# Patient Record
Sex: MALE | Race: WHITE
[De-identification: names, ages, dates, MRNs, and addresses within clinical notes are randomized per-mention and may not be internally consistent; named-entity substitution may affect disease eponyms.]

---

## 2017-02-28 ENCOUNTER — HOSPITAL ENCOUNTER (OUTPATIENT)
Dept: HOSPITAL 75 - REHAB | Age: 37
LOS: 8 days | Discharge: HOME | End: 2017-03-08
Attending: NURSE PRACTITIONER
Payer: COMMERCIAL

## 2017-02-28 DIAGNOSIS — R53.1: Primary | ICD-10-CM

## 2017-09-18 ENCOUNTER — HOSPITAL ENCOUNTER (INPATIENT)
Dept: HOSPITAL 75 - ER | Age: 37
LOS: 4 days | Discharge: HOME | DRG: 623 | End: 2017-09-22
Attending: FAMILY MEDICINE | Admitting: FAMILY MEDICINE
Payer: COMMERCIAL

## 2017-09-18 VITALS — BODY MASS INDEX: 42.66 KG/M2 | HEIGHT: 72 IN | WEIGHT: 315 LBS

## 2017-09-18 DIAGNOSIS — L97.522: ICD-10-CM

## 2017-09-18 DIAGNOSIS — E11.43: ICD-10-CM

## 2017-09-18 DIAGNOSIS — E11.621: Primary | ICD-10-CM

## 2017-09-18 DIAGNOSIS — B95.8: ICD-10-CM

## 2017-09-18 DIAGNOSIS — I10: ICD-10-CM

## 2017-09-18 DIAGNOSIS — Z79.4: ICD-10-CM

## 2017-09-18 DIAGNOSIS — E11.65: ICD-10-CM

## 2017-09-18 DIAGNOSIS — L03.116: ICD-10-CM

## 2017-09-18 DIAGNOSIS — E66.01: ICD-10-CM

## 2017-09-18 DIAGNOSIS — Z91.19: ICD-10-CM

## 2017-09-18 LAB
ALBUMIN SERPL-MCNC: 3.9 GM/DL (ref 3.2–4.5)
ALT SERPL-CCNC: 23 U/L (ref 0–55)
ANION GAP SERPL CALC-SCNC: 14 MMOL/L (ref 5–14)
APTT BLD: 27 SEC (ref 24–35)
AST SERPL-CCNC: 12 U/L (ref 5–34)
BASOPHILS # BLD AUTO: 0 10^3/UL (ref 0–0.1)
BASOPHILS NFR BLD AUTO: 0 % (ref 0–10)
BILIRUB SERPL-MCNC: 0.5 MG/DL (ref 0.1–1)
BUN SERPL-MCNC: 12 MG/DL (ref 7–18)
BUN/CREAT SERPL: 12
CALCIUM SERPL-MCNC: 10.4 MG/DL (ref 8.5–10.1)
CHLORIDE SERPL-SCNC: 95 MMOL/L (ref 98–107)
CO2 SERPL-SCNC: 23 MMOL/L (ref 21–32)
CREAT SERPL-MCNC: 1.01 MG/DL (ref 0.6–1.3)
EOSINOPHIL # BLD AUTO: 0.2 10^3/UL (ref 0–0.3)
EOSINOPHIL NFR BLD AUTO: 2 % (ref 0–10)
ERYTHROCYTE [DISTWIDTH] IN BLOOD BY AUTOMATED COUNT: 14.1 % (ref 10–14.5)
ERYTHROCYTE [SEDIMENTATION RATE] IN BLOOD: 29 MM/HR (ref 0–15)
GFR SERPLBLD BASED ON 1.73 SQ M-ARVRAT: > 60 ML/MIN
GLUCOSE SERPL-MCNC: 583 MG/DL (ref 70–105)
HS C REACTIVE PROTEIN: 5.76 MG/DL (ref 0–0.5)
INR PPP: 0.9 (ref 0.8–1.4)
LYMPHOCYTES # BLD AUTO: 2.6 X 10^3 (ref 1–4)
LYMPHOCYTES NFR BLD AUTO: 36 % (ref 12–44)
MCH RBC QN AUTO: 29 PG (ref 25–34)
MCHC RBC AUTO-ENTMCNC: 35 G/DL (ref 32–36)
MCV RBC AUTO: 83 FL (ref 80–99)
MONOCYTES # BLD AUTO: 0.4 X 10^3 (ref 0–1)
MONOCYTES NFR BLD AUTO: 6 % (ref 0–12)
NEUTROPHILS # BLD AUTO: 4 X 10^3 (ref 1.8–7.8)
NEUTROPHILS NFR BLD AUTO: 56 % (ref 42–75)
PLATELET # BLD: 280 10^3/UL (ref 130–400)
PMV BLD AUTO: 10.3 FL (ref 7.4–10.4)
POTASSIUM SERPL-SCNC: 4.1 MMOL/L (ref 3.6–5)
PROT SERPL-MCNC: 7.5 GM/DL (ref 6.4–8.2)
PROTHROMBIN TIME: 12.5 SEC (ref 12.2–14.7)
RBC # BLD AUTO: 5.1 10^6/UL (ref 4.35–5.85)
SODIUM SERPL-SCNC: 132 MMOL/L (ref 135–145)
WBC # BLD AUTO: 7.2 10^3/UL (ref 4.3–11)

## 2017-09-18 PROCEDURE — 85610 PROTHROMBIN TIME: CPT

## 2017-09-18 PROCEDURE — 87186 SC STD MICRODIL/AGAR DIL: CPT

## 2017-09-18 PROCEDURE — 96365 THER/PROPH/DIAG IV INF INIT: CPT

## 2017-09-18 PROCEDURE — 82962 GLUCOSE BLOOD TEST: CPT

## 2017-09-18 PROCEDURE — 36415 COLL VENOUS BLD VENIPUNCTURE: CPT

## 2017-09-18 PROCEDURE — 93923 UPR/LXTR ART STDY 3+ LVLS: CPT

## 2017-09-18 PROCEDURE — 87077 CULTURE AEROBIC IDENTIFY: CPT

## 2017-09-18 PROCEDURE — 87205 SMEAR GRAM STAIN: CPT

## 2017-09-18 PROCEDURE — 87040 BLOOD CULTURE FOR BACTERIA: CPT

## 2017-09-18 PROCEDURE — 73590 X-RAY EXAM OF LOWER LEG: CPT

## 2017-09-18 PROCEDURE — 73562 X-RAY EXAM OF KNEE 3: CPT

## 2017-09-18 PROCEDURE — 87070 CULTURE OTHR SPECIMN AEROBIC: CPT

## 2017-09-18 PROCEDURE — 73630 X-RAY EXAM OF FOOT: CPT

## 2017-09-18 PROCEDURE — 83605 ASSAY OF LACTIC ACID: CPT

## 2017-09-18 PROCEDURE — 96367 TX/PROPH/DG ADDL SEQ IV INF: CPT

## 2017-09-18 PROCEDURE — 83036 HEMOGLOBIN GLYCOSYLATED A1C: CPT

## 2017-09-18 PROCEDURE — 80202 ASSAY OF VANCOMYCIN: CPT

## 2017-09-18 PROCEDURE — 84443 ASSAY THYROID STIM HORMONE: CPT

## 2017-09-18 PROCEDURE — 85730 THROMBOPLASTIN TIME PARTIAL: CPT

## 2017-09-18 PROCEDURE — 86141 C-REACTIVE PROTEIN HS: CPT

## 2017-09-18 PROCEDURE — 85652 RBC SED RATE AUTOMATED: CPT

## 2017-09-18 PROCEDURE — 85025 COMPLETE CBC W/AUTO DIFF WBC: CPT

## 2017-09-18 PROCEDURE — 80053 COMPREHEN METABOLIC PANEL: CPT

## 2017-09-18 NOTE — ED LOWER EXTREMITY
General


Chief Complaint:  Skin/Wound Problems


Stated Complaint:  L FOOT WOUND


Source:  patient





History of Present Illness


Time seen by provider:  22:10


Initial Comments


PT ARRIVES VIA POV FROM HOME


PT STATES TONIGHT WHEN HE TOOK OFF HIS LEFT SOCK, HE "STARTED TO PULL OFF TISSUE

" FROM HIS FOOT--OCCURRED JUST PRIOR TO ARRIVAL


PT IS INSULIN-DEPENDENT DIABETIC X 20 YEARFS AND HAS NOT HAD ANY SENSATION IN 

HIS FEET FOR AT LEAST THE LAST 10 YEARS


PT STATES HE HAS NEVER HAD A DIABETIC FOOT ULCER, BUT HAS BEEN SEEN BY DR. FITZGERALD

, PODIATRIST, "JUST BECAUSE I'M A DIABETIC" --LAST SEEN HIM IN [place] AND STATES 

HE DID NOT HAVE ANY PROBLEMS, AND HAS NOT HAD ANY PROBLEMS WITH HIS FEET OTHER 

THAN NEUROPATHY. 


PT STATES HE HAS NOT BEEN ABLE TO MOVE HIS LEFT FOOT FOR THE LAST MONTH, AND 

WAS REFERRED TO A NEUROLOGIST IN [place]--SEEN HIM APPROXIMATELY 2 WEEK AGO, AND 

HAD AN MRI OF HIS BACK DONE AT [facility] ON WEDNESDAY 09/13/17--RESULTS UNKNOWN


PT STATES HIS LEFT LEG HAS BEEN SWOLLEN FOR THE LAST 2-3 WEEKS, WAS STARTED ON 

AN NSAID BY MUSHTAQ HAMILTON, ANKLE XRAYS REPORTED AS NORMAL. 


HAS BEEN TOLD "HE PROBABLY HAS POOR CIRCULATION IN HIS FEET" BY MUSHTAQ HAMILTON


PT DENIES ANY FEVER


NO KNOWN INJURY TO LEFT LEG OR FOOT


PT STATES HIS MOST RECENT HB A1C WAS 14


STATES HIS BLOOD SUGARS  FASTING AND 'S AFTER MEALS,AND HAVE FOR A 

LONG TIME.





PCP: GALLITO, MUSHTAQ HAMILTON


PODIATRIST: DR. FITZGERALD





Allergies and Home Medications


Allergies


Coded Allergies:  


     levofloxacin (Verified  Allergy, Mild, 4/14/15)


     sweet potato (Unverified  Allergy, Unknown, 4/14/15)





Home Medications


Indomethacin 50 Mg Capsule, (Reported)


Lisinopril/Hydrochlorothiazide 1 Each Tablet, (Reported)


Metformin HCl 500 Mg Tab.er.24h, (Reported)


Multivits,Ca,Min/Iron/Fa/Lycop 1 Each Tablet, 1 TAB PO DAILY, (Reported)


[Invokana]  , (Reported)


[Lantus]  , (Reported)


[Levemir]  , (Reported)





Constitutional:  no symptoms reported


Respiratory:  no symptoms reported


Cardiovascular:  no symptoms reported


Gastrointestinal:  no symptoms reported


Musculoskeletal:  see HPI


Skin:  see HPI


Psychiatric/Neurological:  See HPI





Past Medical-Social-Family Hx


Patient Social History


Alcohol Use:  Denies Use


Recreational Drug Use:  No


Smoking Status:  Former Smoker (SMOKED 8-10 CIGARS PLUS SMOKED A PIPE EVERY DAY-

-QUIT > 10 YEARS AGO, PER PT ON 09/18/17)


Type Used:  Cigars, Pipe


Recent Foreign Travel:  No


Contact w/Someone Who Travel:  No





Immunizations Up To Date


Tetanus Booster (TDap):  More than 5yrs





Seasonal Allergies


Seasonal Allergies:  No





Surgeries


History of Surgeries:  Yes (SURGERY ON SCROTAL ABSCESS 2015)





Respiratory


History of Respiratory Disorde:  No





Cardiovascular


History of Cardiac Disorders:  Yes


Cardiac Disorders:  Hypertension





Neurological


History of Neurological Disord:  Yes (PERIPHERAL NEUROPATHY IN FEET --NO 

FEELING AT ALL IN FEET FOR OVER 10 YEARS, PER PT ON 09/18/17)


Neurological Disorders:  Neuropathy





Reproductive System


Hx Reproductive Disorders:  No


Sexually Transmitted Disease:  No


HIV/AIDS:  No





Genitourinary


History of Genitourinary Disor:  No





Gastrointestinal


History of Gastrointestinal Di:  No





Musculoskeletal


History of Musculoskeletal Dis:  Yes (DENIES)





Endocrine


History of Endocrine Disorders:  Yes


Endocrine Disorders:  Diabetes, Insulin dep





HEENT


History of HEENT Disorders:  No


Loss of Vision:  Denies


Hearing Impairment:  Denies





Cancer


History of Cancer:  No





Psychosocial


History of Psychiatric Problem:  No





Integumentary


History of Skin or Integumenta:  Yes (SCROTAL ABSCESS 2015; LEFT FOOT DIABETIC 

FOOT ULCER DX 09/18/17)





Blood Transfusions


Adverse Reaction to a Blood Tr:  No





Physical Exam


Vital Signs





Vital Sign - Last 12Hours








 9/18/17





 22:04


 


Temp 96.8


 


Pulse 102


 


Resp 20


 


B/P (MAP) 180/106


 


Pulse Ox 97


 


O2 Delivery Room Air





Capillary Refill :


General Appearance:  obese (MORBIDLY OBESE)


HEENT:  PERRL/EOMI


Neck:  normal inspection


Cardiovascular:  regular rate, rhythm, other (PEDAL PULSES +3/4 BILATERALLY)


Respiratory:  normal breath sounds, no respiratory distress, no accessory 

muscle use


Gastrointestinal:  non tender, soft


Hips:  bilateral hip non-tender, bilateral hip normal range of motion


Legs:  bilateral leg other (RIGHT LEG WITH CHRONIC VENOUS STASIS CHANGES AND 

FAINT ERYTHEMA TO DISTAL ASPECT OF LOWER LEG AND FOOT. LEG APPEARS TO HAVE 2+ 

EDEMA. NO WOUNDS OR SORES NOTED TO RIGHT LEG.    ENTIRE LEFT LEG FROM HIP TO 

TOES IS SIGNIFICANTLY LARGER THAN RIGHT LEG, AND APPEARS TO HAVE +4/4 EDEMA.  

HAS MARKED ERYTHEMA TO LEFT LOWER LEG AND FOOT, EXTENDING TO JUST INFERIOR TO 

KNEE.  BOTTOM OF LEFT FOOT WITH STAGE 2-3 FOOT ULCER, CENTRAL AREA OF 

APPROXIMATELY 2 CM WITH HARD CALLOUS-LIKE TEXTURE, WITH APPROXIMATELY 4.5 X 4.5 

CM SURROUNDING TISSUE HAS BEEN PULLED OFF COMPLETELY OR  FROM 

UNDERLYING TISSUE. SLIGHT OOZING OF SEROSANGUINOUS FLUID. BOTH FEET ARE 

COMPLETELY BLACK WITH DIRT.  DOES HAVE STRONG PULSES IN BOTH FEET--+3/4 

BILATERALLY)


Knees:  bilateral knee non-tender


Ankles:  bilateral ankle other (AS ABOVE)


Feet:  bilateral foot other (AS ABOVE)


Neurologic/Tendon:  other (NO SENSATION TO EITHER FOOT OR LOWER LEG.   PT HAS 

NO MOTOR ACTIVITY OF FOOT OR ANKLE )


Neurologic/Psychiatric:  CNs II-XII nml as tested, alert, normal mood/affect (

PLEASANT, TALKATIVE, SMILING), oriented x 3, other (AS ABOVE)


Skin:  warm/dry, other (AS ABOVE)





Progress/Results/Core Measures


Results/Orders


Lab Results





Laboratory Tests








Test


  9/18/17


22:35 Range/Units


 


 


White Blood Count


  7.2 


  4.3-11.0


10^3/uL


 


Red Blood Count


  5.10 


  4.35-5.85


10^6/uL


 


Hemoglobin 14.7  13.3-17.7  G/DL


 


Hematocrit 42  40-54  %


 


Mean Corpuscular Volume 83  80-99  FL


 


Mean Corpuscular Hemoglobin 29  25-34  PG


 


Mean Corpuscular Hemoglobin


Concent 35 


  32-36  G/DL


 


 


Red Cell Distribution Width 14.1  10.0-14.5  %


 


Platelet Count


  280 


  130-400


10^3/uL


 


Mean Platelet Volume 10.3  7.4-10.4  FL


 


Neutrophils (%) (Auto) 56  42-75  %


 


Lymphocytes (%) (Auto) 36  12-44  %


 


Monocytes (%) (Auto) 6  0-12  %


 


Eosinophils (%) (Auto) 2  0-10  %


 


Basophils (%) (Auto) 0  0-10  %


 


Neutrophils # (Auto) 4.0  1.8-7.8  X 10^3


 


Lymphocytes # (Auto) 2.6  1.0-4.0  X 10^3


 


Monocytes # (Auto) 0.4  0.0-1.0  X 10^3


 


Eosinophils # (Auto)


  0.2 


  0.0-0.3


10^3/uL


 


Basophils # (Auto)


  0.0 


  0.0-0.1


10^3/uL


 


Erythrocyte Sedimentation Rate 29 H 0-15  MM/HR


 


Prothrombin Time 12.5  12.2-14.7  SEC


 


INR Comment 0.9  0.8-1.4  


 


Activated Partial


Thromboplast Time 27 


  24-35  SEC


 


 


Sodium Level 132 L 135-145  MMOL/L


 


Potassium Level 4.1  3.6-5.0  MMOL/L


 


Chloride Level 95 L   MMOL/L


 


Carbon Dioxide Level 23  21-32  MMOL/L


 


Anion Gap 14  5-14  MMOL/L


 


Blood Urea Nitrogen 12  7-18  MG/DL


 


Creatinine


  1.01 


  0.60-1.30


MG/DL


 


Estimat Glomerular Filtration


Rate > 60 


   


 


 


BUN/Creatinine Ratio 12   


 


Glucose Level 583 *H   MG/DL


 


Lactic Acid Level


  3.52 *H


  0.50-2.00


MMOL/L


 


Calcium Level 10.4 H 8.5-10.1  MG/DL


 


Total Bilirubin 0.5  0.1-1.0  MG/DL


 


Aspartate Amino Transf


(AST/SGOT) 12 


  5-34  U/L


 


 


Alanine Aminotransferase


(ALT/SGPT) 23 


  0-55  U/L


 


 


Alkaline Phosphatase 103    U/L


 


C-Reactive Protein High


Sensitivity 5.76 H


  0.00-0.50


MG/DL


 


Total Protein 7.5  6.4-8.2  GM/DL


 


Albumin 3.9  3.2-4.5  GM/DL


 


TSH Cascade Testing


  1.69 


  0.35-4.94


UIU/ML








My Orders





Orders - DIMITRIS VELASCO DO


Saline Lock/Iv-Start (9/18/17 22:19)


Cbc With Automated Diff (9/18/17 22:19)


Comprehensive Metabolic Panel (9/18/17 22:19)


Hs C Reactive Protein (9/18/17 22:19)


Erythrocyte Sedimentation Rate (9/18/17 22:19)


Lactic Acid Analyzer (9/18/17 22:19)


Protime With Inr (9/18/17 22:19)


Partial Thromboplastin Time (9/18/17 22:19)


Thyroid Analyzer (9/18/17 22:19)


Blood Culture (9/18/17 22:19)


Wound Culture (9/18/17 22:19)


Tibia/Fibula, Left, 2 Views (9/18/17 22:19)


Foot, Left, 3 Views (9/18/17 22:19)


Us Venous Lower Ext Lt (9/18/17 22:19)





Vital Signs/I&O





Vital Sign - Last 12Hours








 9/18/17





 22:04


 


Temp 96.8


 


Pulse 102


 


Resp 20


 


B/P (MAP) 180/106


 


Pulse Ox 97


 


O2 Delivery Room Air








Progress Note :  


Progress Note


NO DETERIORATION IN PT'S CONDITION DURING ER STAY





Diagnostic Imaging





Comments


VENOUS DOPPLER LEFT LEG--NO DVT. PER [facility] VIA FAX @ 3582. TECH REPORTED TO 

ME THAT PT ALSO WAS NOTED TO HAVE GOOD TRIPHASIC ARTERIAL FLOW AS WELL. 





XRAYS LEFT FOOT--NO ACUTE DISEASE


XRAYS LEFT TIB-FIB--NO ACUTE DISEASE


BOTH PENDING RADIOLOGIST REVIEW


   Reviewed:  Reviewed by Me





Departure


Communication (Admissions)


Progress Notes


3458--SPOKE WITH DR. PIMENTEL, ACCEPTS PT FOR ADMIT.





Impression


Impression:  


 Primary Impression:  


 NEW DIABETIC FOOT ULCER LEFT FOOT


 Additional Impressions:  


 CELLULITIS LEFT LEG AND FOOT


 Diabetes mellitus, insulin dependent (IDDM), uncontrolled


 Peripheral neuropathy


 LOSS OF MOVEMENT OF LEFT FOOT


 Morbid obesity


Disposition:  09 ADMITTED AS INPATIENT


Condition:  Stable





Admissions


Decision to Admit Reason:  Admit from ER (General)


Decision to Admit/Date:  Sep 19, 2017


Time/Decision to Admit Time:  23:30





Departure-Patient Inst.


Referrals:  


JUNIE HAMILTON (PCP)


Primary Care Physician








[facility] (Family)


Primary Care Physician











DIMITRIS VELASCO DO Sep 18, 2017 22:25

## 2017-09-19 VITALS — SYSTOLIC BLOOD PRESSURE: 140 MMHG | DIASTOLIC BLOOD PRESSURE: 77 MMHG

## 2017-09-19 VITALS — SYSTOLIC BLOOD PRESSURE: 138 MMHG | DIASTOLIC BLOOD PRESSURE: 85 MMHG

## 2017-09-19 VITALS — SYSTOLIC BLOOD PRESSURE: 141 MMHG | DIASTOLIC BLOOD PRESSURE: 86 MMHG

## 2017-09-19 VITALS — DIASTOLIC BLOOD PRESSURE: 75 MMHG | SYSTOLIC BLOOD PRESSURE: 148 MMHG

## 2017-09-19 VITALS — DIASTOLIC BLOOD PRESSURE: 81 MMHG | SYSTOLIC BLOOD PRESSURE: 138 MMHG

## 2017-09-19 VITALS — SYSTOLIC BLOOD PRESSURE: 128 MMHG | DIASTOLIC BLOOD PRESSURE: 87 MMHG

## 2017-09-19 LAB
ALBUMIN SERPL-MCNC: 3.6 GM/DL (ref 3.2–4.5)
ALT SERPL-CCNC: 21 U/L (ref 0–55)
ANION GAP SERPL CALC-SCNC: 13 MMOL/L (ref 5–14)
AST SERPL-CCNC: 13 U/L (ref 5–34)
BASOPHILS # BLD AUTO: 0 10^3/UL (ref 0–0.1)
BASOPHILS NFR BLD AUTO: 0 % (ref 0–10)
BILIRUB SERPL-MCNC: 0.6 MG/DL (ref 0.1–1)
BUN SERPL-MCNC: 11 MG/DL (ref 7–18)
BUN/CREAT SERPL: 14
CALCIUM SERPL-MCNC: 9.8 MG/DL (ref 8.5–10.1)
CHLORIDE SERPL-SCNC: 100 MMOL/L (ref 98–107)
CO2 SERPL-SCNC: 22 MMOL/L (ref 21–32)
CREAT SERPL-MCNC: 0.77 MG/DL (ref 0.6–1.3)
EOSINOPHIL # BLD AUTO: 0.2 10^3/UL (ref 0–0.3)
EOSINOPHIL NFR BLD AUTO: 2 % (ref 0–10)
ERYTHROCYTE [DISTWIDTH] IN BLOOD BY AUTOMATED COUNT: 14.4 % (ref 10–14.5)
GFR SERPLBLD BASED ON 1.73 SQ M-ARVRAT: > 60 ML/MIN
GLUCOSE SERPL-MCNC: 385 MG/DL (ref 70–105)
LYMPHOCYTES # BLD AUTO: 3.4 X 10^3 (ref 1–4)
LYMPHOCYTES NFR BLD AUTO: 41 % (ref 12–44)
MCH RBC QN AUTO: 29 PG (ref 25–34)
MCHC RBC AUTO-ENTMCNC: 34 G/DL (ref 32–36)
MCV RBC AUTO: 85 FL (ref 80–99)
MONOCYTES # BLD AUTO: 0.5 X 10^3 (ref 0–1)
MONOCYTES NFR BLD AUTO: 6 % (ref 0–12)
NEUTROPHILS # BLD AUTO: 4.1 X 10^3 (ref 1.8–7.8)
NEUTROPHILS NFR BLD AUTO: 50 % (ref 42–75)
PLATELET # BLD: 275 10^3/UL (ref 130–400)
PMV BLD AUTO: 10.5 FL (ref 7.4–10.4)
POTASSIUM SERPL-SCNC: 4 MMOL/L (ref 3.6–5)
PROT SERPL-MCNC: 6.5 GM/DL (ref 6.4–8.2)
RBC # BLD AUTO: 4.85 10^6/UL (ref 4.35–5.85)
SODIUM SERPL-SCNC: 135 MMOL/L (ref 135–145)
WBC # BLD AUTO: 8.1 10^3/UL (ref 4.3–11)

## 2017-09-19 RX ADMIN — SODIUM CHLORIDE SCH MLS/HR: 900 INJECTION, SOLUTION INTRAVENOUS at 01:46

## 2017-09-19 RX ADMIN — VANCOMYCIN HYDROCHLORIDE SCH MLS/HR: 500 INJECTION, POWDER, LYOPHILIZED, FOR SOLUTION INTRAVENOUS at 22:02

## 2017-09-19 RX ADMIN — SODIUM CHLORIDE SCH MLS/HR: 900 INJECTION, SOLUTION INTRAVENOUS at 21:53

## 2017-09-19 RX ADMIN — METFORMIN HYDROCHLORIDE SCH MG: 500 TABLET, EXTENDED RELEASE ORAL at 18:04

## 2017-09-19 RX ADMIN — SODIUM CHLORIDE SCH MLS/HR: 900 INJECTION INTRAVENOUS at 18:48

## 2017-09-19 RX ADMIN — SODIUM CHLORIDE SCH MLS/HR: 900 INJECTION, SOLUTION INTRAVENOUS at 11:14

## 2017-09-19 RX ADMIN — ENOXAPARIN SODIUM SCH MG: 100 INJECTION SUBCUTANEOUS at 18:04

## 2017-09-19 RX ADMIN — INSULIN HUMAN SCH UNIT: 100 INJECTION, SOLUTION PARENTERAL at 16:25

## 2017-09-19 RX ADMIN — INSULIN HUMAN SCH UNIT: 100 INJECTION, SOLUTION PARENTERAL at 11:15

## 2017-09-19 RX ADMIN — INSULIN HUMAN SCH UNIT: 100 INJECTION, SOLUTION PARENTERAL at 22:02

## 2017-09-19 RX ADMIN — INSULIN HUMAN SCH UNIT: 100 INJECTION, SOLUTION PARENTERAL at 06:23

## 2017-09-19 RX ADMIN — VANCOMYCIN HYDROCHLORIDE SCH MLS/HR: 500 INJECTION, POWDER, LYOPHILIZED, FOR SOLUTION INTRAVENOUS at 15:21

## 2017-09-19 RX ADMIN — SODIUM CHLORIDE SCH MLS/HR: 900 INJECTION, SOLUTION INTRAVENOUS at 17:12

## 2017-09-19 RX ADMIN — VANCOMYCIN HYDROCHLORIDE SCH MLS/HR: 500 INJECTION, POWDER, LYOPHILIZED, FOR SOLUTION INTRAVENOUS at 06:52

## 2017-09-19 NOTE — XMS REPORT
[facility]

 Created on: 2015



Cookie  Aj

External Reference #: [id number]

: 1980

Sex: Male



Demographics







 Address  [address] [place]  [address]

 

 Home Phone  [phone]

 

 Preferred Language  Unknown

 

 Marital Status  Unknown

 

 Uatsdin Affiliation  Unknown

 

 Race  White

 

 Ethnic Group  Not  or 





Author







 Author  JUNIE HAMILTON

 

 [facility]  eClinical[employer]

 

 Address  Unknown

 

 Phone  Unavailable







Care Team Providers







 Care Team Member Name  Role  Phone

 

 JUNIE HAMILTON  CP  Unavailable



                                                                



Allergies, Adverse Reactions, Alerts

          





 Substance  Reaction  Event Type

 

 Levaquin  anaphylaxis  Drug Allergy



                                                                               
         



Problems

          





 Problem Type  Condition  Code  Onset Dates  Condition Status

 

 Problem  Dermatophytosis of nail  110.1     Active

 

 Problem  Diabetes mellitus without mention of complication, type II or 
unspecified type, not stated as uncontrolled  250.00     Active

 

 Problem  Lesion of plantar nerve  355.6     Active

 

 Problem  Long term current use of insulin  Z79.4     Active

 

 Problem  Left knee pain  M25.562     Active

 

 Problem  Type 2 diabetes mellitus with diabetic neuropathy  E11.40     Active

 

 Problem  Pain in joint, shoulder region  719.41     Active

 

 Problem  Essential hypertension, benign  401.1     Active

 

 Problem  Pain, joint, lower leg, left  M25.562     Active

 

 Problem  Cellulitis of left lower extremity  L03.116     Active

 

 Assessment  Cellulitis of left lower extremity  L03.116     Active

 

 Problem  Other affections of shoulder region, not elsewhere classified  726.2 
    Active

 

 Assessment  Left knee pain  M25.562     Active

 

 Problem  Unspecified hereditary and idiopathic peripheral neuropathy  356.9   
  Active

 

 Assessment  Type 2 diabetes mellitus with diabetic neuropathy  E11.40     
Active

 

 Problem  Dermatophytosis of foot  110.4     Active



                                                                               
                                                                               
                                                                                



Medications

          





 Medication  Code System  Code  Instructions  Start Date  End Date  Status  
Dosage

 

 Norco  NDC  [address]-53   MG Orally Once a day at hs prn           1 
tablet as needed

 

 Novolin 70/30  NDC  [address]-97  (70-30) 100 UNIT/ML Subcutaneous 2 times a 
day  Nov 10, 2015        50-60 & 60-70

 

 Lisinopril-Hydrochlorothiazide  NDC  [address]-50  20-25 MG Orally Once a day
  Dec 08, 2014        1 tablet

 

 Bactrim DS  NDC  [address]-99  800-160 MG Orally 2 times a day           1 
tablet

 

 ReliOn R  NDC  0               15 UNITS SQ TID W/ SSI PRN

 

 Insulin Regular Human  NDC  [address]-80  100 UNIT/ML Injection 3 times a day
  2015        15 units



                                                                               
                                                           



Procedures

          





 Procedure  Coding System  Code  Date

 

 Office Visit, Est Pt., Level 3  CPT-4  28833  2015



                                                                               
                   



Vital Signs

          





 Date/Time:  2015

 

 Temperature  97.0 F

 

 Weight  439.2 lbs

 

 Height  72 in

 

 Pain Scale  8 1-10

 

 Blood Pressure Diastolic  82 mmHg

 

 Blood Pressure Systolic  126 mmHg

 

 Cardiac Monitoring Heart Rate  88 bpm

 

 BMI  59.56 Index



                                                                              



Results

          No Known Results                                                     
               



Summary Purpose

          eClinicalWorks Submission

## 2017-09-19 NOTE — XMS REPORT
[facility]

 Created on: 2017



Cookie  Aj

External Reference #: [id number]

: 1980

Sex: Male



Demographics







 Address  [address] [place] [address]  [address]

 

 Preferred Language  Unknown

 

 Marital Status  Unknown

 

 Jainism Affiliation  Unknown

 

 Race  Unknown

 

 Ethnic Group  Unknown





Author







 Author  JUNIE HAMILTON

 

 [facility]

 

 Address  [address] [place]  [address]



 

 Phone  [phone]







Care Team Providers







 Care Team Member Name  Role  Phone

 

 JUNIE HAMILTON  Unavailable  [phone]







PROBLEMS







 Type  Condition  ICD9-CM Code  GSR16-BZ Code  Onset Dates  Condition Status  
SNOMED Code

 

 Problem  Type 2 diabetes mellitus with diabetic neuropathy     E11.40     
Active  96170676

 

 Problem  Long term current use of insulin     Z79.4     Active  457414329

 

 Problem  Left knee pain     M25.562     Active  67359137

 

 Problem  Abnormal NCS (nerve conduction studies)     R94.130     Active  
07994612

 

 Problem  Pain, joint, lower leg, left     M25.562     Active  06081517

 

 Problem  Cellulitis of left lower extremity     L03.116     Active  693739030

 

 Problem  Stasis dermatitis of both legs     I87.2     Active  02398089

 

 Problem  Loss of movement     R29.898     Active  74737727

 

 Problem  Bilateral swelling of feet     M79.89     Active  264808982

 

 Problem  Essential hypertension with goal blood pressure less than 130/85     
I10     Active  62379959

 

 Problem  Body mass index (BMI) of 60.0-69.9 in adult     Z68.44     Active  
31978

 

 Problem  Morbid (severe) obesity due to excess calories     E66.01     Active  
142773636







ALLERGIES

Unknown Allergies



SOCIAL HISTORY

No smoking Hx information available



PLAN OF CARE





VITAL SIGNS





MEDICATIONS







 Medication  Instructions  Dosage  Frequency  Start Date  End Date  Duration  
Status

 

 Lantus 100 UNIT/ML  Subcutaneous 2 times a day  55 units in am and 45 units at 
bedtime  12h  02 Aug, 2016     30 days  Active







RESULTS

No Results



PROCEDURES

No Known procedures



IMMUNIZATIONS

No Known Immunizations

## 2017-09-19 NOTE — XMS REPORT
[facility]

 Created on: 01/15/2016



Aj Gary

External Reference #: [id number]

: 1980

Sex: Male



Demographics







 Address  [address] [place]  [address]

 

 Home Phone  [phone]

 

 Preferred Language  Unknown

 

 Marital Status  Unknown

 

 Adventist Affiliation  Unknown

 

 Race  White

 

 Ethnic Group  Not  or 





Author







 Author  ZITA SALGADO

 

 [facility]  eClinical[employer]

 

 Address  Unknown

 

 Phone  Unavailable







Care Team Providers







 Care Team Member Name  Role  Phone

 

 ZITA SALGADO    Unavailable



                                                                



Allergies

          No Known Allergies                                                   
                                     



Problems

          





 Problem Type  Condition  Code  Onset Dates  Condition Status

 

 Problem  Dermatophytosis of nail  110.1     Active

 

 Problem  Diabetes mellitus without mention of complication, type II or 
unspecified type, not stated as uncontrolled  250.00     Active

 

 Problem  Lesion of plantar nerve  355.6     Active

 

 Problem  Long term current use of insulin  Z79.4     Active

 

 Problem  Left knee pain  M25.562     Active

 

 Problem  Type 2 diabetes mellitus with diabetic neuropathy  E11.40     Active

 

 Problem  Pain in joint, shoulder region  719.41     Active

 

 Problem  Essential hypertension, benign  401.1     Active

 

 Problem  Pain, joint, lower leg, left  M25.562     Active

 

 Problem  Cellulitis of left lower extremity  L03.116     Active

 

 Assessment  Lateral meniscus tear  S83.289A     Active

 

 Problem  Other affections of shoulder region, not elsewhere classified  726.2 
    Active

 

 Problem  Unspecified hereditary and idiopathic peripheral neuropathy  356.9   
  Active

 

 Assessment  Osteoarthritis of left knee  M17.9     Active

 

 Problem  Dermatophytosis of foot  110.4     Active



                                                                               
                                                                               
                                                                      



Medications

          No Known Medications                                                 
                                       



Procedures

          





 Procedure  Coding System  Code  Date

 

 Office Visit, Est Pt., Level 2  CPT-4  80047  2016



                                                                               
                   



Vital Signs

          





 Date/Time:  2016

 

 Blood Pressure Diastolic  84 mmHg

 

 Blood Pressure Systolic  128 mmHg

 

 Height  72 in



                                                                              



Results

          No Known Results                                                     
               



Summary Purpose

          eClinicalWorks Submission

## 2017-09-19 NOTE — XMS REPORT
[facility]

 Created on: 2017



Cookie  Aj

External Reference #: [id number]

: 1980

Sex: Male



Demographics







 Address  [address] [place]  [address]

 

 Preferred Language  Unknown

 

 Marital Status  Unknown

 

 Islam Affiliation  Unknown

 

 Race  Unknown

 

 Ethnic Group  Unknown





Author







 Author  JUNIE HAMILTON

 

 [facility]

 

 Address  [address] [place]  [address]



 

 Phone  [phone]







Care Team Providers







 Care Team Member Name  Role  Phone

 

 JUNIE HAMILTON  Unavailable  [phone]







PROBLEMS







 Type  Condition  ICD9-CM Code  DAQ76-NM Code  Onset Dates  Condition Status  
SNOMED Code

 

 Problem  Type 2 diabetes mellitus with diabetic neuropathy     E11.40     
Active  91000136

 

 Problem  Long term current use of insulin     Z79.4     Active  265741260

 

 Problem  Left knee pain     M25.562     Active  72094485

 

 Problem  Abnormal NCS (nerve conduction studies)     R94.130     Active  
52091021

 

 Problem  Pain, joint, lower leg, left     M25.562     Active  84063704

 

 Problem  Cellulitis of left lower extremity     L03.116     Active  475716139

 

 Problem  Stasis dermatitis of both legs     I87.2     Active  48659326

 

 Problem  Loss of movement     R29.898     Active  00095235

 

 Problem  Bilateral swelling of feet     M79.89     Active  549369054

 

 Problem  Essential hypertension with goal blood pressure less than 130/85     
I10     Active  54395204

 

 Problem  Body mass index (BMI) of 60.0-69.9 in adult     Z68.44     Active  
252018908

 

 Problem  Morbid (severe) obesity due to excess calories     E66.01     Active  
461688686







ALLERGIES

Unknown Allergies



SOCIAL HISTORY

No smoking Hx information available



PLAN OF CARE





VITAL SIGNS





MEDICATIONS

Unknown Medications



RESULTS

No Results



PROCEDURES

No Known procedures



IMMUNIZATIONS

No Known Immunizations

## 2017-09-19 NOTE — XMS REPORT
[facility]

 Created on: 2015



Cookie  Aj

External Reference #: [id number]

: 1980

Sex: Male



Demographics







 Address  [address] [place]  [address]

 

 Home Phone  [phone]

 

 Preferred Language  Unknown

 

 Marital Status  Unknown

 

 [employer] Affiliation  Unknown

 

 Race  White

 

 Ethnic Group  Not  or 





Author







 Author  RITA LAUGHLIN

 

 [facility]  eClinical[employer]

 

 Address  Unknown

 

 Phone  Unavailable







Care Team Providers







 Care Team Member Name  Role  Phone

 

 RITA LAUGHLIN  CP  Unavailable



                                                                



Allergies, Adverse Reactions, Alerts

          





 Substance  Reaction  Event Type

 

 Levaquin  anaphylaxis  Drug Allergy



                                                                               
         



Problems

          





 Problem Type  Condition  Code  Onset Dates  Condition Status

 

 Problem  Dermatophytosis of nail  110.1     Active

 

 Problem  Diabetes mellitus without mention of complication, type II or 
unspecified type, not stated as uncontrolled  250.00     Active

 

 Problem  Lesion of plantar nerve  355.6     Active

 

 Problem  Long term current use of insulin  Z79.4     Active

 

 Problem  Left knee pain  M25.562     Active

 

 Problem  Type 2 diabetes mellitus with diabetic neuropathy  E11.40     Active

 

 Problem  Pain in joint, shoulder region  719.41     Active

 

 Problem  Essential hypertension, benign  401.1     Active

 

 Problem  Pain, joint, lower leg, left  M25.562     Active

 

 Problem  Cellulitis of left lower extremity  L03.116     Active

 

 Assessment  Cellulitis of left lower extremity  L03.116     Active

 

 Problem  Other affections of shoulder region, not elsewhere classified  726.2 
    Active

 

 Problem  Unspecified hereditary and idiopathic peripheral neuropathy  356.9   
  Active

 

 Problem  Dermatophytosis of foot  110.4     Active



                                                                               
                                                                               
                                                            



Medications

          





 Medication  Code System  Code  Instructions  Start Date  End Date  Status  
Dosage

 

 Novolin 70/30  NDC  [address]-20  (70-30) 100 UNIT/ML Subcutaneous   Nov 10, 
2015        50-60 & 60-70

 

 ReliOn R  NDC  0               15 UNITS SQ TID W/ SSI PRN

 

 [place]  NDC  [address]-22   MG Orally Once a day at hs prn  Nov 10, 2015
        1 tablet as needed

 

 Lisinopril-Hydrochlorothiazide  NDC  [address]-81  20-25 MG Orally Once a day
  Dec 08, 2014        1 tablet

 

 Bactrim DS  NDC  [address]-88  800-160 MG Orally 2 times a day       1 tablet



                                                                               
                                                 



Procedures

          





 Procedure  Coding System  Code  Date

 

 Office Visit, Est Pt., Level 3  CPT-4  03824  2015



                                                                               
                   



Vital Signs

          





 Date/Time:  2015

 

 Temperature  98 F

 

 Weight  438.1 lbs

 

 Height  72 in

 

 BMI  59.41 Index

 

 Blood Pressure Diastolic  86 mmHg

 

 Blood Pressure Systolic  122 mmHg

 

 Cardiac Monitoring Heart Rate  82 bpm



                                                                              



Results

          No Known Results                                                     
               



Summary Purpose

          eClinicalWorks Submission

## 2017-09-19 NOTE — XMS REPORT
[facility]

 Created on: 2016



Aj Gary

External Reference #: [id number]

: 1980

Sex: Male



Demographics







 Address  [address] [place]  [address]

 

 Home Phone  [phone]

 

 Preferred Language  Unknown

 

 Marital Status  Unknown

 

 [employer] Affiliation  Unknown

 

 Race  White

 

 Ethnic Group  Not  or 





Author







 Author  JUNIE HAMILTON

 

 Organization  [employer]

 

 Address  Unknown

 

 Phone  Unavailable







Care Team Providers







 Care Team Member Name  Role  Phone

 

 JUNIE HAMILTON  CP  Unavailable



                                                                



Allergies

          No Known Allergies                                                   
                                     



Problems

          





 Problem Type  Condition  Code  Onset Dates  Condition Status

 

 Problem  Essential hypertension with goal blood pressure less than 130/85  I10
     Active

 

 Problem  Type 2 diabetes mellitus with diabetic neuropathy  E11.40     Active

 

 Problem  Bilateral swelling of feet  M79.89     Active

 

 Problem  Pain, joint, lower leg, left  M25.562     Active

 

 Problem  Cellulitis of left lower extremity  L03.116     Active

 

 Problem  Long term current use of insulin  Z79.4     Active

 

 Problem  Left knee pain  M25.562     Active



                                                                               
                                                                     



Medications

          





 Medication  Code System  Code  Instructions  Start Date  End Date  Status  
Dosage

 

 Lantus  NDC  [address]-13  100 UNIT/ML Subcutaneous 2 times a day  Aug 02, 
2016        60 units



                                                                              



Results

          No Known Results                                                     
               



Summary Purpose

          eClinicalWorks Submission

## 2017-09-19 NOTE — XMS REPORT
[facility]

 Created on: 2017



Cookie  Aj

External Reference #: [id number]

: 1980

Sex: Male



Demographics







 Address  [address] [place] [address]  [address]

 

 Preferred Language  Unknown

 

 Marital Status  Unknown

 

 [employer] Affiliation  Unknown

 

 Race  Unknown

 

 Ethnic Group  Unknown





Author







 Author  JUNIE HAMILTON

 

 [facility]

 

 Address  [address] [place]  [address]



 

 Phone  [phone]







Care Team Providers







 Care Team Member Name  Role  Phone

 

 JUNIE HAMILTON  Unavailable  [phone]







PROBLEMS







 Type  Condition  ICD9-CM Code  IHX48-AL Code  Onset Dates  Condition Status  
SNOMED Code

 

 Problem  Type 2 diabetes mellitus with diabetic neuropathy     E11.40     
Active  72180994

 

 Problem  Long term current use of insulin     Z79.4     Active  368392817

 

 Problem  Left knee pain     M25.562     Active  00730025

 

 Problem  Abnormal NCS (nerve conduction studies)     R94.130     Active  
22272276

 

 Problem  Pain, joint, lower leg, left     M25.562     Active  55166564

 

 Problem  Cellulitis of left lower extremity     L03.116     Active  627979455

 

 Problem  Stasis dermatitis of both legs     I87.2     Active  44700147

 

 Problem  Loss of movement     R29.898     Active  06280313

 

 Problem  Bilateral swelling of feet     M79.89     Active  359967524

 

 Problem  Essential hypertension with goal blood pressure less than 130/85     
I10     Active  70474494

 

 Problem  Body mass index (BMI) of 60.0-69.9 in adult     Z68.44     Active  
196845743

 

 Problem  Morbid (severe) obesity due to excess calories     E66.01     Active  
394350016







ALLERGIES







 Substance  Reaction  Event Type  Date  Status

 

 Levaquin  anaphylaxis  Drug Allergy    Active







SOCIAL HISTORY

No smoking Hx information available



PLAN OF CARE







 Activity  Details









  









 Follow Up  2 Months Reason:DM







VITAL SIGNS







 Height  72 in  2017

 

 Weight  451 lbs  2017

 

 Temperature  98.2 degrees Fahrenheit  2017

 

 Heart Rate  94 bpm  2017

 

 Respiratory Rate  22   2017

 

 BMI  61.16 kg/m2  2017

 

 Blood pressure systolic  144 mmHg  2017

 

 Blood pressure diastolic  82 mmHg  2017







MEDICATIONS







 Medication  Instructions  Dosage  Frequency  Start Date  End Date  Duration  
Status

 

 Lisinopril-Hydrochlorothiazide 20-25 MG  Orally Once a day  1 tablet  24h     
   30 days  Active

 

 Victoza 18 MG/3ML  Subcutaneous Once a day  1.8 mg  24h          
Active

 

 Invokana 300 MG  Orally Once a day  1 tablet  24h  21 Dec, 2016  30 Jul, 2017  
30 days  Active

 

 Lantus 100 UNIT/ML  Subcutaneous 2 times a day  55 units in am and 45 units at 
bedtime  12h  02 Aug, 2016     30 days  Active

 

 NovoLog Flexpen 100 UNIT/ML  Subcutaneous 3 times a day before meals  55 as 
directed     12 Sep, 2016        Active

 

 MetFORMIN HCl  MG  Orally twice a day  2 tablet with food  12h  06 Sep, 
2016        Active







RESULTS







 Name  Result  Date  Reference Range

 

 MICROALBUMIN, URINE (IN HOUSE)         

 

 MICROALBUMIN  High Abnormal      

 

 Lot #  [id number]      

 

 Exp date  2017      

 

 Clarity  Clear      

 

 Color  Yellow      

 

 ALB  80      

 

 CRE  50      

 

 A:C (IN HOUSE)  >300      

 

 Control  +      

 

 Control         

 

 Lot #         

 

 Exp date         

 

 MICROALBUMIN/CREATININE RATIO, URINE     2017   

 

 Creatinine, Urine  20.2     Not Estab.

 

 Microalbumin, Urine  57.9     Not Estab.

 

 Microalb/Creat Ratio  286.6     0.0-30.0







PROCEDURES







 Procedure  Date Ordered  Related Diagnosis  Body Site

 

 MICROALBUMIN, SEMIQUANT  2017      

 

 Office Visit, Est Pt., Level 4  2017      

 

 MICROALBUMIN, QUANTITATIVE  2017      

 

 ASSAY OF URINE CREATININE  2017      







IMMUNIZATIONS

No Known Immunizations

## 2017-09-19 NOTE — XMS REPORT
[facility]

 Created on: 2016



CookieDaveome

External Reference #: [id number]

: 1980

Sex: Male



Demographics







 Address  [address] [place]  [address]

 

 Home Phone  [phone]

 

 Preferred Language  Unknown

 

 Marital Status  Unknown

 

 [employer] Affiliation  Unknown

 

 Race  White

 

 Ethnic Group  Not  or 





Author







 Author  JUNIE HAMILTON

 

 Organization  eClinical[employer]

 

 Address  Unknown

 

 Phone  Unavailable







Care Team Providers







 Care Team Member Name  Role  Phone

 

 JUNIE HAMILTON  CP  Unavailable



                                                                



Allergies

          No Known Allergies                                                   
                                     



Problems

          





 Problem Type  Condition  Code  Onset Dates  Condition Status

 

 Problem  Essential hypertension with goal blood pressure less than 130/85  I10
     Active

 

 Problem  Type 2 diabetes mellitus with diabetic neuropathy  E11.40     Active

 

 Problem  Bilateral swelling of feet  M79.89     Active

 

 Problem  Pain, joint, lower leg, left  M25.562     Active

 

 Problem  Cellulitis of left lower extremity  L03.116     Active

 

 Problem  Long term current use of insulin  Z79.4     Active

 

 Problem  Left knee pain  M25.562     Active



                                                                               
                                                                     



Medications

          





 Medication  Code System  Code  Instructions  Start Date  End Date  Status  
Dosage

 

 Victoza  NDC  [address]-54  18 MG/3ML Subcutaneous Once a day       0.3 ml

 

 Lantus  NDC  [address]-27  100 UNIT/ML Subcutaneous 2 times a day  Aug 02, 
2016        80 units



                                                                               
         



Results

          No Known Results                                                     
               



Summary Purpose

          eClinicalWorks Submission

## 2017-09-19 NOTE — XMS REPORT
[facility]

 Created on: 10/26/2016



Aj Gary

External Reference #: [id number]

: 1980

Sex: Male



Demographics







 Address  [address] [place]  [address]

 

 Home Phone  [phone]

 

 Preferred Language  Unknown

 

 Marital Status  Unknown

 

 [employer] Affiliation  Unknown

 

 Race  White

 

 Ethnic Group  Not  or 





Author







 Author  JUNIE HAMILTON

 

 Organization  [employer]

 

 Address  Unknown

 

 Phone  Unavailable







Care Team Providers







 Care Team Member Name  Role  Phone

 

 JUNIE HAMILTON  CP  Unavailable



                                                                



Allergies

          No Known Allergies                                                   
                                     



Problems

          





 Problem Type  Condition  Code  Onset Dates  Condition Status

 

 Problem  Essential hypertension with goal blood pressure less than 130/85  I10
     Active

 

 Problem  Type 2 diabetes mellitus with diabetic neuropathy  E11.40     Active

 

 Problem  Bilateral swelling of feet  M79.89     Active

 

 Problem  Pain, joint, lower leg, left  M25.562     Active

 

 Problem  Cellulitis of left lower extremity  L03.116     Active

 

 Problem  Long term current use of insulin  Z79.4     Active

 

 Problem  Left knee pain  M25.562     Active



                                                                               
                                                                     



Medications

          





 Medication  Code System  Code  Instructions  Start Date  End Date  Status  
Dosage

 

 Lantus  NDC  [address]-49  100 UNIT/ML Subcutaneous 2 times a day  Aug 02, 
2016        70 units



                                                                              



Results

          No Known Results                                                     
               



Summary Purpose

          eClinicalWorks Submission

## 2017-09-19 NOTE — XMS REPORT
[facility]

 Created on: 2017



Aj Gary

External Reference #: [id number]

: 1980

Sex: Male



Demographics







 Address  [address] [place]  [address]

 

 Preferred Language  Unknown

 

 Marital Status  Unknown

 

 Lutheran Affiliation  Unknown

 

 Race  Unknown

 

 Ethnic Group  Unknown





Author







 Author  JUNIE HAMILTON

 

 [facility]

 

 Address  [address] [place]  [address]



 

 Phone  [phone]







Care Team Providers







 Care Team Member Name  Role  Phone

 

 JUNIE HAMILTON  Unavailable  [phone]







PROBLEMS







 Type  Condition  ICD9-CM Code  QSB79-LT Code  Onset Dates  Condition Status  
SNOMED Code

 

 Problem  Cellulitis of left lower extremity     L03.116     Active  875795874

 

 Problem  Bilateral swelling of feet     M79.89     Active  174512161

 

 Problem  Essential hypertension with goal blood pressure less than 130/85     
I10     Active  58642132

 

 Problem  Left knee pain     M25.562     Active  56110680

 

 Problem  Pain, joint, lower leg, left     M25.562     Active  76702270

 

 Problem  Type 2 diabetes mellitus with diabetic neuropathy     E11.40     
Active  81480974

 

 Problem  Long term current use of insulin     Z79.4     Active  522890827







ALLERGIES

Unknown Allergies



SOCIAL HISTORY

No smoking Hx information available



PLAN OF CARE





VITAL SIGNS





MEDICATIONS







 Medication  Instructions  Dosage  Frequency  Start Date  End Date  Duration  
Status

 

 Lantus 100 UNIT/ML  Subcutaneous 2 times a day  70 units  12h  02 Aug, 2016   
     Active

 

 NovoLog Flexpen 100 UNIT/ML  Subcutaneous 3 times a day before meals  50 as 
directed     12 Sep, 2016        Active







RESULTS

No Results



PROCEDURES

No Known procedures



IMMUNIZATIONS

No Known Immunizations

## 2017-09-19 NOTE — HISTORY & PHYSICIAL ([facility])
FRANKLIN KAPOOR MED STUDENT 17 12:36pm:


HPI


History of Present Illness:


Aj is a 36-year old male who presented to the [facility] ED last night () with the chief complaint of a left foot wound. While taking his sock off 

last evening, the patient noticed fragments of skin come off with it. There was 

also bleeding from the wound. Patient states the "texture" of his left foot 

changed over the past few days from "hard and dry" to now, "soft and squishy." 

Patient says he has had no sensation in his feet "in 10 years." Patient states 

he has no prior history of diabetic foot ulcer(s). Patient is able to walk on 

his own without assistance, but is concerned about a recent diminished ability 

to dorsiflex his left foot. Patient's typical footwear consists of sneakers and 

diabetic socks from [employer] for outdoor use, and Crocs-type closed-toe sandals 

for use while inside his home. Patient typically sees Jacqueline Mcguire at [facility] and 

Dr. Neumann for his foot care. In late August, patient saw a neurologist at 

[facility] in [place], [place] to further work-up his bilateral LE diabetic neuropathy. 

The neurologist "was not able to get any readings [via] a wand with metal probes

," and "got very little readings when using needles." Patient states the 

neurologist concluded there was "incomplete firing" in his LE nerves. Last 

Wednesday (), patient saw Jacqueline to discuss the recent neurologic evaluation

, and she requested he undergo a lumbar spine MRI to rule out vertebral 

abnormalities and/or nerve impingement as contributors to his LE neuropathy. 

Patient has not been informed of the results from MRI.


Source:  patient, family


Time Seen by Provider:  08:45


Attending Physician


Uzma Oconnell MD


PCP


[facility] [place]


Consult





Date of Admission


Sep 18, 2017 at 23:30





Home Medications


Home Medications


Reviewed patient Home Medication Reconciliation Form





Allergies


Coded Allergies:  


     levofloxacin (Verified  Allergy, Mild, 4/14/15)


     sweet potato (Unverified  Allergy, Unknown, 4/14/15)





PMH-Social-Family Hx


Patient Social History


Marrital Status:  


Alcohol Use:  Past History


Recreational Drug Use:  No


Smoking Status:  Light Tobacco Smoker


Former smoker/When Quit:  2004


Type Used:  Cigars, Cigarettes, Pipe


Recent Foreign Travel:  No


Contact w/other who traveled:  No


Recent Hopitalizations:  No


Recent Infectious Disease Expo:  No


Physical Abuse Screen:  No


Sexual Abuse:  No





Immunizations Up To Date


Tetanus Booster (TDap):  More than 5yrs


Date of Pneumonia Vaccine:  2007





Past Medical History





Past medical history


1.  Diabetes mellitus type II


2.  2 hospitalizations for HHN K


3.  Morbid obesity


4.  Patella fracture and meniscus injury of the left knee


5.  Diabetic peripheral neuropathy


6.  Chronic venous insufficiency with chronic skin changes


Patient denies any history of surgery


7. Tibia (2x) and fibula fracture of left leg


8. Tibia and fibula fracture of right leg


9. Diabetic arthritis of left knee


10. Diabetic right frozen shoulder


11. HTN





Family Medical History


Significant Family History:  Diabetes, Hypertension, Other Conditions/Hx (MI)


Family History:  


Completed stroke


  19 MOTHER


Diabetes mellitus


  19 MOTHER


Hypertension


  19 MOTHER


  G8 BROTHER


Myocardial infarction


  19 FATHER





Review of Systems ([facility])


Constitutional:  No chills, No fever, other (heat intolerance)


Respiratory:  No short of breath


Gastrointestinal:  constipation, diarrhea, No nausea, No vomiting





Physical Exam-([facility])


Physical Exam


Vital Signs





 VS - Last 72 Hours, by Label








 17





 22:04 00:30 00:30 00:35


 


Temp 96.8 98.1 98.1 97.6


 


Pulse 102 91 91 114


 


Resp 20 20 20 20


 


B/P (MAP) 180/106  136/77 128/87


 


Pulse Ox 97 97 97 97


 


O2 Delivery Room Air Room Air Room Air Room Air


 


    





 17





 01:00 04:15 08:00 12:00


 


Temp  97.3 95.0 96.0


 


Pulse  87 86 82


 


Resp  20 18 16


 


B/P (MAP)  138/81 140/77 138/85


 


Pulse Ox  95 98 96


 


O2 Delivery Room Air Room Air Room Air Room Air





Capillary Refill : Less Than 3 Seconds


General Appearance:  no apparent distress, obese


Peripheral Pulses:  3+ Dorsalis Pedis (R) (palpated by Dr. Pimentel), 3+ Left Dors-

Pedis (L) (palpated by Dr. Pimentel)


Extremities:  pedal edema (bilateral), swelling (bilateral LE's), other (Left 

Foot: quarter-sized diabetic ulcer along the plantar and lateral surface 

roughly at the area of the fifth metatarsal. Slight eschar-appearing 

discoloration in the middle of the ulcer. No active bleeding but 

serosanguineous fluid contained within ulcer. Did not appear to be deep enough 

to involve tendon or bone.)


Neurologic/Psychiatric:  sensory deficit (RLE: no sensation inferior to the 

malleolus   LLE: no sensation inferior to left knee)


Skin:  other (RLE: slight erythema along anterior tibia   LLE: moderate 

erythema with areas of hardened/keratinized, tightened, clear scab-like skin 

along anterior tibia)





Assessment/Plan


Assessment/Plan


Admission Dx


1. Diabetic left foot ulcer


2. Diabetic neuropathy


3. DM II


4. Morbid obesity


5. HTN


Plan


1. Add Zosyn, or another gram-negative antibacterial, to current antibiotic 

regimen of Vancomycin to account for positive culture of gram-negative 

organisms taken from ulcer.


2. Perform an ankle-brachial index (LORNA) to assess adequacy of arterial supply 

and potential areas of arterial thrombosis


3. Perform left great toe pressure reading and, if needed, use measures to 

decrease an elevated pressure (i.e. LE elevation) 


4. Maintain adequate dressing of ulcer and foot hygiene to eliminate as much as 

possible further bacterial invasion


Diagnosis/Problems:  





Clinical Quality Measures


DVT/VTE Risk/Contraindication:


Risk Factor Score Per Nursin


RFS Level Per Nursing on Admit:  2=Moderate





SHEBA PIMENTEL MD 17 4:42pm:


HPI


History of Present Illness:


Date seen by provider:  Sep 19, 2017


Time Seen by Provider:  09:50





Home Medications


Allergies


Coded Allergies:  


     levofloxacin (Verified  Allergy, Mild, 4/14/15)


     sweet potato (Unverified  Allergy, Unknown, 4/14/15)





Reviewed Test Results


Reviewed Test Results


Lab





Laboratory Tests








Test


  17


22:35 17


01:05 17


05:38 17


06:20 Range/Units


 


 


White Blood Count


  7.2 


  


  8.1 


  


  4.3-11.0


10^3/uL


 


Red Blood Count


  5.10 


  


  4.85 


  


  4.35-5.85


10^6/uL


 


Hemoglobin 14.7   14.0   13.3-17.7  G/DL


 


Hematocrit 42   41   40-54  %


 


Mean Corpuscular Volume 83   85   80-99  FL


 


Mean Corpuscular Hemoglobin 29   29   25-34  PG


 


Mean Corpuscular Hemoglobin


Concent 35 


  


  34 


  


  32-36  G/DL


 


 


Red Cell Distribution Width 14.1   14.4   10.0-14.5  %


 


Platelet Count


  280 


  


  275 


  


  130-400


10^3/uL


 


Mean Platelet Volume 10.3   10.5 H  7.4-10.4  FL


 


Neutrophils (%) (Auto) 56   50   42-75  %


 


Lymphocytes (%) (Auto) 36   41   12-44  %


 


Monocytes (%) (Auto) 6   6   0-12  %


 


Eosinophils (%) (Auto) 2   2   0-10  %


 


Basophils (%) (Auto) 0   0   0-10  %


 


Neutrophils # (Auto) 4.0   4.1   1.8-7.8  X 10^3


 


Lymphocytes # (Auto) 2.6   3.4   1.0-4.0  X 10^3


 


Monocytes # (Auto) 0.4   0.5   0.0-1.0  X 10^3


 


Eosinophils # (Auto)


  0.2 


  


  0.2 


  


  0.0-0.3


10^3/uL


 


Basophils # (Auto)


  0.0 


  


  0.0 


  


  0.0-0.1


10^3/uL


 


Erythrocyte Sedimentation Rate 29 H    0-15  MM/HR


 


Prothrombin Time 12.5     12.2-14.7  SEC


 


INR Comment 0.9     0.8-1.4  


 


Activated Partial


Thromboplast Time 27 


  


  


  


  24-35  SEC


 


 


Sodium Level 132 L  135   135-145  MMOL/L


 


Potassium Level 4.1   4.0   3.6-5.0  MMOL/L


 


Chloride Level 95 L  100     MMOL/L


 


Carbon Dioxide Level 23   22   21-32  MMOL/L


 


Anion Gap 14   13   5-14  MMOL/L


 


Blood Urea Nitrogen 12   11   7-18  MG/DL


 


Creatinine


  1.01 


  


  0.77 


  


  0.60-1.30


MG/DL


 


Estimat Glomerular Filtration


Rate > 60 


  


  > 60 


  


   


 


 


BUN/Creatinine Ratio 12   14    


 


Glucose Level 583 *H  385 H    MG/DL


 


Lactic Acid Level


  3.52 *H


  3.19 *H


  


  


  0.50-2.00


MMOL/L


 


Calcium Level 10.4 H  9.8   8.5-10.1  MG/DL


 


Total Bilirubin 0.5   0.6   0.1-1.0  MG/DL


 


Aspartate Amino Transf


(AST/SGOT) 12 


  


  13 


  


  5-34  U/L


 


 


Alanine Aminotransferase


(ALT/SGPT) 23 


  


  21 


  


  0-55  U/L


 


 


Alkaline Phosphatase 103   93     U/L


 


C-Reactive Protein High


Sensitivity 5.76 H


  


  


  


  0.00-0.50


MG/DL


 


Total Protein 7.5   6.5   6.4-8.2  GM/DL


 


Albumin 3.9   3.6   3.2-4.5  GM/DL


 


TSH Cascade Testing


  1.69 


  


  


  


  0.35-4.94


UIU/ML


 


Hemoglobin A1c   12.0 H  4.5-6.2  %


 


Glucometer    359 H   MG/DL


 


Test


  17


09:57 17


12:10 17


16:15 


  Range/Units


 


 


Lactic Acid Level


  2.38 *H


  1.95 


  


  


  0.50-2.00


MMOL/L


 


Glucometer   168 H    MG/DL








Radiology


Left foot x-ray :  "IMPRESSION: Soft tissue defect, but no evidence of 

underlying acute osseous abnormality. Please note, however, that osteomyelitis 

cannot be excluded based on radiograph alone. Further evaluation with MRI is 

recommended. If MRI is contraindicated, triple phase bone scan could be 

performed."





Left leg x-ray :  "IMPRESSION:  1. Findings concerning for cortical 

destruction (as can be seen with osteomyelitis) of the proximal fibula versus 

artifact related to overlying soft tissue attenuation. Further evaluation with 

MRI is recommended."





Left leg venous doppler : negative for DVT





Physical Exam-([facility])


Physical Exam


Vital Signs





 VS - Last 72 Hours, by Label








 17





 22:04 00:30 00:30 00:35


 


Temp 96.8 98.1 98.1 97.6


 


Pulse 102 91 91 114


 


Resp 20 20 20 20


 


B/P (MAP) 180/106  136/77 128/87


 


Pulse Ox 97 97 97 97


 


O2 Delivery Room Air Room Air Room Air Room Air


 


    





 17





 01:00 04:15 08:00 12:00


 


Temp  97.3 95.0 96.0


 


Pulse  87 86 82


 


Resp  20 18 16


 


B/P (MAP)  138/81 140/77 138/85


 


Pulse Ox  95 98 96


 


O2 Delivery Room Air Room Air Room Air Room Air








Respiratory:  lungs clear, normal breath sounds


Cardiovascular:  regular rate, rhythm, no murmur


Peripheral Pulses:  2+ Dorsalis Pedis (R), 2+ Left Dors-Pedis (L)


Gastrointestinal:  normal bowel sounds, non tender, soft


Extremities:  other (Left Foot: quarter-sized ulcer along the plantar and 

lateral surface roughly at the area of the fifth metatarsal head. Slight eschar-

appearing discoloration in the middle of the ulcer)


Neurologic/Psychiatric:  alert, normal mood/affect


Skin:  other (RLE: slight erythema along anterior tibia   LLE: moderate 

erythema to mid shin)





Supervisory-Addendum Brief


Supervisory Addendum


Patient seen and personally examined myself on  with MS3, Renzo Kapoor. 

Agree with history and exam as documented. Please see below for my plans.





1. Diabetic left foot ulcer with cellulitis- wound care consult, zosyn and 

vancomycin due to concern for infection given elevated lactic acid and erythema 

or lower leg


-IVF bolus given in ER, recheck lactic acid


-Pedal pulses are good, but would still consider arterial studies to confirm 

adequate blood flow to distal foot, particularly if difficulty with healing


-Leg elevation, MRI foot. Repeat x-ray of knee (discussed with Dr. Martinez and 

appears the proximal fibula finding is relatively stable from 2016 and may be 

related to previous injury) for clarification





2. Diabetic neuropathy- outside MRI low spine pending results





3. DM II- reportedly taking all of his insulin and medications, but A1c is 12


-Resume home medications and monitor blood sugar and adjust meds as needed


-Discussed need for significantly improved glycemic control to avoid infection 

and even future amputations





4. Morbid obesity- in process of losing weight and pursuing bariatric surgery





5. HTN- resume home medications





DVT ppx- enoxaparin











FRANKLIN KAPOOR STUDENT Sep 19, 2017 12:36 pm


SHEBA PIMENTEL MD Sep 19, 2017 4:42 pm

## 2017-09-19 NOTE — XMS REPORT
[facility]

 Created on: 10/18/2016



Aj Gary

External Reference #: [id number]

: 1980

Sex: Male



Demographics







 Address  [address] [place]  [address]

 

 Home Phone  [phone]

 

 Preferred Language  Unknown

 

 Marital Status  Unknown

 

 Confucianism Affiliation  Unknown

 

 Race  White

 

 Ethnic Group  Not  or 





Author







 Author  JUNIE HAMILTON

 

 Organization  [employer]

 

 Address  Unknown

 

 Phone  Unavailable







Care Team Providers







 Care Team Member Name  Role  Phone

 

 JUNIE HAMILTON  CP  Unavailable



                                                                



Allergies

          No Known Allergies                                                   
                                     



Problems

          





 Problem Type  Condition  Code  Onset Dates  Condition Status

 

 Problem  Essential hypertension with goal blood pressure less than 130/85  I10
     Active

 

 Problem  Type 2 diabetes mellitus with diabetic neuropathy  E11.40     Active

 

 Problem  Bilateral swelling of feet  M79.89     Active

 

 Problem  Pain, joint, lower leg, left  M25.562     Active

 

 Problem  Cellulitis of left lower extremity  L03.116     Active

 

 Problem  Long term current use of insulin  Z79.4     Active

 

 Problem  Left knee pain  M25.562     Active



                                                                               
                                                                     



Medications

          No Known Medications                                                 
                             



Results

          No Known Results                                                     
               



Summary Purpose

          [employer] Submission

## 2017-09-19 NOTE — XMS REPORT
[facility]

 Created on: 2016



Aj Gary

External Reference #: [id number]

: 1980

Sex: Male



Demographics







 Address  [address] [place]  [address]

 

 Home Phone  [phone]

 

 Preferred Language  Unknown

 

 Marital Status  Unknown

 

 Confucianism Affiliation  Unknown

 

 Race  White

 

 Ethnic Group  Not  or 





Author







 Author  JUNIE HAMILTON

 

 Organization  [employer]

 

 Address  Unknown

 

 Phone  Unavailable







Care Team Providers







 Care Team Member Name  Role  Phone

 

 JUNIE HAMILTON  CP  Unavailable



                                                                



Allergies

          No Known Allergies                                                   
                                     



Problems

          





 Problem Type  Condition  Code  Onset Dates  Condition Status

 

 Problem  Essential hypertension with goal blood pressure less than 130/85  I10
     Active

 

 Problem  Type 2 diabetes mellitus with diabetic neuropathy  E11.40     Active

 

 Problem  Bilateral swelling of feet  M79.89     Active

 

 Problem  Pain, joint, lower leg, left  M25.562     Active

 

 Problem  Cellulitis of left lower extremity  L03.116     Active

 

 Problem  Long term current use of insulin  Z79.4     Active

 

 Problem  Left knee pain  M25.562     Active



                                                                               
                                                                     



Medications

          





 Medication  Code System  Code  Instructions  Start Date  End Date  Status  
Dosage

 

 Victoza  NDC  [address]-47  18 MG/3ML Subcutaneous Once a day  2016  
      1.8 mg



                                                                              



Results

          No Known Results                                                     
               



Summary Purpose

          [employer] Submission

## 2017-09-19 NOTE — XMS REPORT
[facility]

 Created on: 2017



Aj Gary

External Reference #: [id number]

: 1980

Sex: Male



Demographics







 Address  [address] [place]  [address]

 

 Preferred Language  Unknown

 

 Marital Status  Unknown

 

 Latter day Affiliation  Unknown

 

 Race  Unknown

 

 Ethnic Group  Unknown





Author







 Author  JUNIE HAMILTON

 

 [facility]

 

 Address  [address] [place]  [address]



 

 Phone  [phone]







Care Team Providers







 Care Team Member Name  Role  Phone

 

 JUNIE HAMILTON  Unavailable  [phone]







PROBLEMS







 Type  Condition  ICD9-CM Code  IAM28-FN Code  Onset Dates  Condition Status  
SNOMED Code

 

 Problem  Cellulitis of left lower extremity     L03.116     Active  910103542

 

 Problem  Bilateral swelling of feet     M79.89     Active  393485332

 

 Problem  Essential hypertension with goal blood pressure less than 130/85     
I10     Active  21176025

 

 Problem  Left knee pain     M25.562     Active  93583596

 

 Problem  Pain, joint, lower leg, left     M25.562     Active  28505661

 

 Problem  Type 2 diabetes mellitus with diabetic neuropathy     E11.40     
Active  44516875

 

 Problem  Long term current use of insulin     Z79.4     Active  [id number]







ALLERGIES

Unknown Allergies



SOCIAL HISTORY

No smoking Hx information available



PLAN OF CARE





VITAL SIGNS





MEDICATIONS

Unknown Medications



RESULTS

No Results



PROCEDURES

No Known procedures



IMMUNIZATIONS

No Known Immunizations

## 2017-09-19 NOTE — XMS REPORT
[facility]

 Created on: 2015



Cookie  Aj

External Reference #: [id number]

: 1980

Sex: Male



Demographics







 Address  [address] [place]  [address]

 

 Home Phone  [phone]

 

 Preferred Language  Unknown

 

 Marital Status  Unknown

 

 Quaker Affiliation  Unknown

 

 Race  White

 

 Ethnic Group  Not  or 





Author







 Author  JUNIE HAMILTON

 

 [facility]  [employer]

 

 Address  Unknown

 

 Phone  Unavailable







Care Team Providers







 Care Team Member Name  Role  Phone

 

 JUNIE HAMILTON  CP  Unavailable



                                                                



Allergies, Adverse Reactions, Alerts

          





 Substance  Reaction  Event Type

 

 Levaquin  anaphylaxis  Drug Allergy



                                                                               
         



Problems

          





 Problem Type  Condition  Code  Onset Dates  Condition Status

 

 Problem  Dermatophytosis of nail  110.1     Active

 

 Problem  Diabetes mellitus without mention of complication, type II or 
unspecified type, not stated as uncontrolled  250.00     Active

 

 Problem  Lesion of plantar nerve  355.6     Active

 

 Problem  Long term current use of insulin  Z79.4     Active

 

 Problem  Left knee pain  M25.562     Active

 

 Problem  Type 2 diabetes mellitus with diabetic neuropathy  E11.40     Active

 

 Problem  Pain in joint, shoulder region  719.41     Active

 

 Problem  Essential hypertension, benign  401.1     Active

 

 Problem  Pain, joint, lower leg, left  M25.562     Active

 

 Problem  Cellulitis of left lower extremity  L03.116     Active

 

 Assessment  Cellulitis of left lower extremity  L03.116     Active

 

 Assessment  Pain, joint, lower leg, left  M25.562     Active

 

 Assessment  Type 2 diabetes mellitus with diabetic neuropathy  E11.40     
Active

 

 Problem  Other affections of shoulder region, not elsewhere classified  726.2 
    Active

 

 Assessment  Left knee pain  M25.562     Active

 

 Problem  Unspecified hereditary and idiopathic peripheral neuropathy  356.9   
  Active

 

 Assessment  Long term current use of insulin  Z79.4     Active

 

 Problem  Dermatophytosis of foot  110.4     Active



                                                                               
                                                                               
                                                                               
                     



Medications

          





 Medication  Code System  Code  Instructions  Start Date  End Date  Status  
Dosage

 

 Norco  NDC  [address]-19   MG Orally Once a day at hs prn  Nov 10, 2015
        1 tablet as needed

 

 ReliOn R  NDC  0               15 UNITS SQ TID W/ SSI PRN

 

 Novolin 70/30  NDC  [address]-83  (70-30) 100 UNIT/ML Subcutaneous   Nov 10, 
2015        50-60 & 60-70

 

 Lisinopril-Hydrochlorothiazide  NDC  [address]-41  20-25 MG Orally Once a day
  Dec 08, 2014        1 tablet

 

 Keflex  NDC  [address]-03  500 MG Orally 3 times a day  May 08, 2015  Nov 17, 
2015     1 capsule



                                                                               
                                                 



Procedures

          





 Procedure  Coding System  Code  Date

 

 Office Visit, Est Pt., Level 3  CPT-4  23339  Nov 10, 2015

 

 GLYCATED HEMOGLOBIN TEST  CPT-4  95755  Nov 10, 2015



                                                                               
                             



Vital Signs

          





 Date/Time:  Nov 10, 2015

 

 Temperature  97.6 F

 

 Weight  436.7 lbs

 

 Height  72 in

 

 BMI  59.22 Index

 

 Blood Pressure Diastolic  84 mmHg

 

 Blood Pressure Systolic  132 mmHg

 

 Cardiac Monitoring Heart Rate  84 bpm



                                                                    



Results

          





 Name  Result  Date  Reference Range  Unit  Abnormality Flag

 

 A1C (IN HOUSE)               



                                                                    



Summary Purpose

          [employer] Submission

## 2017-09-19 NOTE — XMS REPORT
[facility]

 Created on: 2016



Cookie  Aj

External Reference #: [id number]

: 1980

Sex: Male



Demographics







 Address  [address] [place]  [address]

 

 Home Phone  [phone]

 

 Preferred Language  Unknown

 

 Marital Status  Unknown

 

 Anabaptism Affiliation  Unknown

 

 Race  White

 

 Ethnic Group  Not  or 





Author







 Author  JUNIE HAMILTON

 

 Organization  [employer]

 

 Address  Unknown

 

 Phone  Unavailable







Care Team Providers







 Care Team Member Name  Role  Phone

 

 JUNIE HAMILTON  CP  Unavailable



                                                                



Allergies, Adverse Reactions, Alerts

          





 Substance  Reaction  Event Type

 

 Levaquin  anaphylaxis  Drug Allergy



                                                                               
         



Problems

          





 Problem Type  Condition  Code  Onset Dates  Condition Status

 

 Assessment  Essential hypertension with goal blood pressure less than 130/85  
I10     Active

 

 Assessment  Type 2 diabetes mellitus with diabetic neuropathy  E11.40     
Active

 

 Assessment  Long term current use of insulin  Z79.4     Active

 

 Assessment  Bilateral swelling of feet  M79.89     Active

 

 Problem  Essential hypertension with goal blood pressure less than 130/85  I10
     Active

 

 Problem  Type 2 diabetes mellitus with diabetic neuropathy  E11.40     Active

 

 Problem  Bilateral swelling of feet  M79.89     Active

 

 Problem  Pain, joint, lower leg, left  M25.562     Active

 

 Problem  Cellulitis of left lower extremity  L03.116     Active

 

 Problem  Long term current use of insulin  Z79.4     Active

 

 Problem  Left knee pain  M25.562     Active



                                                                               
                                                                               
                              



Medications

          





 Medication  Code System  Code  Instructions  Start Date  End Date  Status  
Dosage

 

 ReliOn R  NDC  0  100 units/ml subcutaneously 3 times a day and prn           
30 UNITS SQ TID W/ SSI PRN

 

 Lisinopril-Hydrochlorothiazide  NDC  [address]-91  20-25 MG Orally Once a day
           1 tablet

 

 Lantus  NDC  [address]-75  100 UNIT/ML Subcutaneous 2 times a day  Aug 02, 
2016        40 units



                                                                               
                             



Procedures

          





 Procedure  Coding System  Code  Date

 

 COMPREHEN METABOLIC PANEL  CPT-4  47919  Aug 02, 2016

 

 Office Visit, Est Pt., Level 4  CPT-4  91803  Aug 02, 2016

 

 NATRIURETIC PEPTIDE  CPT-4  88212  Aug 02, 2016

 

 VENIPUNCT, ROUTINE*  CPT-4  05835  Aug 02, 2016



                                                                               
                                                 



Vital Signs

          





 Date/Time:  Aug 02, 2016

 

 Cardiac Monitoring Heart Rate  88 bpm

 

 Weight  462.0 lbs

 

 Height  72 in

 

 BMI  62.65 Index

 

 Blood Pressure Diastolic  90 mmHg

 

 Blood Pressure Systolic  136 mmHg



                                                                              



Results

          No Known Results                                                     
               



Summary Purpose

          [employer] Submission

## 2017-09-19 NOTE — XMS REPORT
[facility]

 Created on: 2016



Aj Gary

External Reference #: [id number]

: 1980

Sex: Male



Demographics







 Address  [address] [place]  [address]

 

 Home Phone  [phone]

 

 Preferred Language  Unknown

 

 Marital Status  Unknown

 

 Pentecostalism Affiliation  Unknown

 

 Race  White

 

 Ethnic Group  Not  or 





Author







 Author  JUNIE HAMILTON

 

 Organization  [employer]

 

 Address  Unknown

 

 Phone  Unavailable







Care Team Providers







 Care Team Member Name  Role  Phone

 

 JUNIE HAMILTON  CP  Unavailable



                                                                



Allergies

          No Known Allergies                                                   
                                     



Problems

          





 Problem Type  Condition  Code  Onset Dates  Condition Status

 

 Problem  Essential hypertension with goal blood pressure less than 130/85  I10
     Active

 

 Problem  Type 2 diabetes mellitus with diabetic neuropathy  E11.40     Active

 

 Problem  Bilateral swelling of feet  M79.89     Active

 

 Problem  Pain, joint, lower leg, left  M25.562     Active

 

 Problem  Cellulitis of left lower extremity  L03.116     Active

 

 Problem  Long term current use of insulin  Z79.4     Active

 

 Problem  Left knee pain  M25.562     Active



                                                                               
                                                                     



Medications

          No Known Medications                                                 
                             



Results

          No Known Results                                                     
               



Summary Purpose

          [employer] Submission

## 2017-09-19 NOTE — XMS REPORT
[facility]

 Created on: 2016



Aj Gary

External Reference #: [id number]

: 1980

Sex: Male



Demographics







 Address  [address] [place]  [address]

 

 Home Phone  [phone]

 

 Preferred Language  Unknown

 

 Marital Status  Unknown

 

 Zoroastrianism Affiliation  Unknown

 

 Race  White

 

 Ethnic Group  Not  or 





Author







 Author  JUNIE HAMILTON

 

 Organization  [employer]

 

 Address  Unknown

 

 Phone  Unavailable







Care Team Providers







 Care Team Member Name  Role  Phone

 

 JUNIE HAMILTON  CP  Unavailable



                                                                



Allergies

          No Known Allergies                                                   
                                     



Problems

          





 Problem Type  Condition  Code  Onset Dates  Condition Status

 

 Problem  Essential hypertension with goal blood pressure less than 130/85  I10
     Active

 

 Problem  Type 2 diabetes mellitus with diabetic neuropathy  E11.40     Active

 

 Problem  Bilateral swelling of feet  M79.89     Active

 

 Problem  Pain, joint, lower leg, left  M25.562     Active

 

 Problem  Cellulitis of left lower extremity  L03.116     Active

 

 Problem  Long term current use of insulin  Z79.4     Active

 

 Problem  Left knee pain  M25.562     Active



                                                                               
                                                                     



Medications

          





 Medication  Code System  Code  Instructions  Start Date  End Date  Status  
Dosage

 

 Lantus  NDC  [address]-79  100 UNIT/ML Subcutaneous 2 times a day  Aug 02, 
2016        85 units



                                                                              



Results

          No Known Results                                                     
               



Summary Purpose

          [employer] Submission

## 2017-09-19 NOTE — XMS REPORT
[facility]

 Created on: 2017



Aj Gary

External Reference #: [id number]

: 1980

Sex: Male



Demographics







 Address  [address] [place]  [address]

 

 Preferred Language  Unknown

 

 Marital Status  Unknown

 

 Caodaism Affiliation  Unknown

 

 Race  Unknown

 

 Ethnic Group  Unknown





Author







 Author  JUNIE HAMILTON

 

 [facility]

 

 Address  [address] [place]  [address]



 

 Phone  [phone]







Care Team Providers







 Care Team Member Name  Role  Phone

 

 JUNIE HAMILTON  Unavailable  [phone]







PROBLEMS







 Type  Condition  ICD9-CM Code  CFG47-MG Code  Onset Dates  Condition Status  
SNOMED Code

 

 Problem  Cellulitis of left lower extremity     L03.116     Active  975734113

 

 Problem  Bilateral swelling of feet     M79.89     Active  311950850

 

 Problem  Essential hypertension with goal blood pressure less than 130/85     
I10     Active  84112567

 

 Problem  Left knee pain     M25.562     Active  98250339

 

 Problem  Pain, joint, lower leg, left     M25.562     Active  32655264

 

 Problem  Type 2 diabetes mellitus with diabetic neuropathy     E11.40     
Active  41782993

 

 Problem  Long term current use of insulin     Z79.4     Active  503621786







ALLERGIES

Unknown Allergies



SOCIAL HISTORY

No smoking Hx information available



PLAN OF CARE





VITAL SIGNS





MEDICATIONS







 Medication  Instructions  Dosage  Frequency  Start Date  End Date  Duration  
Status

 

 MetFORMIN HCl  MG  Orally twice a day  2 tablet with food  12h  06 Sep, 
2016        Active







RESULTS

No Results



PROCEDURES

No Known procedures



IMMUNIZATIONS

No Known Immunizations

## 2017-09-19 NOTE — DIAGNOSTIC IMAGING REPORT
INDICATION: Left leg pain and swelling.



Left leg venous Doppler study was performed in the routine

fashion with color flow Doppler and waveform analysis.



FINDINGS: The left common femoral vein, superficial femoral vein,

popliteal vein and visualized portion of the posterior tibial

vein show normal compressibility and venous flow patterns. There

is normal augmentation. 



IMPRESSION: No evidence of deep vein thrombosis of the major

veins of the left leg.



Dictated by: 



  Dictated on workstation # [id number]

## 2017-09-19 NOTE — XMS REPORT
[facility]

 Created on: 2017



Cookie  Aj

External Reference #: [id number]

: 1980

Sex: Male



Demographics







 Address  [address] [place] [address]  [address]

 

 Preferred Language  Unknown

 

 Marital Status  Unknown

 

 Restoration Affiliation  Unknown

 

 Race  Unknown

 

 Ethnic Group  Unknown





Author







 Author  JUNIE HAMILTON

 

 [facility]

 

 Address  [address] [place]  [address]



 

 Phone  [phone]







Care Team Providers







 Care Team Member Name  Role  Phone

 

 JUNIE HAMILTON  Unavailable  [phone]







PROBLEMS







 Type  Condition  ICD9-CM Code  TZX30-TT Code  Onset Dates  Condition Status  
SNOMED Code

 

 Problem  Left knee pain     M25.562     Active  14116968

 

 Problem  Type 2 diabetes mellitus with diabetic neuropathy     E11.40     
Active  77561498

 

 Problem  Long term current use of insulin     Z79.4     Active  284052926

 

 Problem  Abnormal NCS (nerve conduction studies)     R94.130     Active  
90264694

 

 Problem  Cellulitis of left lower extremity     L03.116     Active  465684500

 

 Problem  Pain, joint, lower leg, left     M25.562     Active  73601027

 

 Problem  Stasis dermatitis of both legs     I87.2     Active  94516188

 

 Problem  Loss of movement     R29.898     Active  44899560

 

 Problem  Bilateral swelling of feet     M79.89     Active  743964802

 

 Problem  Essential hypertension with goal blood pressure less than 130/85     
I10     Active  15307957

 

 Problem  Morbid (severe) obesity due to excess calories     E66.01     Active  
079165407

 

 Problem  Body mass index (BMI) of 60.0-69.9 in adult     Z68.44     Active  
191783076







ALLERGIES

Unknown Allergies



SOCIAL HISTORY

No smoking Hx information available



PLAN OF CARE





VITAL SIGNS





MEDICATIONS







 Medication  Instructions  Dosage  Frequency  Start Date  End Date  Duration  
Status

 

 Invokana 100 MG  Orally Once a day  1 tablet  24h  29 Dec, 2016  29 Dec, 2016  
0 days  Active







RESULTS

No Results



PROCEDURES

No Known procedures



IMMUNIZATIONS

No Known Immunizations

## 2017-09-19 NOTE — XMS REPORT
[facility]

 Created on: 2015



Aj Gary

External Reference #: [id number]

: 1980

Sex: Male



Demographics







 Address  [address] [place]  [address]

 

 Home Phone  [phone]

 

 Preferred Language  Unknown

 

 Marital Status  Unknown

 

 Baptism Affiliation  Unknown

 

 Race  White

 

 Ethnic Group  Not  or 





Author







 Author  HARRIS CORNELIUS

 

 Organization  [employer]

 

 Address  Unknown

 

 Phone  Unavailable







Care Team Providers







 Care Team Member Name  Role  Phone

 

 HARRIS CORNELIUS  CP  Unavailable



                                                                



Allergies

          No Known Allergies                                                   
                                     



Problems

          





 Problem Type  Condition  ICD-9 Code  Onset Dates  Condition Status

 

 Problem  Other affections of shoulder region, not elsewhere classified  726.2 
    Active

 

 Assessment  Dental examination  V72.2     Active

 

 Problem  Essential hypertension, benign  401.1     Active

 

 Problem  Diabetes mellitus without mention of complication, type II or 
unspecified type, not stated as uncontrolled  250.00     Active

 

 Problem  Pain in joint, shoulder region  719.41     Active

 

 Problem  Dermatophytosis of foot  110.4     Active

 

 Problem  Unspecified hereditary and idiopathic peripheral neuropathy  356.9   
  Active

 

 Problem  Lesion of plantar nerve  355.6     Active

 

 Problem  Dermatophytosis of nail  110.1     Active



                                                                               
                                                                               
          



Medications

          No Known Medications                                                 
                                       



Procedures

          





 Procedure  Coding System  Code  Date

 

 SURG REMOVAL ERUPTED TOOTH  CPT-4    July 15, 2015

 

 EXTRAC ERUPTED TOOTH/EXPOSED ROOT  CPT-4    July 15, 2015



                                                                               
         



Results

          No Known Results                                                     
               



Summary Purpose

          [employer] Submission

## 2017-09-19 NOTE — DIAGNOSTIC IMAGING REPORT
INDICATION: Open diabetic sore of the left foot.



COMPARISON: 10/27/2015



FINDINGS: Four radiographic views of the left tibia and fibula

were obtained. There is suboptimal delineation of the cortex of

the proximal fibula. This may be artifactual and related to

overlying soft tissue attenuation, though underlying cortical

destruction cannot be excluded. The tibia is intact. Left knee

and ankle joint spaces are maintained. No unexpected radiopaque

foreign bodies are seen.



IMPRESSION:

1. Findings concerning for cortical destruction (as can be seen

with osteomyelitis) of the proximal fibula versus artifact

related to overlying soft tissue attenuation. Further evaluation

with MRI is recommended.



Dictated by: 



  Dictated on workstation # [id number]

## 2017-09-19 NOTE — DIAGNOSTIC IMAGING REPORT
INDICATION:  Diabetic sore  



COMPARISON: None.



FINDINGS: 3 views of the left foot demonstrate no acute fracture

or dislocation. There are no focal osseous lesions. Soft tissue

defect is noted laterally at the level of the fifth metatarsal

phalangeal joint space. No unexpected radiopaque foreign bodies

are seen. Joint spaces are well maintained.  No radiopaque

foreign bodies are seen.



IMPRESSION: Soft tissue defect, but no evidence of underlying

acute osseous abnormality. Please note, however, that

osteomyelitis cannot be excluded based on radiograph alone.

Further evaluation with MRI is recommended. If MRI is

contraindicated, triple phase bone scan could be performed.



Dictated by: 



  Dictated on workstation # [id number]

## 2017-09-19 NOTE — XMS REPORT
[facility]

 Created on: 2016



Aj Gary

External Reference #: [id number]

: 1980

Sex: Male



Demographics







 Address  [address] [place]  [address]

 

 Home Phone  [phone]

 

 Preferred Language  Unknown

 

 Marital Status  Unknown

 

 Rastafari Affiliation  Unknown

 

 Race  White

 

 Ethnic Group  Not  or 





Author







 Author  JUNIE HAMILTON

 

 Organization  [employer]

 

 Address  Unknown

 

 Phone  Unavailable







Care Team Providers







 Care Team Member Name  Role  Phone

 

 JUNIE HAMILTON  CP  Unavailable



                                                                



Allergies

          No Known Allergies                                                   
                                     



Problems

          





 Problem Type  Condition  Code  Onset Dates  Condition Status

 

 Problem  Type 2 diabetes mellitus with diabetic neuropathy  E11.40     Active

 

 Problem  Long term current use of insulin  Z79.4     Active

 

 Problem  Essential hypertension with goal blood pressure less than 130/85  I10
     Active

 

 Problem  Cellulitis of left lower extremity  L03.116     Active

 

 Problem  Left knee pain  M25.562     Active

 

 Problem  Pain, joint, lower leg, left  M25.562     Active



                                                                               
                                                           



Medications

          No Known Medications                                                 
                             



Results

          No Known Results                                                     
               



Summary Purpose

          [employer] Submission

## 2017-09-19 NOTE — XMS REPORT
[facility]

 Created on: 2017



Aj Gary

External Reference #: [id number]

: 1980

Sex: Male



Demographics







 Address  [address] [place]  [address]

 

 Preferred Language  Unknown

 

 Marital Status  Unknown

 

 Amish Affiliation  Unknown

 

 Race  Unknown

 

 Ethnic Group  Unknown





Author







 Author  JUNIE HAMILTON

 

 Organization  [facility]

 

 Address  [address] [place]  [address]



 

 Phone  [phone]







Care Team Providers







 Care Team Member Name  Role  Phone

 

 JUNIE HAMILTON  Unavailable  [phone]







PROBLEMS







 Type  Condition  ICD9-CM Code  IZV71-CQ Code  Onset Dates  Condition Status  
SNOMED Code

 

 Problem  Cellulitis of left lower extremity     L03.116     Active  216183148

 

 Problem  Bilateral swelling of feet     M79.89     Active  030407564

 

 Problem  Essential hypertension with goal blood pressure less than 130/85     
I10     Active  62489117

 

 Problem  Left knee pain     M25.562     Active  89030770

 

 Problem  Pain, joint, lower leg, left     M25.562     Active  38678577

 

 Problem  Type 2 diabetes mellitus with diabetic neuropathy     E11.40     
Active  73004505

 

 Problem  Long term current use of insulin     Z79.4     Active  984793080







ALLERGIES

Unknown Allergies



SOCIAL HISTORY

No smoking Hx information available



PLAN OF CARE





VITAL SIGNS





MEDICATIONS







 Medication  Instructions  Dosage  Frequency  Start Date  End Date  Duration  
Status

 

 NovoLog Flexpen 100 UNIT/ML  Subcutaneous 3 times a day before meals  40 as 
directed     12 Sep, 2016        Active

 

 Lantus 100 UNIT/ML  Subcutaneous 2 times a day  65 units  12h  02 Aug, 2016   
     Active







RESULTS

No Results



PROCEDURES

No Known procedures



IMMUNIZATIONS

No Known Immunizations

## 2017-09-20 VITALS — SYSTOLIC BLOOD PRESSURE: 176 MMHG | DIASTOLIC BLOOD PRESSURE: 85 MMHG

## 2017-09-20 VITALS — SYSTOLIC BLOOD PRESSURE: 134 MMHG | DIASTOLIC BLOOD PRESSURE: 88 MMHG

## 2017-09-20 VITALS — DIASTOLIC BLOOD PRESSURE: 75 MMHG | SYSTOLIC BLOOD PRESSURE: 154 MMHG

## 2017-09-20 VITALS — SYSTOLIC BLOOD PRESSURE: 147 MMHG | DIASTOLIC BLOOD PRESSURE: 82 MMHG

## 2017-09-20 VITALS — SYSTOLIC BLOOD PRESSURE: 132 MMHG | DIASTOLIC BLOOD PRESSURE: 90 MMHG

## 2017-09-20 PROCEDURE — 0JBR0ZZ EXCISION OF LEFT FOOT SUBCUTANEOUS TISSUE AND FASCIA, OPEN APPROACH: ICD-10-PCS | Performed by: SURGERY

## 2017-09-20 RX ADMIN — LISINOPRIL SCH MG: 20 TABLET ORAL at 09:38

## 2017-09-20 RX ADMIN — CARVEDILOL SCH MG: 25 TABLET, FILM COATED ORAL at 21:30

## 2017-09-20 RX ADMIN — SODIUM CHLORIDE SCH MLS/HR: 900 INJECTION INTRAVENOUS at 18:43

## 2017-09-20 RX ADMIN — INSULIN ASPART SCH UNIT: 100 INJECTION, SOLUTION INTRAVENOUS; SUBCUTANEOUS at 13:07

## 2017-09-20 RX ADMIN — INSULIN DETEMIR SCH UNIT: 100 INJECTION, SOLUTION SUBCUTANEOUS at 21:29

## 2017-09-20 RX ADMIN — INSULIN HUMAN SCH UNIT: 100 INJECTION, SOLUTION PARENTERAL at 11:34

## 2017-09-20 RX ADMIN — METFORMIN HYDROCHLORIDE SCH MG: 500 TABLET, EXTENDED RELEASE ORAL at 07:58

## 2017-09-20 RX ADMIN — METFORMIN HYDROCHLORIDE SCH MG: 500 TABLET, EXTENDED RELEASE ORAL at 18:15

## 2017-09-20 RX ADMIN — SODIUM CHLORIDE SCH MLS/HR: 900 INJECTION INTRAVENOUS at 01:51

## 2017-09-20 RX ADMIN — VANCOMYCIN HYDROCHLORIDE SCH MLS/HR: 500 INJECTION, POWDER, LYOPHILIZED, FOR SOLUTION INTRAVENOUS at 05:22

## 2017-09-20 RX ADMIN — SODIUM CHLORIDE SCH MLS/HR: 900 INJECTION, SOLUTION INTRAVENOUS at 08:04

## 2017-09-20 RX ADMIN — INSULIN HUMAN SCH UNIT: 100 INJECTION, SOLUTION PARENTERAL at 07:58

## 2017-09-20 RX ADMIN — HYDROCHLOROTHIAZIDE SCH MG: 25 TABLET ORAL at 09:38

## 2017-09-20 RX ADMIN — SODIUM HYPOCHLORITE SCH ML: 1.25 SOLUTION TOPICAL at 18:32

## 2017-09-20 RX ADMIN — SODIUM CHLORIDE SCH MLS/HR: 900 INJECTION, SOLUTION INTRAVENOUS at 16:51

## 2017-09-20 RX ADMIN — ENOXAPARIN SODIUM SCH MG: 100 INJECTION SUBCUTANEOUS at 18:16

## 2017-09-20 RX ADMIN — SODIUM CHLORIDE SCH MLS/HR: 900 INJECTION INTRAVENOUS at 09:54

## 2017-09-20 RX ADMIN — SODIUM HYPOCHLORITE SCH ML: 1.25 SOLUTION TOPICAL at 11:12

## 2017-09-20 RX ADMIN — INSULIN DETEMIR SCH UNIT: 100 INJECTION, SOLUTION SUBCUTANEOUS at 09:49

## 2017-09-20 RX ADMIN — INSULIN HUMAN SCH UNIT: 100 INJECTION, SOLUTION PARENTERAL at 16:32

## 2017-09-20 RX ADMIN — INSULIN HUMAN SCH UNIT: 100 INJECTION, SOLUTION PARENTERAL at 21:43

## 2017-09-20 RX ADMIN — SODIUM CHLORIDE SCH MLS/HR: 900 INJECTION, SOLUTION INTRAVENOUS at 01:02

## 2017-09-20 RX ADMIN — INSULIN ASPART SCH UNIT: 100 INJECTION, SOLUTION INTRAVENOUS; SUBCUTANEOUS at 07:59

## 2017-09-20 RX ADMIN — INSULIN ASPART SCH UNIT: 100 INJECTION, SOLUTION INTRAVENOUS; SUBCUTANEOUS at 18:41

## 2017-09-20 NOTE — PROGRESS NOTE (SOAP)
FRANKLIN RAMIREZ MED STUDENT 17 11:15am:


Subjective


Subjective/Events-last exam


Patient detailed his consult last evening with Dr. Guzmán and communicated its 

salient conclusions: that Dr. Guzmán will return late this afternoon or early 

evening (), and debride the diabetic ulcer on his left foot. After which, 

Dr. Guzmán will re-assess the ulcer and its condition, as well as the overall 

condition of the foot, and make a determination from there as to the next step 

in care. Patient stated Dr. Guzmán explained to him the potential consequences 

of his ulcer, and that Dr. Guzmán has seen "better looking ulcers than [his]" 

result in amputation. Patient was also instructed by Dr. Guzmán to follow-up 

with Jacqueline at [facility] to obtain customized footwear. Patient 

understood that an MRI of his left foot cannot be performed at [facility] due 

to body habitus limitations.


Review of Systems


Date Seen by Provider:  Sep 20, 2017


Time Seen by Provider:  08:50


General:  No Chills


HEENT:  Head Aches


Pulmonary:  No Dyspnea


Gastrointestinal:  No: Nausea, Vomiting, Abdominal Pain, Diarrhea, Constipation





Objective


Exam


Last Set of Vital Signs





Vital Signs








  Date Time  Temp Pulse Resp B/P (MAP) Pulse Ox O2 Delivery O2 Flow Rate FiO2


 


17 08:26 97.8 76 20 176/85 98 Room Air  





Capillary Refill : Less Than 3 Seconds


I&O











Intake and Output 


 


 17





 00:00


 


Intake Total 2400 ml


 


Output Total 650 ml


 


Balance 1750 ml


 


 


 


Intake Oral 400 ml


 


IV Total 2000 ml


 


Output Urine Total 650 ml


 


# Bowel Movements 1








General:  Alert, No Acute Distress


Lungs:  Clear to Auscultation


Heart:  Regular Rate, Normal S1, Normal S2


Skin:  Other (Decreased erythema of LLE today () as compared to yesterday's 

() inspection)





Results/Procedures


Lab


Laboratory Tests


17 11:04: Glucometer 302H


17 12:10: Lactic Acid Level 1.95


17 16:15: Glucometer 168H


17 20:52: Glucometer 209H


17 06:23: Glucometer 260H





Microbiology


17 Blood Culture - Preliminary, Resulted


          No growth


17 Gram Stain - Final, Resulted


          


17 Wound Culture - Preliminary, Resulted


          Enterobacter Cloacae


          Enterococcus Species


          Staphylococcus Aureus


          Strep Or Related Genus


Radiology


Left foot x-ray :  "IMPRESSION: Soft tissue defect, but no evidence of 

underlying acute osseous abnormality. Please note, however, that osteomyelitis 

cannot be excluded based on radiograph alone. Further evaluation with MRI is 

recommended. If MRI is contraindicated, triple phase bone scan could be 

performed."





Left leg x-ray :  "IMPRESSION:  1. Findings concerning for cortical 

destruction (as can be seen with osteomyelitis) of the proximal fibula versus 

artifact related to overlying soft tissue attenuation. Further evaluation with 

MRI is recommended."





Left leg venous doppler : negative for DVT





Assessment/Plan


Assessment/Plan


Admission Dx





1. Diabetic left foot ulcer


2. Hyperglycemia


3. Uncontrolled DM II


4. Morbid obesity


5. Diabetic peripheral neuropathy


6. LLE pain


7. HTN


Plan





1. Debridement of ulcer today () by Dr. Guzmán.


2. Refer to Dr. Guzmán's post-debridement plan regarding foot ulcer and overall 

foot care moving forward.


3. Increase insulin amount and alter medication form from what patient self-

administers at home to account for still elevated serum Glucose (, 06:23 

Glucometer result of 260).


4. Continue with antibiotic regimen to cover bacterial species grown from left 

ulcer.


Diagnosis/Problems:  





Clinical Quality Measures


DVT/VTE Risk/Contraindication:


Risk Factor Score Per Nursin


RFS Level Per Nursing on Admit:  2=Moderate





SHEBA PIMENTEL MD 17 1:58pm:


Subjective


Supervisory Addendum


Patient seen and personally examined myself on  with MS3Renzo. 

Agree with history and exam as documented. Please see below for my plans.





1. Diabetic left foot ulcer with cellulitis- wound care consult, zosyn and 

vancomycin due to concern for infection given elevated lactic acid and erythema 

or lower leg


-IVF bolus given in ER, recheck lactic acid


-Pedal pulses are good, but would still consider arterial studies to confirm 

adequate blood flow to distal foot, particularly if difficulty with healing


-Leg elevation, MRI foot. Repeat x-ray of knee (discussed with Dr. Martinez and 

appears the proximal fibula finding is relatively stable from 2016 and may be 

related to previous injury) for clarification


- Knee x-ray confirms fibula finding appears to be from prior trauma, 

lactic acid normalized. Appreciate Dr. Guzmán's recommendations, debridement 

later today. Continue zosyn and vancomycin while awaiting further culture 

results. Unable to MRI foot due to body habitus.





2. Diabetic neuropathy- outside MRI low spine pending results





3. DM II- reportedly taking all of his insulin and medications, but A1c is 12


-Resume home medications and monitor blood sugar and adjust meds as needed


-Discussed need for significantly improved glycemic control to avoid infection 

and even future amputations


 discussed home regimen of levemir 40 units at night prn blood sugar above 

250 and recommend scheduling regardless of blood sugar, he expresses 

understanding





4. Morbid obesity- in process of losing weight and pursuing bariatric surgery





5. HTN- resume home medications





DVT ppx- enoxaparin





Objective


Exam


Skin:  Other (Decreased erythema of LLE today () as compared to yesterday's 

() inspection)











FRANKLIN RAMIREZ STUDENT Sep 20, 2017 11:15 am


SHEBA PIMENTEL MD Sep 20, 2017 1:58 pm

## 2017-09-20 NOTE — WOUND CARE PROGRESS NOTE
Subjective


Subjective


Subjective/Events-last exam


The patient is a 36-year-old male with necrotic diabetic foot ulcer of the 

fifth metatarsal of his left foot. The patient has been recommended to undergo, 

and has signed a consent for, debridement of this wound. He states he has no 

pain in his wound.





Past Medical History: Diabetes mellitus, peripheral neuropathy, morbid obesity.





Review of Systems


Date Seen by Provider:  Sep 20, 2017


Time Seen by Provider:  18:15


General:  No Chills


Pulmonary:  No Dyspnea


Cardiovascular:  No: Chest Pain





Objective


Exam


Last Set of Vital Signs





Vital Signs








  Date Time  Temp Pulse Resp B/P (MAP) Pulse Ox O2 Delivery O2 Flow Rate FiO2


 


9/20/17 16:54 98.3 82 20 134/88 98 Room Air  





Capillary Refill : Less Than 3 Seconds


I&O











Intake and Output 


 


 9/21/17





 00:00


 


Intake Total 4400 ml


 


Output Total 1950 ml


 


Balance 2450 ml


 


 


 


Intake Oral 1880 ml


 


IV Total 2520 ml


 


Output Urine Total 1950 ml


 


# Bowel Movements 2








General:  Alert, No Acute Distress


Lungs:  Normal Air Movement


Skin:  Other (L 5th MTH ulcer  ---  2.0 x 4.5 x 0.4  cm, base 50% eschar, 50% 

slough.)





Results


Lab


Laboratory Tests


9/19/17 20:52: Glucometer 209H


9/20/17 06:23: Glucometer 260H


9/20/17 11:18: Glucometer 252H


9/20/17 13:30: Vancomycin Level Trough 23.3H


9/20/17 15:53: Glucometer 134H





Microbiology


9/18/17 Blood Culture - Preliminary, Resulted


          No growth


9/18/17 Gram Stain - Final, Resulted


          


9/18/17 Wound Culture - Preliminary, Resulted


          Enterobacter Cloacae


          Enterococcus Species


          Staphylococcus Aureus


          Strep Or Related Genus





Procedures


Procedures


Following an appropriate timeout, using and no local anesthetic, because the 

patient is insensate, excisional debridement of the left fifth metatarsal head 

wound was carried out using a curette.  Callus, eschar, slough, skin, 

subcutaneous tissue were all removed. The deepest layer removed was 

subcutaneous tissue.  Multiple and fine black straight hairs were noted within 

the wound and embedded in the callus overlying the wound. Family members noted 

that the hairs appear to be ones from their dog. It would appear that poor 

hygiene combined with walking around without shoes or socks on, resulted in 

foreign bodies being embedded in his skin and this probably resulted in the 

overlying wound. Post-debridement the wound measured 2.1 x 2.3 x 0.4 cm. 

Minimal bleeding was controlled with pressure. The patient tolerated this well 

with no pain during or after the procedure. Routine dressings were continued.





Assessment/Plan


1. Diabetic foot ulcer, left fifth metatarsal head, grade 2.





2. Cellulitis of left leg, with associated sepsis, resolved.





3. Poor foot care and hygiene.(It is noteworthy that during the debridement 

multiple areas of black dog hair were encountered in the wound, and embedded in 

the callus overlying the wound.  It seems most probable to this practitioner 

that indeed the embedded dog hairs deep in the patient's skin was responsible 

for the development of this wound, at least in part.





4.Morbid obesity.





Plan:  Continue IV Vancomycin and Zosyn, elevation, diabetic education, 

Physical therapy consult for evaluation for using a Knee walker, arterial 

evaluation.











INES VERNON MD Sep 20, 2017 19:10

## 2017-09-21 VITALS — DIASTOLIC BLOOD PRESSURE: 90 MMHG | SYSTOLIC BLOOD PRESSURE: 147 MMHG

## 2017-09-21 VITALS — SYSTOLIC BLOOD PRESSURE: 153 MMHG | DIASTOLIC BLOOD PRESSURE: 94 MMHG

## 2017-09-21 VITALS — DIASTOLIC BLOOD PRESSURE: 94 MMHG | SYSTOLIC BLOOD PRESSURE: 160 MMHG

## 2017-09-21 RX ADMIN — METFORMIN HYDROCHLORIDE SCH MG: 500 TABLET, EXTENDED RELEASE ORAL at 17:41

## 2017-09-21 RX ADMIN — INSULIN DETEMIR SCH UNIT: 100 INJECTION, SOLUTION SUBCUTANEOUS at 08:55

## 2017-09-21 RX ADMIN — METFORMIN HYDROCHLORIDE SCH MG: 500 TABLET, EXTENDED RELEASE ORAL at 08:44

## 2017-09-21 RX ADMIN — CARVEDILOL SCH MG: 25 TABLET, FILM COATED ORAL at 22:19

## 2017-09-21 RX ADMIN — INSULIN HUMAN SCH UNIT: 100 INJECTION, SOLUTION PARENTERAL at 08:45

## 2017-09-21 RX ADMIN — INSULIN HUMAN SCH UNIT: 100 INJECTION, SOLUTION PARENTERAL at 23:06

## 2017-09-21 RX ADMIN — INSULIN DETEMIR SCH UNIT: 100 INJECTION, SOLUTION SUBCUTANEOUS at 23:10

## 2017-09-21 RX ADMIN — SODIUM CHLORIDE SCH MLS/HR: 900 INJECTION INTRAVENOUS at 03:44

## 2017-09-21 RX ADMIN — SODIUM CHLORIDE SCH MLS/HR: 900 INJECTION, SOLUTION INTRAVENOUS at 20:49

## 2017-09-21 RX ADMIN — AMLODIPINE BESYLATE SCH MG: 5 TABLET ORAL at 17:31

## 2017-09-21 RX ADMIN — INSULIN HUMAN SCH UNIT: 100 INJECTION, SOLUTION PARENTERAL at 16:09

## 2017-09-21 RX ADMIN — INSULIN ASPART SCH UNIT: 100 INJECTION, SOLUTION INTRAVENOUS; SUBCUTANEOUS at 18:48

## 2017-09-21 RX ADMIN — LISINOPRIL SCH MG: 20 TABLET ORAL at 08:52

## 2017-09-21 RX ADMIN — INSULIN HUMAN SCH UNIT: 100 INJECTION, SOLUTION PARENTERAL at 12:23

## 2017-09-21 RX ADMIN — SODIUM CHLORIDE SCH MLS/HR: 900 INJECTION, SOLUTION INTRAVENOUS at 14:02

## 2017-09-21 RX ADMIN — SODIUM CHLORIDE SCH MLS/HR: 900 INJECTION, SOLUTION INTRAVENOUS at 08:39

## 2017-09-21 RX ADMIN — SODIUM CHLORIDE SCH MLS/HR: 900 INJECTION INTRAVENOUS at 10:36

## 2017-09-21 RX ADMIN — INSULIN ASPART SCH UNIT: 100 INJECTION, SOLUTION INTRAVENOUS; SUBCUTANEOUS at 12:23

## 2017-09-21 RX ADMIN — SODIUM CHLORIDE SCH MLS/HR: 900 INJECTION INTRAVENOUS at 18:26

## 2017-09-21 RX ADMIN — HYDROCHLOROTHIAZIDE SCH MG: 25 TABLET ORAL at 08:52

## 2017-09-21 RX ADMIN — ENOXAPARIN SODIUM SCH MG: 100 INJECTION SUBCUTANEOUS at 17:31

## 2017-09-21 RX ADMIN — INSULIN ASPART SCH UNIT: 100 INJECTION, SOLUTION INTRAVENOUS; SUBCUTANEOUS at 08:45

## 2017-09-21 RX ADMIN — SODIUM CHLORIDE SCH MLS/HR: 900 INJECTION, SOLUTION INTRAVENOUS at 07:04

## 2017-09-21 NOTE — PROGRESS NOTE (SOAP)
FRANKLIN RAMIREZ MED STUDENT 17 1558:


Subjective


Subjective/Events-last exam


Patient was informed by Dr. Pimentel that all four bacterial organisms which grew 

from his ulcer culture are sensitive to antibiotics. As a result, an antibiotic 

regimen specific to those four organisms will be started. Patient will need to 

undergo an arterial study of his LLE to check the viability of his blood 

supply. Following the arterial study, patient will need to see Dr. Guzmán 

regarding the nature of his ulcer management and care moving forward. Dr. Pimentel 

explained to the patient that the decrease in his most recent serum glucose 

reading (150), "looked good with the insulin [administration]. It is not perfect

, but it is a good start." Patient was instructed to adhere to a strict, 

diabetic-friendly diet when he returns home. Patient did not report any 

symptoms or related physical issues overnight in the time he was last seen by 

Dr. Pimentel.


Review of Systems


Date Seen by Provider:  Sep 21, 2017


Time Seen by Provider:  09:30





Objective


Exam


Last Set of Vital Signs





Vital Signs








  Date Time  Temp Pulse Resp B/P (MAP) Pulse Ox O2 Delivery O2 Flow Rate FiO2


 


17 08:30      Room Air  


 


17 08:00 97.1 82 20 153/94 98   





Capillary Refill : Less Than 3 Seconds


I&O











Intake and Output 


 


 17





 00:00


 


Intake Total 1760 ml


 


Output Total 1200 ml


 


Balance 560 ml


 


 


 


Intake Oral 1760 ml


 


Output Urine Total 1200 ml


 


# Voids 1


 


# Bowel Movements 1








General:  Alert, Cooperative, No Acute Distress


Lungs:  Clear to Auscultation


Heart:  Regular Rate





Results/Procedures


Lab


Laboratory Tests


17 15:53: Glucometer 134H


17 21:08: Glucometer 178H


17 05:17: Glucometer 151H


17 09:05: Vancomycin Level Trough 15.9


17 11:05: Glucometer 187H





Microbiology


17 Blood Culture - Preliminary, Resulted


          No growth


17 Gram Stain - Final, Resulted


          


17 Wound Culture - Preliminary, Resulted


          Enterobacter Cloacae


          Enterococcus Faecalis


          Staphylococcus Aureus


          Streptococcus Viridans


Radiology


Left foot x-ray :  "IMPRESSION: Soft tissue defect, but no evidence of 

underlying acute osseous abnormality. Please note, however, that osteomyelitis 

cannot be excluded based on radiograph alone. Further evaluation with MRI is 

recommended. If MRI is contraindicated, triple phase bone scan could be 

performed."





Left leg x-ray :  "IMPRESSION:  1. Findings concerning for cortical 

destruction (as can be seen with osteomyelitis) of the proximal fibula versus 

artifact related to overlying soft tissue attenuation. Further evaluation with 

MRI is recommended."





Left leg venous doppler : negative for DVT





Assessment/Plan


Assessment/Plan


Admission Dx


1. Diabetic left foot ulcer


2. Hyperglycemia


3. DM II (uncontrolled)


4. Diabetic peripheral neuropathy


5. LLE pain


6. Morbid Obesity


7. HTN


8. Bilateral LE edema


Plan





1. Start targeted antibiotic regimen for the four sensitive bacterial organisms 

which grew from ulcer culture.


2. Continue with current insulin administration as it has been successful since 

its initiation in lowering patient's serum glucose.


3. Perform arterial study of LLE.


4. Follow-up after arterial study with Dr. Guzmán for all aspects of care moving 

forward regarding foot ulcer.


5. Schedule an appointment with Jacqueline Mcguire at [facility] to begin 

process of obtaining customized footwear.


6. Adhere to a strict, diabetic-friendly diet once discharged home.


Diagnosis/Problems:  





Clinical Quality Measures


DVT/VTE Risk/Contraindication:


Risk Factor Score Per Nursin


RFS Level Per Nursing on Admit:  2=Moderate





SHEBA PIMENTEL MD 17:


Assessment/Plan


Assessment/Plan


Plan


Patient seen and personally examined myself on  with MS3Renzo. 

Agree with history and exam as documented. Please see below for my plans.





1. Diabetic left foot ulcer with cellulitis- wound care consult, zosyn and 

vancomycin due to concern for infection given elevated lactic acid and erythema 

or lower leg


-IVF bolus given in ER, recheck lactic acid


-Pedal pulses are good, but would still consider arterial studies to confirm 

adequate blood flow to distal foot, particularly if difficulty with healing


-Leg elevation, MRI foot. Repeat x-ray of knee (discussed with Dr. Martinez and 

appears the proximal fibula finding is relatively stable from  and may be 

related to previous injury) for clarification


- Knee x-ray confirms fibula finding appears to be from prior trauma, 

lactic acid normalized. Appreciate Dr. Guzmán's recommendations, debridement 

later today. Continue zosyn and vancomycin while awaiting further culture 

results. Unable to MRI foot due to body habitus.


- staph present on culture is oxacillin sensitive, will d/c vanc. Continue 

zosyn, change to oral abx on discharge, possibly tomorrow. Arterial evaluation 

pending today.





2. Diabetic neuropathy- outside MRI low spine pending results





3. DM II- reportedly taking all of his insulin and medications, but A1c is 12


-Resume home medications and monitor blood sugar and adjust meds as needed


-Discussed need for significantly improved glycemic control to avoid infection 

and even future amputations


 discussed home regimen of levemir 40 units at night prn blood sugar above 

250 and recommend scheduling regardless of blood sugar, he expresses 

understanding





4. Morbid obesity- in process of losing weight and pursuing bariatric surgery





5. HTN- resume home medications


 add amlodipine 5 mg as BP still running high





DVT ppx- enoxaparin


Diagnosis/Problems:  











FRANKLIN RAMIREZ STUDENT Sep 21, 2017 15:58


SHEBA PIMENTEL MD Sep 21, 2017 20:40

## 2017-09-21 NOTE — PHYSICAL THERAPY ORTHO EVAL
PT Orthopedic Evaluation


Type of Surgery





left 5th metatarsal diabetic wound





Prior Level of Function


Current Living Status:  Spouse


Locomotion     (Upon Admit):  Straight Cane





Subjective


Subjective


Patient agrees to PT.


Entry Into Home:  Ramp





Objective


Objective


Patient issued an orthowedge shoe to offload left metatarsals to allow healing 

of 5th metatarsal





Motor Control


Motor Control:  Motor Control WNL





ROM


ROM:  WFL, except focal deficit





Strength


Strength:  WFL





Transfer


Transfers (B, C, W/C) (FIM):  6





Gait


Gait Assistive Device:  Cane Single Point


Gait (FIM):  1


Distance (FIM):  1=up to 49 ft


Distance:  25' x 2


Gait Level of Assist:  6





Assessment/Goals


Goal Time Frame:  1 Visit


Safe Ambulation:  Yes





Plan


Treatment Plan:  Discharge, Gait (PRN modified independent)


Treatment Duration:  1 visit


PT/Family Agrees to Plan:  Yes





Time


Time In:  1110


Time Out:  1120


Total Billed Treatment Time:  10


Billed Treatment Time


1 visit


EVLowC 10 min


ADRIANNA Murphy PT Sep 21, 2017 12:09

## 2017-09-21 NOTE — DIAGNOSTIC IMAGING REPORT
EXAMINATION:

Segmental lower extremity pressure assessment and ankle brachial

index measurement. Post volume recording waveforms are also

obtained in the lower extremities.



INDICATION: Peripheral arterial disease, foot ulcer.



FINDINGS:

Systolic pressure in the right upper extremity is 184 mmHg.



RIGHT Lower extremity systolic pressures are:

In the mid thigh 177 , in the upper calf 195, and at the ankle

185 PT, and 185 DP.  





LEFT Lower extremity systolic pressures are:

In the mid thigh 206 , in the upper calf 199, and at the ankle

184 PT, and 186 DP.



 

LORNA on the right is 1.0, and on the left is 1.0.

Pulse volume recordings waveforms demonstrate mild dampening only

on the right side.



IMPRESSION:

Mild dampening of the PVR waveforms on the right side with no

significant abnormality seen otherwise.



Dictated by: 



  Dictated on workstation # [id number]

## 2017-09-22 VITALS — SYSTOLIC BLOOD PRESSURE: 155 MMHG | DIASTOLIC BLOOD PRESSURE: 94 MMHG

## 2017-09-22 VITALS — DIASTOLIC BLOOD PRESSURE: 80 MMHG | SYSTOLIC BLOOD PRESSURE: 124 MMHG

## 2017-09-22 VITALS — DIASTOLIC BLOOD PRESSURE: 94 MMHG | SYSTOLIC BLOOD PRESSURE: 155 MMHG

## 2017-09-22 RX ADMIN — LISINOPRIL SCH MG: 20 TABLET ORAL at 08:43

## 2017-09-22 RX ADMIN — AMLODIPINE BESYLATE SCH MG: 5 TABLET ORAL at 08:43

## 2017-09-22 RX ADMIN — HYDROCHLOROTHIAZIDE SCH MG: 25 TABLET ORAL at 08:43

## 2017-09-22 RX ADMIN — METFORMIN HYDROCHLORIDE SCH MG: 500 TABLET, EXTENDED RELEASE ORAL at 08:43

## 2017-09-22 RX ADMIN — INSULIN DETEMIR SCH UNIT: 100 INJECTION, SOLUTION SUBCUTANEOUS at 08:43

## 2017-09-22 RX ADMIN — INSULIN ASPART SCH UNIT: 100 INJECTION, SOLUTION INTRAVENOUS; SUBCUTANEOUS at 08:42

## 2017-09-22 RX ADMIN — INSULIN ASPART SCH UNIT: 100 INJECTION, SOLUTION INTRAVENOUS; SUBCUTANEOUS at 11:41

## 2017-09-22 RX ADMIN — SODIUM CHLORIDE SCH MLS/HR: 900 INJECTION INTRAVENOUS at 01:37

## 2017-09-22 RX ADMIN — SODIUM CHLORIDE SCH MLS/HR: 900 INJECTION, SOLUTION INTRAVENOUS at 08:43

## 2017-09-22 RX ADMIN — INSULIN HUMAN SCH UNIT: 100 INJECTION, SOLUTION PARENTERAL at 11:42

## 2017-09-22 RX ADMIN — INSULIN HUMAN SCH UNIT: 100 INJECTION, SOLUTION PARENTERAL at 06:25

## 2017-09-22 RX ADMIN — SODIUM CHLORIDE SCH MLS/HR: 900 INJECTION, SOLUTION INTRAVENOUS at 03:05

## 2017-09-22 RX ADMIN — SODIUM CHLORIDE SCH MLS/HR: 900 INJECTION INTRAVENOUS at 09:40

## 2017-09-22 NOTE — DISCHARGE SUMMARY
Diagnosis/Chief Complaint


Date of Admission


Sep 18, 2017 at 23:30


Date of Discharge


Sep 22, 2017


Admission Diagnosis


Admission Diagnosis


1. Diabetic left foot ulcer with cellulitis


2. Diabetic neuropathy- outside MRI low spine pending results


3. DM II


4. Morbid obesity


5. HTN





Discharge Diagnosis


1. Diabetic left foot ulcer with cellulitis- wound care consult, zosyn and 

vancomycin due to concern for infection given elevated lactic acid and erythema 

or lower leg


-IVF bolus given in ER, recheck lactic acid


-Pedal pulses are good, but would still consider arterial studies to confirm 

adequate blood flow to distal foot, particularly if difficulty with healing


-Leg elevation, MRI foot. Repeat x-ray of knee (discussed with Dr. Martinez and 

appears the proximal fibula finding is relatively stable from 2016 and may be 

related to previous injury) for clarification


- Knee x-ray confirms fibula finding appears to be from prior trauma, 

lactic acid normalized. Appreciate Dr. Guzmán's recommendations, debridement 

later today. Continue zosyn and vancomycin while awaiting further culture 

results. Unable to MRI foot due to body habitus.


- staph present on culture is oxacillin sensitive, will d/c vanc. Continue 

zosyn, change to oral abx on discharge, possibly tomorrow. Arterial evaluation 

pending today.


 will d/c with amoxicillin and Bactrim for 10 more days to cover the staph, 

strep viridans, enterococcus and enterobacter in culture.





2. Diabetic neuropathy- outside MRI low spine pending results





3. DM II- reportedly taking all of his insulin and medications, but A1c is 12


-Resume home medications and monitor blood sugar and adjust meds as needed


-Discussed need for significantly improved glycemic control to avoid infection 

and even future amputations


 discussed home regimen of levemir 40 units at night prn blood sugar above 

250 and recommend scheduling regardless of blood sugar, he expresses 

understanding


 blood sugars appropriate on home regimen while inpatient, he states he has 

meds at home or can call in refills, so no new scripts were sent.





4. Morbid obesity- in process of losing weight and pursuing bariatric surgery





5. HTN- resume home medications


 added amlodipine 5 mg as BP still running high





Chief Complaint/HPI


Chief Complaint/HPI


From H&P "Aj is a 36-year old male who presented to the [facility] ED last 

night () with the chief complaint of a left foot wound. While taking his 

sock off last evening, the patient noticed fragments of skin come off with it. 

There was also bleeding from the wound. Patient states the "texture" of his 

left foot changed over the past few days from "hard and dry" to now, "soft and 

squishy." Patient says he has had no sensation in his feet "in 10 years." 

Patient states he has no prior history of diabetic foot ulcer(s). Patient is 

able to walk on his own without assistance, but is concerned about a recent 

diminished ability to dorsiflex his left foot. Patient's typical footwear 

consists of sneakers and diabetic socks from [employer] for outdoor use, and Crocs

-type closed-toe sandals for use while inside his home. Patient typically sees 

Jacqueline Mcguire at [facility] and Dr. Neumann for his foot care. In late August, patient 

saw a neurologist at [facility] in [place], [place] to further work-up his bilateral LE 

diabetic neuropathy. The neurologist "was not able to get any readings [via] a 

wand with metal probes," and "got very little readings when using needles." 

Patient states the neurologist concluded there was "incomplete firing" in his 

LE nerves. Last Wednesday (), patient saw Jacqueline to discuss the recent 

neurologic evaluation, and she requested he undergo a lumbar spine MRI to rule 

out vertebral abnormalities and/or nerve impingement as contributors to his LE 

neuropathy. Patient has not been informed of the results from MRI."








Discharge Summary-Simple/Stand


Procedures


Bedside debridement left foot wound


Consultations





Discharge Physical Examination


Allergies:  


Coded Allergies:  


     levofloxacin (Verified  Allergy, Mild, 4/14/15)


     sweet potato (Unverified  Allergy, Unknown, 4/14/15)


Vitals & I&Os





Vital Sign - Last 12Hours








  Date Time  Temp Pulse Resp B/P (MAP) Pulse Ox O2 Delivery O2 Flow Rate FiO2


 


17 09:00      Room Air  


 


17 08:27 97.1 80 20 155/94 98   








General Appearance:  Alert, No Acute Distress


Respiratory:  Clear to Auscultation, Normal Air Movement


Cardiovascular:  Regular Rate, No Murmurs


Psych/Mental Status:  Mental Status NL, Mood NL





Hospital Course


See final discharge diagnosis.


Labs


Laboratory Tests


17 16:15: Glucometer 168H


17 20:52: Glucometer 209H


17 06:23: Glucometer 260H


17 11:18: Glucometer 252H


17 13:30: Vancomycin Level Trough 23.3H


17 15:53: Glucometer 134H


17 21:08: Glucometer 178H


17 05:17: Glucometer 151H


17 09:05: Vancomycin Level Trough 15.9


17 11:05: Glucometer 187H


17 15:52: Glucometer 117H


17 22:59: Glucometer 98


17 06:18: Glucometer 105


17 10:57: Glucometer 163H





Microbiology


17 Blood Culture - Preliminary, Resulted


          No growth


17 Gram Stain - Final, Complete


          


17 Wound Culture - Final, Complete


          Enterobacter Cloacae


          Enterococcus Faecalis


          Staphylococcus Aureus


          Streptococcus Viridans





Laboratory Tests








Test


  17


13:30 17


15:53 17


21:08 17


05:17 Range/Units


 


 


Vancomycin Level Trough


  23.3 H


  


  


  


  10.0-20.0


UG/ML


 


Glucometer  134 H 178 H 151 H   MG/DL


 


Test


  17


09:05 17


11:05 17


15:52 17


22:59 Range/Units


 


 


Vancomycin Level Trough


  15.9 


  


  


  


  10.0-20.0


UG/ML


 


Glucometer  187 H 117 H 98    MG/DL


 


Test


  17


06:18 17


10:57 


  


  Range/Units


 


 


Glucometer 105  163 H     MG/DL








Radiology Reviewed


Left foot x-ray :  "IMPRESSION: Soft tissue defect, but no evidence of 

underlying acute osseous abnormality. Please note, however, that osteomyelitis 

cannot be excluded based on radiograph alone. Further evaluation with MRI is 

recommended. If MRI is contraindicated, triple phase bone scan could be 

performed."





Left leg x-ray :  "IMPRESSION:  1. Findings concerning for cortical 

destruction (as can be seen with osteomyelitis) of the proximal fibula versus 

artifact related to overlying soft tissue attenuation. Further evaluation with 

MRI is recommended."





Left leg venous doppler : negative for DVT





Discharge


Instructions to patient/family


Please see electronic discharge instructions given to patient.


Discharge Medications


Reviewed and agree with Discharge Medication list on patient's Discharge 

Instruction sheet





Clinical Quality Measures


DVT/VTE Risk/Contraindication:


Risk Factor Score Per Nursin


RFS Level Per Nursing on Admit:  2=Moderate





Copy


Copies To 1:   MARC ALBRIGHT MD, BETHANY N MD Sep 22, 2017 11:09 am

## 2017-09-22 NOTE — DISCHARGE INSTRUCTIONS
Discharge [facility]


Discharge Medications


New, Converted or Re-Newed RX:  Transmitted to Pharmacy


New Medications:  


Amoxicillin (Amoxicillin) 875 Mg Tablet


875 MG PO BID, #20 TAB 0 Refills





Sulfamethoxazole/Trimethoprim (Sulfamethoxazole-Tmp Ds Tablet) 1 Each Tablet


1 EACH PO BID for 10 Days, #20 TAB 0 Refills





Amlodipine Besylate (Amlodipine Besylate) 5 Mg Tablet


5 MG PO DAILY, #30 TAB 0 Refills





 


Continued Medications:  


Canagliflozin (Invokana) 100 Mg Tablet


100 MG PO DAILY, TAB


LAST FILLED #30 4-3-17


Indomethacin (Indomethacin) 50 Mg Capsule


50 MG PO BID, CAP





Insulin Aspart (Novolog Flexpen) 300 Units/3 Ml Solution


25 UNITS SC AC, EA


LAST FILLED 2-18-17


Insulin Glargine,Hum.rec.anlog (Lantus) 100 Unit/1 Ml Vial


40 UNITS SC HS PRN for BS ABOVE 250, EA


LAST FILLED 2-18-17


Liraglutide (Victoza 3-Wilder) 0.6 Mg/0.1 Ml Pen.injctr


1.8 MG SC HS, EACH


LAST FILLED 2-18-17


Lisinopril/Hydrochlorothiazide (Lisinopril-Hctz 20-25 mg Tab) 1 Each Tablet


1 TAB PO DAILY, TAB





Metformin HCl (Metformin HCl ER) 500 Mg Tab.er.24h


1000 MG PO BID, TAB


TAKES 2 (500MG) TABLETS








Patient Instructions


Goal/Follow Up Appt:  


Follow up with Dr. Albright on 9/27 at 2 pm.


Return to The Hospital For:  


Fever, inability to keep down antibiotics





Activity & Diet


Discharge Diet:  ADA Diet


Activity as Tolerated:  No (no pressure on foot wound)





Copy


Copies To 1:   MARC ALBRIGHT MD,SHEBA MOREL MD Sep 22, 2017 11:08 am

## 2017-09-29 ENCOUNTER — HOSPITAL ENCOUNTER (OUTPATIENT)
Dept: HOSPITAL 75 - WOUNDCARE | Age: 37
End: 2017-09-29
Attending: SURGERY
Payer: COMMERCIAL

## 2017-09-29 DIAGNOSIS — E11.42: ICD-10-CM

## 2017-09-29 DIAGNOSIS — L97.522: ICD-10-CM

## 2017-09-29 DIAGNOSIS — E66.01: ICD-10-CM

## 2017-09-29 DIAGNOSIS — E11.621: Primary | ICD-10-CM

## 2017-09-29 PROCEDURE — 11042 DBRDMT SUBQ TIS 1ST 20SQCM/<: CPT

## 2017-10-06 ENCOUNTER — HOSPITAL ENCOUNTER (OUTPATIENT)
Dept: HOSPITAL 75 - WOUNDCARE | Age: 37
End: 2017-10-06
Attending: SURGERY
Payer: COMMERCIAL

## 2017-10-06 DIAGNOSIS — E11.42: ICD-10-CM

## 2017-10-06 DIAGNOSIS — E11.621: Primary | ICD-10-CM

## 2017-10-06 DIAGNOSIS — L97.522: ICD-10-CM

## 2017-10-06 DIAGNOSIS — E66.01: ICD-10-CM

## 2017-10-06 PROCEDURE — 11042 DBRDMT SUBQ TIS 1ST 20SQCM/<: CPT

## 2017-10-06 PROCEDURE — 87205 SMEAR GRAM STAIN: CPT

## 2017-10-06 PROCEDURE — 87075 CULTR BACTERIA EXCEPT BLOOD: CPT

## 2017-10-06 PROCEDURE — 87186 SC STD MICRODIL/AGAR DIL: CPT

## 2017-10-06 PROCEDURE — 87070 CULTURE OTHR SPECIMN AEROBIC: CPT

## 2017-10-13 ENCOUNTER — HOSPITAL ENCOUNTER (OUTPATIENT)
Dept: HOSPITAL 75 - WOUNDCARE | Age: 37
End: 2017-10-13
Attending: NURSE PRACTITIONER
Payer: COMMERCIAL

## 2017-10-13 DIAGNOSIS — E11.42: ICD-10-CM

## 2017-10-13 DIAGNOSIS — E11.621: Primary | ICD-10-CM

## 2017-10-13 DIAGNOSIS — L97.522: ICD-10-CM

## 2017-10-13 DIAGNOSIS — E66.01: ICD-10-CM

## 2017-10-13 PROCEDURE — 11042 DBRDMT SUBQ TIS 1ST 20SQCM/<: CPT

## 2017-10-18 ENCOUNTER — HOSPITAL ENCOUNTER (OUTPATIENT)
Dept: HOSPITAL 75 - WOUNDCARE | Age: 37
End: 2017-10-18
Attending: SURGERY
Payer: COMMERCIAL

## 2017-10-18 DIAGNOSIS — E66.01: ICD-10-CM

## 2017-10-18 DIAGNOSIS — E11.42: ICD-10-CM

## 2017-10-18 DIAGNOSIS — E11.621: Primary | ICD-10-CM

## 2017-10-18 DIAGNOSIS — L97.522: ICD-10-CM

## 2017-10-18 PROCEDURE — 11042 DBRDMT SUBQ TIS 1ST 20SQCM/<: CPT

## 2017-10-20 ENCOUNTER — HOSPITAL ENCOUNTER (OUTPATIENT)
Dept: HOSPITAL 75 - WOUNDCARE | Age: 37
End: 2017-10-20
Attending: SURGERY
Payer: COMMERCIAL

## 2017-10-20 DIAGNOSIS — E11.42: ICD-10-CM

## 2017-10-20 DIAGNOSIS — E66.01: ICD-10-CM

## 2017-10-20 DIAGNOSIS — E11.621: Primary | ICD-10-CM

## 2017-10-20 DIAGNOSIS — L97.522: ICD-10-CM

## 2017-10-20 PROCEDURE — 29445 APPL RIGID TOT CNTC LEG CAST: CPT

## 2017-10-23 ENCOUNTER — HOSPITAL ENCOUNTER (OUTPATIENT)
Dept: HOSPITAL 75 - WOUNDCARE | Age: 37
End: 2017-10-23
Attending: SURGERY
Payer: COMMERCIAL

## 2017-10-23 DIAGNOSIS — E11.42: ICD-10-CM

## 2017-10-23 DIAGNOSIS — E66.01: ICD-10-CM

## 2017-10-23 DIAGNOSIS — E11.621: Primary | ICD-10-CM

## 2017-10-23 DIAGNOSIS — L97.522: ICD-10-CM

## 2017-10-23 PROCEDURE — 11042 DBRDMT SUBQ TIS 1ST 20SQCM/<: CPT

## 2017-10-25 ENCOUNTER — HOSPITAL ENCOUNTER (OUTPATIENT)
Dept: HOSPITAL 75 - WOUNDCARE | Age: 37
End: 2017-10-25
Attending: SURGERY
Payer: COMMERCIAL

## 2017-10-25 DIAGNOSIS — L97.522: ICD-10-CM

## 2017-10-25 DIAGNOSIS — E11.621: Primary | ICD-10-CM

## 2017-10-25 DIAGNOSIS — E11.42: ICD-10-CM

## 2017-10-25 DIAGNOSIS — E66.01: ICD-10-CM

## 2017-10-25 PROCEDURE — 29445 APPL RIGID TOT CNTC LEG CAST: CPT

## 2017-10-27 ENCOUNTER — HOSPITAL ENCOUNTER (OUTPATIENT)
Dept: HOSPITAL 75 - WOUNDCARE | Age: 37
End: 2017-10-27
Attending: SURGERY
Payer: COMMERCIAL

## 2017-10-27 DIAGNOSIS — E11.42: ICD-10-CM

## 2017-10-27 DIAGNOSIS — E66.01: ICD-10-CM

## 2017-10-27 DIAGNOSIS — E11.621: Primary | ICD-10-CM

## 2017-10-27 DIAGNOSIS — L97.522: ICD-10-CM

## 2017-10-27 PROCEDURE — 29445 APPL RIGID TOT CNTC LEG CAST: CPT

## 2017-10-30 ENCOUNTER — HOSPITAL ENCOUNTER (OUTPATIENT)
Dept: HOSPITAL 75 - WOUNDCARE | Age: 37
End: 2017-10-30
Attending: SURGERY
Payer: COMMERCIAL

## 2017-10-30 DIAGNOSIS — L97.522: ICD-10-CM

## 2017-10-30 DIAGNOSIS — E11.621: Primary | ICD-10-CM

## 2017-10-30 DIAGNOSIS — E66.01: ICD-10-CM

## 2017-10-30 DIAGNOSIS — E11.42: ICD-10-CM

## 2017-10-30 PROCEDURE — 29445 APPL RIGID TOT CNTC LEG CAST: CPT

## 2017-11-01 ENCOUNTER — HOSPITAL ENCOUNTER (OUTPATIENT)
Dept: HOSPITAL 75 - WOUNDCARE | Age: 37
End: 2017-11-01
Attending: SURGERY
Payer: COMMERCIAL

## 2017-11-01 DIAGNOSIS — E11.621: Primary | ICD-10-CM

## 2017-11-01 DIAGNOSIS — E66.01: ICD-10-CM

## 2017-11-01 DIAGNOSIS — E11.42: ICD-10-CM

## 2017-11-01 DIAGNOSIS — L97.522: ICD-10-CM

## 2017-11-01 PROCEDURE — 11042 DBRDMT SUBQ TIS 1ST 20SQCM/<: CPT

## 2017-11-10 ENCOUNTER — HOSPITAL ENCOUNTER (OUTPATIENT)
Dept: HOSPITAL 75 - WOUNDCARE | Age: 37
End: 2017-11-10
Attending: SURGERY
Payer: COMMERCIAL

## 2017-11-10 DIAGNOSIS — L97.522: ICD-10-CM

## 2017-11-10 DIAGNOSIS — E66.01: ICD-10-CM

## 2017-11-10 DIAGNOSIS — E11.42: ICD-10-CM

## 2017-11-10 DIAGNOSIS — E11.621: Primary | ICD-10-CM

## 2017-11-10 PROCEDURE — 29445 APPL RIGID TOT CNTC LEG CAST: CPT

## 2017-11-13 ENCOUNTER — HOSPITAL ENCOUNTER (OUTPATIENT)
Dept: HOSPITAL 75 - WOUNDCARE | Age: 37
End: 2017-11-13
Attending: SURGERY
Payer: COMMERCIAL

## 2017-11-13 DIAGNOSIS — E66.01: ICD-10-CM

## 2017-11-13 DIAGNOSIS — L97.522: ICD-10-CM

## 2017-11-13 DIAGNOSIS — E11.42: ICD-10-CM

## 2017-11-13 DIAGNOSIS — E11.621: Primary | ICD-10-CM

## 2017-11-13 PROCEDURE — 11042 DBRDMT SUBQ TIS 1ST 20SQCM/<: CPT

## 2017-11-17 ENCOUNTER — HOSPITAL ENCOUNTER (OUTPATIENT)
Dept: HOSPITAL 75 - WOUNDCARE | Age: 37
End: 2017-11-17
Attending: SURGERY
Payer: COMMERCIAL

## 2017-11-17 DIAGNOSIS — L97.523: ICD-10-CM

## 2017-11-17 DIAGNOSIS — E11.621: Primary | ICD-10-CM

## 2017-11-17 DIAGNOSIS — E66.01: ICD-10-CM

## 2017-11-17 DIAGNOSIS — E11.42: ICD-10-CM

## 2017-11-17 PROCEDURE — 11043 DBRDMT MUSC&/FSCA 1ST 20/<: CPT

## 2017-11-20 ENCOUNTER — HOSPITAL ENCOUNTER (OUTPATIENT)
Dept: HOSPITAL 75 - WOUNDCARE | Age: 37
End: 2017-11-20
Attending: SURGERY
Payer: COMMERCIAL

## 2017-11-20 DIAGNOSIS — E11.621: ICD-10-CM

## 2017-11-20 DIAGNOSIS — E11.42: ICD-10-CM

## 2017-11-20 DIAGNOSIS — L97.523: Primary | ICD-10-CM

## 2017-11-20 DIAGNOSIS — E66.01: ICD-10-CM

## 2017-11-20 PROCEDURE — 87205 SMEAR GRAM STAIN: CPT

## 2017-11-20 PROCEDURE — 87077 CULTURE AEROBIC IDENTIFY: CPT

## 2017-11-20 PROCEDURE — 87075 CULTR BACTERIA EXCEPT BLOOD: CPT

## 2017-11-20 PROCEDURE — 11042 DBRDMT SUBQ TIS 1ST 20SQCM/<: CPT

## 2017-11-20 PROCEDURE — 87070 CULTURE OTHR SPECIMN AEROBIC: CPT

## 2017-11-27 ENCOUNTER — HOSPITAL ENCOUNTER (OUTPATIENT)
Dept: HOSPITAL 75 - WOUNDCARE | Age: 37
End: 2017-11-27
Attending: SURGERY
Payer: COMMERCIAL

## 2017-11-27 DIAGNOSIS — L97.523: ICD-10-CM

## 2017-11-27 DIAGNOSIS — E66.01: ICD-10-CM

## 2017-11-27 DIAGNOSIS — E11.621: Primary | ICD-10-CM

## 2017-11-27 DIAGNOSIS — E11.42: ICD-10-CM

## 2017-11-27 PROCEDURE — 11042 DBRDMT SUBQ TIS 1ST 20SQCM/<: CPT

## 2017-11-29 ENCOUNTER — HOSPITAL ENCOUNTER (OUTPATIENT)
Dept: HOSPITAL 75 - WOUNDCARE | Age: 37
End: 2017-11-29
Attending: SURGERY
Payer: COMMERCIAL

## 2017-11-29 DIAGNOSIS — L97.523: ICD-10-CM

## 2017-11-29 DIAGNOSIS — E66.01: ICD-10-CM

## 2017-11-29 DIAGNOSIS — E11.42: ICD-10-CM

## 2017-11-29 DIAGNOSIS — E11.621: Primary | ICD-10-CM

## 2017-11-29 PROCEDURE — 29445 APPL RIGID TOT CNTC LEG CAST: CPT

## 2017-12-04 ENCOUNTER — HOSPITAL ENCOUNTER (OUTPATIENT)
Dept: HOSPITAL 75 - WOUNDCARE | Age: 37
End: 2017-12-04
Attending: SURGERY
Payer: COMMERCIAL

## 2017-12-04 DIAGNOSIS — E11.621: Primary | ICD-10-CM

## 2017-12-04 DIAGNOSIS — L97.523: ICD-10-CM

## 2017-12-04 DIAGNOSIS — E11.42: ICD-10-CM

## 2017-12-04 DIAGNOSIS — E66.01: ICD-10-CM

## 2017-12-04 PROCEDURE — 11042 DBRDMT SUBQ TIS 1ST 20SQCM/<: CPT

## 2017-12-11 ENCOUNTER — HOSPITAL ENCOUNTER (OUTPATIENT)
Dept: HOSPITAL 75 - WOUNDCARE | Age: 37
End: 2017-12-11
Attending: SURGERY
Payer: COMMERCIAL

## 2017-12-11 DIAGNOSIS — E11.42: ICD-10-CM

## 2017-12-11 DIAGNOSIS — E66.01: ICD-10-CM

## 2017-12-11 DIAGNOSIS — L97.523: ICD-10-CM

## 2017-12-11 DIAGNOSIS — E11.621: Primary | ICD-10-CM

## 2017-12-11 PROCEDURE — 11042 DBRDMT SUBQ TIS 1ST 20SQCM/<: CPT

## 2017-12-18 ENCOUNTER — HOSPITAL ENCOUNTER (OUTPATIENT)
Dept: HOSPITAL 75 - WOUNDCARE | Age: 37
End: 2017-12-18
Attending: SURGERY
Payer: COMMERCIAL

## 2017-12-18 DIAGNOSIS — E11.621: Primary | ICD-10-CM

## 2017-12-18 DIAGNOSIS — L97.523: ICD-10-CM

## 2017-12-18 DIAGNOSIS — E66.01: ICD-10-CM

## 2017-12-18 DIAGNOSIS — E11.42: ICD-10-CM

## 2017-12-18 PROCEDURE — 11042 DBRDMT SUBQ TIS 1ST 20SQCM/<: CPT

## 2017-12-18 PROCEDURE — 87205 SMEAR GRAM STAIN: CPT

## 2017-12-18 PROCEDURE — 87075 CULTR BACTERIA EXCEPT BLOOD: CPT

## 2017-12-18 PROCEDURE — 87077 CULTURE AEROBIC IDENTIFY: CPT

## 2017-12-18 PROCEDURE — 87070 CULTURE OTHR SPECIMN AEROBIC: CPT

## 2017-12-26 ENCOUNTER — HOSPITAL ENCOUNTER (OUTPATIENT)
Dept: HOSPITAL 75 - WOUNDCARE | Age: 37
End: 2017-12-26
Attending: SURGERY
Payer: COMMERCIAL

## 2017-12-26 DIAGNOSIS — E11.621: Primary | ICD-10-CM

## 2017-12-26 DIAGNOSIS — L97.523: ICD-10-CM

## 2017-12-26 DIAGNOSIS — E66.01: ICD-10-CM

## 2017-12-26 DIAGNOSIS — E11.42: ICD-10-CM

## 2017-12-26 PROCEDURE — 11042 DBRDMT SUBQ TIS 1ST 20SQCM/<: CPT

## 2018-01-02 ENCOUNTER — HOSPITAL ENCOUNTER (OUTPATIENT)
Dept: HOSPITAL 75 - WOUNDCARE | Age: 38
End: 2018-01-02
Attending: SURGERY
Payer: COMMERCIAL

## 2018-01-02 DIAGNOSIS — E11.621: Primary | ICD-10-CM

## 2018-01-02 DIAGNOSIS — L97.523: ICD-10-CM

## 2018-01-02 DIAGNOSIS — E11.42: ICD-10-CM

## 2018-01-02 DIAGNOSIS — E66.01: ICD-10-CM

## 2018-01-02 PROCEDURE — 11042 DBRDMT SUBQ TIS 1ST 20SQCM/<: CPT

## 2018-01-08 ENCOUNTER — HOSPITAL ENCOUNTER (OUTPATIENT)
Dept: HOSPITAL 75 - WOUNDCARE | Age: 38
End: 2018-01-08
Attending: SURGERY
Payer: COMMERCIAL

## 2018-01-08 DIAGNOSIS — E66.01: ICD-10-CM

## 2018-01-08 DIAGNOSIS — E11.42: ICD-10-CM

## 2018-01-08 DIAGNOSIS — E11.621: Primary | ICD-10-CM

## 2018-01-08 DIAGNOSIS — L97.523: ICD-10-CM

## 2018-01-15 ENCOUNTER — HOSPITAL ENCOUNTER (OUTPATIENT)
Dept: HOSPITAL 75 - WOUNDCARE | Age: 38
End: 2018-01-15
Attending: SURGERY
Payer: COMMERCIAL

## 2018-01-15 DIAGNOSIS — E11.42: ICD-10-CM

## 2018-01-15 DIAGNOSIS — E66.01: ICD-10-CM

## 2018-01-15 DIAGNOSIS — E11.621: Primary | ICD-10-CM

## 2018-01-15 DIAGNOSIS — L97.523: ICD-10-CM

## 2018-01-15 PROCEDURE — 29445 APPL RIGID TOT CNTC LEG CAST: CPT

## 2018-01-22 ENCOUNTER — HOSPITAL ENCOUNTER (OUTPATIENT)
Dept: HOSPITAL 75 - WOUNDCARE | Age: 38
End: 2018-01-22
Attending: SURGERY
Payer: COMMERCIAL

## 2018-01-22 DIAGNOSIS — E11.621: Primary | ICD-10-CM

## 2018-01-22 DIAGNOSIS — L97.523: ICD-10-CM

## 2018-01-22 PROCEDURE — 99212 OFFICE O/P EST SF 10 MIN: CPT

## 2018-01-29 ENCOUNTER — HOSPITAL ENCOUNTER (OUTPATIENT)
Dept: HOSPITAL 75 - WOUNDCARE | Age: 38
End: 2018-01-29
Attending: SURGERY
Payer: COMMERCIAL

## 2018-01-29 DIAGNOSIS — E11.621: Primary | ICD-10-CM

## 2018-01-29 DIAGNOSIS — L97.521: ICD-10-CM

## 2018-01-29 DIAGNOSIS — E66.01: ICD-10-CM

## 2018-01-29 DIAGNOSIS — E11.42: ICD-10-CM

## 2018-01-29 PROCEDURE — 97597 DBRDMT OPN WND 1ST 20 CM/<: CPT

## 2018-02-05 ENCOUNTER — HOSPITAL ENCOUNTER (OUTPATIENT)
Dept: HOSPITAL 75 - WOUNDCARE | Age: 38
End: 2018-02-05
Attending: SURGERY
Payer: COMMERCIAL

## 2018-02-05 DIAGNOSIS — E11.621: Primary | ICD-10-CM

## 2018-02-05 DIAGNOSIS — E66.01: ICD-10-CM

## 2018-02-05 DIAGNOSIS — L97.522: ICD-10-CM

## 2018-02-05 DIAGNOSIS — E11.42: ICD-10-CM

## 2018-02-05 PROCEDURE — 87077 CULTURE AEROBIC IDENTIFY: CPT

## 2018-02-05 PROCEDURE — 87186 SC STD MICRODIL/AGAR DIL: CPT

## 2018-02-05 PROCEDURE — 87070 CULTURE OTHR SPECIMN AEROBIC: CPT

## 2018-02-05 PROCEDURE — 87205 SMEAR GRAM STAIN: CPT

## 2018-02-05 PROCEDURE — 87075 CULTR BACTERIA EXCEPT BLOOD: CPT

## 2018-02-05 PROCEDURE — 11042 DBRDMT SUBQ TIS 1ST 20SQCM/<: CPT

## 2018-02-12 ENCOUNTER — HOSPITAL ENCOUNTER (OUTPATIENT)
Dept: HOSPITAL 75 - WOUNDCARE | Age: 38
End: 2018-02-12
Attending: SURGERY
Payer: COMMERCIAL

## 2018-02-12 DIAGNOSIS — E11.42: ICD-10-CM

## 2018-02-12 DIAGNOSIS — E11.621: Primary | ICD-10-CM

## 2018-02-12 DIAGNOSIS — L97.522: ICD-10-CM

## 2018-02-12 DIAGNOSIS — E66.01: ICD-10-CM

## 2018-02-12 PROCEDURE — 11042 DBRDMT SUBQ TIS 1ST 20SQCM/<: CPT

## 2018-02-14 ENCOUNTER — HOSPITAL ENCOUNTER (OUTPATIENT)
Dept: HOSPITAL 75 - WOUNDCARE | Age: 38
End: 2018-02-14
Attending: SURGERY
Payer: COMMERCIAL

## 2018-02-14 DIAGNOSIS — L97.522: ICD-10-CM

## 2018-02-14 DIAGNOSIS — E66.01: ICD-10-CM

## 2018-02-14 DIAGNOSIS — E11.42: ICD-10-CM

## 2018-02-14 DIAGNOSIS — E11.621: Primary | ICD-10-CM

## 2018-02-14 PROCEDURE — 29445 APPL RIGID TOT CNTC LEG CAST: CPT

## 2018-02-21 ENCOUNTER — HOSPITAL ENCOUNTER (OUTPATIENT)
Dept: HOSPITAL 75 - WOUNDCARE | Age: 38
End: 2018-02-21
Attending: SURGERY
Payer: COMMERCIAL

## 2018-02-21 DIAGNOSIS — E66.01: ICD-10-CM

## 2018-02-21 DIAGNOSIS — E11.42: ICD-10-CM

## 2018-02-21 DIAGNOSIS — E11.621: Primary | ICD-10-CM

## 2018-02-21 DIAGNOSIS — L97.522: ICD-10-CM

## 2018-02-21 PROCEDURE — 11042 DBRDMT SUBQ TIS 1ST 20SQCM/<: CPT

## 2018-02-28 ENCOUNTER — HOSPITAL ENCOUNTER (OUTPATIENT)
Dept: HOSPITAL 75 - WOUNDCARE | Age: 38
End: 2018-02-28
Attending: SURGERY
Payer: COMMERCIAL

## 2018-02-28 DIAGNOSIS — E11.621: Primary | ICD-10-CM

## 2018-02-28 DIAGNOSIS — L97.522: ICD-10-CM

## 2018-02-28 DIAGNOSIS — E66.01: ICD-10-CM

## 2018-02-28 DIAGNOSIS — E11.42: ICD-10-CM

## 2018-02-28 PROCEDURE — 11042 DBRDMT SUBQ TIS 1ST 20SQCM/<: CPT

## 2018-03-06 ENCOUNTER — HOSPITAL ENCOUNTER (OUTPATIENT)
Dept: HOSPITAL 75 - WOUNDCARE | Age: 38
End: 2018-03-06
Attending: NURSE PRACTITIONER
Payer: COMMERCIAL

## 2018-03-06 DIAGNOSIS — E11.42: ICD-10-CM

## 2018-03-06 DIAGNOSIS — E11.621: Primary | ICD-10-CM

## 2018-03-06 DIAGNOSIS — E66.01: ICD-10-CM

## 2018-03-06 DIAGNOSIS — L97.522: ICD-10-CM

## 2018-03-06 PROCEDURE — 11042 DBRDMT SUBQ TIS 1ST 20SQCM/<: CPT

## 2018-03-14 ENCOUNTER — HOSPITAL ENCOUNTER (OUTPATIENT)
Dept: HOSPITAL 75 - WOUNDCARE | Age: 38
End: 2018-03-14
Attending: SURGERY
Payer: COMMERCIAL

## 2018-03-14 DIAGNOSIS — L97.522: ICD-10-CM

## 2018-03-14 DIAGNOSIS — E66.01: ICD-10-CM

## 2018-03-14 DIAGNOSIS — E11.42: ICD-10-CM

## 2018-03-14 DIAGNOSIS — E11.621: Primary | ICD-10-CM

## 2018-03-14 PROCEDURE — 29445 APPL RIGID TOT CNTC LEG CAST: CPT

## 2018-03-23 ENCOUNTER — HOSPITAL ENCOUNTER (OUTPATIENT)
Dept: HOSPITAL 75 - WOUNDCARE | Age: 38
End: 2018-03-23
Attending: SURGERY
Payer: COMMERCIAL

## 2018-03-23 DIAGNOSIS — E11.42: ICD-10-CM

## 2018-03-23 DIAGNOSIS — E11.621: Primary | ICD-10-CM

## 2018-03-23 DIAGNOSIS — L97.522: ICD-10-CM

## 2018-03-23 DIAGNOSIS — E66.01: ICD-10-CM

## 2018-03-23 DIAGNOSIS — L97.521: ICD-10-CM

## 2018-03-23 PROCEDURE — 99212 OFFICE O/P EST SF 10 MIN: CPT

## 2018-05-16 ENCOUNTER — HOSPITAL ENCOUNTER (OUTPATIENT)
Dept: HOSPITAL 75 - WOUNDCARE | Age: 38
End: 2018-05-16
Attending: SURGERY
Payer: COMMERCIAL

## 2018-05-16 DIAGNOSIS — E11.621: Primary | ICD-10-CM

## 2018-05-16 DIAGNOSIS — I70.245: ICD-10-CM

## 2018-05-16 DIAGNOSIS — E11.42: ICD-10-CM

## 2018-05-16 DIAGNOSIS — L97.522: ICD-10-CM

## 2018-05-16 DIAGNOSIS — L97.521: ICD-10-CM

## 2018-05-16 DIAGNOSIS — E11.65: ICD-10-CM

## 2018-05-16 DIAGNOSIS — E66.01: ICD-10-CM

## 2018-05-16 PROCEDURE — 87075 CULTR BACTERIA EXCEPT BLOOD: CPT

## 2018-05-16 PROCEDURE — 11042 DBRDMT SUBQ TIS 1ST 20SQCM/<: CPT

## 2018-05-16 PROCEDURE — 87205 SMEAR GRAM STAIN: CPT

## 2018-05-16 PROCEDURE — 87077 CULTURE AEROBIC IDENTIFY: CPT

## 2018-05-16 PROCEDURE — 97597 DBRDMT OPN WND 1ST 20 CM/<: CPT

## 2018-05-16 PROCEDURE — 87070 CULTURE OTHR SPECIMN AEROBIC: CPT

## 2018-05-16 PROCEDURE — 87186 SC STD MICRODIL/AGAR DIL: CPT

## 2018-05-23 ENCOUNTER — HOSPITAL ENCOUNTER (OUTPATIENT)
Dept: HOSPITAL 75 - WOUNDCARE | Age: 38
End: 2018-05-23
Attending: SURGERY
Payer: COMMERCIAL

## 2018-05-23 DIAGNOSIS — L97.522: ICD-10-CM

## 2018-05-23 DIAGNOSIS — L97.521: ICD-10-CM

## 2018-05-23 DIAGNOSIS — E11.621: Primary | ICD-10-CM

## 2018-05-23 DIAGNOSIS — E11.42: ICD-10-CM

## 2018-05-23 DIAGNOSIS — I70.245: ICD-10-CM

## 2018-05-23 DIAGNOSIS — E66.01: ICD-10-CM

## 2018-05-23 DIAGNOSIS — E11.65: ICD-10-CM

## 2018-05-23 PROCEDURE — 97597 DBRDMT OPN WND 1ST 20 CM/<: CPT

## 2018-05-23 PROCEDURE — 11042 DBRDMT SUBQ TIS 1ST 20SQCM/<: CPT

## 2018-05-25 ENCOUNTER — HOSPITAL ENCOUNTER (OUTPATIENT)
Dept: HOSPITAL 75 - WOUNDCARE | Age: 38
End: 2018-05-25
Attending: SURGERY
Payer: COMMERCIAL

## 2018-05-25 DIAGNOSIS — E11.42: ICD-10-CM

## 2018-05-25 DIAGNOSIS — E11.621: Primary | ICD-10-CM

## 2018-05-25 DIAGNOSIS — L97.522: ICD-10-CM

## 2018-05-25 DIAGNOSIS — E66.01: ICD-10-CM

## 2018-05-25 PROCEDURE — 99213 OFFICE O/P EST LOW 20 MIN: CPT

## 2018-05-30 ENCOUNTER — HOSPITAL ENCOUNTER (OUTPATIENT)
Dept: HOSPITAL 75 - WOUNDCARE | Age: 38
End: 2018-05-30
Attending: SURGERY
Payer: COMMERCIAL

## 2018-05-30 DIAGNOSIS — E11.622: ICD-10-CM

## 2018-05-30 DIAGNOSIS — E66.01: ICD-10-CM

## 2018-05-30 DIAGNOSIS — L97.221: ICD-10-CM

## 2018-05-30 DIAGNOSIS — E11.42: ICD-10-CM

## 2018-05-30 DIAGNOSIS — L97.522: ICD-10-CM

## 2018-05-30 DIAGNOSIS — E11.621: Primary | ICD-10-CM

## 2018-05-30 PROCEDURE — 11042 DBRDMT SUBQ TIS 1ST 20SQCM/<: CPT

## 2018-06-08 ENCOUNTER — HOSPITAL ENCOUNTER (OUTPATIENT)
Dept: HOSPITAL 75 - WOUNDCARE | Age: 38
End: 2018-06-08
Attending: SURGERY
Payer: COMMERCIAL

## 2018-06-08 DIAGNOSIS — E11.621: Primary | ICD-10-CM

## 2018-06-08 DIAGNOSIS — L97.221: ICD-10-CM

## 2018-06-08 DIAGNOSIS — E66.01: ICD-10-CM

## 2018-06-08 DIAGNOSIS — L97.522: ICD-10-CM

## 2018-06-08 DIAGNOSIS — E11.42: ICD-10-CM

## 2018-06-08 PROCEDURE — 87075 CULTR BACTERIA EXCEPT BLOOD: CPT

## 2018-06-08 PROCEDURE — 87205 SMEAR GRAM STAIN: CPT

## 2018-06-08 PROCEDURE — 87077 CULTURE AEROBIC IDENTIFY: CPT

## 2018-06-08 PROCEDURE — 87070 CULTURE OTHR SPECIMN AEROBIC: CPT

## 2018-06-08 PROCEDURE — 87186 SC STD MICRODIL/AGAR DIL: CPT

## 2018-06-08 PROCEDURE — 11042 DBRDMT SUBQ TIS 1ST 20SQCM/<: CPT

## 2018-06-15 ENCOUNTER — HOSPITAL ENCOUNTER (OUTPATIENT)
Dept: HOSPITAL 75 - WOUNDCARE | Age: 38
End: 2018-06-15
Attending: SURGERY
Payer: COMMERCIAL

## 2018-06-15 DIAGNOSIS — L97.221: ICD-10-CM

## 2018-06-15 DIAGNOSIS — E11.621: Primary | ICD-10-CM

## 2018-06-15 DIAGNOSIS — E11.42: ICD-10-CM

## 2018-06-15 DIAGNOSIS — E66.01: ICD-10-CM

## 2018-06-15 DIAGNOSIS — L97.522: ICD-10-CM

## 2018-06-15 PROCEDURE — 11042 DBRDMT SUBQ TIS 1ST 20SQCM/<: CPT

## 2018-06-20 ENCOUNTER — HOSPITAL ENCOUNTER (OUTPATIENT)
Dept: HOSPITAL 75 - WOUNDCARE | Age: 38
End: 2018-06-20
Attending: SURGERY
Payer: COMMERCIAL

## 2018-06-20 DIAGNOSIS — E11.42: ICD-10-CM

## 2018-06-20 DIAGNOSIS — E11.621: Primary | ICD-10-CM

## 2018-06-20 DIAGNOSIS — E66.01: ICD-10-CM

## 2018-06-20 DIAGNOSIS — L97.522: ICD-10-CM

## 2018-06-20 PROCEDURE — 11042 DBRDMT SUBQ TIS 1ST 20SQCM/<: CPT

## 2018-07-06 ENCOUNTER — HOSPITAL ENCOUNTER (OUTPATIENT)
Dept: HOSPITAL 75 - WOUNDCARE | Age: 38
End: 2018-07-06
Attending: SURGERY
Payer: COMMERCIAL

## 2018-07-06 DIAGNOSIS — E66.01: ICD-10-CM

## 2018-07-06 DIAGNOSIS — L97.522: ICD-10-CM

## 2018-07-06 DIAGNOSIS — E11.42: ICD-10-CM

## 2018-07-06 DIAGNOSIS — E11.621: Primary | ICD-10-CM

## 2018-07-06 PROCEDURE — 11042 DBRDMT SUBQ TIS 1ST 20SQCM/<: CPT

## 2018-07-13 ENCOUNTER — HOSPITAL ENCOUNTER (OUTPATIENT)
Dept: HOSPITAL 75 - WOUNDCARE | Age: 38
End: 2018-07-13
Attending: SURGERY
Payer: COMMERCIAL

## 2018-07-13 DIAGNOSIS — L97.522: ICD-10-CM

## 2018-07-13 DIAGNOSIS — E66.01: ICD-10-CM

## 2018-07-13 DIAGNOSIS — E11.621: Primary | ICD-10-CM

## 2018-07-13 DIAGNOSIS — E11.42: ICD-10-CM

## 2018-07-13 PROCEDURE — 87077 CULTURE AEROBIC IDENTIFY: CPT

## 2018-07-13 PROCEDURE — 11042 DBRDMT SUBQ TIS 1ST 20SQCM/<: CPT

## 2018-07-13 PROCEDURE — 87075 CULTR BACTERIA EXCEPT BLOOD: CPT

## 2018-07-13 PROCEDURE — 87205 SMEAR GRAM STAIN: CPT

## 2018-07-13 PROCEDURE — 87186 SC STD MICRODIL/AGAR DIL: CPT

## 2018-07-13 PROCEDURE — 87070 CULTURE OTHR SPECIMN AEROBIC: CPT

## 2018-07-18 ENCOUNTER — HOSPITAL ENCOUNTER (OUTPATIENT)
Dept: HOSPITAL 75 - WOUNDCARE | Age: 38
End: 2018-07-18
Attending: SURGERY
Payer: COMMERCIAL

## 2018-07-18 DIAGNOSIS — L97.522: ICD-10-CM

## 2018-07-18 DIAGNOSIS — E11.42: ICD-10-CM

## 2018-07-18 DIAGNOSIS — E66.01: ICD-10-CM

## 2018-07-18 DIAGNOSIS — E11.621: Primary | ICD-10-CM

## 2018-07-18 PROCEDURE — 11042 DBRDMT SUBQ TIS 1ST 20SQCM/<: CPT

## 2018-07-27 ENCOUNTER — HOSPITAL ENCOUNTER (OUTPATIENT)
Dept: HOSPITAL 75 - WOUNDCARE | Age: 38
End: 2018-07-27
Attending: SURGERY
Payer: COMMERCIAL

## 2018-07-27 DIAGNOSIS — L97.522: ICD-10-CM

## 2018-07-27 DIAGNOSIS — E11.621: Primary | ICD-10-CM

## 2018-07-27 DIAGNOSIS — E66.01: ICD-10-CM

## 2018-07-27 DIAGNOSIS — E11.42: ICD-10-CM

## 2018-07-27 PROCEDURE — 11042 DBRDMT SUBQ TIS 1ST 20SQCM/<: CPT

## 2018-08-02 ENCOUNTER — HOSPITAL ENCOUNTER (OUTPATIENT)
Dept: HOSPITAL 75 - WOUNDCARE | Age: 38
Discharge: HOME | End: 2018-08-02
Attending: SURGERY
Payer: COMMERCIAL

## 2018-08-02 DIAGNOSIS — L97.522: ICD-10-CM

## 2018-08-02 DIAGNOSIS — E11.621: Primary | ICD-10-CM

## 2018-08-02 PROCEDURE — 82962 GLUCOSE BLOOD TEST: CPT

## 2018-08-02 PROCEDURE — 99183 HYPERBARIC OXYGEN THERAPY: CPT

## 2018-08-03 ENCOUNTER — HOSPITAL ENCOUNTER (OUTPATIENT)
Dept: HOSPITAL 75 - WOUNDCARE | Age: 38
End: 2018-08-03
Attending: NURSE PRACTITIONER
Payer: COMMERCIAL

## 2018-08-03 DIAGNOSIS — E11.621: Primary | ICD-10-CM

## 2018-08-03 DIAGNOSIS — E66.01: ICD-10-CM

## 2018-08-03 DIAGNOSIS — E11.42: ICD-10-CM

## 2018-08-03 DIAGNOSIS — L97.522: ICD-10-CM

## 2018-08-03 PROCEDURE — 11042 DBRDMT SUBQ TIS 1ST 20SQCM/<: CPT

## 2018-08-10 ENCOUNTER — HOSPITAL ENCOUNTER (OUTPATIENT)
Dept: HOSPITAL 75 - WOUNDCARE | Age: 38
End: 2018-08-10
Attending: SURGERY
Payer: COMMERCIAL

## 2018-08-10 DIAGNOSIS — E66.01: ICD-10-CM

## 2018-08-10 DIAGNOSIS — E11.621: Primary | ICD-10-CM

## 2018-08-10 DIAGNOSIS — L97.522: ICD-10-CM

## 2018-08-10 DIAGNOSIS — E11.42: ICD-10-CM

## 2018-08-10 PROCEDURE — 99212 OFFICE O/P EST SF 10 MIN: CPT

## 2018-08-15 ENCOUNTER — HOSPITAL ENCOUNTER (OUTPATIENT)
Dept: HOSPITAL 75 - WOUNDCARE | Age: 38
End: 2018-08-15
Attending: SURGERY
Payer: COMMERCIAL

## 2018-08-15 DIAGNOSIS — E66.01: ICD-10-CM

## 2018-08-15 DIAGNOSIS — L97.522: ICD-10-CM

## 2018-08-15 DIAGNOSIS — E11.621: Primary | ICD-10-CM

## 2018-08-15 DIAGNOSIS — E11.42: ICD-10-CM

## 2018-08-15 PROCEDURE — 99212 OFFICE O/P EST SF 10 MIN: CPT

## 2018-08-17 ENCOUNTER — HOSPITAL ENCOUNTER (OUTPATIENT)
Dept: HOSPITAL 75 - WOUNDCARE | Age: 38
End: 2018-08-17
Attending: SURGERY
Payer: COMMERCIAL

## 2018-08-17 DIAGNOSIS — E11.42: ICD-10-CM

## 2018-08-17 DIAGNOSIS — M86.472: ICD-10-CM

## 2018-08-17 DIAGNOSIS — E11.621: Primary | ICD-10-CM

## 2018-08-17 DIAGNOSIS — E66.01: ICD-10-CM

## 2018-08-17 DIAGNOSIS — L97.522: ICD-10-CM

## 2018-08-17 PROCEDURE — 11042 DBRDMT SUBQ TIS 1ST 20SQCM/<: CPT

## 2018-08-31 ENCOUNTER — HOSPITAL ENCOUNTER (OUTPATIENT)
Dept: HOSPITAL 75 - LAB | Age: 38
End: 2018-08-31
Attending: SURGERY
Payer: COMMERCIAL

## 2018-08-31 ENCOUNTER — HOSPITAL ENCOUNTER (OUTPATIENT)
Dept: HOSPITAL 75 - WOUNDCARE | Age: 38
End: 2018-08-31
Attending: SURGERY
Payer: COMMERCIAL

## 2018-08-31 DIAGNOSIS — T81.31XA: ICD-10-CM

## 2018-08-31 DIAGNOSIS — E11.621: Primary | ICD-10-CM

## 2018-08-31 DIAGNOSIS — L97.523: ICD-10-CM

## 2018-08-31 DIAGNOSIS — E11.42: ICD-10-CM

## 2018-08-31 DIAGNOSIS — E66.01: ICD-10-CM

## 2018-08-31 LAB
ALBUMIN SERPL-MCNC: 4 GM/DL (ref 3.2–4.5)
ALP SERPL-CCNC: 62 U/L (ref 40–136)
ALT SERPL-CCNC: 33 U/L (ref 0–55)
BASOPHILS # BLD AUTO: 0 10^3/UL (ref 0–0.1)
BASOPHILS NFR BLD AUTO: 0 % (ref 0–10)
BILIRUB SERPL-MCNC: 0.3 MG/DL (ref 0.1–1)
BUN/CREAT SERPL: 20
CALCIUM SERPL-MCNC: 10.1 MG/DL (ref 8.5–10.1)
CHLORIDE SERPL-SCNC: 103 MMOL/L (ref 98–107)
CO2 SERPL-SCNC: 26 MMOL/L (ref 21–32)
CREAT SERPL-MCNC: 0.75 MG/DL (ref 0.6–1.3)
EOSINOPHIL # BLD AUTO: 0.2 10^3/UL (ref 0–0.3)
EOSINOPHIL NFR BLD AUTO: 2 % (ref 0–10)
ERYTHROCYTE [DISTWIDTH] IN BLOOD BY AUTOMATED COUNT: 14.9 % (ref 10–14.5)
GFR SERPLBLD BASED ON 1.73 SQ M-ARVRAT: > 60 ML/MIN
GLUCOSE SERPL-MCNC: 123 MG/DL (ref 70–105)
HCT VFR BLD CALC: 38 % (ref 40–54)
HGB BLD-MCNC: 12.7 G/DL (ref 13.3–17.7)
LYMPHOCYTES # BLD AUTO: 2.3 X 10^3 (ref 1–4)
LYMPHOCYTES NFR BLD AUTO: 32 % (ref 12–44)
MANUAL DIFFERENTIAL PERFORMED BLD QL: NO
MCH RBC QN AUTO: 28 PG (ref 25–34)
MCHC RBC AUTO-ENTMCNC: 33 G/DL (ref 32–36)
MCV RBC AUTO: 85 FL (ref 80–99)
MONOCYTES # BLD AUTO: 0.4 X 10^3 (ref 0–1)
MONOCYTES NFR BLD AUTO: 6 % (ref 0–12)
NEUTROPHILS # BLD AUTO: 4.2 X 10^3 (ref 1.8–7.8)
NEUTROPHILS NFR BLD AUTO: 59 % (ref 42–75)
PLATELET # BLD: 351 10^3/UL (ref 130–400)
PMV BLD AUTO: 9.3 FL (ref 7.4–10.4)
POTASSIUM SERPL-SCNC: 4.4 MMOL/L (ref 3.6–5)
PROT SERPL-MCNC: 7.2 GM/DL (ref 6.4–8.2)
RBC # BLD AUTO: 4.48 10^6/UL (ref 4.35–5.85)
SODIUM SERPL-SCNC: 136 MMOL/L (ref 135–145)
WBC # BLD AUTO: 7.1 10^3/UL (ref 4.3–11)

## 2018-08-31 PROCEDURE — 85025 COMPLETE CBC W/AUTO DIFF WBC: CPT

## 2018-08-31 PROCEDURE — 36415 COLL VENOUS BLD VENIPUNCTURE: CPT

## 2018-08-31 PROCEDURE — 11042 DBRDMT SUBQ TIS 1ST 20SQCM/<: CPT

## 2018-08-31 PROCEDURE — 11043 DBRDMT MUSC&/FSCA 1ST 20/<: CPT

## 2018-08-31 PROCEDURE — 80053 COMPREHEN METABOLIC PANEL: CPT

## 2018-08-31 PROCEDURE — 83036 HEMOGLOBIN GLYCOSYLATED A1C: CPT

## 2018-09-05 ENCOUNTER — HOSPITAL ENCOUNTER (OUTPATIENT)
Dept: HOSPITAL 75 - WOUNDCARE | Age: 38
End: 2018-09-05
Attending: SURGERY
Payer: COMMERCIAL

## 2018-09-05 DIAGNOSIS — L97.523: ICD-10-CM

## 2018-09-05 DIAGNOSIS — E11.621: Primary | ICD-10-CM

## 2018-09-05 DIAGNOSIS — E66.01: ICD-10-CM

## 2018-09-05 DIAGNOSIS — E11.42: ICD-10-CM

## 2018-09-05 DIAGNOSIS — T81.31XA: ICD-10-CM

## 2018-09-05 PROCEDURE — 87070 CULTURE OTHR SPECIMN AEROBIC: CPT

## 2018-09-05 PROCEDURE — 11043 DBRDMT MUSC&/FSCA 1ST 20/<: CPT

## 2018-09-05 PROCEDURE — 87205 SMEAR GRAM STAIN: CPT

## 2018-09-05 PROCEDURE — 87075 CULTR BACTERIA EXCEPT BLOOD: CPT

## 2018-09-14 ENCOUNTER — HOSPITAL ENCOUNTER (OUTPATIENT)
Dept: HOSPITAL 75 - WOUNDCARE | Age: 38
End: 2018-09-14
Attending: SURGERY
Payer: COMMERCIAL

## 2018-09-14 DIAGNOSIS — E66.01: ICD-10-CM

## 2018-09-14 DIAGNOSIS — E11.621: Primary | ICD-10-CM

## 2018-09-14 DIAGNOSIS — E11.42: ICD-10-CM

## 2018-09-14 DIAGNOSIS — T81.31XA: ICD-10-CM

## 2018-09-14 DIAGNOSIS — L97.522: ICD-10-CM

## 2018-09-14 PROCEDURE — 11042 DBRDMT SUBQ TIS 1ST 20SQCM/<: CPT

## 2018-09-21 ENCOUNTER — HOSPITAL ENCOUNTER (OUTPATIENT)
Dept: HOSPITAL 75 - WOUNDCARE | Age: 38
End: 2018-09-21
Attending: SURGERY
Payer: COMMERCIAL

## 2018-09-21 DIAGNOSIS — E66.01: ICD-10-CM

## 2018-09-21 DIAGNOSIS — T81.31XA: ICD-10-CM

## 2018-09-21 DIAGNOSIS — L97.522: ICD-10-CM

## 2018-09-21 DIAGNOSIS — E11.42: ICD-10-CM

## 2018-09-21 DIAGNOSIS — E11.621: Primary | ICD-10-CM

## 2018-09-21 PROCEDURE — 87077 CULTURE AEROBIC IDENTIFY: CPT

## 2018-09-21 PROCEDURE — 87205 SMEAR GRAM STAIN: CPT

## 2018-09-21 PROCEDURE — 87186 SC STD MICRODIL/AGAR DIL: CPT

## 2018-09-21 PROCEDURE — 87075 CULTR BACTERIA EXCEPT BLOOD: CPT

## 2018-09-21 PROCEDURE — 11042 DBRDMT SUBQ TIS 1ST 20SQCM/<: CPT

## 2018-09-21 PROCEDURE — 87070 CULTURE OTHR SPECIMN AEROBIC: CPT

## 2018-09-28 ENCOUNTER — HOSPITAL ENCOUNTER (OUTPATIENT)
Dept: HOSPITAL 75 - WOUNDCARE | Age: 38
End: 2018-09-28
Attending: SURGERY
Payer: COMMERCIAL

## 2018-09-28 DIAGNOSIS — E66.01: ICD-10-CM

## 2018-09-28 DIAGNOSIS — E11.42: ICD-10-CM

## 2018-09-28 DIAGNOSIS — L97.522: ICD-10-CM

## 2018-09-28 DIAGNOSIS — T81.31XA: ICD-10-CM

## 2018-09-28 DIAGNOSIS — E11.621: Primary | ICD-10-CM

## 2018-09-28 PROCEDURE — 15275 SKIN SUB GRAFT FACE/NK/HF/G: CPT

## 2018-10-05 ENCOUNTER — HOSPITAL ENCOUNTER (OUTPATIENT)
Dept: HOSPITAL 75 - WOUNDCARE | Age: 38
End: 2018-10-05
Attending: NURSE PRACTITIONER
Payer: COMMERCIAL

## 2018-10-05 DIAGNOSIS — E11.42: ICD-10-CM

## 2018-10-05 DIAGNOSIS — T81.31XA: ICD-10-CM

## 2018-10-05 DIAGNOSIS — L97.522: ICD-10-CM

## 2018-10-05 DIAGNOSIS — E11.621: Primary | ICD-10-CM

## 2018-10-05 DIAGNOSIS — E66.01: ICD-10-CM

## 2018-10-05 PROCEDURE — 11042 DBRDMT SUBQ TIS 1ST 20SQCM/<: CPT

## 2018-10-23 ENCOUNTER — HOSPITAL ENCOUNTER (OUTPATIENT)
Dept: HOSPITAL 75 - WOUNDCARE | Age: 38
End: 2018-10-23
Attending: NURSE PRACTITIONER
Payer: COMMERCIAL

## 2018-10-23 DIAGNOSIS — L97.522: ICD-10-CM

## 2018-10-23 DIAGNOSIS — E66.01: ICD-10-CM

## 2018-10-23 DIAGNOSIS — T81.31XA: ICD-10-CM

## 2018-10-23 DIAGNOSIS — E11.42: ICD-10-CM

## 2018-10-23 DIAGNOSIS — E11.621: Primary | ICD-10-CM

## 2018-10-23 PROCEDURE — 99212 OFFICE O/P EST SF 10 MIN: CPT

## 2019-04-14 NOTE — DIAGNOSTIC IMAGING REPORT
EXAMINATION: Three views of the left knee.



INDICATION: Lesion in the upper fibula seen on 09/18/2017

radiograph.



FINDINGS:

The lesion in the upper fibula is better seen on this exam and

appears to relate to a deformity from an old injury. This is

similar to 11/12/2015 exam. The knee joint demonstrates no

significant arthritic changes. There is a dense appearance of the

osseous structures which is probably related to underpenetration

on this radiograph.



IMPRESSION: The lesion seen in the left proximal fibula appears

to relate to a deformity from an old fracture.



Dictated by: 



  Dictated on workstation # [id number] Speaking Coherently

## 2019-12-14 ENCOUNTER — HOSPITAL ENCOUNTER (EMERGENCY)
Dept: HOSPITAL 75 - ER | Age: 39
Discharge: HOME | End: 2019-12-14
Payer: COMMERCIAL

## 2019-12-14 VITALS — SYSTOLIC BLOOD PRESSURE: 179 MMHG | DIASTOLIC BLOOD PRESSURE: 120 MMHG

## 2019-12-14 VITALS — WEIGHT: 315 LBS | HEIGHT: 71.97 IN | BODY MASS INDEX: 42.66 KG/M2

## 2019-12-14 DIAGNOSIS — L97.529: ICD-10-CM

## 2019-12-14 DIAGNOSIS — Z82.49: ICD-10-CM

## 2019-12-14 DIAGNOSIS — I10: ICD-10-CM

## 2019-12-14 DIAGNOSIS — Z88.1: ICD-10-CM

## 2019-12-14 DIAGNOSIS — E11.40: ICD-10-CM

## 2019-12-14 DIAGNOSIS — Z87.891: ICD-10-CM

## 2019-12-14 DIAGNOSIS — Z79.4: ICD-10-CM

## 2019-12-14 DIAGNOSIS — E11.621: ICD-10-CM

## 2019-12-14 DIAGNOSIS — L02.416: Primary | ICD-10-CM

## 2019-12-14 PROCEDURE — 90471 IMMUNIZATION ADMIN: CPT

## 2019-12-14 PROCEDURE — 87205 SMEAR GRAM STAIN: CPT

## 2019-12-14 PROCEDURE — 87077 CULTURE AEROBIC IDENTIFY: CPT

## 2019-12-14 PROCEDURE — 87070 CULTURE OTHR SPECIMN AEROBIC: CPT

## 2019-12-14 PROCEDURE — 99284 EMERGENCY DEPT VISIT MOD MDM: CPT

## 2019-12-14 PROCEDURE — 90715 TDAP VACCINE 7 YRS/> IM: CPT

## 2019-12-14 NOTE — ED INTEGUMENTARY GENERAL
General


Chief Complaint:  Skin/Wound Problems


Stated Complaint:  SORE ON L LEG


Nursing Triage Note:  


PT TO ED W/ C/O WOUND TO LT UPPER THIGH ONSET TUESDAY, WORSE YESTERDAY AFTER A 


KEY IN HIS POCKET "OPENED IT UP".  STATES IS A DIABETIC.


Source:  patient


Exam Limitations:  no limitations


 (LLOYD BLUE HuodongxingJOSHUA)





History of Present Illness


Date Seen by Provider:  Dec 14, 2019


Time Seen by Provider:  13:33


Initial Comments


Pt is a 39 y/o male w/ Hx of DMII here per private vehicle with a dime-sized 

skin lesion to L upper anterior thigh. States it started as a boil that looked 

like "ingrown hair" a week ago and then bursted open last night when it was 

proped accidently by his house keys in his jeans pocket. Pt denies any fevers or

chills, or any active bleeding from the wound.


Timing/Duration:  week


Severity:  mild


Location:  extremities


Associated Symptoms:  edema (around the lesion); No fever, No petechiae, No rash

(LLOYD BLUE MED STUDEN)





Allergies and Home Medications


Allergies


Coded Allergies:  


     levofloxacin (Verified  Allergy, Mild, 4/14/15)


     sweet potato (Unverified  Allergy, Unknown, 4/14/15)





Home Medications


Amlodipine Besylate 5 Mg Tablet, 5 MG PO DAILY


   Prescribed by: SHEBA PIMENTEL on 9/22/17 1101


Amoxicillin 875 Mg Tablet, 875 MG PO BID


   Prescribed by: SHEBA PIMENTEL on 9/22/17 1101


Canagliflozin 100 Mg Tablet, 100 MG PO DAILY, (Reported)


   LAST FILLED #30 4-3-17 


Indomethacin 50 Mg Capsule, 50 MG PO BID, (Reported)


Insulin Aspart 300 Units/3 Ml Solution, 25 UNITS SC AC, (Reported)


   LAST FILLED 2-18-17 


Insulin Glargine,Hum.rec.anlog 100 Unit/1 Ml Vial, 40 UNITS SC HS PRN for BS 

ABOVE 250, (Reported)


   LAST FILLED 2-18-17 


Liraglutide 0.6 Mg/0.1 Ml Pen.injctr, 1.8 MG SC HS, (Reported)


   LAST FILLED 2-18-17 


Lisinopril/Hydrochlorothiazide 1 Each Tablet, 1 TAB PO DAILY, (Reported)


Metformin HCl 500 Mg Tab.er.24h, 1,000 MG PO BID, (Reported)


   TAKES 2 (500MG) TABLETS 


Sulfamethoxazole/Trimethoprim 1 Each Tablet, 1 EACH PO BID


   Prescribed by: SHEBA PIMENTEL on 9/22/17 1101


Sulfamethoxazole/Trimethoprim 1 Each Tablet, 1 EACH PO TID


   Prescribed by: JONAH GATES on 12/14/19 1439





Patient Home Medication List


Home Medication List Reviewed:  Yes


 (LLOYD BLUE [facility])





Review of Systems


Review of Systems


Constitutional:  No chills, No fever


EENTM:  no symptoms reported


Respiratory:  no symptoms reported


Cardiovascular:  no symptoms reported


Musculoskeletal:  no symptoms reported


Skin:  lesions (dime sized lesion to L upper anetior thigh); No pruritus, No 

rash


Psychiatric/Neurological:  No Symptoms Reported [facility]





Past Medical-Social-Family Hx


Patient Social History


Alcohol Use:  Occasionally Uses


Recreational Drug Use:  No


Smoking Status:  Light Tobacco Smoker


Type Used:  Cigars


Former Smoker, Quit:  Jan 1, 2003


Recent Foreign Travel:  No


Contact w/Someone Who Travel:  No


Recent Infectious Disease Expo:  No


Recent Hopitalizations:  No


Physical Abuse:  No


Sexual Abuse:  No


Mistreated:  No


Fear:  No


 ([facility]





Immunizations Up To Date


Tetanus Booster (TDap):  More than 5yrs


Date of Pneumonia Vaccine:  Jan 1, 2007


 [facility]





Seasonal Allergies


Seasonal Allergies:  No


 ([facility]





Past Medical History


Surgeries:  Yes (SURGERY ON SCROTAL ABSCESS 2015)


Respiratory:  No


Cardiac:  Yes


Hypertension


Neurological:  Yes


Neuropathy


Reproductive Disorders:  No


Sexually Transmitted Disease:  No


HIV/AIDS:  No


Genitourinary:  No


Gastrointestinal:  No


Musculoskeletal:  Yes


Chronic Back Pain


Endocrine:  Yes


Diabetes, Insulin dep


HEENT:  No


Loss of Vision:  Denies


Hearing Impairment:  Denies


Cancer:  No


Psychosocial:  No


Integumentary:  Yes (SCROTAL ABSCESS 2015; LEFT FOOT DIABETIC FOOT ULCER DX 

09/18/17)


Recent Skin Changes


Blood Disorders:  No


Adverse Reaction/Blood Tranf:  No


 ([facility]





Family Medical History





Completed stroke


  19 MOTHER


Diabetes mellitus


  19 MOTHER


Hypertension


  19 MOTHER


  G8 BROTHER


Myocardial infarction


  19 FATHER


Diabetes, Hypertension, Other Conditions/Hx


 ([facility]





Physical Exam


Vital Signs





Vital Signs - First Documented








 12/14/19 12/14/19





 13:36 14:48


 


Temp 36.8 


 


Pulse 91 


 


Resp 20 


 


B/P (MAP) 201/118 (145) 


 


Pulse Ox  98





 (JONAH CARDENAS MD)


Vital Signs


Capillary Refill : Less Than 3 Seconds 


 ([facility]


General Appearance:  WD/WN, no apparent distress


Cardiovascular:  normal peripheral pulses, regular rate, rhythm, no edema, no 

gallop, no JVD, no murmur


Respiratory:  chest non-tender, lungs clear, normal breath sounds, no 

respiratory distress, no accessory muscle use


Skin:  normal color, warm/dry, other (dime-sized round lesion to L upper, 

anterior lower extremity, erythema and edema noted to surrounding area, no 

active bleeding or pus drainage; minimal amount of blood and pus drained after 

the top, crusted part of the wound was disturbed by a scalpel)


Skin Problem Location:  lower extremities (Left)


Skin Problem Character:  abscess, erythema, warm


Lymphatic:  no adenopathy ([facility]





Progress/Results/Core Measures


Results/Orders


My Orders





Orders - JONAH CARDENAS MD


Dipht,Pertuss(Acell),Tet Adult (Boostrix (12/14/19 14:30)


Wound Culture (12/14/19 14:33)


 (JONAH CARDENAS MD)


Medications Given in ED





Current Medications








 Medications  Dose


 Ordered  Sig/Rl


 Route  Start Time


 Stop Time Status Last Admin


Dose Admin


 


 Diphtheria/


 Tetanus/Acell


 Pertussis  0.5 ml  ONCE ONCE


 IM  12/14/19 14:30


 12/14/19 14:31 DC 12/14/19 14:47


0.5 ML





 (JONAH CARDENAS MD)


Vital Signs/I&O











 12/14/19 12/14/19





 13:36 14:48


 


Temp 36.8 36.8


 


Pulse 91 91


 


Resp 20 20


 


B/P (MAP) 201/118 (145) 179/120 (145)


 


Pulse Ox  98





 (JONAH CADRENAS MD)








Blood Pressure Mean:                    145


POS








Departure


Impression





   Primary Impression:  


   Abscess of left thigh


   Additional Impression:  


   Essential hypertension


Disposition:  01 HOME, SELF-CARE


Condition:  Improved





Departure-Patient Inst.


Decision time for Depature:  14:35


 (JONAH CARDENAS MD)


Referrals:  


[facility]RA[facility] (PCP)


Primary Care Physician








RUT DAVIS (Family)


Primary Care Physician


Patient Instructions:  Skin Abscess





Add. Discharge Instructions:  


Complete your antibiotics as prescribed.


Apply a warm compresses or soak in warm soapy water for about 20 minutes 3 times

a day for the next couple of days.


Increase your blood pressure medication to lisinopril 40 mg daily and 

hydrochlorothiazide 50 mg daily.  Follow-up with your primary care provider in 

about one week to have your blood pressure checked again.


Return to the emergency room if you are experiencing worsening symptoms or 

develop new symptoms such as fevers.











All discharge instructions reviewed with patient and/or family. Voiced u

nderstanding.


Scripts


Sulfamethoxazole/Trimethoprim (Bactrim Ds Tablet) 1 Each Tablet


1 EACH PO TID, #21 TAB


   Prov: JONAH CARDENAS MD         12/14/19


This 38-year-old gentleman was seen and examined by me along with 

LLOYD BLUE MS3.  I agree with the history, physical, assessment, and 

documentation provided by MS3.





On exam patient is alert and oriented.  He has an abscess with a crater on the 

proximal left thigh with surrounding erythema.  The crater is capped with Drive 

firm purulent material.  This was cleaned with alcohol swabs and then opened 

with a scalpel.  A small amount of pus was expressed.  This was cultured.  Wound

was dressed with antibiotic ointment and a bandage.  Patient is being started on

Bactrim.  3 times a day dosing was prescribed because of patient's high BMI.  

Patient was also noted to have significant hypertension.  He was advised to 

double his lisinopril/HCTZ to 40 mg/50 mg.  He is to follow-up in about a week 

with his primary care provider for a blood pressure check.


 (JONAH CARDENAS MD)





Copy


Copies To 1:   INES RAMIREZ SHAGHAYEGH [facility]  Dec 14, 2019 14:24


JONAH ORDONEZ MD     Dec 14, 2019 14:40


POS

## 2020-02-23 ENCOUNTER — HOSPITAL ENCOUNTER (EMERGENCY)
Dept: HOSPITAL 75 - ER | Age: 40
Discharge: HOME | End: 2020-02-23
Payer: COMMERCIAL

## 2020-02-23 VITALS — BODY MASS INDEX: 43.13 KG/M2 | HEIGHT: 71.65 IN | WEIGHT: 315 LBS

## 2020-02-23 DIAGNOSIS — Z88.1: ICD-10-CM

## 2020-02-23 DIAGNOSIS — L02.412: ICD-10-CM

## 2020-02-23 DIAGNOSIS — I10: ICD-10-CM

## 2020-02-23 DIAGNOSIS — Z82.49: ICD-10-CM

## 2020-02-23 DIAGNOSIS — Z79.4: ICD-10-CM

## 2020-02-23 DIAGNOSIS — E11.65: Primary | ICD-10-CM

## 2020-02-23 DIAGNOSIS — L97.529: ICD-10-CM

## 2020-02-23 DIAGNOSIS — E11.40: ICD-10-CM

## 2020-02-23 DIAGNOSIS — E11.621: ICD-10-CM

## 2020-02-23 LAB
ALBUMIN SERPL-MCNC: 3.7 GM/DL (ref 3.2–4.5)
ALP SERPL-CCNC: 121 U/L (ref 40–136)
ALT SERPL-CCNC: 16 U/L (ref 0–55)
APTT PPP: YELLOW S
BACTERIA #/AREA URNS HPF: NEGATIVE /HPF
BASOPHILS # BLD AUTO: 0 10^3/UL (ref 0–0.1)
BASOPHILS NFR BLD AUTO: 0 % (ref 0–10)
BILIRUB SERPL-MCNC: 0.3 MG/DL (ref 0.1–1)
BILIRUB UR QL STRIP: NEGATIVE
BUN/CREAT SERPL: 21
CALCIUM SERPL-MCNC: 9.4 MG/DL (ref 8.5–10.1)
CHLORIDE SERPL-SCNC: 97 MMOL/L (ref 98–107)
CO2 SERPL-SCNC: 25 MMOL/L (ref 21–32)
CREAT SERPL-MCNC: 1.12 MG/DL (ref 0.6–1.3)
EOSINOPHIL # BLD AUTO: 0.2 10^3/UL (ref 0–0.3)
EOSINOPHIL NFR BLD AUTO: 3 % (ref 0–10)
ERYTHROCYTE [DISTWIDTH] IN BLOOD BY AUTOMATED COUNT: 14.3 % (ref 10–14.5)
FIBRINOGEN PPP-MCNC: CLEAR MG/DL
GFR SERPLBLD BASED ON 1.73 SQ M-ARVRAT: > 60 ML/MIN
GLUCOSE SERPL-MCNC: 593 MG/DL (ref 70–105)
GLUCOSE UR STRIP-MCNC: (no result) MG/DL
HCT VFR BLD CALC: 40 % (ref 40–54)
HGB BLD-MCNC: 13.5 G/DL (ref 13.3–17.7)
KETONES UR QL STRIP: NEGATIVE
LEUKOCYTE ESTERASE UR QL STRIP: NEGATIVE
LYMPHOCYTES # BLD AUTO: 1.4 X 10^3 (ref 1–4)
LYMPHOCYTES NFR BLD AUTO: 21 % (ref 12–44)
MANUAL DIFFERENTIAL PERFORMED BLD QL: NO
MCH RBC QN AUTO: 28 PG (ref 25–34)
MCHC RBC AUTO-ENTMCNC: 34 G/DL (ref 32–36)
MCV RBC AUTO: 84 FL (ref 80–99)
MONOCYTES # BLD AUTO: 0.5 X 10^3 (ref 0–1)
MONOCYTES NFR BLD AUTO: 7 % (ref 0–12)
NEUTROPHILS # BLD AUTO: 4.8 X 10^3 (ref 1.8–7.8)
NEUTROPHILS NFR BLD AUTO: 69 % (ref 42–75)
NITRITE UR QL STRIP: NEGATIVE
PH UR STRIP: 6.5 [PH] (ref 5–9)
PLATELET # BLD: 284 10^3/UL (ref 130–400)
PMV BLD AUTO: 9.6 FL (ref 7.4–10.4)
POTASSIUM SERPL-SCNC: 4.5 MMOL/L (ref 3.6–5)
PROT SERPL-MCNC: 7 GM/DL (ref 6.4–8.2)
PROT UR QL STRIP: (no result)
RBC #/AREA URNS HPF: (no result) /HPF
SODIUM SERPL-SCNC: 133 MMOL/L (ref 135–145)
SP GR UR STRIP: 1.01 (ref 1.02–1.02)
WBC # BLD AUTO: 6.9 10^3/UL (ref 4.3–11)
WBC #/AREA URNS HPF: (no result) /HPF

## 2020-02-23 PROCEDURE — 87205 SMEAR GRAM STAIN: CPT

## 2020-02-23 PROCEDURE — 81000 URINALYSIS NONAUTO W/SCOPE: CPT

## 2020-02-23 PROCEDURE — 85025 COMPLETE CBC W/AUTO DIFF WBC: CPT

## 2020-02-23 PROCEDURE — 80053 COMPREHEN METABOLIC PANEL: CPT

## 2020-02-23 PROCEDURE — 87186 SC STD MICRODIL/AGAR DIL: CPT

## 2020-02-23 PROCEDURE — 36415 COLL VENOUS BLD VENIPUNCTURE: CPT

## 2020-02-23 PROCEDURE — 87070 CULTURE OTHR SPECIMN AEROBIC: CPT

## 2020-02-23 PROCEDURE — 87077 CULTURE AEROBIC IDENTIFY: CPT

## 2020-02-23 PROCEDURE — 82962 GLUCOSE BLOOD TEST: CPT

## 2020-02-23 NOTE — ED INTEGUMENTARY GENERAL
General


Chief Complaint:  Skin/Wound Problems


Stated Complaint:  MASS UNDER L ARM/ ELEV BS


Source:  patient


Exam Limitations:  no limitations


 (EUN ALARCON MEDICAL STUDENT)





History of Present Illness


Initial Comments


Pt is a 40 yo M who presents to the ED for swelling and pain in his L axilla. He

first noticed it on Thursday and it has become progressively larger and more 

tender. His blood sugars have also been elevated in the 300-400s. His blood 

sugars usually run in the 200s. He took 45 units of Novolog at 1300 today and on

arrival his BGL is 529. He endorses some nausea and diarrhea. He denies vomiting

or fever. He has not taken anything for pain. He takes a daily aspirin. He has 

had abscesses before and has also had osteomyelitis of his feet.


Timing/Duration:  constant, getting worse, other (4 days ago)


Severity:  mild


Location:  torso (L axilla)


Possible Cause:  no cause identified


Modifying Factors:  improves with other (no modifying factors)


Associated Symptoms:  No edema, No fever, No rash (EUN ALARCON MEDICAL 

STUDENT)


Date Seen by Provider:  Feb 23, 2020


Time Seen by Provider:  17:20


 (CARLOS CAPPS)





Allergies and Home Medications


Allergies


Coded Allergies:  


     levofloxacin (Verified  Allergy, Mild, 4/14/15)


     sweet potato (Unverified  Allergy, Unknown, 4/14/15)





Home Medications


Amlodipine Besylate 5 Mg Tablet, 5 MG PO DAILY


   Prescribed by: SHEBA PIMENTEL on 9/22/17 1101


Amoxicillin 875 Mg Tablet, 875 MG PO BID


   Prescribed by: SHEBA PIMENTEL on 9/22/17 1101


Canagliflozin 100 Mg Tablet, 100 MG PO DAILY, (Reported)


   LAST FILLED #30 4-3-17 


Indomethacin 50 Mg Capsule, 50 MG PO BID, (Reported)


Insulin Aspart 300 Units/3 Ml Solution, 25 UNITS SC AC, (Reported)


   LAST FILLED 2-18-17 


Insulin Glargine,Hum.rec.anlog 100 Unit/1 Ml Vial, 40 UNITS SC HS PRN for BS 

ABOVE 250, (Reported)


   LAST FILLED 2-18-17 


Liraglutide 0.6 Mg/0.1 Ml Pen.injctr, 1.8 MG SC HS, (Reported)


   LAST FILLED 2-18-17 


Lisinopril/Hydrochlorothiazide 1 Each Tablet, 1 TAB PO DAILY, (Reported)


Metformin HCl 500 Mg Tab.er.24h, 1,000 MG PO BID, (Reported)


   TAKES 2 (500MG) TABLETS 


Sulfamethoxazole/Trimethoprim 1 Each Tablet, 1 EACH PO BID


   Prescribed by: SHEBA PIMENTEL on 9/22/17 1101


Sulfamethoxazole/Trimethoprim 1 Each Tablet, 1 EACH PO TID


   Prescribed by: JONAH GATES on 12/14/19 1439





Patient Home Medication List


Home Medication List Reviewed:  Yes


 (EUN ALARCON STUDENT)


Home Medication List Reviewed:  Yes


 (CARLOS CAPPS)





Review of Systems


Review of Systems


Constitutional:  No chills, No fever


EENTM:  no symptoms reported


Respiratory:  no symptoms reported


Cardiovascular:  no symptoms reported


Gastrointestinal:  diarrhea, nausea; No vomiting


Genitourinary:  no symptoms reported


Musculoskeletal:  no symptoms reported


Skin:  change in color (erythema), lumps (L axilla, painful)


Psychiatric/Neurological:  No Symptoms Reported


Endocrine:  No Symptoms Reported


Hematologic/Lymphatic:  No Symptoms Reported (EUN ALARCON MEDICAL STUDENT)


Constitutional:  see HPI (CARLOS CAPPS)





Past Medical-Social-Family Hx


Past Med/Social Hx:  Reviewed Nursing Past Med/Soc Hx


 (EUN ALARCON)





Patient Social History


Type Used:  Cigars


Former Smoker, Quit:  Jan 1, 2003


Recent Foreign Travel:  No


Contact w/Someone Who Travel:  No


Recent Hopitalizations:  No


 (EUN ALARCON MEDICAL STUDENT)





Immunizations Up To Date


Tetanus Booster (TDap):  More than 5yrs


Date of Pneumonia Vaccine:  Jan 1, 2007


 (EUN ALARCON MEDICAL STUDENT)





Seasonal Allergies


Seasonal Allergies:  No


 (EUN ALARCON)





Past Medical History


Surgeries:  Yes (SURGERY ON SCROTAL ABSCESS 2015)


Respiratory:  No


Cardiac:  Yes


Hypertension


Neurological:  Yes


Neuropathy


Reproductive Disorders:  No


Sexually Transmitted Disease:  No


HIV/AIDS:  No


Genitourinary:  No


Gastrointestinal:  No


Musculoskeletal:  Yes


Chronic Back Pain


Endocrine:  Yes


Diabetes, Insulin dep


HEENT:  No


Loss of Vision:  Denies


Hearing Impairment:  Denies


Cancer:  No


Psychosocial:  No


Integumentary:  Yes (SCROTAL ABSCESS 2015; LEFT FOOT DIABETIC FOOT ULCER DX 

09/18/17)


Recent Skin Changes


Blood Disorders:  No


Adverse Reaction/Blood Tranf:  No


 (EUN ALARCON MEDICAL STUDENT)





Family Medical History


Reviewed Nursing Family Hx


 (EUN ALARCON MEDICAL STUDENT)





Completed stroke


  19 MOTHER


Diabetes mellitus


  19 MOTHER


Hypertension


  19 MOTHER


  G8 BROTHER


Myocardial infarction


  19 FATHER


Diabetes, Hypertension, Other Conditions/Hx


 (EUN ALARCON MEDICAL STUDENT)





Physical Exam


Vital Signs


Capillary Refill :  


 (EUN ALARCON MEDICAL STUDENT)


General Appearance:  WD/WN, no apparent distress, obese


Cardiovascular:  normal peripheral pulses, regular rate, rhythm, no murmur, 

tachycardia


Respiratory:  chest non-tender, lungs clear, normal breath sounds, no 

respiratory distress


Gastrointestinal:  normal bowel sounds, non tender, soft


Extremities:  normal range of motion, non-tender, normal inspection


Neurologic/Psychiatric:  alert, normal mood/affect, oriented x 3


Skin:  normal color; No diaphoresis; other (3x4cm area of induration in the L 

axilla. Warm to the touch. Tender. Overlying erythema.)


Skin Problem Character:  abscess


Lymphatic:  no adenopathy (EUN ALARCON MEDICAL STUDENT)


General Appearance:  obese


Skin Problem Character:  abscess (left axilla)


 (CARLOS CAPPS)





Procedures/Interventions


I&D :  


   Site:  L axilla


   Blade Size:  11


   I & D Procedure:  betadine prep


Progress


Used 2 mL of 1% lidocaine to numb the area. A 1.5 cm linear incision was made 

and the abscess was lanced. Purulent material was drained. A specimen was 

collected and sent to lab for culture. We continued to express the wound until 

there was no more serosanguinous discharge. The wound was then rinsed with 1 

liter normal with hibiclens and lightly dressed with 4x4 gauze.


 (EUN ALARCON MEDICAL STUDENT)





Progress/Results/Core Measures


Results/Orders


Lab Results





Laboratory Tests








Test


 2/23/20


17:37 2/23/20


17:38 2/23/20


18:38 Range/Units


 


 


Glucometer 529 *H     MG/DL


 


White Blood Count


 


 6.9 


 


 4.3-11.0


10^3/uL


 


Red Blood Count


 


 4.75 


 


 4.35-5.85


10^6/uL


 


Hemoglobin  13.5   13.3-17.7  G/DL


 


Hematocrit  40   40-54  %


 


Mean Corpuscular Volume  84   80-99  FL


 


Mean Corpuscular Hemoglobin  28   25-34  PG


 


Mean Corpuscular Hemoglobin


Concent 


 34 


 


 32-36  G/DL





 


Red Cell Distribution Width  14.3   10.0-14.5  %


 


Platelet Count


 


 284 


 


 130-400


10^3/uL


 


Mean Platelet Volume  9.6   7.4-10.4  FL


 


Neutrophils (%) (Auto)  69   42-75  %


 


Lymphocytes (%) (Auto)  21   12-44  %


 


Monocytes (%) (Auto)  7   0-12  %


 


Eosinophils (%) (Auto)  3   0-10  %


 


Basophils (%) (Auto)  0   0-10  %


 


Neutrophils # (Auto)  4.8   1.8-7.8  X 10^3


 


Lymphocytes # (Auto)  1.4   1.0-4.0  X 10^3


 


Monocytes # (Auto)  0.5   0.0-1.0  X 10^3


 


Eosinophils # (Auto)


 


 0.2 


 


 0.0-0.3


10^3/uL


 


Basophils # (Auto)


 


 0.0 


 


 0.0-0.1


10^3/uL


 


Sodium Level  133 L  135-145  MMOL/L


 


Potassium Level  4.5   3.6-5.0  MMOL/L


 


Chloride Level  97 L    MMOL/L


 


Carbon Dioxide Level  25   21-32  MMOL/L


 


Anion Gap  11   5-14  MMOL/L


 


Blood Urea Nitrogen  24 H  7-18  MG/DL


 


Creatinine


 


 1.12 


 


 0.60-1.30


MG/DL


 


Estimat Glomerular Filtration


Rate 


 > 60 


 


  





 


BUN/Creatinine Ratio  21    


 


Glucose Level  593 *H    MG/DL


 


Calcium Level  9.4   8.5-10.1  MG/DL


 


Corrected Calcium  9.6   8.5-10.1  MG/DL


 


Total Bilirubin  0.3   0.1-1.0  MG/DL


 


Aspartate Amino Transf


(AST/SGOT) 


 12 


 


 5-34  U/L





 


Alanine Aminotransferase


(ALT/SGPT) 


 16 


 


 0-55  U/L





 


Alkaline Phosphatase  121     U/L


 


Total Protein  7.0   6.4-8.2  GM/DL


 


Albumin  3.7   3.2-4.5  GM/DL


 


Urine Color   YELLOW   


 


Urine Clarity   CLEAR   


 


Urine pH   6.5  5-9  


 


Urine Specific Gravity   1.010 L 1.016-1.022  


 


Urine Protein   TRACE H NEGATIVE  


 


Urine Glucose (UA)   3+ H NEGATIVE  


 


Urine Ketones   NEGATIVE  NEGATIVE  


 


Urine Nitrite   NEGATIVE  NEGATIVE  


 


Urine Bilirubin   NEGATIVE  NEGATIVE  


 


Urine Urobilinogen   1.0  < = 1.0  MG/DL


 


Urine Leukocyte Esterase   NEGATIVE  NEGATIVE  


 


Urine RBC (Auto)   TRACE-I  NEGATIVE  


 


Urine RBC   2-5 H  /HPF


 


Urine WBC   RARE   /HPF


 


Urine Crystals   NONE   /LPF


 


Urine Bacteria   NEGATIVE   /HPF


 


Urine Casts   NONE   /LPF


 


Urine Mucus   NEGATIVE   /LPF


 


Urine Culture Indicated   NO   





 (CARLOS CAPPS)


My Orders





Orders - CARLOS CAPPS


Accucheck Stat ONCE (2/23/20 17:19)


Cbc With Automated Diff (2/23/20 17:40)


Comprehensive Metabolic Panel (2/23/20 17:40)


Ua Culture If Indicated (2/23/20 17:40)


Ed Iv/Invasive Line Start (2/23/20 17:40)


Ns Iv 1000 Ml (Sodium Chloride 0.9%) (2/23/20 17:40)


Lidocaine 1% Inj 20 Ml (Xylocaine 1% Inj (2/23/20 17:45)


Wound Culture (2/23/20 18:04)


Insulin (Regular) Human (Humulin R (Per (2/23/20 18:15)


Rx-Trimeth/Sulfameth Ds Tab (Rx-Bactrim/ (2/23/20 18:30)


Accucheck Stat ONCE (2/23/20 18:49)


Rx-Mupirocin 2% Oint (Rx-Bactroban) (2/23/20 19:17)


 (CARLOS CAPPS)


Medications Given in ED





Current Medications








 Medications  Dose


 Ordered  Sig/Rl


 Route  Start Time


 Stop Time Status Last Admin


Dose Admin


 


 Insulin Human


 Regular  10 unit  ONCE  ONCE


 SC  2/23/20 18:15


 2/23/20 18:16 DC 2/23/20 18:23


10 UNIT


 


 Lidocaine HCl  20 ml  ONCE  ONCE


 INJ  2/23/20 17:45


 2/23/20 17:46 DC 2/23/20 17:55


20 ML





 (CARLOS CAPPS)





FSBG Bedside Testing


Finger Stick Blood Glucose:  529


Blood Glucose Action Taken:  PROVIDER NOTIFIED.


 (EUN ALARCON MEDICAL STUDENT)





Progress


Progress Note :  


   Time:  17:20


Progress Note


Patient seen and evaluated, Accu-Chek 529. Will give normal saline 1 L with 10 

units of regular insulin. Patient agreeable to I&D of the left axilla. This will

be performed and we'll recheck blood sugars after insulin and saline has 

infused.


1830 IV infusing, patient denies any complaints at this time. taking ice chips. 


1925 Accucheck 376, dressing remains dry and intact to left axilla. Patient 

reports be feeling much better. Discharge instructions and return precautions 

reviewed with the patient.


 (CARLOS CAPPS)





Departure


Impression





   Primary Impression:  


   Hyperglycemia


   Additional Impression:  


   Abscess of left axilla


Disposition:  01 HOME, SELF-CARE


Condition:  Improved





Departure-Patient Inst.


Decision time for Depature:  19:15


 (CARLOS CAPPS)


Referrals:  


[facility]/ (PCP)


Primary Care Physician








RUT DAVIS (Family)


Primary Care Physician


Patient Instructions:  Hyperglycemia, Adult (DC), Abscess Incision and Drainage 

(DC)





Add. Discharge Instructions:  


Continue to monitor glucose and adjust insulin as needed.


Drink 16 ounces of water every 2 hours while awake.


Take antibiotics as prescribed.


Clean wound to left armpit with peroxide 3 times daily and apply antibiotic 

ointment. 


Keep dressing to left armpit a change as it becomes soiled.


Follow-up with your primary care provider in 2-3 days, sooner if symptoms are 

not improving or worsen.


You may alternate between Tylenol 650 mg and ibuprofen 600 mg every 4 hours for 

pain or fever.


Return to the emergency department for new, urgent health care needs.





All discharge instructions reviewed with patient and/or family. Voiced 

understanding.


Personally assessed the patient and was present during the procedure, reviewed 

all documentation and agreed or edited after medical student.


 (CARLOS CAPPS)











EUN ALARCON MEDICAL STUDENT  Feb 23, 2020 18:21


CARLOS CAPPS                  Feb 23, 2020 19:17

## 2020-02-25 VITALS — SYSTOLIC BLOOD PRESSURE: 151 MMHG | DIASTOLIC BLOOD PRESSURE: 108 MMHG

## 2020-04-28 ENCOUNTER — HOSPITAL ENCOUNTER (INPATIENT)
Dept: HOSPITAL 75 - ER | Age: 40
LOS: 6 days | Discharge: HOME | DRG: 853 | End: 2020-05-04
Attending: FAMILY MEDICINE | Admitting: FAMILY MEDICINE
Payer: COMMERCIAL

## 2020-04-28 VITALS — BODY MASS INDEX: 42.66 KG/M2 | HEIGHT: 72.01 IN | WEIGHT: 315 LBS

## 2020-04-28 VITALS — DIASTOLIC BLOOD PRESSURE: 41 MMHG | SYSTOLIC BLOOD PRESSURE: 95 MMHG

## 2020-04-28 VITALS — DIASTOLIC BLOOD PRESSURE: 58 MMHG | SYSTOLIC BLOOD PRESSURE: 108 MMHG

## 2020-04-28 VITALS — SYSTOLIC BLOOD PRESSURE: 95 MMHG | DIASTOLIC BLOOD PRESSURE: 41 MMHG

## 2020-04-28 VITALS — SYSTOLIC BLOOD PRESSURE: 103 MMHG | DIASTOLIC BLOOD PRESSURE: 62 MMHG

## 2020-04-28 DIAGNOSIS — R65.20: ICD-10-CM

## 2020-04-28 DIAGNOSIS — E66.01: ICD-10-CM

## 2020-04-28 DIAGNOSIS — E11.65: ICD-10-CM

## 2020-04-28 DIAGNOSIS — F17.290: ICD-10-CM

## 2020-04-28 DIAGNOSIS — L03.115: ICD-10-CM

## 2020-04-28 DIAGNOSIS — A40.9: Primary | ICD-10-CM

## 2020-04-28 DIAGNOSIS — E86.1: ICD-10-CM

## 2020-04-28 DIAGNOSIS — N17.9: ICD-10-CM

## 2020-04-28 DIAGNOSIS — E87.1: ICD-10-CM

## 2020-04-28 DIAGNOSIS — M72.6: ICD-10-CM

## 2020-04-28 DIAGNOSIS — I10: ICD-10-CM

## 2020-04-28 DIAGNOSIS — E11.00: ICD-10-CM

## 2020-04-28 DIAGNOSIS — E11.42: ICD-10-CM

## 2020-04-28 DIAGNOSIS — I87.2: ICD-10-CM

## 2020-04-28 DIAGNOSIS — E11.618: ICD-10-CM

## 2020-04-28 LAB
ALBUMIN SERPL-MCNC: 3.5 GM/DL (ref 3.2–4.5)
ALP SERPL-CCNC: 73 U/L (ref 40–136)
ALT SERPL-CCNC: 13 U/L (ref 0–55)
AMYLASE SERPL-CCNC: 20 U/L (ref 25–125)
APTT BLD: 27 SEC (ref 24–35)
ARTERIAL PATENCY WRIST A: (no result)
BASE EXCESS STD BLDA CALC-SCNC: -7.5 MMOL/L (ref -2.5–2.5)
BASOPHILS # BLD AUTO: 0 10^3/UL (ref 0–0.1)
BASOPHILS NFR BLD AUTO: 0 % (ref 0–10)
BDY SITE: (no result)
BILIRUB SERPL-MCNC: 0.6 MG/DL (ref 0.1–1)
BODY TEMPERATURE: 38.3
BUN/CREAT SERPL: 29
CALCIUM SERPL-MCNC: 9.4 MG/DL (ref 8.5–10.1)
CHLORIDE SERPL-SCNC: 96 MMOL/L (ref 98–107)
CO2 BLDA CALC-SCNC: 17 MMOL/L (ref 21–31)
CO2 SERPL-SCNC: 17 MMOL/L (ref 21–32)
CREAT SERPL-MCNC: 1.25 MG/DL (ref 0.6–1.3)
EOSINOPHIL # BLD AUTO: 0 10^3/UL (ref 0–0.3)
EOSINOPHIL NFR BLD AUTO: 0 % (ref 0–10)
ERYTHROCYTE [DISTWIDTH] IN BLOOD BY AUTOMATED COUNT: 14.9 % (ref 10–14.5)
GFR SERPLBLD BASED ON 1.73 SQ M-ARVRAT: > 60 ML/MIN
GLUCOSE SERPL-MCNC: 244 MG/DL (ref 70–105)
HCT VFR BLD CALC: 41 % (ref 40–54)
HGB BLD-MCNC: 14.2 G/DL (ref 13.3–17.7)
INHALED O2 FLOW RATE: (no result) L/MIN
INR PPP: 1.2 (ref 0.8–1.4)
LIPASE SERPL-CCNC: 9 U/L (ref 8–78)
LYMPHOCYTES # BLD AUTO: 1 X 10^3 (ref 1–4)
LYMPHOCYTES NFR BLD AUTO: 8 % (ref 12–44)
MAGNESIUM SERPL-MCNC: 1.9 MG/DL (ref 1.6–2.4)
MANUAL DIFFERENTIAL PERFORMED BLD QL: NO
MCH RBC QN AUTO: 29 PG (ref 25–34)
MCHC RBC AUTO-ENTMCNC: 35 G/DL (ref 32–36)
MCV RBC AUTO: 83 FL (ref 80–99)
MONOCYTES # BLD AUTO: 1.3 X 10^3 (ref 0–1)
MONOCYTES NFR BLD AUTO: 10 % (ref 0–12)
NEUTROPHILS # BLD AUTO: 10.8 X 10^3 (ref 1.8–7.8)
NEUTROPHILS NFR BLD AUTO: 82 % (ref 42–75)
PCO2 BLDA: 28 MMHG (ref 35–45)
PH BLDA: 7.39 [PH] (ref 7.37–7.43)
PLATELET # BLD: 327 10^3/UL (ref 130–400)
PMV BLD AUTO: 9.9 FL (ref 7.4–10.4)
PO2 BLDA: 99 MMHG (ref 79–93)
POTASSIUM SERPL-SCNC: 4.4 MMOL/L (ref 3.6–5)
PROT SERPL-MCNC: 7.9 GM/DL (ref 6.4–8.2)
PROTHROMBIN TIME: 15.2 SEC (ref 12.2–14.7)
SAO2 % BLDA FROM PO2: 98 % (ref 94–100)
SODIUM SERPL-SCNC: 129 MMOL/L (ref 135–145)
VENTILATION MODE VENT: NO
WBC # BLD AUTO: 13.1 10^3/UL (ref 4.3–11)

## 2020-04-28 PROCEDURE — 71045 X-RAY EXAM CHEST 1 VIEW: CPT

## 2020-04-28 PROCEDURE — 82150 ASSAY OF AMYLASE: CPT

## 2020-04-28 PROCEDURE — 87075 CULTR BACTERIA EXCEPT BLOOD: CPT

## 2020-04-28 PROCEDURE — 85610 PROTHROMBIN TIME: CPT

## 2020-04-28 PROCEDURE — 87077 CULTURE AEROBIC IDENTIFY: CPT

## 2020-04-28 PROCEDURE — 80048 BASIC METABOLIC PNL TOTAL CA: CPT

## 2020-04-28 PROCEDURE — 96365 THER/PROPH/DIAG IV INF INIT: CPT

## 2020-04-28 PROCEDURE — 87040 BLOOD CULTURE FOR BACTERIA: CPT

## 2020-04-28 PROCEDURE — 85027 COMPLETE CBC AUTOMATED: CPT

## 2020-04-28 PROCEDURE — 87205 SMEAR GRAM STAIN: CPT

## 2020-04-28 PROCEDURE — 72193 CT PELVIS W/DYE: CPT

## 2020-04-28 PROCEDURE — 82962 GLUCOSE BLOOD TEST: CPT

## 2020-04-28 PROCEDURE — 93041 RHYTHM ECG TRACING: CPT

## 2020-04-28 PROCEDURE — 87081 CULTURE SCREEN ONLY: CPT

## 2020-04-28 PROCEDURE — 87070 CULTURE OTHR SPECIMN AEROBIC: CPT

## 2020-04-28 PROCEDURE — 87181 SC STD AGAR DILUTION PER AGT: CPT

## 2020-04-28 PROCEDURE — 80053 COMPREHEN METABOLIC PANEL: CPT

## 2020-04-28 PROCEDURE — 80202 ASSAY OF VANCOMYCIN: CPT

## 2020-04-28 PROCEDURE — 83605 ASSAY OF LACTIC ACID: CPT

## 2020-04-28 PROCEDURE — 83735 ASSAY OF MAGNESIUM: CPT

## 2020-04-28 PROCEDURE — 85025 COMPLETE CBC W/AUTO DIFF WBC: CPT

## 2020-04-28 PROCEDURE — 84145 PROCALCITONIN (PCT): CPT

## 2020-04-28 PROCEDURE — 83690 ASSAY OF LIPASE: CPT

## 2020-04-28 PROCEDURE — 87184 SC STD DISK METHOD PER PLATE: CPT

## 2020-04-28 PROCEDURE — 87185 SC STD ENZYME DETCJ PER NZM: CPT

## 2020-04-28 PROCEDURE — 36415 COLL VENOUS BLD VENIPUNCTURE: CPT

## 2020-04-28 PROCEDURE — 87186 SC STD MICRODIL/AGAR DIL: CPT

## 2020-04-28 PROCEDURE — 82805 BLOOD GASES W/O2 SATURATION: CPT

## 2020-04-28 PROCEDURE — 93005 ELECTROCARDIOGRAM TRACING: CPT

## 2020-04-28 PROCEDURE — 85730 THROMBOPLASTIN TIME PARTIAL: CPT

## 2020-04-28 RX ADMIN — SODIUM CHLORIDE SCH MLS/HR: 900 INJECTION, SOLUTION INTRAVENOUS at 16:19

## 2020-04-28 RX ADMIN — ENOXAPARIN SODIUM SCH MG: 100 INJECTION SUBCUTANEOUS at 17:41

## 2020-04-28 RX ADMIN — SODIUM CHLORIDE SCH MLS/HR: 900 INJECTION, SOLUTION INTRAVENOUS at 17:37

## 2020-04-28 RX ADMIN — SODIUM CHLORIDE SCH MLS/HR: 900 INJECTION, SOLUTION INTRAVENOUS at 14:54

## 2020-04-28 RX ADMIN — SODIUM CHLORIDE SCH MLS/HR: 900 INJECTION, SOLUTION INTRAVENOUS at 13:45

## 2020-04-28 RX ADMIN — SODIUM CHLORIDE SCH MLS/HR: 900 INJECTION, SOLUTION INTRAVENOUS at 17:06

## 2020-04-28 NOTE — DIAGNOSTIC IMAGING REPORT
PROCEDURE: 

CT pelvis with contrast.



TECHNIQUE: 

Oral and intravenous contrast were administered with pelvic CT

performed. Auto Exposure Controls were utilized during the CT

exam to meet ALARA standards for radiation dose reduction. 



INDICATION:  

Pain in the fold of the pelvis to the thigh area.



CORRELATION STUDY: 

04/14/2015.



FINDINGS:  

There is extensive subcutaneous gas along the proximal medial

aspect of the right thigh. Associated overlying skin thickening

and diffuse haziness of the subcutaneous tissues is present. No

definitive drainable abscess in this area. This appears to be at

the region of the medial thigh with the scrotal tissue not

definitively involved. This extends from the region of the right

inguinal region to the proximal to mid aspect of the thigh.

Prominent, likely reactive, right iliac and inguinal lymph nodes.



The proximal left thigh soft tissues are unremarkable.



The urinary bladder is unremarkable. The prostate gland is

unremarkable. The partially visualized gastrointestinal tract is

nonobstructed. No significant pelvic fluid collection.



The osseous structures demonstrate no acute or erosive change.



IMPRESSION:

Findings consistent with necrotizing fasciitis with severe

cellulitis of the proximal medial right thigh. No definitive

drainable abscess.



Critical findings:

A telephone call has been made to the Emergency Department at

12:59 PM.



Dictated by: 



  Dictated on workstation # [id number]

## 2020-04-28 NOTE — XMS REPORT
[facility]

                             Created on: 2020



Aj Gary

External Reference #: [id number]

: 1980

Sex: Male



Demographics





                          Address                   [address] [place]  [address]

 

                          Preferred Language        Unknown

 

                          Marital Status            Unknown

 

                          Jehovah's witness Affiliation     Unknown

 

                          Race                      Unknown

 

                          Ethnic Group              Unknown





Author





                          Author                    Aj DAVIS

 

                          Organization              [facility]

 

                          Address                   [address] [place]  [address]



 

                          Phone                     [phone]







Care Team Providers





                    Care Team Member Name Role                Phone

 

                    RUT DAVIS  Unavailable         [phone]







PROBLEMS





          Type      Condition ICD9-CM Code NGW76-PE Code Onset Dates Condition S

tatus SNOMED 

Code

 

          Problem   Spinal stenosis, lumbar           M48.06              Active

    21671037

 

          Problem   Lumbar disc herniation           M51.26              Active 

   [id number]

 

          Problem   Abnormal NCS (nerve conduction studies)           R94.130   

          Active    84379846

 

          Problem   Degenerative disc disease, lumbar           M51.36          

    Active    91266661

 

          Problem   Long term current use of insulin           Z79.4            

   Active    624904994

 

          Problem   Pain, joint, lower leg, left           M25.562             A

ctive    75800090

 

             Problem      Essential hypertension with goal blood pressure less t

sargent 130/85              I10           

                          Active                    36177360

 

          Problem   Bilateral swelling of feet           M79.89              Act

shruthi    [id number]

 

          Problem   Morbid (severe) obesity due to excess calories           E66

.01              Active    

[id number]

 

             Problem      Diabetes mellitus due to underlying condition with yanna

t ulcer              E08.621      

                          Active                    199077408

 

          Problem   Left knee pain           M25.562             Active    79402

003

 

          Problem   Mixed hyperlipidemia           E78.2               Active   

 [id number]

 

          Problem   Type 2 diabetes mellitus with diabetic neuropathy           

E11.40              Active    

12431321

 

          Problem   Body mass index (BMI) of 60.0-69.9 in adult           Z68.44

              Active    605773526

 

          Problem   Loss of movement           R29.898             Active    623

86822

 

          Problem   Stasis dermatitis of both legs           I87.2              

 Active    22254747

 

                          Problem                   Non-pressure chronic ulcer o

f other part of left foot with other 

specified severity              L97.528                   Active       [id number]







ALLERGIES

No Information



ENCOUNTERS





                Encounter       Location        Date            Diagnosis

 

                    [facility] [id number] [place] [address] 23 

Dec, 2019                                

 

                    [facility] [id number] [place] [address] 16 

Dec, 2019                                

 

                    [place] [address] [address]                                Type 2 diabetes mellitus with diabetic n

europathy E11.40 ; Essential 

hypertension with goal blood pressure less than 130/85 I10 ; Mixed 
hyperlipidemia E78.2 and Avulsion of toenail of left foot S91.209A

 

                    [place] [address] [address] 22 

Oct, 2019                                

 

                    [place] [address] [address] 04 

Sep, 2019                                

 

                    [place] [address] [address]                                 

 

                    [place] [address] [address] 28 

May, 2019                                

 

                    [place] [address] [address] 09 

May, 2019                               Exercise counseling Z71.82

 

                    [place] [address] [address] 02 

May, 2019                                

 

                    [place] [address] [address]                                Exercise counseling Z71.82

 

                    [place] [address] [address]                                Exercise counseling Z71.82

 

                    [place] [address] [address]                                Body mass index (BMI) of 60.0-69.9 in ad

ult Z68.44 and Exercise 

counseling Z71.82

 

                    [place] [address] [address] 29 

Mar, 2019                               Exercise counseling Z71.82

 

                    [place] [address] [address] 27 

Mar, 2019                               Exercise counseling Z71.82

 

                    [place] [address] [address] 24 

Mar, 2019                                

 

                    [place] [address] [address] 20 

Mar, 2019                               Exercise counseling Z71.82

 

                    [place] [address] [address] 18 

Mar, 2019                               Type 2 diabetes mellitus with diabetic n

europathy E11.40 ; Morbid 

(severe) obesity due to excess calories E66.01 and Morbid obesity E66.01

 

                    [place] [address] [address] 11 

Mar, 2019                               Exercise counseling Z71.82

 

                    [place] [address] [address] 08 

Mar, 2019                               Type 2 diabetes mellitus with diabetic n

europathy E11.40 ; Mixed 

hyperlipidemia E78.2 and Essential hypertension with goal blood pressure less 
than 130/85 I10

 

                    [place] [address] [address] 08 

Mar, 2019                               Exercise counseling Z71.82

 

                    [place] [address] [address] 04 

Mar, 2019                               Exercise counseling Z71.82

 

                    [place] [address] [address]                                Exercise counseling Z71.82

 

                    [place] [address] [address] 15 

2019                               Type 2 diabetes mellitus with diabetic n

europathy E11.40 ; Essential 

hypertension with goal blood pressure less than 130/85 I10 ; Mixed 
hyperlipidemia E78.2 and BMI 50.0-59.9, adult Z68.43

 

                    [place] [address] [address]                                 

 

                    [place] [address] [address] 14 

Dec, 2018                               Essential hypertension with goal blood p

ressure less than 130/85 I10

 

                    [place] [address] [address]                                Type 2 diabetes mellitus with diabetic n

europathy E11.40

 

                    [facility] [id number] [place] [address]                                 

 

                    [facility] [address] [address] 22 

Oct, 2018                                

 

                    [facility] [address] [address] 25 

Sep, 2018                                

 

                    [facility] [address] [address] 06 

Sep, 2018                               Type 2 diabetes mellitus with diabetic n

europathy E11.40

 

                    [facility] [address] [address] 05 

Sep, 2018                               Type 2 diabetes mellitus with diabetic n

europathy E11.40 ; Essential 

hypertension with goal blood pressure less than 130/85 I10 ; Mixed 
hyperlipidemia E78.2 and BMI 50.0-59.9, adult Z68.43

 

                    [facility] [address] [address] 04 

Sep, 2018                                

 

                    [facility] [address] [address] 27 

Aug, 2018                               Type 2 diabetes mellitus with diabetic n

europathy E11.40

 

                    [facility] [address] [address]                                 

 

                    [facility] [address] [address]                                 

 

                    [facility] [address] [address]                                 

 

                    [facility] [address] [address]                                Type 2 diabetes mellitus with diabetic n

europathy E11.40

 

                    [facility] [address] [address]                                Mixed hyperlipidemia E78.2

 

                    [facility] [id number] [place] [address] 29 

May, 2018                               Type 2 diabetes mellitus with diabetic n

europathy E11.40 ; Essential 

hypertension with goal blood pressure less than 130/85 I10 ; Mixed 
hyperlipidemia E78.2 and BMI 50.0-59.9, adult Z68.43

 

                    [place] [address] [address] 16 

May, 2018                               Type 2 diabetes mellitus with diabetic n

europathy E11.40 and Diabetes 

mellitus due to underlying condition with foot ulcer E08.621

 

                    [place] [address] [address] 16 

May, 2018                                

 

                    [place] [address] [address] 10 

Apr, 2018                               Type 2 diabetes mellitus with diabetic n

europathy E11.40

 

                    [place] [address] [address] 10 

Apr, 2018                                

 

                    [place] [address] [address] 23 

Mar, 2018                                

 

                    [place] [address] [address] 06 

Mar, 2018                                

 

                    [place] [address] [address] 06 

Mar, 2018                                

 

                    [place] [address] [address]                                Type 2 diabetes mellitus with diabetic n

europathy E11.40 ; BMI 50.0-

59.9, adult Z68.43 ; Long term current use of insulin Z79.4 and Essential 
hypertension with goal blood pressure less than 130/85 I10

 

                    [place] [address] [address]                                Type 2 diabetes mellitus with diabetic n

europathy E11.40 ; 

Onychocryptosis L60.0 and Onychomycosis B35.1

 

                    [place] [address] [address]                                 

 

                    [place] [address] [address] 05 

Dec, 2017                                

 

                    [place] [address] [address]                                 

 

                    [address] [address] 20 

Oct, 2017                               Onychomycosis B35.1 and Diabetes mellitu

s due to underlying condition 

with foot ulcer E08.621

 

                    [place] [address] [address] 10 

Oct, 2017                                

 

                    [place] [address] [address] 04 

Oct, 2017                                

 

                    [address] [address] 27 

Sep, 2017                               Encounter for immunization Z23 ; Type 2 

diabetes mellitus with 

diabetic neuropathy E11.40 ; Long term current use of insulin Z79.4 ; Diabetes 
mellitus due to underlying condition with foot ulcer E08.621 and Non-pressure 
chronic ulcer of other part of left foot with other specified severity L97.528

 

                    [address] [address] 26 

Sep, 2017                               Lumbar disc herniation M51.26 ; Degenera

tive disc disease, lumbar 

M51.36 and Spinal stenosis, lumbar M48.06

 

                    [address] [address] 25 

Sep, 2017                               Type 2 diabetes mellitus with diabetic n

europathy E11.40

 

                    [address] [address] 12 

Sep, 2017                                

 

                    [address] [address] 24 

Aug, 2017                               Acute left ankle pain M25.572 ; Abnormal

 NCS (nerve conduction 

studies) R94.130 and Loss of movement R29.898

 

                    [address] [address]                                Serum potassium elevated E87.5

 

                    [address] [address]                                Onychomycosis B35.1 ; Type 2 diabetes me

llitus with diabetic 

neuropathy E11.40 and Loss of movement R29.898

 

                    [facility] [address] [address]                                Serum potassium elevated E87.5

 

                    [facility] [address] [address]                                Type 2 diabetes mellitus with diabetic n

europathy E11.40 ; Essential 

hypertension with goal blood pressure less than 130/85 I10 ; Body mass index 
(BMI) of 60.0-69.9 in adult Z68.44 ; Morbid (severe) obesity due to excess 
calories E66.01 ; Stasis dermatitis of both legs I87.2 and Loss of movement 
R29.898

 

                    [address] [address] [address]                                 

 

                    [place] [address] [address] 05 

May, 2017                                

 

                    [address] [address] [address]                                 

 

                    [address] [address] [address]                                 

 

                    [place] [address] [address] 10 

Apr, 2017                                

 

                          [facility]  [address] [facility] [id number]

[address] [place] 

[address]                10 Apr, 2017              Acute eye pain H57.10

 

                    [address] [address] [address] 16 

Mar, 2017                                

 

                    [address] [address] [address] 15 

Mar, 2017                                

 

                    [place] [address] [address]                                Type 2 diabetes mellitus with diabetic n

europathy E11.40 ; Essential 

hypertension with goal blood pressure less than 130/85 I10 ; Body mass index 
(BMI) of 60.0-69.9 in adult Z68.44 and Morbid (severe) obesity due to excess 
calories E66.01

 

                    [facility] [id number] [place] [address]                                Type 2 diabetes mellitus with diabetic n

europathy E11.40

 

                    [facility] [address] [address]                                 

 

                    [facility] [address] [address] 29 

Dec, 2016                                

 

                    [facility] [address] [address] 07 

Dec, 2016                                

 

                    [facility] [address] [address]                                Type 2 diabetes mellitus with diabetic n

europathy E11.40 ; Long term 

current use of insulin Z79.4 ; Essential hypertension with goal blood pressure 
less than 130/85 I10 ; Alteration in mobility due to weakness R53.1 and Acute 
non-recurrent maxillary sinusitis J01.00

 

                    [facility] [address] [address]                                 

 

                    [facility] [address] [address] 15 

Nov, 2016                                

 

                    [facility] [address] [address]                                 

 

                    [facility] [address] [address]                                 

 

                    [facility] [address] [address] 24 

Oct, 2016                                

 

                    [facility] [address] [address] 18 

Oct, 2016                                

 

                    [facility] [address] [address] 22 

Sep, 2016                                

 

                    [facility] [address] [address] 12 

Sep, 2016                                

 

                    [facility] [address] [address] 06 

Sep, 2016                                

 

                    [address] [place] [address] [address] 06 

Sep, 2016                                

 

                    [address] [place] [address] [address] 01 

Sep, 2016                                

 

                    [facility] [address] [address] 02 

Aug, 2016                               Type 2 diabetes mellitus with diabetic n

europathy E11.40 ; Long term 

current use of insulin Z79.4 ; Essential hypertension with goal blood pressure 
less than 130/85 I10 and Bilateral swelling of feet M79.89

 

                    [address] [place] [address] [address]                                 

 

                    [address] [place] [address] [address] 05 

May, 2016                               Type 2 diabetes mellitus with diabetic n

europathy E11.40 ; Long term 

current use of insulin Z79.4 and Essential hypertension with goal blood pressure
less than 130/85 I10

 

                    [address] [address] [address]                                Lateral meniscus tear S83.289A and Osteo

arthritis of left knee M17.9

 

                    [address] [address]                                Cellulitis of left lower extremity L03.1

16 ; Type 2 diabetes mellitus 

with diabetic neuropathy E11.40 and Left knee pain M25.562

 

                    [address] [address] [address] 12 

2015                               Cellulitis of left lower extremity L03.1

16

 

                    [address] [address] [address] 10 

2015                               Type 2 diabetes mellitus with diabetic n

europathy E11.40 ; Long term 

current use of insulin Z79.4 ; Left knee pain M25.562 ; Pain, joint, lower leg, 
left M25.562 and Cellulitis of left lower extremity L03.116

 

                    [facility] [address] [facility] [place] [address] 15 

Jul, 2015                               Dental examination V72.2

 

                    [facility] [address] [facility] [place] [id number] 09 

2015                               Dental examination V72.2

 

                    [facility] [id number] [place] [address] 08 

May, 2015                               Essential hypertension, benign 401.1 ; D

iabetes mellitus without 

mention of complication, type II or unspecified type, not stated as uncontrolled
250.00 ; Unspecified hereditary and idiopathic peripheral neuropathy 356.9 and 
Cellulitis 682.9

 

                    [facility] [id number] [place] [address]                                 

 

                    [facility] [address] [address]                                 

 

                    [facility] [address] [address]                                 

 

                    [facility] [address] [address]                                 

 

                    [facility] [address] [address]                                 

 

                    [facility] [address] [address]                                 

 

                    [facility] [address] [address] 16 

Dec, 2014                                

 

                    [facility] [id number] [place] [address] 16 

Dec, 2014                                

 

                    [facility] [id number] [place] [address] 11 

Dec, 2014                                

 

                    [facility] [address] [address] 11 

Dec, 2014                                

 

                    [facility] [address] [address] 09 

Dec, 2014                                

 

                    [facility] [address] [address] 08 

Dec, 2014                                

 

                    [facility] [address] [address] 08 

Dec, 2014                                

 

                    [facility] [address] [facility] [place] [address]                                 

 

                    [facility] [address] [facility] [place] [address]                                 

 

                    [facility] [address] [facility] [place] [address] 24 

Oct, 2014                                

 

                    [facility] [id number] [place] [address] 24 

Oct, 2014                                

 

                    [facility] [address] [facility] [place] [address] 28 

Aug, 2014                                

 

                    [facility] [id number] [place] [address] 28 

Aug, 2014                                

 

                    [facility] [address] [facility] [place] [address] 15 

Aug, 2014                                

 

                    [facility] [id number] [place] [address] 15 

Aug, 2014                                

 

                    [facility] [address] [facility] [place] [address] 14 

Aug, 2014                                

 

                    [facility] [id number] [place] [address] 14 

Aug, 2014                                







IMMUNIZATIONS

No Known Immunizations



SOCIAL HISTORY

Never Assessed



REASON FOR VISIT

weight loss



PLAN OF CARE





                          Activity                  Details

 

                                         

 

                          Follow Up                 1 Week Reason:







VITAL SIGNS





MEDICATIONS

Unknown Medications



RESULTS

No Results



PROCEDURES

No Known procedures



INSTRUCTIONS





MEDICATIONS ADMINISTERED

No Known Medications



MEDICAL (GENERAL) HISTORY





                    Type                Description         Date

 

                    Medical History     HYPERTENSION         

 

                    Medical History     OBESITY              

 

                    Medical History     DM                   

 

                    Medical History     ORTHOPEDIC DISORDER LEFT KNEE HX FX LEFT

 LEG AND KNEE  

 

                    Medical History     NEUROPATHY           

 

                    Medical History     CHRONIC LEFT KNEE PAIN  

 

                    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

 

 

                          Surgical History          removal of 2 metatarsal head

s and stretching of achilles 

tendon, left                            2018

 

                    Hospitalization History Ketoacidosis x3      

 

                    Hospitalization History left foot wound     2017

## 2020-04-28 NOTE — NUR
SPOKE WITH THE PT AND GOT A MED LIST FROM [facility] TO COMPLETE THE MED REC



ATORVASTATIN 40MG: PT SAID HE WAS USING THIS MEDICATION- WHEN I CALLED [facility] THEY HAD 
NOT FILLED THIS SINCE 09- #30/30DS, AND I HAD THEM CHECK FROM THE REPOSITORY AND THEY 
HAD NOT FILLED IT EITHER. I WENT BACK AND ASKED THE PT ABOUT THIS HE THEN SAID HE WENT TO 
[facility] TO GET IT (AFTER I INQUIRED MORE HE WAS ABLE TO TELL ME THE NORTH LOCATION)- I THEN 
CALLED [facility] AND THEY HAVE NEVER FILLED THIS FOR THE PT AND THEY EVEN CHECKED THE 
NEIGHBORHOOD MARKET AND IT HAD NOT BEEN FILLED THERE EITHER. DUE TO THE PAST DUE FILL DATE I 
DID NOT INCLUDE THIS ON THE MED REC



LISINOPRIL/HCTZ 20/25MG: THE DIRECTIONS FROM [facility] SHOW 1 TAB DAILY HOWEVER THE PT SAYS 
HE IS TAKING 2 TABS DAILY. WHEN I SPOKE WITH [facility] AND THEN SPOKE WITH [facility] THE NURSE 
THEY DID NOT HAVE ANY NOTES SHOWING THESE NEW DIRECTIONS



GLYBURIDE 2.5MG- THE DIRECTIONS FROM THE PHARM ARE 1 TAB BID HOWEVER THE PT IS TAKING 2 TABS 
(AT THE SAME TIME) AT BEDTIME



THE FOLLOWING ARE FILL DATES FROM [facility]:

01- LEVEMIR #10 PENS- ABOUT A 30 DAY SUPPLY (I DOCUMENTED THE PAST DUE FILL ON THE 
MED REC)

03- METFORMIN ER 500MG #120/30DS

03- LISINOPRIL/HCTZ 20/25MG #90/45DS

04- GLYBURIDE 2.5MG #60/30DS

04- NOVOLOG PEN #10 PENS/30DS



OTC MEDS:

ASPIRIN

## 2020-04-28 NOTE — NUR
CR 1.25; CR CL > 60; ACTUAL .3 KG; VANCO 2000 MG IV ORDERED BY ER & GIVEN; CONTINUE 
WITH VANCO 2000 MG IV Q12H; TROUGH AFTER 3RD DOSE

## 2020-04-28 NOTE — DIAGNOSTIC IMAGING REPORT
INDICATION: Hyperglycemia and abnormal dark urine.



Frontal chest obtained at 11:34 a.m. and compared to 06/27/2018



FINDINGS: Heart is borderline in size. There is mild central

vascular prominence. There is no focal infiltrate or pneumothorax

or pleural fluid.



IMPRESSION:



Borderline heart size with no acute process in the chest.



Dictated by: 



  Dictated on workstation # [id number]

## 2020-04-28 NOTE — XMS REPORT
[facility]

                             Created on: 2020



Aj Gary

External Reference #: [id number]

: 1980

Sex: Male



Demographics





                          Address                   [address] [place]  [address]

 

                          Preferred Language        Unknown

 

                          Marital Status            Unknown

 

                          Buddhism Affiliation     Unknown

 

                          Race                      Unknown

 

                          Ethnic Group              Unknown





Author





                          Author                    Aj DAVIS

 

                          Organization              [facility]

 

                          Address                   [address] [place]  [address]



 

                          Phone                     [phone]







Care Team Providers





                    Care Team Member Name Role                Phone

 

                    RUT DAVIS  Unavailable         [phone]







PROBLEMS





          Type      Condition ICD9-CM Code XDQ81-VG Code Onset Dates Condition S

tatus SNOMED 

Code

 

          Problem   Spinal stenosis, lumbar           M48.06              Active

    17278942

 

          Problem   Lumbar disc herniation           M51.26              Active 

   057153155

 

          Problem   Abnormal NCS (nerve conduction studies)           R94.130   

          Active    33936415

 

          Problem   Degenerative disc disease, lumbar           M51.36          

    Active    32934208

 

          Problem   Long term current use of insulin           Z79.4            

   Active    123119983

 

          Problem   Pain, joint, lower leg, left           M25.562             A

ctive    19390052

 

             Problem      Essential hypertension with goal blood pressure less t

sargent 130/85              I10           

                          Active                    03113495

 

          Problem   Bilateral swelling of feet           M79.89              Act

shruthi    [id number]

 

          Problem   Morbid (severe) obesity due to excess calories           E66

.01              Active    

712630405

 

             Problem      Diabetes mellitus due to underlying condition with yanna

t ulcer              E08.621      

                          Active                    541579344

 

          Problem   Left knee pain           M25.562             Active    66789

003

 

          Problem   Mixed hyperlipidemia           E78.2               Active   

 831618237

 

          Problem   Type 2 diabetes mellitus with diabetic neuropathy           

E11.40              Active    

97847514

 

          Problem   Body mass index (BMI) of 60.0-69.9 in adult           Z68.44

              Active    025288591

 

          Problem   Loss of movement           R29.898             Active    623

36788

 

          Problem   Stasis dermatitis of both legs           I87.2              

 Active    81435910

 

                          Problem                   Non-pressure chronic ulcer o

f other part of left foot with other 

specified severity              L97.528                   Active       470019542







ALLERGIES

No Information



ENCOUNTERS





                Encounter       Location        Date            Diagnosis

 

                    [facility] [id number] [place] [address] 23 

Dec, 2019                                

 

                    [facility] [id number] [place] [address] 16 

Dec, 2019                                

 

                    [place] [address] [address]                                Type 2 diabetes mellitus with diabetic n

europathy E11.40 ; Essential 

hypertension with goal blood pressure less than 130/85 I10 ; Mixed 
hyperlipidemia E78.2 and Avulsion of toenail of left foot S91.209A

 

                    [place] [address] [address] 22 

Oct, 2019                                

 

                    [place] [address] [address] 04 

Sep, 2019                                

 

                    [place] [address] [address]                                 

 

                    [place] [address] [address] 28 

May, 2019                                

 

                    [place] [address] [address] 09 

May, 2019                               Exercise counseling Z71.82

 

                    [place] [address] [address] 02 

May, 2019                                

 

                    [place] [address] [address]                                Exercise counseling Z71.82

 

                    [place] [address] [address]                                Exercise counseling Z71.82

 

                    [place] [address] [address]                                Body mass index (BMI) of 60.0-69.9 in ad

ult Z68.44 and Exercise 

counseling Z71.82

 

                    [place] [address] [address] 29 

Mar, 2019                               Exercise counseling Z71.82

 

                    [place] [address] [address] 27 

Mar, 2019                               Exercise counseling Z71.82

 

                    [place] [address] [address] 24 

Mar, 2019                                

 

                    [place] [address] [address] 20 

Mar, 2019                               Exercise counseling Z71.82

 

                    [place] [address] [address] 18 

Mar, 2019                               Type 2 diabetes mellitus with diabetic n

europathy E11.40 ; Morbid 

(severe) obesity due to excess calories E66.01 and Morbid obesity E66.01

 

                    [place] [address] [address] 11 

Mar, 2019                               Exercise counseling Z71.82

 

                    [place] [address] [address] 08 

Mar, 2019                               Type 2 diabetes mellitus with diabetic n

europathy E11.40 ; Mixed 

hyperlipidemia E78.2 and Essential hypertension with goal blood pressure less 
than 130/85 I10

 

                    [place] [address] [address] 08 

Mar, 2019                               Exercise counseling Z71.82

 

                    [place] [address] [address] 04 

Mar, 2019                               Exercise counseling Z71.82

 

                    [place] [address] [address]                                Exercise counseling Z71.82

 

                    [place] [address] [address] 15 

2019                               Type 2 diabetes mellitus with diabetic n

europathy E11.40 ; Essential 

hypertension with goal blood pressure less than 130/85 I10 ; Mixed 
hyperlipidemia E78.2 and BMI 50.0-59.9, adult Z68.43

 

                    [place] [address] [address]                                 

 

                    [place] [address] [address] 14 

Dec, 2018                               Essential hypertension with goal blood p

ressure less than 130/85 I10

 

                    [place] [address] [address]                                Type 2 diabetes mellitus with diabetic n

europathy E11.40

 

                    [facility] [id number] [place] [address]                                 

 

                    [facility] [address] [address] 22 

Oct, 2018                                

 

                    [facility] [address] [address] 25 

Sep, 2018                                

 

                    [facility] [address] [address] 06 

Sep, 2018                               Type 2 diabetes mellitus with diabetic n

europathy E11.40

 

                    [facility] [address] [address] 05 

Sep, 2018                               Type 2 diabetes mellitus with diabetic n

europathy E11.40 ; Essential 

hypertension with goal blood pressure less than 130/85 I10 ; Mixed 
hyperlipidemia E78.2 and BMI 50.0-59.9, adult Z68.43

 

                    [facility] [address] [address] 04 

Sep, 2018                                

 

                    [facility] [address] [address] 27 

Aug, 2018                               Type 2 diabetes mellitus with diabetic n

europathy E11.40

 

                    [facility] [address] [address]                                 

 

                    [facility] [address] [address]                                 

 

                    [facility] [address] [address]                                 

 

                    [facility] [address] [address]                                Type 2 diabetes mellitus with diabetic n

europathy E11.40

 

                    [facility] [address] [address]                                Mixed hyperlipidemia E78.2

 

                    [facility] [id number] [place] [address] 29 

May, 2018                               Type 2 diabetes mellitus with diabetic n

europathy E11.40 ; Essential 

hypertension with goal blood pressure less than 130/85 I10 ; Mixed 
hyperlipidemia E78.2 and BMI 50.0-59.9, adult Z68.43

 

                    [place] [address] [address] 16 

May, 2018                               Type 2 diabetes mellitus with diabetic n

europathy E11.40 and Diabetes 

mellitus due to underlying condition with foot ulcer E08.621

 

                    [place] [address] [address] 16 

May, 2018                                

 

                    [place] [address] [address] 10 

Apr, 2018                               Type 2 diabetes mellitus with diabetic n

europathy E11.40

 

                    [place] [address] [address] 10 

Apr, 2018                                

 

                    [place] [address] [address] 23 

Mar, 2018                                

 

                    [place] [address] [address] 06 

Mar, 2018                                

 

                    [place] [address] [address] 06 

Mar, 2018                                

 

                    [place] [address] [address]                                Type 2 diabetes mellitus with diabetic n

europathy E11.40 ; BMI 50.0-

59.9, adult Z68.43 ; Long term current use of insulin Z79.4 and Essential 
hypertension with goal blood pressure less than 130/85 I10

 

                    [place] [address] [address]                                Type 2 diabetes mellitus with diabetic n

europathy E11.40 ; 

Onychocryptosis L60.0 and Onychomycosis B35.1

 

                    [place] [address] [address]                                 

 

                    [place] [address] [address] 05 

Dec, 2017                                

 

                    [place] [address] [address]                                 

 

                    [address] [address] 20 

Oct, 2017                               Onychomycosis B35.1 and Diabetes mellitu

s due to underlying condition 

with foot ulcer E08.621

 

                    [place] [address] [address] 10 

Oct, 2017                                

 

                    [place] [address] [address] 04 

Oct, 2017                                

 

                    [address] [address] 27 

Sep, 2017                               Encounter for immunization Z23 ; Type 2 

diabetes mellitus with 

diabetic neuropathy E11.40 ; Long term current use of insulin Z79.4 ; Diabetes 
mellitus due to underlying condition with foot ulcer E08.621 and Non-pressure 
chronic ulcer of other part of left foot with other specified severity L97.528

 

                    [address] [address] 26 

Sep, 2017                               Lumbar disc herniation M51.26 ; Degenera

tive disc disease, lumbar 

M51.36 and Spinal stenosis, lumbar M48.06

 

                    [address] [address] 25 

Sep, 2017                               Type 2 diabetes mellitus with diabetic n

europathy E11.40

 

                    [address] [address] 12 

Sep, 2017                                

 

                    [address] [address] 24 

Aug, 2017                               Acute left ankle pain M25.572 ; Abnormal

 NCS (nerve conduction 

studies) R94.130 and Loss of movement R29.898

 

                    [address] [address]                                Serum potassium elevated E87.5

 

                    [address] [address]                                Onychomycosis B35.1 ; Type 2 diabetes me

llitus with diabetic 

neuropathy E11.40 and Loss of movement R29.898

 

                    [facility] [address] [address]                                Serum potassium elevated E87.5

 

                    [facility] [address] [address]                                Type 2 diabetes mellitus with diabetic n

europathy E11.40 ; Essential 

hypertension with goal blood pressure less than 130/85 I10 ; Body mass index 
(BMI) of 60.0-69.9 in adult Z68.44 ; Morbid (severe) obesity due to excess 
calories E66.01 ; Stasis dermatitis of both legs I87.2 and Loss of movement 
R29.898

 

                    [place] [address] [address]                                 

 

                    [place] [address] [address] 05 

May, 2017                                

 

                    [place] [address] [address]                                 

 

                    [place] [address] [address]                                 

 

                    [place] [address] [address] 10 

Apr, 2017                                

 

                          [facility]  [address] [facility] [id number]

[address] [place] 

[address]                10 Apr, 2017              Acute eye pain H57.10

 

                    [place] [address] [address] 16 

Mar, 2017                                

 

                    [place] [address] [address] 15 

Mar, 2017                                

 

                    [place] [address] [address]                                Type 2 diabetes mellitus with diabetic n

europathy E11.40 ; Essential 

hypertension with goal blood pressure less than 130/85 I10 ; Body mass index 
(BMI) of 60.0-69.9 in adult Z68.44 and Morbid (severe) obesity due to excess 
calories E66.01

 

                    [facility] [id number] [place] [address]                                Type 2 diabetes mellitus with diabetic n

europathy E11.40

 

                    [facility] [address] [address]                                 

 

                    [facility] [address] [address] 29 

Dec, 2016                                

 

                    [facility] [address] [address] 07 

Dec, 2016                                

 

                    [facility] [address] [address]                                Type 2 diabetes mellitus with diabetic n

europathy E11.40 ; Long term 

current use of insulin Z79.4 ; Essential hypertension with goal blood pressure 
less than 130/85 I10 ; Alteration in mobility due to weakness R53.1 and Acute 
non-recurrent maxillary sinusitis J01.00

 

                    [facility] [address] [address]                                 

 

                    [facility] [address] [address] 15 

Nov, 2016                                

 

                    [facility] [address] [address]                                 

 

                    [facility] [address] [address]                                 

 

                    [facility] [address] [address] 24 

Oct, 2016                                

 

                    [facility] [address] [address] 18 

Oct, 2016                                

 

                    [facility] [address] [address] 22 

Sep, 2016                                

 

                    [facility] [address] [address] 12 

Sep, 2016                                

 

                    [facility] [address] [address] 06 

Sep, 2016                                

 

                    [place] [address] [address] 06 

Sep, 2016                                

 

                    [address] [address] [address] 01 

Sep, 2016                                

 

                    [facility] [address] [address] 02 

Aug, 2016                               Type 2 diabetes mellitus with diabetic n

europathy E11.40 ; Long term 

current use of insulin Z79.4 ; Essential hypertension with goal blood pressure 
less than 130/85 I10 and Bilateral swelling of feet M79.89

 

                    [address] [address] [address]                                 

 

                    [address] [address] [address] 05 

May, 2016                               Type 2 diabetes mellitus with diabetic n

europathy E11.40 ; Long term 

current use of insulin Z79.4 and Essential hypertension with goal blood pressure
less than 130/85 I10

 

                    [address] [address] [address]                                Lateral meniscus tear S83.289A and Osteo

arthritis of left knee M17.9

 

                    [address] [address]                                Cellulitis of left lower extremity L03.1

16 ; Type 2 diabetes mellitus 

with diabetic neuropathy E11.40 and Left knee pain M25.562

 

                    [address] [address] [address] 12 

2015                               Cellulitis of left lower extremity L03.1

16

 

                    [address] [address] [address] 10 

2015                               Type 2 diabetes mellitus with diabetic n

europathy E11.40 ; Long term 

current use of insulin Z79.4 ; Left knee pain M25.562 ; Pain, joint, lower leg, 
left M25.562 and Cellulitis of left lower extremity L03.116

 

                    [facility] [id number] [place] [id number] 

Jul, 2015                               Dental examination V72.2

 

                    [facility] [id number] [place] [id number] 09 

2015                               Dental examination V72.2

 

                    [facility] [id number] [place] [address] 08 

May, 2015                               Essential hypertension, benign 401.1 ; D

iabetes mellitus without 

mention of complication, type II or unspecified type, not stated as uncontrolled
250.00 ; Unspecified hereditary and idiopathic peripheral neuropathy 356.9 and 
Cellulitis 682.9

 

                    [facility] [id number] [place] [address]                                 

 

                    [facility] [address] [address]                                 

 

                    [facility] [address] [address]                                 

 

                    [facility] [address] [address]                                 

 

                    [facility] [address] [address]                                 

 

                    [facility] [address] [address]                                 

 

                    [facility] [address] [address] 16 

Dec, 2014                                

 

                    [facility] [id number] [place] [address] 16 

Dec, 2014                                

 

                    [facility] [id number] [place] [address] 11 

Dec, 2014                                

 

                    [facility] [address] [address] 11 

Dec, 2014                                

 

                    [facility] [address] [address] 09 

Dec, 2014                                

 

                    [facility] [address] [address] 08 

Dec, 2014                                

 

                    [facility] [address] [address] 08 

Dec, 2014                                

 

                    [facility] [address] [facility] [place] [address]                                 

 

                    [facility] [address] [facility] [place] [address]                                 

 

                    [facility] [address] [facility] [place] [address] 24 

Oct, 2014                                

 

                    [facility] [id number] [place] [address] 24 

Oct, 2014                                

 

                    [facility] [id number] [place] [address] 28 

Aug, 2014                                

 

                    [facility] [id number] [place] [address] 28 

Aug, 2014                                

 

                    [facility] [address] [facility] [place] [address] 15 

Aug, 2014                                

 

                    [facility] [id number] [place] [address] 15 

Aug, 2014                                

 

                    [facility] [address] [facility] [place] [address] 14 

Aug, 2014                                

 

                    [facility] [id number] [place] [address] 14 

Aug, 2014                                







IMMUNIZATIONS

No Known Immunizations



SOCIAL HISTORY

Never Assessed



REASON FOR VISIT

BS f/u



PLAN OF CARE





VITAL SIGNS





MEDICATIONS

Unknown Medications



RESULTS

No Results



PROCEDURES

No Known procedures



INSTRUCTIONS





MEDICATIONS ADMINISTERED

No Known Medications



MEDICAL (GENERAL) HISTORY





                    Type                Description         Date

 

                    Medical History     HYPERTENSION         

 

                    Medical History     OBESITY              

 

                    Medical History     DM                   

 

                    Medical History     ORTHOPEDIC DISORDER LEFT KNEE HX FX LEFT

 LEG AND KNEE  

 

                    Medical History     NEUROPATHY           

 

                    Medical History     CHRONIC LEFT KNEE PAIN  

 

                    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

 

 

                          Surgical History          removal of 2 metatarsal head

s and stretching of achilles 

tendon, left                            2018

 

                    Hospitalization History Ketoacidosis x3      

 

                    Hospitalization History left foot wound     2017

## 2020-04-28 NOTE — NUR
REPORT GIVEN TO  4TH  INFORMED  NURSE ROCKY THAT 2ND LITER OF FLUIDS WILL COME 
WITH PATIENT TO HANG  AND THAT PHARMACY WILL MIX AND SEND VANCOMYCIN TO FLOOR.

## 2020-04-28 NOTE — PROGRESS NOTE-PRE OPERATIVE
Pre-Operative Progress Note


H&P Reviewed


The H&P was reviewed, patient examined and no changes noted.


Date Seen by Provider:  Apr 28, 2020


Time Seen by Provider:  14:45


Date H&P Reviewed:  Apr 28, 2020


Time H&P Reviewed:  14:45


Pre-Operative Diagnosis:  right medial thigh necrotizing soft tissue infection.











CLAUDY ALEJO MD                Apr 28, 2020 14:51

## 2020-04-28 NOTE — XMS REPORT
Continuity of Care Document

                             Created on: 2020



MAGALIE BURGEROME

External Reference #: [id number]

: 1980

Sex: Male



Demographics





                          Home Phone                [phone]

 

                          Preferred Language        Unknown

 

                          Marital Status            Unknown

 

                          Yazidi Affiliation     Unknown

 

                          Race                      Unknown

 

                          Ethnic Group              Unknown





Author





                          Organization              Unknown

 

                          Address                   Unknown

 

                          Phone                     Unavailable



              



Allergies

      



             Active           Description           Code           Type         

  Severity   

                Reaction           Onset           Reported/Identified          

 

Relationship to Patient                 Clinical Status        

 

                Yes             levofloxacin           [id number]           Drug

 Allergy          

           Mild           N/A                       2015                  

           

  

 

                Yes             No Allergy Information Available           

[id number]           

Drug Allergy           Unknown           N/A                             

015   

                                                             

 

                Yes             sweet potato           [id number]           Drug

 Allergy          

           Unknown           N/A                       2015               

           

     



                      



Medications

      



There is no data.                  



Problems

      



             Date Dx Coded           Attending           Type           Code    

       

Diagnosis                               Diagnosed By        

 

             2014           JUNIE CORRIGAN                        250

.00           

DIABETES MELLITUS WITHOUT MENTION OF COMPLICATION TYPE II OR UNSPECIFIED TYPE 
NOT STATED AS UNCONTROLLED                       

 

             2014           JUNIE CORRIGAN L                        401

.1           

HYPERTENSION, BENIGN ESSENTIAL                    

 

             2014           ALVARADO CORRIGANA L                        250

.00           

DIABETES MELLITUS WITHOUT MENTION OF COMPLICATION TYPE II OR UNSPECIFIED TYPE 
NOT STATED AS UNCONTROLLED                       

 

             2014           JUNIE CORRIGAN L                        401

.1           

HYPERTENSION, BENIGN ESSENTIAL                    

 

             2014           GALE FITZGERALD DPMIN                        250.00

           

DIABETES MELLITUS WITHOUT MENTION OF COMPLICATION TYPE II OR UNSPECIFIED TYPE 
NOT STATED AS UNCONTROLLED                       

 

             2014           AMILCAR LOPEZ, JOHN                        401.1 

          

HYPERTENSION, BENIGN ESSENTIAL                    

 

             2014           ALVARAOD CORRIGANA L                        250

.00           

DIABETES MELLITUS WITHOUT MENTION OF COMPLICATION TYPE II OR UNSPECIFIED TYPE 
NOT STATED AS UNCONTROLLED                       

 

             2014           JUNIE CORRIGAN                        401

.1           

HYPERTENSION, BENIGN ESSENTIAL                    

 

             2014           ZITA MOJICA                        250.

00           

DIABETES MELLITUS WITHOUT MENTION OF COMPLICATION TYPE II OR UNSPECIFIED TYPE 
NOT STATED AS UNCONTROLLED                       

 

             2014           ZITA MOJICA                        401.

1           

HYPERTENSION, BENIGN ESSENTIAL                    

 

             2014           EMORY ZHAO MD                        250.0

0           

DIABETES MELLITUS WITHOUT MENTION OF COMPLICATION TYPE II OR UNSPECIFIED TYPE 
NOT STATED AS UNCONTROLLED                       

 

             2014           EMORY ZHAO MD                        401.1

           

HYPERTENSION, BENIGN ESSENTIAL                    

 

             10/24/2014           AMILCAR DPARIAN, JOHN                        110.1 

          

ONYCHOMYCOSIS                                    

 

             10/24/2014           AMILCAR DPM, JOHN                        110.4 

          

TINEA PEDIS                                      

 

             10/24/2014           AMILCAR DPM, JOHN                        356.9 

          

NEUROPATHY                                       

 

             10/24/2014           ALFONSO APRMARIA TERESA JUNIE L                        110

.1           

ONYCHOMYCOSIS                                    

 

             10/24/2014           GEORGIL APRN, JUNIE L                        110

.4           

TINEA PEDIS                                      

 

             10/24/2014           MADL APRMARIA TERESA, JUNIE L                        356

.9           

NEUROPATHY                                       

 

             10/24/2014           ZITA MOJICA                        110.

1           

ONYCHOMYCOSIS                                    

 

             10/24/2014           ZITA MOJICA                        110.

4           

TINEA PEDIS                                      

 

             10/24/2014           ZITA MOJICA                        356.

9           

NEUROPATHY                                       

 

             10/24/2014           EMORY ZHAO MD                        110.1

           

ONYCHOMYCOSIS                                    

 

             10/24/2014           EMORY ZHAO MD                        110.4

           

TINEA PEDIS                                      

 

             10/24/2014           EMORY ZHAO MD                        356.9

           

NEUROPATHY                                       

 

             2014           ALVARADO CORRIGANA L                        719

.41           

PAIN IN JOINT INVOLVING SHOULDER REGION                    

 

             2014           ZITA MOJICA                        719.

41           

PAIN IN JOINT INVOLVING SHOULDER REGION                    

 

             2014           EMORY ZHAO MD                        719.4

1           

PAIN IN JOINT INVOLVING SHOULDER REGION                    

 

             2015           ZITA MOJICA                        355.

6           

NEUROMA/METATARSALGIA                            

 

             2015           EMORY ZHAO MD                        355.6

           

NEUROMA/METATARSALGIA                            

 

             2015           ZITA MOJICA                        726.

2           

OTHER AFFECTIONS OF SHOULDER REGION NOT ELSEWHERE CLASSIFIED                    

 

             2015           EMORY ZHAO MD                        726.2

           

OTHER AFFECTIONS OF SHOULDER REGION NOT ELSEWHERE CLASSIFIED                    

 

                04/15/2015           EMORY ZHAO MD           Ot           

   250.60          

                                                             

 

             04/15/2015           EMORY ZHAO MD, Ot           357

.2           

                                                 

 

             04/15/2015           EMORY ZHAO MD           Ot           608

.4           

                                                 

 

                2015           EMORY ZHAO MD           Ot           

   250.60          

                                                             

 

             2015           EMORY ZHAO MD, Ot           357

.2           

                                                 

 

             2015           EMORY ZHAO MD           Ot           608

.4           

                                                 

 

                2015           EMORY ZHAO MD           Ot           

   250.60          

                          DIAB W NEURO MANIFEST, TYPE II OR UNSPEC              

      

 

             2015           EMORY ZHAO MD, Ot           357

.2           

NEUROPATHY IN DIABETES                           

 

             2015           CECE HERNANDEZ, EMORY MUHAMMAD           Ot           608

.4           

MALE GEN INFLAM DIS NEC                          

 

                2015           EMORY ZHAO MD           Ot           

   V58.67          

                          LONG-TERM (CURRENT) USE OF INSULIN                    

 

                10/27/2015           VINNY LÓPEZ APRN           Ot           

   E11.40          

                          TYPE 2 DIABETES MELLITUS WITH DIABETIC N              

      

 

                10/27/2015           VINNY LÓPEZ APRN           Ot           

   F17.211         

                          NICOTINE DEPENDENCE, CIGARETTES, IN [place]BR[place]              

      

 

                10/27/2015           VINNY LÓPEZ APRN           Ot           

   M19.072         

                          PRIMARY OSTEOARTHRITIS, LEFT ANKLE AND F              

      

 

             10/27/2015           VINNY LÓPEZ APRN           Ot           N39

.0           

URINARY TRACT INFECTION, SITE NOT SPECIF                    

 

             10/27/2015           VINNY LÓPEZ APRN           Ot           Z79

.4           

LONG TERM (CURRENT) USE OF INSULIN                    

 

                2015           MADL, JUNIE L ARNP           Ot           

   M25.562         

                                                             

 

                2015           MADL, JUNIE L ARNP           Ot           

   M25.562         

                                                             

 

                2016           MADL, JUNIE L ARNP           Ot           

   M25.562         

                          PAIN IN LEFT KNEE                    

 

                2016           MADL, JUNIE L ARNP           Ot           

   M25.562         

                          PAIN IN LEFT KNEE                    

 

             2016           MADL, JUNIE L ARNP           Ot           R53

.1           

WEAKNESS                                         

 

             2017           MADL, JUNIE L ARNP           Ot           R53

.1           

WEAKNESS                                         

 

                2017           MADL, JUNIE L ARNP           Ot           

   M25.562         

                          PAIN IN LEFT KNEE                    

 

             2017           MADL, JUNIE L ARNP           Ot           R53

.1           

WEAKNESS                                         

 

             02/15/2017           MADL, JUNIE L ARNP           Ot           R53

.1           

WEAKNESS                                         

 

             2017           MADL, JUNIE L ARNP           Ot           R53

.1           

WEAKNESS                                         

 

             2017           MADL, JUNIE L ARNP           Ot           R53

.1           

WEAKNESS                                         

 

                2017           MADL, JUNIE L ARNP           Ot           

   M25.562         

                          PAIN IN LEFT KNEE                    

 

                2017           MADL, JUNIE L ARNP           Ot           

   M25.562         

                          PAIN IN LEFT KNEE                    

 

             2017           ZULMA PEREZ MD           Ot           E11.4

3           

TYPE 2 DIABETES W DIABETIC AUTONOMIC (PO                    

 

             2017           ZULMA PEREZ MD           Ot           E11.6

21           

TYPE 2 DIABETES MELLITUS WITH FOOT ULCER                    

 

             2017           ZULMA PEREZ MD           Ot           E66.0

1           

MORBID (SEVERE) OBESITY DUE TO EXCESS CA                    

 

             2017           ZULMA PEREZ MD           Ot           I10  

         

ESSENTIAL (PRIMARY) HYPERTENSION                    

 

             2017           ZULMA PEREZ MD           Ot           L97.5

29           

NON-PRESSURE CHRONIC ULCER OTH PRT LEFT                     

 

             2017           ZULMA PEREZ MD           Ot           Z68.4

3           

BODY MASS INDEX (BMI) 50-59.9 , ADULT                    

 

             2017           ZULMA PEREZ MD           Ot           Z79.4

           

LONG TERM (CURRENT) USE OF INSULIN                    

 

             2017           ZULMA PEREZ MD           Ot           E11.4

3           

TYPE 2 DIABETES W DIABETIC AUTONOMIC (PO                    

 

             2017           ZULMA PEREZ MD           Ot           E11.6

21           

TYPE 2 DIABETES MELLITUS WITH FOOT ULCER                    

 

             2017           ZULMA PEREZ MD           Ot           E66.0

1           

MORBID (SEVERE) OBESITY DUE TO EXCESS CA                    

 

             2017           ZULMA PEREZ MD           Ot           I10  

         

ESSENTIAL (PRIMARY) HYPERTENSION                    

 

             2017           ZULMA PEREZ MD           Ot           L97.5

29           

NON-PRESSURE CHRONIC ULCER OTH PRT LEFT                     

 

             2017           ZULMA PEREZ MD           Ot           Z68.4

3           

BODY MASS INDEX (BMI) 50-59.9 , ADULT                    

 

             2017           ZULMA PEREZ MD           Ot           Z79.4

           

LONG TERM (CURRENT) USE OF INSULIN                    

 

             2017           ZULMA PEREZ MD           Ot           E11.4

3           

TYPE 2 DIABETES W DIABETIC AUTONOMIC (PO                    

 

             2017           ZULMA PEREZ MD           Ot           E11.6

21           

TYPE 2 DIABETES MELLITUS WITH FOOT ULCER                    

 

             2017           ZULMA PEREZ MD           Ot           E66.0

1           

MORBID (SEVERE) OBESITY DUE TO EXCESS CA                    

 

             2017           ZULMA PEREZ MD           Ot           I10  

         

ESSENTIAL (PRIMARY) HYPERTENSION                    

 

             2017           ZULMA PEREZ MD           Ot           L97.5

29           

NON-PRESSURE CHRONIC ULCER OTH PRT LEFT                     

 

             2017           ZULMA PEREZ MD           Ot           Z68.4

3           

BODY MASS INDEX (BMI) 50-59.9 , ADULT                    

 

             2017           ZULMA PEREZ MD           Ot           Z79.4

           

LONG TERM (CURRENT) USE OF INSULIN                    

 

             2017           ZULMA PEREZ MD           Ot           E11.4

3           

TYPE 2 DIABETES W DIABETIC AUTONOMIC (PO                    

 

             2017           ZULMA PEREZ MD           Ot           E11.6

21           

TYPE 2 DIABETES MELLITUS WITH FOOT ULCER                    

 

             2017           ZULMA PEREZ MD           Ot           E66.0

1           

MORBID (SEVERE) OBESITY DUE TO EXCESS CA                    

 

             2017           ZULMA EPREZ MD           Ot           I10  

         

ESSENTIAL (PRIMARY) HYPERTENSION                    

 

             2017           ZULMA PEREZ MD           Ot           L97.5

29           

NON-PRESSURE CHRONIC ULCER OTH PRT LEFT                     

 

             2017           ZULMA PEREZ MD           Ot           Z68.4

3           

BODY MASS INDEX (BMI) 50-59.9 , ADULT                    

 

             2017           ZULMA PEREZ MD           Ot           Z79.4

           

LONG TERM (CURRENT) USE OF INSULIN                    

 

             2017           ZULMA PEREZ MD           Ot           E11.4

3           

TYPE 2 DIABETES W DIABETIC AUTONOMIC (PO                    

 

             2017           ZULMA PEREZ MD           Ot           E11.6

21           

TYPE 2 DIABETES MELLITUS WITH FOOT ULCER                    

 

             2017           ZULMA PEREZ MD           Ot           E66.0

1           

MORBID (SEVERE) OBESITY DUE TO EXCESS CA                    

 

             2017           ZULMA PEREZ MD           Ot           I10  

         

ESSENTIAL (PRIMARY) HYPERTENSION                    

 

             2017           ZULMA PEREZ MD           Ot           L97.5

29           

NON-PRESSURE CHRONIC ULCER OTH PRT LEFT                     

 

             2017           ZULMA PEREZ MD           Ot           Z68.4

3           

BODY MASS INDEX (BMI) 50-59.9 , ADULT                    

 

             2017           ZULMA PEREZ MD           Ot           Z79.4

           

LONG TERM (CURRENT) USE OF INSULIN                    

 

             2017           ZULMA PEREZ MD           Ot           B95.8

           

UNSP STAPHYLOCOCCUS AS THE CAUSE OF DISE                    

 

             2017           ZULMA PEREZ MD           Ot           E11.4

3           

TYPE 2 DIABETES W DIABETIC AUTONOMIC (PO                    

 

             2017           ZULMA PEREZ MD           Ot           E11.6

21           

TYPE 2 DIABETES MELLITUS WITH FOOT ULCER                    

 

             2017           ZULMA PEREZ MD           Ot           E11.6

5           

TYPE 2 DIABETES MELLITUS WITH HYPERGLYCE                    

 

             2017           ZULMA PEREZ MD           Ot           E66.0

1           

MORBID (SEVERE) OBESITY DUE TO EXCESS CA                    

 

             2017           ZULMA PEREZ MD           Ot           I10  

         

ESSENTIAL (PRIMARY) HYPERTENSION                    

 

             2017           ZULMA PEREZ MD           Ot           L03.1

16           

CELLULITIS OF LEFT LOWER LIMB                    

 

             2017           ZULMA PEREZ MD           Ot           L97.5

22           

NON-PRS CHRONIC ULCER OTH PRT LEFT FOOT                     

 

             2017           ZULMA PEREZ MD           Ot           L97.5

29           

NON-PRESSURE CHRONIC ULCER OTH PRT LEFT                     

 

             2017           ZULMA PEREZ MD           Ot           Z68.4

3           

BODY MASS INDEX (BMI) 50-59.9 , ADULT                    

 

             2017           ZULMA PEREZ MD           Ot           Z79.4

           

LONG TERM (CURRENT) USE OF INSULIN                    

 

             2017           ZULMA PEREZ MD           Ot           Z91.1

9           

PATIENT'S NONCOMPLIANCE [facility] MEDICAL TR                    

 

                10/10/2017           INES VERNON MD, Ot           

   E11.42          

                          TYPE 2 DIABETES MELLITUS WITH DIABETIC P              

      

 

                10/10/2017           INES VERNON MD, Ot           

   E11.621         

                          TYPE 2 DIABETES MELLITUS WITH FOOT ULCER              

      

 

                10/10/2017           INES VERNON MD           Ot           

   E66.01          

                          MORBID (SEVERE) OBESITY DUE TO EXCESS CA              

      

 

                10/10/2017           INES VERNON MD           Ot           

   L97.522         

                          NON-PRS CHRONIC ULCER OTH PRT LEFT FOOT               

      

 

                10/11/2017           INES VERNON MD, Ot           

   E11.42          

                          TYPE 2 DIABETES MELLITUS WITH DIABETIC P              

      

 

                10/11/2017           INES VERNON MD, Ot           

   E11.621         

                          TYPE 2 DIABETES MELLITUS WITH FOOT ULCER              

      

 

                10/11/2017           INES VERNON MD           Ot           

   E66.01          

                          MORBID (SEVERE) OBESITY DUE TO EXCESS CA              

      

 

                10/11/2017           INES VERNON MD, Ot           

   L97.522         

                          NON-PRS CHRONIC ULCER OTH PRT LEFT FOOT               

      

 

                10/14/2017           INES VERNON MD, Ot           

   E11.42          

                          TYPE 2 DIABETES MELLITUS WITH DIABETIC P              

      

 

                10/14/2017           INES VERNON MD           Ot           

   E11.621         

                          TYPE 2 DIABETES MELLITUS WITH FOOT ULCER              

      

 

                10/14/2017           INES VERNON MD           Ot           

   E66.01          

                          MORBID (SEVERE) OBESITY DUE TO EXCESS CA              

      

 

                10/14/2017           INES VERNON MD, Ot           

   L97.522         

                          NON-PRS CHRONIC ULCER OTH PRT LEFT FOOT               

      

 

                2017           INES VERNON MD, Ot           

   E11.42          

                          TYPE 2 DIABETES MELLITUS WITH DIABETIC P              

      

 

                2017           INES VERNON MD, Ot           

   E11.621         

                          TYPE 2 DIABETES MELLITUS WITH FOOT ULCER              

      

 

                2017           INES VERNON MD, Ot           

   E66.01          

                          MORBID (SEVERE) OBESITY DUE TO EXCESS CA              

      

 

                2017           INES VERNON MD, Ot           

   L97.522         

                          NON-PRS CHRONIC ULCER OTH PRT LEFT FOOT               

      

 

                2017           INES VERNON MD, Ot           

   Z68.43          

                          BODY MASS INDEX (BMI) 50-59.9 , ADULT                 

   

 

                2017           INES VERNON MD           Ot           

   E11.42          

                          TYPE 2 DIABETES MELLITUS WITH DIABETIC P              

      

 

                2017           INES VERNON MD, Ot           

   E11.621         

                          TYPE 2 DIABETES MELLITUS WITH FOOT ULCER              

      

 

                2017           INES VERNON MD, Ot           

   E66.01          

                          MORBID (SEVERE) OBESITY DUE TO EXCESS CA              

      

 

                2017           INES VERNON MD, Ot           

   L97.522         

                          NON-PRS CHRONIC ULCER OTH PRT LEFT FOOT               

      

 

                2017           INES VERNON MD, Ot           

   Z68.43          

                          BODY MASS INDEX (BMI) 50-59.9 , ADULT                 

   

 

                2017           INES VERNON MD, Ot           

   E11.42          

                          TYPE 2 DIABETES MELLITUS WITH DIABETIC P              

      

 

                2017           INES VERNON MD, Ot           

   E11.621         

                          TYPE 2 DIABETES MELLITUS WITH FOOT ULCER              

      

 

                2017           INES VERNON MD, Ot           

   E66.01          

                          MORBID (SEVERE) OBESITY DUE TO EXCESS CA              

      

 

                2017           INES VERNON MD, Ot           

   L97.522         

                          NON-PRS CHRONIC ULCER OTH PRT LEFT FOOT               

      

 

                2017           INES VERNON MD, Ot           

   E11.42          

                          TYPE 2 DIABETES MELLITUS WITH DIABETIC P              

      

 

                2017           INES VERNON MD, Ot           

   E11.621         

                          TYPE 2 DIABETES MELLITUS WITH FOOT ULCER              

      

 

                2017           INES VERNON MD           Ot           

   E66.01          

                          MORBID (SEVERE) OBESITY DUE TO EXCESS CA              

      

 

                2017           INES VERNON MD, Ot           

   L97.522         

                          NON-PRS CHRONIC ULCER OTH PRT LEFT FOOT               

      

 

                2017           INES VERNON MD, Ot           

   E11.42          

                          TYPE 2 DIABETES MELLITUS WITH DIABETIC P              

      

 

                2017           INES VERNON MD, Ot           

   E11.621         

                          TYPE 2 DIABETES MELLITUS WITH FOOT ULCER              

      

 

                2017           INES VERNON MD, Ot           

   E66.01          

                          MORBID (SEVERE) OBESITY DUE TO EXCESS CA              

      

 

                2017           INES VERNON MD, Ot           

   L97.523         

                          NON-PRS CHRONIC ULCER OTH PRT LEFT FOOT               

      

 

                2017           INES VERNON MD           Ot           

   E11.42          

                          TYPE 2 DIABETES MELLITUS WITH DIABETIC P              

      

 

                2017           INES VERNON MD, Ot           

   E11.621         

                          TYPE 2 DIABETES MELLITUS WITH FOOT ULCER              

      

 

                2017           INES VERNON MD           Ot           

   E66.01          

                          MORBID (SEVERE) OBESITY DUE TO EXCESS CA              

      

 

                2017           INES VERNON MD           Ot           

   L97.522         

                          NON-PRS CHRONIC ULCER OTH PRT LEFT FOOT               

      

 

                2017           NARESH CARTER APRN           Ot         

     E11.42        

                          TYPE 2 DIABETES MELLITUS WITH DIABETIC P              

      

 

                2017           NARESH CARTER APRN           Ot         

     E11.621       

                          TYPE 2 DIABETES MELLITUS WITH FOOT ULCER              

      

 

                2017           NARESH CARTER APRN           Ot         

     E66.01        

                          MORBID (SEVERE) OBESITY DUE TO EXCESS CA              

      

 

                2017           NARESH CARTER APRN           Ot         

     L97.522       

                          NON-PRS CHRONIC ULCER OTH PRT LEFT FOOT               

      

 

                2017           INES VERNON MD           Ot           

   E11.42          

                          TYPE 2 DIABETES MELLITUS WITH DIABETIC P              

      

 

                2017           INES VERNON MD, Ot           

   E11.621         

                          TYPE 2 DIABETES MELLITUS WITH FOOT ULCER              

      

 

                2017           INES VERNON MD           Ot           

   E66.01          

                          MORBID (SEVERE) OBESITY DUE TO EXCESS CA              

      

 

                2017           INES VERNON MD, Ot           

   L97.522         

                          NON-PRS CHRONIC ULCER OTH PRT LEFT FOOT               

      

 

                2017           INES VERNON MD           Ot           

   Z68.43          

                          BODY MASS INDEX (BMI) 50-59.9 , ADULT                 

   

 

                2017           INES VERNON MD           Ot           

   E11.42          

                          TYPE 2 DIABETES MELLITUS WITH DIABETIC P              

      

 

                2017           INES VERNON MD           Ot           

   E11.621         

                          TYPE 2 DIABETES MELLITUS WITH FOOT ULCER              

      

 

                2017           INES VERNON MD           Ot           

   E66.01          

                          MORBID (SEVERE) OBESITY DUE TO EXCESS CA              

      

 

                2017           INES VERNON MD, Ot           

   L97.522         

                          NON-PRS CHRONIC ULCER OTH PRT LEFT FOOT               

      

 

                2017           INES VERNON MD           Ot           

   E11.42          

                          TYPE 2 DIABETES MELLITUS WITH DIABETIC P              

      

 

                2017           INES VERNON MD           Ot           

   E11.621         

                          TYPE 2 DIABETES MELLITUS WITH FOOT ULCER              

      

 

                2017           JANEEN MD, INES G           Ot           

   E66.01          

                          MORBID (SEVERE) OBESITY DUE TO EXCESS CA              

      

 

                2017           INES VERNON MD, Ot           

   L97.522         

                          NON-PRS CHRONIC ULCER OTH PRT LEFT FOOT               

      

 

                2017           INES VERNON MD, Ot           

   E11.42          

                          TYPE 2 DIABETES MELLITUS WITH DIABETIC P              

      

 

                2017           INES VERNON MD, Ot           

   E11.621         

                          TYPE 2 DIABETES MELLITUS WITH FOOT ULCER              

      

 

                2017           INES VERNON MD, Ot           

   E66.01          

                          MORBID (SEVERE) OBESITY DUE TO EXCESS CA              

      

 

                2017           INES VERNON MD, Ot           

   L97.522         

                          NON-PRS CHRONIC ULCER OTH PRT LEFT FOOT               

      

 

                2017           INES VERNON MD, Ot           

   E11.42          

                          TYPE 2 DIABETES MELLITUS WITH DIABETIC P              

      

 

                2017           INES VERNON MD, Ot           

   E11.621         

                          TYPE 2 DIABETES MELLITUS WITH FOOT ULCER              

      

 

                2017           INES VERNON MD, Ot           

   E66.01          

                          MORBID (SEVERE) OBESITY DUE TO EXCESS CA              

      

 

                2017           INES VERNON MD, Ot           

   L97.522         

                          NON-PRS CHRONIC ULCER OTH PRT LEFT FOOT               

      

 

                2017           INES VERNON MD, Ot           

   Z68.43          

                          BODY MASS INDEX (BMI) 50-59.9 , ADULT                 

   

 

                2017           INES VERNON MD, Ot           

   E11.42          

                          TYPE 2 DIABETES MELLITUS WITH DIABETIC P              

      

 

                2017           INES VERNON MD, Ot           

   E11.621         

                          TYPE 2 DIABETES MELLITUS WITH FOOT ULCER              

      

 

                2017           INES VERNON MD, Ot           

   E66.01          

                          MORBID (SEVERE) OBESITY DUE TO EXCESS CA              

      

 

                2017           INES VERNON MD, Ot           

   L97.523         

                          NON-PRS CHRONIC ULCER OTH PRT LEFT FOOT               

      

 

                2017           INES VERNON MD, Ot           

   E11.42          

                          TYPE 2 DIABETES MELLITUS WITH DIABETIC P              

      

 

                2017           INES VERNON MD, Ot           

   E11.621         

                          TYPE 2 DIABETES MELLITUS WITH FOOT ULCER              

      

 

                2017           INES VERNON MD, Ot           

   E66.01          

                          MORBID (SEVERE) OBESITY DUE TO EXCESS CA              

      

 

                2017           INES VERNON MD, Ot           

   L97.522         

                          NON-PRS CHRONIC ULCER OTH PRT LEFT FOOT               

      

 

                2017           INES VERNON MD, Ot           

   Z68.43          

                          BODY MASS INDEX (BMI) 50-59.9 , ADULT                 

   

 

                2017           INES VERNON MD, Ot           

   E11.42          

                          TYPE 2 DIABETES MELLITUS WITH DIABETIC P              

      

 

                2017           INES VERNON MD, Ot           

   E11.621         

                          TYPE 2 DIABETES MELLITUS WITH FOOT ULCER              

      

 

                2017           INES VERNON MD, Ot           

   E66.01          

                          MORBID (SEVERE) OBESITY DUE TO EXCESS CA              

      

 

                2017           INES VERNON MD, Ot           

   L97.522         

                          NON-PRS CHRONIC ULCER OTH PRT LEFT FOOT               

      

 

                2017           INES VERNON MD, Ot           

   Z68.43          

                          BODY MASS INDEX (BMI) 50-59.9 , ADULT                 

   

 

                2017           INES VERNON MD, Ot           

   E11.42          

                          TYPE 2 DIABETES MELLITUS WITH DIABETIC P              

      

 

                2017           INES VERNON MD, Ot           

   E11.621         

                          TYPE 2 DIABETES MELLITUS WITH FOOT ULCER              

      

 

                2017           INES VERNON MD, Ot           

   E66.01          

                          MORBID (SEVERE) OBESITY DUE TO EXCESS CA              

      

 

                2017           INES VERNON MD, Ot           

   L97.522         

                          NON-PRS CHRONIC ULCER OTH PRT LEFT FOOT               

      

 

                2017           INES VERNON MD, Ot           

   Z68.43          

                          BODY MASS INDEX (BMI) 50-59.9 , ADULT                 

   

 

                2017           INES VERNON MD, Ot           

   E11.42          

                          TYPE 2 DIABETES MELLITUS WITH DIABETIC P              

      

 

                2017           INES VERNON MD, Ot           

   E11.621         

                          TYPE 2 DIABETES MELLITUS WITH FOOT ULCER              

      

 

                2017           INES VERNON MD, Ot           

   E66.01          

                          MORBID (SEVERE) OBESITY DUE TO EXCESS CA              

      

 

                2017           INES VERNON MD, Ot           

   L97.522         

                          NON-PRS CHRONIC ULCER OTH PRT LEFT FOOT               

      

 

                2017           INES VERNON MD, Ot           

   Z68.43          

                          BODY MASS INDEX (BMI) 50-59.9 , ADULT                 

   

 

                2017           INES VERNON MD, Ot           

   E11.42          

                          TYPE 2 DIABETES MELLITUS WITH DIABETIC P              

      

 

                2017           INES VERNON MD, Ot           

   E11.621         

                          TYPE 2 DIABETES MELLITUS WITH FOOT ULCER              

      

 

                2017           INES VERNON MD, Ot           

   E66.01          

                          MORBID (SEVERE) OBESITY DUE TO EXCESS CA              

      

 

                2017           INES VERNON MD, Ot           

   L97.523         

                          NON-PRS CHRONIC ULCER OTH PRT LEFT FOOT               

      

 

                2017           INES VERNON MD, Ot           

   Z68.43          

                          BODY MASS INDEX (BMI) 50-59.9 , ADULT                 

   

 

                2017           INES VERNON MD, Ot           

   E11.42          

                          TYPE 2 DIABETES MELLITUS WITH DIABETIC P              

      

 

                2017           INES VERNON MD, Ot           

   E11.621         

                          TYPE 2 DIABETES MELLITUS WITH FOOT ULCER              

      

 

                2017           INES VERNON MD           Ot           

   E66.01          

                          MORBID (SEVERE) OBESITY DUE TO EXCESS CA              

      

 

                2017           INES VERNON MD, Ot           

   L97.522         

                          NON-PRS CHRONIC ULCER OTH PRT LEFT FOOT               

      

 

                2017           INES VERNON MD           Ot           

   E11.42          

                          TYPE 2 DIABETES MELLITUS WITH DIABETIC P              

      

 

                2017           INES VERNON MD, Ot           

   E11.621         

                          TYPE 2 DIABETES MELLITUS WITH FOOT ULCER              

      

 

                2017           INES VERNON MD, Ot           

   E66.01          

                          MORBID (SEVERE) OBESITY DUE TO EXCESS CA              

      

 

                2017           INES VERNON MD, Ot           

   L97.522         

                          NON-PRS CHRONIC ULCER OTH PRT LEFT FOOT               

      

 

                2017           INES VERNON MD, Ot           

   E11.42          

                          TYPE 2 DIABETES MELLITUS WITH DIABETIC P              

      

 

                2017           INES VERNON MD, Ot           

   E11.621         

                          TYPE 2 DIABETES MELLITUS WITH FOOT ULCER              

      

 

                2017           INES VERNON MD           Ot           

   E66.01          

                          MORBID (SEVERE) OBESITY DUE TO EXCESS CA              

      

 

                2017           INES VERNON MD, Ot           

   L97.523         

                          NON-PRS CHRONIC ULCER OTH PRT LEFT FOOT               

      

 

                2017           INES VERNON MD, Ot           

   E11.42          

                          TYPE 2 DIABETES MELLITUS WITH DIABETIC P              

      

 

                2017           INES VERNON MD, Ot           

   E11.621         

                          TYPE 2 DIABETES MELLITUS WITH FOOT ULCER              

      

 

                2017           INES VERNON MD           Ot           

   E66.01          

                          MORBID (SEVERE) OBESITY DUE TO EXCESS CA              

      

 

                2017           INES VERNON MD, Ot           

   L97.523         

                          NON-PRS CHRONIC ULCER OTH PRT LEFT FOOT               

      

 

                2017           INES VERNON MD, Ot           

   E11.42          

                          TYPE 2 DIABETES MELLITUS WITH DIABETIC P              

      

 

                2017           INES VERNON MD, Ot           

   E11.621         

                          TYPE 2 DIABETES MELLITUS WITH FOOT ULCER              

      

 

                2017           INES VERNON MD           Ot           

   E66.01          

                          MORBID (SEVERE) OBESITY DUE TO EXCESS CA              

      

 

                2017           INES VERNON MD, Ot           

   L97.523         

                          NON-PRS CHRONIC ULCER OTH PRT LEFT FOOT               

      

 

                2017           INES VERNON MD, Ot           

   E11.42          

                          TYPE 2 DIABETES MELLITUS WITH DIABETIC P              

      

 

                2017           INES VERNON MD, Ot           

   E11.621         

                          TYPE 2 DIABETES MELLITUS WITH FOOT ULCER              

      

 

                2017           INES VERNON MD, Ot           

   E66.01          

                          MORBID (SEVERE) OBESITY DUE TO EXCESS CA              

      

 

                2017           INES VERNON MD, Ot           

   L97.523         

                          NON-PRS CHRONIC ULCER OTH PRT LEFT FOOT               

      

 

                2017           INES VERNON MD, Ot           

   Z68.43          

                          BODY MASS INDEX (BMI) 50-59.9 , ADULT                 

   

 

                2017           INES VERNON MD, Ot           

   E11.42          

                          TYPE 2 DIABETES MELLITUS WITH DIABETIC P              

      

 

                2017           INES VERNON MD, Ot           

   E11.621         

                          TYPE 2 DIABETES MELLITUS WITH FOOT ULCER              

      

 

                2017           INES VERNON MD, Ot           

   E66.01          

                          MORBID (SEVERE) OBESITY DUE TO EXCESS CA              

      

 

                2017           INES VERNON MD, Ot           

   L97.523         

                          NON-PRS CHRONIC ULCER OTH PRT LEFT FOOT               

      

 

                2017           INES VERNON MD, Ot           

   Z68.43          

                          BODY MASS INDEX (BMI) 50-59.9 , ADULT                 

   

 

                2017           INES VERNON MD, Ot           

   E11.42          

                          TYPE 2 DIABETES MELLITUS WITH DIABETIC P              

      

 

                2017           INES VERNON MD, Ot           

   E11.621         

                          TYPE 2 DIABETES MELLITUS WITH FOOT ULCER              

      

 

                2017           INES VERNON MD, Ot           

   E66.01          

                          MORBID (SEVERE) OBESITY DUE TO EXCESS CA              

      

 

                2017           INES VERNON MD, Ot           

   L97.523         

                          NON-PRS CHRONIC ULCER OTH PRT LEFT FOOT               

      

 

                2017           INES VERNON MD, Ot           

   Z68.43          

                          BODY MASS INDEX (BMI) 50-59.9 , ADULT                 

   

 

                2017           INES VERNON MD, Ot           

   E11.42          

                          TYPE 2 DIABETES MELLITUS WITH DIABETIC P              

      

 

                2017           INES VERNON MD, Ot           

   E11.621         

                          TYPE 2 DIABETES MELLITUS WITH FOOT ULCER              

      

 

                2017           INES VERNON MD, Ot           

   E66.01          

                          MORBID (SEVERE) OBESITY DUE TO EXCESS CA              

      

 

                2017           INES VERNON MD, Ot           

   L97.523         

                          NON-PRS CHRONIC ULCER OTH PRT LEFT FOOT               

      

 

                2017           INES VERNON MD, Ot           

   E11.42          

                          TYPE 2 DIABETES MELLITUS WITH DIABETIC P              

      

 

                2017           INES VERNON MD, Ot           

   E11.621         

                          TYPE 2 DIABETES MELLITUS WITH FOOT ULCER              

      

 

                2017           INES VERNON MD, Ot           

   E66.01          

                          MORBID (SEVERE) OBESITY DUE TO EXCESS CA              

      

 

                2017           INES VERNON MD, Ot           

   L97.523         

                          NON-PRS CHRONIC ULCER OTH PRT LEFT FOOT               

      

 

                2017           INES VERNON MD, Ot           

   Z68.43          

                          BODY MASS INDEX (BMI) 50-59.9 , ADULT                 

   

 

                2018           INES VERNON MD, Ot           

   E11.42          

                          TYPE 2 DIABETES MELLITUS WITH DIABETIC P              

      

 

                2018           INES VERNON MD, Ot           

   E11.621         

                          TYPE 2 DIABETES MELLITUS WITH FOOT ULCER              

      

 

                2018           INES VERNON MD, Ot           

   E66.01          

                          MORBID (SEVERE) OBESITY DUE TO EXCESS CA              

      

 

                2018           INES VERNON MD, Ot           

   L97.523         

                          NON-PRS CHRONIC ULCER OTH PRT LEFT FOOT               

      

 

                2018           INES VERNON MD, Ot           

   Z68.43          

                          BODY MASS INDEX (BMI) 50-59.9 , ADULT                 

   

 

                2018           INES VERNON MD, Ot           

   E11.42          

                          TYPE 2 DIABETES MELLITUS WITH DIABETIC P              

      

 

                2018           INES VERNON MD, Ot           

   E11.621         

                          TYPE 2 DIABETES MELLITUS WITH FOOT ULCER              

      

 

                2018           INES VERNON MD, Ot           

   E66.01          

                          MORBID (SEVERE) OBESITY DUE TO EXCESS CA              

      

 

                2018           INES VERNON MD, Ot           

   L97.523         

                          NON-PRS CHRONIC ULCER OTH PRT LEFT FOOT               

      

 

                2018           INES VERNON MD, Ot           

   Z68.43          

                          BODY MASS INDEX (BMI) 50-59.9 , ADULT                 

   

 

                2018           INES VERNON MD, Ot           

   E11.42          

                          TYPE 2 DIABETES MELLITUS WITH DIABETIC P              

      

 

                2018           INES VERNON MD, Ot           

   E11.621         

                          TYPE 2 DIABETES MELLITUS WITH FOOT ULCER              

      

 

                2018           INES VERNON MD, Ot           

   E66.01          

                          MORBID (SEVERE) OBESITY DUE TO EXCESS CA              

      

 

                2018           INES VERNON MD, Ot           

   L97.523         

                          NON-PRS CHRONIC ULCER OTH PRT LEFT FOOT               

      

 

                2018           INES VERNON MD, Ot           

   Z68.43          

                          BODY MASS INDEX (BMI) 50-59.9 , ADULT                 

   

 

                2018           INES VERNON MD, Ot           

   E11.42          

                          TYPE 2 DIABETES MELLITUS WITH DIABETIC P              

      

 

                2018           INES VERNON MD, Ot           

   E11.621         

                          TYPE 2 DIABETES MELLITUS WITH FOOT ULCER              

      

 

                2018           INES VERNON MD, Ot           

   E66.01          

                          MORBID (SEVERE) OBESITY DUE TO EXCESS CA              

      

 

                2018           INES VERNON MD, Ot           

   L97.523         

                          NON-PRS CHRONIC ULCER OTH PRT LEFT FOOT               

      

 

                2018           INES VERNON MD, Ot           

   Z68.43          

                          BODY MASS INDEX (BMI) 50-59.9 , ADULT                 

   

 

                2018           INES VERNON MD           Ot           

   E11.621         

                          TYPE 2 DIABETES MELLITUS WITH FOOT ULCER              

      

 

                2018           INES VERNON MD, Ot           

   L97.523         

                          NON-PRS CHRONIC ULCER OTH PRT LEFT FOOT               

      

 

                2018           INES VERNON MD, Ot           

   E11.42          

                          TYPE 2 DIABETES MELLITUS WITH DIABETIC P              

      

 

                2018           INES VERNON MD, Ot           

   E11.621         

                          TYPE 2 DIABETES MELLITUS WITH FOOT ULCER              

      

 

                2018           INES VERNON MD, Ot           

   E66.01          

                          MORBID (SEVERE) OBESITY DUE TO EXCESS CA              

      

 

                2018           INES VERNON MD, Ot           

   L97.523         

                          NON-PRS CHRONIC ULCER OTH PRT LEFT FOOT               

      

 

                2018           INES VERNON MD, Ot           

   Z68.43          

                          BODY MASS INDEX (BMI) 50-59.9 , ADULT                 

   

 

                2018           INES VERNON MD           Ot           

   E11.42          

                          TYPE 2 DIABETES MELLITUS WITH DIABETIC P              

      

 

                2018           INES VERNON MD, Ot           

   E11.621         

                          TYPE 2 DIABETES MELLITUS WITH FOOT ULCER              

      

 

                2018           INES VERNON MD, Ot           

   E66.01          

                          MORBID (SEVERE) OBESITY DUE TO EXCESS CA              

      

 

                2018           INES VERNON MD, Ot           

   L97.523         

                          NON-PRS CHRONIC ULCER OTH PRT LEFT FOOT               

      

 

                2018           INES VERNON MD, Ot           

   E11.621         

                          TYPE 2 DIABETES MELLITUS WITH FOOT ULCER              

      

 

                2018           INES VERNON MD, Ot           

   L97.523         

                          NON-PRS CHRONIC ULCER OTH PRT LEFT FOOT               

      

 

                2018           INES VERNON MD, Ot           

   E11.42          

                          TYPE 2 DIABETES MELLITUS WITH DIABETIC P              

      

 

                2018           INES VERNON MD, Ot           

   E11.621         

                          TYPE 2 DIABETES MELLITUS WITH FOOT ULCER              

      

 

                2018           INES VERNON MD           Ot           

   E66.01          

                          MORBID (SEVERE) OBESITY DUE TO EXCESS CA              

      

 

                2018           INES VERNON MD, Ot           

   L97.523         

                          NON-PRS CHRONIC ULCER OTH PRT LEFT FOOT               

      

 

                2018           INES VERNON MD, Ot           

   Z68.43          

                          BODY MASS INDEX (BMI) 50-59.9 , ADULT                 

   

 

                2018           INES VERNON MD, Ot           

   E11.621         

                          TYPE 2 DIABETES MELLITUS WITH FOOT ULCER              

      

 

                2018           INES VERNON MD, Ot           

   L97.523         

                          NON-PRS CHRONIC ULCER OTH PRT LEFT FOOT               

      

 

                2018           INES VERNON MD, Ot           

   E11.42          

                          TYPE 2 DIABETES MELLITUS WITH DIABETIC P              

      

 

                2018           INES VERNON MD, Ot           

   E11.621         

                          TYPE 2 DIABETES MELLITUS WITH FOOT ULCER              

      

 

                2018           INES VERNON MD, Ot           

   E66.01          

                          MORBID (SEVERE) OBESITY DUE TO EXCESS CA              

      

 

                2018           INES VERNON MD, Ot           

   L97.522         

                          NON-PRS CHRONIC ULCER OTH PRT LEFT FOOT               

      

 

                2018           INES VERNON MD, Ot           

   Z68.43          

                          BODY MASS INDEX (BMI) 50-59.9 , ADULT                 

   

 

                2018           INES VERNON MD, Ot           

   E11.42          

                          TYPE 2 DIABETES MELLITUS WITH DIABETIC P              

      

 

                2018           INES VERNON MD, Ot           

   E11.621         

                          TYPE 2 DIABETES MELLITUS WITH FOOT ULCER              

      

 

                2018           INES VERNON MD, Ot           

   E66.01          

                          MORBID (SEVERE) OBESITY DUE TO EXCESS CA              

      

 

                2018           INES VERNON MD, Ot           

   L97.521         

                          NON-PRS CHRONIC ULCER OTH PRT L FOOT MATTHEWS              

      

 

                2018           INES VERNON MD, Ot           

   Z68.43          

                          BODY MASS INDEX (BMI) 50-59.9 , ADULT                 

   

 

                2018           INES VERNON MD, Ot           

   E11.42          

                          TYPE 2 DIABETES MELLITUS WITH DIABETIC P              

      

 

                2018           INES VERNON MD, Ot           

   E11.621         

                          TYPE 2 DIABETES MELLITUS WITH FOOT ULCER              

      

 

                2018           INES VERNON MD, Ot           

   E66.01          

                          MORBID (SEVERE) OBESITY DUE TO EXCESS CA              

      

 

                2018           INES VERNON MD, Ot           

   L97.522         

                          NON-PRS CHRONIC ULCER OTH PRT LEFT FOOT               

      

 

                2018           INES EVRNON MD, Ot           

   Z68.43          

                          BODY MASS INDEX (BMI) 50-59.9 , ADULT                 

   

 

                2018           INES VERNON MD, Ot           

   E11.42          

                          TYPE 2 DIABETES MELLITUS WITH DIABETIC P              

      

 

                2018           INES VERNON MD, Ot           

   E11.621         

                          TYPE 2 DIABETES MELLITUS WITH FOOT ULCER              

      

 

                2018           INES VERNON MD, Ot           

   E66.01          

                          MORBID (SEVERE) OBESITY DUE TO EXCESS CA              

      

 

                2018           INES VERNON MD, Ot           

   L97.522         

                          NON-PRS CHRONIC ULCER OTH PRT LEFT FOOT               

      

 

                2018           INES VERNON MD, Ot           

   Z68.43          

                          BODY MASS INDEX (BMI) 50-59.9 , ADULT                 

   

 

                2018           INES VERNON MD, Ot           

   E11.42          

                          TYPE 2 DIABETES MELLITUS WITH DIABETIC P              

      

 

                2018           INES VERNON MD, Ot           

   E11.621         

                          TYPE 2 DIABETES MELLITUS WITH FOOT ULCER              

      

 

                2018           INES VERNON MD, Ot           

   E66.01          

                          MORBID (SEVERE) OBESITY DUE TO EXCESS CA              

      

 

                2018           INES VERNON MD, Ot           

   L97.522         

                          NON-PRS CHRONIC ULCER OTH PRT LEFT FOOT               

      

 

                2018           INES VERNON MD, Ot           

   Z68.43          

                          BODY MASS INDEX (BMI) 50-59.9 , ADULT                 

   

 

                2018           INES VERNON MD, Ot           

   E11.42          

                          TYPE 2 DIABETES MELLITUS WITH DIABETIC P              

      

 

                2018           INES VERNON MD, Ot           

   E11.621         

                          TYPE 2 DIABETES MELLITUS WITH FOOT ULCER              

      

 

                2018           INES VERNON MD, Ot           

   E66.01          

                          MORBID (SEVERE) OBESITY DUE TO EXCESS CA              

      

 

                2018           INES VERNON MD, Ot           

   L97.522         

                          NON-PRS CHRONIC ULCER OTH PRT LEFT FOOT               

      

 

                2018           INES VERNON MD, Ot           

   Z68.43          

                          BODY MASS INDEX (BMI) 50-59.9 , ADULT                 

   

 

                2018           INES VERNON MD, Ot           

   E11.42          

                          TYPE 2 DIABETES MELLITUS WITH DIABETIC P              

      

 

                2018           INES VERNON MD, Ot           

   E11.621         

                          TYPE 2 DIABETES MELLITUS WITH FOOT ULCER              

      

 

                2018           INES VERNON MD, Ot           

   E66.01          

                          MORBID (SEVERE) OBESITY DUE TO EXCESS CA              

      

 

                2018           INES VERNON MD, Ot           

   L97.522         

                          NON-PRS CHRONIC ULCER OTH PRT LEFT FOOT               

      

 

                2018           INES VERNON MD, Ot           

   Z68.43          

                          BODY MASS INDEX (BMI) 50-59.9 , ADULT                 

   

 

                2018           NARESH CARTER APRN           Ot         

     E11.42        

                          TYPE 2 DIABETES MELLITUS WITH DIABETIC P              

      

 

                2018           NARESH CARTER APRN           Ot         

     E11.621       

                          TYPE 2 DIABETES MELLITUS WITH FOOT ULCER              

      

 

                2018           NARESH CARTER APRN           Ot         

     E66.01        

                          MORBID (SEVERE) OBESITY DUE TO EXCESS CA              

      

 

                2018           NARESH CARTER APRN           Ot         

     L97.522       

                          NON-PRS CHRONIC ULCER OTH PRT LEFT FOOT               

      

 

                2018           INES VERNON MD           Ot           

   E11.42          

                          TYPE 2 DIABETES MELLITUS WITH DIABETIC P              

      

 

                2018           INES VERNON MD           Ot           

   E11.621         

                          TYPE 2 DIABETES MELLITUS WITH FOOT ULCER              

      

 

                2018           INES VERNON MD, Ot           

   E66.01          

                          MORBID (SEVERE) OBESITY DUE TO EXCESS CA              

      

 

                2018           INES VERNON MD, Ot           

   L97.522         

                          NON-PRS CHRONIC ULCER OTH PRT LEFT FOOT               

      

 

                2018           INES VERNON MD, Ot           

   Z68.43          

                          BODY MASS INDEX (BMI) 50-59.9 , ADULT                 

   

 

                2018           INES VERNON MD, Ot           

   E11.42          

                          TYPE 2 DIABETES MELLITUS WITH DIABETIC P              

      

 

                2018           INES VERONN MD, Ot           

   E11.621         

                          TYPE 2 DIABETES MELLITUS WITH FOOT ULCER              

      

 

                2018           INES VERNON MD, Ot           

   E66.01          

                          MORBID (SEVERE) OBESITY DUE TO EXCESS CA              

      

 

                2018           INES VERNON MD, Ot           

   L97.522         

                          NON-PRS CHRONIC ULCER OTH PRT LEFT FOOT               

      

 

                2018           INES VERNON MD, Ot           

   Z68.43          

                          BODY MASS INDEX (BMI) 50-59.9 , ADULT                 

   

 

                2018           INES VERNON MD, Ot           

   E11.42          

                          TYPE 2 DIABETES MELLITUS WITH DIABETIC P              

      

 

                2018           INES VERNON MD, Ot           

   E11.621         

                          TYPE 2 DIABETES MELLITUS WITH FOOT ULCER              

      

 

                2018           INES VERNON MD, Ot           

   E66.01          

                          MORBID (SEVERE) OBESITY DUE TO EXCESS CA              

      

 

                2018           INES VERNON MD, Ot           

   L97.522         

                          NON-PRS CHRONIC ULCER OTH PRT LEFT FOOT               

      

 

                2018           INES VERNON MD, Ot           

   Z68.43          

                          BODY MASS INDEX (BMI) 50-59.9 , ADULT                 

   

 

                2018           INES VERNON MD, Ot           

   E11.42          

                          TYPE 2 DIABETES MELLITUS WITH DIABETIC P              

      

 

                2018           INES VERNON MD           Ot           

   E11.621         

                          TYPE 2 DIABETES MELLITUS WITH FOOT ULCER              

      

 

                2018           INES VERNON MD, Ot           

   E66.01          

                          MORBID (SEVERE) OBESITY DUE TO EXCESS CA              

      

 

                2018           INES VERNON MD, Ot           

   L97.522         

                          NON-PRS CHRONIC ULCER OTH PRT LEFT FOOT               

      

 

                2018           INES VERNON MD, Ot           

   Z68.43          

                          BODY MASS INDEX (BMI) 50-59.9 , ADULT                 

   

 

                2018           NARESH CARTER APRN           Ot         

     E11.42        

                          TYPE 2 DIABETES MELLITUS WITH DIABETIC P              

      

 

                2018           NARESH CARTER APRN           Ot         

     E11.621       

                          TYPE 2 DIABETES MELLITUS WITH FOOT ULCER              

      

 

                2018           NARESH CARTER           Ot         

     E66.01        

                          MORBID (SEVERE) OBESITY DUE TO EXCESS CA              

      

 

                2018           NARESH CARTER APRN           Ot         

     L97.522       

                          NON-PRS CHRONIC ULCER OTH PRT LEFT FOOT               

      

 

                2018           INES VERNON MD           Ot           

   E11.42          

                          TYPE 2 DIABETES MELLITUS WITH DIABETIC P              

      

 

                2018           INES VERNON MD, Ot           

   E11.621         

                          TYPE 2 DIABETES MELLITUS WITH FOOT ULCER              

      

 

                2018           INES VERNON MD, Ot           

   E66.01          

                          MORBID (SEVERE) OBESITY DUE TO EXCESS CA              

      

 

                2018           INES VERNON MD, Ot           

   L97.521         

                          NON-PRS CHRONIC ULCER OTH PRT L FOOT MATTHEWS              

      

 

                2018           INES VERNON MD, Ot           

   L97.522         

                          NON-PRS CHRONIC ULCER OTH PRT LEFT FOOT               

      

 

                2018           INES VERNON MD, Ot           

   E11.42          

                          TYPE 2 DIABETES MELLITUS WITH DIABETIC P              

      

 

                2018           INES VERNON MD, Ot           

   E11.621         

                          TYPE 2 DIABETES MELLITUS WITH FOOT ULCER              

      

 

                2018           INES VERNON MD           Ot           

   E66.01          

                          MORBID (SEVERE) OBESITY DUE TO EXCESS CA              

      

 

                2018           INES VERNON MD, Ot           

   L97.522         

                          NON-PRS CHRONIC ULCER OTH PRT LEFT FOOT               

      

 

                2018           INES VERNON MD, Ot           

   Z68.43          

                          BODY MASS INDEX (BMI) 50-59.9 , ADULT                 

   

 

                2018           INES VERNON MD           Ot           

   E11.42          

                          TYPE 2 DIABETES MELLITUS WITH DIABETIC P              

      

 

                2018           INES VERNON MD, Ot           

   E11.621         

                          TYPE 2 DIABETES MELLITUS WITH FOOT ULCER              

      

 

                2018           INES VERNON MD, Ot           

   E11.65          

                          TYPE 2 DIABETES MELLITUS WITH HYPERGLYCE              

      

 

                2018           INES VERNON MD, Ot           

   E66.01          

                          MORBID (SEVERE) OBESITY DUE TO EXCESS CA              

      

 

                2018           INES VERNON MD, Ot           

   I70.245         

                          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              

      

 

                2018           INES VERNON MD, Ot           

   L97.521         

                          NON-PRS CHRONIC ULCER OTH PRT L FOOT MATTHEWS              

      

 

                2018           INES VERNON MD, Ot           

   L97.522         

                          NON-PRS CHRONIC ULCER OTH PRT LEFT FOOT               

      

 

                2018           INES VERNON MD, Ot           

   Z68.44          

                          BODY MASS INDEX (BMI) 60.0-69.9, ADULT                

    

 

                2018           INES VERNON MD, Ot           

   E11.42          

                          TYPE 2 DIABETES MELLITUS WITH DIABETIC P              

      

 

                2018           INES VERNON MD, Ot           

   E11.621         

                          TYPE 2 DIABETES MELLITUS WITH FOOT ULCER              

      

 

                2018           INES VERNON MD, Ot           

   E66.01          

                          MORBID (SEVERE) OBESITY DUE TO EXCESS CA              

      

 

                2018           INES VERNON MD, Ot           

   L97.522         

                          NON-PRS CHRONIC ULCER OTH PRT LEFT FOOT               

      

 

                2018           INES VERNON MD, Ot           

   E11.42          

                          TYPE 2 DIABETES MELLITUS WITH DIABETIC P              

      

 

                2018           INES VERNON MD, Ot           

   E11.621         

                          TYPE 2 DIABETES MELLITUS WITH FOOT ULCER              

      

 

                2018           INES VERNON MD, Ot           

   E11.622         

                          TYPE 2 DIABETES MELLITUS WITH OTHER SKIN              

      

 

                2018           INES VERNON MD, Ot           

   E66.01          

                          MORBID (SEVERE) OBESITY DUE TO EXCESS CA              

      

 

                2018           INES VERNON MD, Ot           

   L97.221         

                          NON-PRS CHRONIC ULCER OF LEFT CALF LIMIT              

      

 

                2018           INES VERNON MD, Ot           

   L97.522         

                          NON-PRS CHRONIC ULCER OTH PRT LEFT FOOT               

      

 

                2018           INES VERNON MD, Ot           

   Z68.44          

                          BODY MASS INDEX (BMI) 60.0-69.9, ADULT                

    

 

                2018           INES VERNON MD, Ot           

   E11.42          

                          TYPE 2 DIABETES MELLITUS WITH DIABETIC P              

      

 

                2018           INES VERNON MD, Ot           

   E11.621         

                          TYPE 2 DIABETES MELLITUS WITH FOOT ULCER              

      

 

                2018           INES VERNON MD, Ot           

   E11.65          

                          TYPE 2 DIABETES MELLITUS WITH HYPERGLYCE              

      

 

                2018           INES VERNON MD, Ot           

   E66.01          

                          MORBID (SEVERE) OBESITY DUE TO EXCESS CA              

      

 

                2018           INES VERNON MD, Ot           

   I70.245         

                          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              

      

 

                2018           INES VERNON MD, Ot           

   L97.521         

                          NON-PRS CHRONIC ULCER OTH PRT L FOOT MATTHEWS              

      

 

                2018           INES VERNON MD, Ot           

   L97.522         

                          NON-PRS CHRONIC ULCER OTH PRT LEFT FOOT               

      

 

                2018           INES VERNON MD, Ot           

   E11.42          

                          TYPE 2 DIABETES MELLITUS WITH DIABETIC P              

      

 

                2018           INES VERNON MD, Ot           

   E11.621         

                          TYPE 2 DIABETES MELLITUS WITH FOOT ULCER              

      

 

                2018           INES VERNON MD, Ot           

   E66.01          

                          MORBID (SEVERE) OBESITY DUE TO EXCESS CA              

      

 

                2018           INES VERNON MD, Ot           

   L97.522         

                          NON-PRS CHRONIC ULCER OTH PRT LEFT FOOT               

      

 

                2018           INES VERNON MD, Ot           

   E11.42          

                          TYPE 2 DIABETES MELLITUS WITH DIABETIC P              

      

 

                2018           INES VERNON MD, Ot           

   E11.621         

                          TYPE 2 DIABETES MELLITUS WITH FOOT ULCER              

      

 

                2018           INES VERNON MD, Ot           

   E66.01          

                          MORBID (SEVERE) OBESITY DUE TO EXCESS CA              

      

 

                2018           INES VERNON MD, Ot           

   L97.221         

                          NON-PRS CHRONIC ULCER OF LEFT CALF LIMIT              

      

 

                2018           INES VERNON MD, Ot           

   L97.522         

                          NON-PRS CHRONIC ULCER OTH PRT LEFT FOOT               

      

 

                2018           INES VERNON MD, Ot           

   E11.42          

                          TYPE 2 DIABETES MELLITUS WITH DIABETIC P              

      

 

                2018           INES VERNON MD, Ot           

   E11.621         

                          TYPE 2 DIABETES MELLITUS WITH FOOT ULCER              

      

 

                2018           INES VERNON MD, Ot           

   E11.622         

                          TYPE 2 DIABETES MELLITUS WITH OTHER SKIN              

      

 

                2018           INES VERNON MD, Ot           

   E66.01          

                          MORBID (SEVERE) OBESITY DUE TO EXCESS CA              

      

 

                2018           INES VERNON MD, Ot           

   L97.221         

                          NON-PRS CHRONIC ULCER OF LEFT CALF LIMIT              

      

 

                2018           INES VERNON MD, Ot           

   L97.522         

                          NON-PRS CHRONIC ULCER OTH PRT LEFT FOOT               

      

 

                2018           INES VERNON MD, Ot           

   Z68.44          

                          BODY MASS INDEX (BMI) 60.0-69.9, ADULT                

    

 

                2018           INES VERNON MD, Ot           

   E11.42          

                          TYPE 2 DIABETES MELLITUS WITH DIABETIC P              

      

 

                2018           INES VERNON MD, Ot           

   E11.621         

                          TYPE 2 DIABETES MELLITUS WITH FOOT ULCER              

      

 

                2018           INES VERNON MD, Ot           

   E66.01          

                          MORBID (SEVERE) OBESITY DUE TO EXCESS CA              

      

 

                2018           INES VERNON MD, Ot           

   L97.221         

                          NON-PRS CHRONIC ULCER OF LEFT CALF LIMIT              

      

 

                2018           INES VERNON MD, Ot           

   L97.522         

                          NON-PRS CHRONIC ULCER OTH PRT LEFT FOOT               

      

 

                2018           INES VERNON MD, Ot           

   E11.42          

                          TYPE 2 DIABETES MELLITUS WITH DIABETIC P              

      

 

                2018           INES VERNON MD, Ot           

   E11.621         

                          TYPE 2 DIABETES MELLITUS WITH FOOT ULCER              

      

 

                2018           INES VERNON MD, Ot           

   E66.01          

                          MORBID (SEVERE) OBESITY DUE TO EXCESS CA              

      

 

                2018           INES VERNON MD, Ot           

   L97.221         

                          NON-PRS CHRONIC ULCER OF LEFT CALF LIMIT              

      

 

                2018           INES VERNON MD, Ot           

   L97.522         

                          NON-PRS CHRONIC ULCER OTH PRT LEFT FOOT               

      

 

                2018           INES VERNON MD, Ot           

   Z68.44          

                          BODY MASS INDEX (BMI) 60.0-69.9, ADULT                

    

 

                2018           INES VERNON MD, Ot           

   E11.42          

                          TYPE 2 DIABETES MELLITUS WITH DIABETIC P              

      

 

                2018           INES VERNON MD, Ot           

   E11.621         

                          TYPE 2 DIABETES MELLITUS WITH FOOT ULCER              

      

 

                2018           INES VERNON MD, Ot           

   E66.01          

                          MORBID (SEVERE) OBESITY DUE TO EXCESS CA              

      

 

                2018           INES VERNON MD, Ot           

   L97.522         

                          NON-PRS CHRONIC ULCER OTH PRT LEFT FOOT               

      

 

                2018           INES VERNON MD, Ot           

   Z68.44          

                          BODY MASS INDEX (BMI) 60.0-69.9, ADULT                

    

 

                2018           INES VERNON MD, Ot           

   E11.42          

                          TYPE 2 DIABETES MELLITUS WITH DIABETIC P              

      

 

                2018           INES VERNON MD, Ot           

   E11.621         

                          TYPE 2 DIABETES MELLITUS WITH FOOT ULCER              

      

 

                2018           INES VERNON MD, Ot           

   E66.01          

                          MORBID (SEVERE) OBESITY DUE TO EXCESS CA              

      

 

                2018           INES VERNON MD, Ot           

   L97.522         

                          NON-PRS CHRONIC ULCER OTH PRT LEFT FOOT               

      

 

                2018           INES VERNON MD, Ot           

   Z68.44          

                          BODY MASS INDEX (BMI) 60.0-69.9, ADULT                

    

 

                2018           INES VERNON MD, Ot           

   E11.42          

                          TYPE 2 DIABETES MELLITUS WITH DIABETIC P              

      

 

                2018           INES VERNON MD, Ot           

   E11.621         

                          TYPE 2 DIABETES MELLITUS WITH FOOT ULCER              

      

 

                2018           INES VERNON MD, Ot           

   E66.01          

                          MORBID (SEVERE) OBESITY DUE TO EXCESS CA              

      

 

                2018           INES VERNON MD, Ot           

   L97.221         

                          NON-PRS CHRONIC ULCER OF LEFT CALF LIMIT              

      

 

                2018           INES VERNON MD, Ot           

   L97.522         

                          NON-PRS CHRONIC ULCER OTH PRT LEFT FOOT               

      

 

                2018           INES VERNON MD, Ot           

   Z68.44          

                          BODY MASS INDEX (BMI) 60.0-69.9, ADULT                

    

 

                2018           INES VERNON MD, Ot           

   E11.42          

                          TYPE 2 DIABETES MELLITUS WITH DIABETIC P              

      

 

                2018           INES VERNON MD, Ot           

   E11.621         

                          TYPE 2 DIABETES MELLITUS WITH FOOT ULCER              

      

 

                2018           INES VERNON MD, Ot           

   E66.01          

                          MORBID (SEVERE) OBESITY DUE TO EXCESS CA              

      

 

                2018           INES VERNON MD, Ot           

   L97.522         

                          NON-PRS CHRONIC ULCER OTH PRT LEFT FOOT               

      

 

                2018           INES VERNON MD, Ot           

   Z68.44          

                          BODY MASS INDEX (BMI) 60.0-69.9, ADULT                

    

 

                2018           INES VERNON MD, Ot           

   E11.42          

                          TYPE 2 DIABETES MELLITUS WITH DIABETIC P              

      

 

                2018           INES VERNON MD, Ot           

   E11.621         

                          TYPE 2 DIABETES MELLITUS WITH FOOT ULCER              

      

 

                2018           INES VERNON MD, Ot           

   E66.01          

                          MORBID (SEVERE) OBESITY DUE TO EXCESS CA              

      

 

                2018           INES VERNON MD, Ot           

   L97.522         

                          NON-PRS CHRONIC ULCER OTH PRT LEFT FOOT               

      

 

                2018           INES VERNON MD, Ot           

   Z68.44          

                          BODY MASS INDEX (BMI) 60.0-69.9, ADULT                

    

 

                2018           INES VERNON MD, Ot           

   E11.42          

                          TYPE 2 DIABETES MELLITUS WITH DIABETIC P              

      

 

                2018           INES VERNON MD, Ot           

   E11.621         

                          TYPE 2 DIABETES MELLITUS WITH FOOT ULCER              

      

 

                2018           INES VERNON MD, Ot           

   E66.01          

                          MORBID (SEVERE) OBESITY DUE TO EXCESS CA              

      

 

                2018           INES VERNON MD, Ot           

   L97.522         

                          NON-PRS CHRONIC ULCER OTH PRT LEFT FOOT               

      

 

                2018           INES VERNON MD, Ot           

   Z68.44          

                          BODY MASS INDEX (BMI) 60.0-69.9, ADULT                

    

 

                2018           INES VERNON MD, Ot           

   E11.42          

                          TYPE 2 DIABETES MELLITUS WITH DIABETIC P              

      

 

                2018           INES VERNON MD, Ot           

   E11.621         

                          TYPE 2 DIABETES MELLITUS WITH FOOT ULCER              

      

 

                2018           INES VERNON MD, Ot           

   E66.01          

                          MORBID (SEVERE) OBESITY DUE TO EXCESS CA              

      

 

                2018           INES VERNON MD, Ot           

   L97.522         

                          NON-PRS CHRONIC ULCER OTH PRT LEFT FOOT               

      

 

                2018           INES VERNON MD, Ot           

   Z68.44          

                          BODY MASS INDEX (BMI) 60.0-69.9, ADULT                

    

 

                2018           INES VERNON MD, Ot           

   E11.42          

                          TYPE 2 DIABETES MELLITUS WITH DIABETIC P              

      

 

                2018           INES VERNON MD, Ot           

   E11.621         

                          TYPE 2 DIABETES MELLITUS WITH FOOT ULCER              

      

 

                2018           INES VERNON MD, Ot           

   E66.01          

                          MORBID (SEVERE) OBESITY DUE TO EXCESS CA              

      

 

                2018           INES VERNON MD, Ot           

   L97.522         

                          NON-PRS CHRONIC ULCER OTH PRT LEFT FOOT               

      

 

                2018           JUNIE HAMILTON ARNP           Ot           

   M25.562         

                          PAIN IN LEFT KNEE                    

 

                2018           INES VERNON MD, Ot           

   E11.42          

                          TYPE 2 DIABETES MELLITUS WITH DIABETIC P              

      

 

                2018           INES VERNON MD, Ot           

   E11.621         

                          TYPE 2 DIABETES MELLITUS WITH FOOT ULCER              

      

 

                2018           INES VERNON MD, Ot           

   E66.01          

                          MORBID (SEVERE) OBESITY DUE TO EXCESS CA              

      

 

                2018           INES VERNON MD, Ot           

   L97.522         

                          NON-PRS CHRONIC ULCER OTH PRT LEFT FOOT               

      

 

                2018           INES VERNON MD, Ot           

   E11.42          

                          TYPE 2 DIABETES MELLITUS WITH DIABETIC P              

      

 

                2018           INES VERNON MD, Ot           

   E11.621         

                          TYPE 2 DIABETES MELLITUS WITH FOOT ULCER              

      

 

                2018           INES VERNON MD, Ot           

   E66.01          

                          MORBID (SEVERE) OBESITY DUE TO EXCESS CA              

      

 

                2018           INES VERNON MD           Ot           

   L97.522         

                          NON-PRS CHRONIC ULCER OTH PRT LEFT FOOT               

      

 

                2018           NARESH CARTER APRMARIA TERESA           Ot         

     E11.42        

                          TYPE 2 DIABETES MELLITUS WITH DIABETIC P              

      

 

                2018           NARESH CARTER APRN           Ot         

     E11.621       

                          TYPE 2 DIABETES MELLITUS WITH FOOT ULCER              

      

 

                2018           NARESH CARTER APRN           Ot         

     E66.01        

                          MORBID (SEVERE) OBESITY DUE TO EXCESS CA              

      

 

                2018           NARESH CARTER APRN           Ot         

     L97.522       

                          NON-PRS CHRONIC ULCER OTH PRT LEFT FOOT               

      

 

                2018           INES VERNON MD           Ot           

   E11.42          

                          TYPE 2 DIABETES MELLITUS WITH DIABETIC P              

      

 

                2018           INES VERNON MD, Ot           

   E11.621         

                          TYPE 2 DIABETES MELLITUS WITH FOOT ULCER              

      

 

                2018           INES VERNON MD, Ot           

   E66.01          

                          MORBID (SEVERE) OBESITY DUE TO EXCESS CA              

      

 

                2018           INES VERNON MD, Ot           

   L97.522         

                          NON-PRS CHRONIC ULCER OTH PRT LEFT FOOT               

      

 

                2018           INES VERNON MD           Ot           

   Z68.43          

                          BODY MASS INDEX (BMI) 50-59.9 , ADULT                 

   

 

                2018           INES VERNON MD           Ot           

   E11.42          

                          TYPE 2 DIABETES MELLITUS WITH DIABETIC P              

      

 

                2018           INES VERNON MD, Ot           

   E11.621         

                          TYPE 2 DIABETES MELLITUS WITH FOOT ULCER              

      

 

                2018           INES VERNON MD           Ot           

   E66.01          

                          MORBID (SEVERE) OBESITY DUE TO EXCESS CA              

      

 

                2018           INES VERNON MD           Ot           

   L97.522         

                          NON-PRS CHRONIC ULCER OTH PRT LEFT FOOT               

      

 

                2018           INES VERNON MD           Ot           

   E11.42          

                          TYPE 2 DIABETES MELLITUS WITH DIABETIC P              

      

 

                2018           INES VERNON MD           Ot           

   E11.621         

                          TYPE 2 DIABETES MELLITUS WITH FOOT ULCER              

      

 

                2018           INES VERNON MD           Ot           

   E66.01          

                          MORBID (SEVERE) OBESITY DUE TO EXCESS CA              

      

 

                2018           INES VERNON MD           Ot           

   L97.522         

                          NON-PRS CHRONIC ULCER OTH PRT LEFT FOOT               

      

 

                2018           INES VERNON MD           Ot           

   E11.42          

                          TYPE 2 DIABETES MELLITUS WITH DIABETIC P              

      

 

                2018           INES VERNON MD, Ot           

   E11.621         

                          TYPE 2 DIABETES MELLITUS WITH FOOT ULCER              

      

 

                2018           INES VERNON MD, Ot           

   E66.01          

                          MORBID (SEVERE) OBESITY DUE TO EXCESS CA              

      

 

                2018           INES VERNON MD, Ot           

   L97.522         

                          NON-PRS CHRONIC ULCER OTH PRT LEFT FOOT               

      

 

                2018           INES VERNON MD, Ot           

   Z68.43          

                          BODY MASS INDEX (BMI) 50-59.9 , ADULT                 

   

 

                2018           INES VERNON MD           Ot           

   E11.42          

                          TYPE 2 DIABETES MELLITUS WITH DIABETIC P              

      

 

                2018           INES VERNON MD, Ot           

   E11.621         

                          TYPE 2 DIABETES MELLITUS WITH FOOT ULCER              

      

 

                2018           INES VERNON MD, Ot           

   E66.01          

                          MORBID (SEVERE) OBESITY DUE TO EXCESS CA              

      

 

                2018           INES VERNON MD, Ot           

   L97.522         

                          NON-PRS CHRONIC ULCER OTH PRT LEFT FOOT               

      

 

                2018           INES VERNON MD, Ot           

   E11.42          

                          TYPE 2 DIABETES MELLITUS WITH DIABETIC P              

      

 

                2018           INES VERNON MD, Ot           

   E11.621         

                          TYPE 2 DIABETES MELLITUS WITH FOOT ULCER              

      

 

                2018           INES VERNON MD, Ot           

   E66.01          

                          MORBID (SEVERE) OBESITY DUE TO EXCESS CA              

      

 

                2018           INES VERNON MD, Ot           

   L97.522         

                          NON-PRS CHRONIC ULCER OTH PRT LEFT FOOT               

      

 

                2018           INES VERNON MD, Ot           

   Z68.43          

                          BODY MASS INDEX (BMI) 50-59.9 , ADULT                 

   

 

                2018           INES VERNON MD           Ot           

   E11.42          

                          TYPE 2 DIABETES MELLITUS WITH DIABETIC P              

      

 

                2018           INES VERNON MD, Ot           

   E11.621         

                          TYPE 2 DIABETES MELLITUS WITH FOOT ULCER              

      

 

                2018           INES VERNON MD, Ot           

   E66.01          

                          MORBID (SEVERE) OBESITY DUE TO EXCESS CA              

      

 

                2018           INES VERNON MD, Ot           

   L97.522         

                          NON-PRS CHRONIC ULCER OTH PRT LEFT FOOT               

      

 

                2018           INES VERNON MD, Ot           

   Z68.43          

                          BODY MASS INDEX (BMI) 50-59.9 , ADULT                 

   

 

                2018           INES VERNON MD, Ot           

   E11.42          

                          TYPE 2 DIABETES MELLITUS WITH DIABETIC P              

      

 

                2018           INES VERNON MD, Ot           

   E11.621         

                          TYPE 2 DIABETES MELLITUS WITH FOOT ULCER              

      

 

                2018           INES VERNON MD, Ot           

   E66.01          

                          MORBID (SEVERE) OBESITY DUE TO EXCESS CA              

      

 

                2018           INES VERNON MD, Ot           

   L97.522         

                          NON-PRS CHRONIC ULCER OTH PRT LEFT FOOT               

      

 

                2018           INES VERNON MD, Ot           

   Z68.43          

                          BODY MASS INDEX (BMI) 50-59.9 , ADULT                 

   

 

                2018           INES VERNON MD, Ot           

   E11.42          

                          TYPE 2 DIABETES MELLITUS WITH DIABETIC P              

      

 

                2018           INES VERNON MD, Ot           

   E11.621         

                          TYPE 2 DIABETES MELLITUS WITH FOOT ULCER              

      

 

                2018           INES VERNON MD, Ot           

   E66.01          

                          MORBID (SEVERE) OBESITY DUE TO EXCESS CA              

      

 

                2018           INES VERNON MD, Ot           

   L97.522         

                          NON-PRS CHRONIC ULCER OTH PRT LEFT FOOT               

      

 

                2018           INES VERNON MD, Ot           

   Z68.43          

                          BODY MASS INDEX (BMI) 50-59.9 , ADULT                 

   

 

                2018           INES VERNON MD, Ot           

   E11.42          

                          TYPE 2 DIABETES MELLITUS WITH DIABETIC P              

      

 

                2018           INES VERNON MD, Ot           

   E11.621         

                          TYPE 2 DIABETES MELLITUS WITH FOOT ULCER              

      

 

                2018           INES VERNON MD, Ot           

   E66.01          

                          MORBID (SEVERE) OBESITY DUE TO EXCESS CA              

      

 

                2018           INES VERNON MD, Ot           

   L97.522         

                          NON-PRS CHRONIC ULCER OTH PRT LEFT FOOT               

      

 

                2018           INES VERNON MD, Ot           

   E11.42          

                          TYPE 2 DIABETES MELLITUS WITH DIABETIC P              

      

 

                2018           INES VERNON MD, Ot           

   E11.621         

                          TYPE 2 DIABETES MELLITUS WITH FOOT ULCER              

      

 

                2018           INES VERNON MD, Ot           

   E66.01          

                          MORBID (SEVERE) OBESITY DUE TO EXCESS CA              

      

 

                2018           INES VERNON MD, Ot           

   L97.522         

                          NON-PRS CHRONIC ULCER OTH PRT LEFT FOOT               

      

 

                2018           INES VERNON MD, Ot           

   E11.42          

                          TYPE 2 DIABETES MELLITUS WITH DIABETIC P              

      

 

                2018           INES VERNON MD, Ot           

   E11.621         

                          TYPE 2 DIABETES MELLITUS WITH FOOT ULCER              

      

 

                2018           INES VERNON MD, Ot           

   E66.01          

                          MORBID (SEVERE) OBESITY DUE TO EXCESS CA              

      

 

                2018           INES VERNON MD, Ot           

   L97.523         

                          NON-PRS CHRONIC ULCER OTH PRT LEFT FOOT               

      

 

                2018           INES VERNON MD           Ot           

   E11.42          

                          TYPE 2 DIABETES MELLITUS WITH DIABETIC P              

      

 

                2018           INES VERNON MD, Ot           

   E11.621         

                          TYPE 2 DIABETES MELLITUS WITH FOOT ULCER              

      

 

                2018           INES VERNON MD, Ot           

   E66.01          

                          MORBID (SEVERE) OBESITY DUE TO EXCESS CA              

      

 

                2018           INES VERNON MD, Ot           

   L97.523         

                          NON-PRS CHRONIC ULCER OTH PRT LEFT FOOT               

      

 

                2018           INES VERNON MD, Ot           

   E11.42          

                          TYPE 2 DIABETES MELLITUS WITH DIABETIC P              

      

 

                2018           INES VERNON MD, Ot           

   E11.621         

                          TYPE 2 DIABETES MELLITUS WITH FOOT ULCER              

      

 

                2018           INES VERNON MD, Ot           

   E66.01          

                          MORBID (SEVERE) OBESITY DUE TO EXCESS CA              

      

 

                2018           INES VERNON MD, Ot           

   L97.523         

                          NON-PRS CHRONIC ULCER OTH PRT LEFT FOOT               

      

 

                2018           INES VERNON MD, Ot           

   Z68.43          

                          BODY MASS INDEX (BMI) 50-59.9 , ADULT                 

   

 

                2018           INES VERNON MD           Ot           

   E11.42          

                          TYPE 2 DIABETES MELLITUS WITH DIABETIC P              

      

 

                2018           INES VERNON MD, Ot           

   E11.621         

                          TYPE 2 DIABETES MELLITUS WITH FOOT ULCER              

      

 

                2018           INES VERNON MD, Ot           

   E66.01          

                          MORBID (SEVERE) OBESITY DUE TO EXCESS CA              

      

 

                2018           INES VERNON MD, Ot           

   L97.523         

                          NON-PRS CHRONIC ULCER OTH PRT LEFT FOOT               

      

 

                2018           INES VERNON MD, Ot           

   Z68.43          

                          BODY MASS INDEX (BMI) 50-59.9 , ADULT                 

   

 

                2018           INES VERNON MD           Ot           

   E11.42          

                          TYPE 2 DIABETES MELLITUS WITH DIABETIC P              

      

 

                2018           INES VERNON MD, Ot           

   E11.621         

                          TYPE 2 DIABETES MELLITUS WITH FOOT ULCER              

      

 

                2018           INES VERNON MD, Ot           

   E66.01          

                          MORBID (SEVERE) OBESITY DUE TO EXCESS CA              

      

 

                2018           INES VERNON MD, Ot           

   L97.523         

                          NON-PRS CHRONIC ULCER OTH PRT LEFT FOOT               

      

 

                2018           INES VERNON MD, Ot           

   E11.42          

                          TYPE 2 DIABETES MELLITUS WITH DIABETIC P              

      

 

                2018           INES VERNON MD, Ot           

   E11.621         

                          TYPE 2 DIABETES MELLITUS WITH FOOT ULCER              

      

 

                2018           INES VERNON MD, Ot           

   E66.01          

                          MORBID (SEVERE) OBESITY DUE TO EXCESS CA              

      

 

                2018           INES VERNON MD, Ot           

   L97.523         

                          NON-PRS CHRONIC ULCER OTH PRT LEFT FOOT               

      

 

                2018           INES VERNON MD, Ot           

   Z68.43          

                          BODY MASS INDEX (BMI) 50-59.9 , ADULT                 

   

 

                2018           INES VERNON MD, Ot           

   E11.42          

                          TYPE 2 DIABETES MELLITUS WITH DIABETIC P              

      

 

                2018           INES VERNON MD, Ot           

   E11.621         

                          TYPE 2 DIABETES MELLITUS WITH FOOT ULCER              

      

 

                2018           INES VERNON MD, Ot           

   E66.01          

                          MORBID (SEVERE) OBESITY DUE TO EXCESS CA              

      

 

                2018           INES VERNON MD, Ot           

   L97.523         

                          NON-PRS CHRONIC ULCER OTH PRT LEFT FOOT               

      

 

                2018           INES VERNON MD, Ot           

   Z68.43          

                          BODY MASS INDEX (BMI) 50-59.9 , ADULT                 

   

 

                2018           INES VERNON MD, Ot           

   E11.42          

                          TYPE 2 DIABETES MELLITUS WITH DIABETIC P              

      

 

                2018           INES VERNON MD, Ot           

   E11.621         

                          TYPE 2 DIABETES MELLITUS WITH FOOT ULCER              

      

 

                2018           INES VERNON MD, Ot           

   E66.01          

                          MORBID (SEVERE) OBESITY DUE TO EXCESS CA              

      

 

                2018           INES VERNON MD, Ot           

   L97.523         

                          NON-PRS CHRONIC ULCER OTH PRT LEFT FOOT               

      

 

                2018           INES VERNON MD, Ot           

   Z68.43          

                          BODY MASS INDEX (BMI) 50-59.9 , ADULT                 

   

 

                2018           INES VERNON MD, Ot           

   E11.42          

                          TYPE 2 DIABETES MELLITUS WITH DIABETIC P              

      

 

                2018           INES VERNON MD, Ot           

   E11.621         

                          TYPE 2 DIABETES MELLITUS WITH FOOT ULCER              

      

 

                2018           INES VERNON MD, Ot           

   E66.01          

                          MORBID (SEVERE) OBESITY DUE TO EXCESS CA              

      

 

                2018           INES VERNON MD, Ot           

   L97.523         

                          NON-PRS CHRONIC ULCER OTH PRT LEFT FOOT               

      

 

                2018           INES VERNON MD, Ot           

   E11.42          

                          TYPE 2 DIABETES MELLITUS WITH DIABETIC P              

      

 

                2018           INES VERNON MD, Ot           

   E11.621         

                          TYPE 2 DIABETES MELLITUS WITH FOOT ULCER              

      

 

                2018           INES VERNON MD, Ot           

   E66.01          

                          MORBID (SEVERE) OBESITY DUE TO EXCESS CA              

      

 

                2018           INES VERNON MD, Ot           

   L97.523         

                          NON-PRS CHRONIC ULCER OTH PRT LEFT FOOT               

      

 

                2018           INES VERNON MD, Ot           

   Z68.43          

                          BODY MASS INDEX (BMI) 50-59.9 , ADULT                 

   

 

                2018           INES VERNON MD, Ot           

   E11.42          

                          TYPE 2 DIABETES MELLITUS WITH DIABETIC P              

      

 

                2018           INES VERNON MD, Ot           

   E11.621         

                          TYPE 2 DIABETES MELLITUS WITH FOOT ULCER              

      

 

                2018           INES VERNON MD, Ot           

   E66.01          

                          MORBID (SEVERE) OBESITY DUE TO EXCESS CA              

      

 

                2018           INES VERNON MD, Ot           

   L97.523         

                          NON-PRS CHRONIC ULCER OTH PRT LEFT FOOT               

      

 

                2018           INES VERNON MD, Ot           

   Z68.43          

                          BODY MASS INDEX (BMI) 50-59.9 , ADULT                 

   

 

                2018           INES VERNON MD, Ot           

   E11.621         

                          TYPE 2 DIABETES MELLITUS WITH FOOT ULCER              

      

 

                2018           INES VERNON MD, Ot           

   L97.523         

                          NON-PRS CHRONIC ULCER OTH PRT LEFT FOOT               

      

 

                2018           INES VERNON MD, Ot           

   E11.42          

                          TYPE 2 DIABETES MELLITUS WITH DIABETIC P              

      

 

                2018           INES VERNON MD, Ot           

   E11.621         

                          TYPE 2 DIABETES MELLITUS WITH FOOT ULCER              

      

 

                2018           INES VERNON MD, Ot           

   E66.01          

                          MORBID (SEVERE) OBESITY DUE TO EXCESS CA              

      

 

                2018           INES VERONN MD, Ot           

   L97.521         

                          NON-PRS CHRONIC ULCER OTH PRT L FOOT MATTHEWS              

      

 

                2018           INES VERNON MD, Ot           

   Z68.43          

                          BODY MASS INDEX (BMI) 50-59.9 , ADULT                 

   

 

                2018           INES VERNON MD, Ot           

   E11.42          

                          TYPE 2 DIABETES MELLITUS WITH DIABETIC P              

      

 

                2018           INES VERNON MD, Ot           

   E11.621         

                          TYPE 2 DIABETES MELLITUS WITH FOOT ULCER              

      

 

                2018           INES VERNON MD, Ot           

   E66.01          

                          MORBID (SEVERE) OBESITY DUE TO EXCESS CA              

      

 

                2018           INES VERNON MD, Ot           

   L97.522         

                          NON-PRS CHRONIC ULCER OTH PRT LEFT FOOT               

      

 

                2018           INES VERNON MD, Ot           

   Z68.43          

                          BODY MASS INDEX (BMI) 50-59.9 , ADULT                 

   

 

                2018           INES VERNON MD, Ot           

   E11.42          

                          TYPE 2 DIABETES MELLITUS WITH DIABETIC P              

      

 

                2018           INES VERNON MD, Ot           

   E11.621         

                          TYPE 2 DIABETES MELLITUS WITH FOOT ULCER              

      

 

                2018           INES VERNON MD, Ot           

   E66.01          

                          MORBID (SEVERE) OBESITY DUE TO EXCESS CA              

      

 

                2018           INES VERNON MD, Ot           

   L97.522         

                          NON-PRS CHRONIC ULCER OTH PRT LEFT FOOT               

      

 

                2018           INES VERNON MD, Ot           

   Z68.43          

                          BODY MASS INDEX (BMI) 50-59.9 , ADULT                 

   

 

                2018           INES VERNON MD, Ot           

   E11.42          

                          TYPE 2 DIABETES MELLITUS WITH DIABETIC P              

      

 

                2018           INES VERNON MD, Ot           

   E11.621         

                          TYPE 2 DIABETES MELLITUS WITH FOOT ULCER              

      

 

                2018           INES VERNON MD, Ot           

   E66.01          

                          MORBID (SEVERE) OBESITY DUE TO EXCESS CA              

      

 

                2018           INES VERNON MD, Ot           

   L97.522         

                          NON-PRS CHRONIC ULCER OTH PRT LEFT FOOT               

      

 

                2018           INES VERNON MD, Ot           

   Z68.43          

                          BODY MASS INDEX (BMI) 50-59.9 , ADULT                 

   

 

                2018           INES VERNON MD, Ot           

   E11.42          

                          TYPE 2 DIABETES MELLITUS WITH DIABETIC P              

      

 

                2018           INES VERNON MD, Ot           

   E11.621         

                          TYPE 2 DIABETES MELLITUS WITH FOOT ULCER              

      

 

                2018           INES VERNON MD, Ot           

   E66.01          

                          MORBID (SEVERE) OBESITY DUE TO EXCESS CA              

      

 

                2018           INES VERNON MD, Ot           

   L97.522         

                          NON-PRS CHRONIC ULCER OTH PRT LEFT FOOT               

      

 

                2018           INES VERNON MD, Ot           

   Z68.43          

                          BODY MASS INDEX (BMI) 50-59.9 , ADULT                 

   

 

                2018           INES VERNON MD, Ot           

   E11.42          

                          TYPE 2 DIABETES MELLITUS WITH DIABETIC P              

      

 

                2018           INES VERNON MD, Ot           

   E11.621         

                          TYPE 2 DIABETES MELLITUS WITH FOOT ULCER              

      

 

                2018           INES VERNON MD, Ot           

   E66.01          

                          MORBID (SEVERE) OBESITY DUE TO EXCESS CA              

      

 

                2018           INES VERNON MD, Ot           

   L97.522         

                          NON-PRS CHRONIC ULCER OTH PRT LEFT FOOT               

      

 

                2018           INES VERNON MD, Ot           

   Z68.43          

                          BODY MASS INDEX (BMI) 50-59.9 , ADULT                 

   

 

                2018           RAUL, NARESH R APRN           Ot         

     E11.42        

                          TYPE 2 DIABETES MELLITUS WITH DIABETIC P              

      

 

                2018           NARESH CARTER APRN           Ot         

     E11.621       

                          TYPE 2 DIABETES MELLITUS WITH FOOT ULCER              

      

 

                2018           NARESH CARTER           Ot         

     E66.01        

                          MORBID (SEVERE) OBESITY DUE TO EXCESS CA              

      

 

                2018           NARESH CARTER APRN           Ot         

     L97.522       

                          NON-PRS CHRONIC ULCER OTH PRT LEFT FOOT               

      

 

                2018           INES VERNON MD           Ot           

   E11.42          

                          TYPE 2 DIABETES MELLITUS WITH DIABETIC P              

      

 

                2018           INES VERNON MD, Ot           

   E11.621         

                          TYPE 2 DIABETES MELLITUS WITH FOOT ULCER              

      

 

                2018           INES VERNON MD, Ot           

   E66.01          

                          MORBID (SEVERE) OBESITY DUE TO EXCESS CA              

      

 

                2018           INES VERNON MD, Ot           

   L97.522         

                          NON-PRS CHRONIC ULCER OTH PRT LEFT FOOT               

      

 

                2018           INES VERNON MD, Ot           

   Z68.43          

                          BODY MASS INDEX (BMI) 50-59.9 , ADULT                 

   

 

                2018           INES VERNON MD, Ot           

   E11.42          

                          TYPE 2 DIABETES MELLITUS WITH DIABETIC P              

      

 

                2018           INES VERNON MD, Ot           

   E11.621         

                          TYPE 2 DIABETES MELLITUS WITH FOOT ULCER              

      

 

                2018           INES VERNON MD, Ot           

   E66.01          

                          MORBID (SEVERE) OBESITY DUE TO EXCESS CA              

      

 

                2018           INES VERNON MD, Ot           

   L97.521         

                          NON-PRS CHRONIC ULCER OTH PRT L FOOT MATTHEWS              

      

 

                2018           INES VERNON MD, Ot           

   L97.522         

                          NON-PRS CHRONIC ULCER OTH PRT LEFT FOOT               

      

 

                2018           INES VERNON MD, Ot           

   E11.42          

                          TYPE 2 DIABETES MELLITUS WITH DIABETIC P              

      

 

                2018           INES VERNON MD, Ot           

   E11.621         

                          TYPE 2 DIABETES MELLITUS WITH FOOT ULCER              

      

 

                2018           INES VERNON MD, Ot           

   E11.65          

                          TYPE 2 DIABETES MELLITUS WITH HYPERGLYCE              

      

 

                2018           INES VERNON MD, Ot           

   E66.01          

                          MORBID (SEVERE) OBESITY DUE TO EXCESS CA              

      

 

                2018           INES VERNON MD, Ot           

   I70.245         

                          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              

      

 

                2018           INES VERNON MD, Ot           

   L97.521         

                          NON-PRS CHRONIC ULCER OTH PRT L FOOT MATTHEWS              

      

 

                2018           INES VERNON MD, Ot           

   L97.522         

                          NON-PRS CHRONIC ULCER OTH PRT LEFT FOOT               

      

 

                2018           INES VERNON MD, Ot           

   Z68.44          

                          BODY MASS INDEX (BMI) 60.0-69.9, ADULT                

    

 

                2018           INES VERNON MD, Ot           

   E11.42          

                          TYPE 2 DIABETES MELLITUS WITH DIABETIC P              

      

 

                2018           INES VERNON MD, Ot           

   E11.621         

                          TYPE 2 DIABETES MELLITUS WITH FOOT ULCER              

      

 

                2018           INES VERNON MD, Ot           

   E11.65          

                          TYPE 2 DIABETES MELLITUS WITH HYPERGLYCE              

      

 

                2018           INES VERNON MD, Ot           

   E66.01          

                          MORBID (SEVERE) OBESITY DUE TO EXCESS CA              

      

 

                2018           INES VERNON MD, Ot           

   I70.245         

                          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              

      

 

                2018           INES VERNON MD, Ot           

   L97.521         

                          NON-PRS CHRONIC ULCER OTH PRT L FOOT MATTHEWS              

      

 

                2018           INES VERNON MD, Ot           

   L97.522         

                          NON-PRS CHRONIC ULCER OTH PRT LEFT FOOT               

      

 

                2018           INES VERNON MD, Ot           

   E11.42          

                          TYPE 2 DIABETES MELLITUS WITH DIABETIC P              

      

 

                2018           INES VERNON MD, Ot           

   E11.621         

                          TYPE 2 DIABETES MELLITUS WITH FOOT ULCER              

      

 

                2018           INES VERNON MD, Ot           

   E66.01          

                          MORBID (SEVERE) OBESITY DUE TO EXCESS CA              

      

 

                2018           INES VERNON MD, Ot           

   L97.522         

                          NON-PRS CHRONIC ULCER OTH PRT LEFT FOOT               

      

 

                2018           INES VERNON MD, Ot           

   E11.42          

                          TYPE 2 DIABETES MELLITUS WITH DIABETIC P              

      

 

                2018           INES VERNON MD, Ot           

   E11.621         

                          TYPE 2 DIABETES MELLITUS WITH FOOT ULCER              

      

 

                2018           INES VERNON MD, Ot           

   E11.622         

                          TYPE 2 DIABETES MELLITUS WITH OTHER SKIN              

      

 

                2018           INES VERNON MD, Ot           

   E66.01          

                          MORBID (SEVERE) OBESITY DUE TO EXCESS CA              

      

 

                2018           INES VERNON MD, Ot           

   L97.221         

                          NON-PRS CHRONIC ULCER OF LEFT CALF LIMIT              

      

 

                2018           INES VERNON MD, Ot           

   L97.522         

                          NON-PRS CHRONIC ULCER OTH PRT LEFT FOOT               

      

 

                2018           INES VERNON MD, Ot           

   Z68.44          

                          BODY MASS INDEX (BMI) 60.0-69.9, ADULT                

    

 

                2018           INES VERNON MD, Ot           

   E11.42          

                          TYPE 2 DIABETES MELLITUS WITH DIABETIC P              

      

 

                2018           INES VERNON MD, Ot           

   E11.621         

                          TYPE 2 DIABETES MELLITUS WITH FOOT ULCER              

      

 

                2018           INES VERNON MD, Ot           

   E66.01          

                          MORBID (SEVERE) OBESITY DUE TO EXCESS CA              

      

 

                2018           INES VERNON MD, Ot           

   L97.221         

                          NON-PRS CHRONIC ULCER OF LEFT CALF LIMIT              

      

 

                2018           INES VERNON MD, Ot           

   L97.522         

                          NON-PRS CHRONIC ULCER OTH PRT LEFT FOOT               

      

 

                2018           INES VERNON MD, Ot           

   E11.42          

                          TYPE 2 DIABETES MELLITUS WITH DIABETIC P              

      

 

                2018           INES VERNON MD, Ot           

   E11.621         

                          TYPE 2 DIABETES MELLITUS WITH FOOT ULCER              

      

 

                2018           INES VERNON MD, Ot           

   E66.01          

                          MORBID (SEVERE) OBESITY DUE TO EXCESS CA              

      

 

                2018           INES VERNON MD, Ot           

   L97.221         

                          NON-PRS CHRONIC ULCER OF LEFT CALF LIMIT              

      

 

                2018           INES VERNON MD, Ot           

   L97.522         

                          NON-PRS CHRONIC ULCER OTH PRT LEFT FOOT               

      

 

                2018           INES VERNON MD, Ot           

   Z68.44          

                          BODY MASS INDEX (BMI) 60.0-69.9, ADULT                

    

 

                2018           INES VERNON MD, Ot           

   E11.42          

                          TYPE 2 DIABETES MELLITUS WITH DIABETIC P              

      

 

                2018           INES VERNON MD, Ot           

   E11.621         

                          TYPE 2 DIABETES MELLITUS WITH FOOT ULCER              

      

 

                2018           INES VERNON MD, Ot           

   E66.01          

                          MORBID (SEVERE) OBESITY DUE TO EXCESS CA              

      

 

                2018           INES VERNON MD, Ot           

   L97.522         

                          NON-PRS CHRONIC ULCER OTH PRT LEFT FOOT               

      

 

                2018           INES VERNON MD, Ot           

   Z68.44          

                          BODY MASS INDEX (BMI) 60.0-69.9, ADULT                

    

 

                2018           INES VERNON MD, Ot           

   E11.42          

                          TYPE 2 DIABETES MELLITUS WITH DIABETIC P              

      

 

                2018           INES VERNON MD, Ot           

   E11.621         

                          TYPE 2 DIABETES MELLITUS WITH FOOT ULCER              

      

 

                2018           INES VERNON MD, Ot           

   E66.01          

                          MORBID (SEVERE) OBESITY DUE TO EXCESS CA              

      

 

                2018           INES VERNON MD, Ot           

   L97.522         

                          NON-PRS CHRONIC ULCER OTH PRT LEFT FOOT               

      

 

                2018           INES VERNON MD, Ot           

   Z68.44          

                          BODY MASS INDEX (BMI) 60.0-69.9, ADULT                

    

 

                2018           INES VERNON MD, Ot           

   E11.42          

                          TYPE 2 DIABETES MELLITUS WITH DIABETIC P              

      

 

                2018           INES VERNON MD, Ot           

   E11.621         

                          TYPE 2 DIABETES MELLITUS WITH FOOT ULCER              

      

 

                2018           INES VERNON MD, Ot           

   E66.01          

                          MORBID (SEVERE) OBESITY DUE TO EXCESS CA              

      

 

                2018           INES VERNON MD, Ot           

   L97.522         

                          NON-PRS CHRONIC ULCER OTH PRT LEFT FOOT               

      

 

                2018           INES VERNON MD, Ot           

   E11.42          

                          TYPE 2 DIABETES MELLITUS WITH DIABETIC P              

      

 

                2018           INES VERNON MD, Ot           

   E11.621         

                          TYPE 2 DIABETES MELLITUS WITH FOOT ULCER              

      

 

                2018           INES VERNON MD, Ot           

   E66.01          

                          MORBID (SEVERE) OBESITY DUE TO EXCESS CA              

      

 

                2018           INES VERNON MD, Ot           

   L97.522         

                          NON-PRS CHRONIC ULCER OTH PRT LEFT FOOT               

      

 

                2018           INES VERNON MD, Ot           

   Z68.44          

                          BODY MASS INDEX (BMI) 60.0-69.9, ADULT                

    

 

                2018           INES VERNON MD, Ot           

   E11.621         

                          TYPE 2 DIABETES MELLITUS WITH FOOT ULCER              

      

 

                2018           INES VERNON MD, Ot           

   L97.522         

                          NON-PRS CHRONIC ULCER OTH PRT LEFT FOOT               

      

 

                2018           INES VERNON MD, Ot           

   E11.42          

                          TYPE 2 DIABETES MELLITUS WITH DIABETIC P              

      

 

                2018           INES VERNON MD, Ot           

   E11.621         

                          TYPE 2 DIABETES MELLITUS WITH FOOT ULCER              

      

 

                2018           INES VERNON MD           Ot           

   E66.01          

                          MORBID (SEVERE) OBESITY DUE TO EXCESS CA              

      

 

                2018           INES VERNON MD, Ot           

   L97.522         

                          NON-PRS CHRONIC ULCER OTH PRT LEFT FOOT               

      

 

                2018           INES VERNON MD, Ot           

   E11.42          

                          TYPE 2 DIABETES MELLITUS WITH DIABETIC P              

      

 

                2018           INES VERNON MD, Ot           

   E11.621         

                          TYPE 2 DIABETES MELLITUS WITH FOOT ULCER              

      

 

                2018           INES VERNON MD, Ot           

   E66.01          

                          MORBID (SEVERE) OBESITY DUE TO EXCESS CA              

      

 

                2018           INES VERNON MD, Ot           

   L97.522         

                          NON-PRS CHRONIC ULCER OTH PRT LEFT FOOT               

      

 

                2018           INES VERNON MD, Ot           

   Z68.44          

                          BODY MASS INDEX (BMI) 60.0-69.9, ADULT                

    

 

                2018           INES VERNON MD, Ot           

   E11.42          

                          TYPE 2 DIABETES MELLITUS WITH DIABETIC P              

      

 

                2018           INES VERNON MD, Ot           

   E11.621         

                          TYPE 2 DIABETES MELLITUS WITH FOOT ULCER              

      

 

                2018           INES VERNON MD, Ot           

   E66.01          

                          MORBID (SEVERE) OBESITY DUE TO EXCESS CA              

      

 

                2018           INES VERNON MD, Ot           

   L97.522         

                          NON-PRS CHRONIC ULCER OTH PRT LEFT FOOT               

      

 

                2018           INES VERNON MD, Ot           

   Z68.44          

                          BODY MASS INDEX (BMI) 60.0-69.9, ADULT                

    

 

                2018           INES VERNON MD, Ot           

   E11.42          

                          TYPE 2 DIABETES MELLITUS WITH DIABETIC P              

      

 

                2018           INES VERNON MD, Ot           

   E11.621         

                          TYPE 2 DIABETES MELLITUS WITH FOOT ULCER              

      

 

                2018           INES VERNON MD, Ot           

   E66.01          

                          MORBID (SEVERE) OBESITY DUE TO EXCESS CA              

      

 

                2018           INES VERNON MD, Ot           

   L97.522         

                          NON-PRS CHRONIC ULCER OTH PRT LEFT FOOT               

      

 

                2018           INES VERNON MD, Ot           

   Z68.44          

                          BODY MASS INDEX (BMI) 60.0-69.9, ADULT                

    

 

                2018           INES VERNON MD, Ot           

   E11.42          

                          TYPE 2 DIABETES MELLITUS WITH DIABETIC P              

      

 

                2018           INES VERNON MD, Ot           

   E11.621         

                          TYPE 2 DIABETES MELLITUS WITH FOOT ULCER              

      

 

                2018           INES VERNON MD, Ot           

   E66.01          

                          MORBID (SEVERE) OBESITY DUE TO EXCESS CA              

      

 

                2018           INES VERNON MD, Ot           

   L97.522         

                          NON-PRS CHRONIC ULCER OTH PRT LEFT FOOT               

      

 

                2018           INES VERNON MD, Ot           

   E11.42          

                          TYPE 2 DIABETES MELLITUS WITH DIABETIC P              

      

 

                2018           INES VERNON MD, Ot           

   E11.621         

                          TYPE 2 DIABETES MELLITUS WITH FOOT ULCER              

      

 

                2018           INES VERNON MD, Ot           

   E66.01          

                          MORBID (SEVERE) OBESITY DUE TO EXCESS CA              

      

 

                2018           INES VERNON MD, Ot           

   L97.522         

                          NON-PRS CHRONIC ULCER OTH PRT LEFT FOOT               

      

 

                2018           INES VERNON MD, Ot           

   E11.621         

                          TYPE 2 DIABETES MELLITUS WITH FOOT ULCER              

      

 

                2018           INES VERNON MD, Ot           

   L97.522         

                          NON-PRS CHRONIC ULCER OTH PRT LEFT FOOT               

      

 

             2018                        Ot           E11.42           TYP

E 2 DIABETES 

MELLITUS WITH DIABETIC P                         

 

             2018                        Ot           E11.621           TY

PE 2 DIABETES 

MELLITUS WITH FOOT ULCER                         

 

             2018                        Ot           E66.01           MOR

BID (SEVERE) 

OBESITY DUE TO EXCESS CA                         

 

             2018                        Ot           L97.522           NO

N-PRS CHRONIC 

ULCER OTH PRT LEFT FOOT                          

 

             2018                        Ot           Z68.43           BOD

Y MASS INDEX 

(BMI) 50-59.9 , ADULT                            

 

                2018           INES VERNON MD, Ot           

   E11.42          

                          TYPE 2 DIABETES MELLITUS WITH DIABETIC P              

      

 

                2018           INES VERNON MD, Ot           

   E11.621         

                          TYPE 2 DIABETES MELLITUS WITH FOOT ULCER              

      

 

                2018           INES VERNON MD, Ot           

   E66.01          

                          MORBID (SEVERE) OBESITY DUE TO EXCESS CA              

      

 

                2018           INES VERNON MD, Ot           

   L97.522         

                          NON-PRS CHRONIC ULCER OTH PRT LEFT FOOT               

      

 

                2018           INES VERNON MD, Ot           

   M86.472         

                          CHRONIC OSTEOMYELITIS W DRAINING SINUS,               

      

 

                2018           INES VERNON MD, Ot           

   E11.42          

                          TYPE 2 DIABETES MELLITUS WITH DIABETIC P              

      

 

                2018           INES VERNON MD, Ot           

   E11.621         

                          TYPE 2 DIABETES MELLITUS WITH FOOT ULCER              

      

 

                2018           INES VERNON MD, Ot           

   E66.01          

                          MORBID (SEVERE) OBESITY DUE TO EXCESS CA              

      

 

                2018           INES VERNON MD, Ot           

   L97.522         

                          NON-PRS CHRONIC ULCER OTH PRT LEFT FOOT               

      

 

                2018           INES VERNON MD, Ot           

   L97.523         

                          NON-PRS CHRONIC ULCER OTH PRT LEFT FOOT               

      

 

                2018           INES VERNON MD, Ot           

   M86.472         

                          CHRONIC OSTEOMYELITIS W DRAINING SINUS,               

      

 

                2018           INES VERNON MD, Ot           

   T81.31XA        

                          DISRUPTION OF EXTERNAL OPERATION (SURGIC              

      

 

                2018           INES VERNON MD, Ot           

   E11.42          

                          TYPE 2 DIABETES MELLITUS WITH DIABETIC P              

      

 

                2018           INES VERNON MD, Ot           

   E11.621         

                          TYPE 2 DIABETES MELLITUS WITH FOOT ULCER              

      

 

                2018           INES VERNON MD, Ot           

   L97.523         

                          NON-PRS CHRONIC ULCER OTH PRT LEFT FOOT               

      

 

                2018           INES VERNON MD, Ot           

   E11.42          

                          TYPE 2 DIABETES MELLITUS WITH DIABETIC P              

      

 

                2018           INES VERNON MD, Ot           

   E11.621         

                          TYPE 2 DIABETES MELLITUS WITH FOOT ULCER              

      

 

                2018           INES VERNON MD           Ot           

   E66.01          

                          MORBID (SEVERE) OBESITY DUE TO EXCESS CA              

      

 

                2018           INES VERNON MD, Ot           

   L97.523         

                          NON-PRS CHRONIC ULCER OTH PRT LEFT FOOT               

      

 

                2018           INES VERNON MD, Ot           

   T81.31XA        

                          DISRUPTION OF EXTERNAL OPERATION (SURGIC              

      

 

                2018           INES VERNON MD, Ot           

   Z68.43          

                          BODY MASS INDEX (BMI) 50-59.9 , ADULT                 

   

 

             2018                        Ot           E11.42           TYP

E 2 DIABETES 

MELLITUS WITH DIABETIC P                         

 

             2018                        Ot           E11.621           TY

PE 2 DIABETES 

MELLITUS WITH FOOT ULCER                         

 

             2018                        Ot           E66.01           MOR

BID (SEVERE) 

OBESITY DUE TO EXCESS CA                         

 

             2018                        Ot           L97.522           NO

N-PRS CHRONIC 

ULCER OTH PRT LEFT FOOT                          

 

             2018                        Ot           Z68.43           BOD

Y MASS INDEX 

(BMI) 50-59.9 , ADULT                            

 

                2018           INES VERONN MD, Ot           

   E11.42          

                          TYPE 2 DIABETES MELLITUS WITH DIABETIC P              

      

 

                2018           INES VERNON MD, Ot           

   E11.621         

                          TYPE 2 DIABETES MELLITUS WITH FOOT ULCER              

      

 

                2018           INES VERNON MD, Ot           

   E66.01          

                          MORBID (SEVERE) OBESITY DUE TO EXCESS CA              

      

 

                2018           INES VERNON MD, Ot           

   L97.522         

                          NON-PRS CHRONIC ULCER OTH PRT LEFT FOOT               

      

 

                2018           INES VERNON MD, Ot           

   L97.523         

                          NON-PRS CHRONIC ULCER OTH PRT LEFT FOOT               

      

 

                2018           INES VERNON MD, Ot           

   M86.472         

                          CHRONIC OSTEOMYELITIS W DRAINING SINUS,               

      

 

                2018           INES VERNON MD, Ot           

   T81.31XA        

                          DISRUPTION OF EXTERNAL OPERATION (SURGIC              

      

 

                2018           INES VERNON MD, Ot           

   E11.42          

                          TYPE 2 DIABETES MELLITUS WITH DIABETIC P              

      

 

                2018           INES VERNON MD, Ot           

   E11.621         

                          TYPE 2 DIABETES MELLITUS WITH FOOT ULCER              

      

 

                2018           INES VERNON MD, Ot           

   E66.01          

                          MORBID (SEVERE) OBESITY DUE TO EXCESS CA              

      

 

                2018           INES VERNON MD, Ot           

   L97.523         

                          NON-PRS CHRONIC ULCER OTH PRT LEFT FOOT               

      

 

                2018           INES VERNON MD, Ot           

   T81.31XA        

                          DISRUPTION OF EXTERNAL OPERATION (SURGIC              

      

 

                2018           INES VERNON MD, Ot           

   Z68.43          

                          BODY MASS INDEX (BMI) 50-59.9 , ADULT                 

   

 

                2018           INES VERNON MD, Ot           

   E11.42          

                          TYPE 2 DIABETES MELLITUS WITH DIABETIC P              

      

 

                2018           INES VERNON MD, Ot           

   E11.621         

                          TYPE 2 DIABETES MELLITUS WITH FOOT ULCER              

      

 

                2018           INES VERNON MD, Ot           

   L97.523         

                          NON-PRS CHRONIC ULCER OTH PRT LEFT FOOT               

      

 

                2018           INES VERNON MD, Ot           

   E11.42          

                          TYPE 2 DIABETES MELLITUS WITH DIABETIC P              

      

 

                2018           INES VERNON MD, Ot           

   E11.621         

                          TYPE 2 DIABETES MELLITUS WITH FOOT ULCER              

      

 

                2018           INES VERNON MD, Ot           

   E66.01          

                          MORBID (SEVERE) OBESITY DUE TO EXCESS CA              

      

 

                2018           INES VERNON MD, Ot           

   L97.522         

                          NON-PRS CHRONIC ULCER OTH PRT LEFT FOOT               

      

 

                2018           INES VERNON MD, Ot           

   T81.31XA        

                          DISRUPTION OF EXTERNAL OPERATION (SURGIC              

      

 

                2018           INES VERNON MD, Ot           

   E11.42          

                          TYPE 2 DIABETES MELLITUS WITH DIABETIC P              

      

 

                2018           INES VERNON MD, Ot           

   E11.621         

                          TYPE 2 DIABETES MELLITUS WITH FOOT ULCER              

      

 

                2018           INES VERNON MD, Ot           

   E66.01          

                          MORBID (SEVERE) OBESITY DUE TO EXCESS CA              

      

 

                2018           INES VERNON MD, Ot           

   L97.522         

                          NON-PRS CHRONIC ULCER OTH PRT LEFT FOOT               

      

 

                2018           INES VERNON MD, Ot           

   M86.472         

                          CHRONIC OSTEOMYELITIS W DRAINING SINUS,               

      

 

                2018           INES VERNON MD, Ot           

   E11.42          

                          TYPE 2 DIABETES MELLITUS WITH DIABETIC P              

      

 

                2018           INES VERNON MD, Ot           

   E11.621         

                          TYPE 2 DIABETES MELLITUS WITH FOOT ULCER              

      

 

                2018           INES VERNON MD, Ot           

   E66.01          

                          MORBID (SEVERE) OBESITY DUE TO EXCESS CA              

      

 

                2018           INES VERNON MD, Ot           

   L97.522         

                          NON-PRS CHRONIC ULCER OTH PRT LEFT FOOT               

      

 

                2018           INES VERNON MD, Ot           

   T81.31XA        

                          DISRUPTION OF EXTERNAL OPERATION (SURGIC              

      

 

                2018           INES VERNON MD, Ot           

   E11.42          

                          TYPE 2 DIABETES MELLITUS WITH DIABETIC P              

      

 

                2018           INES VERNON MD, Ot           

   E11.621         

                          TYPE 2 DIABETES MELLITUS WITH FOOT ULCER              

      

 

                2018           INES VERNON MD, Ot           

   E66.01          

                          MORBID (SEVERE) OBESITY DUE TO EXCESS CA              

      

 

                2018           INES VERNON MD, Ot           

   L97.523         

                          NON-PRS CHRONIC ULCER OTH PRT LEFT FOOT               

      

 

                2018           INES VERNON MD, Ot           

   T81.31XA        

                          DISRUPTION OF EXTERNAL OPERATION (SURGIC              

      

 

                2018           INES VERNON MD, Ot           

   Z68.43          

                          BODY MASS INDEX (BMI) 50-59.9 , ADULT                 

   

 

                2018           INES VERNON MD, Ot           

   E11.42          

                          TYPE 2 DIABETES MELLITUS WITH DIABETIC P              

      

 

                2018           INES VERNON MD, Ot           

   E11.621         

                          TYPE 2 DIABETES MELLITUS WITH FOOT ULCER              

      

 

                2018           INES VERNON MD, Ot           

   E66.01          

                          MORBID (SEVERE) OBESITY DUE TO EXCESS CA              

      

 

                2018           INES VERNON MD, Ot           

   L97.522         

                          NON-PRS CHRONIC ULCER OTH PRT LEFT FOOT               

      

 

                2018           INES VERNON MD, Ot           

   T81.31XA        

                          DISRUPTION OF EXTERNAL OPERATION (SURGIC              

      

 

                10/01/2018           INES VERNON MD, Ot           

   E11.42          

                          TYPE 2 DIABETES MELLITUS WITH DIABETIC P              

      

 

                10/01/2018           INES VERNON MD, Ot           

   E11.621         

                          TYPE 2 DIABETES MELLITUS WITH FOOT ULCER              

      

 

                10/01/2018           INES VERNON MD, Ot           

   E66.01          

                          MORBID (SEVERE) OBESITY DUE TO EXCESS CA              

      

 

                10/01/2018           INES VERNON MD, Ot           

   L97.522         

                          NON-PRS CHRONIC ULCER OTH PRT LEFT FOOT               

      

 

                10/01/2018           NIES VERNON MD, Ot           

   T81.31XA        

                          DISRUPTION OF EXTERNAL OPERATION (SURGIC              

      

 

                10/09/2018           NARESH CARTER           Ot         

     E11.42        

                          TYPE 2 DIABETES MELLITUS WITH DIABETIC P              

      

 

                10/09/2018           NARESH CARTER           Ot         

     E11.621       

                          TYPE 2 DIABETES MELLITUS WITH FOOT ULCER              

      

 

                10/09/2018           NARESH CARTER           Ot         

     E66.01        

                          MORBID (SEVERE) OBESITY DUE TO EXCESS CA              

      

 

                10/09/2018           NARESH CARTER APRN           Ot         

     L97.522       

                          NON-PRS CHRONIC ULCER OTH PRT LEFT FOOT               

      

 

                10/09/2018           NARESH CARTER APRN           Ot         

     T81.31XA      

                          DISRUPTION OF EXTERNAL OPERATION (SURGIC              

      

 

                10/10/2018           INES VERNON MD           Ot           

   E11.42          

                          TYPE 2 DIABETES MELLITUS WITH DIABETIC P              

      

 

                10/10/2018           INES VERNON MD           Ot           

   E11.621         

                          TYPE 2 DIABETES MELLITUS WITH FOOT ULCER              

      

 

                10/10/2018           INES VERNON MD           Ot           

   E66.01          

                          MORBID (SEVERE) OBESITY DUE TO EXCESS CA              

      

 

                10/10/2018           INES VERNON MD, Ot           

   L97.522         

                          NON-PRS CHRONIC ULCER OTH PRT LEFT FOOT               

      

 

                10/10/2018           INES VERNON MD, Ot           

   T81.31XA        

                          DISRUPTION OF EXTERNAL OPERATION (SURGIC              

      

 

                10/18/2018           NARESH CARTER           Ot         

     E11.42        

                          TYPE 2 DIABETES MELLITUS WITH DIABETIC P              

      

 

                10/18/2018           NARESH CARTER           Ot         

     E11.621       

                          TYPE 2 DIABETES MELLITUS WITH FOOT ULCER              

      

 

                10/18/2018           NARESH CARTER APRN           Ot         

     E66.01        

                          MORBID (SEVERE) OBESITY DUE TO EXCESS CA              

      

 

                10/18/2018           NARESH CARTER           Ot         

     L97.522       

                          NON-PRS CHRONIC ULCER OTH PRT LEFT FOOT               

      

 

                10/18/2018           NARESH CARTER           Ot         

     T81.31XA      

                          DISRUPTION OF EXTERNAL OPERATION (SURGIC              

      

 

                10/25/2018           INES VERNON MD, Ot           

   E11.42          

                          TYPE 2 DIABETES MELLITUS WITH DIABETIC P              

      

 

                10/25/2018           INES VERNON MD, Ot           

   E11.621         

                          TYPE 2 DIABETES MELLITUS WITH FOOT ULCER              

      

 

                10/25/2018           INES VERNON MD           Ot           

   E66.01          

                          MORBID (SEVERE) OBESITY DUE TO EXCESS CA              

      

 

                10/25/2018           INES VERNON MD, Ot           

   L97.522         

                          NON-PRS CHRONIC ULCER OTH PRT LEFT FOOT               

      

 

                10/25/2018           INES VERNON MD           Ot           

   T81.31XA        

                          DISRUPTION OF EXTERNAL OPERATION (SURGIC              

      

 

                10/25/2018           NARESH CARTER           Ot         

     E11.42        

                          TYPE 2 DIABETES MELLITUS WITH DIABETIC P              

      

 

                10/25/2018           NARESH CARTER           Ot         

     E11.621       

                          TYPE 2 DIABETES MELLITUS WITH FOOT ULCER              

      

 

                10/25/2018           NARESH CARTER           Ot         

     E66.01        

                          MORBID (SEVERE) OBESITY DUE TO EXCESS CA              

      

 

                10/25/2018           NARESH CARTER APRN           Ot         

     L97.522       

                          NON-PRS CHRONIC ULCER OTH PRT LEFT FOOT               

      

 

                10/25/2018           NARESH CARTER APRN           Ot         

     T81.31XA      

                          DISRUPTION OF EXTERNAL OPERATION (SURGIC              

      

 

                2018           NARESH CARTER APRN           Ot         

     E11.42        

                          TYPE 2 DIABETES MELLITUS WITH DIABETIC P              

      

 

                2018           NARESH CARTER APRN           Ot         

     E11.621       

                          TYPE 2 DIABETES MELLITUS WITH FOOT ULCER              

      

 

                2018           NARESH CARTER APRN           Ot         

     E66.01        

                          MORBID (SEVERE) OBESITY DUE TO EXCESS CA              

      

 

                2018           NARESH CARTER APRN           Ot         

     L97.522       

                          NON-PRS CHRONIC ULCER OTH PRT LEFT FOOT               

      

 

                2018           NARESH CARTER APRN           Ot         

     T81.31XA      

                          DISRUPTION OF EXTERNAL OPERATION (SURGIC              

      

 

                2019           GEORGIHARVEYJUNIE ARNCHA           Ot           

   M25.562         

                          PAIN IN LEFT KNEE                    

 

                2019           INES VERNON MD           Ot           

   E11.42          

                          TYPE 2 DIABETES MELLITUS WITH DIABETIC P              

      

 

                2019           INES VERNON MD           Ot           

   E11.621         

                          TYPE 2 DIABETES MELLITUS WITH FOOT ULCER              

      

 

                2019           INES VERNON MD           Ot           

   E66.01          

                          MORBID (SEVERE) OBESITY DUE TO EXCESS CA              

      

 

                2019           INES VERNON MD           Ot           

   L97.522         

                          NON-PRS CHRONIC ULCER OTH PRT LEFT FOOT               

      

 

                2019           INES VERNON MD           Ot           

   E11.42          

                          TYPE 2 DIABETES MELLITUS WITH DIABETIC P              

      

 

                2019           INES VERNON MD           Ot           

   E11.621         

                          TYPE 2 DIABETES MELLITUS WITH FOOT ULCER              

      

 

                2019           INES VERNON MD           Ot           

   E66.01          

                          MORBID (SEVERE) OBESITY DUE TO EXCESS CA              

      

 

                2019           INES VERNON MD           Ot           

   L97.522         

                          NON-PRS CHRONIC ULCER OTH PRT LEFT FOOT               

      

 

                2019           NARESH CARTER APRMARIA TERESA           Ot         

     E11.42        

                          TYPE 2 DIABETES MELLITUS WITH DIABETIC P              

      

 

                2019           NARESH CARTER APRN           Ot         

     E11.621       

                          TYPE 2 DIABETES MELLITUS WITH FOOT ULCER              

      

 

                2019           NARESH CARTER APRN           Ot         

     E66.01        

                          MORBID (SEVERE) OBESITY DUE TO EXCESS CA              

      

 

                2019           NARESH CARTER APRN           Ot         

     L97.522       

                          NON-PRS CHRONIC ULCER OTH PRT LEFT FOOT               

      

 

                2019           INES VERNON MD           Ot           

   E11.42          

                          TYPE 2 DIABETES MELLITUS WITH DIABETIC P              

      

 

                2019           INES VERNON MD, Ot           

   E11.621         

                          TYPE 2 DIABETES MELLITUS WITH FOOT ULCER              

      

 

                2019           INES VERNON MD           Ot           

   E66.01          

                          MORBID (SEVERE) OBESITY DUE TO EXCESS CA              

      

 

                2019           INES VERNON MD, Ot           

   L97.522         

                          NON-PRS CHRONIC ULCER OTH PRT LEFT FOOT               

      

 

                2019           INES VERNON MD, Ot           

   Z68.43          

                          BODY MASS INDEX (BMI) 50-59.9 , ADULT                 

   

 

                2019           INES VERNON MD           Ot           

   E11.42          

                          TYPE 2 DIABETES MELLITUS WITH DIABETIC P              

      

 

                2019           INES VERNON MD, Ot           

   E11.621         

                          TYPE 2 DIABETES MELLITUS WITH FOOT ULCER              

      

 

                2019           INES VERNON MD, Ot           

   E66.01          

                          MORBID (SEVERE) OBESITY DUE TO EXCESS CA              

      

 

                2019           INES VERNON MD, Ot           

   L97.522         

                          NON-PRS CHRONIC ULCER OTH PRT LEFT FOOT               

      

 

                2019           INES VERNON MD           Ot           

   E11.42          

                          TYPE 2 DIABETES MELLITUS WITH DIABETIC P              

      

 

                2019           INES VERNON MD           Ot           

   E11.621         

                          TYPE 2 DIABETES MELLITUS WITH FOOT ULCER              

      

 

                2019           INES VERNON MD, Ot           

   E66.01          

                          MORBID (SEVERE) OBESITY DUE TO EXCESS CA              

      

 

                2019           INES VERNON MD, Ot           

   L97.522         

                          NON-PRS CHRONIC ULCER OTH PRT LEFT FOOT               

      

 

                2019           INES VERNON MD           Ot           

   E11.42          

                          TYPE 2 DIABETES MELLITUS WITH DIABETIC P              

      

 

                2019           INES VERNON MD           Ot           

   E11.621         

                          TYPE 2 DIABETES MELLITUS WITH FOOT ULCER              

      

 

                2019           INES VERNON MD           Ot           

   E66.01          

                          MORBID (SEVERE) OBESITY DUE TO EXCESS CA              

      

 

                2019           INES VERNON MD, Ot           

   L97.522         

                          NON-PRS CHRONIC ULCER OTH PRT LEFT FOOT               

      

 

                2019           INES VERNON MD, Ot           

   Z68.43          

                          BODY MASS INDEX (BMI) 50-59.9 , ADULT                 

   

 

                2019           INES VERNON MD           Ot           

   E11.42          

                          TYPE 2 DIABETES MELLITUS WITH DIABETIC P              

      

 

                2019           INSE VERNON MD, Ot           

   E11.621         

                          TYPE 2 DIABETES MELLITUS WITH FOOT ULCER              

      

 

                2019           INES VERNON MD, Ot           

   E66.01          

                          MORBID (SEVERE) OBESITY DUE TO EXCESS CA              

      

 

                2019           INES VERNON MD, Ot           

   L97.522         

                          NON-PRS CHRONIC ULCER OTH PRT LEFT FOOT               

      

 

                2019           INES VERNON MD, Ot           

   E11.42          

                          TYPE 2 DIABETES MELLITUS WITH DIABETIC P              

      

 

                2019           INES VERNON MD, Ot           

   E11.621         

                          TYPE 2 DIABETES MELLITUS WITH FOOT ULCER              

      

 

                2019           INES VERNON MD, Ot           

   E66.01          

                          MORBID (SEVERE) OBESITY DUE TO EXCESS CA              

      

 

                2019           INES VERNON MD, Ot           

   L97.522         

                          NON-PRS CHRONIC ULCER OTH PRT LEFT FOOT               

      

 

                2019           INES VERNON MD, Ot           

   Z68.43          

                          BODY MASS INDEX (BMI) 50-59.9 , ADULT                 

   

 

                2019           INES VERNON MD, Ot           

   E11.42          

                          TYPE 2 DIABETES MELLITUS WITH DIABETIC P              

      

 

                2019           INES VERNON MD, Ot           

   E11.621         

                          TYPE 2 DIABETES MELLITUS WITH FOOT ULCER              

      

 

                2019           INES VERNON MD, Ot           

   E66.01          

                          MORBID (SEVERE) OBESITY DUE TO EXCESS CA              

      

 

                2019           INES VERNON MD, Ot           

   L97.522         

                          NON-PRS CHRONIC ULCER OTH PRT LEFT FOOT               

      

 

                2019           INES VERNON MD, Ot           

   Z68.43          

                          BODY MASS INDEX (BMI) 50-59.9 , ADULT                 

   

 

                2019           INES VERNON MD, Ot           

   E11.42          

                          TYPE 2 DIABETES MELLITUS WITH DIABETIC P              

      

 

                2019           INES VERNON MD, Ot           

   E11.621         

                          TYPE 2 DIABETES MELLITUS WITH FOOT ULCER              

      

 

                2019           INES VERNON MD, Ot           

   E66.01          

                          MORBID (SEVERE) OBESITY DUE TO EXCESS CA              

      

 

                2019           INES VERNON MD, Ot           

   L97.522         

                          NON-PRS CHRONIC ULCER OTH PRT LEFT FOOT               

      

 

                2019           INES VERNON MD, Ot           

   Z68.43          

                          BODY MASS INDEX (BMI) 50-59.9 , ADULT                 

   

 

                2019           INES VERNON MD, Ot           

   E11.42          

                          TYPE 2 DIABETES MELLITUS WITH DIABETIC P              

      

 

                2019           INES VERNON MD, Ot           

   E11.621         

                          TYPE 2 DIABETES MELLITUS WITH FOOT ULCER              

      

 

                2019           INES VERNON MD, Ot           

   E66.01          

                          MORBID (SEVERE) OBESITY DUE TO EXCESS CA              

      

 

                2019           INES VERNON MD, Ot           

   L97.522         

                          NON-PRS CHRONIC ULCER OTH PRT LEFT FOOT               

      

 

                2019           INES VERNON MD, Ot           

   Z68.43          

                          BODY MASS INDEX (BMI) 50-59.9 , ADULT                 

   

 

                2019           INES VERNON MD           Ot           

   E11.42          

                          TYPE 2 DIABETES MELLITUS WITH DIABETIC P              

      

 

                2019           INES VERNON MD, Ot           

   E11.621         

                          TYPE 2 DIABETES MELLITUS WITH FOOT ULCER              

      

 

                2019           INES VERNON MD, Ot           

   E66.01          

                          MORBID (SEVERE) OBESITY DUE TO EXCESS CA              

      

 

                2019           INES VERNON MD, Ot           

   L97.522         

                          NON-PRS CHRONIC ULCER OTH PRT LEFT FOOT               

      

 

                2019           INES VERNON MD, Ot           

   E11.42          

                          TYPE 2 DIABETES MELLITUS WITH DIABETIC P              

      

 

                2019           INES VERNON MD, Ot           

   E11.621         

                          TYPE 2 DIABETES MELLITUS WITH FOOT ULCER              

      

 

                2019           INES VERNON MD, Ot           

   E66.01          

                          MORBID (SEVERE) OBESITY DUE TO EXCESS CA              

      

 

                2019           INES VERNON MD, Ot           

   L97.522         

                          NON-PRS CHRONIC ULCER OTH PRT LEFT FOOT               

      

 

                2019           INES VERNON MD           Ot           

   E11.42          

                          TYPE 2 DIABETES MELLITUS WITH DIABETIC P              

      

 

                2019           INES VERNON MD, Ot           

   E11.621         

                          TYPE 2 DIABETES MELLITUS WITH FOOT ULCER              

      

 

                2019           INES VERNON MD           Ot           

   E66.01          

                          MORBID (SEVERE) OBESITY DUE TO EXCESS CA              

      

 

                2019           INES VERNON MD, Ot           

   L97.523         

                          NON-PRS CHRONIC ULCER OTH PRT LEFT FOOT               

      

 

                2019           INES VERNON MD, Ot           

   E11.42          

                          TYPE 2 DIABETES MELLITUS WITH DIABETIC P              

      

 

                2019           INES VERNON MD           Ot           

   E11.621         

                          TYPE 2 DIABETES MELLITUS WITH FOOT ULCER              

      

 

                2019           INES VERNON MD           Ot           

   E66.01          

                          MORBID (SEVERE) OBESITY DUE TO EXCESS CA              

      

 

                2019           INES VERNON MD, Ot           

   L97.523         

                          NON-PRS CHRONIC ULCER OTH PRT LEFT FOOT               

      

 

                2019           INES VERNON MD           Ot           

   E11.42          

                          TYPE 2 DIABETES MELLITUS WITH DIABETIC P              

      

 

                2019           INES VERNON MD           Ot           

   E11.621         

                          TYPE 2 DIABETES MELLITUS WITH FOOT ULCER              

      

 

                2019           INES VERNON MD, Ot           

   E66.01          

                          MORBID (SEVERE) OBESITY DUE TO EXCESS CA              

      

 

                2019           NIES VERNON MD, Ot           

   L97.523         

                          NON-PRS CHRONIC ULCER OTH PRT LEFT FOOT               

      

 

                2019           INES VERNON MD, Ot           

   Z68.43          

                          BODY MASS INDEX (BMI) 50-59.9 , ADULT                 

   

 

                2019           INES VERNON MD           Ot           

   E11.42          

                          TYPE 2 DIABETES MELLITUS WITH DIABETIC P              

      

 

                2019           INES VERNON MD, Ot           

   E11.621         

                          TYPE 2 DIABETES MELLITUS WITH FOOT ULCER              

      

 

                2019           INES VERNON MD, Ot           

   E66.01          

                          MORBID (SEVERE) OBESITY DUE TO EXCESS CA              

      

 

                2019           INES VERNON MD, Ot           

   L97.523         

                          NON-PRS CHRONIC ULCER OTH PRT LEFT FOOT               

      

 

                2019           INES VERNON MD, Ot           

   Z68.43          

                          BODY MASS INDEX (BMI) 50-59.9 , ADULT                 

   

 

                2019           INES VERNON MD, Ot           

   E11.42          

                          TYPE 2 DIABETES MELLITUS WITH DIABETIC P              

      

 

                2019           INES VERNON MD, Ot           

   E11.621         

                          TYPE 2 DIABETES MELLITUS WITH FOOT ULCER              

      

 

                2019           INES VERNON MD, Ot           

   E66.01          

                          MORBID (SEVERE) OBESITY DUE TO EXCESS CA              

      

 

                2019           INES VERNON MD, Ot           

   L97.523         

                          NON-PRS CHRONIC ULCER OTH PRT LEFT FOOT               

      

 

                2019           INES VERNON MD, Ot           

   E11.42          

                          TYPE 2 DIABETES MELLITUS WITH DIABETIC P              

      

 

                2019           INES VERNON MD, Ot           

   E11.621         

                          TYPE 2 DIABETES MELLITUS WITH FOOT ULCER              

      

 

                2019           INES VERNON MD, Ot           

   E66.01          

                          MORBID (SEVERE) OBESITY DUE TO EXCESS CA              

      

 

                2019           INES VERNON MD, Ot           

   L97.523         

                          NON-PRS CHRONIC ULCER OTH PRT LEFT FOOT               

      

 

                2019           INES VERNON MD, Ot           

   Z68.43          

                          BODY MASS INDEX (BMI) 50-59.9 , ADULT                 

   

 

                2019           INES VERNON MD, Ot           

   E11.42          

                          TYPE 2 DIABETES MELLITUS WITH DIABETIC P              

      

 

                2019           INES VERNON MD, Ot           

   E11.621         

                          TYPE 2 DIABETES MELLITUS WITH FOOT ULCER              

      

 

                2019           INES VERNON MD, Ot           

   E66.01          

                          MORBID (SEVERE) OBESITY DUE TO EXCESS CA              

      

 

                2019           INES VERNON MD, Ot           

   L97.523         

                          NON-PRS CHRONIC ULCER OTH PRT LEFT FOOT               

      

 

                2019           INES VERNON MD, Ot           

   Z68.43          

                          BODY MASS INDEX (BMI) 50-59.9 , ADULT                 

   

 

                2019           INES VERNON MD, Ot           

   E11.42          

                          TYPE 2 DIABETES MELLITUS WITH DIABETIC P              

      

 

                2019           INES VERNON MD, Ot           

   E11.621         

                          TYPE 2 DIABETES MELLITUS WITH FOOT ULCER              

      

 

                2019           INES VERNON MD, Ot           

   E66.01          

                          MORBID (SEVERE) OBESITY DUE TO EXCESS CA              

      

 

                2019           INES VERNON MD, Ot           

   L97.523         

                          NON-PRS CHRONIC ULCER OTH PRT LEFT FOOT               

      

 

                2019           INES VERNON MD, Ot           

   Z68.43          

                          BODY MASS INDEX (BMI) 50-59.9 , ADULT                 

   

 

                2019           INES VERNON MD, Ot           

   E11.42          

                          TYPE 2 DIABETES MELLITUS WITH DIABETIC P              

      

 

                2019           INES VERNON MD, Ot           

   E11.621         

                          TYPE 2 DIABETES MELLITUS WITH FOOT ULCER              

      

 

                2019           INES VERNON MD, Ot           

   E66.01          

                          MORBID (SEVERE) OBESITY DUE TO EXCESS CA              

      

 

                2019           INES VERNON MD, Ot           

   L97.523         

                          NON-PRS CHRONIC ULCER OTH PRT LEFT FOOT               

      

 

                2019           INES VERNON MD, Ot           

   E11.42          

                          TYPE 2 DIABETES MELLITUS WITH DIABETIC P              

      

 

                2019           INES VERNON MD, Ot           

   E11.621         

                          TYPE 2 DIABETES MELLITUS WITH FOOT ULCER              

      

 

                2019           INES VERNON MD, Ot           

   E66.01          

                          MORBID (SEVERE) OBESITY DUE TO EXCESS CA              

      

 

                2019           INES VERNON MD, Ot           

   L97.523         

                          NON-PRS CHRONIC ULCER OTH PRT LEFT FOOT               

      

 

                2019           INES VERNON MD, Ot           

   Z68.43          

                          BODY MASS INDEX (BMI) 50-59.9 , ADULT                 

   

 

                2019           INES VERNON MD, Ot           

   E11.42          

                          TYPE 2 DIABETES MELLITUS WITH DIABETIC P              

      

 

                2019           INES VERNON MD, Ot           

   E11.621         

                          TYPE 2 DIABETES MELLITUS WITH FOOT ULCER              

      

 

                2019           INES VERNON MD           Ot           

   E66.01          

                          MORBID (SEVERE) OBESITY DUE TO EXCESS CA              

      

 

                2019           INES VERNON MD, Ot           

   L97.523         

                          NON-PRS CHRONIC ULCER OTH PRT LEFT FOOT               

      

 

                2019           INES VERNON MD, Ot           

   Z68.43          

                          BODY MASS INDEX (BMI) 50-59.9 , ADULT                 

   

 

                2019           INES VERNON MD, Ot           

   E11.621         

                          TYPE 2 DIABETES MELLITUS WITH FOOT ULCER              

      

 

                2019           INES VERNON MD, Ot           

   L97.523         

                          NON-PRS CHRONIC ULCER OTH PRT LEFT FOOT               

      

 

                2019           INES VERNON MD, Ot           

   E11.42          

                          TYPE 2 DIABETES MELLITUS WITH DIABETIC P              

      

 

                2019           INES VERNON MD, Ot           

   E11.621         

                          TYPE 2 DIABETES MELLITUS WITH FOOT ULCER              

      

 

                2019           INES VERNON MD, Ot           

   E66.01          

                          MORBID (SEVERE) OBESITY DUE TO EXCESS CA              

      

 

                2019           INES VERNON MD, Ot           

   L97.521         

                          NON-PRS CHRONIC ULCER OTH PRT L FOOT MATTHEWS              

      

 

                2019           INES VERNON MD, Ot           

   Z68.43          

                          BODY MASS INDEX (BMI) 50-59.9 , ADULT                 

   

 

                2019           INES VERNON MD, Ot           

   E11.42          

                          TYPE 2 DIABETES MELLITUS WITH DIABETIC P              

      

 

                2019           INES VERNON MD, Ot           

   E11.621         

                          TYPE 2 DIABETES MELLITUS WITH FOOT ULCER              

      

 

                2019           INES VERNON MD, Ot           

   E66.01          

                          MORBID (SEVERE) OBESITY DUE TO EXCESS CA              

      

 

                2019           INES VERNON MD, Ot           

   L97.522         

                          NON-PRS CHRONIC ULCER OTH PRT LEFT FOOT               

      

 

                2019           INES VERNON MD, Ot           

   Z68.43          

                          BODY MASS INDEX (BMI) 50-59.9 , ADULT                 

   

 

                2019           INES VERNON MD, Ot           

   E11.42          

                          TYPE 2 DIABETES MELLITUS WITH DIABETIC P              

      

 

                2019           INES VERNON MD, Ot           

   E11.621         

                          TYPE 2 DIABETES MELLITUS WITH FOOT ULCER              

      

 

                2019           INES VERONN MD, Ot           

   E66.01          

                          MORBID (SEVERE) OBESITY DUE TO EXCESS CA              

      

 

                2019           INES VERNON MD, Ot           

   L97.522         

                          NON-PRS CHRONIC ULCER OTH PRT LEFT FOOT               

      

 

                2019           INES VERNON MD, Ot           

   Z68.43          

                          BODY MASS INDEX (BMI) 50-59.9 , ADULT                 

   

 

                2019           INES VERNON MD, Ot           

   E11.42          

                          TYPE 2 DIABETES MELLITUS WITH DIABETIC P              

      

 

                2019           INES VERNON MD, Ot           

   E11.621         

                          TYPE 2 DIABETES MELLITUS WITH FOOT ULCER              

      

 

                2019           INES VERNON MD, Ot           

   E66.01          

                          MORBID (SEVERE) OBESITY DUE TO EXCESS CA              

      

 

                2019           INES VERNON MD, Ot           

   L97.522         

                          NON-PRS CHRONIC ULCER OTH PRT LEFT FOOT               

      

 

                2019           INES VERNON MD, Ot           

   Z68.43          

                          BODY MASS INDEX (BMI) 50-59.9 , ADULT                 

   

 

                2019           INES VERNON MD, Ot           

   E11.42          

                          TYPE 2 DIABETES MELLITUS WITH DIABETIC P              

      

 

                2019           INES VERNON MD, Ot           

   E11.621         

                          TYPE 2 DIABETES MELLITUS WITH FOOT ULCER              

      

 

                2019           INES VERNON MD, Ot           

   E66.01          

                          MORBID (SEVERE) OBESITY DUE TO EXCESS CA              

      

 

                2019           INES VERNON MD, Ot           

   L97.522         

                          NON-PRS CHRONIC ULCER OTH PRT LEFT FOOT               

      

 

                2019           INES VERNON MD, Ot           

   Z68.43          

                          BODY MASS INDEX (BMI) 50-59.9 , ADULT                 

   

 

                2019           INES VERNON MD, Ot           

   E11.42          

                          TYPE 2 DIABETES MELLITUS WITH DIABETIC P              

      

 

                2019           INES VERNON MD, Ot           

   E11.621         

                          TYPE 2 DIABETES MELLITUS WITH FOOT ULCER              

      

 

                2019           INES VERNON MD, Ot           

   E66.01          

                          MORBID (SEVERE) OBESITY DUE TO EXCESS CA              

      

 

                2019           INES VERNON MD, Ot           

   L97.522         

                          NON-PRS CHRONIC ULCER OTH PRT LEFT FOOT               

      

 

                2019           INES VERNON MD, Ot           

   Z68.43          

                          BODY MASS INDEX (BMI) 50-59.9 , ADULT                 

   

 

                2019           NARESH CARTER           Ot         

     E11.42        

                          TYPE 2 DIABETES MELLITUS WITH DIABETIC P              

      

 

                2019           NARESH CARTER           Ot         

     E11.621       

                          TYPE 2 DIABETES MELLITUS WITH FOOT ULCER              

      

 

                2019           NARESH CARTER           Ot         

     E66.01        

                          MORBID (SEVERE) OBESITY DUE TO EXCESS CA              

      

 

                2019           NARESH CARTER           Ot         

     L97.522       

                          NON-PRS CHRONIC ULCER OTH PRT LEFT FOOT               

      

 

                2019           INES VERNON MD           Ot           

   E11.42          

                          TYPE 2 DIABETES MELLITUS WITH DIABETIC P              

      

 

                2019           INES VERNON MD, Ot           

   E11.621         

                          TYPE 2 DIABETES MELLITUS WITH FOOT ULCER              

      

 

                2019           INES VERNON MD, Ot           

   E66.01          

                          MORBID (SEVERE) OBESITY DUE TO EXCESS CA              

      

 

                2019           INES VERNON MD, Ot           

   L97.522         

                          NON-PRS CHRONIC ULCER OTH PRT LEFT FOOT               

      

 

                2019           INES VERNON MD, Ot           

   Z68.43          

                          BODY MASS INDEX (BMI) 50-59.9 , ADULT                 

   

 

                2019           INES VERNON MD           Ot           

   E11.42          

                          TYPE 2 DIABETES MELLITUS WITH DIABETIC P              

      

 

                2019           INES VERNON MD, Ot           

   E11.621         

                          TYPE 2 DIABETES MELLITUS WITH FOOT ULCER              

      

 

                2019           INES VERNON MD, Ot           

   E66.01          

                          MORBID (SEVERE) OBESITY DUE TO EXCESS CA              

      

 

                2019           INES VERNON MD, Ot           

   L97.521         

                          NON-PRS CHRONIC ULCER OTH PRT L FOOT MATTHEWS              

      

 

                2019           INES VERNON MD, Ot           

   L97.522         

                          NON-PRS CHRONIC ULCER OTH PRT LEFT FOOT               

      

 

                2019           INES VERNON MD, Ot           

   E11.42          

                          TYPE 2 DIABETES MELLITUS WITH DIABETIC P              

      

 

                2019           INES VERNON MD, Ot           

   E11.621         

                          TYPE 2 DIABETES MELLITUS WITH FOOT ULCER              

      

 

                2019           INES VERNON MD, Ot           

   E11.65          

                          TYPE 2 DIABETES MELLITUS WITH HYPERGLYCE              

      

 

                2019           INES VERNON MD, Ot           

   E66.01          

                          MORBID (SEVERE) OBESITY DUE TO EXCESS CA              

      

 

                2019           INES VERNON MD, Ot           

   I70.245         

                          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              

      

 

                2019           INES VERNON MD, Ot           

   L97.521         

                          NON-PRS CHRONIC ULCER OTH PRT L FOOT MATTHEWS              

      

 

                2019           INES VERNON MD, Ot           

   L97.522         

                          NON-PRS CHRONIC ULCER OTH PRT LEFT FOOT               

      

 

                2019           INES VERNON MD, Ot           

   Z68.44          

                          BODY MASS INDEX (BMI) 60.0-69.9, ADULT                

    

 

                2019           INES VERNON MD, Ot           

   E11.42          

                          TYPE 2 DIABETES MELLITUS WITH DIABETIC P              

      

 

                2019           INES VERNON MD, Ot           

   E11.621         

                          TYPE 2 DIABETES MELLITUS WITH FOOT ULCER              

      

 

                2019           INES VERNON MD, Ot           

   E11.65          

                          TYPE 2 DIABETES MELLITUS WITH HYPERGLYCE              

      

 

                2019           INES VERNON MD, Ot           

   E66.01          

                          MORBID (SEVERE) OBESITY DUE TO EXCESS CA              

      

 

                2019           INES VERNON MD, Ot           

   I70.245         

                          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              

      

 

                2019           INES VERNON MD, Ot           

   L97.521         

                          NON-PRS CHRONIC ULCER OTH PRT L FOOT MATTHEWS              

      

 

                2019           INES VERNON MD, Ot           

   L97.522         

                          NON-PRS CHRONIC ULCER OTH PRT LEFT FOOT               

      

 

                2019           INES VERNON MD, Ot           

   E11.42          

                          TYPE 2 DIABETES MELLITUS WITH DIABETIC P              

      

 

                2019           INES VERNON MD, Ot           

   E11.621         

                          TYPE 2 DIABETES MELLITUS WITH FOOT ULCER              

      

 

                2019           INES VERNON MD, Ot           

   E66.01          

                          MORBID (SEVERE) OBESITY DUE TO EXCESS CA              

      

 

                2019           INES VERNON MD, Ot           

   L97.522         

                          NON-PRS CHRONIC ULCER OTH PRT LEFT FOOT               

      

 

                2019           INES VERNON MD, Ot           

   E11.42          

                          TYPE 2 DIABETES MELLITUS WITH DIABETIC P              

      

 

                2019           INES VERNON MD, Ot           

   E11.621         

                          TYPE 2 DIABETES MELLITUS WITH FOOT ULCER              

      

 

                2019           INES VERNON MD, Ot           

   E11.622         

                          TYPE 2 DIABETES MELLITUS WITH OTHER SKIN              

      

 

                2019           INES VERNON MD, Ot           

   E66.01          

                          MORBID (SEVERE) OBESITY DUE TO EXCESS CA              

      

 

                2019           INES VERNON MD, Ot           

   L97.221         

                          NON-PRS CHRONIC ULCER OF LEFT CALF LIMIT              

      

 

                2019           INES VERNON MD, Ot           

   L97.522         

                          NON-PRS CHRONIC ULCER OTH PRT LEFT FOOT               

      

 

                2019           INES VERNON MD, Ot           

   Z68.44          

                          BODY MASS INDEX (BMI) 60.0-69.9, ADULT                

    

 

                2019           INES VERNON MD, Ot           

   E11.42          

                          TYPE 2 DIABETES MELLITUS WITH DIABETIC P              

      

 

                2019           INES VERNON MD, Ot           

   E11.621         

                          TYPE 2 DIABETES MELLITUS WITH FOOT ULCER              

      

 

                2019           INES VERNON MD, Ot           

   E66.01          

                          MORBID (SEVERE) OBESITY DUE TO EXCESS CA              

      

 

                2019           INES VERNON MD, Ot           

   L97.221         

                          NON-PRS CHRONIC ULCER OF LEFT CALF LIMIT              

      

 

                2019           INES VERNON MD, Ot           

   L97.522         

                          NON-PRS CHRONIC ULCER OTH PRT LEFT FOOT               

      

 

                2019           INES VERNON MD, Ot           

   E11.42          

                          TYPE 2 DIABETES MELLITUS WITH DIABETIC P              

      

 

                2019           INES VERNON MD, Ot           

   E11.621         

                          TYPE 2 DIABETES MELLITUS WITH FOOT ULCER              

      

 

                2019           INES VERNON MD, Ot           

   E66.01          

                          MORBID (SEVERE) OBESITY DUE TO EXCESS CA              

      

 

                2019           INES VERNON MD, Ot           

   L97.221         

                          NON-PRS CHRONIC ULCER OF LEFT CALF LIMIT              

      

 

                2019           INES VERNON MD, Ot           

   L97.522         

                          NON-PRS CHRONIC ULCER OTH PRT LEFT FOOT               

      

 

                2019           INES VERNON MD, Ot           

   Z68.44          

                          BODY MASS INDEX (BMI) 60.0-69.9, ADULT                

    

 

                2019           INES VERNON MD, Ot           

   E11.42          

                          TYPE 2 DIABETES MELLITUS WITH DIABETIC P              

      

 

                2019           INES VERNON MD, Ot           

   E11.621         

                          TYPE 2 DIABETES MELLITUS WITH FOOT ULCER              

      

 

                2019           INES VERNON MD, Ot           

   E66.01          

                          MORBID (SEVERE) OBESITY DUE TO EXCESS CA              

      

 

                2019           INES VERNON MD, Ot           

   L97.522         

                          NON-PRS CHRONIC ULCER OTH PRT LEFT FOOT               

      

 

                2019           INES VERNON MD, Ot           

   Z68.44          

                          BODY MASS INDEX (BMI) 60.0-69.9, ADULT                

    

 

                2019           INES VERNON MD, Ot           

   E11.42          

                          TYPE 2 DIABETES MELLITUS WITH DIABETIC P              

      

 

                2019           INES VERNON MD, Ot           

   E11.621         

                          TYPE 2 DIABETES MELLITUS WITH FOOT ULCER              

      

 

                2019           INES VERNON MD, Ot           

   E66.01          

                          MORBID (SEVERE) OBESITY DUE TO EXCESS CA              

      

 

                2019           INES VERNON MD, Ot           

   L97.522         

                          NON-PRS CHRONIC ULCER OTH PRT LEFT FOOT               

      

 

                2019           INES VERNON MD, Ot           

   Z68.44          

                          BODY MASS INDEX (BMI) 60.0-69.9, ADULT                

    

 

                2019           INES VERNON MD, Ot           

   E11.42          

                          TYPE 2 DIABETES MELLITUS WITH DIABETIC P              

      

 

                2019           INES VERNON MD, Ot           

   E11.621         

                          TYPE 2 DIABETES MELLITUS WITH FOOT ULCER              

      

 

                2019           INES VERNON MD, Ot           

   E66.01          

                          MORBID (SEVERE) OBESITY DUE TO EXCESS CA              

      

 

                2019           INES VERNON MD, Ot           

   L97.522         

                          NON-PRS CHRONIC ULCER OTH PRT LEFT FOOT               

      

 

                2019           INES VERNON MD, Ot           

   E11.42          

                          TYPE 2 DIABETES MELLITUS WITH DIABETIC P              

      

 

                2019           INES VERNON MD, Ot           

   E11.621         

                          TYPE 2 DIABETES MELLITUS WITH FOOT ULCER              

      

 

                2019           INES VERNON MD, Ot           

   E66.01          

                          MORBID (SEVERE) OBESITY DUE TO EXCESS CA              

      

 

                2019           INES VERNON MD, Ot           

   L97.522         

                          NON-PRS CHRONIC ULCER OTH PRT LEFT FOOT               

      

 

                2019           INES VERNON MD, Ot           

   Z68.44          

                          BODY MASS INDEX (BMI) 60.0-69.9, ADULT                

    

 

                2019           INES VERNON MD, Ot           

   E11.42          

                          TYPE 2 DIABETES MELLITUS WITH DIABETIC P              

      

 

                2019           INES VERNON MD, Ot           

   E11.621         

                          TYPE 2 DIABETES MELLITUS WITH FOOT ULCER              

      

 

                2019           INES VERNON MD, Ot           

   E66.01          

                          MORBID (SEVERE) OBESITY DUE TO EXCESS CA              

      

 

                2019           INES VERNON MD, Ot           

   L97.522         

                          NON-PRS CHRONIC ULCER OTH PRT LEFT FOOT               

      

 

                2019           INES VERNON MD, Ot           

   E11.42          

                          TYPE 2 DIABETES MELLITUS WITH DIABETIC P              

      

 

                2019           INES VERNON MD, Ot           

   E11.621         

                          TYPE 2 DIABETES MELLITUS WITH FOOT ULCER              

      

 

                2019           INES VERNON MD, Ot           

   E66.01          

                          MORBID (SEVERE) OBESITY DUE TO EXCESS CA              

      

 

                2019           INES VERNON MD, Ot           

   L97.522         

                          NON-PRS CHRONIC ULCER OTH PRT LEFT FOOT               

      

 

                2019           INES VERNON MD, Ot           

   Z68.44          

                          BODY MASS INDEX (BMI) 60.0-69.9, ADULT                

    

 

                2019           INES VERNON MD, Ot           

   E11.42          

                          TYPE 2 DIABETES MELLITUS WITH DIABETIC P              

      

 

                2019           INES VERNON MD, Ot           

   E11.621         

                          TYPE 2 DIABETES MELLITUS WITH FOOT ULCER              

      

 

                2019           INES VERNON MD, Ot           

   E66.01          

                          MORBID (SEVERE) OBESITY DUE TO EXCESS CA              

      

 

                2019           INES VERNON MD, Ot           

   L97.522         

                          NON-PRS CHRONIC ULCER OTH PRT LEFT FOOT               

      

 

             2019                        Ot           E11.42           TYP

E 2 DIABETES 

MELLITUS WITH DIABETIC P                         

 

             2019                        Ot           E11.621           TY

PE 2 DIABETES 

MELLITUS WITH FOOT ULCER                         

 

             2019                        Ot           E66.01           MOR

BID (SEVERE) 

OBESITY DUE TO EXCESS CA                         

 

             2019                        Ot           L97.522           NO

N-PRS CHRONIC 

ULCER OTH PRT LEFT FOOT                          

 

             2019                        Ot           Z68.43           BOD

Y MASS INDEX 

(BMI) 50-59.9 , ADULT                            

 

             2019                        Ot           E11.42           TYP

E 2 DIABETES 

MELLITUS WITH DIABETIC P                         

 

             2019                        Ot           E11.621           TY

PE 2 DIABETES 

MELLITUS WITH FOOT ULCER                         

 

             2019                        Ot           E66.01           MOR

BID (SEVERE) 

OBESITY DUE TO EXCESS CA                         

 

             2019                        Ot           L97.522           NO

N-PRS CHRONIC 

ULCER OTH PRT LEFT FOOT                          

 

             2019                        Ot           E11.42           TYP

E 2 DIABETES 

MELLITUS WITH DIABETIC P                         

 

             2019                        Ot           E11.621           TY

PE 2 DIABETES 

MELLITUS WITH FOOT ULCER                         

 

             2019                        Ot           E66.01           MOR

BID (SEVERE) 

OBESITY DUE TO EXCESS CA                         

 

             2019                        Ot           L97.522           NO

N-PRS CHRONIC 

ULCER OTH PRT LEFT FOOT                          

 

             2019                        Ot           Z68.43           BOD

Y MASS INDEX 

(BMI) 50-59.9 , ADULT                            

 

                2019           INES VERNON MD           Ot           

   E11.42          

                          TYPE 2 DIABETES MELLITUS WITH DIABETIC P              

      

 

                2019           INES VERNON MD           Ot           

   E11.621         

                          TYPE 2 DIABETES MELLITUS WITH FOOT ULCER              

      

 

                2019           INES VERNON MD, Ot           

   E66.01          

                          MORBID (SEVERE) OBESITY DUE TO EXCESS CA              

      

 

                2019           INES VERNON MD, Ot           

   L97.522         

                          NON-PRS CHRONIC ULCER OTH PRT LEFT FOOT               

      

 

                2019           INES VERNON MD, Ot           

   M86.472         

                          CHRONIC OSTEOMYELITIS W DRAINING SINUS,               

      

 

                2019           INES VERNON MD           Ot           

   E11.42          

                          TYPE 2 DIABETES MELLITUS WITH DIABETIC P              

      

 

                2019           INES VERNON MD           Ot           

   E11.621         

                          TYPE 2 DIABETES MELLITUS WITH FOOT ULCER              

      

 

                2019           INES VERNON MD           Ot           

   E66.01          

                          MORBID (SEVERE) OBESITY DUE TO EXCESS CA              

      

 

                2019           INES VERNON MD, Ot           

   L97.522         

                          NON-PRS CHRONIC ULCER OTH PRT LEFT FOOT               

      

 

                2019           INES VERNON MD, Ot           

   L97.523         

                          NON-PRS CHRONIC ULCER OTH PRT LEFT FOOT               

      

 

                2019           INES VERNON MD, Ot           

   M86.472         

                          CHRONIC OSTEOMYELITIS W DRAINING SINUS,               

      

 

                2019           INES VERNON MD, Ot           

   T81.31XA        

                          DISRUPTION OF EXTERNAL OPERATION (SURGIC              

      

 

                2019           INES VERNON MD, Ot           

   E11.42          

                          TYPE 2 DIABETES MELLITUS WITH DIABETIC P              

      

 

                2019           INES VERNON MD, Ot           

   E11.621         

                          TYPE 2 DIABETES MELLITUS WITH FOOT ULCER              

      

 

                2019           INES VERNON MD, Ot           

   E66.01          

                          MORBID (SEVERE) OBESITY DUE TO EXCESS CA              

      

 

                2019           INES VERNON MD, Ot           

   L97.523         

                          NON-PRS CHRONIC ULCER OTH PRT LEFT FOOT               

      

 

                2019           INES VERNON MD, Ot           

   T81.31XA        

                          DISRUPTION OF EXTERNAL OPERATION (SURGIC              

      

 

                2019           INES VERNON MD, Ot           

   Z68.43          

                          BODY MASS INDEX (BMI) 50-59.9 , ADULT                 

   

 

                2019           INES VERNON MD, Ot           

   E11.42          

                          TYPE 2 DIABETES MELLITUS WITH DIABETIC P              

      

 

                2019           INES VERNON MD, Ot           

   E11.621         

                          TYPE 2 DIABETES MELLITUS WITH FOOT ULCER              

      

 

                2019           INES VERNON MD, Ot           

   L97.523         

                          NON-PRS CHRONIC ULCER OTH PRT LEFT FOOT               

      

 

                2019           INES VERNON MD, Ot           

   E11.42          

                          TYPE 2 DIABETES MELLITUS WITH DIABETIC P              

      

 

                2019           INES VERNON MD, Ot           

   E11.621         

                          TYPE 2 DIABETES MELLITUS WITH FOOT ULCER              

      

 

                2019           INES VERNON MD, Ot           

   E66.01          

                          MORBID (SEVERE) OBESITY DUE TO EXCESS CA              

      

 

                2019           INES VERNON MD, Ot           

   L97.523         

                          NON-PRS CHRONIC ULCER OTH PRT LEFT FOOT               

      

 

                2019           INES VENRON MD, Ot           

   T81.31XA        

                          DISRUPTION OF EXTERNAL OPERATION (SURGIC              

      

 

                2019           INES VERNON MD, Ot           

   Z68.43          

                          BODY MASS INDEX (BMI) 50-59.9 , ADULT                 

   

 

                2019           INES VERNON MD, Ot           

   E11.42          

                          TYPE 2 DIABETES MELLITUS WITH DIABETIC P              

      

 

                2019           INES VERNON MD, Ot           

   E11.621         

                          TYPE 2 DIABETES MELLITUS WITH FOOT ULCER              

      

 

                2019           INES VERNON MD, Ot           

   E66.01          

                          MORBID (SEVERE) OBESITY DUE TO EXCESS CA              

      

 

                2019           INES VERNON MD, Ot           

   L97.522         

                          NON-PRS CHRONIC ULCER OTH PRT LEFT FOOT               

      

 

                2019           INES VERNON MD, Ot           

   T81.31XA        

                          DISRUPTION OF EXTERNAL OPERATION (SURGIC              

      

 

                2019           INES VERNON MD           Ot           

   E11.42          

                          TYPE 2 DIABETES MELLITUS WITH DIABETIC P              

      

 

                2019           INES VERNON MD, Ot           

   E11.621         

                          TYPE 2 DIABETES MELLITUS WITH FOOT ULCER              

      

 

                2019           INES VERNON MD           Ot           

   E66.01          

                          MORBID (SEVERE) OBESITY DUE TO EXCESS CA              

      

 

                2019           INES VERNON MD, Ot           

   L97.522         

                          NON-PRS CHRONIC ULCER OTH PRT LEFT FOOT               

      

 

                2019           INES VERNON MD, Ot           

   T81.31XA        

                          DISRUPTION OF EXTERNAL OPERATION (SURGIC              

      

 

                2019           INES VERNON MD, Ot           

   E11.42          

                          TYPE 2 DIABETES MELLITUS WITH DIABETIC P              

      

 

                2019           INES VERNON MD, Ot           

   E11.621         

                          TYPE 2 DIABETES MELLITUS WITH FOOT ULCER              

      

 

                2019           INES EVRNON MD, Ot           

   E66.01          

                          MORBID (SEVERE) OBESITY DUE TO EXCESS CA              

      

 

                2019           INES VERNON MD, Ot           

   L97.522         

                          NON-PRS CHRONIC ULCER OTH PRT LEFT FOOT               

      

 

                2019           INES VERNON MD, Ot           

   T81.31XA        

                          DISRUPTION OF EXTERNAL OPERATION (SURGIC              

      

 

                2019           NARESH CARTER APRN           Ot         

     E11.42        

                          TYPE 2 DIABETES MELLITUS WITH DIABETIC P              

      

 

                2019           NARESH CARTER APRN           Ot         

     E11.621       

                          TYPE 2 DIABETES MELLITUS WITH FOOT ULCER              

      

 

                2019           NARESH CARTER APRN           Ot         

     E66.01        

                          MORBID (SEVERE) OBESITY DUE TO EXCESS CA              

      

 

                2019           NARESH CARTER APRN           Ot         

     L97.522       

                          NON-PRS CHRONIC ULCER OTH PRT LEFT FOOT               

      

 

                2019           NARESH CARTER           Ot         

     T81.31XA      

                          DISRUPTION OF EXTERNAL OPERATION (SURGIC              

      

 

                2019           NARESH CARTER APRN           Ot         

     E11.42        

                          TYPE 2 DIABETES MELLITUS WITH DIABETIC P              

      

 

                2019           NARESH CARTER APRMARIA TERESA           Ot         

     E11.621       

                          TYPE 2 DIABETES MELLITUS WITH FOOT ULCER              

      

 

                2019           NARESH CARTER           Ot         

     E66.01        

                          MORBID (SEVERE) OBESITY DUE TO EXCESS CA              

      

 

                2019           NARESH CARTER APRN           Ot         

     L97.522       

                          NON-PRS CHRONIC ULCER OTH PRT LEFT FOOT               

      

 

                2019           NARESH CARTER           Ot         

     T81.31XA      

                          DISRUPTION OF EXTERNAL OPERATION (SURGIC              

      

 

                2019           JONAH CARDENAS MD           Ot      

        E11.40     

                          TYPE 2 DIABETES MELLITUS WITH DIABETIC N              

      

 

                2019           JONAH CARDENAS MD           Ot      

        E11.621    

                          TYPE 2 DIABETES MELLITUS WITH FOOT ULCER              

      

 

                2019           JONAH CARDENAS MD           Ot      

        I10        

                          ESSENTIAL (PRIMARY) HYPERTENSION                    

 

                2019           JONAH CARDENAS MD           Ot      

        L02.416    

                          CUTANEOUS ABSCESS OF LEFT LOWER LIMB                  

  

 

                2019           JONAH CARDENAS MD           Ot      

        L97.529    

                          NON-PRESSURE CHRONIC ULCER OTH PRT LEFT               

      

 

                2019           JONAH CARDENAS MD           Ot      

        Z79.4      

                          LONG TERM (CURRENT) USE OF INSULIN                    

 

                2019           JONAH CARDENAS MD           Ot      

        Z82.49     

                          FAMILY HX OF ISCHEM HEART DIS AND OTH DI              

      

 

                2019           JONAH CARDENAS MD           Ot      

        Z87.891    

                          PERSONAL HISTORY OF NICOTINE DEPENDENCE               

     

 

                2019           JONAH CARDENAS MD           Ot      

        Z88.1      

                          ALLERGY STATUS TO OTHER ANTIBIOTIC AGENT              

      

 

                2019           JONAH CARDENAS MD           Ot      

        E11.40     

                          TYPE 2 DIABETES MELLITUS WITH DIABETIC N              

      

 

                2019           JONAH CARDENAS MD           Ot      

        E11.621    

                          TYPE 2 DIABETES MELLITUS WITH FOOT ULCER              

      

 

                2019           JONAH CARDENAS MD           Ot      

        I10        

                          ESSENTIAL (PRIMARY) HYPERTENSION                    

 

                2019           JONAH CARDENAS MD           Ot      

        L02.416    

                          CUTANEOUS ABSCESS OF LEFT LOWER LIMB                  

  

 

                2019           JONAH CARDENAS MD           Ot      

        L97.529    

                          NON-PRESSURE CHRONIC ULCER OTH PRT LEFT               

      

 

                2019           JONAH CARDENAS MD           Ot      

        Z79.4      

                          LONG TERM (CURRENT) USE OF INSULIN                    

 

                2019           JONAH CARDENAS MD           Ot      

        Z82.49     

                          FAMILY HX OF ISCHEM HEART DIS AND OTH DI              

      

 

                2019           JONAH CARDENAS MD           Ot      

        Z87.891    

                          PERSONAL HISTORY OF NICOTINE DEPENDENCE               

     

 

                2019           JONAH CARDENAS MD           Ot      

        Z88.1      

                          ALLERGY STATUS TO OTHER ANTIBIOTIC AGENT              

      

 

             2020           JEFRY, CARLOS ARNP           Ot           E11.40  

         TYPE

2 DIABETES MELLITUS WITH DIABETIC N                    

 

             2020           JEFRY, CARLOS ARNP           Ot           E11.621 

          

TYPE 2 DIABETES MELLITUS WITH FOOT ULCER                    

 

             2020           JEFRY, CARLOS ARNP           Ot           E11.65  

         TYPE

2 DIABETES MELLITUS WITH HYPERGLYCE                    

 

             2020           JEFRY, CARLOS ARNP           Ot           I10     

      

ESSENTIAL (PRIMARY) HYPERTENSION                    

 

             2020           JEFRY, CARLOS ARNP           Ot           L02.412 

          

CUTANEOUS ABSCESS OF LEFT AXILLA                    

 

             2020           JEFRY, CARLOS ARNP           Ot           L97.529 

          

NON-PRESSURE CHRONIC ULCER OTH PRT LEFT                     

 

             2020           JEFRY, CARLOS ARNP           Ot           R73.9   

        

HYPERGLYCEMIA, UNSPECIFIED                       

 

             2020           JEFRY, CARLOS ARNP           Ot           Z79.4   

        LONG 

TERM (CURRENT) USE OF INSULIN                    

 

             2020           JEFRY, CARLOS ARNP           Ot           Z82.49  

         

FAMILY HX OF ISCHEM HEART DIS AND OTH DI                    

 

             2020           JEFRY, CARLOS ARNP           Ot           Z88.1   

        

ALLERGY STATUS TO OTHER ANTIBIOTIC AGENT                    

 

             2020           JEFRY, CARLOS ARNP           Ot           E11.40  

         TYPE

2 DIABETES MELLITUS WITH DIABETIC N                    

 

             2020           JEFRY, CARLOS ARNP           Ot           E11.621 

          

TYPE 2 DIABETES MELLITUS WITH FOOT ULCER                    

 

             2020           JEFRY, CARLOS ARNP           Ot           E11.65  

         TYPE

2 DIABETES MELLITUS WITH HYPERGLYCE                    

 

             2020           JEFRY, CARLOS ARNP           Ot           I10     

      

ESSENTIAL (PRIMARY) HYPERTENSION                    

 

             2020           JEFRY, CARLOS ARNP           Ot           L02.412 

          

CUTANEOUS ABSCESS OF LEFT AXILLA                    

 

             2020           JEFRY, CARLOS ARNP           Ot           L97.529 

          

NON-PRESSURE CHRONIC ULCER OTH PRT LEFT                     

 

             2020           JEFRY, CARLOS ARNP           Ot           R73.9   

        

HYPERGLYCEMIA, UNSPECIFIED                       

 

             2020           JEFRY, CARLOS ARNP           Ot           Z79.4   

        LONG 

TERM (CURRENT) USE OF INSULIN                    

 

             2020           JEFRY, CARLOS ARNP           Ot           Z82.49  

         

FAMILY HX OF ISCHEM HEART DIS AND OTH DI                    

 

             2020           JEFRY, CARLOS ARNP           Ot           Z88.1   

        

ALLERGY STATUS TO OTHER ANTIBIOTIC AGENT                    



                                                                                
                                                                                
                                                                                
                                                                                
                                                                                
                                                                                
                                                                                
                                                                                
                                                                                
                                                                                
                                                                                
                                                                                
                                                                                
                                                                                
                                                                                
                                                                                
                                                                                
                                                                                
                                                                                
                                                                                
                                                                                
                                                                                
                                                                                
                                                                                
                                                                                
                                                                                
                                                                                
                                                                                
                      



Procedures

      



                Code            Description           Performed By           Per

erasmo On        

 

                                      [id number]                                 ROUT

INE VENIPUNCTURE          

                                                    2014        

 

                                      [id number]                                 A1C 

(IN-HOUSE)                

                                                    2014        

 

                                      [id number]                                 MICR

O ALBUMIN-IN HOUSE        

                                                    2014        

 

                                21821                                 CBC       

                    

                                        2014        

 

                                      [id number]                                 MICR

OALBUMIN                  

                                                    2014        

 

                                      [id number]                                 GF

R CALC (RESULT ONLY)      

                                                    2014        

 

                                [id number]                                 CMP       

                    

                                        2014        

 

                                71115                                 TSH       

                    

                                        2014        

 

                                      PODIATRY                                 W

JOHN FABIAN               

                                                    2014        

 

                                      99273                                 DEBR

ARLET NAIL >6               

                                                    10/24/2014        

 

                                      [id number]                                 ROUT

INE VENIPUNCTURE          

                                                    2014        

 

                                      [id number]                                 A1C 

(IN-HOUSE)                

                                                    2014        

 

                                      [id number]                                 GF

R CALC (RESULT ONLY)      

                                                    2014        

 

                                [id number]                                 [facility]       

                    

                                        2014        

 

                                      [id number]                                 XRAY

 SHOULDER RIGHT COMP 2 

VIEWS                                               12/10/2014        

 

                                      [id number]                                 MICR

O ALBUMIN-IN HOUSE        

                                                    12/10/2014        

 

                                      ORTHOPEDI                                 

ZITA SALGADO               

                                                    2014        

 

                                      61.0                                 SCROT

UM   TUNICA I   D         

                                                    04/15/2015        

 

                                      [id number]                                 EX

CISION OF L FOOT 

SUBCU/FASCIA, OPEN AP                                               2017  

      



                                                      



Results

      



                    Test                Result              Range        

 

                                        Microalb/Creat Ratio, Rand Ur - 

7 11:47         

 

                    Creatinine, Urine           20.2 mg/dL           Not Estab. 

       

 

                    Microalbumin, Urine           57.9 ug/mL           Not Estab

.        

 

                    Microalb/Creat Ratio           286.6 mg/g creat           0.

0-30.0        

 

                                        Comp. Metabolic Panel (14) - 17 14

:09         

 

                    Glucose, Serum           443 mg/dL           65-99        

 

                    BUN                 17 mg/dL            6-20        

 

                    Creatinine, Serum           0.72 mg/dL           0.76-1.27  

      

 

                    eGFR If NonAfricn Am           118 mL/min/1.73              

 >59        

 

                    eGFR If Africn Am           137 mL/min/1.73               >5

9        

 

                    BUN/Creatinine Ratio           24                  9-20     

   

 

                    Sodium, Serum           137 mmol/L           134-144        

 

                    Potassium, Serum           5.3 mmol/L           3.5-5.2     

   

 

                    Chloride, Serum           96 mmol/L                   

 

                    Carbon Dioxide, Total           22 mmol/L           18-29   

     

 

                    Calcium, Serum           10.4 mg/dL           8.7-10.2      

  

 

                    Protein, Total, Serum           7.2 g/dL            6.0-8.5 

       

 

                    Albumin, Serum           4.3 g/dL            3.5-5.5        

 

                    Globulin, Total           2.9 g/dL            1.5-4.5       

 

 

                    A/G Ratio           1.5                 1.2-2.2        

 

                    Bilirubin, Total           0.3 mg/dL           0.0-1.2      

  

 

                    Alkaline Phosphatase, S           101 IU/L            

        

 

                    AST (SGOT)           19 IU/L             0-40        

 

                    ALT (SGPT)           34 IU/L             0-44        

 

                                        Comp. Metabolic Panel (14) - 17 09

:07         

 

                    Glucose, Serum           264 mg/dL           65-99        

 

                    BUN                 14 mg/dL            6-20        

 

                    Creatinine, Serum           0.61 mg/dL           0.76-1.27  

      

 

                    eGFR If NonAfricn Am           127 mL/min/1.73              

 >59        

 

                    eGFR If Africn Am           146 mL/min/1.73               >5

9        

 

                    BUN/Creatinine Ratio           23                  9-20     

   

 

                    Sodium, Serum           139 mmol/L           134-144        

 

                    Potassium, Serum           4.6 mmol/L           3.5-5.2     

   

 

                    Chloride, Serum           95 mmol/L                   

 

                    Carbon Dioxide, Total           24 mmol/L           18-29   

     

 

                    Calcium, Serum           10.3 mg/dL           8.7-10.2      

  

 

                    Protein, Total, Serum           6.9 g/dL            6.0-8.5 

       

 

                    Albumin, Serum           3.7 g/dL            3.5-5.5        

 

                    Globulin, Total           3.2 g/dL            1.5-4.5       

 

 

                    A/G Ratio           1.2                 1.2-2.2        

 

                    Bilirubin, Total           0.3 mg/dL           0.0-1.2      

  

 

                    Alkaline Phosphatase, S           78 IU/L             

        

 

                    AST (SGOT)           18 IU/L             0-40        

 

                    ALT (SGPT)           28 IU/L             0-44        

 

                                        Gram stain microscopy - 17 22:22  

       

 

                    GRAM STAIN RESULT           MODERATE # GRAM NEGATIVE RODS   

         NRG        

 

                                        Bacteria identification in wound by cult

ure - 17 22:22         

 

                    Bacteria identification in wound by culture           [id number]

04            NRG   

    

 

                    FREE TEXT EXTERNAL           SENSITIVITY REPORTED 17 8:

45            NRG   

    

 

                    QUANTITY OF GROWTH           Moderate Growth            NR 

       

 

                                        Bacterial susceptibility panel - 

7 22:22         

 

                          Gentamicin susceptibility test by minimum inhibitory c

oncentration           <= 

                                        NRG        

 

                                        Trimethoprim/sulfamethoxazole susceptibi

lity test by minimum 

inhibitoryconcentration           <=                        NRG        

 

                          Tobramycin susceptibility test by minimum inhibitory c

oncentration           <= 

                                        NRG        

 

                          Cefazolin susceptibility test by minimum inhibitory co

ncentration           >=  

                                        NRG        

 

                                        Piperacillin/tazobactam susceptibility t

est by minimum inhibitory concentration 

                          <=                        NRG        

 

                          Ciprofloxacin susceptibility test by minimum inhibitor

y concentration           

<=                                      NRG        

 

                          Meropenem susceptibility test by minimum inhibitory co

ncentration           <=  

                                        NRG        

 

                          Aztreonam susceptibility test by minimum inhibitory co

ncentration           <=  

                                        NRG        

 

                          Cefepime susceptibility test by minimum inhibitory con

centration           <=   

                                        NRG        

 

                                        Bacterial susceptibility panel - 

7 22:22         

 

                          Gentamicin susceptibility test by minimum inhibitory c

oncentration           S  

                                        NRG        

 

                          Erythromycin susceptibility test by minimum inhibitory

 concentration           2

                                        NRG        

 

                          Vancomycin susceptibility test by minimum inhibitory c

oncentration           1  

                                        NRG        

 

                          Ampicillin susceptibility test by minimum inhibitory c

oncentration           <= 

                                        NRG        

 

                          Linezolid susceptibility test by minimum inhibitory co

ncentration           2   

                                        NRG        

 

                                        Bacterial susceptibility panel - 

7 22:22         

 

                          Oxacillin susceptibility test by minimum inhibitory co

ncentration           <=  

                                        NRG        

 

                          Gentamicin susceptibility test by minimum inhibitory c

oncentration           <= 

                                        NRG        

 

                          Clindamycin susceptibility test by minimum inhibitory 

concentration           R 

                                        NRG        

 

                          Erythromycin susceptibility test by minimum inhibitory

 concentration           

>=                                      NRG        

 

                                        Trimethoprim/sulfamethoxazole susceptibi

lity test by minimum 

inhibitoryconcentration           <=                        NRG        

 

                          Vancomycin susceptibility test by minimum inhibitory c

oncentration           <= 

                                        NRG        

 

                          Levofloxacin susceptibility test by minimum inhibitory

 concentration           

<=                                      NRG        

 

                          Rifampin susceptibility test by minimum inhibitory con

centration           <=   

                                        NRG        

 

                          Tetracycline susceptibility test by minimum inhibitory

 concentration           

<=                                      NRG        

 

                                        Complete blood count (CBC) with automate

d white blood cell (WBC) differential - 

17 22:35         

 

                          Blood leukocytes automated count (number/volume)      

     7.2 10*3/uL          

                                        4.3-11.0        

 

                          Blood erythrocytes automated count (number/volume)    

       5.10 10*6/uL       

                                        4.35-5.85        

 

                    Venous blood hemoglobin measurement (mass/volume)           

14.7 g/dL           

13.3-17.7        

 

                    Blood hematocrit (volume fraction)           42 %           

     40-54        

 

                    Automated erythrocyte mean corpuscular volume           83 [

foz_[id number]]           

80-99        

 

                                        Automated erythrocyte mean corpuscular h

emoglobin (mass per erythrocyte)        

                          29 pg                     25-34        

 

                                        Automated erythrocyte mean corpuscular h

emoglobin concentration measurement 

(mass/volume)             35 g/dL                   32-36        

 

                    Automated erythrocyte distribution width ratio           14.

1 %              10.0-

14.5        

 

                    Automated blood platelet count (count/volume)           280 

10*3/uL           

130-400        

 

                          Automated blood platelet mean volume measurement      

     10.3 [foz_us]        

                                        7.4-10.4        

 

                    Automated blood neutrophils/100 leukocytes           56 %   

             42-75       

 

 

                    Automated blood lymphocytes/100 leukocytes           36 %   

             12-44       

 

 

                    Blood monocytes/100 leukocytes           6 %                

 0-12        

 

                    Automated blood eosinophils/100 leukocytes           2 %    

             0-10        

 

                    Automated blood basophils/100 leukocytes           0 %      

           0-10        

 

                    Blood neutrophils automated count (number/volume)           

4.0 10*3            

1.8-7.8        

 

                    Blood lymphocytes automated count (number/volume)           

2.6 10*3            

1.0-4.0        

 

                    Blood monocytes automated count (number/volume)           0.

4 10*3            

0.0-1.0        

 

                    Automated eosinophil count           0.2 10*3/uL           0

.0-0.3        

 

                    Automated blood basophil count (count/volume)           0.0 

10*3/uL           

0.0-0.1        

 

                                        PT panel in platelet poor plasma by coag

ulation assay - 17 22:35         

 

                          Prothrombin time (PT) in platelet poor plasma by coagu

lation assay           

12.5 s                                  12.2-14.7        

 

                          INR in platelet poor plasma or blood by coagulation as

say           0.9         

                                        0.8-1.4        

 

                                        Erythrocyte sedimentation rate by michelle

gren method - 17 22:35         

 

                    Erythrocyte sedimentation rate by westergren method         

  29 mm               0-

15        

 

                                        Activated partial thromboplastin time (a

PTT) in platelet poor plasma 

bycoagulation assay - 17 22:35         

 

                                        Activated partial thromboplastin time (a

PTT) in platelet poor plasma 

bycoagulation assay           27 s                      24-35        

 

                                        Comprehensive metabolic panel - 17

 22:35         

 

                          Serum or plasma sodium measurement (moles/volume)     

      132 mmol/L          

                                        135-145        

 

                          Serum or plasma potassium measurement (moles/volume)  

         4.1 mmol/L       

                                        3.6-5.0        

 

                          Serum or plasma chloride measurement (moles/volume)   

        95 mmol/L         

                                                

 

                    Carbon dioxide           23 mmol/L           21-32        

 

                          Serum or plasma anion gap determination (moles/volume)

           14 mmol/L      

                                        5-14        

 

                          Serum or plasma urea nitrogen measurement (mass/volume

)           12 mg/dL      

                                        7-18        

 

                          Serum or plasma creatinine measurement (mass/volume)  

         1.01 mg/dL       

                                        0.60-1.30        

 

                    Serum or plasma urea nitrogen/creatinine mass ratio         

  12                  NRG 

       

 

                                        Serum or plasma creatinine measurement w

ith calculation of estimated glomerular 

filtration rate           >                         NRG        

 

                    Serum or plasma glucose measurement (mass/volume)           

583 mg/dL           

        

 

                          Serum or plasma calcium measurement (mass/volume)     

      10.4 mg/dL          

                                        8.5-10.1        

 

                          Serum or plasma total bilirubin measurement (mass/volu

me)           0.5 mg/dL   

                                        0.1-1.0        

 

                                        Serum or plasma alkaline phosphatase kianna

surement (enzymatic activity/volume)    

                          103 U/L                           

 

                                        Serum or plasma aspartate aminotransfera

se measurement (enzymatic 

activity/volume)           12 U/L                    5-34        

 

                                        Serum or plasma alanine aminotransferase

 measurement (enzymatic activity/volume)

                          23 U/L                    0-55        

 

                    Serum or plasma protein measurement (mass/volume)           

7.5 g/dL            

6.4-8.2        

 

                    Serum or plasma albumin measurement (mass/volume)           

3.9 g/dL            

3.2-4.5        

 

                                        Serum or plasma thyrotropin measurement 

by detection limit <=0.05 miu/l 

(units/volume) - 17 22:35         

 

                                        Serum or plasma thyrotropin measurement 

by detection limit <=0.05 miu/l 

(units/volume)            1.69 u[iU]/mL             0.35-4.94        

 

                                        Blood lactic acid measurement (moles/vol

ume) - 17 22:35         

 

                    Blood lactic acid measurement (moles/volume)           3.52 

mmol/L           

0.50-2.00        

 

                                        Serum or plasma C reactive protein measu

rement (mass/volume) - 17 22:35   

      

 

                          Serum or plasma C reactive protein measurement (mass/v

olume)           5.76 

mg/dL                                   0.00-0.50        

 

                                        Bacterial blood culture - 17 22:35

         

 

                    Bacterial blood culture           NG                  NRG   

     

 

                                        Bacterial blood culture - 17 23:00

         

 

                    Bacterial blood culture           NG                  NRG   

     

 

                                        Serum or plasma lactate measurement (mol

es/volume) - 17 01:05         

 

                          Serum or plasma lactate measurement (moles/volume)    

       3.19 mmol/L        

                                        0.50-2.00        

 

                                        Complete blood count (CBC) with automate

d white blood cell (WBC) differential - 

17 05:38         

 

                          Blood leukocytes automated count (number/volume)      

     8.1 10*3/uL          

                                        4.3-11.0        

 

                          Blood erythrocytes automated count (number/volume)    

       4.85 10*6/uL       

                                        4.35-5.85        

 

                    Venous blood hemoglobin measurement (mass/volume)           

14.0 g/dL           

13.3-17.7        

 

                    Blood hematocrit (volume fraction)           41 %           

     40-54        

 

                    Automated erythrocyte mean corpuscular volume           85 [

foz_us]           

80-99        

 

                                        Automated erythrocyte mean corpuscular h

emoglobin (mass per erythrocyte)        

                          29 pg                     25-34        

 

                                        Automated erythrocyte mean corpuscular h

emoglobin concentration measurement 

(mass/volume)             34 g/dL                   32-36        

 

                    Automated erythrocyte distribution width ratio           14.

4 %              10.0-

14.5        

 

                    Automated blood platelet count (count/volume)           275 

10*3/uL           

130-400        

 

                          Automated blood platelet mean volume measurement      

     10.5 [foz_us]        

                                        7.4-10.4        

 

                    Automated blood neutrophils/100 leukocytes           50 %   

             42-75       

 

 

                    Automated blood lymphocytes/100 leukocytes           41 %   

             12-44       

 

 

                    Blood monocytes/100 leukocytes           6 %                

 0-12        

 

                    Automated blood eosinophils/100 leukocytes           2 %    

             0-10        

 

                    Automated blood basophils/100 leukocytes           0 %      

           0-10        

 

                    Blood neutrophils automated count (number/volume)           

4.1 10*3            

1.8-7.8        

 

                    Blood lymphocytes automated count (number/volume)           

3.4 10*3            

1.0-4.0        

 

                    Blood monocytes automated count (number/volume)           0.

5 10*3            

0.0-1.0        

 

                    Automated eosinophil count           0.2 10*3/uL           0

.0-0.3        

 

                    Automated blood basophil count (count/volume)           0.0 

10*3/uL           

0.0-0.1        

 

                                        Comprehensive metabolic panel - 17

 05:38         

 

                          Serum or plasma sodium measurement (moles/volume)     

      135 mmol/L          

                                        135-145        

 

                          Serum or plasma potassium measurement (moles/volume)  

         4.0 mmol/L       

                                        3.6-5.0        

 

                          Serum or plasma chloride measurement (moles/volume)   

        100 mmol/L        

                                                

 

                    Carbon dioxide           22 mmol/L           21-32        

 

                          Serum or plasma anion gap determination (moles/volume)

           13 mmol/L      

                                        5-14        

 

                          Serum or plasma urea nitrogen measurement (mass/volume

)           11 mg/dL      

                                        7-18        

 

                          Serum or plasma creatinine measurement (mass/volume)  

         0.77 mg/dL       

                                        0.60-1.30        

 

                    Serum or plasma urea nitrogen/creatinine mass ratio         

  14                  NRG 

       

 

                                        Serum or plasma creatinine measurement w

ith calculation of estimated glomerular 

filtration rate           >                         NRG        

 

                    Serum or plasma glucose measurement (mass/volume)           

385 mg/dL           

        

 

                    Serum or plasma calcium measurement (mass/volume)           

9.8 mg/dL           

8.5-10.1        

 

                          Serum or plasma total bilirubin measurement (mass/volu

me)           0.6 mg/dL   

                                        0.1-1.0        

 

                                        Serum or plasma alkaline phosphatase kianna

surement (enzymatic activity/volume)    

                          93 U/L                            

 

                                        Serum or plasma aspartate aminotransfera

se measurement (enzymatic 

activity/volume)           13 U/L                    5-34        

 

                                        Serum or plasma alanine aminotransferase

 measurement (enzymatic activity/volume)

                          21 U/L                    0-55        

 

                    Serum or plasma protein measurement (mass/volume)           

6.5 g/dL            

6.4-8.2        

 

                    Serum or plasma albumin measurement (mass/volume)           

3.6 g/dL            

3.2-4.5        

 

                                        Hemoglobin A1c - 17 05:38         

 

                    Hemoglobin A1c           12.0 %              4.5-6.2        

 

                                        Capillary blood glucose measurement by g

lucometer (mass/volume) - 17 06:20

         

 

                          Capillary blood glucose measurement by glucometer (mas

s/volume)           359 

mg/dL                                           

 

                                        Blood lactic acid measurement (moles/vol

ume) - 17 09:57         

 

                    Blood lactic acid measurement (moles/volume)           2.38 

mmol/L           

0.50-2.00        

 

                                        Capillary blood glucose measurement by g

lucometer (mass/volume) - 17 11:04

         

 

                          Capillary blood glucose measurement by glucometer (mas

s/volume)           302 

mg/dL                                           

 

                                        Serum or plasma lactate measurement (mol

es/volume) - 17 12:10         

 

                          Serum or plasma lactate measurement (moles/volume)    

       1.95 mmol/L        

                                        0.50-2.00        

 

                                        Capillary blood glucose measurement by g

lucometer (mass/volume) - 17 16:15

         

 

                          Capillary blood glucose measurement by glucometer (mas

s/volume)           168 

mg/dL                                           

 

                                        Capillary blood glucose measurement by g

lucometer (mass/volume) - 17 20:52

         

 

                          Capillary blood glucose measurement by glucometer (mas

s/volume)           209 

mg/dL                                           

 

                                        Capillary blood glucose measurement by g

lucometer (mass/volume) - 17 06:23

         

 

                          Capillary blood glucose measurement by glucometer (mas

s/volume)           260 

mg/dL                                           

 

                                        Capillary blood glucose measurement by g

lucometer (mass/volume) - 17 11:18

         

 

                          Capillary blood glucose measurement by glucometer (mas

s/volume)           252 

mg/dL                                           

 

                                        Vancomycin trough - 17 13:30      

   

 

                    Vancomycin trough           23.3 ug/mL           10.0-20.0  

      

 

                                        Capillary blood glucose measurement by g

lucometer (mass/volume) - 17 15:53

         

 

                          Capillary blood glucose measurement by glucometer (mas

s/volume)           134 

mg/dL                                           

 

                                        Capillary blood glucose measurement by g

lucometer (mass/volume) - 17 21:08

         

 

                          Capillary blood glucose measurement by glucometer (mas

s/volume)           178 

mg/dL                                           

 

                                        Capillary blood glucose measurement by g

lucometer (mass/volume) - 17 05:17

         

 

                          Capillary blood glucose measurement by glucometer (mas

s/volume)           151 

mg/dL                                           

 

                                        Vancomycin trough - 17 09:05      

   

 

                    Vancomycin trough           15.9 ug/mL           10.0-20.0  

      

 

                                        Capillary blood glucose measurement by g

lucometer (mass/volume) - 17 11:05

         

 

                          Capillary blood glucose measurement by glucometer (mas

s/volume)           187 

mg/dL                                           

 

                                        Capillary blood glucose measurement by g

lucometer (mass/volume) - 17 15:52

         

 

                          Capillary blood glucose measurement by glucometer (mas

s/volume)           117 

mg/dL                                           

 

                                        Capillary blood glucose measurement by g

lucometer (mass/volume) - 17 22:59

         

 

                          Capillary blood glucose measurement by glucometer (mas

s/volume)           98 

mg/dL                                           

 

                                        Capillary blood glucose measurement by g

lucometer (mass/volume) - 17 06:18

         

 

                          Capillary blood glucose measurement by glucometer (mas

s/volume)           105 

mg/dL                                           

 

                                        Capillary blood glucose measurement by g

lucometer (mass/volume) - 17 10:57

         

 

                          Capillary blood glucose measurement by glucometer (mas

s/volume)           163 

mg/dL                                           

 

                                        Bacteria identification in isolate by an

aerobe culture - 10/06/17 09:10         

 

                          Bacteria identification in isolate by anaerobe culture

           NOANA          

                                        NRG        

 

                                        Gram stain microscopy - 10/06/17 09:10  

       

 

                    GRAM STAIN RESULT           FEW WBC'S, NO BACTERIA OBSERVED 

           NRG      

  

 

                                        Bacteria identification in wound by cult

ure - 10/06/17 09:10         

 

                    Bacteria identification in wound by culture           [id number]

06            NRG   

     

 

                    FREE TEXT EXTERNAL           SEE COMMENT FOR FINAL REPORT   

         NRG        

 

                    QUANTITY OF GROWTH           Scant Growth            NRG    

    

 

                                        Bacterial susceptibility panel - 10/06/1

7 09:10         

 

                          Oxacillin susceptibility test by minimum inhibitory co

ncentration           <=  

                                        NRG        

 

                          Gentamicin susceptibility test by minimum inhibitory c

oncentration           <= 

                                        NRG        

 

                          Clindamycin susceptibility test by minimum inhibitory 

concentration           <=

                                        NRG        

 

                          Erythromycin susceptibility test by minimum inhibitory

 concentration           

<=                                      NRG        

 

                                        Trimethoprim/sulfamethoxazole susceptibi

lity test by minimum 

inhibitoryconcentration           <=                        NRG        

 

                          Vancomycin susceptibility test by minimum inhibitory c

oncentration           <= 

                                        NRG        

 

                          Levofloxacin susceptibility test by minimum inhibitory

 concentration           

0.25                                    NRG        

 

                          Rifampin susceptibility test by minimum inhibitory con

centration           <=   

                                        NRG        

 

                          Tetracycline susceptibility test by minimum inhibitory

 concentration           

<=                                      NRG        

 

                                        Bacterial susceptibility panel - 10/06/1

7 09:10         

 

                          Oxacillin susceptibility test by minimum inhibitory co

ncentration           <=  

                                        NRG        

 

                          Gentamicin susceptibility test by minimum inhibitory c

oncentration           <= 

                                        NRG        

 

                          Clindamycin susceptibility test by minimum inhibitory 

concentration           <=

                                        NRG        

 

                          Erythromycin susceptibility test by minimum inhibitory

 concentration           

>=                                      NRG        

 

                                        Trimethoprim/sulfamethoxazole susceptibi

lity test by minimum 

inhibitoryconcentration           20                        NRG        

 

                          Vancomycin susceptibility test by minimum inhibitory c

oncentration           <= 

                                        NRG        

 

                          Levofloxacin susceptibility test by minimum inhibitory

 concentration           

<=                                      NRG        

 

                          Rifampin susceptibility test by minimum inhibitory con

centration           <=   

                                        NRG        

 

                          Tetracycline susceptibility test by minimum inhibitory

 concentration           

<=                                      NRG        

 

                                        Bacteria identification in isolate by an

aerobe culture - 17 16:31         

 

                          Bacteria identification in isolate by anaerobe culture

           NOANA          

                                        NRG        

 

                                        Gram stain microscopy - 17 16:31  

       

 

                    GRAM STAIN RESULT           NO BACTERIA OBSERVED            

NRG        

 

                                        Bacteria identification in wound by cult

ure - 17 16:31         

 

                    Bacteria identification in wound by culture           [id number]

[id number]            NRG  

      

 

                    FREE TEXT EXTERNAL           SENSITIVITY REPORTED  8

:50            NRG  

      

 

                    QUANTITY OF GROWTH           Scant Growth            NRG    

    

 

                    FREE TEXT ENTRY 2           SENSITIVITY TESTING. SEE COMMENT

S            NRG    

    

 

                                        Bacterial susceptibility panel -  16:31         

 

                          Gentamicin susceptibility test by minimum inhibitory c

oncentration           S  

                                        NRG        

 

                          Erythromycin susceptibility test by minimum inhibitory

 concentration           2

                                        NRG        

 

                          Vancomycin susceptibility test by minimum inhibitory c

oncentration           1  

                                        NRG        

 

                          Ampicillin susceptibility test by minimum inhibitory c

oncentration           <= 

                                        NRG        

 

                          Linezolid susceptibility test by minimum inhibitory co

ncentration           2   

                                        NRG        

 

                                        Bacterial susceptibility panel -  16:31         

 

                          Oxacillin susceptibility test by minimum inhibitory co

ncentration           >=  

                                        NRG        

 

                          Gentamicin susceptibility test by minimum inhibitory c

oncentration           <= 

                                        NRG        

 

                          Clindamycin susceptibility test by minimum inhibitory 

concentration           R 

                                        NRG        

 

                          Erythromycin susceptibility test by minimum inhibitory

 concentration           R

                                        NRG        

 

                                        Trimethoprim/sulfamethoxazole susceptibi

lity test by minimum 

inhibitoryconcentration           <=                        NRG        

 

                          Vancomycin susceptibility test by minimum inhibitory c

oncentration           <= 

                                        NRG        

 

                          Levofloxacin susceptibility test by minimum inhibitory

 concentration           1

                                        NRG        

 

                          Rifampin susceptibility test by minimum inhibitory con

centration           <=   

                                        NRG        

 

                          Tetracycline susceptibility test by minimum inhibitory

 concentration           

<=                                      NRG        

 

                                        Bacterial susceptibility panel - 

7 16:31         

 

                          Oxacillin susceptibility test by minimum inhibitory co

ncentration           S   

                                        NRG        

 

                          Gentamicin susceptibility test by minimum inhibitory c

oncentration           <= 

                                        NRG        

 

                          Clindamycin susceptibility test by minimum inhibitory 

concentration           

0.5                                     NRG        

 

                          Erythromycin susceptibility test by minimum inhibitory

 concentration           

<=                                      NRG        

 

                                        Trimethoprim/sulfamethoxazole susceptibi

lity test by minimum 

inhibitoryconcentration           <=                        NRG        

 

                          Vancomycin susceptibility test by minimum inhibitory c

oncentration           <= 

                                        NRG        

 

                          Levofloxacin susceptibility test by minimum inhibitory

 concentration           

0.5                                     NRG        

 

                          Rifampin susceptibility test by minimum inhibitory con

centration           <=   

                                        NRG        

 

                          Tetracycline susceptibility test by minimum inhibitory

 concentration           

<=                                      NRG        

 

                                        Gram stain microscopy - 17 15:46  

       

 

                    GRAM STAIN RESULT           NO WBC'S OR BACTERIA OBSERVED   

         NRG        

 

                                        Bacteria identification in wound by cult

ure - 17 15:46         

 

                    Bacteria identification in wound by culture           [id number]

8             NRG    

    

 

                    FREE TEXT EXTERNAL           SENSITIVITY REPORTED 17 9

:35            NRG  

      

 

                    QUANTITY OF GROWTH           Scant Growth            NRG    

    

 

                                        Bacteria identification in isolate by an

aerobe culture - 17 15:46         

 

                          Bacteria identification in isolate by anaerobe culture

           NOANA          

                                        NRG        

 

                                        Bacterial susceptibility panel - 

7 15:46         

 

                          Oxacillin susceptibility test by minimum inhibitory co

ncentration           <=  

                                        NRG        

 

                          Gentamicin susceptibility test by minimum inhibitory c

oncentration           <= 

                                        NRG        

 

                          Clindamycin susceptibility test by minimum inhibitory 

concentration           R 

                                        NRG        

 

                          Erythromycin susceptibility test by minimum inhibitory

 concentration           

>=                                      NRG        

 

                                        Trimethoprim/sulfamethoxazole susceptibi

lity test by minimum 

inhibitoryconcentration           S                         NRG        

 

                          Vancomycin susceptibility test by minimum inhibitory c

oncentration           S  

                                        NRG        

 

                          Levofloxacin susceptibility test by minimum inhibitory

 concentration           

<=                                      NRG        

 

                          Rifampin susceptibility test by minimum inhibitory con

centration           <=   

                                        NRG        

 

                          Tetracycline susceptibility test by minimum inhibitory

 concentration           

<=                                      NRG        

 

                          Linezolid susceptibility test by minimum inhibitory co

ncentration           2   

                                        NRG        

 

                                        CMP - 18 14:56         

 

                    GLUCOSE             307 mg/dL           65-99        

 

                    UREA NITROGEN (BUN)           15 mg/dL            7-25      

  

 

                    CREATININE           0.66 mg/dL           0.60-1.35        

 

                    eGFR NON-AFR. AMERICAN           122 mL/min/1.73m2          

 > OR = 60        

 

                    eGFR            141 mL/min/1.73m2           

> OR = 60        

 

                    BUN/CREATININE RATIO           NOT APPLICABLE (calc)        

   6-22        

 

                    SODIUM              137 mmol/L           135-146        

 

                    POTASSIUM           4.3 mmol/L           3.5-5.3        

 

                    CHLORIDE            100 mmol/L                   

 

                    CARBON DIOXIDE           29 mmol/L           20-31        

 

                    CALCIUM             10.0 mg/dL           8.6-10.3        

 

                    PROTEIN, TOTAL           6.8 g/dL            6.1-8.1        

 

                    ALBUMIN             4.0 g/dL            3.6-5.1        

 

                    GLOBULIN            2.8 g/dL (calc)           1.9-3.7       

 

 

                    ALBUMIN/GLOBULIN RATIO           1.4 (calc)           1.0-2.

5        

 

                    BILIRUBIN, TOTAL           0.5 mg/dL           0.2-1.2      

  

 

                    ALKALINE PHOSPHATASE           75 U/L                 

     

 

                    AST                 23 U/L              10-40        

 

                    ALT                 35 U/L              9-46        

 

                                        Bacteria identification in isolate by an

aerobe culture - 18 16:20         

 

                          Bacteria identification in isolate by anaerobe culture

           NOANA          

                                        NRG        

 

                                        Gram stain microscopy - 18 16:20  

       

 

                    GRAM STAIN RESULT           RARE WBC, NO BACTERIA           

 NRG        

 

                                        Bacteria identification in wound by cult

ure - 18 16:20         

 

                    Bacteria identification in wound by culture           [id number]

008            NRG  

      

 

                    FREE TEXT EXTERNAL           SENSITIVITY REPORTED AT 0817, 2

-            NRG

        

 

                    QUANTITY OF GROWTH           Scant Growth            [facility]    

    

 

                                        Bacterial susceptibility panel - 

8 16:20         

 

                          Gentamicin susceptibility test by minimum inhibitory c

oncentration           <= 

                                        NRG        

 

                                        Trimethoprim/sulfamethoxazole susceptibi

lity test by minimum 

inhibitoryconcentration           S                         NRG        

 

                          Ampicillin susceptibility test by minimum inhibitory c

oncentration           <= 

                                        NRG        

 

                          Tobramycin susceptibility test by minimum inhibitory c

oncentration           <= 

                                        NRG        

 

                          Cefazolin susceptibility test by minimum inhibitory co

ncentration           <=  

                                        NRG        

 

                          Ceftriaxone susceptibility test by minimum inhibitory 

concentration           <=

                                        NRG        

 

                                        Ampicillin/sulbactam susceptibility test

 by minimum inhibitory concentration    

                          <=                        NRG        

 

                                        Piperacillin/tazobactam susceptibility t

est by minimum inhibitory concentration 

                          S                         NRG        

 

                          Ciprofloxacin susceptibility test by minimum inhibitor

y concentration           

<=                                      NRG        

 

                          Meropenem susceptibility test by minimum inhibitory co

ncentration           <=  

                                        NRG        

 

                          Aztreonam susceptibility test by minimum inhibitory co

ncentration           <=  

                                        NRG        

 

                                        Bacterial susceptibility panel - 

8 16:20         

 

                          Oxacillin susceptibility test by minimum inhibitory co

ncentration           <=  

                                        NRG        

 

                          Gentamicin susceptibility test by minimum inhibitory c

oncentration           <= 

                                        NRG        

 

                          Clindamycin susceptibility test by minimum inhibitory 

concentration           R 

                                        NRG        

 

                          Erythromycin susceptibility test by minimum inhibitory

 concentration           

>=                                      NRG        

 

                                        Trimethoprim/sulfamethoxazole susceptibi

lity test by minimum 

inhibitoryconcentration           S                         NRG        

 

                          Vancomycin susceptibility test by minimum inhibitory c

oncentration           1  

                                        NRG        

 

                          Levofloxacin susceptibility test by minimum inhibitory

 concentration           

<=                                      NRG        

 

                          Rifampin susceptibility test by minimum inhibitory con

centration           <=   

                                        NRG        

 

                          Tetracycline susceptibility test by minimum inhibitory

 concentration           2

                                        NRG        

 

                          Linezolid susceptibility test by minimum inhibitory co

ncentration           2   

                                        NRG        

 

                                        Bacterial susceptibility panel - 

8 16:20         

 

                          Oxacillin susceptibility test by minimum inhibitory co

ncentration           >=  

                                        NRG        

 

                          Gentamicin susceptibility test by minimum inhibitory c

oncentration           <= 

                                        NRG        

 

                          Clindamycin susceptibility test by minimum inhibitory 

concentration           >=

                                        NRG        

 

                          Erythromycin susceptibility test by minimum inhibitory

 concentration           

>=                                      NRG        

 

                                        Trimethoprim/sulfamethoxazole susceptibi

lity test by minimum 

inhibitoryconcentration           S                         NRG        

 

                          Vancomycin susceptibility test by minimum inhibitory c

oncentration           1  

                                        NRG        

 

                          Levofloxacin susceptibility test by minimum inhibitory

 concentration           

<=                                      NRG        

 

                          Rifampin susceptibility test by minimum inhibitory con

centration           <=   

                                        NRG        

 

                          Tetracycline susceptibility test by minimum inhibitory

 concentration           2

                                        NRG        

 

                          Linezolid susceptibility test by minimum inhibitory co

ncentration           1   

                                        NRG        

 

                                        Bacteria identification in isolate by an

aerobe culture - 18 14:45         

 

                          Bacteria identification in isolate by anaerobe culture

           NOANA          

                                        NRG        

 

                                        Gram stain microscopy - 18 14:45  

       

 

                    Gram stain microscopy           Few gram positive cocci     

       NRG        

 

                                        Bacteria identification in wound by cult

ure - 18 14:45         

 

                    Bacteria identification in wound by culture           [id number]

8             NRG    

    

 

                    FREE TEXT EXTERNAL           SENSITIVITY REPORTED AT 1003,             

NRG        

 

                    QUANTITY OF GROWTH           Moderate Growth            NRG 

       

 

                          MRSA AGAR                 **MRSA isolated** (Screening

 test for MRSA is positive)     

                                        NRG        

 

                          CALL POSITIVES (F1 HELP)           CALLED TO SHRUTHI/W

C AT 1005, 18/KD     

                                        NRG        

 

                                        Bacterial susceptibility panel - 

8 14:45         

 

                          Oxacillin susceptibility test by minimum inhibitory co

ncentration           >=  

                                        NRG        

 

                          Gentamicin susceptibility test by minimum inhibitory c

oncentration           <= 

                                        NRG        

 

                          Clindamycin susceptibility test by minimum inhibitory 

concentration           <=

                                        NRG        

 

                          Erythromycin susceptibility test by minimum inhibitory

 concentration           

>=                                      NRG        

 

                                        Trimethoprim/sulfamethoxazole susceptibi

lity test by minimum 

inhibitoryconcentration           S                         NRG        

 

                          Vancomycin susceptibility test by minimum inhibitory c

oncentration           1  

                                        NRG        

 

                          Levofloxacin susceptibility test by minimum inhibitory

 concentration           4

                                        NRG        

 

                          Rifampin susceptibility test by minimum inhibitory con

centration           <=   

                                        NRG        

 

                          Tetracycline susceptibility test by minimum inhibitory

 concentration           

<=                                      NRG        

 

                          Ciprofloxacin susceptibility test by minimum inhibitor

y concentration           

R                                       NRG        

 

                          Linezolid susceptibility test by minimum inhibitory co

ncentration           1   

                                        NRG        

 

                                        A1C - 18 16:05         

 

                    HEMOGLOBIN A1c           8.5 % of total Hgb           <5.7  

      

 

                                        LIPID PANEL - 18 08:58         

 

                    CHOLESTEROL, TOTAL           251 mg/dL           <200       

 

 

                    HDL CHOLESTEROL           42 mg/dL            >40        

 

                    TRIGLYCERIDES           349 mg/dL           <150        

 

                    LDL-CHOLESTEROL           155 mg/dL (calc)           NRG    

    

 

                    CHOL/HDLC RATIO           6.0 (calc)           <5.0        

 

                    NON HDL CHOLESTEROL           209 mg/dL (calc)           <13

0        

 

                                        Bacteria identification in isolate by an

aerobe culture - 18 08:52         

 

                          Bacteria identification in isolate by anaerobe culture

           RADHA          

                                        NRG        

 

                                        Gram stain microscopy - 18 08:52  

       

 

                    GRAM STAIN RESULT           FEW WBC'S, NO BACTERIA OBSERVED 

           NRG      

  

 

                                        Bacteria identification in wound by cult

ure - 18 08:52         

 

                    Bacteria identification in wound by culture           [id number]

8             NRG    

    

 

                    FREE TEXT EXTERNAL           SENSITIVITY REPORTED 6/10/18 10

:50            NRG  

      

 

                    QUANTITY OF GROWTH           Scant Growth            NRG    

    

 

                                        Bacterial susceptibility panel - 

8 08:52         

 

                          Oxacillin susceptibility test by minimum inhibitory co

ncentration           <=  

                                        NRG        

 

                          Gentamicin susceptibility test by minimum inhibitory c

oncentration           <= 

                                        NRG        

 

                          Clindamycin susceptibility test by minimum inhibitory 

concentration           >=

                                        NRG        

 

                          Erythromycin susceptibility test by minimum inhibitory

 concentration           

>=                                      NRG        

 

                                        Trimethoprim/sulfamethoxazole susceptibi

lity test by minimum 

inhibitoryconcentration           S                         NRG        

 

                          Vancomycin susceptibility test by minimum inhibitory c

oncentration           <= 

                                        NRG        

 

                          Levofloxacin susceptibility test by minimum inhibitory

 concentration           4

                                        NRG        

 

                          Rifampin susceptibility test by minimum inhibitory con

centration           <=   

                                        NRG        

 

                          Tetracycline susceptibility test by minimum inhibitory

 concentration           

>=                                      NRG        

 

                          Ciprofloxacin susceptibility test by minimum inhibitor

y concentration           

R                                       NRG        

 

                          Linezolid susceptibility test by minimum inhibitory co

ncentration           1   

                                        NRG        

 

                                        Capillary blood glucose measurement by g

lucometer (mass/volume) - 18 08:06

         

 

                          Capillary blood glucose measurement by glucometer (mas

s/volume)           122 

mg/dL                                           

 

                                        Capillary blood glucose measurement by g

lucometer (mass/volume) - 18 08:37

         

 

                          Capillary blood glucose measurement by glucometer (mas

s/volume)           132 

mg/dL                                           

 

                                        Capillary blood glucose measurement by g

lucometer (mass/volume) - 18 10:59

         

 

                          Capillary blood glucose measurement by glucometer (mas

s/volume)           111 

mg/dL                                           

 

                                        Capillary blood glucose measurement by g

lucometer (mass/volume) - 07/10/18 08:05

         

 

                          Capillary blood glucose measurement by glucometer (mas

s/volume)           165 

mg/dL                                           

 

                                        Capillary blood glucose measurement by g

lucometer (mass/volume) - 07/10/18 10:29

         

 

                          Capillary blood glucose measurement by glucometer (mas

s/volume)           160 

mg/dL                                           

 

                                        Capillary blood glucose measurement by g

lucometer (mass/volume) - 18 08:04

         

 

                          Capillary blood glucose measurement by glucometer (mas

s/volume)           139 

mg/dL                                           

 

                                        Capillary blood glucose measurement by g

lucometer (mass/volume) - 18 10:32

         

 

                          Capillary blood glucose measurement by glucometer (mas

s/volume)           113 

mg/dL                                           

 

                                        Capillary blood glucose measurement by g

lucometer (mass/volume) - 18 08:13

         

 

                          Capillary blood glucose measurement by glucometer (mas

s/volume)           164 

mg/dL                                           

 

                                        Capillary blood glucose measurement by g

lucometer (mass/volume) - 18 10:25

         

 

                          Capillary blood glucose measurement by glucometer (mas

s/volume)           150 

mg/dL                                           

 

                                        Capillary blood glucose measurement by g

lucometer (mass/volume) - 18 08:00

         

 

                          Capillary blood glucose measurement by glucometer (mas

s/volume)           136 

mg/dL                                           

 

                                        Capillary blood glucose measurement by g

lucometer (mass/volume) - 18 10:19

         

 

                          Capillary blood glucose measurement by glucometer (mas

s/volume)           127 

mg/dL                                           

 

                                        Bacteria identification in isolate by an

aerobe culture - 18 11:30         

 

                          Bacteria identification in isolate by anaerobe culture

           NOANA          

                                        NRG        

 

                                        Gram stain microscopy - 18 11:30  

       

 

                    GRAM STAIN RESULT           FEW WBC'S, NO BACTERIA OBSERVED 

           NRG      

  

 

                                        Bacteria identification in wound by cult

ure - 18 11:30         

 

                    Bacteria identification in wound by culture           [id number]

004            NRG  

      

 

                    FREE TEXT EXTERNAL           SENSITIVITY REPORTED 7/15/18 8:

30            NRG   

     

 

                    QUANTITY OF GROWTH           Moderate Growth            NRG 

       

 

                          MRSA AGAR                 **MRSA isolated** (Screening

 test for MRSA is positive)     

                                        NRG        

 

                          CALL POSITIVES (F1 HELP)           MRSA CALLED TO STACY GONZALEZ  10:30 BY ST      

                                        NRG        

 

                                        Bacterial susceptibility panel - 

8 11:30         

 

                          Oxacillin susceptibility test by minimum inhibitory co

ncentration           >=  

                                        NRG        

 

                          Gentamicin susceptibility test by minimum inhibitory c

oncentration           <= 

                                        NRG        

 

                          Clindamycin susceptibility test by minimum inhibitory 

concentration           <=

                                        NRG        

 

                          Erythromycin susceptibility test by minimum inhibitory

 concentration           

>=                                      NRG        

 

                                        Trimethoprim/sulfamethoxazole susceptibi

lity test by minimum 

inhibitoryconcentration           S                         NRG        

 

                          Vancomycin susceptibility test by minimum inhibitory c

oncentration           1  

                                        NRG        

 

                          Levofloxacin susceptibility test by minimum inhibitory

 concentration           4

                                        NRG        

 

                          Rifampin susceptibility test by minimum inhibitory con

centration           <=   

                                        NRG        

 

                          Tetracycline susceptibility test by minimum inhibitory

 concentration           

<=                                      NRG        

 

                          Ciprofloxacin susceptibility test by minimum inhibitor

y concentration           

R                                       NRG        

 

                          Linezolid susceptibility test by minimum inhibitory co

ncentration           1   

                                        NRG        

 

                                        Capillary blood glucose measurement by g

lucometer (mass/volume) - 18 10:31

         

 

                          Capillary blood glucose measurement by glucometer (mas

s/volume)           176 

mg/dL                                           

 

                                        Capillary blood glucose measurement by g

lucometer (mass/volume) - 18 08:35

         

 

                          Capillary blood glucose measurement by glucometer (mas

s/volume)           238 

mg/dL                                           

 

                                        Capillary blood glucose measurement by g

lucometer (mass/volume) - 18 10:44

         

 

                          Capillary blood glucose measurement by glucometer (mas

s/volume)           178 

mg/dL                                           

 

                                        Capillary blood glucose measurement by g

lucometer (mass/volume) - 18 08:11

         

 

                          Capillary blood glucose measurement by glucometer (mas

s/volume)           170 

mg/dL                                           

 

                                        Capillary blood glucose measurement by g

lucometer (mass/volume) - 18 10:29

         

 

                          Capillary blood glucose measurement by glucometer (mas

s/volume)           165 

mg/dL                                           

 

                                        Capillary blood glucose measurement by g

lucometer (mass/volume) - 18 08:17

         

 

                          Capillary blood glucose measurement by glucometer (mas

s/volume)           211 

mg/dL                                           

 

                                        Capillary blood glucose measurement by g

lucometer (mass/volume) - 18 10:25

         

 

                          Capillary blood glucose measurement by glucometer (mas

s/volume)           154 

mg/dL                                           

 

                                        Capillary blood glucose measurement by g

lucometer (mass/volume) - 18 08:16

         

 

                          Capillary blood glucose measurement by glucometer (mas

s/volume)           208 

mg/dL                                           

 

                                        Capillary blood glucose measurement by g

lucometer (mass/volume) - 18 10:36

         

 

                          Capillary blood glucose measurement by glucometer (mas

s/volume)           97 

mg/dL                                           

 

                                        Capillary blood glucose measurement by g

lucometer (mass/volume) - 18 08:22

         

 

                          Capillary blood glucose measurement by glucometer (mas

s/volume)           113 

mg/dL                                           

 

                                        Capillary blood glucose measurement by g

lucometer (mass/volume) - 18 08:48

         

 

                          Capillary blood glucose measurement by glucometer (mas

s/volume)           152 

mg/dL                                           

 

                                        Capillary blood glucose measurement by g

lucometer (mass/volume) - 18 10:47

         

 

                          Capillary blood glucose measurement by glucometer (mas

s/volume)           157 

mg/dL                                           

 

                                        Capillary blood glucose measurement by g

lucometer (mass/volume) - 18 08:07

         

 

                          Capillary blood glucose measurement by glucometer (mas

s/volume)           185 

mg/dL                                           

 

                                        Capillary blood glucose measurement by g

lucometer (mass/volume) - 18 10:30

         

 

                          Capillary blood glucose measurement by glucometer (mas

s/volume)           142 

mg/dL                                           

 

                                        Capillary blood glucose measurement by g

lucometer (mass/volume) - 18 08:17

         

 

                          Capillary blood glucose measurement by glucometer (mas

s/volume)           170 

mg/dL                                           

 

                                        Capillary blood glucose measurement by g

lucometer (mass/volume) - 18 10:28

         

 

                          Capillary blood glucose measurement by glucometer (mas

s/volume)           126 

mg/dL                                           

 

                                        Complete blood count (CBC) with automate

d white blood cell (WBC) differential - 

18 11:14         

 

                          Blood leukocytes automated count (number/volume)      

     7.1 10*3/uL          

                                        4.3-11.0        

 

                          Blood erythrocytes automated count (number/volume)    

       4.48 10*6/uL       

                                        4.35-5.85        

 

                    Venous blood hemoglobin measurement (mass/volume)           

12.7 g/dL           

13.3-17.7        

 

                    Blood hematocrit (volume fraction)           38 %           

     40-54        

 

                    Automated erythrocyte mean corpuscular volume           85 [

foz_us]           

80-99        

 

                                        Automated erythrocyte mean corpuscular h

emoglobin (mass per erythrocyte)        

                          28 pg                     25-34        

 

                                        Automated erythrocyte mean corpuscular h

emoglobin concentration measurement 

(mass/volume)             33 g/dL                   32-36        

 

                    Automated erythrocyte distribution width ratio           14.

9 %              10.0-

14.5        

 

                    Automated blood platelet count (count/volume)           351 

10*3/uL           

130-400        

 

                          Automated blood platelet mean volume measurement      

     9.3 [foz_us]         

                                        7.4-10.4        

 

                    Automated blood neutrophils/100 leukocytes           59 %   

             42-75       

 

 

                    Automated blood lymphocytes/100 leukocytes           32 %   

             12-44       

 

 

                    Blood monocytes/100 leukocytes           6 %                

 0-12        

 

                    Automated blood eosinophils/100 leukocytes           2 %    

             0-10        

 

                    Automated blood basophils/100 leukocytes           0 %      

           0-10        

 

                    Blood neutrophils automated count (number/volume)           

4.2 10*3            

1.8-7.8        

 

                    Blood lymphocytes automated count (number/volume)           

2.3 10*3            

1.0-4.0        

 

                    Blood monocytes automated count (number/volume)           0.

4 10*3            

0.0-1.0        

 

                    Automated eosinophil count           0.2 10*3/uL           0

.0-0.3        

 

                    Automated blood basophil count (count/volume)           0.0 

10*3/uL           

0.0-0.1        

 

                                        Comprehensive metabolic panel - 18

 11:14         

 

                          Serum or plasma sodium measurement (moles/volume)     

      136 mmol/L          

                                        135-145        

 

                          Serum or plasma potassium measurement (moles/volume)  

         4.4 mmol/L       

                                        3.6-5.0        

 

                          Serum or plasma chloride measurement (moles/volume)   

        103 mmol/L        

                                                

 

                    Carbon dioxide           26 mmol/L           21-32        

 

                          Serum or plasma anion gap determination (moles/volume)

           7 mmol/L       

                                        5-14        

 

                          Serum or plasma urea nitrogen measurement (mass/volume

)           15 mg/dL      

                                        7-18        

 

                          Serum or plasma creatinine measurement (mass/volume)  

         0.75 mg/dL       

                                        0.60-1.30        

 

                    Serum or plasma urea nitrogen/creatinine mass ratio         

  20                  NRG 

       

 

                                        Serum or plasma creatinine measurement w

ith calculation of estimated glomerular 

filtration rate           >                         NRG        

 

                    Serum or plasma glucose measurement (mass/volume)           

123 mg/dL           

        

 

                          Serum or plasma calcium measurement (mass/volume)     

      10.1 mg/dL          

                                        8.5-10.1        

 

                          Serum or plasma total bilirubin measurement (mass/volu

me)           0.3 mg/dL   

                                        0.1-1.0        

 

                                        Serum or plasma alkaline phosphatase kianna

surement (enzymatic activity/volume)    

                          62 U/L                            

 

                                        Serum or plasma aspartate aminotransfera

se measurement (enzymatic 

activity/volume)           20 U/L                    5-34        

 

                                        Serum or plasma alanine aminotransferase

 measurement (enzymatic activity/volume)

                          33 U/L                    0-55        

 

                    Serum or plasma protein measurement (mass/volume)           

7.2 g/dL            

6.4-8.2        

 

                    Serum or plasma albumin measurement (mass/volume)           

4.0 g/dL            

3.2-4.5        

 

                    CALCIUM CORRECTED           10.1 mg/dL           8.5-10.1   

     

 

                                        Hemoglobin A1c - 18 11:14         

 

                    Blood hemoglobin A1C measurement (mass/volume)           7.7

 %               4.0-5.6

        

 

                    MEAN BLOOD GLUCOSE           174 %               <=126      

  

 

                                        Bacteria identification in isolate by an

aerobe culture - 18 09:05         

 

                          Bacteria identification in isolate by anaerobe culture

           NOANA          

                                        NRG        

 

                                        Gram stain microscopy - 18 09:05  

       

 

                    Gram stain microscopy           REPORTED 2018, 0805.    

        NRG        

 

                                        Bacteria identification in wound by cult

ure - 18 09:05         

 

                    Bacteria identification in wound by culture           NG    

              NRG        

 

                                        Bacteria identification in isolate by an

aerobe culture - 18 10:24         

 

                          Bacteria identification in isolate by anaerobe culture

           NOANA          

                                        NRG        

 

                                        Gram stain microscopy - 18 10:24  

       

 

                    Gram stain microscopy           No bacteria seen            

NRG        

 

                                        Bacteria identification in wound by cult

ure - 18 10:24         

 

                    Bacteria identification in wound by culture           [id number]

008            NRG  

      

 

                    FREE TEXT EXTERNAL           RML SENT SENSITIVITY REPORT  13:05            

NRG        

 

                    QUANTITY OF GROWTH           Rare                NRG        

 

                                        RML Sensitivity Panel - 18 10:24  

       

 

                          Oxacillin susceptibility test by minimum inhibitory co

ncentration           <=  

                                        NRG        

 

                          Clindamycin susceptibility test by minimum inhibitory 

concentration           > 

                                        NRG        

 

                          Erythromycin susceptibility test by minimum inhibitory

 concentration           >

                                        NRG        

 

                          Vancomycin susceptibility test by minimum inhibitory c

oncentration           <= 

                                        NRG        

 

                          Levofloxacin susceptibility test by minimum inhibitory

 concentration           

<=                                      NRG        

 

                          Rifampin susceptibility test by minimum inhibitory con

centration           <=   

                                        NRG        

 

                          Cefazolin susceptibility test by minimum inhibitory co

ncentration           <=  

                                        NRG        

 

                          Linezolid susceptibility test by minimum inhibitory co

ncentration           <=  

                                        NRG        

 

                          Penicillin G susceptibility test by minimum inhibitory

 concentration           

>=                                      NRG        

 

                    Minocycline susc BETITO           <=                  NRG      

  

 

                                        RML Sensitivity Panel - 18 10:24  

       

 

                          Oxacillin susceptibility test by minimum inhibitory co

ncentration           0.5 

                                        NRG        

 

                          Clindamycin susceptibility test by minimum inhibitory 

concentration           <=

                                        NRG        

 

                          Erythromycin susceptibility test by minimum inhibitory

 concentration           

<=                                      NRG        

 

                          Vancomycin susceptibility test by minimum inhibitory c

oncentration           1  

                                        NRG        

 

                          Levofloxacin susceptibility test by minimum inhibitory

 concentration           

<=                                      NRG        

 

                          Rifampin susceptibility test by minimum inhibitory con

centration           <=   

                                        NRG        

 

                          Cefazolin susceptibility test by minimum inhibitory co

ncentration           R   

                                        NRG        

 

                          Linezolid susceptibility test by minimum inhibitory co

ncentration           <=  

                                        NRG        

 

                          Penicillin G susceptibility test by minimum inhibitory

 concentration           

>=                                      NRG        

 

                    Minocycline susc BETITO           <=                  NRG      

  

 

                                        CMP - 19 08:56         

 

                    GLUCOSE             226 mg/dL           65-99        

 

                    UREA NITROGEN (BUN)           13 mg/dL            7-25      

  

 

                    CREATININE           0.66 mg/dL           0.60-1.35        

 

                    eGFR NON-AFR. AMERICAN           121 mL/min/1.73m2          

 > OR = 60        

 

                    eGFR            140 mL/min/1.73m2           

> OR = 60        

 

                    BUN/CREATININE RATIO           NOT APPLICABLE (calc)        

   6-22        

 

                    SODIUM              135 mmol/L           135-146        

 

                    POTASSIUM           4.4 mmol/L           3.5-5.3        

 

                    CHLORIDE            101 mmol/L                   

 

                    CARBON DIOXIDE           22 mmol/L           20-32        

 

                    CALCIUM             8.9 mg/dL           8.6-10.3        

 

                    PROTEIN, TOTAL           6.5 g/dL            6.1-8.1        

 

                    ALBUMIN             4.0 g/dL            3.6-5.1        

 

                    GLOBULIN            2.5 g/dL (calc)           1.9-3.7       

 

 

                    ALBUMIN/GLOBULIN RATIO           1.6 (calc)           1.0-2.

5        

 

                    BILIRUBIN, TOTAL           0.4 mg/dL           0.2-1.2      

  

 

                    ALKALINE PHOSPHATASE           76 U/L                 

     

 

                    AST                 17 U/L              10-40        

 

                    ALT                 21 U/L              9-46        

 

                                        CMP - 19 09:22         

 

                    GLUCOSE             325 mg/dL           65-99        

 

                    UREA NITROGEN (BUN)           8 mg/dL             7-25      

  

 

                    CREATININE           0.64 mg/dL           0.60-1.35        

 

                    eGFR NON-AFR. AMERICAN           122 mL/min/1.73m2          

 > OR = 60        

 

                    eGFR            142 mL/min/1.73m2           

> OR = 60        

 

                    BUN/CREATININE RATIO           NOT APPLICABLE (calc)        

   6-22        

 

                    SODIUM              137 mmol/L           135-146        

 

                    POTASSIUM           4.2 mmol/L           3.5-5.3        

 

                    CHLORIDE            100 mmol/L                   

 

                    CARBON DIOXIDE           26 mmol/L           20-32        

 

                    CALCIUM             9.1 mg/dL           8.6-10.3        

 

                    PROTEIN, TOTAL           6.3 g/dL            6.1-8.1        

 

                    ALBUMIN             3.6 g/dL            3.6-5.1        

 

                    GLOBULIN            2.7 g/dL (calc)           1.9-3.7       

 

 

                    ALBUMIN/GLOBULIN RATIO           1.3 (calc)           1.0-2.

5        

 

                    BILIRUBIN, TOTAL           0.4 mg/dL           0.2-1.2      

  

 

                    ALKALINE PHOSPHATASE           97 U/L                 

     

 

                    AST                 17 U/L              10-40        

 

                    ALT                 24 U/L              9-46        

 

                                        Gram stain microscopy - 19 14:36  

       

 

                    Gram stain microscopy           FEW GRAM POSITIVE COCCI     

       NRG        

 

                                        Bacteria identification in wound by cult

ure - 19 14:36         

 

                    Bacteria identification in wound by culture           [id number]

07            NRG   

     

 

                    FREE TEXT EXTERNAL           SEE COMMENT            NRG     

   

 

                    QUANTITY OF GROWTH           Many                NRG        

 

                                        Capillary blood glucose measurement by g

lucometer (mass/volume) - 20 17:37

         

 

                          Capillary blood glucose measurement by glucometer (mas

s/volume)           529 

mg/dL                                           

 

                                        Complete blood count (CBC) with automate

d white blood cell (WBC) differential - 

20 17:38         

 

                          Blood leukocytes automated count (number/volume)      

     6.9 10*3/uL          

                                        4.3-11.0        

 

                          Blood erythrocytes automated count (number/volume)    

       4.75 10*6/uL       

                                        4.35-5.85        

 

                    Venous blood hemoglobin measurement (mass/volume)           

13.5 g/dL           

13.3-17.7        

 

                    Blood hematocrit (volume fraction)           40 %           

     40-54        

 

                    Automated erythrocyte mean corpuscular volume           84 [

foz_us]           

80-99        

 

                                        Automated erythrocyte mean corpuscular h

emoglobin (mass per erythrocyte)        

                          28 pg                     25-34        

 

                                        Automated erythrocyte mean corpuscular h

emoglobin concentration measurement 

(mass/volume)             34 g/dL                   32-36        

 

                    Automated erythrocyte distribution width ratio           14.

3 %              10.0-

14.5        

 

                    Automated blood platelet count (count/volume)           284 

10*3/uL           

130-400        

 

                          Automated blood platelet mean volume measurement      

     9.6 [[id number]]         

                                        7.4-10.4        

 

                    Automated blood neutrophils/100 leukocytes           69 %   

             42-75       

 

 

                    Automated blood lymphocytes/100 leukocytes           21 %   

             12-44       

 

 

                    Blood monocytes/100 leukocytes           7 %                

 0-12        

 

                    Automated blood eosinophils/100 leukocytes           3 %    

             0-10        

 

                    Automated blood basophils/100 leukocytes           0 %      

           0-10        

 

                    Blood neutrophils automated count (number/volume)           

4.8 10*3            

1.8-7.8        

 

                    Blood lymphocytes automated count (number/volume)           

1.4 10*3            

1.0-4.0        

 

                    Blood monocytes automated count (number/volume)           0.

5 10*3            

0.0-1.0        

 

                    Automated eosinophil count           0.2 10*3/uL           0

.0-0.3        

 

                    Automated blood basophil count (count/volume)           0.0 

10*3/uL           

0.0-0.1        

 

                                        Comprehensive metabolic panel - 20

 17:38         

 

                          Serum or plasma sodium measurement (moles/volume)     

      133 mmol/L          

                                        135-145        

 

                          Serum or plasma potassium measurement (moles/volume)  

         4.5 mmol/L       

                                        3.6-5.0        

 

                          Serum or plasma chloride measurement (moles/volume)   

        97 mmol/L         

                                                

 

                    Carbon dioxide           25 mmol/L           21-32        

 

                          Serum or plasma anion gap determination (moles/volume)

           11 mmol/L      

                                        5-14        

 

                          Serum or plasma urea nitrogen measurement (mass/volume

)           24 mg/dL      

                                        7-18        

 

                          Serum or plasma creatinine measurement (mass/volume)  

         1.12 mg/dL       

                                        0.60-1.30        

 

                    Serum or plasma urea nitrogen/creatinine mass ratio         

  21                  NRG 

       

 

                                        Serum or plasma creatinine measurement w

ith calculation of estimated glomerular 

filtration rate           >                         NRG        

 

                    Serum or plasma glucose measurement (mass/volume)           

593 mg/dL           

        

 

                    Serum or plasma calcium measurement (mass/volume)           

9.4 mg/dL           

8.5-10.1        

 

                          Serum or plasma total bilirubin measurement (mass/volu

me)           0.3 mg/dL   

                                        0.1-1.0        

 

                                        Serum or plasma alkaline phosphatase kianna

surement (enzymatic activity/volume)    

                          121 U/L                           

 

                                        Serum or plasma aspartate aminotransfera

se measurement (enzymatic 

activity/volume)           12 U/L                    5-34        

 

                                        Serum or plasma alanine aminotransferase

 measurement (enzymatic activity/volume)

                          16 U/L                    0-55        

 

                    Serum or plasma protein measurement (mass/volume)           

7.0 g/dL            

6.4-8.2        

 

                    Serum or plasma albumin measurement (mass/volume)           

3.7 g/dL            

3.2-4.5        

 

                    CALCIUM CORRECTED           9.6 mg/dL           8.5-10.1    

    

 

                                        Gram stain microscopy - 20 18:07  

       

 

                    Gram stain microscopy           FEW GRAM POSITIVE COCCI IN C

LUSTERS            

NRG        

 

                                        Bacteria identification in wound by cult

ure - 20 18:07         

 

                    Bacteria identification in wound by culture           [id number]

8             NRG    

    

 

                    FREE TEXT EXTERNAL           SUSCEPTIBILTIY REPORTED  12

:30            NRG  

      

 

                    QUANTITY OF GROWTH           FEW                 NRG        

 

                    FREE TEXT ENTRY 3           PRESUMTPIVE MRSA; SCREEN AT [facility] 

            NRG 

       

 

                                        Dirithromycin susceptibility test by dis

k diffusion - 20 18:07         

 

                          Oxacillin susceptibility test by minimum inhibitory co

ncentration           >   

                                        NRG        

 

                          Clindamycin susceptibility test by minimum inhibitory 

concentration           <=

                                        NRG        

 

                          Erythromycin susceptibility test by minimum inhibitory

 concentration           >

                                        NRG        

 

                                        Trimethoprim/sulfamethoxazole susceptibi

lity test by minimum 

inhibitoryconcentration           <=                        NRG        

 

                          Levofloxacin susceptibility test by minimum inhibitory

 concentration           

<=                                      NRG        

 

                          Rifampin susceptibility test by minimum inhibitory con

centration           <=   

                                        NRG        

 

                          Cefazolin susceptibility test by minimum inhibitory co

ncentration           R   

                                        NRG        

 

                          Linezolid susceptibility test by minimum inhibitory co

ncentration           2   

                                        NRG        

 

                          Penicillin G susceptibility test by minimum inhibitory

 concentration           >

                                        NRG        

 

                          Moxifloxacin susceptibility test by minimum inhibitory

 concentration           

<=                                      NRG        

 

                    Minocycline susc BETITO           <=                  NRG      

  

 

                                        Complete urinalysis with reflex to cultu

re - 20 18:38         

 

                    Urine color determination           YELLOW              NRG 

       

 

                    Urine clarity determination           CLEAR               NR

G        

 

                    Urine pH measurement by test strip           6.5            

     5-9        

 

                    Specific gravity of urine by test strip           1.010     

          1.016-1.022  

      

 

                    Urine protein assay by test strip, semi-quantitative        

   TRACE               

NEGATIVE        

 

                    Urine glucose detection by automated test strip           3+

                  NEGATIVE

        

 

                          Erythrocytes detection in urine sediment by light micr

oscopy           TRACE-I  

                                        NEGATIVE        

 

                    Urine ketones detection by automated test strip           NE

GATIVE            

NEGATIVE        

 

                    Urine nitrite detection by test strip           NEGATIVE    

        NEGATIVE    

    

 

                    Urine total bilirubin detection by test strip           NEGA

TIVE            

NEGATIVE        

 

                          Urine urobilinogen measurement by automated test strip

 (mass/volume)           

1.0 mg/dL                               < = 1.0        

 

                    Urine leukocyte esterase detection by dipstick           NEG

ATIVE            

NEGATIVE        

 

                                        Automated urine sediment erythrocyte cou

nt by microscopy (number/high power 

field)                     [HPF]                    NRG        

 

                                        Automated urine sediment leukocyte count

 by microscopy (number/high power field)

                          RARE                      NRG        

 

                          Bacteria detection in urine sediment by light microsco

py           NEGATIVE     

                                        NRG        

 

                          Crystals detection in urine sediment by light microsco

py           NONE         

                                        NRG        

 

                    Casts detection in urine sediment by light microscopy       

    NONE                

NRG        

 

                          Mucus detection in urine sediment by light microscopy 

          NEGATIVE        

                                        NRG        

 

                    Complete urinalysis with reflex to culture           NO     

             NRG        

 

                                        Capillary blood glucose measurement by g

lucometer (mass/volume) - 20 19:28

         

 

                          Capillary blood glucose measurement by glucometer (mas

s/volume)           376 

mg/dL                                           



                                                                                
                                                                                
                                                                                
                          



Encounters

      



                ACCT No.           Visit Date/Time           Discharge          

 Status         

             Pt. Type           Provider           Facility           Loc./Unit 

          

Complaint        

 

             [id number]           2017 11:09:00                         

            

Document Registration                                                           

         

 

                [id number]           2015 10:30:00           2015 23:59:

59           CLS

                Outpatient           EMORY ZHAO MD                          

         

                                                 

 

                [id number]           2015 12:53:00           2015 23:59:

59           CLS

                Outpatient           ZITA MOJICA                         

         

                                                 

 

                [id number]           2014 15:21:00           2014 23:59:

59           CLS

                Outpatient           JUNIE CORRIGAN                        

         

                                                 

 

                [id number]           10/24/2014 08:25:00           10/24/2014 23:59:

59           CLS

                Outpatient           JOHN FITZGERALD DPM                           

         

                                                 

 

                [id number]           2014 10:55:00           2014 23:59:

59           CLS

                Outpatient           JUNIE CORRIGAN                        

         

                                                 

 

                [id number]           2014 09:44:00           2014 23:59:

59           CLS

                Outpatient           JUNIE CORRIGAN                        

         

                                                 

 

             [id number]           2017 08:35:00                         

            

Document Registration                                                           

         

 

                    [id number]           2020 17:11:00           

020 19:42:00        

                DIS             Emergency           CARLOS CAPPS           Via

 [facility]           ER                        MASS UNDER L ARM/ ELEV 

BS        

 

                    [id number]           2019 13:34:00           

019 15:20:00        

                DIS             Emergency           BRUEGGEMANN MD, JONAH AVILES    

       [facility]ia 

[facility]           ER                        SORE ON L LEG  

      

 

                    [id number]           10/23/2018 10:20:00           10/23/2

018 23:59:59        

                CLS             Outpatient           NARESH CARTER      

     [facility]ia 

[facility]           [facility]                          

 

                    [id number]           10/05/2018 10:00:00           10/05/2

018 23:59:59        

                CLS             Outpatient           NARESH CARTER      

     [facility] 

[facility]           [facility]                          

 

                    [id number]           2018 10:01:00            23:59:59        

                CLS             Outpatient           INES VERNON MD        

   [facility]           [facility]                          

 

                    [id number]           2018 09:59:00           

018 23:59:59        

                CLS             Outpatient           INES VERNON MD        

   [facility]           [facility]                          

 

                    [id number]           2018 11:00:00           

018 23:59:59        

                CLS             Outpatient           INES VERNON MD        

   [facility]           [facility]                          

 

                    [id number]           2018 08:23:00           

018 23:59:59        

                CLS             Outpatient           INES VERNON MD        

   [facility]           [facility]                          

 

                    [id number]           2018 10:59:00           

018 23:59:59        

                CLS             Outpatient           INES VERNON MD        

   [facility]           LAB                       NON PRESSURE ULCER OF L

EFT FOOT    

    

 

                    [id number]           2018 09:53:00           

018 23:59:59        

                CLS             Outpatient           INES VERNON MD        

   [facility]           [facility]                          

 

                    [id number]           2018 09:41:00           

018 23:59:59        

                CLS             Outpatient           INES VERNON MD        

   [facility]           [facility]                          

 

                    [id number]           2018 09:57:00           

018 23:59:59        

                CLS             Outpatient           INES VERNON MD        

   [facility]           [facility]                          

 

                    [id number]           2018 08:00:00           

018 23:59:59        

                CLS             Outpatient           INES VERNON MD        

   [facility]           [facility]                          

 

                    [id number]           2018 11:39:00           

018 23:59:59        

                CLS             Outpatient           INES VERNON MD        

   [facility]           [facility]                          

 

                    [id number]           2018 10:30:00           

018 23:59:59        

                CLS             Outpatient           INES VERNON MD        

   [facility]           [facility]                          

 

                    [id number]           2018 07:58:00           

018 23:59:59        

                CLS             Outpatient           INES VERNON MD        

   [facility]           [facility]                          

 

                    [id number]           2018 10:45:00           

018 23:59:59        

                CLS             Outpatient           INES VERNON MD        

   [facility]           [facility]                          

 

                    [id number]           2018 10:58:00           

018 23:59:59        

                CLS             Outpatient           INES VERNON MD        

   [facility]           RAD                       CHRONIC PRESSURE ULCER,

HBOT        

 

                    [id number]           2018 09:29:00           

018 23:59:59        

                CLS             Outpatient           INES VERNON MD        

   [facility]           [facility]                          

 

                    [id number]           2018 09:27:00           

018 23:59:59        

                CLS             Outpatient           INES VERNON MD        

   [facility]           [facility]                          

 

                    [id number]           06/15/2018 08:15:00           06/15/2

018 23:59:59        

                CLS             Outpatient           INES VERNON MD        

   [facility]           [facility]                          

 

                    [id number]           2018 08:13:00           

018 23:59:59        

                CLS             Outpatient           INES VERNON MD        

   [facility]           [facility]                          

 

                    [id number]           2018 14:56:00           

018 23:59:59        

                CLS             Outpatient           INES VERNON MD        

   [facility]           [facility]                          

 

                    [id number]           2018 08:08:00           

018 23:59:59        

                CLS             Outpatient           INES VERNON MD        

   [facility]           [facility]                          

 

                    [id number]           2018 15:45:00           

018 23:59:59        

                CLS             Outpatient           INES VERNON MD        

   [facility]           [facility]                          

 

                    [id number]           2018 13:09:00           

018 23:59:59        

                CLS             Outpatient           INES VERNON MD        

   [facility]           [facility]                          

 

                    [id number]           2018 09:03:00           

018 23:59:59        

                CLS             Outpatient           INES VERNON MD        

   [facility]           [facility]                          

 

                    [id number]           2018 15:30:00           

018 23:59:59        

                CLS             Outpatient           INES VERNON MD        

   [facility]                          

 

                    [id number]           2018 09:31:00           

018 23:59:59        

                CLS             Outpatient           NARESH CARTER      

     [facility] 

[facility]           [facility]                          

 

                    [id number]           2018 15:26:00           

018 23:59:59        

                CLS             Outpatient           INES VERNON MD        

   [facility]                          

 

                    [id number]           2018 15:25:00           

018 23:59:59        

                CLS             Outpatient           INES VERNON MD        

   [facility]                          

 

                    [id number]           2018 15:24:00           

018 23:59:59        

                CLS             Outpatient           INES VERNON MD        

   [facility]           [facility]                          

 

                    [id number]           2018 15:30:00           

018 23:59:59        

                CLS             Outpatient           INES VERNON MD        

   [facility]           [facility]                          

 

                    [id number]           2018 15:24:00           

018 23:59:59        

                CLS             Outpatient           INES VERNON MD        

   [facility]           [facility]                          

 

                    [id number]           2018 10:22:00           

018 23:59:59        

                CLS             Outpatient           INES VERNON MD        

   [facility]           [facility]                          

 

                    [id number]           2018 15:18:00           01/22/2

018 23:59:59        

                CLS             Outpatient           INES VERNON MD        

   [facility]           [facility]                          

 

                    [id number]           01/15/2018 15:13:00           01/15/2

018 23:59:59        

                CLS             Outpatient           INES VERNON MD        

   [facility]                          

 

                    [id number]           2018 15:08:00            23:59:59        

                CLS             Outpatient           INES VERNON MD        

   [facility]           [facility]                          

 

                    [id number]           2018 15:22:00            23:59:59        

                CLS             Outpatient           INES VERNON MD        

   [facility]           [facility]                          

 

                    [id number]           2017 15:25:00            23:59:59        

                CLS             Outpatient           INES VERNON MD        

   [facility]                          

 

                    [id number]           2017 15:19:00            23:59:59        

                CLS             Outpatient           INES VERNON MD        

   [facility]           [facility]                          

 

                    [id number]           2017 15:22:00            23:59:59        

                CLS             Outpatient           INES VERNON MD        

   [facility]           [facility]                          

 

                    [id number]           2017 11:21:00            23:59:59        

                CLS             Outpatient           INES VERNON MD        

   [facility]           [facility]                          

 

                    [id number]           2017 15:17:00           

017 23:59:59        

                CLS             Outpatient           INES VERNON MD        

   [facility]           [facility]                          

 

                    [id number]           2017 15:12:00           

017 23:59:59        

                CLS             Outpatient           INES VERNON MD        

   [facility]           [facility]OUNDCARE                          

 

                    [id number]           2017 15:19:00           

017 23:59:59        

                CLS             Outpatient           INES VERNON MD        

   [facility]           [facility]OUNDCARE                          

 

                    [id number]           2017 08:24:00           

017 23:59:59        

                CLS             Outpatient           INES VERNON MD        

   [facility]           [facility]OUNDCARE                          

 

                    [id number]           2017 08:30:00            23:59:59        

                CLS             Outpatient           INES VERNON MD        

   [facility]           [facility]OUNDCARE                          

 

                    [id number]           11/10/2017 08:27:00           11/10/2

017 23:59:59        

                CLS             Outpatient           INES VERNON MD        

   [facility]           [facility]OUNDCARE                          

 

                    [id number]           2017 08:20:00            23:59:59        

                CLS             Outpatient           INES VERNON MD        

   [facility]                          

 

                    [id number]           2017 08:23:00            23:59:59        

                CLS             Outpatient           INES VERNON MD        

   [facility]                          

 

                    [id number]           2017 15:19:00            23:59:59        

                CLS             Outpatient           INES VERNON MD        

   [facility]           [facility]OUNDCARE                          

 

                    [id number]           10/30/2017 08:22:00           10/30/2

017 23:59:59        

                CLS             Outpatient           INES VERNON MD        

   [facility]           [facility]OUNDCARE                          

 

                    [id number]           10/27/2017 08:19:00           10/27/2

017 23:59:59        

                CLS             Outpatient           INES VERNON MD        

   [facility]                          

 

                    [id number]           10/25/2017 15:01:00           10/25/2

017 23:59:59        

                CLS             Outpatient           INES VERNON MD        

   [facility]           [facility]OUNDCARE                          

 

                    [id number]           10/23/2017 08:21:00           10/23/2

017 23:59:59        

                CLS             Outpatient           INES VERNON MD        

   [facility]           [facility]OUNDCARE                          

 

                    [id number]           10/20/2017 08:07:00           10/20/2

017 23:59:59        

                CLS             Outpatient           INES VERNON MD        

   [facility]           [facility]OUND[facility]                          

 

                    [id number]           10/18/2017 12:27:00           10/18/2

017 23:59:59        

                CLS             Outpatient           INES VERNON MD        

   [facility]           [facility]OUND[facility]                          

 

                    [id number]           10/13/2017 08:29:00           10/13/2

017 23:59:59        

                CLS             Outpatient           NARESH CARTER      

     [facility]           WOUNDCARE                          

 

                    [id number]           10/06/2017 08:20:00           10/06/2

017 23:59:59        

                CLS             Outpatient           INES VERNON MD        

   [facility]           [facility]OUNDCARE                          

 

                    [id number]           2017 08:30:00            23:59:59        

                CLS             Outpatient           INES VERNON MD        

   [facility]           [facility]OUNDCARE                          

 

                    [id number]           2017 23:30:00            16:05:00        

                DIS             Inpatient           ANA HERNANDEZ, ZULMA DENSON           

[facility]           4TH                       NEW DIABETIC L FOOT [facility]

ER W 

CELLULITIS OF L LEG;        

 

                    [id number]           2017 00:10:00            23:59:59        

                CLS             Preadmit           MADL, JUNIE L ARNP          

 [facility]           REHAB                     GENERAL WEAKNESS AND 

DECONDITIONING;KNEE PAIN        

 

                    [id number]           2017 15:50:00            00:01:00        

                DIS             Outpatient           MADL, JUNIE L ARNP        

   [facility]           REHAB                     GENERAL WEAKNESS AND 

DECONDITIONING;KNEE PAIN        

 

                    [id number]           2015 11:01:00            23:59:59        

                CLS             Outpatient           MADL, JUNIE L ARNP        

   [facility]           RAD                       PAIN IN LOWER LEG      

  

 

                    [id number]           10/27/2015 17:27:00           10/27/2

015 20:31:00        

                DIS             Emergency           VINNY LÓPEZ APRN         

  [facility]           ER                        L LEG INJ        

 

                    [id number]           2015 16:30:00            13:30:00        

                DIS             Inpatient           CECE HERNANDEZ, EMORY MUHAMMAD         

  [facility]           SURGICAL                  EXTENSIVE CELLULITIS; A

BSCESS 

RIGHT SCORTUM        

 

             [id number]           08/15/2018 08:26:00                         

            

Document Registration                                                           

         

 

             [id number]           08/10/2018 10:01:00                         

            

Document Registration                                                           

         

 

             [id number]           2018 08:13:00                         

            

Document Registration                                                           

         

 

                [id number]           2019 10:20:00           2019 23:59:5

9           [facility] 

                Outpatient           RUT DAVIS                        

   [facility] 

[facility]                                   

 

             [id number]           2019 08:40:00                              

       Document

 Registration                                                                   

 

 

             [id number]           2019 09:20:00                              

       Document

 Registration                                                                   

 

 

             [id number]           2018 08:20:00                              

       Document

 Registration                                                                   

 

 

             [id number]           2018 16:20:00                              

       Document

 Registration                                                                   

 

 

             [id number]           2018 14:20:00                              

       Document

 Registration                                                                   

 

 

             [id number]           2017 08:40:00                         

            

Document Registration

## 2020-04-28 NOTE — XMS REPORT
[facility]

                             Created on: 2020



Aj Gary

External Reference #: [id number]

: 1980

Sex: Male



Demographics





                          Address                   [address] [place]  [address]

 

                          Preferred Language        Unknown

 

                          Marital Status            Unknown

 

                          Temple Affiliation     Unknown

 

                          Race                      Unknown

 

                          Ethnic Group              Unknown





Author





                          Author                    Aj HAMILTON

 

                          Organization              [facility]

 

                          Address                   [address] [place]  [address]



 

                          Phone                     [phone]







Care Team Providers





                    Care Team Member Name Role                Phone

 

                    JUNIE HAMILTON       Unavailable         [phone]







PROBLEMS





          Type      Condition ICD9-CM Code HLL92-RX Code Onset Dates Condition S

tatus SNOMED 

Code

 

          Problem   Spinal stenosis, lumbar           M48.06              Active

    [id number]

 

          Problem   Lumbar disc herniation           M51.26              Active 

   [id number]

 

          Problem   Abnormal NCS (nerve conduction studies)           R94.130   

          Active    74160890

 

          Problem   Degenerative disc disease, lumbar           M51.36          

    Active    04671078

 

          Problem   Long term current use of insulin           Z79.4            

   Active    090970631

 

          Problem   Pain, joint, lower leg, left           M25.562             A

ctive    76243352

 

             Problem      Essential hypertension with goal blood pressure less t

sargent 130/85              I10           

                          Active                    24132268

 

          Problem   Bilateral swelling of feet           M79.89              Act

shruthi    [id number]

 

          Problem   Morbid (severe) obesity due to excess calories           E66

.01              Active    

[id number]

 

             Problem      Diabetes mellitus due to underlying condition with yanna

t ulcer              E08.621      

                          Active                    141200806

 

          Problem   Left knee pain           M25.562             Active    22664

003

 

          Problem   Mixed hyperlipidemia           E78.2               Active   

 [id number]

 

          Problem   Type 2 diabetes mellitus with diabetic neuropathy           

E11.40              Active    

65569629

 

          Problem   Body mass index (BMI) of 60.0-69.9 in adult           Z68.44

              Active    774300336

 

          Problem   Loss of movement           R29.898             Active    623

70962

 

          Problem   Stasis dermatitis of both legs           I87.2              

 Active    90335053

 

                          Problem                   Non-pressure chronic ulcer o

f other part of left foot with other 

specified severity              L97.528                   Active       [id number]







ALLERGIES

No Information



ENCOUNTERS





                Encounter       Location        Date            Diagnosis

 

                          [facility]     [address] [facility] [id number]

[address] [place] [address]

                          24 2020               

 

                          [facility]     [address] [facility] [id number]

[address] [place] [address]

                          23 Dec, 2019               

 

                          [facility]     [address] [id number]

[address] [place] [address]

                          16 Dec, 2019               

 

                          [facility]     [address] [id number]

[address] [place] [address]

                                        Type 2 diabetes mellitus wit

h diabetic neuropathy E11.40 ; 

Essential hypertension with goal blood pressure less than 130/85 I10 ; Mixed 
hyperlipidemia E78.2 and Avulsion of toenail of left foot S91.209A

 

                          [facility]     [address] [id number]

[address] [place] [address]

                          22 Oct, 2019               

 

                          [facility]     [address] [id number]

[address] [place] [address]

                          04 Sep, 2019               

 

                          [facility]     [address] [id number]

[address] [place] [address]

                                         

 

                          [facility]     [address] [id number]

[address] [place] [address]

                          28 May, 2019               

 

                          [facility]     [address] [id number]

[address] [place] [address]

                          09 May, 2019              Exercise counseling Z71.82

 

                          [place] [address] [id number]

[address] [place] [address]

                          02 May, 2019               

 

                          [facility]     [address] [id number]

[address] [place] [address]

                                        Exercise counseling Z71.82

 

                          [facility]     [address] [id number]

[address] [place] [address]

                                        Exercise counseling Z71.82

 

                          [facility]     [address] [id number]

[address] [place] [address]

                                        Body mass index (BMI) of 60.

0-69.9 in adult Z68.44 and Exercise 

counseling Z71.82

 

                          [place] [address] [id number]

[address] [place] [address]

                          29 Mar, 2019              Exercise counseling Z71.82

 

                          [place] [address] [facility] [id number]

[address] [place] [address]

                          27 Mar, 2019              Exercise counseling Z71.82

 

                          [facility]     [address] [facility] [id number]

[address] [place] [address]

                          24 Mar, 2019               

 

                          [facility]     [address] [facility] [id number]

[address] [place] [address]

                          20 Mar, 2019              Exercise counseling Z71.82

 

                          [facility]     [address] [facility] [id number]

[address] [place] [address]

                          18 Mar, 2019              Type 2 diabetes mellitus wit

h diabetic neuropathy E11.40 ; Morbid 

(severe) obesity due to excess calories E66.01 and Morbid obesity E66.01

 

                          [place] [address] [facility] [id number]

[address] [place] [address]

                          11 Mar, 2019              Exercise counseling Z71.82

 

                          [place] [address] [facility] [id number]

[address] [place] [address]

                          08 Mar, 2019              Type 2 diabetes mellitus wit

h diabetic neuropathy E11.40 ; Mixed 

hyperlipidemia E78.2 and Essential hypertension with goal blood pressure less 
than 130/85 I10

 

                          [place] [address] [facility] [id number]

[address] [place] [address]

                          08 Mar, 2019              Exercise counseling Z71.82

 

                          [place] [address] [facility] [id number]

[address] [place] [address]

                          04 Mar, 2019              Exercise counseling Z71.82

 

                          [place] [address] [facility] [id number]

[address] [place] [address]

                                        Exercise counseling Z71.82

 

                          [place] [address] [facility] [id number]

[address] [place] [address]

                          15 Feb, 2019              Type 2 diabetes mellitus wit

h diabetic neuropathy E11.40 ; 

Essential hypertension with goal blood pressure less than 130/85 I10 ; Mixed 
hyperlipidemia E78.2 and BMI 50.0-59.9, adult Z68.43

 

                          [place] [address] [facility] [id number]

[address] [place] [address]

                                         

 

                          [place] [address] [id number]

[address] [place] [address]

                          14 Dec, 2018              Essential hypertension with 

goal blood pressure less than 130/85 

I10

 

                          [facility]     [address] [id number]

[address] [place] [address]

                                        Type 2 diabetes mellitus wit

h diabetic neuropathy E11.40

 

                          [facility]     [address] [id number]

[address] [place] [address]

                                         

 

                          [facility]     [address] [id number]

[address] [place] [address]

                          22 Oct, 2018               

 

                          [facility]     [address] [id number]

[address] [place] [address]

                          25 Sep, 2018               

 

                          [facility]     [address] [facility] [id number]

[address] [place] [address]

                          06 Sep, 2018              Type 2 diabetes mellitus wit

h diabetic neuropathy E11.40

 

                          [facility]     [address] [facility] [id number]

[address] [place] [address]

                          05 Sep, 2018              Type 2 diabetes mellitus wit

h diabetic neuropathy E11.40 ; 

Essential hypertension with goal blood pressure less than 130/85 I10 ; Mixed 
hyperlipidemia E78.2 and BMI 50.0-59.9, adult Z68.43

 

                          [facility]     [address] [facility] [id number]

[address] [place] [address]

                          04 Sep, 2018               

 

                          [facility]     [address] [facility] [id number]

[address] [place] [address]

                          27 Aug, 2018              Type 2 diabetes mellitus wit

h diabetic neuropathy E11.40

 

                          [facility]     [address] [id number]

[address] [place] [address]

                                         

 

                          [facility]     [address] [facility] [id number]

[address] [place] [address]

                                         

 

                          [facility]     [address] [facility] [id number]

[address] [place] [address]

                                         

 

                          [facility]     [address] [facility] [id number]

[address] [place] [address]

                                        Type 2 diabetes mellitus wit

h diabetic neuropathy E11.40

 

                          [facility]     [address] [id number]

[address] [place] [address]

                                        Mixed hyperlipidemia E78.2

 

                          [facility]     [address] [id number]

[address] [place] [address]

                          29 May, 2018              Type 2 diabetes mellitus wit

h diabetic neuropathy E11.40 ; 

Essential hypertension with goal blood pressure less than 130/85 I10 ; Mixed 
hyperlipidemia E78.2 and BMI 50.0-59.9, adult Z68.43

 

                          [facility]     [address] [facility] [id number]

[address] [place] [address]

                          16 May, 2018              Type 2 diabetes mellitus wit

h diabetic neuropathy E11.40 and 

Diabetes mellitus due to underlying condition with foot ulcer E08.621

 

                          [facility]     [address] [facility] [id number]

[address] [place] [address]

                          16 May, 2018               

 

                          [facility]     [address] [facility] [id number]

[address] [place] [address]

                          10 Apr, 2018              Type 2 diabetes mellitus wit

h diabetic neuropathy E11.40

 

                          [facility]     [address] [id number]

[address] [place] [address]

                          10 Apr, 2018               

 

                          [facility]     [address] [facility] [id number]

[address] [place] [address]

                          23 Mar, 2018               

 

                          [facility]     [address] [facility] [id number]

[address] [place] [address]

                          06 Mar, 2018               

 

                          [facility]     [address] [facility] [id number]

[address] [place] [address]

                          06 Mar, 2018               

 

                          [facility]     [address] [facility] [id number]

[address] [place] [address]

                                        Type 2 diabetes mellitus wit

h diabetic neuropathy E11.40 ; BMI 

50.0-59.9, adult Z68.43 ; Long term current use of insulin Z79.4 and Essential 
hypertension with goal blood pressure less than 130/85 I10

 

                          [facility]     [address] [facility] [id number]

[address] [place] [address]

                                        Type 2 diabetes mellitus wit

h diabetic neuropathy E11.40 ; 

Onychocryptosis L60.0 and Onychomycosis B35.1

 

                          [place] [address] [id number]

[address] [place] [address]

                                         

 

                          [facility]     [address] [id number]

[address] [place] [address]

                          05 Dec, 2017               

 

                          [place] [address] [id number]

[address] [place] [address]

                                         

 

                          [place] [address] [id number]

[address] [place] [address]

                          20 Oct, 2017              Onychomycosis B35.1 and Diab

etes mellitus due to underlying 

condition with foot ulcer E08.621

 

                          [place] [address] [id number]

[address] [place] [address]

                          10 Oct, 2017               

 

                          [place] [address] [id number]

[address] [place] [address]

                          04 Oct, 2017               

 

                          [place] [address] [facility] [id number]

[address] [place] [address]

                          27 Sep, 2017              Encounter for immunization Z

23 ; Type 2 diabetes mellitus with 

diabetic neuropathy E11.40 ; Long term current use of insulin Z79.4 ; Diabetes 
mellitus due to underlying condition with foot ulcer E08.621 and Non-pressure 
chronic ulcer of other part of left foot with other specified severity L97.528

 

                          [place] [address] [id number]

[address] [place] [address]

                          26 Sep, 2017              Lumbar disc herniation M51.2

6 ; Degenerative disc disease, lumbar 

M51.36 and Spinal stenosis, lumbar M48.06

 

                          [place] [address] [facility] [id number]

[address] [place] [address]

                          25 Sep, 2017              Type 2 diabetes mellitus wit

h diabetic neuropathy E11.40

 

                          [place] [address] [id number]

[address] [place] [address]

                          12 Sep, 2017               

 

                          [place] [address] [place] [id number] [place] [address]

                          24 Aug, 2017              Acute left ankle pain M25.57

2 ; Abnormal NCS (nerve conduction 

studies) R94.130 and Loss of movement R29.898

 

                          [facility]     [address] [place] [id number] [place] [address]

                                        Serum potassium elevated E87

.5

 

                          [facility]     [address] [address]

                                        Onychomycosis B35.1 ; Type 2

 diabetes mellitus with diabetic 

neuropathy E11.40 and Loss of movement R29.898

 

                          [facility]     [address] [address]

                                        Serum potassium elevated E87

.5

 

                          [place] [address] [address]

                                        Type 2 diabetes mellitus wit

h diabetic neuropathy E11.40 ; 

Essential hypertension with goal blood pressure less than 130/85 I10 ; Body mass
index (BMI) of 60.0-69.9 in adult Z68.44 ; Morbid (severe) obesity due to excess
calories E66.01 ; Stasis dermatitis of both legs I87.2 and Loss of movement 
R29.898

 

                          [facility]     [address] [address]

                                         

 

                          [facility]     [address] [place] [id number]

[address] [place] [address]

                          05 May, 2017               

 

                          [facility]     [address] [address]

                                         

 

                          [facility]     [id number]

[address] [place] [address]

                                         

 

                          [facility]     [address] [address]

                          10 Apr, 2017               

 

                          [facility]  [address] [place] [id number]

[address] [place] [address] 

[address]                10 Apr, 2017              Acute eye pain H57.10

 

                          [place] [id number] [id number]

[address] [place] [address]

                          16 Mar, 2017               

 

                          [facility]     [address] [facility] [id number]

[address] [place] [address]

                          15 Mar, 2017               

 

                          [facility]     [address] [facility] [id number]

[address] [place] [address]

                                        Type 2 diabetes mellitus wit

h diabetic neuropathy E11.40 ; 

Essential hypertension with goal blood pressure less than 130/85 I10 ; Body mass
index (BMI) of 60.0-69.9 in adult Z68.44 and Morbid (severe) obesity due to 
excess calories E66.01

 

                          [facility]     [address] [facility] [id number]

[address] [place] [address]

                                        Type 2 diabetes mellitus wit

h diabetic neuropathy E11.40

 

                          [facility]     [address] [facility] [id number]

[address] [place] [address]

                                         

 

                          [facility]     [address] [facility] [id number]

[address] [place] [address]

                          29 Dec, 2016               

 

                          [facility]     [address] [facility] [id number]

[address] [place] [address]

                          07 Dec, 2016               

 

                          [facility]     [address] [facility] [id number]

[address] [place] [address]

                          30 2016              Type 2 diabetes mellitus wit

h diabetic neuropathy E11.40 ; Long 

term current use of insulin Z79.4 ; Essential hypertension with goal blood 
pressure less than 130/85 I10 ; Alteration in mobility due to weakness R53.1 and
Acute non-recurrent maxillary sinusitis J01.00

 

                          [facility]     [address] [facility] [id number]

[address] [place] [address]

                                         

 

                          [facility]     [address] [facility] [id number]

[address] [place] [address]

                          15 Nov, 2016               

 

                          [facility]     [address] [facility] [id number]

[address] [place] [address]

                                         

 

                          [facility]     [address] [facility] [id number]

[address] [place] [address]

                                         

 

                          [facility]     [address] [id number]

[address] [place] [address]

                          24 Oct, 2016               

 

                          [facility]     [address] [id number]

[address] [place] [address]

                          18 Oct, 2016               

 

                          [facility]     [address] [id number]

[address] [place] [address] [id number]

                          22 Sep, 2016               

 

                          [facility]     [address] [id number]

[address] [place] [phone]

                          12 Sep, 2016               

 

                          [facility]     [address] [id number]

[address] [place] [phone]

                          06 Sep, 2016               

 

                          [facility]     [address] [id number]

[address] [place] [phone]

                          06 Sep, 2016               

 

                          [facility]     [address] [id number]

[address] [place] [phone]

                          01 Sep, 2016               

 

                          [facility]     [address] [id number]

[address] [place] [address]

                          02 Aug, 2016              Type 2 diabetes mellitus wit

h diabetic neuropathy E11.40 ; Long 

term current use of insulin Z79.4 ; Essential hypertension with goal blood 
pressure less than 130/85 I10 and Bilateral swelling of feet M79.89

 

                          [facility]     [address] [facility] [id number]

[address] [place] [phone]

                                         

 

                          [facility]     [address] [facility] [id number]

[address] [place] [phone]

                          05 May, 2016              Type 2 diabetes mellitus wit

h diabetic neuropathy E11.40 ; Long 

term current use of insulin Z79.4 and Essential hypertension with goal blood 
pressure less than 130/85 I10

 

                          [facility]     [address] [facility] [id number]

[address] [place] [address]

                                        Lateral meniscus tear S83.28

9A and Osteoarthritis of left knee 

M17.9

 

                          [facility]     [address] [facility] [id number]

[address] [place] [address]

                                        Cellulitis of left lower ext

remity L03.116 ; Type 2 diabetes 

mellitus with diabetic neuropathy E11.40 and Left knee pain M25.562

 

                          [facility]     [address] [id number]

[address] [place] [phone]

                          12 2015              Cellulitis of left lower ext

remity L03.116

 

                          [facility]     [address] [id number]

[address] [place] [phone]

                          10 2015              Type 2 diabetes mellitus wit

h diabetic neuropathy E11.40 ; Long 

term current use of insulin Z79.4 ; Left knee pain M25.562 ; Pain, joint, lower 
leg, left M25.562 and Cellulitis of left lower extremity L03.116

 

                          [facility]   [id number] [place] [id number]

                          15 Jul, 2015              Dental examination V72.2

 

                          [facility]   [address] [place] [id number]

                                        Dental examination V72.2

 

                          [facility]     [address] [id number]

[address] [place] [phone]

                          08 May, 2015              Essential hypertension, babak

gn 401.1 ; Diabetes mellitus without 

mention of complication, type II or unspecified type, not stated as uncontrolled
250.00 ; Unspecified hereditary and idiopathic peripheral neuropathy 356.9 and 
Cellulitis 682.9

 

                          [facility]     [address] [facility] [id number]

[address] [place] [phone]

                                         

 

                          [facility]     [address] [facility] [id number]

[address] [place] [phone]

                                         

 

                          [facility]     [address] [id number]

[address] [place] [phone]

                                         

 

                          [facility]     [address] [facility] [id number]

[address] [place] [phone]

                                         

 

                          [facility]     [address] [facility] [id number]

[address] [place] [phone]

                                         

 

                          [facility]     [address] [facility] [id number]

[address] [place] [phone]

                                         

 

                          [facility]     [address] [id number]

[address] [place] [address], [place] [address]

                          16 Dec, 2014               

 

                          [facility]     [address] [id number]

[address] [place] [address], [place] [address]

                          16 Dec, 2014               

 

                          [facility]     [address] [id number]

[address] [place] [address], [place] [address]

                          11 Dec, 2014               

 

                          [facility]     [address] [id number]

[address] [place] [address], [place] [address]

                          11 Dec, 2014               

 

                          [facility]     [address] [id number]

[address] [place] [address], [place] [address]

                          09 Dec, 2014               

 

                          [facility]     [address] [id number]

[address] [place] [address], [place] [address]

                          08 Dec, 2014               

 

                          [facility]     [address] [id number]

[address] [place] [address], [place] [address]

                          08 Dec, 2014               

 

                          [facility]     [address] [id number]

[address] [place] [address], [place] [address]

                                         

 

                          [facility]     [address] [id number]

[address] [place] [address], [place] [address]

                                         

 

                          [facility]     [address] [id number]

[address] [place] [address], [place] [address]

                          24 Oct, 2014               

 

                          [facility]     [address] [id number]

[address] [place] [address], [place] [address]

                          24 Oct, 2014               

 

                          [facility]     [address] [id number]

[address] [place] [address], [place] [address]

                          28 Aug, 2014               

 

                          [facility]     [address] [id number]

[address] [place] [address], [place] [address]

                          28 Aug, 2014               

 

                          [facility]     [address] [id number]

[address] [place] [address], [place] [address]

                          15 Aug, 2014               

 

                          [facility]     [address] [id number]

[address] [place] [address], [place] [address]

                          15 Aug, 2014               

 

                          [facility]     [address] [id number]

[address] [place] [phone]

                          14 Aug, 2014               

 

                          [facility]     [address] [facility] [id number]

[address] [place] [phone]

                          14 Aug, 2014               







IMMUNIZATIONS

No Known Immunizations



SOCIAL HISTORY

Never Assessed



REASON FOR VISIT





PLAN OF CARE





VITAL SIGNS





                    Height              72 in               2014

 

                    Weight              455.61 lbs          2014

 

                    Temperature         97.5 degrees Fahrenheit 2014

 

                    Heart Rate          84 bpm              2014

 

                    Respiratory Rate    20                  2014

 

                    Blood pressure systolic 142 mmHg            2014

 

                    Blood pressure diastolic 86 mmHg             2014







MEDICATIONS

Unknown Medications



RESULTS

No Results



PROCEDURES





                Procedure       Date Ordered    Result          Body Site

 

                COMPLETE CBC W/AUTO DIFF WBC Aug 14, 2014                     

 

                ASSAY THYROID STIM HORMONE Aug 14, 2014                     

 

                GLYCATED HEMOGLOBIN TEST Aug 14, 2014                     

 

                MICROALBUMIN, SEMIQUANT Aug 14, 2014                     

 

                MICROALBUMIN, QUANTITATIVE Aug 14, 2014                     

 

                COMPREHEN METABOLIC PANEL Aug 14, 2014                     

 

                VENIPUNCT, ROUTINE* Aug 14, 2014                     







INSTRUCTIONS





MEDICATIONS ADMINISTERED

No Known Medications



MEDICAL (GENERAL) HISTORY





                    Type                Description         Date

 

                    Medical History     HYPERTENSION         

 

                    Medical History     OBESITY              

 

                    Medical History     DM                   

 

                    Medical History     ORTHOPEDIC DISORDER LEFT KNEE HX FX LEFT

 LEG AND KNEE  

 

                    Medical History     NEUROPATHY           

 

                    Medical History     CHRONIC LEFT KNEE PAIN  

 

                    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

 

 

                          Surgical History          removal of 2 metatarsal head

s and stretching of achilles 

tendon, left                            2018

 

                    Hospitalization History Ketoacidosis x3      

 

                    Hospitalization History left foot wound     2017

## 2020-04-28 NOTE — XMS REPORT
[facility]

                             Created on: 2020



Aj Gary

External Reference #: [id number]

: 1980

Sex: Male



Demographics





                          Address                   [address] [place]  [address]

 

                          Preferred Language        Unknown

 

                          Marital Status            Unknown

 

                          Zoroastrianism Affiliation     Unknown

 

                          Race                      Unknown

 

                          Ethnic Group              Unknown





Author





                          Author                    Aj DAVIS

 

                          [facility] [address]

 

                          Address                   [address] [place]  [address]



 

                          Phone                     [phone]







Care Team Providers





                    Care Team Member Name Role                Phone

 

                    RUT DAVIS  Unavailable         [phone]







PROBLEMS





          Type      Condition ICD9-CM Code GCM95-LR Code Onset Dates Condition S

tatus SNOMED 

Code

 

          Problem   Spinal stenosis, lumbar           M48.06              Active

    20147373

 

          Problem   Lumbar disc herniation           M51.26              Active 

   [id number]

 

          Problem   Abnormal NCS (nerve conduction studies)           R94.130   

          Active    86531288

 

          Problem   Degenerative disc disease, lumbar           M51.36          

    Active    17845929

 

          Problem   Long term current use of insulin           Z79.4            

   Active    180890231

 

          Problem   Pain, joint, lower leg, left           M25.562             A

ctive    34585403

 

             Problem      Essential hypertension with goal blood pressure less t

sargent 130/85              I10           

                          Active                    83707089

 

          Problem   Bilateral swelling of feet           M79.89              Act

shruthi    [id number]

 

          Problem   Morbid (severe) obesity due to excess calories           E66

.01              Active    

[id number]

 

             Problem      Diabetes mellitus due to underlying condition with yanna

t ulcer              E08.621      

                          Active                    [id number]

 

          Problem   Left knee pain           M25.562             Active    91397

003

 

          Problem   Mixed hyperlipidemia           E78.2               Active   

 306800329

 

          Problem   Type 2 diabetes mellitus with diabetic neuropathy           

E11.40              Active    

30852103

 

          Problem   Body mass index (BMI) of 60.0-69.9 in adult           Z68.44

              Active    541596271

 

          Problem   Loss of movement           R29.898             Active    623

55920

 

          Problem   Stasis dermatitis of both legs           I87.2              

 Active    17277540

 

                          Problem                   Non-pressure chronic ulcer o

f other part of left foot with other 

specified severity              L97.528                   Active       741456646







ALLERGIES

No Information



ENCOUNTERS





                Encounter       Location        Date            Diagnosis

 

                    [facility] [place] [address] [id number] [place] [address] 24 

2020                                

 

                    [facility] [place] [id number] [place] [address] 23 

Dec, 2019                                

 

                    [place] [address] [address] 16 

Dec, 2019                                

 

                    [place] [address] [address]                                Type 2 diabetes mellitus with diabetic n

europathy E11.40 ; Essential 

hypertension with goal blood pressure less than 130/85 I10 ; Mixed 
hyperlipidemia E78.2 and Avulsion of toenail of left foot S91.209A

 

                    [place] [address] [address] 22 

Oct, 2019                                

 

                    [place] [address] [address] 04 

Sep, 2019                                

 

                    [address] [address] [address]                                 

 

                    [place] [address] [address] 28 

May, 2019                                

 

                    [place] [address] [address] 09 

May, 2019                               Exercise counseling Z71.82

 

                    [place] [address] [address] 02 

May, 2019                                

 

                    [place] [address] [address]                                Exercise counseling Z71.82

 

                    [place] [address] [address]                                Exercise counseling Z71.82

 

                    [place] [address] [address]                                Body mass index (BMI) of 60.0-69.9 in ad

ult Z68.44 and Exercise 

counseling Z71.82

 

                    [place] [address] [address] 29 

Mar, 2019                               Exercise counseling Z71.82

 

                    [place] [address] [address] 27 

Mar, 2019                               Exercise counseling Z71.82

 

                    [place] [address] [address] 24 

Mar, 2019                                

 

                    [place] [address] [address] 20 

Mar, 2019                               Exercise counseling Z71.82

 

                    [place] [address] [address] 18 

Mar, 2019                               Type 2 diabetes mellitus with diabetic n

europathy E11.40 ; Morbid 

(severe) obesity due to excess calories E66.01 and Morbid obesity E66.01

 

                    [place] [address] [address] 11 

Mar, 2019                               Exercise counseling Z71.82

 

                    [place] [address] [address] 08 

Mar, 2019                               Type 2 diabetes mellitus with diabetic n

europathy E11.40 ; Mixed 

hyperlipidemia E78.2 and Essential hypertension with goal blood pressure less 
than 130/85 I10

 

                    [place] [address] [address] 08 

Mar, 2019                               Exercise counseling Z71.82

 

                    [place] [address] [address] 04 

Mar, 2019                               Exercise counseling Z71.82

 

                    [place] [address] [address]                                Exercise counseling Z71.82

 

                    [place] [address] [address] 15 

2019                               Type 2 diabetes mellitus with diabetic n

europathy E11.40 ; Essential 

hypertension with goal blood pressure less than 130/85 I10 ; Mixed 
hyperlipidemia E78.2 and BMI 50.0-59.9, adult Z68.43

 

                    [place] [address] [address]                                 

 

                    [place] [address] [address] 14 

Dec, 2018                               Essential hypertension with goal blood p

ressure less than 130/85 I10

 

                    [facility] [place] [id number] [place] [address]                                Type 2 diabetes mellitus with diabetic n

europathy E11.40

 

                    [facility] [place] [id number] [place] [address]                                 

 

                    [facility] [place] [id number] [place] [address] 22 

Oct, 2018                                

 

                    [facility] [place] [id number] [place] [address] 25 

Sep, 2018                                

 

                    [facility] [place] [id number] [place] [address] 06 

Sep, 2018                               Type 2 diabetes mellitus with diabetic n

europathy E11.40

 

                    [facility] [place] [id number] [place] [address] 05 

Sep, 2018                               Type 2 diabetes mellitus with diabetic n

europathy E11.40 ; Essential 

hypertension with goal blood pressure less than 130/85 I10 ; Mixed 
hyperlipidemia E78.2 and BMI 50.0-59.9, adult Z68.43

 

                    [facility] [place] [id number] [place] [address] 04 

Sep, 2018                                

 

                    [facility] [place] [id number] [place] [address] 27 

Aug, 2018                               Type 2 diabetes mellitus with diabetic n

europathy E11.40

 

                    [facility] [place] [id number] [place] [address]                                 

 

                    [facility] [place] [id number] [place] [address]                                 

 

                    [facility] [place] [id number] [place] [address]                                 

 

                    [facility] [place] [id number] [place] [address]                                Type 2 diabetes mellitus with diabetic n

europathy E11.40

 

                    [facility] [place] [id number] [place] [address]                                Mixed hyperlipidemia E78.2

 

                    [facility] [place] [id number] [place] [address] 29 

May, 2018                               Type 2 diabetes mellitus with diabetic n

europathy E11.40 ; Essential 

hypertension with goal blood pressure less than 130/85 I10 ; Mixed 
hyperlipidemia E78.2 and BMI 50.0-59.9, adult Z68.43

 

                    [address] [id number] [place] [address] 16 

May, 2018                               Type 2 diabetes mellitus with diabetic n

europathy E11.40 and Diabetes 

mellitus due to underlying condition with foot ulcer E08.621

 

                    [address] [address] [address] 16 

May, 2018                                

 

                    [address] [address] [address] 10 

Apr, 2018                               Type 2 diabetes mellitus with diabetic n

europathy E11.40

 

                    [address] [address] [address] 10 

Apr, 2018                                

 

                    [address] [address] [address] 23 

Mar, 2018                                

 

                    [address] [address] [address] 06 

Mar, 2018                                

 

                    [address] [address] [address] 06 

Mar, 2018                                

 

                    [address] [address] [address]                                Type 2 diabetes mellitus with diabetic n

europathy E11.40 ; BMI 50.0-

59.9, adult Z68.43 ; Long term current use of insulin Z79.4 and Essential 
hypertension with goal blood pressure less than 130/85 I10

 

                    [address] [address] [address]                                Type 2 diabetes mellitus with diabetic n

europathy E11.40 ; 

Onychocryptosis L60.0 and Onychomycosis B35.1

 

                    [address] [id number] [place] [address]                                 

 

                    [address] [address] [address] [address] 05 

Dec, 2017                                

 

                    [address] [address] [address]                                 

 

                    [address] [address] [address] [address] 20 

Oct, 2017                               Onychomycosis B35.1 and Diabetes mellitu

s due to underlying condition 

with foot ulcer E08.621

 

                    [address] [address] [address] 10 

Oct, 2017                                

 

                    [address] [address] [address] 04 

Oct, 2017                                

 

                    [address] [address] 27 

Sep, 2017                               Encounter for immunization Z23 ; Type 2 

diabetes mellitus with 

diabetic neuropathy E11.40 ; Long term current use of insulin Z79.4 ; Diabetes 
mellitus due to underlying condition with foot ulcer E08.621 and Non-pressure 
chronic ulcer of other part of left foot with other specified severity L97.528

 

                    [address] [address] 26 

Sep, 2017                               Lumbar disc herniation M51.26 ; Degenera

tive disc disease, lumbar 

M51.36 and Spinal stenosis, lumbar M48.06

 

                    [address] [address] 25 

Sep, 2017                               Type 2 diabetes mellitus with diabetic n

europathy E11.40

 

                    [address] [address] [address] 12 

Sep, 2017                                

 

                    [address] [address] 24 

Aug, 2017                               Acute left ankle pain M25.572 ; Abnormal

 NCS (nerve conduction 

studies) R94.130 and Loss of movement R29.898

 

                    [address] [address]                                Serum potassium elevated E87.5

 

                    [address] [place] [address]                                Onychomycosis B35.1 ; Type 2 diabetes me

llitus with diabetic 

neuropathy E11.40 and Loss of movement R29.898

 

                    [facility] [place] [address] [address]                                Serum potassium elevated E87.5

 

                    [address] [address]                                Type 2 diabetes mellitus with diabetic n

europathy E11.40 ; Essential 

hypertension with goal blood pressure less than 130/85 I10 ; Body mass index 
(BMI) of 60.0-69.9 in adult Z68.44 ; Morbid (severe) obesity due to excess 
calories E66.01 ; Stasis dermatitis of both legs I87.2 and Loss of movement 
R29.898

 

                    [facility] [place] [address] [address]                                 

 

                    [address] [address] 05 

May, 2017                                

 

                    [facility] [place] [address] [address]                                 

 

                    [address] [address]                                 

 

                    [facility] [place] [id number] [place] [address] 10 

Apr, 2017                                

 

                          [facility]  [address] [facility] [id number]

[address] [place] 

[address]                10 Apr, 2017              Acute eye pain H57.10

 

                    [facility] [place] [address] [address] 16 

Mar, 2017                                

 

                    [facility] [place] [address] [address] 15 

Mar, 2017                                

 

                    [facility] [place] [address] [address]                                Type 2 diabetes mellitus with diabetic n

europathy E11.40 ; Essential 

hypertension with goal blood pressure less than 130/85 I10 ; Body mass index 
(BMI) of 60.0-69.9 in adult Z68.44 and Morbid (severe) obesity due to excess 
calories E66.01

 

                    [facility] [place] [address] [address]                                Type 2 diabetes mellitus with diabetic n

europathy E11.40

 

                    [facility] [place] [address] [address]                                 

 

                    [facility] [place] [address] [address] 29 

Dec, 2016                                

 

                    [facility] [place] [address] [address] 07 

Dec, 2016                                

 

                    [facility] [place] [address] [address]                                Type 2 diabetes mellitus with diabetic n

europathy E11.40 ; Long term 

current use of insulin Z79.4 ; Essential hypertension with goal blood pressure 
less than 130/85 I10 ; Alteration in mobility due to weakness R53.1 and Acute 
non-recurrent maxillary sinusitis J01.00

 

                    [facility] [place] [address] [address]                                 

 

                    [facility] [place] [address] [address] 15 

Nov, 2016                                

 

                    [facility] [place] [address] [address]                                 

 

                    [facility] [place] [address] [address]                                 

 

                    [facility] [place] [address] [address] 24 

Oct, 2016                                

 

                    [facility] [place] [address] [address] 18 

Oct, 2016                                

 

                    [facility] [place] [address] [address] 22 

Sep, 2016                                

 

                    [facility] [place] [address] [address] 12 

Sep, 2016                                

 

                    [address] [address] 06 

Sep, 2016                                

 

                    [facility] [place] [address] [address] 06 

Sep, 2016                                

 

                    [facility] [place] [address] [address] 01 

Sep, 2016                                

 

                    [address] [address] 02 

Aug, 2016                               Type 2 diabetes mellitus with diabetic n

europathy E11.40 ; Long term 

current use of insulin Z79.4 ; Essential hypertension with goal blood pressure 
less than 130/85 I10 and Bilateral swelling of feet M79.89

 

                    [address] [address]                                 

 

                    [address] [address] 05 

May, 2016                               Type 2 diabetes mellitus with diabetic n

europathy E11.40 ; Long term 

current use of insulin Z79.4 and Essential hypertension with goal blood pressure
less than 130/85 I10

 

                    [address] [address] [address]                                Lateral meniscus tear S83.289A and Osteo

arthritis of left knee M17.9

 

                    [address] [address]                                Cellulitis of left lower extremity L03.1

16 ; Type 2 diabetes mellitus 

with diabetic neuropathy E11.40 and Left knee pain M25.562

 

                    [address] [address] [address] 12 

2015                               Cellulitis of left lower extremity L03.1

16

 

                    [address] [address] [address] 10 

2015                               Type 2 diabetes mellitus with diabetic n

europathy E11.40 ; Long term 

current use of insulin Z79.4 ; Left knee pain M25.562 ; Pain, joint, lower leg, 
left M25.562 and Cellulitis of left lower extremity L03.116

 

                    [facility] [address] [facility] [place] [id number] 15 

2015                               Dental examination V72.2

 

                    [facility] [address] [id number] 09 

2015                               Dental examination V72.2

 

                    [facility] [place] [address] [address] 08 

May, 2015                               Essential hypertension, benign 401.1 ; D

iabetes mellitus without 

mention of complication, type II or unspecified type, not stated as uncontrolled
250.00 ; Unspecified hereditary and idiopathic peripheral neuropathy 356.9 and 
Cellulitis 682.9

 

                    [facility] [place] [address] [address]                                 

 

                    [facility] [place] [address] [address]                                 

 

                    [facility] [place] [address] [address]                                 

 

                    [facility] [place] [address] [address]                                 

 

                    [facility] [place] [address] [address]                                 

 

                    [facility] [place] [address] [address]                                 

 

                    [facility] [place] [address] [address] 16 

Dec, 2014                                

 

                    [facility] [place] [address] [address] 16 

Dec, 2014                                

 

                    [facility] [place] [address] [address] 11 

Dec, 2014                                

 

                    [facility], [place] [address] [address] 11 

Dec, 2014                                

 

                    [facility] [place] [address] [id number] 09 

Dec, 2014                                

 

                    [facility] [place] [address] [id number] 08 

Dec, 2014                                

 

                    [facility] [place] [address] [facility] [place] [address] 08 

Dec, 2014                                

 

                    [facility] [place] [address] [facility] [place] [address]                                 

 

                    [facility], [place] [address] [facility] [place] [address]                                 

 

                    [facility], [place] [id number] [place] [address] 24 

Oct, 2014                                

 

                    [facility], [place] [id number] [place] [address] 24 

Oct, 2014                                

 

                    [facility], [place] [id number] [place] [address] 28 

Aug, 2014                                

 

                    [facility], [place] [address] [facility] [place] [address] 28 

Aug, 2014                                

 

                    [facility], [place] [id number] [place] [address] 15 

Aug, 2014                                

 

                    [facility], [place] [address] [facility] [place] [address] 15 

Aug, 2014                                

 

                    [facility], [place] [id number] [place] [address] 14 

Aug, 2014                                

 

                    [facility] [place] [address] [facility] [place] [address] 14 

Aug, 2014                                







IMMUNIZATIONS

No Known Immunizations



SOCIAL HISTORY

Never Assessed



REASON FOR VISIT

weight loss



PLAN OF CARE





                          Activity                  Details

 

                                         

 

                          Follow Up                 1 Week Reason:







VITAL SIGNS





MEDICATIONS

Unknown Medications



RESULTS

No Results



PROCEDURES

No Known procedures



INSTRUCTIONS





MEDICATIONS ADMINISTERED

No Known Medications



MEDICAL (GENERAL) HISTORY





                    Type                Description         Date

 

                    Medical History     HYPERTENSION         

 

                    Medical History     OBESITY              

 

                    Medical History     DM                   

 

                    Medical History     ORTHOPEDIC DISORDER LEFT KNEE HX FX LEFT

 LEG AND KNEE  

 

                    Medical History     NEUROPATHY           

 

                    Medical History     CHRONIC LEFT KNEE PAIN  

 

                    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

 

 

                          Surgical History          removal of 2 metatarsal head

s and stretching of achilles 

tendon, left                            2018

 

                    Hospitalization History Ketoacidosis x3      

 

                    Hospitalization History left foot wound     2017

## 2020-04-28 NOTE — XMS REPORT
[facility]

                             Created on: 2020



CookieDaveAj

External Reference #: [id number]

: 1980

Sex: Male



Demographics





                          Address                   [address] [place]  [address]

 

                          Preferred Language        Unknown

 

                          Marital Status            Unknown

 

                          Muslim Affiliation     Unknown

 

                          Race                      Unknown

 

                          Ethnic Group              Unknown





Author





                          Author                    Aj Villareal Doctor

 

                          Organization              [facility]

 

                          Address                   Unknown

 

                          Phone                     Unavailable







Care Team Providers





                    Care Team Member Name Role                Phone

 

                    Migration,  Doctor  Unavailable         Unavailable







PROBLEMS





          Type      Condition ICD9-CM Code QAW26-YP Code Onset Dates Condition S

tatus SNOMED 

Code

 

          Problem   Spinal stenosis, lumbar           M48.06              Active

    [id number]

 

          Problem   Lumbar disc herniation           M51.26              Active 

   [id number]

 

          Problem   Abnormal NCS (nerve conduction studies)           R94.130   

          Active    92520968

 

          Problem   Degenerative disc disease, lumbar           M51.36          

    Active    83974467

 

          Problem   Long term current use of insulin           Z79.4            

   Active    837023730

 

          Problem   Pain, joint, lower leg, left           M25.562             A

ctive    64333045

 

             Problem      Essential hypertension with goal blood pressure less t

sargent 130/85              I10           

                          Active                    57321581

 

          Problem   Bilateral swelling of feet           M79.89              Act

shruthi    708047776

 

          Problem   Morbid (severe) obesity due to excess calories           E66

.01              Active    

800046880

 

             Problem      Diabetes mellitus due to underlying condition with yanna

t ulcer              E08.621      

                          Active                    313051776

 

          Problem   Left knee pain           M25.562             Active    94507

003

 

          Problem   Mixed hyperlipidemia           E78.2               Active   

 850250969

 

          Problem   Type 2 diabetes mellitus with diabetic neuropathy           

E11.40              Active    

13740565

 

          Problem   Body mass index (BMI) of 60.0-69.9 in adult           Z68.44

              Active    952748731

 

          Problem   Loss of movement           R29.898             Active    623

50702

 

          Problem   Stasis dermatitis of both legs           I87.2              

 Active    18929806

 

                          Problem                   Non-pressure chronic ulcer o

f other part of left foot with other 

specified severity              L97.528                   Active       [id number]







ALLERGIES

No Information



ENCOUNTERS





                Encounter       Location        Date            Diagnosis

 

                          [facility]     [address] [facility] [id number]

[address] [place] [address]

                          06 May, 2020               

 

                          [facility]     [address] [facility] [id number]

[address] [place] [address]

                                         

 

                          [facility]     [address] [facility] [id number]

[address] [place] [address]

                                         

 

                          [facility]     [address] [id number]

[address] [place] [address]

                          23 Dec, 2019               

 

                          [facility]     [address] [id number]

[address] [place] [address]

                          16 Dec, 2019               

 

                          [facility]     [address] [id number]

[address] [place] [address]

                                        Type 2 diabetes mellitus wit

h diabetic neuropathy E11.40 ; 

Essential hypertension with goal blood pressure less than 130/85 I10 ; Mixed 
hyperlipidemia E78.2 and Avulsion of toenail of left foot S91.209A

 

                          [facility]     [address] [id number]

[address] [place] [address]

                          22 Oct, 2019               

 

                          [facility]     [address] [id number]

[address] [place] [address]

                          04 Sep, 2019               

 

                          [facility]     [address] [id number]

[address] [place] [address]

                                         

 

                          [facility]     [address] [id number]

[address] [place] [address]

                          28 May, 2019               

 

                          [facility]     [address] [id number]

[address] [place] [address]

                          09 May, 2019              Exercise counseling Z71.82

 

                          [facility]     [address] [id number]

[address] [place] [address]

                          02 May, 2019               

 

                          [facility]     [address] [id number]

[address] [place] [address]

                                        Exercise counseling Z71.82

 

                          [facility]     [address] [id number]

[address] [place] [address]

                                        Exercise counseling Z71.82

 

                          [facility]     [address] [id number]

[address] [place] [address]

                                        Body mass index (BMI) of 60.

0-69.9 in adult Z68.44 and Exercise 

counseling Z71.82

 

                          [facility]     [address] [facility] [id number]

[address] [place] [address]

                          29 Mar, 2019              Exercise counseling Z71.82

 

                          [facility]     [address] [facility] [id number]

[address] [place] [address]

                          27 Mar, 2019              Exercise counseling Z71.82

 

                          [facility]     [address] [facility] [id number]

[address] [place] [address]

                          24 Mar, 2019               

 

                          [place] [address] [facility] [id number]

[address] [place] [address]

                          20 Mar, 2019              Exercise counseling Z71.82

 

                          [place] [address] [facility] [id number]

[address] [place] [address]

                          18 Mar, 2019              Type 2 diabetes mellitus wit

h diabetic neuropathy E11.40 ; Morbid 

(severe) obesity due to excess calories E66.01 and Morbid obesity E66.01

 

                          [place] [address] [facility] [id number]

[address] [place] [address]

                          11 Mar, 2019              Exercise counseling Z71.82

 

                          [place] [address] [facility] [id number]

[address] [place] [address]

                          08 Mar, 2019              Type 2 diabetes mellitus wit

h diabetic neuropathy E11.40 ; Mixed 

hyperlipidemia E78.2 and Essential hypertension with goal blood pressure less 
than 130/85 I10

 

                          [place] [address] [facility] [id number]

[address] [place] [address]

                          08 Mar, 2019              Exercise counseling Z71.82

 

                          [place] [address] [facility] [id number]

[address] [place] [address]

                          04 Mar, 2019              Exercise counseling Z71.82

 

                          [place] [address] [facility] [id number]

[address] [place] [address]

                                        Exercise counseling Z71.82

 

                          [place] [address] [facility] [id number]

[address] [place] [address]

                          15 2019              Type 2 diabetes mellitus wit

h diabetic neuropathy E11.40 ; 

Essential hypertension with goal blood pressure less than 130/85 I10 ; Mixed 
hyperlipidemia E78.2 and BMI 50.0-59.9, adult Z68.43

 

                          [facility]     [address] [id number]

[address] [place] [address]

                                         

 

                          [facility]     [address] [id number]

[address] [place] [address]

                          14 Dec, 2018              Essential hypertension with 

goal blood pressure less than 130/85 

I10

 

                          [facility]     [address] [id number]

[address] [place] [address]

                                        Type 2 diabetes mellitus wit

h diabetic neuropathy E11.40

 

                          [facility]     [address] [id number]

[address] [place] [address]

                                         

 

                          [facility]     [address] [id number]

[address] [place] [address]

                          22 Oct, 2018               

 

                          [facility]     [address] [id number]

[address] [place] [address]

                          25 Sep, 2018               

 

                          [facility]     [address] [id number]

[address] [place] [address]

                          06 Sep, 2018              Type 2 diabetes mellitus wit

h diabetic neuropathy E11.40

 

                          [facility]     [address] [id number]

[address] [place] [address]

                          05 Sep, 2018              Type 2 diabetes mellitus wit

h diabetic neuropathy E11.40 ; 

Essential hypertension with goal blood pressure less than 130/85 I10 ; Mixed 
hyperlipidemia E78.2 and BMI 50.0-59.9, adult Z68.43

 

                          [facility]     [address] [id number]

[address] [place] [address]

                          04 Sep, 2018               

 

                          [facility]     [address] [id number]

[address] [place] [address]

                          27 Aug, 2018              Type 2 diabetes mellitus wit

h diabetic neuropathy E11.40

 

                          [facility]     [address] [id number]

[address] [place] [address]

                                         

 

                          [facility]     [address] [id number]

[address] [place] [address]

                                         

 

                          [facility]     [address] [facility] [id number]

[address] [place] [address]

                                         

 

                          [facility]     [address] [facility] [id number]

[address] [place] [address]

                                        Type 2 diabetes mellitus wit

h diabetic neuropathy E11.40

 

                          [facility]     [address] [facility] [id number]

[address] [place] [address]

                                        Mixed hyperlipidemia E78.2

 

                          [facility]     [address] [place] [id number]

[address] [place] [address]

                          29 May, 2018              Type 2 diabetes mellitus wit

h diabetic neuropathy E11.40 ; 

Essential hypertension with goal blood pressure less than 130/85 I10 ; Mixed 
hyperlipidemia E78.2 and BMI 50.0-59.9, adult Z68.43

 

                          [place] [address] [place] [id number]

[address] [place] [address]

                          16 May, 2018              Type 2 diabetes mellitus wit

h diabetic neuropathy E11.40 and 

Diabetes mellitus due to underlying condition with foot ulcer E08.621

 

                          [place] [address] [place] [id number]

[address] [place] [address]

                          16 May, 2018               

 

                          [facility]     [address] [place] [id number]

[address] [place] [address]

                          10 Apr, 2018              Type 2 diabetes mellitus wit

h diabetic neuropathy E11.40

 

                          [facility]     [address] [facility] [id number]

[address] [place] [address]

                          10 Apr, 2018               

 

                          [facility]     [address] [place] [id number]

[address] [place] [address]

                          23 Mar, 2018               

 

                          [facility]     [address] [place] [id number]

[address] [place] [address]

                          06 Mar, 2018               

 

                          [facility]     [address] [facility] [id number]

[address] [place] [address]

                          06 Mar, 2018               

 

                          [facility]     [address] [facility] [id number]

[address] [place] [address]

                                        Type 2 diabetes mellitus wit

h diabetic neuropathy E11.40 ; BMI 

50.0-59.9, adult Z68.43 ; Long term current use of insulin Z79.4 and Essential 
hypertension with goal blood pressure less than 130/85 I10

 

                          [facility]     [address] [facility] [id number]

[address] [place] [address]

                                        Type 2 diabetes mellitus wit

h diabetic neuropathy E11.40 ; 

Onychocryptosis L60.0 and Onychomycosis B35.1

 

                          [place] [address] [facility] [id number]

[address] [place] [address]

                                         

 

                          [place] [address] [id number]

[address] [place] [address]

                          05 Dec, 2017               

 

                          [place] [address] [facility] [id number]

[address] [place] [address]

                                         

 

                          [place] [address] [place] [id number]

[address] [place] [address]

                          20 Oct, 2017              Onychomycosis B35.1 and Diab

etes mellitus due to underlying 

condition with foot ulcer E08.621

 

                          [place] [address] [facility] [id number]

[address] [place] [address]

                          10 Oct, 2017               

 

                          [place] [address] [facility] [id number]

[address] [place] [address]

                          04 Oct, 2017               

 

                          [place] [address] [place] [id number]

[address] [place] [address]

                          27 Sep, 2017              Encounter for immunization Z

23 ; Type 2 diabetes mellitus with 

diabetic neuropathy E11.40 ; Long term current use of insulin Z79.4 ; Diabetes 
mellitus due to underlying condition with foot ulcer E08.621 and Non-pressure 
chronic ulcer of other part of left foot with other specified severity L97.528

 

                          [facility]     [address] [facility] [id number]

[address] [place] [address]

                          26 Sep, 2017              Lumbar disc herniation M51.2

6 ; Degenerative disc disease, lumbar 

M51.36 and Spinal stenosis, lumbar M48.06

 

                          [place] [address] [facility] [id number]

[address] [place] [address]

                          25 Sep, 2017              Type 2 diabetes mellitus wit

h diabetic neuropathy E11.40

 

                          [place] [address] [place] [id number]

[address] [place] [address]

                          12 Sep, 2017               

 

                          [facility]     [address] [place] [id number]

[address] [place] [address]

                          24 Aug, 2017              Acute left ankle pain M25.57

2 ; Abnormal NCS (nerve conduction 

studies) R94.130 and Loss of movement R29.898

 

                          [facility]     [id number]

[address] [place] [address]

                                        Serum potassium elevated E87

.5

 

                          [facility]     [address] [place] [id number]

[address] [place] [address]

                                        Onychomycosis B35.1 ; Type 2

 diabetes mellitus with diabetic 

neuropathy E11.40 and Loss of movement R29.898

 

                          [facility]     [address] [place] [id number]

[address] [place] [address]

                                        Serum potassium elevated E87

.5

 

                          [place] [address] [place] [id number]

[address] [place] [address]

                                        Type 2 diabetes mellitus wit

h diabetic neuropathy E11.40 ; 

Essential hypertension with goal blood pressure less than 130/85 I10 ; Body mass
index (BMI) of 60.0-69.9 in adult Z68.44 ; Morbid (severe) obesity due to excess
calories E66.01 ; Stasis dermatitis of both legs I87.2 and Loss of movement 
R29.898

 

                          [facility]     [id number]

[address] [place] [address]

                                         

 

                          [facility]     [address] [place] [id number]

[address] [place] [address]

                          05 May, 2017               

 

                          [facility]     [address] [place] [id number]

[address] [place] [address]

                                         

 

                          [facility]     [address] [place] [id number]

[address] [place] [address]

                                         

 

                          [facility]     [address] [place] [id number]

[address] [place] [address]

                          10 Apr, 2017               

 

                          [facility]  [address] [place] [id number]

[address] [place] [address] 

[address]                10 2017              Acute eye pain H57.10

 

                          [facility]     [address] [facility] [id number]

[address] [place] [address]

                          16 Mar, 2017               

 

                          [facility]     [address] [facility] [id number]

[address] [place] [address]

                          15 Mar, 2017               

 

                          [facility]     [address] [facility] [id number]

[address] [place] [address]

                                        Type 2 diabetes mellitus wit

h diabetic neuropathy E11.40 ; 

Essential hypertension with goal blood pressure less than 130/85 I10 ; Body mass
index (BMI) of 60.0-69.9 in adult Z68.44 and Morbid (severe) obesity due to 
excess calories E66.01

 

                          [facility]     [address] [facility] [id number]

[address] [place] [address]

                                        Type 2 diabetes mellitus wit

h diabetic neuropathy E11.40

 

                          [facility]     [address] [facility] [id number]

[address] [place] [address]

                                         

 

                          [facility]     [address] [facility] [id number]

[address] [place] [address]

                          29 Dec, 2016               

 

                          [facility]     [address] [place] [id number]

[address] [place] [address]

                          07 Dec, 2016               

 

                          [facility]     [address] [facility] [id number]

[address] [place] [address]

                          30 2016              Type 2 diabetes mellitus wit

h diabetic neuropathy E11.40 ; Long 

term current use of insulin Z79.4 ; Essential hypertension with goal blood 
pressure less than 130/85 I10 ; Alteration in mobility due to weakness R53.1 and
Acute non-recurrent maxillary sinusitis J01.00

 

                          [facility]     [address] [facility] [id number]

[address] [place] [address]

                                         

 

                          [facility]     [address] [facility] [id number]

[address] [place] [address]

                          15 Nov, 2016               

 

                          [facility]     [address] [place] [id number]

[address] [place] [address]

                                         

 

                          [facility]     [address] [id number]

[address] [place] [address]

                                         

 

                          [facility]     [address] [id number]

[address] [place] [address]

                          24 Oct, 2016               

 

                          [facility]     [address] [id number]

[address] [place] [address]

                          18 Oct, 2016               

 

                          [facility]     [address] [id number]

[address] [place] [phone]

                          22 Sep, 2016               

 

                          [facility]     [address] [id number]

[address] [place] [phone]

                          12 Sep, 2016               

 

                          [facility]     [address] [id number]

[address] [place] [address]

                          06 Sep, 2016               

 

                          [facility]     [address] [id number]

[address] [place] [phone]

                          06 Sep, 2016               

 

                          [facility]     [address] [id number]

[address] [place] [address]

                          01 Sep, 2016               

 

                          [facility]     [address] [id number]

[address] [place] [address]

                          02 Aug, 2016              Type 2 diabetes mellitus wit

h diabetic neuropathy E11.40 ; Long 

term current use of insulin Z79.4 ; Essential hypertension with goal blood 
pressure less than 130/85 I10 and Bilateral swelling of feet M79.89

 

                          [facility]     [address] [id number]

[address] [place] [phone]

                                         

 

                          [facility]     [address] [id number]

[address] [place] [address]

                          05 May, 2016              Type 2 diabetes mellitus wit

h diabetic neuropathy E11.40 ; Long 

term current use of insulin Z79.4 and Essential hypertension with goal blood 
pressure less than 130/85 I10

 

                          [facility]     [address] [id number]

[address] [place] [phone]

                                        Lateral meniscus tear S83.28

9A and Osteoarthritis of left knee 

M17.9

 

                          [facility]     [address] [id number]

[address] [place] [address]

                                        Cellulitis of left lower ext

remity L03.116 ; Type 2 diabetes 

mellitus with diabetic neuropathy E11.40 and Left knee pain M25.562

 

                          [facility]     [address] [id number]

[address] [place] [address]

                          12 2015              Cellulitis of left lower ext

remity L03.116

 

                          [facility]     [address] [id number]

[address] [place] [address]

                          10 2015              Type 2 diabetes mellitus wit

h diabetic neuropathy E11.40 ; Long 

term current use of insulin Z79.4 ; Left knee pain M25.562 ; Pain, joint, lower 
leg, left M25.562 and Cellulitis of left lower extremity L03.116

 

                          [facility]   [address] [id number]

[address] [place] [id number]

                          15 Jul, 2015              Dental examination V72.2

 

                          [facility]   [address] [id number] [place] [id number]

                                        Dental examination V72.2

 

                          [facility]     [address] [facility] [id number]

[address] [place] [address]

                          08 May, 2015              Essential hypertension, babak

gn 401.1 ; Diabetes mellitus without 

mention of complication, type II or unspecified type, not stated as uncontrolled
250.00 ; Unspecified hereditary and idiopathic peripheral neuropathy 356.9 and 
Cellulitis 682.9

 

                          [facility]     [address] [facility] [id number]

[address] [place] [phone]

                                         

 

                          [facility]     [address] [facility] [id number]

[address] [place] [phone]

                                         

 

                          [facility]     [address] [facility] [id number]

[address] [place] [phone]

                                         

 

                          [facility]     [address] [facility] [id number]

[address] [place] [phone]

                                         

 

                          [facility]     [address] [facility] [id number]

[address] [place] [phone]

                                         

 

                          [facility]     [address] [id number]

[address] [place] [address], [place] [address]

                                         

 

                          [facility]     [address] [id number]

[address] [place] [address], [place] [address]

                          16 Dec, 2014               

 

                          [facility]     [address] [id number]

[address] [place] [address], [place] [address]

                          16 Dec, 2014               

 

                          [facility]     [address] [id number]

[address] [place] [address], [place] [address]

                          11 Dec, 2014               

 

                          [facility]     [address] [id number]

[address] [place] [address], [place] [address]

                          11 Dec, 2014               

 

                          [facility]     [address] [id number]

[address] [place] [address], [place] [address]

                          09 Dec, 2014               

 

                          [facility]     [address] [id number]

[address] [place] [address], [place] [address]

                          08 Dec, 2014               

 

                          [facility]     [address] [id number]

[address] [place] [address], [place] [address]

                          08 Dec, 2014               

 

                          [facility]     [address] [id number]

[address] [place] [address], [place] [address]

                                         

 

                          [facility]     [address] [id number]

[address] [place] [address], [place] [address]

                                         

 

                          [facility]     [address] [id number]

[address] [place] [address], [place] [address]

                          24 Oct, 2014               

 

                          [facility]     [address] [id number]

[address] [place] [address], [place] [address]

                          24 Oct, 2014               

 

                          [facility]     [address] [id number]

[address] [place] [address], [place] [address]

                          28 Aug, 2014               

 

                          [facility]     [address] [id number]

[address] [place] [address], [place] [address]

                          28 Aug, 2014               

 

                          [facility]     [address] [id number]

[address] [place] [address], [place] [address]

                          15 Aug, 2014               

 

                          [facility]     [address] [id number]

[address] [place] [phone]

                          15 Aug, 2014               

 

                          [facility]     [address] [facility] [id number]

[address] [place] [phone]

                          14 Aug, 2014               

 

                          [facility]     [address] [facility] [id number]

[address] [place] [phone]

                          14 Aug, 2014               







IMMUNIZATIONS

No Known Immunizations



SOCIAL HISTORY

Never Assessed



REASON FOR VISIT





PLAN OF CARE





VITAL SIGNS





                    Blood pressure systolic 124 mmHg            2015

 

                    Blood pressure diastolic 74 mmHg             2015







MEDICATIONS

Unknown Medications



RESULTS

No Results



PROCEDURES





                Procedure       Date Ordered    Result          Body Site

 

                DEBRIDE NAIL, 6 OR MORE 2015                     







INSTRUCTIONS





MEDICATIONS ADMINISTERED

No Known Medications



MEDICAL (GENERAL) HISTORY





                    Type                Description         Date

 

                    Medical History     HYPERTENSION         

 

                    Medical History     OBESITY              

 

                    Medical History     DM                   

 

                    Medical History     ORTHOPEDIC DISORDER LEFT KNEE HX FX LEFT

 LEG AND KNEE  

 

                    Medical History     NEUROPATHY           

 

                    Medical History     CHRONIC LEFT KNEE PAIN  

 

                    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

 

 

                          Surgical History          removal of 2 metatarsal head

s and stretching of achilles 

tendon, left                            2018

 

                    Hospitalization History Ketoacidosis x3      

 

                    Hospitalization History left foot wound     2017

## 2020-04-28 NOTE — CONSULTATION REPORT
DATE OF SERVICE:  



ATTENDING PRIMARY CLINICIAN:

GUERRERO Fry.



HISTORY OF PRESENT ILLNESS:

The patient is a 39-year-old male, who presented to the Emergency Department

with pain and swelling along the right medial thigh.  He initially presented 
approximately 5

days ago with left axillary swelling and pain and was found to have a cellulitis

and was treated with antibiotics.  He then presented to the Emergency Department

today with similar symptoms of pain and swelling in the right medial thigh. 

This gentleman is morbidly obese and does have a pannus formation in this

region, which is red and erythematous.  There are no bulla blisters or any

crepitance; however, CT scan did show air in subcutaneous tissue consistent with

an early necrotizing soft tissue infection.



PAST MEDICAL HISTORY:

Insulin-dependent diabetes, degenerative joint disease, peripheral neuropathy,

hypertension, history of multiple incision and drainage including a left foot

diabetic ulcer 09/17, scrotal abscess in 2015.



ALLERGIES:

LEVOFLOXACIN.



MEDICATIONS:

Amlodipine 5 mg daily, lisinopril/hydrochlorothiazide 20/25 mg daily, metformin

1000 mg b.i.d., indomethacin 50 mg daily, Invokana 100 mg daily, Lantus insulin

40 units each day at bedtime, Victoza 1.8 mg subcutaneous each day at bedtime

and insulin sliding scale.



SOCIAL HISTORY:

Previous smoke, uses vaporized nicotine.  Negative alcohol.



FAMILY HISTORY:

Noncontributory.



REVIEW OF SYSTEMS:

Obese male currently in no acute distress.  He is not experiencing any shortness

of breath or difficulty breathing.  No chest pain, palpitations, diaphoresis. 

No cough or sputum production.  No nausea, vomiting, no diarrhea, constipation,

no red blood per rectum, no dark tarry stools.  No fever, chills and no recent

inadvertent weight loss.  All other review of systems negative.



PHYSICAL EXAMINATION:

VITAL SIGNS:  Temperature 36.2, blood pressure 95/41, pulse 108, respirations 20

and pulse ox 97% on room air.

CHEST:  Decreased breath sounds in bilateral bases.

HEART:  Regular, no murmurs.

EXTREMITIES:  +2/3 bilateral lower extremity edema, negative Homans sign.

HEENT:  No scleral icterus.

NECK:  No cervical lymphadenopathy.

ABDOMEN:  Soft, nontender and nondistended.

SKIN:  Along the right medial thigh pannus is redness, erythema.  There are no

bulla blisters or any crepitance.  The area is tender to palpation.



LABORATORY DATA:

WBC 13.1, hemoglobin 14.2, hematocrit 41 and platelets 327.  Glucose 259.  BUN

is 36 and creatinine 1.25.



ASSESSMENT AND PLAN:

A 39-year-old male with early necrotizing soft tissue infection of the right

medial thigh.  We will proceed with IV broad spectrum antibiotics as well as IV

hydration as well as a wide debridement of the infected portion of skin and

subcutaneous tissue as well as fascia if this was included as well as a possible

placement of a wound VAC.





Job ID: [id number]

DocumentID: [id number]

Dictated Date:  04/28/2020 14:49:56

Transcription Date: 04/28/2020 15:11:44

Dictated By: CLAUDY ALEJO MD

MTDD

## 2020-04-29 VITALS — SYSTOLIC BLOOD PRESSURE: 90 MMHG | DIASTOLIC BLOOD PRESSURE: 60 MMHG

## 2020-04-29 VITALS — DIASTOLIC BLOOD PRESSURE: 60 MMHG | SYSTOLIC BLOOD PRESSURE: 105 MMHG

## 2020-04-29 VITALS — DIASTOLIC BLOOD PRESSURE: 36 MMHG | SYSTOLIC BLOOD PRESSURE: 79 MMHG

## 2020-04-29 VITALS — DIASTOLIC BLOOD PRESSURE: 54 MMHG | SYSTOLIC BLOOD PRESSURE: 99 MMHG

## 2020-04-29 VITALS — SYSTOLIC BLOOD PRESSURE: 108 MMHG | DIASTOLIC BLOOD PRESSURE: 65 MMHG

## 2020-04-29 VITALS — DIASTOLIC BLOOD PRESSURE: 60 MMHG | SYSTOLIC BLOOD PRESSURE: 90 MMHG

## 2020-04-29 VITALS — DIASTOLIC BLOOD PRESSURE: 78 MMHG | SYSTOLIC BLOOD PRESSURE: 140 MMHG

## 2020-04-29 VITALS — SYSTOLIC BLOOD PRESSURE: 85 MMHG | DIASTOLIC BLOOD PRESSURE: 57 MMHG

## 2020-04-29 VITALS — SYSTOLIC BLOOD PRESSURE: 125 MMHG | DIASTOLIC BLOOD PRESSURE: 80 MMHG

## 2020-04-29 VITALS — SYSTOLIC BLOOD PRESSURE: 99 MMHG | DIASTOLIC BLOOD PRESSURE: 60 MMHG

## 2020-04-29 VITALS — DIASTOLIC BLOOD PRESSURE: 72 MMHG | SYSTOLIC BLOOD PRESSURE: 111 MMHG

## 2020-04-29 VITALS — DIASTOLIC BLOOD PRESSURE: 53 MMHG | SYSTOLIC BLOOD PRESSURE: 101 MMHG

## 2020-04-29 LAB
ALBUMIN SERPL-MCNC: 2.7 GM/DL (ref 3.2–4.5)
ALP SERPL-CCNC: 68 U/L (ref 40–136)
ALT SERPL-CCNC: 11 U/L (ref 0–55)
BASOPHILS # BLD AUTO: 0 10^3/UL (ref 0–0.1)
BASOPHILS NFR BLD AUTO: 0 % (ref 0–10)
BILIRUB SERPL-MCNC: 0.4 MG/DL (ref 0.1–1)
BUN/CREAT SERPL: 26
CALCIUM SERPL-MCNC: 7.8 MG/DL (ref 8.5–10.1)
CHLORIDE SERPL-SCNC: 103 MMOL/L (ref 98–107)
CO2 SERPL-SCNC: 14 MMOL/L (ref 21–32)
CREAT SERPL-MCNC: 1.56 MG/DL (ref 0.6–1.3)
EOSINOPHIL # BLD AUTO: 0 10^3/UL (ref 0–0.3)
EOSINOPHIL NFR BLD AUTO: 0 % (ref 0–10)
ERYTHROCYTE [DISTWIDTH] IN BLOOD BY AUTOMATED COUNT: 14.7 % (ref 10–14.5)
GFR SERPLBLD BASED ON 1.73 SQ M-ARVRAT: 49 ML/MIN
GLUCOSE SERPL-MCNC: 403 MG/DL (ref 70–105)
HCT VFR BLD CALC: 34 % (ref 40–54)
HGB BLD-MCNC: 11.4 G/DL (ref 13.3–17.7)
LYMPHOCYTES # BLD AUTO: 0.9 X 10^3 (ref 1–4)
LYMPHOCYTES NFR BLD AUTO: 9 % (ref 12–44)
MANUAL DIFFERENTIAL PERFORMED BLD QL: NO
MCH RBC QN AUTO: 28 PG (ref 25–34)
MCHC RBC AUTO-ENTMCNC: 33 G/DL (ref 32–36)
MCV RBC AUTO: 84 FL (ref 80–99)
MONOCYTES # BLD AUTO: 0.9 X 10^3 (ref 0–1)
MONOCYTES NFR BLD AUTO: 9 % (ref 0–12)
NEUTROPHILS # BLD AUTO: 8.3 X 10^3 (ref 1.8–7.8)
NEUTROPHILS NFR BLD AUTO: 82 % (ref 42–75)
PLATELET # BLD: 280 10^3/UL (ref 130–400)
PMV BLD AUTO: 9.7 FL (ref 7.4–10.4)
POTASSIUM SERPL-SCNC: 3.7 MMOL/L (ref 3.6–5)
PROT SERPL-MCNC: 6.1 GM/DL (ref 6.4–8.2)
SODIUM SERPL-SCNC: 131 MMOL/L (ref 135–145)
WBC # BLD AUTO: 10.1 10^3/UL (ref 4.3–11)

## 2020-04-29 PROCEDURE — 0JBL0ZZ EXCISION OF RIGHT UPPER LEG SUBCUTANEOUS TISSUE AND FASCIA, OPEN APPROACH: ICD-10-PCS | Performed by: SURGERY

## 2020-04-29 RX ADMIN — SODIUM CHLORIDE SCH MLS/HR: 900 INJECTION, SOLUTION INTRAVENOUS at 18:10

## 2020-04-29 RX ADMIN — SODIUM CHLORIDE SCH MLS/HR: 900 INJECTION, SOLUTION INTRAVENOUS at 01:44

## 2020-04-29 RX ADMIN — SODIUM CHLORIDE SCH MLS/HR: 900 INJECTION, SOLUTION INTRAVENOUS at 10:01

## 2020-04-29 RX ADMIN — ENOXAPARIN SODIUM SCH MG: 100 INJECTION SUBCUTANEOUS at 06:22

## 2020-04-29 RX ADMIN — SODIUM CHLORIDE SCH MLS/HR: 900 INJECTION, SOLUTION INTRAVENOUS at 20:41

## 2020-04-29 RX ADMIN — ENOXAPARIN SODIUM SCH MG: 100 INJECTION SUBCUTANEOUS at 16:11

## 2020-04-29 RX ADMIN — SODIUM CHLORIDE SCH MLS/HR: 900 INJECTION, SOLUTION INTRAVENOUS at 06:34

## 2020-04-29 RX ADMIN — ASPIRIN SCH MG: 81 TABLET ORAL at 20:41

## 2020-04-29 RX ADMIN — INSULIN ASPART SCH UNIT: 100 INJECTION, SOLUTION INTRAVENOUS; SUBCUTANEOUS at 14:01

## 2020-04-29 RX ADMIN — VANCOMYCIN HYDROCHLORIDE SCH MLS/HR: 500 INJECTION, POWDER, LYOPHILIZED, FOR SOLUTION INTRAVENOUS at 23:09

## 2020-04-29 RX ADMIN — SODIUM CHLORIDE SCH MLS/HR: 900 INJECTION, SOLUTION INTRAVENOUS at 09:33

## 2020-04-29 RX ADMIN — INSULIN ASPART SCH UNIT: 100 INJECTION, SOLUTION INTRAVENOUS; SUBCUTANEOUS at 16:12

## 2020-04-29 RX ADMIN — INSULIN ASPART SCH UNIT: 100 INJECTION, SOLUTION INTRAVENOUS; SUBCUTANEOUS at 20:42

## 2020-04-29 RX ADMIN — SODIUM CHLORIDE SCH MLS/HR: 900 INJECTION, SOLUTION INTRAVENOUS at 14:56

## 2020-04-29 RX ADMIN — INSULIN ASPART SCH UNIT: 100 INJECTION, SOLUTION INTRAVENOUS; SUBCUTANEOUS at 06:34

## 2020-04-29 NOTE — HISTORY & PHYSICAL
HPI


History of Present Illness:


40 yo male presented to ER after feeling poorly for several days, started on 

Friday (5 days prior) not feeling well and then blood sugar starting going up. 

By yesterday he felt very tired and sick and having fevers. He states his wife 

noted a hard spot on his inner upper leg which he had not otherwise been aware 

of.


Source:  patient


Date seen by provider:  2020


Time Seen by Provider:  09:30


Attending Physician


Sheba Ricardo MD


[facility]


Consult





Date of Admission


2020 at 12:05





Home Medications


Home Medications


Reviewed patient Home Medication Reconciliation performed by pharmacy medication

reconciliations technician and/or nursing.


Patients Allergies have been reviewed.





Allergies


Coded Allergies:  


     levofloxacin (Verified  Allergy, Mild, 4/14/15)


     sweet potato (Unverified  Allergy, Unknown, 4/14/15)





PMH-Social-Family Hx


Patient Social History


Alcohol Use:  Occasionally Uses


Recreational Drug Use:  No


Former smoker/When Quit:  2004


Type Used:  Electronic/Vapor


Recent Foreign Travel:  No


Contact w/other who traveled:  No


Recent Hopitalizations:  No


Recent Infectious Disease Expo:  No





Immunizations Up To Date


Tetanus Booster (TDap):  More than 5yrs


Date of Pneumonia Vaccine:  2007





Past Medical History





Past medical history


1.  Diabetes mellitus type II


2.  hospitalizations for HHN K


3.  Morbid obesity


4.  Patella fracture and meniscus injury of the left knee


5.  Diabetic peripheral neuropathy


6.  Chronic venous insufficiency with chronic skin changes


7. Tibia (2x) and fibula fracture of left leg


8. Tibia and fibula fracture of right leg


9. Diabetic arthritis of left knee


10. Diabetic right frozen shoulder


11. HTN





SurgHx:


Removal of infected bone/bone fragments from left midfoot





Family Medical History


Significant Family History:  Diabetes, Hypertension, Other Conditions/Hx





Review of Systems ([facility])


Constitutional:  fever, malaise


EENTM:  No nose congestion, No throat pain


Respiratory:  No cough, No short of breath


Cardiovascular:  No chest pain


Gastrointestinal:  No constipation, No diarrhea; nausea; No vomiting


Genitourinary:  no symptoms reported


Musculoskeletal:  no symptoms reported


Skin:  see HPI





Reviewed Test Results


Reviewed Test Results


Lab





Laboratory Tests








Test


 20


10:43 20


10:48 20


11:05 20


15:49 Range/Units


 


 


White Blood Count


 13.1 H


 


 


 


 4.3-11.0


10^3/uL


 


Red Blood Count


 4.95 


 


 


 


 4.35-5.85


10^6/uL


 


Hemoglobin 14.2     13.3-17.7  G/DL


 


Hematocrit 41     40-54  %


 


Mean Corpuscular Volume 83     80-99  FL


 


Mean Corpuscular Hemoglobin 29     25-34  PG


 


Mean Corpuscular Hemoglobin


Concent 35 


 


 


 


 32-36  G/DL





 


Red Cell Distribution Width 14.9 H    10.0-14.5  %


 


Platelet Count


 327 


 


 


 


 130-400


10^3/uL


 


Mean Platelet Volume 9.9     7.4-10.4  FL


 


Neutrophils (%) (Auto) 82 H    42-75  %


 


Lymphocytes (%) (Auto) 8 L    12-44  %


 


Monocytes (%) (Auto) 10     0-12  %


 


Eosinophils (%) (Auto) 0     0-10  %


 


Basophils (%) (Auto) 0     0-10  %


 


Neutrophils # (Auto) 10.8 H    1.8-7.8  X 10^3


 


Lymphocytes # (Auto) 1.0     1.0-4.0  X 10^3


 


Monocytes # (Auto) 1.3 H    0.0-1.0  X 10^3


 


Eosinophils # (Auto)


 0.0 


 


 


 


 0.0-0.3


10^3/uL


 


Basophils # (Auto)


 0.0 


 


 


 


 0.0-0.1


10^3/uL


 


Prothrombin Time 15.2 H    12.2-14.7  SEC


 


INR Comment 1.2     0.8-1.4  


 


Activated Partial


Thromboplast Time 27 


 


 


 


 24-35  SEC





 


Sodium Level 129 L    135-145  MMOL/L


 


Potassium Level 4.4     3.6-5.0  MMOL/L


 


Chloride Level 96 L      MMOL/L


 


Carbon Dioxide Level 17 L    21-32  MMOL/L


 


Anion Gap 16 H    5-14  MMOL/L


 


Blood Urea Nitrogen 36 H    7-18  MG/DL


 


Creatinine


 1.25 


 


 


 


 0.60-1.30


MG/DL


 


Estimat Glomerular Filtration


Rate > 60 


 


 


 


  





 


BUN/Creatinine Ratio 29      


 


Glucose Level 244 H      MG/DL


 


Lactic Acid Level


 1.55 


 


 


 


 0.50-2.00


MMOL/L


 


Calcium Level 9.4     8.5-10.1  MG/DL


 


Corrected Calcium 9.8     8.5-10.1  MG/DL


 


Magnesium Level 1.9     1.6-2.4  MG/DL


 


Total Bilirubin 0.6     0.1-1.0  MG/DL


 


Aspartate Amino Transf


(AST/SGOT) 16 


 


 


 


 5-34  U/L





 


Alanine Aminotransferase


(ALT/SGPT) 13 


 


 


 


 0-55  U/L





 


Alkaline Phosphatase 73       U/L


 


Total Protein 7.9     6.4-8.2  GM/DL


 


Albumin 3.5     3.2-4.5  GM/DL


 


Amylase Level 20 L      U/L


 


Lipase 9     8-78  U/L


 


Procalcitonin 0.67 H    <0.10  NG/ML


 


Glucometer  259 H  378 H   MG/DL


 


Blood Gas Puncture Site   RR    


 


Blood Gas Patient Temperature   38.3    


 


Arterial Blood pH   7.39   7.37-7.43  


 


Arterial Blood Partial


Pressure CO2 


 


 28 L


 


 35-45  MMHG





 


Arterial Blood Partial


Pressure O2 


 


 99 H


 


 79-93  MMHG





 


Arterial Blood HCO3   16 *L  23-27  MMOL/L


 


Arterial Blood Total CO2


 


 


 17.0 L


 


 21.0-31.0


MMOL/L


 


Arterial Blood Oxygen


Saturation 


 


 98 


 


   %





 


Arterial Blood Base Excess


 


 


 -7.5 L


 


 -2.5-2.5


MMOL/L


 


Neto Test   YES-POS    


 


Blood Gas Ventilator Setting   NO    


 


Blood Gas Inspired Oxygen   RA    


 


Test


 20


20:30 20


23:35 20


01:46 20


05:02 Range/Units


 


 


Glucometer 402 *H 388 H 371 H    MG/DL


 


White Blood Count


 


 


 


 10.1 


 4.3-11.0


10^3/uL


 


Red Blood Count


 


 


 


 4.06 L


 4.35-5.85


10^6/uL


 


Hemoglobin    11.4 L 13.3-17.7  G/DL


 


Hematocrit    34 L 40-54  %


 


Mean Corpuscular Volume    84  80-99  FL


 


Mean Corpuscular Hemoglobin    28  25-34  PG


 


Mean Corpuscular Hemoglobin


Concent 


 


 


 33 


 32-36  G/DL





 


Red Cell Distribution Width    14.7 H 10.0-14.5  %


 


Platelet Count


 


 


 


 280 


 130-400


10^3/uL


 


Mean Platelet Volume    9.7  7.4-10.4  FL


 


Neutrophils (%) (Auto)    82 H 42-75  %


 


Lymphocytes (%) (Auto)    9 L 12-44  %


 


Monocytes (%) (Auto)    9  0-12  %


 


Eosinophils (%) (Auto)    0  0-10  %


 


Basophils (%) (Auto)    0  0-10  %


 


Neutrophils # (Auto)    8.3 H 1.8-7.8  X 10^3


 


Lymphocytes # (Auto)    0.9 L 1.0-4.0  X 10^3


 


Monocytes # (Auto)    0.9  0.0-1.0  X 10^3


 


Eosinophils # (Auto)


 


 


 


 0.0 


 0.0-0.3


10^3/uL


 


Basophils # (Auto)


 


 


 


 0.0 


 0.0-0.1


10^3/uL


 


Sodium Level    131 L 135-145  MMOL/L


 


Potassium Level    3.7  3.6-5.0  MMOL/L


 


Chloride Level    103    MMOL/L


 


Carbon Dioxide Level    14 L 21-32  MMOL/L


 


Anion Gap    14  5-14  MMOL/L


 


Blood Urea Nitrogen    40 H 7-18  MG/DL


 


Creatinine


 


 


 


 1.56 H


 0.60-1.30


MG/DL


 


Estimat Glomerular Filtration


Rate 


 


 


 49 


  





 


BUN/Creatinine Ratio    26   


 


Glucose Level    403 *H   MG/DL


 


Calcium Level    7.8 L 8.5-10.1  MG/DL


 


Corrected Calcium    8.8  8.5-10.1  MG/DL


 


Total Bilirubin    0.4  0.1-1.0  MG/DL


 


Aspartate Amino Transf


(AST/SGOT) 


 


 


 13 


 5-34  U/L





 


Alanine Aminotransferase


(ALT/SGPT) 


 


 


 11 


 0-55  U/L





 


Alkaline Phosphatase    68    U/L


 


Total Protein    6.1 L 6.4-8.2  GM/DL


 


Albumin    2.7 L 3.2-4.5  GM/DL








Radiology


CT pelvis : IMPRESSION: Findings consistent with necrotizing fasciitis with 

severe cellulitis of the proximal medial right thigh. No definitive drainable 

abscess.





Physical Exam-([facility])


Physical Exam


Vital Signs





                          VS - Last 72 Hours, by Label








 20





 10:33 13:10 13:53 13:57


 


Temp 38.7  36.2 


 


Pulse 116 113 108 


 


Resp 18 18 20 


 


B/P (MAP) 124/93 (103) 125/77 95/41 


 


Pulse Ox 96 99 97 99


 


O2 Delivery Room Air Room Air Room Air Room Air


 


    





 20





 14:05 16:00 20:07 20:45


 


Temp 36.2 36.1 36.9 


 


Pulse 108 100 117 


 


Resp 20 22 22 


 


B/P (MAP) 95/41 (59) 103/62 (76) 108/58 (75) 


 


Pulse Ox 97 96 98 


 


O2 Delivery Room Air Room Air Room Air Room Air


 


    





 20





 00:07 00:33 00:50 04:00


 


Temp 39.4 39.4 37.7 36.7


 


Pulse  120  97


 


Resp  


 


B/P (MAP)  111/72 (85)  105/60 (75)


 


Pulse Ox  96  97


 


O2 Delivery  Room Air  Room Air


 


    





 20  





 08:00 08:10  


 


Temp  36.2  


 


Pulse  102  


 


Resp  22  


 


B/P (MAP)  140/78 (98)  


 


Pulse Ox  99  


 


O2 Delivery Room Air Room Air  





Capillary Refill : Less Than 3 Seconds


General Appearance:  no apparent distress, obese


Respiratory:  lungs clear, no respiratory distress


Cardiovascular:  regular rate, rhythm, no murmur


Gastrointestinal:  normal bowel sounds, non tender, soft


Neurologic/Psychiatric:  normal mood/affect


Skin:  other (induration and erythema of right inner upper thigh, ttp)





Assessment/Plan


Assessment/Plan


Admission Status:  Inpatient Order (span 2 midnights)


Reason for Inpatient Admission:  


Sepsis with necrotizing fasciitis and uncontrolled blood sugar





(1) Sepsis


Status:  Acute


Assessment & Plan:  Blood culture with likely strep. Cellulitis source. On Zosyn

and vancomycin, awaiting final results on blood culture.


Qualifiers:  


   Qualified Codes:  A40.9 - Streptococcal sepsis, unspecified


(2) Diabetes mellitus, type 2


Status:  Chronic


Assessment & Plan:  Hold home short acting insulin while NPO, resume home long 

acting. Sliding scale insulin.


Qualifiers:  


   Qualified Codes:  E11.65 - Type 2 diabetes mellitus with hyperglycemia; Z79.4

- Long term (current) use of insulin


(3) Hypertension


Status:  Chronic


Assessment & Plan:  Normal to low BP, hold home medication


Qualifiers:  


   Qualified Codes:  I10 - Essential (primary) hypertension


(4) Necrotizing fasciitis


Status:  Acute


Assessment & Plan:  Surgery consulted, appreciate recommendations, plan for OR 

this am.





(5) Hyperglycemia


Status:  Acute


(6) Hyponatremia


Status:  Acute


Assessment & Plan:  Improving, secondary to hyperglycemia and hypovolemia, 

continue insulin and IVF.





(7) ANCA (acute kidney injury)


Status:  Acute


Assessment & Plan:  Multiple possible etiologies- hypovolemia due to 

hyperglycemia, sepsis, vancomycin. Monitor closely.





(8) DVT prophylaxis


Status:  Acute


Assessment & Plan:  Enoxaparin








Clinical Quality Measures


DVT/VTE Risk/Contraindication:


Risk Factor Score Per Nursin


RFS Level Per Nursing on Admit:  4+=Very High











SHEBA RICARDO MD             2020 08:40

## 2020-04-29 NOTE — NUR
RD ASSESSMENT 



PMHx: DM; HTN; morbid obesity



PT INTERACTION: Pt was awake and pleasant during nutrition assessment. Pt states current 
appetite "fluctuates. One day, I don't want anything to eat at all. The next, I want to eat 
a horse." Note PO intake of 100% x1meal, per chart review. Pt states following a regular 
diet on a low income, and has no issues with chewing/swallowing food. Pt states some issues 
with nausea if he stands for too long, "I get lightheaded sometimes and feel nauseous." Pt 
states some recent issues with diarrhea, and that his last BM was 4/29. Note pt not 
currently on bowel regimen per chart review. Pt states recent 8# wt loss, but unsure of 
timeframe. Note recent 16# wt gain x2mon per chart review. Pt states current DM management 
was good "up until 2 days and I had to inject myself with a lot of insulin to remain below 
240." Note unable to determine recent HbA1c, per chart review. Note presence of wound on 
right medial thigh, per chart review. 



ABNORMAL NUTRITION-RELATED LAB VALUES

LOW: Na 131; Ca 7.8; Pro 6.1; alb 2.7

HIGH: BUN 40; cr 1.56; glu 403



Est. kcal needs: 1775-3638 kcal | 25-30 kcal/kg IBW, based on IBW of 80.9 kg (178#)

Est. Pro needs:   g Pro | 1.2-1.4 g Pro/kg IBW, based on IBW of 80.9 kg (178#)



PES STATEMENT: Inadequate oral intake (NI-2.1) related to nausea | diarrhea | NPO status as 
evidenced by pt interview | chart review



Inadequate protein intake (NI-5.6.1) related to increased protein needs as evidenced by 
presence of wound (right medial thigh)



INTERVENTION:  

Note pt currently NPO, pending upcoming procedure. 

Would recommend advancing diet to consistent CHO diet, when medically able and as tolerated. 


When medically able, add Ensure HP (vary) to meals TID. Provides 160 kcal and 16 g Pro per 
serving. 

Discussed DM management and nutrition recommendations. Pt states he and his family eat a lot 
of fresh fruits and vegetables. Discussed fiber intake and its relationship to DM 
management. Discussed portion control as it relates to glucose control and wt loss. Pt 
verbalized understanding of suggestions. Will attempt to reinforce education after procedure 
prior to discharge. 

Will continue to follow and reassess as pt needs, intake, and status change. 



MONITOR/EVALUATE:  

PO Intake; Plan of Care; Hydration Status; Weight Status; Lab Values 





PENNY Yoo, MS, RD, LD

## 2020-04-29 NOTE — OPERATIVE REPORT
DATE OF SERVICE:  04/29/2020



ATTENDING PRIMARY CLINICIAN:

GUERRERO Fry.



PREOPERATIVE DIAGNOSIS:

Necrotizing fasciitis right medial thigh.



POSTOPERATIVE DIAGNOSIS:

Necrotizing fasciitis right medial thigh, involving skin, subcutaneous tissue

and fascia with an area of 20 x 20 cm in size.



PROCEDURES PERFORMED:

Debridement of skin, subcutaneous tissue and fascia, right medial thigh 20 x 20

cm in dimension and placement of right subclavian central venous catheter and

placement of wound VAC.



SURGEON:

Claudy Alejo MD.



ASSISTANT:

GUERRERO Figueredo.



ANESTHESIA:

General laryngeal mask airway.



ESTIMATED BLOOD LOSS:

100 mL.



FINDINGS:

Same as postoperative diagnosis.



DISPOSITION:

The patient tolerated the procedure well.



INDICATIONS FOR PROCEDURE:

The patient is a 39-year-old male, who presented to the emergency department

with pain and swelling along the right medial thigh.  He had presented

approximately one week previous with left axillary swelling and found to have a

cellulitis and was treated with antibiotics.  He presented with similar types of

pain and swelling along the medial side.  This gentleman is morbidly obese and

does have a medial thigh pannus bilaterally.  The area was red and erythematous.

 There were no bulla or blisters identified as well as no crepitance.  However,

CT scan was performed, which did show air in the subcutaneous tissue consistent

with an early necrotizing soft tissue infection.



DESCRIPTION OF PROCEDURE:

The patient was brought to the operating room and laid supine on the table. 

After adequate IV pain and sedative medications and general laryngeal mask

airway intubation, the patient was then placed in a lithotomy position and the

perineum was prepped and draped in a standard surgical fashion.



A wide vannessa shaped wedge of skin was then opened using a 10 blade.  We then

proceeded with wide debridement of all devitalized tissue including the skin and

subcutaneous tissue as well as the fascia overlying the muscle.  The greater

saphenous vein was identified and left intact.  All of the necrotic tissue was

then removed en bloc, which encompassed an area of 20 x 20 cm in size.  This was

done using electrocautery.



The wound was then copiously irrigated and the surrounding tissue appeared to be

viable, bleeding as well as a strong tensile strength.  Good hemostasis was

observed and we irrigated the wound and proceeded to place a large wound VAC.



The patient tolerated the procedure well.  We will consult wound care for

continued wound VAC change and continue with antibiotics.



The right chest and neck were prepped and draped in a standard

surgical fashion.  A 1% lidocaine was used to anesthetize the overlying skin. 

The left subclavian vein was then cannulated with drawing of venous blood.  The

guidewire was then inserted without any resistance.  The cannulating needle was

removed and a skin incision was made using an 11-blade.  A tract was then

created using a venous dilator and through this opening, a triple lumen central

venous catheter was placed over the guidewire using the Seldinger technique. 

Guidewire was removed and all three ports jamar venous blood and saline pushed in

without any resistance.  Catheter was then sutured to the skin using interrupted

3-0 silk sutures.  Catheter was then cleaned and covered with Op-Site.



The patient tolerated the procedure well.  We will admit him back to the floor

and continue with IV antibiotics, consult wound care and tight glycemic control 
and

reevaluation as necessary and further debridement as necessary.





Job ID: [id number]

DocumentID: [id number]

Dictated Date:  04/29/2020 12:19:21

Transcription Date: 04/29/2020 15:29:31

Dictated By: CLAUDY ALEJO MD

[facility]

## 2020-04-29 NOTE — NUR
PT TEMP 39.4. IBUPROFEN GIVEN, ICE PACKS AND FAN. ROOM TEMPERATURE TURN DOWN. WILL CONTINUO 
TO MONITOR

## 2020-04-29 NOTE — PROGRESS NOTE-POST OPERATIVE
Post-Operative Progess Note


Surgeon (s)/Assistant (s)


Surgeon


CLAUDY ALEJO MD


Assistant:  gali de la torre APRN





Pre-Operative Diagnosis


right medial thigh necrotizing soft tissue infection.





Post-Operative Diagnosis





same involving skin, SC, fascia([id number]).





Procedure & Operative Findings


Date of Procedure


4/29/20


Procedure Performed/Findings


wide debridement skin, SC, fascia ([id number])


Anesthesia Type


general LMA





Estimated Blood Loss


Estimated blood loss (mL):  100ml





Specimens/Packing


Specimens Removed


necrotic thigh soft tissue











CLAUDY ALEJO MD                Apr 29, 2020 12:09

## 2020-04-29 NOTE — DIAGNOSTIC IMAGING REPORT
INDICATION: Central line placement 



FINDINGS:

Catheter via right subclavian has its distal tip directed

inferiorly in the lower SVC in good alignment. There is no

pneumothorax. 



IMPRESSION: No pneumothorax following central catheter placement

its tip in the lower SVC.



Dictated by: 



  Dictated on workstation # [id number]

## 2020-04-30 VITALS — DIASTOLIC BLOOD PRESSURE: 74 MMHG | SYSTOLIC BLOOD PRESSURE: 107 MMHG

## 2020-04-30 VITALS — DIASTOLIC BLOOD PRESSURE: 71 MMHG | SYSTOLIC BLOOD PRESSURE: 134 MMHG

## 2020-04-30 VITALS — DIASTOLIC BLOOD PRESSURE: 75 MMHG | SYSTOLIC BLOOD PRESSURE: 113 MMHG

## 2020-04-30 VITALS — DIASTOLIC BLOOD PRESSURE: 70 MMHG | SYSTOLIC BLOOD PRESSURE: 135 MMHG

## 2020-04-30 VITALS — SYSTOLIC BLOOD PRESSURE: 118 MMHG | DIASTOLIC BLOOD PRESSURE: 74 MMHG

## 2020-04-30 VITALS — SYSTOLIC BLOOD PRESSURE: 120 MMHG | DIASTOLIC BLOOD PRESSURE: 77 MMHG

## 2020-04-30 VITALS — SYSTOLIC BLOOD PRESSURE: 96 MMHG | DIASTOLIC BLOOD PRESSURE: 61 MMHG

## 2020-04-30 LAB
BUN/CREAT SERPL: 25
CALCIUM SERPL-MCNC: 7.1 MG/DL (ref 8.5–10.1)
CHLORIDE SERPL-SCNC: 104 MMOL/L (ref 98–107)
CO2 SERPL-SCNC: 16 MMOL/L (ref 21–32)
CREAT SERPL-MCNC: 1.26 MG/DL (ref 0.6–1.3)
ERYTHROCYTE [DISTWIDTH] IN BLOOD BY AUTOMATED COUNT: 15.2 % (ref 10–14.5)
GFR SERPLBLD BASED ON 1.73 SQ M-ARVRAT: > 60 ML/MIN
GLUCOSE SERPL-MCNC: 208 MG/DL (ref 70–105)
HCT VFR BLD CALC: 28 % (ref 40–54)
HGB BLD-MCNC: 9.2 G/DL (ref 13.3–17.7)
MCH RBC QN AUTO: 28 PG (ref 25–34)
MCHC RBC AUTO-ENTMCNC: 33 G/DL (ref 32–36)
MCV RBC AUTO: 85 FL (ref 80–99)
PLATELET # BLD: 310 10^3/UL (ref 130–400)
PMV BLD AUTO: 9.3 FL (ref 7.4–10.4)
POTASSIUM SERPL-SCNC: 3.6 MMOL/L (ref 3.6–5)
SODIUM SERPL-SCNC: 132 MMOL/L (ref 135–145)
WBC # BLD AUTO: 9.1 10^3/UL (ref 4.3–11)

## 2020-04-30 RX ADMIN — INSULIN ASPART SCH UNIT: 100 INJECTION, SOLUTION INTRAVENOUS; SUBCUTANEOUS at 20:46

## 2020-04-30 RX ADMIN — ASPIRIN SCH MG: 81 TABLET ORAL at 20:34

## 2020-04-30 RX ADMIN — INSULIN ASPART SCH UNIT: 100 INJECTION, SOLUTION INTRAVENOUS; SUBCUTANEOUS at 17:01

## 2020-04-30 RX ADMIN — VANCOMYCIN HYDROCHLORIDE SCH MLS/HR: 500 INJECTION, POWDER, LYOPHILIZED, FOR SOLUTION INTRAVENOUS at 22:22

## 2020-04-30 RX ADMIN — SODIUM CHLORIDE SCH MLS/HR: 900 INJECTION, SOLUTION INTRAVENOUS at 19:29

## 2020-04-30 RX ADMIN — ACETAMINOPHEN PRN MG: 500 TABLET ORAL at 09:40

## 2020-04-30 RX ADMIN — SODIUM CHLORIDE SCH MLS/HR: 900 INJECTION, SOLUTION INTRAVENOUS at 10:35

## 2020-04-30 RX ADMIN — INSULIN ASPART SCH UNIT: 100 INJECTION, SOLUTION INTRAVENOUS; SUBCUTANEOUS at 06:26

## 2020-04-30 RX ADMIN — SODIUM CHLORIDE SCH MLS/HR: 900 INJECTION, SOLUTION INTRAVENOUS at 06:26

## 2020-04-30 RX ADMIN — INSULIN ASPART SCH UNIT: 100 INJECTION, SOLUTION INTRAVENOUS; SUBCUTANEOUS at 12:41

## 2020-04-30 RX ADMIN — ENOXAPARIN SODIUM SCH MG: 100 INJECTION SUBCUTANEOUS at 17:01

## 2020-04-30 RX ADMIN — INSULIN ASPART SCH UNIT: 100 INJECTION, SOLUTION INTRAVENOUS; SUBCUTANEOUS at 19:29

## 2020-04-30 RX ADMIN — ENOXAPARIN SODIUM SCH MG: 100 INJECTION SUBCUTANEOUS at 06:26

## 2020-04-30 RX ADMIN — SODIUM CHLORIDE SCH MLS/HR: 900 INJECTION, SOLUTION INTRAVENOUS at 02:15

## 2020-04-30 NOTE — NUR
CM/SS visited with the patient to assess for discharge needs. 



The patient stated he was feeling tired this a.m. but doing well. He is currently working 
for [employer] as a . The patients wife is also a 
teacher in [employer]. 



Home Health: The patient denies having home health in the past. 



Wounds: The patient verbalized the only other wound he has had trouble with was a foot wound 
years ago. 



CM/SS discussed with the patient that depending on what the physician would like patient to 
do for wound care this SS will assist in facilitating set up. He verbalized understanding. 
The patient is unsure if wife will be able to assist with any at home care due to patient 
stating she is "squeamish". Will continue to follow.

## 2020-04-30 NOTE — ANESTHESIA-GENERAL POST-OP
General


Patient Condition


Mental Status/LOC:  Same as Preop


Cardiovascular:  Satisfactory


Nausea/Vomiting:  Absent


Respiratory:  Satisfactory


Pain:  Controlled


Complications:  Absent





Post Op Complications


Complications


None





Follow Up Care/Instructions


Patient Instructions


None needed.





Anesthesia/Patient Condition


Patient Condition


Patient is doing well, no complaints, stable vital signs, no apparent adverse 

anesthesia problems.   


No complications reported per nursing.











ASH SINGLETON CRNA         Apr 30, 2020 10:39

## 2020-04-30 NOTE — PROGRESS NOTE
Subjective


Subjective/Events-last exam


Afebrile, reports feeling tired but no other concerns.





Focused Exam


Lactate Level


20 10:43: Lactic Acid Level 1.55





Objective


Exam


Last Set of Vital Signs





Vital Signs








  Date Time  Temp Pulse Resp B/P (MAP) Pulse Ox O2 Delivery O2 Flow Rate FiO2


 


20 04:00 36.0 109 20 135/70 (91) 98 Room Air  


 


20 13:20       3 





Capillary Refill : Less Than 3 Seconds


I&O











Intake and Output 


 


 20





 00:00


 


Intake Total 2220 ml


 


Output Total 1700 ml


 


Balance 520 ml


 


 


 


Intake Oral 700 ml


 


IV Total 1520 ml


 


Output Urine Total 1700 ml


 


# Voids 3


 


# Bowel Movements 1








General:  Alert, No Acute Distress


Lungs:  Clear to Auscultation, Normal Air Movement


Heart:  Regular Rate, No Murmurs


Neuro:  Normal Speech


Psych/Mental Status:  Mood NL





Results/Procedures


Lab


Laboratory Tests


20 12:59: Glucometer 365H


20 16:00: Glucometer 410*H


20 20:18: Glucometer 311H


20 21:55: Vancomycin Level Trough 12.4


20 04:50: 


White Blood Count 9.1, Red Blood Count 3.26L, Hemoglobin 9.2L, Hematocrit 28L, 

Mean Corpuscular Volume 85, Mean Corpuscular Hemoglobin 28, Mean Corpuscular 

Hemoglobin Concent 33, Red Cell Distribution Width 15.2H, Platelet Count 310, 

Mean Platelet Volume 9.3, Sodium Level 132L, Potassium Level 3.6, Chloride Level

104, Carbon Dioxide Level 16L, Anion Gap 12, Blood Urea Nitrogen 32H, Creatinine

1.26, Estimat Glomerular Filtration Rate > 60, BUN/Creatinine Ratio 25, Glucose 

Level 208H, Calcium Level 7.1L


20 08:15: Glucometer 198H





Microbiology


20 Gram Stain - Final, Resulted


          


20 Anaerobic Culture, Resulted


          Pending


20 Surgical Culture - Preliminary, Resulted


          Culture In Progress


20 MRSA Screen - Final, Complete


          MRSA not isolated


20 Blood Culture - Preliminary, Resulted


          Gram Positive Cocci in Chains


          See Report


Radiology


CT pelvis : IMPRESSION: Findings consistent with necrotizing fasciitis with 

severe cellulitis of the proximal medial right thigh. No definitive drainable 

abscess.





Assessment/Plan


Assessment/Plan





(1) Sepsis


Status:  Acute


Assessment & Plan:  Blood culture with likely strep. Cellulitis source. On Zosyn

and vancomycin, awaiting final results on blood culture.


Qualifiers:  


   Qualified Codes:  A40.9 - Streptococcal sepsis, unspecified


(2) Diabetes mellitus, type 2


Status:  Chronic


Assessment & Plan:  Hold home short acting insulin while NPO, resume home long 

acting. Sliding scale insulin.


 diabetic diet, resume home mealtime insulin


Qualifiers:  


   Qualified Codes:  E11.65 - Type 2 diabetes mellitus with hyperglycemia; Z79.4

- Long term (current) use of insulin


(3) Hypertension


Status:  Chronic


Assessment & Plan:  Normal to low BP, hold home medication


Qualifiers:  


   Qualified Codes:  I10 - Essential (primary) hypertension


(4) Necrotizing fasciitis


Status:  Acute


Assessment & Plan:  Surgery consulted, appreciate recommendations


 POD1 s/p large resection of necrotic tissue and placement of wound vac.





(5) Hyperglycemia


Status:  Acute


(6) Hyponatremia


Status:  Acute


Assessment & Plan:  Improving, secondary to hyperglycemia and hypovolemia, 

continue insulin and IVF.





(7) ANCA (acute kidney injury)


Status:  Resolved


Assessment & Plan:  Multiple possible etiologies- hypovolemia due to 

hyperglycemia, sepsis, vancomycin. Monitor closely.





(8) DVT prophylaxis


Status:  Acute


Assessment & Plan:  Enoxaparin








Clinical Quality Measures


DVT/VTE Risk/Contraindication:


Risk Factor Score Per Nursin


RFS Level Per Nursing on Admit:  4+=Very High











SHEBA PIMENTEL MD             2020 10:29

## 2020-04-30 NOTE — PROGRESS NOTE
Subjective


Date Seen by a Provider:  2020


Time Seen by a Provider:  10:00


Subjective/Events-last exam


doing well. pain controlled. no fever/chills.





Focused Exam


Lactate Level


20 10:43: Lactic Acid Level 1.55





Objective


Exam





Vital Signs








  Date Time  Temp Pulse Resp B/P (MAP) Pulse Ox O2 Delivery O2 Flow Rate FiO2


 


20 12:00 36.0 95 16 96/61 (73) 98 Room Air  


 


20 08:00      Room Air  


 


20 08:00 36.5 105 20 120/77 (91) 97 Room Air  


 


20 04:00 36.0 109 20 135/70 (91) 98 Room Air  


 


20 00:25 36.7 109 22 113/75 (88) 98 Room Air  


 


20 20:00 36.8 109 22 125/80 (95) 99 Room Air  


 


20 20:00      Room Air  


 


20 15:59 36.2 105 24 108/65 (79) 99 Room Air  


 


20 13:56 36.2 104 20 101/53 (69) 98 Room Air  


 


20 13:40 37.1  20 90/60 (70) 97 Room Air  


 


20 13:40      Room Air  


 


20 13:30      Room Air  


 


20 13:30   20 85/57 (66) 96 Room Air  


 


20 13:20   20 90/60 (70) 99 OxyMask 3 














I & O 


 


 20





 07:00


 


Intake Total 1000 ml


 


Output Total 2400 ml


 


Balance -1400 ml





Capillary Refill : Less Than 3 Seconds


General Appearance:  No Apparent Distress


HEENT:  PERRL/EOMI


Neck:  Full Range of Motion


Respiratory:  Chest Non Tender, Decreased Breath Sounds


Cardiovascular:  Regular Rate, Rhythm


Gastrointestinal:  normal bowel sounds, non tender, soft


Extremity:  Normal Capillary Refill, Other (right thigh vac in place. no new 

area redness/erythema)


Neurologic/Psychiatric:  Alert, Oriented x3


Skin:  Normal Color


Lymphatic:  No Adenopathy





Results


Lab


Laboratory Tests


20 16:00: Glucometer 410*H


20 20:18: Glucometer 311H


20 21:55: Vancomycin Level Trough 12.4


20 04:50: 


White Blood Count 9.1, Red Blood Count 3.26L, Hemoglobin 9.2L, Hematocrit 28L, 

Mean Corpuscular Volume 85, Mean Corpuscular Hemoglobin 28, Mean Corpuscular 

Hemoglobin Concent 33, Red Cell Distribution Width 15.2H, Platelet Count 310, 

Mean Platelet Volume 9.3, Sodium Level 132L, Potassium Level 3.6, Chloride Level

104, Carbon Dioxide Level 16L, Anion Gap 12, Blood Urea Nitrogen 32H, Creatinine

1.26, Estimat Glomerular Filtration Rate > 60, BUN/Creatinine Ratio 25, Glucose 

Level 208H, Calcium Level 7.1L


20 08:15: Glucometer 198H


20 11:36: Glucometer 212H





Microbiology


20 Gram Stain - Final, Resulted


          


20 Anaerobic Culture, Resulted


          Pending


20 Surgical Culture - Preliminary, Resulted


          Culture In Progress


20 MRSA Screen - Final, Complete


          MRSA not isolated


20 Blood Culture - Preliminary, Resulted


          Gram Positive Cocci in Chains


          See Report





Assessment/Plan


Assessment/Plan


Assess & Plan/Chief Complaint


necrotizing fascitis right medial thigh s/p wide debridement. 


cont VAC.


cont IV zosyn and vanc.


await vac change for re-eval on monday.





Clinical Quality Measures


DVT/VTE Risk/Contraindication:


Risk Factor Score Per Nursin


RFS Level Per Nursing on Admit:  4+=Very High











CLAUDY ALEJO MD                2020 13:18

## 2020-05-01 VITALS — DIASTOLIC BLOOD PRESSURE: 84 MMHG | SYSTOLIC BLOOD PRESSURE: 128 MMHG

## 2020-05-01 VITALS — SYSTOLIC BLOOD PRESSURE: 119 MMHG | DIASTOLIC BLOOD PRESSURE: 74 MMHG

## 2020-05-01 VITALS — SYSTOLIC BLOOD PRESSURE: 112 MMHG | DIASTOLIC BLOOD PRESSURE: 75 MMHG

## 2020-05-01 VITALS — DIASTOLIC BLOOD PRESSURE: 67 MMHG | SYSTOLIC BLOOD PRESSURE: 100 MMHG

## 2020-05-01 VITALS — DIASTOLIC BLOOD PRESSURE: 76 MMHG | SYSTOLIC BLOOD PRESSURE: 125 MMHG

## 2020-05-01 LAB
BUN/CREAT SERPL: 24
CALCIUM SERPL-MCNC: 7.6 MG/DL (ref 8.5–10.1)
CHLORIDE SERPL-SCNC: 110 MMOL/L (ref 98–107)
CO2 SERPL-SCNC: 19 MMOL/L (ref 21–32)
CREAT SERPL-MCNC: 0.85 MG/DL (ref 0.6–1.3)
ERYTHROCYTE [DISTWIDTH] IN BLOOD BY AUTOMATED COUNT: 14.8 % (ref 10–14.5)
GFR SERPLBLD BASED ON 1.73 SQ M-ARVRAT: > 60 ML/MIN
GLUCOSE SERPL-MCNC: 85 MG/DL (ref 70–105)
HCT VFR BLD CALC: 25 % (ref 40–54)
HGB BLD-MCNC: 8.2 G/DL (ref 13.3–17.7)
MCH RBC QN AUTO: 28 PG (ref 25–34)
MCHC RBC AUTO-ENTMCNC: 32 G/DL (ref 32–36)
MCV RBC AUTO: 86 FL (ref 80–99)
PLATELET # BLD: 345 10^3/UL (ref 130–400)
PMV BLD AUTO: 9.4 FL (ref 7.4–10.4)
POTASSIUM SERPL-SCNC: 3.4 MMOL/L (ref 3.6–5)
SODIUM SERPL-SCNC: 139 MMOL/L (ref 135–145)
WBC # BLD AUTO: 7.6 10^3/UL (ref 4.3–11)

## 2020-05-01 RX ADMIN — ENOXAPARIN SODIUM SCH MG: 100 INJECTION SUBCUTANEOUS at 16:32

## 2020-05-01 RX ADMIN — SODIUM CHLORIDE SCH MLS/HR: 900 INJECTION, SOLUTION INTRAVENOUS at 10:34

## 2020-05-01 RX ADMIN — SODIUM CHLORIDE SCH MLS/HR: 900 INJECTION, SOLUTION INTRAVENOUS at 02:03

## 2020-05-01 RX ADMIN — ENOXAPARIN SODIUM SCH MG: 100 INJECTION SUBCUTANEOUS at 05:44

## 2020-05-01 RX ADMIN — INSULIN ASPART SCH UNIT: 100 INJECTION, SOLUTION INTRAVENOUS; SUBCUTANEOUS at 12:05

## 2020-05-01 RX ADMIN — INSULIN ASPART SCH UNIT: 100 INJECTION, SOLUTION INTRAVENOUS; SUBCUTANEOUS at 16:32

## 2020-05-01 RX ADMIN — INSULIN ASPART SCH UNIT: 100 INJECTION, SOLUTION INTRAVENOUS; SUBCUTANEOUS at 08:33

## 2020-05-01 RX ADMIN — INSULIN ASPART SCH UNIT: 100 INJECTION, SOLUTION INTRAVENOUS; SUBCUTANEOUS at 17:43

## 2020-05-01 RX ADMIN — ACETAMINOPHEN PRN MG: 500 TABLET ORAL at 21:42

## 2020-05-01 RX ADMIN — ASPIRIN SCH MG: 81 TABLET ORAL at 21:44

## 2020-05-01 RX ADMIN — INSULIN ASPART SCH UNIT: 100 INJECTION, SOLUTION INTRAVENOUS; SUBCUTANEOUS at 15:25

## 2020-05-01 RX ADMIN — INSULIN ASPART SCH UNIT: 100 INJECTION, SOLUTION INTRAVENOUS; SUBCUTANEOUS at 21:44

## 2020-05-01 RX ADMIN — SODIUM CHLORIDE SCH MLS/HR: 900 INJECTION, SOLUTION INTRAVENOUS at 17:35

## 2020-05-01 RX ADMIN — INSULIN ASPART SCH UNIT: 100 INJECTION, SOLUTION INTRAVENOUS; SUBCUTANEOUS at 06:24

## 2020-05-01 NOTE — PROGRESS NOTE
Subjective


Subjective/Events-last exam


Afebrile, denies concerns.





Objective


Exam


Last Set of Vital Signs





Vital Signs








  Date Time  Temp Pulse Resp B/P (MAP) Pulse Ox O2 Delivery O2 Flow Rate FiO2


 


20 12:00 36.0 94 18 112/75 (87) 98 Room Air  


 


20 13:20       3 





Capillary Refill : Less Than 3 SecondsLess Than 3 Seconds


I&O











Intake and Output 


 


 20





 00:00


 


Intake Total 2980 ml


 


Output Total 3000 ml


 


Balance -20 ml


 


 


 


Intake Oral 1860 ml


 


IV Total 1120 ml


 


Output Urine Total 3000 ml


 


# Bowel Movements 1








General:  Alert, No Acute Distress


Lungs:  Clear to Auscultation, Normal Air Movement


Heart:  Regular Rate, No Murmurs


Skin:  Other (wound vac in place to right inner thigh with no surrounding 

erythema)


Neuro:  Normal Speech


Psych/Mental Status:  Mood NL





Results/Procedures


Lab


Laboratory Tests


20 16:01: Glucometer 214H


20 20:33: Glucometer 214H


20 05:45: 


White Blood Count 7.6, Red Blood Count 2.96L, Hemoglobin 8.2L, Hematocrit 25L, 

Mean Corpuscular Volume 86, Mean Corpuscular Hemoglobin 28, Mean Corpuscular 

Hemoglobin Concent 32, Red Cell Distribution Width 14.8H, Platelet Count 345, 

Mean Platelet Volume 9.4, Sodium Level 139, Potassium Level 3.4L, Chloride Level

110H, Carbon Dioxide Level 19L, Anion Gap 10, Blood Urea Nitrogen 20H, 

Creatinine 0.85, Estimat Glomerular Filtration Rate > 60, BUN/Creatinine Ratio 

24, Glucose Level 85, Calcium Level 7.6L


20 11:27: Glucometer 200H





Microbiology


20 Gram Stain - Final, Resulted


          


20 Anaerobic Culture - Preliminary, Resulted


          Prevotella bivia


20 Surgical Culture - Preliminary, Resulted


          Strep constellatus


          Strep agalactiae Group B


          Gram Pos Mixed Bacterial [place]


20 MRSA Screen - Final, Complete


          MRSA not isolated


20 Blood Culture - Final, Complete


          Strep constellatus


          Staphylococcus epidermidis


Radiology


CT pelvis : IMPRESSION: Findings consistent with necrotizing fasciitis with 

severe cellulitis of the proximal medial right thigh. No definitive drainable 

abscess.





Assessment/Plan


Assessment/Plan





(1) Sepsis


Status:  Acute


Assessment & Plan:  Blood culture with likely strep. Cellulitis source. On Zosyn

and vancomycin, awaiting final results on blood culture.


Qualifiers:  


   Qualified Codes:  A40.9 - Streptococcal sepsis, unspecified


(2) Diabetes mellitus, type 2


Status:  Chronic


Assessment & Plan:  Hold home short acting insulin while NPO, resume home long 

acting. Sliding scale insulin.


 diabetic diet, resume home mealtime insulin


Qualifiers:  


   Qualified Codes:  E11.65 - Type 2 diabetes mellitus with hyperglycemia; Z79.4

- Long term (current) use of insulin


(3) Hypertension


Status:  Chronic


Assessment & Plan:  Normal to low BP, hold home medication


Qualifiers:  


   Qualified Codes:  I10 - Essential (primary) hypertension


(4) Necrotizing fasciitis


Status:  Acute


Assessment & Plan:  Surgery consulted, appreciate recommendations


 POD1 s/p large resection of necrotic tissue and placement of wound vac.





(5) Hyperglycemia


Status:  Acute


(6) Hyponatremia


Status:  Acute


Assessment & Plan:  Improving, secondary to hyperglycemia and hypovolemia, 

continue insulin and IVF.





(7) ANCA (acute kidney injury)


Status:  Resolved


Assessment & Plan:  Multiple possible etiologies- hypovolemia due to 

hyperglycemia, sepsis, vancomycin. Monitor closely.





(8) DVT prophylaxis


Status:  Acute


Assessment & Plan:  Enoxaparin








Clinical Quality Measures


DVT/VTE Risk/Contraindication:


Risk Factor Score Per Nursin


RFS Level Per Nursing on Admit:  4+=Very High











SHEBA PIMENTEL MD              May 1, 2020 15:12

## 2020-05-01 NOTE — NUR
BONG/ANNETTA visited with Kevin the wound care Nurse. 



He reports that the patient has already filled out the paper work for [facility] to get the home 
wound vac approved. He reports it should be good for discharge on Monday. No further needs 
at this time.

## 2020-05-01 NOTE — PROGRESS NOTE
Subjective


Date Seen by a Provider:  May 1, 2020


Time Seen by a Provider:  11:00


Subjective/Events-last exam


doing well. no complaints. no fever/chills. ambulating better daily.





Objective


Exam





Vital Signs








  Date Time  Temp Pulse Resp B/P (MAP) Pulse Ox O2 Delivery O2 Flow Rate FiO2


 


20 08:00 36.4 91 18 125/76 (92) 97 Room Air  


 


20 04:25 36.6 95 22 119/74 (89) 97 Room Air  


 


20 23:40 37.0 100 16 118/74 (89) 97 Room Air  


 


20 20:45      Room Air  


 


20 20:34 36.8 102 18 134/71 (92) 97 Room Air  


 


20 16:00 36.7 104 18 107/74 (85) 99 Room Air  














I & O 


 


 20





 07:00


 


Intake Total 3920 ml


 


Output Total 2300 ml


 


Balance 1620 ml





Capillary Refill : Less Than 3 SecondsLess Than 3 Seconds


General Appearance:  No Apparent Distress


HEENT:  PERRL/EOMI


Neck:  Full Range of Motion


Respiratory:  Chest Non Tender, Normal Breath Sounds


Cardiovascular:  Regular Rate, Rhythm


Gastrointestinal:  normal bowel sounds, non tender, soft


Extremity:  Normal Capillary Refill, Other (vac implace, no new area 

redness/erythema)


Neurologic/Psychiatric:  Alert, Oriented x3


Skin:  Normal Color


Lymphatic:  No Adenopathy





Results


Lab


Laboratory Tests


20 16:01: Glucometer 214H


20 20:33: Glucometer 214H


20 05:45: 


White Blood Count 7.6, Red Blood Count 2.96L, Hemoglobin 8.2L, Hematocrit 25L, 

Mean Corpuscular Volume 86, Mean Corpuscular Hemoglobin 28, Mean Corpuscular 

Hemoglobin Concent 32, Red Cell Distribution Width 14.8H, Platelet Count 345, 

Mean Platelet Volume 9.4, Sodium Level 139, Potassium Level 3.4L, Chloride Level

110H, Carbon Dioxide Level 19L, Anion Gap 10, Blood Urea Nitrogen 20H, 

Creatinine 0.85, Estimat Glomerular Filtration Rate > 60, BUN/Creatinine Ratio 

24, Glucose Level 85, Calcium Level 7.6L


20 11:27: Glucometer 200H





Microbiology


20 Gram Stain - Final, Resulted


          


20 Anaerobic Culture, Resulted


          Pending


20 Surgical Culture - Preliminary, Resulted


          Strep constellatus


          Strep agalactiae Group B


20 MRSA Screen - Final, Complete


          MRSA not isolated


20 Blood Culture - Final, Complete


          Strep constellatus


          Staphylococcus epidermidis





Assessment/Plan


Assessment/Plan


Assess & Plan/Chief Complaint


necrotizing fascitis right medial thigh s/p wide debridement. 


cont VAC.


cont IV zosyn and vanc.


await vac change for re-eval on monday.





Clinical Quality Measures


DVT/VTE Risk/Contraindication:


Risk Factor Score Per Nursin


RFS Level Per Nursing on Admit:  4+=Very High











CLAUDY ALEJO MD                 May 1, 2020 12:04

## 2020-05-02 VITALS — DIASTOLIC BLOOD PRESSURE: 64 MMHG | SYSTOLIC BLOOD PRESSURE: 115 MMHG

## 2020-05-02 VITALS — DIASTOLIC BLOOD PRESSURE: 85 MMHG | SYSTOLIC BLOOD PRESSURE: 133 MMHG

## 2020-05-02 VITALS — DIASTOLIC BLOOD PRESSURE: 86 MMHG | SYSTOLIC BLOOD PRESSURE: 130 MMHG

## 2020-05-02 VITALS — SYSTOLIC BLOOD PRESSURE: 126 MMHG | DIASTOLIC BLOOD PRESSURE: 83 MMHG

## 2020-05-02 VITALS — DIASTOLIC BLOOD PRESSURE: 99 MMHG | SYSTOLIC BLOOD PRESSURE: 141 MMHG

## 2020-05-02 LAB
ALBUMIN SERPL-MCNC: 2.6 GM/DL (ref 3.2–4.5)
ALP SERPL-CCNC: 62 U/L (ref 40–136)
ALT SERPL-CCNC: 33 U/L (ref 0–55)
BILIRUB SERPL-MCNC: 0.1 MG/DL (ref 0.1–1)
BUN/CREAT SERPL: 21
CALCIUM SERPL-MCNC: 8.2 MG/DL (ref 8.5–10.1)
CHLORIDE SERPL-SCNC: 106 MMOL/L (ref 98–107)
CO2 SERPL-SCNC: 21 MMOL/L (ref 21–32)
CREAT SERPL-MCNC: 0.78 MG/DL (ref 0.6–1.3)
ERYTHROCYTE [DISTWIDTH] IN BLOOD BY AUTOMATED COUNT: 15 % (ref 10–14.5)
GFR SERPLBLD BASED ON 1.73 SQ M-ARVRAT: > 60 ML/MIN
GLUCOSE SERPL-MCNC: 175 MG/DL (ref 70–105)
HCT VFR BLD CALC: 27 % (ref 40–54)
HGB BLD-MCNC: 8.7 G/DL (ref 13.3–17.7)
MCH RBC QN AUTO: 28 PG (ref 25–34)
MCHC RBC AUTO-ENTMCNC: 32 G/DL (ref 32–36)
MCV RBC AUTO: 87 FL (ref 80–99)
PLATELET # BLD: 422 10^3/UL (ref 130–400)
PMV BLD AUTO: 8.7 FL (ref 7.4–10.4)
POTASSIUM SERPL-SCNC: 3.8 MMOL/L (ref 3.6–5)
PROT SERPL-MCNC: 6.1 GM/DL (ref 6.4–8.2)
SODIUM SERPL-SCNC: 137 MMOL/L (ref 135–145)
WBC # BLD AUTO: 7.3 10^3/UL (ref 4.3–11)

## 2020-05-02 RX ADMIN — INSULIN ASPART SCH UNIT: 100 INJECTION, SOLUTION INTRAVENOUS; SUBCUTANEOUS at 17:41

## 2020-05-02 RX ADMIN — INSULIN ASPART SCH UNIT: 100 INJECTION, SOLUTION INTRAVENOUS; SUBCUTANEOUS at 21:38

## 2020-05-02 RX ADMIN — INSULIN ASPART SCH UNIT: 100 INJECTION, SOLUTION INTRAVENOUS; SUBCUTANEOUS at 12:33

## 2020-05-02 RX ADMIN — SODIUM CHLORIDE SCH MLS/HR: 900 INJECTION, SOLUTION INTRAVENOUS at 02:52

## 2020-05-02 RX ADMIN — ENOXAPARIN SODIUM SCH MG: 100 INJECTION SUBCUTANEOUS at 06:39

## 2020-05-02 RX ADMIN — SODIUM CHLORIDE SCH MLS/HR: 900 INJECTION, SOLUTION INTRAVENOUS at 17:41

## 2020-05-02 RX ADMIN — ENOXAPARIN SODIUM SCH MG: 100 INJECTION SUBCUTANEOUS at 17:41

## 2020-05-02 RX ADMIN — ASPIRIN SCH MG: 81 TABLET ORAL at 21:37

## 2020-05-02 RX ADMIN — INSULIN ASPART SCH UNIT: 100 INJECTION, SOLUTION INTRAVENOUS; SUBCUTANEOUS at 08:07

## 2020-05-02 RX ADMIN — INSULIN ASPART SCH UNIT: 100 INJECTION, SOLUTION INTRAVENOUS; SUBCUTANEOUS at 06:38

## 2020-05-02 RX ADMIN — INSULIN ASPART SCH UNIT: 100 INJECTION, SOLUTION INTRAVENOUS; SUBCUTANEOUS at 16:27

## 2020-05-02 RX ADMIN — SODIUM CHLORIDE SCH MLS/HR: 900 INJECTION, SOLUTION INTRAVENOUS at 10:49

## 2020-05-02 NOTE — ED GENERAL
General


Chief Complaint:  General Problems/Pain


Stated Complaint:  UNCONTROLLED DIABETES;HHNK;SEPSIS


Nursing Triage Note:  


TO ED PER W/C FROM HOME PATIENT  REPORTS THAT HE HAS NOT FELT WELL FOR 2 DAYS 


URINE IS DARK  BS IS BEEN   ALSO HAS AREA ON LEG OF CONCERN.


Nursing Sepsis Screen:  No Definite Risk


Source of Information:  Patient





History of Present Illness


Date Seen by Provider:  Apr 28, 2020


Time Seen by Provider:  10:50


Initial Comments


PT ARRIVES VIA POV FROM HOME


PT STATES THAT HE THINKS HE IS IN [place]-STATES "MY SUGARS HAVE BEEN MESSING WITH 

ME" 


STATES FOR THE LAST 4 DAYS HE HAS HAD BLOOD SUGARS OF UP 


STATES HE HAS USED 2 FULL NOVOLOG PENS AND A WHOLE LEVEMIR PEN "TO KEEP IT DOWN 

" PER PT


C/O THIRST AND URINARY FREQUENCY


STATES HIS URINE IS "DARK AND SMELLY" 


PT IS UNAWARE OF FEVER, BUT HAS HAD CHILLS AND SWEATS


DENIES COUGH, CONGESTION, SORE THROAT OR ANY RESPIRATORY SYMPTOMS 


NO CHEST PAIN OR SHORTNESS OF BREATH


DENIES GI SYMPTOMS, BUT STATES HE "HASN'T HAD AN APPETITE" 





PT STATES HIS WIFE NOTICED A "LUMP" ON HIS RIGHT THIGH--PT IS UNAWARE OF THIS, 

AND DENIES PAIN TO AREA. NO DRAINAGE FROM SITE.





PCP: ADAMARIS-[facility], LATRICIA NP THERE





Allergies and Home Medications


Allergies


Coded Allergies:  


     levofloxacin (Verified  Allergy, Mild, 4/14/15)


     sweet potato (Unverified  Allergy, Unknown, 4/14/15)





Home Medications


Aspirin 81 Mg Tablet.dr, 162 MG PO HS, (Reported)


   TAKES 2 (81MG) TABS AT BEDTIME 


Glyburide 2.5 Mg Tablet, 5 MG PO HS, (Reported)


   TAKES 2 (2.5MG) TABS DAILY 


Insulin Aspart 300 Units/3 Ml Solution, 30 UNITS SQ TIDWM, (Reported)


Insulin Detemir 100 Unit/1 Ml Insuln.pen, 45 UNIT SQ BID, (Reported)


   LAST FILLED 01- #10 PENS/33 DAY SUPPLY 


Lisinopril/Hydrochlorothiazide 1 Each Tablet, 2 EACH PO HS, (Reported)


Metformin HCl 500 Mg Tab.er.24, 1,000 MG PO BID, (Reported)





Patient Home Medication List


Home Medication List Reviewed:  Yes





Review of Systems


Review of Systems


Constitutional:  see HPI, chills, malaise


EENTM:  no symptoms reported; No nose congestion, No throat pain


Respiratory:  no symptoms reported; No cough, No short of breath


Cardiovascular:  no symptoms reported; No chest pain


Gastrointestinal:  No abdominal pain, No diarrhea; loss of appetite; No nausea, 

No vomiting


Genitourinary:  No dysuria; frequency


Musculoskeletal:  no symptoms reported


Skin:  see HPI


Psychiatric/Neurological:  No Symptoms Reported


Hematologic/Lymphatic:  No Symptoms Reported


Immunological/Allergic:  no symptoms reported





Past Medical-Social-Family Hx


Past Med/Social Hx:  Reviewed and Corrections made


Patient Social History


Alcohol Use:  Occasionally Uses


Recreational Drug Use:  No


Smoking Status:  Former Smoker


Type Used:  Cigarettes, Electronic/Vapor


Former Smoker, Quit:  Jan 1, 2003


Recent Foreign Travel:  No


Contact w/Someone Who Travel:  No


Recent Infectious Disease Expo:  No


Recent Hopitalizations:  No





Immunizations Up To Date


Tetanus Booster (TDap):  More than 5yrs


Date of Pneumonia Vaccine:  Jan 1, 2007





Seasonal Allergies


Seasonal Allergies:  No





Past Medical History


Surgeries:  Yes (SURGERY ON SCROTAL ABSCESS 2015)


Respiratory:  No


Currently Using CPAP:  No


Currently Using BIPAP:  No


Cardiac:  Yes


Hypertension


Neurological:  Yes


Neuropathy


Reproductive Disorders:  No


Sexually Transmitted Disease:  No


HIV/AIDS:  No


Genitourinary:  No


Gastrointestinal:  No


Musculoskeletal:  Yes


Chronic Back Pain


Endocrine:  Yes (MORBID OBESITY)


Diabetes, Insulin dep


HEENT:  No


Loss of Vision:  Denies


Hearing Impairment:  Denies


Cancer:  No


Psychosocial:  No


Integumentary:  Yes (SCROTAL ABSCESS 2015; LEFT FOOT DIABETIC FOOT ULCER DX 

09/18/17)


Blood Disorders:  No


Adverse Reaction/Blood Tranf:  No





Family Medical History





Completed stroke


  17 MOTHER


Diabetes mellitus


  19 MOTHER


Hypertension


  19 MOTHER


  G8 BROTHER


Myocardial infarction


  19 FATHER


Diabetes, Hypertension, Other Conditions/Hx





Physical Exam


Vital Signs





Vital Signs - First Documented








 4/28/20





 10:33


 


Temp 38.7


 


Pulse 116


 


Resp 18


 


B/P (MAP) 124/93 (103)


 


Pulse Ox 96


 


O2 Delivery Room Air





Capillary Refill : Less Than 3 SecondsLess Than 3 Seconds


Height, Weight, BMI


Height: 6'0.00"


Weight: 422lbs. 6.4oz. [id number]; 59.27 BMI


Method:Stated


General Appearance:  No Apparent Distress, Obese (MORBIDLY), Other (DOES NOT 

APPEAR ILL OR TO BE IN ANY DISCOMFORT OR DISTRESS)


HEENT:  PERRL/EOMI, Other (ORAL MUCOSA SLIGHTLY DRY)


Neck:  Normal Inspection


Respiratory:  Normal Breath Sounds, No Accessory Muscle Use, No Respiratory 

Distress


Cardiovascular:  No Murmur, Tachycardia


Gastrointestinal:  Non Tender, Soft


Extremity:  Normal Capillary Refill, Other (RIGHT UPPER MEDIAL THIGH WITH 

EXTREMELY LARGE ( > 30 CM DIAMETER) AREA OF ERYTHEMA, WARMTH AND INDURATION. 

EXTENDS TO GROIN, BUT DOES NOT INVOLVE PERIRECTAL, PERINEAL, SCROTAL AREA OR 

PENIS. DOES NOT EXTEND ABOVE PUBIC AREA. NO OPEN WOUNDS, NO AREAS OF FLUCTUANCE,

NO DRAINAGE.  UNABLE TO DETERMINE IF PEDAL EDEMA IS PRESENT DUE TO BODY HABITUS.

 )


Neurologic/Psychiatric:  Alert, Oriented x3, Normal Mood/Affect, CNs II-XII Norm

as Tested


Skin:  Normal Color, Warm/Dry, Other (AS ABOVE)





Progress/Results/Core Measures


Suspected Sepsis


Recent Fever Within 48 Hours:  No


Infection Criteria Present:  Documented Infection


New/Unexplained  Altered Menta:  No


Sepsis Screen:  No Definite Risk


SIRS


Temperature: 


Pulse: 85 


Respiratory Rate: 20


 


Laboratory Tests


4/30/20 04:50: White Blood Count 9.1


5/1/20 05:45: White Blood Count 7.6


Blood Pressure 141 /99 


Mean: 113


 


Laboratory Tests


4/30/20 04:50: 


Creatinine 1.26, Platelet Count 310


5/1/20 05:45: 


Creatinine 0.85, Platelet Count 345








Results/Orders


Lab Results





Laboratory Tests








Test


 4/30/20


08:15 4/30/20


11:36 4/30/20


16:01 4/30/20


20:33 Range/Units


 


 


Glucometer 198 H 212 H 214 H 214 H   MG/DL


 


Test


 5/1/20


05:45 5/1/20


11:27 5/1/20


15:57 5/1/20


20:51 Range/Units


 


 


White Blood Count


 7.6 


 


 


 


 4.3-11.0


10^3/uL


 


Red Blood Count


 2.96 L


 


 


 


 4.35-5.85


10^6/uL


 


Hemoglobin 8.2 L    13.3-17.7  G/DL


 


Hematocrit 25 L    40-54  %


 


Mean Corpuscular Volume 86     80-99  FL


 


Mean Corpuscular Hemoglobin 28     25-34  PG


 


Mean Corpuscular Hemoglobin


Concent 32 


 


 


 


 32-36  G/DL





 


Red Cell Distribution Width 14.8 H    10.0-14.5  %


 


Platelet Count


 345 


 


 


 


 130-400


10^3/uL


 


Mean Platelet Volume 9.4     7.4-10.4  FL


 


Sodium Level 139     135-145  MMOL/L


 


Potassium Level 3.4 L    3.6-5.0  MMOL/L


 


Chloride Level 110 H      MMOL/L


 


Carbon Dioxide Level 19 L    21-32  MMOL/L


 


Anion Gap 10     5-14  MMOL/L


 


Blood Urea Nitrogen 20 H    7-18  MG/DL


 


Creatinine


 0.85 


 


 


 


 0.60-1.30


MG/DL


 


Estimat Glomerular Filtration


Rate > 60 


 


 


 


  





 


BUN/Creatinine Ratio 24      


 


Glucose Level 85       MG/DL


 


Calcium Level 7.6 L    8.5-10.1  MG/DL


 


Glucometer  200 H 236 H 132 H   MG/DL


 


Test


 5/2/20


05:32 


 


 


 Range/Units


 


 


Glucometer 82       MG/DL








Vital Signs/I&O











 5/2/20 5/2/20





 00:00 04:00


 


Temp 36.6 36.6


 


Pulse 98 85


 


Resp 20 20


 


B/P (MAP) 115/64 (81) 141/99 (113)


 


Pulse Ox 93 97


 


O2 Delivery Room Air Room Air





Capillary Refill : Less Than 3 SecondsLess Than 3 Seconds








Blood Pressure Mean:                    113











Point of Care Testing


Finger Stick Blood Glucose:  82


Blood Glucose Action Taken:  RN Notified


Progress Note :  


Progress Note


UNEVENTFUL ER STAY





ECG


Initial ECG Impression Date:  Apr 28, 2020


Initial ECG Impression Time:  11:19


Initial ECG Rate:  115


Initial ECG Rhythm:  S.Tach (PVC'S)





Diagnostic Imaging





Comments


CXR--NO ACUTE PROCESS, PER RADIOLOGIST REPORT [id number]





CT LOWER EXTREMITY--EVIDENCE OF NECROTIZING FASCITIS TO PROXIMAL RIGHT THIGH, 

BUT DOES NOT EXTEND INTO GENITAL AREA-NO EVIDENCE OF FLAKITO'S GANGRENE--PER 

RADIOLOGIST VIA PHONE AT 1303


   Reviewed:  Reviewed by Me





Departure


Communication (Admissions)


1205/1300/1305--SPOKE WITH DR. PIMENTEL, ACCEPTS PT FOR ADMIT. WILL CONSULT SURGERY


SPOKE WITH DR. ALEJO AND INFORMED OF CONSULT. NO ADDITIONAL ORDERS AT THIS TIME





Impression





   Primary Impression:  


   NECROTIZING FASCIITIS RIGHT THIGH


   Additional Impressions:  


   Uncontrolled diabetes mellitus


   HHNK


   Sepsis


   Qualified Codes:  A40.9 - Streptococcal sepsis, unspecified


   Hyponatremia


   Renal insufficiency


   Morbid obesity


Disposition:  09 ADMITTED AS INPATIENT


Condition:  Stable





Admissions


Decision to Admit Reason:  Admit from ER (General)


Decision to Admit/Date:  Apr 28, 2020


Time/Decision to Admit Time:  13:00





Departure-Patient Inst.


Referrals:  


[facility] (PCP)


Primary Care Physician








RUT DAVIS (Family)


Primary Care Physician











DIMITRIS VELASCO DO                  May 2, 2020 07:55

## 2020-05-02 NOTE — NUR
Wound dressing changed. Post surgical wound debridement. Wound W 6CM, L 21CM, D 5CM. Wound 
bed presents with good red granulation tissue with small amount of white patches throughout. 
Minimal drainage. Wound edges intact. Wound cleaned with NS. Wound VAC dressing applied and 
hooked up to wound VAC. Settings are 125mmHG and continuous. NATHALIE Vick RN, [facility]

## 2020-05-02 NOTE — PROGRESS NOTE - HOSPITALIST
Subjective


HPI/CC On Admission


Date Seen by Provider:  May 2, 2020


Time Seen by Provider:  12:00


Subjective/Events-last exam


Patient has minimal concerns


Trying to get up and around with the wound VAC and that is working pretty well 

although slowly


Bowels are moving pretty well


Zosyn tolerated


Appreciate general surgery management


Pain is well controlled


No falls


Check meds and labs


Reviewed extensive hospital notes


Conferred with RN





Review of Systems


Musculoskeletal:  leg pain





Objective


Exam


Vital Signs





Vital Signs








  Date Time  Temp Pulse Resp B/P (MAP) Pulse Ox O2 Delivery O2 Flow Rate FiO2


 


20 12:00 36.4 87 18 130/86 (101) 98 Room Air  


 


20 13:20       3 





Capillary Refill : Less Than 3 SecondsLess Than 3 Seconds


General Appearance:  No Apparent Distress, WD/WN, Chronically ill, Obese


Respiratory:  Chest Non Tender, Lungs Clear, Normal Breath Sounds, No Accessory 

Muscle Use, No Respiratory Distress


Cardiovascular:  Regular Rate, Rhythm, No Edema, No Gallop, No JVD, No Murmur, 

Normal Peripheral Pulses


Neurologic/Psychiatric:  Alert, Oriented x3, No Motor/Sensory Deficits, 

Depressed Affect





Results/Procedures


Lab


Laboratory Tests


20 12:45








Patient resulted labs reviewed.





Assessment/Plan


Assessment and Plan


Assess & Plan/Chief Complaint


Assessment:


Sepsis with bacteremia


Cellulitis with necrotizing fasciitis status post resection of necrotic tissue 

and wound VAC placed


Diabetes mellitus





Plan:


Antibiotics


Supportive care


Pain control


Lovenox for DVT prophylaxis





Diagnosis/Problems


Diagnosis/Problems





(1) Sepsis


Status:  Acute


Qualifiers:  


   Sepsis type:  Streptococcus, unspecified  Sepsis acute organ dysfunction 

status:  without acute organ dysfunction  Qualified Codes:  A40.9 - Strept

ococcal sepsis, unspecified


(2) Necrotizing fasciitis


Status:  Acute


(3) Left leg pain


Status:  Acute


(4) DVT prophylaxis


Status:  Acute





Clinical Quality Measures


DVT/VTE Risk/Contraindication:


Risk Factor Score Per Nursin


RFS Level Per Nursing on Admit:  4+=Very High











NICOLE VILA DO                 May 2, 2020 13:07

## 2020-05-02 NOTE — PROGRESS NOTE
Subjective


Date Seen by a Provider:  May 2, 2020


Time Seen by a Provider:  10:15


Subjective/Events-last exam


Patient seen with Dr. Jiménez.  Patient reports doing well.  Denies any right thigh

pain.  Tolerating diet and ambulating.  Had Vac changed today.  No fever/chills.





Objective


Exam





Vital Signs








  Date Time  Temp Pulse Resp B/P (MAP) Pulse Ox O2 Delivery O2 Flow Rate FiO2


 


20 08:00 36.2 96 20 133/85 (101) 100 Room Air  


 


20 08:00      Room Air  


 


20 04:00 36.6 85 20 141/99 (113) 97 Room Air  


 


20 00:00 36.6 98 20 115/64 (81) 93 Room Air  


 


20 20:00      Room Air  


 


20 19:11 36.6 99 20 128/84 (99) 98 Room Air  


 


20 16:13 36.1 91 20 100/67 (78) 98 Room Air  


 


20 12:00 36.0 94 18 112/75 (87) 98 Room Air  














I & O 


 


 20





 07:00


 


Intake Total 2630 ml


 


Output Total 2 ml


 


Balance 2628 ml





Capillary Refill : Less Than 3 SecondsLess Than 3 Seconds


General Appearance:  No Apparent Distress, WD/WN


Neck:  Normal Inspection, Non Tender, Supple


Respiratory:  Normal Breath Sounds, No Accessory Muscle Use, No Respiratory 

Distress


Cardiovascular:  Regular Rate, Rhythm, No Edema


Gastrointestinal:  normal bowel sounds, non tender, soft


Extremity:  Normal Capillary Refill, Normal Range of Motion


Neurologic/Psychiatric:  Alert, Oriented x3


Skin:  Warm/Dry, Other (Right thigh wound vac in place.  Mild erythema, but no 

new significant changes.)





Results


Lab


Laboratory Tests


20 11:27: Glucometer 200H


20 15:57: Glucometer 236H


20 20:51: Glucometer 132H


20 05:32: Glucometer 82





Microbiology


20 Gram Stain - Final, Resulted


          


20 Anaerobic Culture - Preliminary, Resulted


          Prevotella bivia


20 Surgical Culture - Final, Resulted


          Strep constellatus


          Strep agalactiae Group B


          Gram Pos Mixed Bacterial [place]


20 MRSA Screen - Final, Complete


          MRSA not isolated


20 Blood Culture - Final, Complete


          Strep constellatus


          Staphylococcus epidermidis





Assessment/Plan


Assessment/Plan


Assess & Plan/Chief Complaint


necrotizing fascitis right medial thigh s/p wide debridement. 


VSS


cont VAC.


cont IV zosyn and vanc.


await vac change for re-eval on monday.


Will check labs today and tomorrow due to Hgb was trending down.  Will make sure

it is stable.


Continue diet and ambulation.





Clinical Quality Measures


DVT/VTE Risk/Contraindication:


Risk Factor Score Per Nursin


RFS Level Per Nursing on Admit:  4+=Very High











NEGIN CHA APRN           May 2, 2020 11:14

## 2020-05-03 VITALS — DIASTOLIC BLOOD PRESSURE: 79 MMHG | SYSTOLIC BLOOD PRESSURE: 115 MMHG

## 2020-05-03 VITALS — DIASTOLIC BLOOD PRESSURE: 86 MMHG | SYSTOLIC BLOOD PRESSURE: 136 MMHG

## 2020-05-03 VITALS — DIASTOLIC BLOOD PRESSURE: 85 MMHG | SYSTOLIC BLOOD PRESSURE: 136 MMHG

## 2020-05-03 VITALS — SYSTOLIC BLOOD PRESSURE: 130 MMHG | DIASTOLIC BLOOD PRESSURE: 82 MMHG

## 2020-05-03 VITALS — SYSTOLIC BLOOD PRESSURE: 138 MMHG | DIASTOLIC BLOOD PRESSURE: 86 MMHG

## 2020-05-03 LAB
ALBUMIN SERPL-MCNC: 2.5 GM/DL (ref 3.2–4.5)
ALP SERPL-CCNC: 56 U/L (ref 40–136)
ALT SERPL-CCNC: 27 U/L (ref 0–55)
BILIRUB SERPL-MCNC: 0.1 MG/DL (ref 0.1–1)
BUN/CREAT SERPL: 19
CALCIUM SERPL-MCNC: 8.2 MG/DL (ref 8.5–10.1)
CHLORIDE SERPL-SCNC: 109 MMOL/L (ref 98–107)
CO2 SERPL-SCNC: 22 MMOL/L (ref 21–32)
CREAT SERPL-MCNC: 0.73 MG/DL (ref 0.6–1.3)
ERYTHROCYTE [DISTWIDTH] IN BLOOD BY AUTOMATED COUNT: 14.8 % (ref 10–14.5)
GFR SERPLBLD BASED ON 1.73 SQ M-ARVRAT: > 60 ML/MIN
GLUCOSE SERPL-MCNC: 84 MG/DL (ref 70–105)
HCT VFR BLD CALC: 27 % (ref 40–54)
HGB BLD-MCNC: 8.5 G/DL (ref 13.3–17.7)
MCH RBC QN AUTO: 28 PG (ref 25–34)
MCHC RBC AUTO-ENTMCNC: 32 G/DL (ref 32–36)
MCV RBC AUTO: 88 FL (ref 80–99)
PLATELET # BLD: 449 10^3/UL (ref 130–400)
PMV BLD AUTO: 8.7 FL (ref 7.4–10.4)
POTASSIUM SERPL-SCNC: 3.7 MMOL/L (ref 3.6–5)
PROT SERPL-MCNC: 5.7 GM/DL (ref 6.4–8.2)
SODIUM SERPL-SCNC: 140 MMOL/L (ref 135–145)
WBC # BLD AUTO: 7.3 10^3/UL (ref 4.3–11)

## 2020-05-03 RX ADMIN — INSULIN ASPART SCH UNIT: 100 INJECTION, SOLUTION INTRAVENOUS; SUBCUTANEOUS at 12:29

## 2020-05-03 RX ADMIN — INSULIN ASPART SCH UNIT: 100 INJECTION, SOLUTION INTRAVENOUS; SUBCUTANEOUS at 20:41

## 2020-05-03 RX ADMIN — SODIUM CHLORIDE SCH MLS/HR: 900 INJECTION, SOLUTION INTRAVENOUS at 01:02

## 2020-05-03 RX ADMIN — INSULIN ASPART SCH UNIT: 100 INJECTION, SOLUTION INTRAVENOUS; SUBCUTANEOUS at 08:15

## 2020-05-03 RX ADMIN — INSULIN ASPART SCH UNIT: 100 INJECTION, SOLUTION INTRAVENOUS; SUBCUTANEOUS at 17:20

## 2020-05-03 RX ADMIN — INSULIN ASPART SCH UNIT: 100 INJECTION, SOLUTION INTRAVENOUS; SUBCUTANEOUS at 06:16

## 2020-05-03 RX ADMIN — INSULIN ASPART SCH UNIT: 100 INJECTION, SOLUTION INTRAVENOUS; SUBCUTANEOUS at 16:55

## 2020-05-03 RX ADMIN — SODIUM CHLORIDE SCH MLS/HR: 900 INJECTION, SOLUTION INTRAVENOUS at 10:17

## 2020-05-03 RX ADMIN — ENOXAPARIN SODIUM SCH MG: 100 INJECTION SUBCUTANEOUS at 17:20

## 2020-05-03 RX ADMIN — ASPIRIN SCH MG: 81 TABLET ORAL at 20:41

## 2020-05-03 RX ADMIN — SODIUM CHLORIDE SCH MLS/HR: 900 INJECTION, SOLUTION INTRAVENOUS at 17:20

## 2020-05-03 RX ADMIN — INSULIN ASPART SCH UNIT: 100 INJECTION, SOLUTION INTRAVENOUS; SUBCUTANEOUS at 11:29

## 2020-05-03 RX ADMIN — ENOXAPARIN SODIUM SCH MG: 100 INJECTION SUBCUTANEOUS at 06:16

## 2020-05-03 NOTE — PROGRESS NOTE
Subjective


Date Seen by a Provider:  May 3, 2020


Time Seen by a Provider:  09:25


Subjective/Events-last exam


Patient seen with Dr. Jiménez.  Patient reports doing well with no complaints.  

Denies any pain.  Tolerating diet.





Objective


Exam





Vital Signs








  Date Time  Temp Pulse Resp B/P (MAP) Pulse Ox O2 Delivery O2 Flow Rate FiO2


 


5/3/20 08:00      Room Air  


 


5/3/20 08:00 36.2 79 20 115/79 (91) 98 Room Air  


 


5/3/20 04:51 36.0 81 16 130/82 (98) 98 Room Air  


 


5/3/20 00:43 36.2 89 18 136/85 (102) 98 Room Air  


 


20 20:05 36.2 96 20 133/85 (101) 100 Room Air  


 


20 20:00      Room Air  


 


20 16:21 35.9 87 18 126/83 (97) 99 Room Air  


 


20 12:00 36.4 87 18 130/86 (101) 98 Room Air  














I & O 


 


 5/3/20





 07:00


 


Intake Total 2450 ml


 


Balance 2450 ml





Capillary Refill : Less Than 3 SecondsLess Than 3 Seconds


General Appearance:  No Apparent Distress, WD/WN


Neck:  Full Range of Motion, Normal Inspection, Supple


Respiratory:  Normal Breath Sounds, No Accessory Muscle Use, No Respiratory 

Distress


Cardiovascular:  Regular Rate, Rhythm, No Edema


Gastrointestinal:  normal bowel sounds, non tender, soft


Extremity:  Normal Range of Motion, Other (Right thight wound vac in place.  

Minimal erythema.  Mild tenderness to palpation.)


Neurologic/Psychiatric:  Alert, Oriented x3


Skin:  Normal Color, Warm/Dry





Results


Lab


Laboratory Tests


20 11:26: Glucometer 196H


20 12:45: 


White Blood Count 7.3, Red Blood Count 3.15L, Hemoglobin 8.7L, Hematocrit 27L, 

Mean Corpuscular Volume 87, Mean Corpuscular Hemoglobin 28, Mean Corpuscular 

Hemoglobin Concent 32, Red Cell Distribution Width 15.0H, Platelet Count 422H, 

Mean Platelet Volume 8.7, Sodium Level 137, Potassium Level 3.8, Chloride Level 

106, Carbon Dioxide Level 21, Anion Gap 10, Blood Urea Nitrogen 16, Creatinine 

0.78, Estimat Glomerular Filtration Rate > 60, BUN/Creatinine Ratio 21, Glucose 

Level 175H, Calcium Level 8.2L, Corrected Calcium 9.3, Total Bilirubin 0.1, 

Aspartate Amino Transf (AST/SGOT) 27, Alanine Aminotransferase (ALT/SGPT) 33, 

Alkaline Phosphatase 62, Total Protein 6.1L, Albumin 2.6L


20 16:06: Glucometer 170H


20 21:01: Glucometer 161H


5/3/20 05:15: 


White Blood Count 7.3, Red Blood Count 3.03L, Hemoglobin 8.5L, Hematocrit 27L, 

Mean Corpuscular Volume 88, Mean Corpuscular Hemoglobin 28, Mean Corpuscular 

Hemoglobin Concent 32, Red Cell Distribution Width 14.8H, Platelet Count 449H, 

Mean Platelet Volume 8.7, Sodium Level 140, Potassium Level 3.7, Chloride Level 

109H, Carbon Dioxide Level 22, Anion Gap 9, Blood Urea Nitrogen 14, Creatinine 

0.73, Estimat Glomerular Filtration Rate > 60, BUN/Creatinine Ratio 19, Glucose 

Level 84, Calcium Level 8.2L, Corrected Calcium 9.4, Total Bilirubin 0.1, 

Aspartate Amino Transf (AST/SGOT) 18, Alanine Aminotransferase (ALT/SGPT) 27, 

Alkaline Phosphatase 56, Total Protein 5.7L, Albumin 2.5L





Microbiology


20 Gram Stain - Final, Resulted


          


20 Anaerobic Culture - Preliminary, Resulted


          Prevotella bivia


20 Surgical Culture - Final, Resulted


          Strep constellatus


          Strep agalactiae Group B


          Gram Pos Mixed Bacterial [place]


20 MRSA Screen - Final, Complete


          MRSA not isolated


20 Blood Culture - Final, Complete


          Strep constellatus


          Staphylococcus epidermidis





Assessment/Plan


Assessment/Plan


Assess & Plan/Chief Complaint


necrotizing fascitis right medial thigh s/p wide debridement. 


VSS


cont VAC.


cont IV zosyn and vanc.


await vac change for re-eval on monday.


labs stable.


Continue diet and ambulation.





Clinical Quality Measures


DVT/VTE Risk/Contraindication:


Risk Factor Score Per Nursin


RFS Level Per Nursing on Admit:  4+=Very High











NEGIN CHA APRN           May 3, 2020 10:24

## 2020-05-03 NOTE — PROGRESS NOTE - HOSPITALIST
Subjective


HPI/CC On Admission


Date Seen by Provider:  May 3, 2020


Time Seen by Provider:  11:45


Subjective/Events-last exam


Patient doing very well


Spoke to general surgery and the plan will be to discharge tomorrow on wound VAC


Antibiotic prescription already in chart


Will need any other home health arranged if that is needed and Dr. Guzmán will 

manage an outpatient wound care


Check meds and labs


Overall feels pretty good





Review of Systems


General:  Fatigue


Musculoskeletal:  leg pain





Objective


Exam


Vital Signs





Vital Signs








  Date Time  Temp Pulse Resp B/P (MAP) Pulse Ox O2 Delivery O2 Flow Rate FiO2


 


5/3/20 12:00 36.3 78 18 138/86 (103) 97 Room Air  


 


20 13:20       3 





Capillary Refill : Less Than 3 SecondsLess Than 3 Seconds


General Appearance:  No Apparent Distress, WD/WN, Chronically ill, Obese


Respiratory:  Chest Non Tender, Lungs Clear, Normal Breath Sounds, No Accessory 

Muscle Use, No Respiratory Distress


Cardiovascular:  Regular Rate, Rhythm, No Edema, No Gallop, No JVD, No Murmur, 

Normal Peripheral Pulses


Neurologic/Psychiatric:  Alert, Oriented x3, No Motor/Sensory Deficits, Normal 

Mood/Affect





Results/Procedures


Lab


Laboratory Tests


5/3/20 05:15








Patient resulted labs reviewed.





Assessment/Plan


Assessment and Plan


Assess & Plan/Chief Complaint


Assessment:


Sepsis with bacteremia


Cellulitis with necrotizing fasciitis status post resection of necrotic tissue 

and wound VAC placed


Diabetes mellitus





Plan:


Antibiotics


Supportive care


Pain control


Lovenox for DVT prophylaxis


DC tomorrow





Diagnosis/Problems


Diagnosis/Problems





(1) Sepsis


Status:  Acute


Qualifiers:  


   Sepsis type:  Streptococcus, unspecified  Sepsis acute organ dysfunction 

status:  without acute organ dysfunction  Qualified Codes:  A40.9 - 

Streptococcal sepsis, unspecified


(2) Necrotizing fasciitis


Status:  Acute


(3) Left leg pain


Status:  Acute


(4) DVT prophylaxis


Status:  Acute





Clinical Quality Measures


DVT/VTE Risk/Contraindication:


Risk Factor Score Per Nursin


RFS Level Per Nursing on Admit:  4+=Very High











NICOLE VILA DO                 May 3, 2020 11:19

## 2020-05-04 VITALS — SYSTOLIC BLOOD PRESSURE: 140 MMHG | DIASTOLIC BLOOD PRESSURE: 82 MMHG

## 2020-05-04 VITALS — DIASTOLIC BLOOD PRESSURE: 83 MMHG | SYSTOLIC BLOOD PRESSURE: 125 MMHG

## 2020-05-04 VITALS — DIASTOLIC BLOOD PRESSURE: 69 MMHG | SYSTOLIC BLOOD PRESSURE: 147 MMHG

## 2020-05-04 LAB
ALBUMIN SERPL-MCNC: 2.5 GM/DL (ref 3.2–4.5)
ALP SERPL-CCNC: 51 U/L (ref 40–136)
ALT SERPL-CCNC: 26 U/L (ref 0–55)
BASOPHILS # BLD AUTO: 0 10^3/UL (ref 0–0.1)
BASOPHILS NFR BLD AUTO: 0 % (ref 0–10)
BILIRUB SERPL-MCNC: 0.1 MG/DL (ref 0.1–1)
BUN/CREAT SERPL: 16
CALCIUM SERPL-MCNC: 8.1 MG/DL (ref 8.5–10.1)
CHLORIDE SERPL-SCNC: 107 MMOL/L (ref 98–107)
CO2 SERPL-SCNC: 22 MMOL/L (ref 21–32)
CREAT SERPL-MCNC: 0.74 MG/DL (ref 0.6–1.3)
EOSINOPHIL # BLD AUTO: 0.3 10^3/UL (ref 0–0.3)
EOSINOPHIL NFR BLD AUTO: 3 % (ref 0–10)
ERYTHROCYTE [DISTWIDTH] IN BLOOD BY AUTOMATED COUNT: 14.9 % (ref 10–14.5)
GFR SERPLBLD BASED ON 1.73 SQ M-ARVRAT: > 60 ML/MIN
GLUCOSE SERPL-MCNC: 107 MG/DL (ref 70–105)
HCT VFR BLD CALC: 27 % (ref 40–54)
HGB BLD-MCNC: 8.3 G/DL (ref 13.3–17.7)
LYMPHOCYTES # BLD AUTO: 2.9 X 10^3 (ref 1–4)
LYMPHOCYTES NFR BLD AUTO: 36 % (ref 12–44)
MANUAL DIFFERENTIAL PERFORMED BLD QL: NO
MCH RBC QN AUTO: 27 PG (ref 25–34)
MCHC RBC AUTO-ENTMCNC: 31 G/DL (ref 32–36)
MCV RBC AUTO: 88 FL (ref 80–99)
MONOCYTES # BLD AUTO: 0.7 X 10^3 (ref 0–1)
MONOCYTES NFR BLD AUTO: 9 % (ref 0–12)
NEUTROPHILS # BLD AUTO: 4.1 X 10^3 (ref 1.8–7.8)
NEUTROPHILS NFR BLD AUTO: 52 % (ref 42–75)
PLATELET # BLD: 520 10^3/UL (ref 130–400)
PMV BLD AUTO: 8.5 FL (ref 7.4–10.4)
POTASSIUM SERPL-SCNC: 3.9 MMOL/L (ref 3.6–5)
PROT SERPL-MCNC: 5.9 GM/DL (ref 6.4–8.2)
SODIUM SERPL-SCNC: 138 MMOL/L (ref 135–145)
WBC # BLD AUTO: 7.9 10^3/UL (ref 4.3–11)

## 2020-05-04 RX ADMIN — SODIUM CHLORIDE SCH MLS/HR: 900 INJECTION, SOLUTION INTRAVENOUS at 10:43

## 2020-05-04 RX ADMIN — ENOXAPARIN SODIUM SCH MG: 100 INJECTION SUBCUTANEOUS at 05:11

## 2020-05-04 RX ADMIN — INSULIN ASPART SCH UNIT: 100 INJECTION, SOLUTION INTRAVENOUS; SUBCUTANEOUS at 15:10

## 2020-05-04 RX ADMIN — SODIUM CHLORIDE SCH MLS/HR: 900 INJECTION, SOLUTION INTRAVENOUS at 01:29

## 2020-05-04 RX ADMIN — INSULIN ASPART SCH UNIT: 100 INJECTION, SOLUTION INTRAVENOUS; SUBCUTANEOUS at 08:45

## 2020-05-04 RX ADMIN — INSULIN ASPART SCH UNIT: 100 INJECTION, SOLUTION INTRAVENOUS; SUBCUTANEOUS at 05:12

## 2020-05-04 RX ADMIN — INSULIN ASPART SCH UNIT: 100 INJECTION, SOLUTION INTRAVENOUS; SUBCUTANEOUS at 11:34

## 2020-05-04 RX ADMIN — INSULIN ASPART SCH UNIT: 100 INJECTION, SOLUTION INTRAVENOUS; SUBCUTANEOUS at 16:00

## 2020-05-04 NOTE — DISCHARGE SUMMARY
Discharge Summary


Hospital Course


Was the Problem List Reviewed?:  Yes


Problems/Dx:  


(1) Sepsis


Status:  Acute


Qualifiers:  


   Qualified Codes:  A40.9 - Streptococcal sepsis, unspecified


(2) Necrotizing fasciitis


Status:  Acute


(3) Left leg pain


Status:  Acute


(4) DVT prophylaxis


Status:  Acute


Hospital Course


Date of Admission: 2020 at 12:05 


Admission Diagnosis :  





Family Physician/Provider: Deya Jorge Aprn  





Date of Discharge: 20 


Discharge Diagnosis: Sepsis, cellulitis, necrotizing fasciitis, DM, HHNKS








Hospital Course:


Hospital Course: Pt had an uneventful 6 day hospital course for uncontrolled 

diabetes hyperosmolar nonketotic state with sepsis from cellulitis that ended up

being necrotizing fasciitis requiring debridement of the necrotic tissue 

placement of wound vac by general surgery. Overall pt did very well, maintained 

on IV antibiotics, diabetes was very well controlled, minimal pain medication 

needed, bowels were of normal function and he was discharged home on a normal 

wound vac with close follow up with Dr. Guzmán as outpatient wound management. He

will be on Augmentin 875 twice a day for two weeks. 














Labs and Pending Lab Test:


Laboratory Tests


5/3/20 11:18: Glucometer 121H


5/3/20 16:23: Glucometer 112H


5/3/20 20:22: Glucometer 108


20 03:20: 


White Blood Count 7.9, Red Blood Count 3.03L, Hemoglobin 8.3L, Hematocrit 27L, 

Mean Corpuscular Volume 88, Mean Corpuscular Hemoglobin 27, Mean Corpuscular 

Hemoglobin Concent 31L, Red Cell Distribution Width 14.9H, Platelet Count 520H, 

Mean Platelet Volume 8.5, Neutrophils (%) (Auto) 52, Lymphocytes (%) (Auto) 36, 

Monocytes (%) (Auto) 9, Eosinophils (%) (Auto) 3, Basophils (%) (Auto) 0, 

Neutrophils # (Auto) 4.1, Lymphocytes # (Auto) 2.9, Monocytes # (Auto) 0.7, 

Eosinophils # (Auto) 0.3, Basophils # (Auto) 0.0, Sodium Level 138, Potassium 

Level 3.9, Chloride Level 107, Carbon Dioxide Level 22, Anion Gap 9, Blood Urea 

Nitrogen 12, Creatinine 0.74, Estimat Glomerular Filtration Rate > 60, 

BUN/Creatinine Ratio 16, Glucose Level 107H, Calcium Level 8.1L, Corrected 

Calcium 9.3, Total Bilirubin 0.1, Aspartate Amino Transf (AST/SGOT) 24, Alanine 

Aminotransferase (ALT/SGPT) 26, Alkaline Phosphatase 51, Total Protein 5.9L, 

Albumin 2.5L





Microbiology


20 Gram Stain - Final, Complete


          


20 Anaerobic Culture - Final, Complete


          Prevotella bivia


20 Surgical Culture - Final, Complete


          Strep constellatus


          Strep agalactiae Group B


          Gram Pos Mixed Bacterial [place]


20 MRSA Screen - Final, Complete


          MRSA not isolated


20 Blood Culture - Final, Complete


          Strep constellatus


          Staphylococcus epidermidis





Home Meds


Active


Reported


Aspirin EC (Aspirin) 81 Mg Tablet.dr 162 Mg PO HS


     TAKES 2 (81MG) TABS AT BEDTIME


Lisinopril-Hctz 20-25 mg Tab (Lisinopril/Hydrochlorothiazide) 1 Each Tablet 2 

Each PO HS


Glyburide 2.5 Mg Tablet 5 Mg PO HS


     TAKES 2 (2.5MG) TABS DAILY


Metformin HCl ER (Metformin HCl) 500 Mg Tab.er.24 1,000 Mg PO BID


Levemir Flextouch (Insulin Detemir) 100 Unit/1 Ml Insuln.pen 45 Unit SQ BID


     LAST FILLED 2020 #10 PENS/33 DAY SUPPLY


Novolog Flexpen (Insulin Aspart) 300 Units/3 Ml Solution 30 Units SQ TIDWM


Assessment/Pt Instructions


[facility] 1 week, Dr Guzmán wound care


Discharge Planning:  <30 minutes discharge planning





Discharge Instructions


Discharge Diet:  ADA Diet


Pneumonia Vaccine Order Indica:  Yes





Discharge Physical Examination


Vital Signs





Vital Signs








  Date Time  Temp Pulse Resp B/P (MAP) Pulse Ox O2 Delivery O2 Flow Rate FiO2


 


20 08:00      Room Air  


 


20 08:00 36.0 88 18 125/83 (97) 100   


 


20 13:20       3 








General Appearance:  No Apparent Distress, WD/WN


Allergies:  


Coded Allergies:  


     levofloxacin (Verified  Allergy, Mild, 4/14/15)


     sweet potato (Unverified  Allergy, Unknown, 4/14/15)





Discharge Summary


Date of Admission


2020 at 12:05


Date of Discharge





Discharge Date:  May 4, 2020


Discharge Diagnosis


Assessment:


Sepsis with bacteremia


Cellulitis with necrotizing fasciitis status post resection of necrotic tissue 

and wound VAC placed


Diabetes mellitus





Plan:


Antibiotics


Supportive care


Pain control


Lovenox for DVT prophylaxis


DC tomorrow





(1) Sepsis


Status:  Acute


Qualifiers:  


   Qualified Codes:  A40.9 - Streptococcal sepsis, unspecified


(2) Necrotizing fasciitis


Status:  Acute


(3) Left leg pain


Status:  Acute


(4) DVT prophylaxis


Status:  Acute





Clinical Quality Measures


DVT/VTE Risk/Contraindication:


Risk Factor Score Per Nursin


RFS Level Per Nursing on Admit:  4+=Very High











NICOLE VILA DO                 May 4, 2020 10:34

## 2020-05-04 NOTE — NUR
CM/SS follow up. 



Plan: Patient will return home with home wound vac. He will follow with Dr. Celestin and 
outpatient wound care. 



The patient was in bed at time of visit. He reports he is doing okay today and verbalized 
that he would be happy to get home. The patient stated that the Dr will be around at 1 p.m. 
today to assess wound. Per the discharge, the patient will follow up with Dr. Guzmán and 
outpatient wound care. At this time it does not appear that patient will need home health. 
Patient verbalized that he did not have any other questions at this time.

## 2020-05-04 NOTE — NUR
Spoke with BILL 3 times today trying to get home wound VAC approved for pt to go home with.  
At 1515 spoke to Cristina with [facility] who informed me that they were still waiting on approval 
from insurance carrier.  Cristina reports that [facility] can take 5 to 15 
business days to approve wound VAC placement.  Spoke with Dr. Jiménez who advised to place wet 
to dry dressing so pt can go home and make an appointment with out patient wound care 
tomorrow 5/5/18qa2a

## 2020-05-04 NOTE — PROGRESS NOTE
Subjective


Date Seen by a Provider:  May 4, 2020


Time Seen by a Provider:  14:00


Subjective/Events-last exam


doing well. wound clean/dry with good granulation bed. no new areas necr

osis/redness/erythema.





Objective


Exam





Vital Signs








  Date Time  Temp Pulse Resp B/P (MAP) Pulse Ox O2 Delivery O2 Flow Rate FiO2


 


20 08:00      Room Air  


 


20 08:00 36.0 88 18 125/83 (97) 100 Room Air  


 


20 00:08 36.2 83 16 140/82 (101) 99 Room Air  


 


5/3/20 20:00      Room Air  


 


5/3/20 16:00 36.5 78 18 136/86 (103) 99 Room Air  














I & O 


 


 20





 07:00


 


Intake Total 3770 ml


 


Balance 3770 ml





Capillary Refill : Less Than 3 SecondsLess Than 3 Seconds


General Appearance:  No Apparent Distress


HEENT:  PERRL/EOMI


Neck:  Full Range of Motion


Respiratory:  Chest Non Tender, Lungs Clear, Normal Breath Sounds


Cardiovascular:  Regular Rate, Rhythm


Gastrointestinal:  normal bowel sounds, non tender, soft


Extremity:  Normal Capillary Refill, Other (wound intact and good granulation 

bed)


Neurologic/Psychiatric:  Alert, Oriented x3


Skin:  Normal Color


Lymphatic:  No Adenopathy





Results


Lab


Laboratory Tests


5/3/20 16:23: Glucometer 112H


5/3/20 20:22: Glucometer 108


20 03:20: 


White Blood Count 7.9, Red Blood Count 3.03L, Hemoglobin 8.3L, Hematocrit 27L, 

Mean Corpuscular Volume 88, Mean Corpuscular Hemoglobin 27, Mean Corpuscular 

Hemoglobin Concent 31L, Red Cell Distribution Width 14.9H, Platelet Count 520H, 

Mean Platelet Volume 8.5, Neutrophils (%) (Auto) 52, Lymphocytes (%) (Auto) 36, 

Monocytes (%) (Auto) 9, Eosinophils (%) (Auto) 3, Basophils (%) (Auto) 0, 

Neutrophils # (Auto) 4.1, Lymphocytes # (Auto) 2.9, Monocytes # (Auto) 0.7, 

Eosinophils # (Auto) 0.3, Basophils # (Auto) 0.0, Sodium Level 138, Potassium 

Level 3.9, Chloride Level 107, Carbon Dioxide Level 22, Anion Gap 9, Blood Urea 

Nitrogen 12, Creatinine 0.74, Estimat Glomerular Filtration Rate > 60, 

BUN/Creatinine Ratio 16, Glucose Level 107H, Calcium Level 8.1L, Corrected [place]

cium 9.3, Total Bilirubin 0.1, Aspartate Amino Transf (AST/SGOT) 24, Alanine 

Aminotransferase (ALT/SGPT) 26, Alkaline Phosphatase 51, Total Protein 5.9L, 

Albumin 2.5L


20 11:06: Glucometer 116H





Microbiology


20 Gram Stain - Final, Complete


          


20 Anaerobic Culture - Final, Complete


          Prevotella bivia


20 Surgical Culture - Final, Complete


          Strep constellatus


          Strep agalactiae Group B


          Gram Pos Mixed Bacterial [place]


20 MRSA Screen - Final, Complete


          MRSA not isolated


20 Blood Culture - Final, Complete


          Strep constellatus


          Staphylococcus epidermidis





Assessment/Plan


Assessment/Plan


Assess & Plan/Chief Complaint


necrotizing fascitis right medial thigh s/p wide debridement. 


cont VAC.


cont IV zosyn and vanc.


wound granulating in well. will redress and d/c home.





Clinical Quality Measures


DVT/VTE Risk/Contraindication:


Risk Factor Score Per Nursin


RFS Level Per Nursing on Admit:  4+=Very High











CLAUDY ALEJO MD                 May 4, 2020 14:19

## 2020-05-05 ENCOUNTER — HOSPITAL ENCOUNTER (OUTPATIENT)
Dept: HOSPITAL 75 - WOUNDCARE | Age: 40
End: 2020-05-05
Attending: SURGERY
Payer: COMMERCIAL

## 2020-05-05 DIAGNOSIS — M72.6: ICD-10-CM

## 2020-05-05 DIAGNOSIS — L97.112: ICD-10-CM

## 2020-05-05 DIAGNOSIS — E11.622: Primary | ICD-10-CM

## 2020-05-05 DIAGNOSIS — E11.42: ICD-10-CM

## 2020-05-05 DIAGNOSIS — E66.01: ICD-10-CM

## 2020-05-05 PROCEDURE — 36415 COLL VENOUS BLD VENIPUNCTURE: CPT

## 2020-05-05 PROCEDURE — 11045 DBRDMT SUBQ TISS EACH ADDL: CPT

## 2020-05-05 PROCEDURE — 83036 HEMOGLOBIN GLYCOSYLATED A1C: CPT

## 2020-05-05 PROCEDURE — 84134 ASSAY OF PREALBUMIN: CPT

## 2020-05-05 PROCEDURE — 11042 DBRDMT SUBQ TIS 1ST 20SQCM/<: CPT

## 2020-05-14 ENCOUNTER — HOSPITAL ENCOUNTER (OUTPATIENT)
Dept: HOSPITAL 75 - WOUNDCARE | Age: 40
End: 2020-05-14
Attending: SURGERY
Payer: COMMERCIAL

## 2020-05-14 ENCOUNTER — HOSPITAL ENCOUNTER (OUTPATIENT)
Dept: HOSPITAL 75 - LAB | Age: 40
End: 2020-05-14
Attending: SURGERY
Payer: COMMERCIAL

## 2020-05-14 DIAGNOSIS — E11.622: Primary | ICD-10-CM

## 2020-05-14 DIAGNOSIS — L97.112: Primary | ICD-10-CM

## 2020-05-14 DIAGNOSIS — M72.6: ICD-10-CM

## 2020-05-14 DIAGNOSIS — E11.42: ICD-10-CM

## 2020-05-14 DIAGNOSIS — L97.112: ICD-10-CM

## 2020-05-14 DIAGNOSIS — E66.01: ICD-10-CM

## 2020-05-14 PROCEDURE — 11042 DBRDMT SUBQ TIS 1ST 20SQCM/<: CPT

## 2020-05-14 PROCEDURE — 36415 COLL VENOUS BLD VENIPUNCTURE: CPT

## 2020-05-14 PROCEDURE — 11045 DBRDMT SUBQ TISS EACH ADDL: CPT

## 2020-05-14 PROCEDURE — 84134 ASSAY OF PREALBUMIN: CPT

## 2020-05-14 PROCEDURE — 97606 NEG PRS WND THER DME>50 SQCM: CPT

## 2020-05-20 ENCOUNTER — HOSPITAL ENCOUNTER (OUTPATIENT)
Dept: HOSPITAL 75 - WOUNDCARE | Age: 40
End: 2020-05-20
Attending: SURGERY
Payer: COMMERCIAL

## 2020-05-20 DIAGNOSIS — Z87.891: ICD-10-CM

## 2020-05-20 DIAGNOSIS — M17.9: ICD-10-CM

## 2020-05-20 DIAGNOSIS — E11.42: ICD-10-CM

## 2020-05-20 DIAGNOSIS — E66.01: ICD-10-CM

## 2020-05-20 DIAGNOSIS — I87.2: ICD-10-CM

## 2020-05-20 DIAGNOSIS — I10: ICD-10-CM

## 2020-05-20 DIAGNOSIS — M72.6: ICD-10-CM

## 2020-05-20 DIAGNOSIS — E11.622: Primary | ICD-10-CM

## 2020-05-20 DIAGNOSIS — L97.112: ICD-10-CM

## 2020-05-20 PROCEDURE — 11042 DBRDMT SUBQ TIS 1ST 20SQCM/<: CPT

## 2020-05-20 PROCEDURE — 97606 NEG PRS WND THER DME>50 SQCM: CPT

## 2020-05-20 PROCEDURE — 11045 DBRDMT SUBQ TISS EACH ADDL: CPT

## 2020-05-27 ENCOUNTER — HOSPITAL ENCOUNTER (OUTPATIENT)
Dept: HOSPITAL 75 - WOUNDCARE | Age: 40
End: 2020-05-27
Attending: SURGERY
Payer: COMMERCIAL

## 2020-05-27 DIAGNOSIS — Z87.891: ICD-10-CM

## 2020-05-27 DIAGNOSIS — E11.622: Primary | ICD-10-CM

## 2020-05-27 DIAGNOSIS — E11.42: ICD-10-CM

## 2020-05-27 DIAGNOSIS — L97.112: ICD-10-CM

## 2020-05-27 DIAGNOSIS — I10: ICD-10-CM

## 2020-05-27 DIAGNOSIS — E66.01: ICD-10-CM

## 2020-05-27 DIAGNOSIS — M72.6: ICD-10-CM

## 2020-05-27 DIAGNOSIS — I87.2: ICD-10-CM

## 2020-05-27 PROCEDURE — 11045 DBRDMT SUBQ TISS EACH ADDL: CPT

## 2020-05-27 PROCEDURE — 11042 DBRDMT SUBQ TIS 1ST 20SQCM/<: CPT

## 2020-05-27 PROCEDURE — 97606 NEG PRS WND THER DME>50 SQCM: CPT

## 2020-06-03 ENCOUNTER — HOSPITAL ENCOUNTER (OUTPATIENT)
Dept: HOSPITAL 75 - WOUNDCARE | Age: 40
End: 2020-06-03
Attending: SURGERY
Payer: COMMERCIAL

## 2020-06-03 DIAGNOSIS — M72.6: ICD-10-CM

## 2020-06-03 DIAGNOSIS — L97.113: ICD-10-CM

## 2020-06-03 DIAGNOSIS — E66.01: ICD-10-CM

## 2020-06-03 DIAGNOSIS — E11.622: Primary | ICD-10-CM

## 2020-06-03 DIAGNOSIS — E11.42: ICD-10-CM

## 2020-06-03 DIAGNOSIS — E11.52: ICD-10-CM

## 2020-06-03 DIAGNOSIS — I96: ICD-10-CM

## 2020-06-03 PROCEDURE — 11042 DBRDMT SUBQ TIS 1ST 20SQCM/<: CPT

## 2020-06-03 PROCEDURE — 11045 DBRDMT SUBQ TISS EACH ADDL: CPT

## 2020-06-03 PROCEDURE — 97606 NEG PRS WND THER DME>50 SQCM: CPT

## 2020-06-10 ENCOUNTER — HOSPITAL ENCOUNTER (OUTPATIENT)
Dept: HOSPITAL 75 - WOUNDCARE | Age: 40
End: 2020-06-10
Attending: SURGERY
Payer: COMMERCIAL

## 2020-06-10 DIAGNOSIS — E66.01: ICD-10-CM

## 2020-06-10 DIAGNOSIS — E11.42: ICD-10-CM

## 2020-06-10 DIAGNOSIS — E11.622: Primary | ICD-10-CM

## 2020-06-10 DIAGNOSIS — M72.6: ICD-10-CM

## 2020-06-10 DIAGNOSIS — L97.113: ICD-10-CM

## 2020-06-10 PROCEDURE — 97606 NEG PRS WND THER DME>50 SQCM: CPT

## 2020-06-17 ENCOUNTER — HOSPITAL ENCOUNTER (OUTPATIENT)
Dept: HOSPITAL 75 - WOUNDCARE | Age: 40
End: 2020-06-17
Attending: SURGERY
Payer: COMMERCIAL

## 2020-06-17 DIAGNOSIS — M72.6: ICD-10-CM

## 2020-06-17 DIAGNOSIS — E11.622: Primary | ICD-10-CM

## 2020-06-17 DIAGNOSIS — E11.42: ICD-10-CM

## 2020-06-17 DIAGNOSIS — E66.01: ICD-10-CM

## 2020-06-17 DIAGNOSIS — L97.112: ICD-10-CM

## 2020-06-17 DIAGNOSIS — Z87.891: ICD-10-CM

## 2020-06-17 DIAGNOSIS — I87.2: ICD-10-CM

## 2020-06-17 DIAGNOSIS — I10: ICD-10-CM

## 2020-06-17 PROCEDURE — 99213 OFFICE O/P EST LOW 20 MIN: CPT

## 2020-06-24 ENCOUNTER — HOSPITAL ENCOUNTER (OUTPATIENT)
Dept: HOSPITAL 75 - WOUNDCARE | Age: 40
End: 2020-06-24
Attending: SURGERY
Payer: COMMERCIAL

## 2020-06-24 DIAGNOSIS — L97.112: ICD-10-CM

## 2020-06-24 DIAGNOSIS — E66.01: ICD-10-CM

## 2020-06-24 DIAGNOSIS — I96: ICD-10-CM

## 2020-06-24 DIAGNOSIS — E11.42: ICD-10-CM

## 2020-06-24 DIAGNOSIS — E11.52: ICD-10-CM

## 2020-06-24 DIAGNOSIS — E11.622: Primary | ICD-10-CM

## 2020-06-24 DIAGNOSIS — M72.6: ICD-10-CM

## 2020-06-24 PROCEDURE — 97606 NEG PRS WND THER DME>50 SQCM: CPT

## 2020-07-01 ENCOUNTER — HOSPITAL ENCOUNTER (OUTPATIENT)
Dept: HOSPITAL 75 - WOUNDCARE | Age: 40
End: 2020-07-01
Attending: SURGERY
Payer: COMMERCIAL

## 2020-07-01 DIAGNOSIS — E11.42: ICD-10-CM

## 2020-07-01 DIAGNOSIS — E66.01: ICD-10-CM

## 2020-07-01 DIAGNOSIS — M72.6: Primary | ICD-10-CM

## 2020-07-01 DIAGNOSIS — E11.52: ICD-10-CM

## 2020-07-01 DIAGNOSIS — E11.622: ICD-10-CM

## 2020-07-01 DIAGNOSIS — L97.112: ICD-10-CM

## 2020-07-01 PROCEDURE — 11045 DBRDMT SUBQ TISS EACH ADDL: CPT

## 2020-07-01 PROCEDURE — 11042 DBRDMT SUBQ TIS 1ST 20SQCM/<: CPT

## 2020-07-02 ENCOUNTER — HOSPITAL ENCOUNTER (OUTPATIENT)
Dept: HOSPITAL 75 - WOUNDCARE | Age: 40
End: 2020-07-02
Attending: SURGERY
Payer: COMMERCIAL

## 2020-07-02 DIAGNOSIS — S71.101A: Primary | ICD-10-CM

## 2020-07-02 DIAGNOSIS — M86.9: ICD-10-CM

## 2020-07-02 DIAGNOSIS — I10: ICD-10-CM

## 2020-07-02 DIAGNOSIS — Z87.828: ICD-10-CM

## 2020-07-02 DIAGNOSIS — E11.40: ICD-10-CM

## 2020-07-02 PROCEDURE — 97606 NEG PRS WND THER DME>50 SQCM: CPT

## 2020-07-06 ENCOUNTER — HOSPITAL ENCOUNTER (OUTPATIENT)
Dept: HOSPITAL 75 - WOUNDCARE | Age: 40
End: 2020-07-06
Attending: SURGERY
Payer: COMMERCIAL

## 2020-07-06 DIAGNOSIS — E11.42: ICD-10-CM

## 2020-07-06 DIAGNOSIS — M72.6: ICD-10-CM

## 2020-07-06 DIAGNOSIS — L97.112: Primary | ICD-10-CM

## 2020-07-06 DIAGNOSIS — E66.01: ICD-10-CM

## 2020-07-06 DIAGNOSIS — E11.622: ICD-10-CM

## 2020-07-06 DIAGNOSIS — E11.52: ICD-10-CM

## 2020-07-06 PROCEDURE — 97606 NEG PRS WND THER DME>50 SQCM: CPT

## 2020-07-06 PROCEDURE — 11042 DBRDMT SUBQ TIS 1ST 20SQCM/<: CPT

## 2020-07-06 PROCEDURE — 11045 DBRDMT SUBQ TISS EACH ADDL: CPT

## 2020-07-09 ENCOUNTER — HOSPITAL ENCOUNTER (OUTPATIENT)
Dept: HOSPITAL 75 - WOUNDCARE | Age: 40
End: 2020-07-09
Attending: SURGERY
Payer: COMMERCIAL

## 2020-07-09 DIAGNOSIS — S71.101A: Primary | ICD-10-CM

## 2020-07-09 PROCEDURE — 97606 NEG PRS WND THER DME>50 SQCM: CPT

## 2020-07-13 ENCOUNTER — HOSPITAL ENCOUNTER (OUTPATIENT)
Dept: HOSPITAL 75 - WOUNDCARE | Age: 40
End: 2020-07-13
Attending: SURGERY
Payer: COMMERCIAL

## 2020-07-13 ENCOUNTER — HOSPITAL ENCOUNTER (OUTPATIENT)
Dept: HOSPITAL 75 - LAB | Age: 40
End: 2020-07-13
Attending: SURGERY
Payer: COMMERCIAL

## 2020-07-13 DIAGNOSIS — L97.112: ICD-10-CM

## 2020-07-13 DIAGNOSIS — E66.01: ICD-10-CM

## 2020-07-13 DIAGNOSIS — E11.622: Primary | ICD-10-CM

## 2020-07-13 DIAGNOSIS — E11.622: ICD-10-CM

## 2020-07-13 DIAGNOSIS — I10: ICD-10-CM

## 2020-07-13 DIAGNOSIS — E11.42: ICD-10-CM

## 2020-07-13 DIAGNOSIS — M72.6: Primary | ICD-10-CM

## 2020-07-13 DIAGNOSIS — Z87.891: ICD-10-CM

## 2020-07-13 DIAGNOSIS — M72.6: ICD-10-CM

## 2020-07-13 LAB
BUN/CREAT SERPL: 24
CALCIUM SERPL-MCNC: 9.7 MG/DL (ref 8.5–10.1)
CHLORIDE SERPL-SCNC: 102 MMOL/L (ref 98–107)
CO2 SERPL-SCNC: 16 MMOL/L (ref 21–32)
CREAT SERPL-MCNC: 1.02 MG/DL (ref 0.6–1.3)
GFR SERPLBLD BASED ON 1.73 SQ M-ARVRAT: > 60 ML/MIN
GLUCOSE SERPL-MCNC: 433 MG/DL (ref 70–105)
POTASSIUM SERPL-SCNC: 4.9 MMOL/L (ref 3.6–5)
SODIUM SERPL-SCNC: 133 MMOL/L (ref 135–145)

## 2020-07-13 PROCEDURE — 97605 NEG PRS WND THER DME<=50SQCM: CPT

## 2020-07-13 PROCEDURE — 36415 COLL VENOUS BLD VENIPUNCTURE: CPT

## 2020-07-13 PROCEDURE — 80048 BASIC METABOLIC PNL TOTAL CA: CPT

## 2020-07-13 PROCEDURE — 11045 DBRDMT SUBQ TISS EACH ADDL: CPT

## 2020-07-13 PROCEDURE — 11042 DBRDMT SUBQ TIS 1ST 20SQCM/<: CPT

## 2020-07-16 ENCOUNTER — HOSPITAL ENCOUNTER (OUTPATIENT)
Dept: HOSPITAL 75 - WOUNDCARE | Age: 40
End: 2020-07-16
Attending: SURGERY
Payer: COMMERCIAL

## 2020-07-16 DIAGNOSIS — E11.40: ICD-10-CM

## 2020-07-16 DIAGNOSIS — S71.101A: Primary | ICD-10-CM

## 2020-07-16 DIAGNOSIS — I10: ICD-10-CM

## 2020-07-16 PROCEDURE — 97606 NEG PRS WND THER DME>50 SQCM: CPT

## 2020-07-18 ENCOUNTER — HOSPITAL ENCOUNTER (OUTPATIENT)
Dept: HOSPITAL 75 - ER | Age: 40
Setting detail: OBSERVATION
LOS: 2 days | Discharge: ANOTHER HOSPITAL | End: 2020-07-20
Attending: FAMILY MEDICINE | Admitting: INTERNAL MEDICINE
Payer: COMMERCIAL

## 2020-07-18 VITALS — BODY MASS INDEX: 43.13 KG/M2 | WEIGHT: 315 LBS | HEIGHT: 71.65 IN

## 2020-07-18 DIAGNOSIS — I07.1: ICD-10-CM

## 2020-07-18 DIAGNOSIS — F17.210: ICD-10-CM

## 2020-07-18 DIAGNOSIS — Z79.82: ICD-10-CM

## 2020-07-18 DIAGNOSIS — Z82.49: ICD-10-CM

## 2020-07-18 DIAGNOSIS — E66.01: ICD-10-CM

## 2020-07-18 DIAGNOSIS — E11.40: ICD-10-CM

## 2020-07-18 DIAGNOSIS — I25.10: Primary | ICD-10-CM

## 2020-07-18 DIAGNOSIS — Z91.018: ICD-10-CM

## 2020-07-18 DIAGNOSIS — Z88.1: ICD-10-CM

## 2020-07-18 DIAGNOSIS — I10: ICD-10-CM

## 2020-07-18 DIAGNOSIS — Z79.4: ICD-10-CM

## 2020-07-18 DIAGNOSIS — Z20.828: ICD-10-CM

## 2020-07-18 DIAGNOSIS — L03.116: ICD-10-CM

## 2020-07-18 DIAGNOSIS — M54.9: ICD-10-CM

## 2020-07-18 LAB
ALBUMIN SERPL-MCNC: 3.6 GM/DL (ref 3.2–4.5)
ALP SERPL-CCNC: 82 U/L (ref 40–136)
ALT SERPL-CCNC: 26 U/L (ref 0–55)
APTT BLD: 26 SEC (ref 24–35)
BASOPHILS # BLD AUTO: 0 10^3/UL (ref 0–0.1)
BASOPHILS NFR BLD AUTO: 0 % (ref 0–10)
BILIRUB SERPL-MCNC: 0.2 MG/DL (ref 0.1–1)
BUN/CREAT SERPL: 27
CALCIUM SERPL-MCNC: 8.9 MG/DL (ref 8.5–10.1)
CHLORIDE SERPL-SCNC: 108 MMOL/L (ref 98–107)
CO2 SERPL-SCNC: 18 MMOL/L (ref 21–32)
CREAT SERPL-MCNC: 0.89 MG/DL (ref 0.6–1.3)
EOSINOPHIL # BLD AUTO: 0.2 10^3/UL (ref 0–0.3)
EOSINOPHIL NFR BLD AUTO: 2 % (ref 0–10)
ERYTHROCYTE [DISTWIDTH] IN BLOOD BY AUTOMATED COUNT: 15.6 % (ref 10–14.5)
GFR SERPLBLD BASED ON 1.73 SQ M-ARVRAT: > 60 ML/MIN
GLUCOSE SERPL-MCNC: 132 MG/DL (ref 70–105)
HCT VFR BLD CALC: 36 % (ref 40–54)
HGB BLD-MCNC: 11.8 G/DL (ref 13.3–17.7)
INR PPP: 1 (ref 0.8–1.4)
LYMPHOCYTES # BLD AUTO: 2.6 X 10^3 (ref 1–4)
LYMPHOCYTES NFR BLD AUTO: 33 % (ref 12–44)
MAGNESIUM SERPL-MCNC: 1.6 MG/DL (ref 1.6–2.4)
MANUAL DIFFERENTIAL PERFORMED BLD QL: NO
MCH RBC QN AUTO: 27 PG (ref 25–34)
MCHC RBC AUTO-ENTMCNC: 33 G/DL (ref 32–36)
MCV RBC AUTO: 82 FL (ref 80–99)
MONOCYTES # BLD AUTO: 0.6 X 10^3 (ref 0–1)
MONOCYTES NFR BLD AUTO: 7 % (ref 0–12)
NEUTROPHILS # BLD AUTO: 4.5 X 10^3 (ref 1.8–7.8)
NEUTROPHILS NFR BLD AUTO: 57 % (ref 42–75)
PLATELET # BLD: 389 10^3/UL (ref 130–400)
PMV BLD AUTO: 9.4 FL (ref 7.4–10.4)
POTASSIUM SERPL-SCNC: 4 MMOL/L (ref 3.6–5)
PROT SERPL-MCNC: 6.9 GM/DL (ref 6.4–8.2)
PROTHROMBIN TIME: 13.6 SEC (ref 12.2–14.7)
SODIUM SERPL-SCNC: 139 MMOL/L (ref 135–145)
WBC # BLD AUTO: 7.8 10^3/UL (ref 4.3–11)

## 2020-07-18 PROCEDURE — 82962 GLUCOSE BLOOD TEST: CPT

## 2020-07-18 PROCEDURE — 87635 SARS-COV-2 COVID-19 AMP PRB: CPT

## 2020-07-18 PROCEDURE — 80053 COMPREHEN METABOLIC PANEL: CPT

## 2020-07-18 PROCEDURE — 85610 PROTHROMBIN TIME: CPT

## 2020-07-18 PROCEDURE — 83874 ASSAY OF MYOGLOBIN: CPT

## 2020-07-18 PROCEDURE — 83735 ASSAY OF MAGNESIUM: CPT

## 2020-07-18 PROCEDURE — 84145 PROCALCITONIN (PCT): CPT

## 2020-07-18 PROCEDURE — 85379 FIBRIN DEGRADATION QUANT: CPT

## 2020-07-18 PROCEDURE — 36221 PLACE CATH THORACIC AORTA: CPT

## 2020-07-18 PROCEDURE — 85730 THROMBOPLASTIN TIME PARTIAL: CPT

## 2020-07-18 PROCEDURE — 85025 COMPLETE CBC W/AUTO DIFF WBC: CPT

## 2020-07-18 PROCEDURE — 71045 X-RAY EXAM CHEST 1 VIEW: CPT

## 2020-07-18 PROCEDURE — 93005 ELECTROCARDIOGRAM TRACING: CPT

## 2020-07-18 PROCEDURE — 36415 COLL VENOUS BLD VENIPUNCTURE: CPT

## 2020-07-18 PROCEDURE — 86141 C-REACTIVE PROTEIN HS: CPT

## 2020-07-18 PROCEDURE — 85652 RBC SED RATE AUTOMATED: CPT

## 2020-07-18 PROCEDURE — 83615 LACTATE (LD) (LDH) ENZYME: CPT

## 2020-07-18 PROCEDURE — 84484 ASSAY OF TROPONIN QUANT: CPT

## 2020-07-18 PROCEDURE — 83880 ASSAY OF NATRIURETIC PEPTIDE: CPT

## 2020-07-18 PROCEDURE — 93041 RHYTHM ECG TRACING: CPT

## 2020-07-18 PROCEDURE — 87081 CULTURE SCREEN ONLY: CPT

## 2020-07-18 PROCEDURE — 93458 L HRT ARTERY/VENTRICLE ANGIO: CPT

## 2020-07-18 PROCEDURE — 99284 EMERGENCY DEPT VISIT MOD MDM: CPT

## 2020-07-18 PROCEDURE — 80061 LIPID PANEL: CPT

## 2020-07-18 NOTE — ED CHEST PAIN
General


Chief Complaint:  Chest Pain


Stated Complaint:  SOA,CHEST PAIN,NUMBNESS IN ARMS


Nursing Triage Note:  


Pt to RM 10 via WC with c/o sternal chest pain that radiates to back x 2 hrs. Pt




also c/o lightheadedness and SOB. Pt denies fever/chills. Pt has Hx of HTN and 


DM


Nursing Sepsis Screen:  No Definite Risk


Source:  patient


Exam Limitations:  no limitations





History of Present Illness


Date Seen by Provider:  2020


Time Seen by Provider:  21:05


Initial Comments


Here with report of 2 hours of central chest pain that radiates to his back. 

Also associated with shortness of breath. Denies fever or chills. Does have 

history of hypertension and diabetes. States his blood sugar was 190s prior to 

coming here tontho. Does have a wound VAC to the left upper thigh for wound 

care after necrotic tissue removed during surgery here. He has remained secluded

at home to prevent COVID infection and has no known contacts with any COVID 

positive patient's. He and his wife state home and avoid all people. If they go 

to the store they wear the mask. He does not feel that he has had any contact of

significance. Denies sore throat, runny nose, fever or chills. He does follow up

with wound care for the wound VAC under the direction of Dr. Guzmán. Denies 

nausea, vomiting or diarrhea. Has significant strong family history of heart 

disease including biological father with heart attack at 35 and multiple uncles 

with heart disease.


Timing/Duration:  1-3 hours, constant


Severity/Quality:  moderate, pressure


Location:  central


Radiation:  back, other (radiates across the chest)


Prior CP/Workup:  no prior cardiac workup


Modifying Factors:  worse with coughing, worse with movement


ASA po PTA:  Yes


NTG SL PTA:  No (and lactic acid. So we risk and That this)


Associated Symptoms:  No abdominal pain; back pain; No dizziness, No 

fever/chills, No nausea/vomiting; shortness of breath, weakness





Allergies and Home Medications


Allergies


Coded Allergies:  


     levofloxacin (Verified  Allergy, Mild, 4/14/15)


     sweet potato (Unverified  Allergy, Unknown, 4/14/15)





Home Medications


Amoxicillin/Potassium Clav 1 Each Tablet, 1 EACH PO BID


   Prescribed by: NICOLE VILA on 20 1033


Aspirin 81 Mg Tablet., 162 MG PO HS, (Reported)


   TAKES 2 (81MG) TABS AT BEDTIME 


Glyburide 2.5 Mg Tablet, 5 MG PO HS, (Reported)


   TAKES 2 (2.5MG) TABS DAILY 


Hydrocodone/Acetaminophen 1 Each Tablet, 1 EACH PO Q6H


   Prescribed by: NICOLE VILA on 20 1034


Insulin Aspart 300 Units/3 Ml Solution, 30 UNITS SQ TIDWM, (Reported)


Insulin Detemir 100 Unit/1 Ml Insuln.pen, 45 UNIT SQ BID, (Reported)


   LAST FILLED 2020 #10 PENS/33 DAY SUPPLY 


Lisinopril/Hydrochlorothiazide 1 Each Tablet, 2 EACH PO HS, (Reported)


Metformin HCl 500 Mg Tab.er.24, 1,000 MG PO BID, (Reported)





Patient Home Medication List


Home Medication List Reviewed:  Yes





Review of Systems


Review of Systems


Constitutional:  see HPI; No chills, No fever


EENTM:  No Nose Congestion, No Throat Pain


Respiratory:  Denies Cough; Shortness of Air; Denies Wheezing


Cardiovascular:  No Symptoms Reported


Gastrointestinal:  No Symptoms Reported (would)


Genitourinary:  No Symptoms Reported


Musculoskeletal:  no symptoms reported


Skin:  see HPI, lesions (wound VAC to left upper thigh); No pruritus


Psychiatric/Neurological:  No Symptoms Reported





All Other Systems Reviewed


Negative Unless Noted:  Yes





Past Medical-Social-Family Hx


Past Med/Social Hx:  Reviewed Nursing Past Med/Soc Hx


Patient Social History


Alcohol Use:  Rarely Uses


Recreational Drug Use:  No


Smoking Status:  Current Someday Smoker


Type Used:  Cigarettes, Electronic/Vapor


Former Smoker, Quit:  2003


Recent Foreign Travel:  No


Contact w/Someone Who Travel:  No


Recent Infectious Disease Expo:  No


Recent Hopitalizations:  No





Immunizations Up To Date


Tetanus Booster (TDap):  More than 5yrs


Date of Pneumonia Vaccine:  2007





Seasonal Allergies


Seasonal Allergies:  No





Past Medical History


Surgeries:  Yes (SURGERY ON SCROTAL ABSCESS )


Respiratory:  No


Currently Using CPAP:  No


Currently Using BIPAP:  No


Cardiac:  Yes


Hypertension


Neurological:  Yes


Neuropathy


Reproductive Disorders:  No


Sexually Transmitted Disease:  No


HIV/AIDS:  No


Genitourinary:  No


Gastrointestinal:  No


Musculoskeletal:  Yes


Chronic Back Pain


Endocrine:  Yes (MORBID OBESITY)


Diabetes, Insulin dep


HEENT:  No


Loss of Vision:  Denies


Hearing Impairment:  Denies


Cancer:  No


Psychosocial:  No


Integumentary:  Yes (SCROTAL ABSCESS ; LEFT FOOT DIABETIC FOOT ULCER DX 

17)


Recent Skin Changes


Blood Disorders:  No


Adverse Reaction/Blood Tranf:  No





Family Medical History


Reviewed Nursing Family Hx





Completed stroke


  17 MOTHER


Diabetes mellitus


  19 MOTHER


Hypertension


  19 MOTHER


  G8 BROTHER


Myocardial infarction


  19 FATHER


Diabetes, Hypertension, Other Conditions/Hx





Physical Exam


Vital Signs





Vital Signs - First Documented








 20





 21:03


 


Pulse 114


 


Resp 22


 


B/P (MAP) 149/67 (94)


 


Pulse Ox 99


 


O2 Delivery Room Air





Capillary Refill : Less Than 3 Seconds


Height, Weight, BMI


Height: 6'0.00"


Weight: 422lbs. 6.4oz. 191.200456wr; 57.00 BMI


Method:Stated


General Appearance:  No Apparent Distress, WD/WN


HEENT:  PERRL/EOMI, Pharynx Normal


Neck:  Non Tender, Supple


Respiratory:  Lungs Clear, Normal Breath Sounds


Cardiovascular:  No Murmur, Tachycardia


Gastrointestinal:  Non Tender, Soft


Extremity:  Normal Range of Motion, Non Tender


Neurologic/Psychiatric:  Alert, Oriented x3


Skin:  Normal Color, Warm/Dry, Other (wound VAC to left upper thigh)





Progress/Results/Core Measures


Results/Orders


Lab Results





Laboratory Tests








Test


 20


21:18 20


21:50 Range/Units


 


 


Prothrombin Time 13.6   12.2-14.7  SEC


 


INR Comment 1.0   0.8-1.4  


 


Activated Partial


Thromboplast Time 26 


 


 24-35  SEC





 


D-Dimer


 0.35 


 


 0.00-0.49


UG/ML


 


Sodium Level 139   135-145  MMOL/L


 


Potassium Level 4.0   3.6-5.0  MMOL/L


 


Chloride Level 108 H    MMOL/L


 


Carbon Dioxide Level 18 L  21-32  MMOL/L


 


Anion Gap 13   5-14  MMOL/L


 


Blood Urea Nitrogen 24 H  7-18  MG/DL


 


Creatinine


 0.89 


 


 0.60-1.30


MG/DL


 


Estimat Glomerular Filtration


Rate > 60 


 


  





 


BUN/Creatinine Ratio 27    


 


Glucose Level 132 H    MG/DL


 


Calcium Level 8.9   8.5-10.1  MG/DL


 


Corrected Calcium 9.2   8.5-10.1  MG/DL


 


Magnesium Level 1.6   1.6-2.4  MG/DL


 


Total Bilirubin 0.2   0.1-1.0  MG/DL


 


Aspartate Amino Transf


(AST/SGOT) 20 


 


 5-34  U/L





 


Alanine Aminotransferase


(ALT/SGPT) 26 


 


 0-55  U/L





 


Alkaline Phosphatase 82     U/L


 


Lactate Dehydrogenase 220   125-220  U/L


 


Myoglobin


 45.8 


 


 10.0-92.0


NG/ML


 


Troponin I < 0.028   <0.028  NG/ML


 


C-Reactive Protein High


Sensitivity 0.33 


 


 0.00-0.50


MG/DL


 


Total Protein 6.9   6.4-8.2  GM/DL


 


Albumin 3.6   3.2-4.5  GM/DL


 


Procalcitonin 0.05   <0.10  NG/ML


 


White Blood Count


 


 7.8 


 4.3-11.0


10^3/uL


 


Red Blood Count


 


 4.42 


 4.35-5.85


10^6/uL


 


Hemoglobin  11.8 L 13.3-17.7  G/DL


 


Hematocrit  36 L 40-54  %


 


Mean Corpuscular Volume  82  80-99  FL


 


Mean Corpuscular Hemoglobin  27  25-34  PG


 


Mean Corpuscular Hemoglobin


Concent 


 33 


 32-36  G/DL





 


Red Cell Distribution Width  15.6 H 10.0-14.5  %


 


Platelet Count


 


 389 


 130-400


10^3/uL


 


Mean Platelet Volume  9.4  7.4-10.4  FL


 


Neutrophils (%) (Auto)  57  42-75  %


 


Lymphocytes (%) (Auto)  33  12-44  %


 


Monocytes (%) (Auto)  7  0-12  %


 


Eosinophils (%) (Auto)  2  0-10  %


 


Basophils (%) (Auto)  0  0-10  %


 


Neutrophils # (Auto)  4.5  1.8-7.8  X 10^3


 


Lymphocytes # (Auto)  2.6  1.0-4.0  X 10^3


 


Monocytes # (Auto)  0.6  0.0-1.0  X 10^3


 


Eosinophils # (Auto)


 


 0.2 


 0.0-0.3


10^3/uL


 


Basophils # (Auto)


 


 0.0 


 0.0-0.1


10^3/uL


 


Erythrocyte Sedimentation Rate  37 H 0-15  MM/HR


 


B-Type Natriuretic Peptide  14.2  <100.0  PG/ML








My Orders





Orders - FRANKLIN CESPEDES MD


Nitroglycerin 0.4 Mg Btl 25's (Nitrostat (20 21:10)


Procalcitonin (Pct) (7/18/20 21:24)


Hs C Reactive Protein (20 21:24)


Erythrocyte Sedimentation Rate (20 21:24)


LDH (20 21:24)


Morphine  Injection (Morphine  Injection (20 22:10)





Medications Given in ED





Vital Signs/I&O











 20





 21:03 21:09


 


Pulse 114 


 


Resp 22 


 


B/P (MAP) 149/67 (94) 


 


Pulse Ox 99 


 


O2 Delivery Room Air Room Air














Blood Pressure Mean:                    94











Progress


Progress Note :  


Progress Note


Seen and evaluated. IV, labs, EKG and chest x-ray ordered. Initially evaluated 

with COVID precautions in place although I think this is low risk given his 

history. We will check labs. Patient has had his aspirin already. Nitroglycerin 

sublingual ordered for pain. He did receive one dose and that did bring his 

blood pressure down quite a bit so we will forego further nitroglycerin. We will

switch to morphine if needed for pain control. Monitor patient. 2301: Patient's 

without chest pain currently. I spoke with the patient regarding his risk fa

ctors and he is high risk given his diabetes, family history and hypertension in

the setting of obesity. I discussed the case with Dr. Etienne, and he agrees with

concerns related to risk factors and chest pain. Patient will need admission for

continued evaluation and for possible acute cardiac event. We will go ahead and 

get COVID-19 screening in anticipation of either stress test or heart catheter 

as needed. I do believe he is low risk for COVID-19 infection but we will 

continue COVID-19 precautions until test is negative. I did discuss the case 

with Dr. Newsome and she accepts patient for admission, observation status with

cardiology on consult. Discussed with the patient who agrees with plan.


Initial ECG Impression Date:  2020


Initial ECG Impression Time:  21:06


Initial ECG Rate:  112


Initial ECG Rhythm:  S.Tach


Comment


Sinus tachycardia with normal axis. No evidence of ST elevation MI. Q waves 

noted in inferior leads. Similar but progressed from previous of 20. 

Concerns for inferior infarct old and anterior infarct old. Interpreted by me.





Diagnostic Imaging





   Diagonstic Imaging:  Xray


   Plain Films/CT/US/NM/MRI:  chest


Comments


                 [facility].


                                [place], [place]





NAME:   BELL BURGER


MED REC#:   [id number]


ACCOUNT#:   [id number]


PT STATUS:   REG ER


:   1980


PHYSICIAN:   VINNY LÓPEZ


ADMIT DATE:   20/ER


                                   ***Draft***


Date of Exam:20





CHEST 1 VIEW, AP/PA ONLY








INDICATION: Shortness of air, chest pain. 





EXAMINATION: Two views of the chest, 2020.





COMPARISON: 2020.





FINDINGS: The heart is prominent. Pulmonary vasculature is mildly


congested. The lungs demonstrate no acute process. No effusion or


pneumothorax. 





IMPRESSION: Pulmonary vascular congestion. 





  Dictated on workstation # [id number]








Dict:   20 2146


Trans:   20 2204


[facility] [id number]





Interpreted by:     SARWAT MOTA MD


Electronically signed by:


   Reviewed:  Reviewed by Me





Departure


Communication (Admissions)


Time/Spoke to Admitting Phy:  23:01


Time/Spoke to Consulting Phy:  22:55





Impression





   Primary Impression:  


   Chest pain


   Qualified Codes:  R07.9 - Chest pain, unspecified


   Additional Impressions:  


   Shortness of breath


   Covid-19 Evaluation


Disposition:   ADMITTED AS INPATIENT


Condition:  Stable





Admissions


Decision to Admit Reason:  Admit from ER (General)


Decision to Admit/Date:  2020


Time/Decision to Admit Time:  22:55





Departure-Patient Inst.


Referrals:  


[facility] (PCP)


Primary Care Physician








RUT DAVIS (Family)


Primary Care Physician











FRANKLIN CESPEDES MD          2020 21:47

## 2020-07-18 NOTE — DIAGNOSTIC IMAGING REPORT
INDICATION: Shortness of air, chest pain. 



EXAMINATION: Two views of the chest, 07/18/2020.



COMPARISON: 04/29/2020.



FINDINGS: The heart is prominent. Pulmonary vasculature is mildly

congested. The lungs demonstrate no acute process. No effusion or

pneumothorax. 



IMPRESSION: Pulmonary vascular congestion. 



Dictated by: 



  Dictated on workstation # [id number]

## 2020-07-19 VITALS — DIASTOLIC BLOOD PRESSURE: 97 MMHG | SYSTOLIC BLOOD PRESSURE: 150 MMHG

## 2020-07-19 VITALS — SYSTOLIC BLOOD PRESSURE: 118 MMHG | DIASTOLIC BLOOD PRESSURE: 80 MMHG

## 2020-07-19 VITALS — DIASTOLIC BLOOD PRESSURE: 73 MMHG | SYSTOLIC BLOOD PRESSURE: 123 MMHG

## 2020-07-19 VITALS — SYSTOLIC BLOOD PRESSURE: 140 MMHG | DIASTOLIC BLOOD PRESSURE: 86 MMHG

## 2020-07-19 VITALS — DIASTOLIC BLOOD PRESSURE: 91 MMHG | SYSTOLIC BLOOD PRESSURE: 157 MMHG

## 2020-07-19 VITALS — DIASTOLIC BLOOD PRESSURE: 85 MMHG | SYSTOLIC BLOOD PRESSURE: 128 MMHG

## 2020-07-19 VITALS — DIASTOLIC BLOOD PRESSURE: 78 MMHG | SYSTOLIC BLOOD PRESSURE: 121 MMHG

## 2020-07-19 VITALS — DIASTOLIC BLOOD PRESSURE: 83 MMHG | SYSTOLIC BLOOD PRESSURE: 132 MMHG

## 2020-07-19 VITALS — SYSTOLIC BLOOD PRESSURE: 138 MMHG | DIASTOLIC BLOOD PRESSURE: 87 MMHG

## 2020-07-19 LAB
ALBUMIN SERPL-MCNC: 3.2 GM/DL (ref 3.2–4.5)
ALP SERPL-CCNC: 62 U/L (ref 40–136)
ALT SERPL-CCNC: 26 U/L (ref 0–55)
BASOPHILS # BLD AUTO: 0 10^3/UL (ref 0–0.1)
BASOPHILS NFR BLD AUTO: 0 % (ref 0–10)
BILIRUB SERPL-MCNC: 0.2 MG/DL (ref 0.1–1)
BUN/CREAT SERPL: 32
CALCIUM SERPL-MCNC: 8.7 MG/DL (ref 8.5–10.1)
CHLORIDE SERPL-SCNC: 110 MMOL/L (ref 98–107)
CHOLEST SERPL-MCNC: 140 MG/DL (ref ?–200)
CO2 SERPL-SCNC: 17 MMOL/L (ref 21–32)
CREAT SERPL-MCNC: 0.76 MG/DL (ref 0.6–1.3)
EOSINOPHIL # BLD AUTO: 0.3 10^3/UL (ref 0–0.3)
EOSINOPHIL NFR BLD AUTO: 3 % (ref 0–10)
ERYTHROCYTE [DISTWIDTH] IN BLOOD BY AUTOMATED COUNT: 15.9 % (ref 10–14.5)
GFR SERPLBLD BASED ON 1.73 SQ M-ARVRAT: > 60 ML/MIN
GLUCOSE SERPL-MCNC: 121 MG/DL (ref 70–105)
HCT VFR BLD CALC: 37 % (ref 40–54)
HDLC SERPL-MCNC: 41 MG/DL (ref 40–60)
HGB BLD-MCNC: 12 G/DL (ref 13.3–17.7)
LYMPHOCYTES # BLD AUTO: 2.7 X 10^3 (ref 1–4)
LYMPHOCYTES NFR BLD AUTO: 31 % (ref 12–44)
MANUAL DIFFERENTIAL PERFORMED BLD QL: NO
MCH RBC QN AUTO: 27 PG (ref 25–34)
MCHC RBC AUTO-ENTMCNC: 33 G/DL (ref 32–36)
MCV RBC AUTO: 83 FL (ref 80–99)
MONOCYTES # BLD AUTO: 0.6 X 10^3 (ref 0–1)
MONOCYTES NFR BLD AUTO: 7 % (ref 0–12)
NEUTROPHILS # BLD AUTO: 5.1 X 10^3 (ref 1.8–7.8)
NEUTROPHILS NFR BLD AUTO: 59 % (ref 42–75)
PLATELET # BLD: 293 10^3/UL (ref 130–400)
PMV BLD AUTO: 9.7 FL (ref 7.4–10.4)
POTASSIUM SERPL-SCNC: 5.1 MMOL/L (ref 3.6–5)
PROT SERPL-MCNC: 6.3 GM/DL (ref 6.4–8.2)
SODIUM SERPL-SCNC: 139 MMOL/L (ref 135–145)
TRIGL SERPL-MCNC: 93 MG/DL (ref ?–150)
VLDLC SERPL CALC-MCNC: 19 MG/DL (ref 5–40)
WBC # BLD AUTO: 8.7 10^3/UL (ref 4.3–11)

## 2020-07-19 RX ADMIN — Medication SCH ML: at 13:34

## 2020-07-19 RX ADMIN — Medication SCH ML: at 20:38

## 2020-07-19 RX ADMIN — ASPIRIN SCH MG: 81 TABLET ORAL at 07:51

## 2020-07-19 RX ADMIN — DOXYCYCLINE HYCLATE SCH MG: 100 TABLET, COATED ORAL at 22:23

## 2020-07-19 RX ADMIN — METRONIDAZOLE SCH MG: 500 TABLET ORAL at 22:23

## 2020-07-19 RX ADMIN — INSULIN ASPART SCH UNIT: 100 INJECTION, SOLUTION INTRAVENOUS; SUBCUTANEOUS at 13:21

## 2020-07-19 RX ADMIN — INSULIN ASPART SCH UNIT: 100 INJECTION, SOLUTION INTRAVENOUS; SUBCUTANEOUS at 17:53

## 2020-07-19 RX ADMIN — Medication SCH ML: at 05:42

## 2020-07-19 NOTE — NUR
BELL BURGER admitted to room 424-1, with an admitting diagnosis of CHEST PAIN|SOA|PUI, 
on 07/18/20 from ED via WC, accompanied by STAFF.BELL BURGER introduced to surroundings, 
call light, bed controls, phone, TV, temperature control, lights, meal times, smoking 
policy, visitor policy, side rail policy, bathrooms and showers.  Patient Rights given to 
patient in the handbook. BELL BURGER verbalizes understanding that [facility] is not 
responsible for the loss or damage to any personal effects or valuables that are kept in the 
patients posession during their hospitalization.  The following Patient Care Plans were 
discussed with the PATIENT: Discharge Planning, CHEST PAIN,SOA, and WOUND CARE. 
BELL BURGER verbalizes understanding of Interdisciplinary Patient Education. Patient 
and/or family were informed about the Rapid Response Team and its purpose.

## 2020-07-19 NOTE — HISTORY & PHYSICAL-HOSPITALIST
History of Present Illness


HPI/Chief Complaint


This is a 39-year-old white male who presents to the emergency room with chest 

discomfort mild radiation to the back and shortness of breath.  He has never had

anything like this before.  His symptoms were relieved with nitroglycerin and he

now is pain-free.  COVID test is pending.  History is significant for diabetes 

hypertension and a very strong family history of early heart disease


Source:  patient


Exam Limitations:  no limitations


Date Seen


20


Time Seen by a Provider:  11:45


Attending Physician


Yaquelin Newsome MD


PCP


[place]


Referring Physician





Date of Admission


2020 at 23:05





Home Medications & Allergies


Home Medications


Reviewed patient Home Medication Reconciliation performed by pharmacy medication

reconciliations technician and/or nursing.


Patients Allergies have been reviewed.





Allergies





Allergies


Coded Allergies


  levofloxacin (Verified Allergy, Mild, 4/14/15)


  sweet potato (Unverified Allergy, Unknown, 4/14/15)








Past Medical-Social-Family Hx


Past Med/Social Hx:  Reviewed Nursing Past Med/Soc Hx


Patient Social History


Marrital Status:  


Employed/Student:  employed (Special ended)


Alcohol Use:  Rarely Uses


Recreational Drug Use:  No


Smoking Status:  Current Someday Smoker


Former Smoker, Quit:  2003


Type Used:  Cigarettes, Electronic/Vapor


Recent Foreign Travel:  No


Contact w/other who traveled:  No


Recent Hopitalizations:  No


Recent Infectious Disease Expo:  No





Immunizations Up To Date


Tetanus Booster (TDap):  More than 5yrs


Date of Pneumonia Vaccine:  2007





Seasonal Allergies


Seasonal Allergies:  No





Past Medical History


Currently Using CPAP:  No


Currently Using BIPAP:  No


Cardiac:  Hypertension


Neurological:  Neuropathy


Reproductive:  No


Sexually Transmitted Disease:  No


HIV/AIDS:  No


Musculoskeletal:  Chronic Back Pain


Endocrine:  Diabetes, Insulin dep


Loss of Vision:  Denies


Hearing Impairment:  Denies


Skin/Integumentary:  Recent Skin Changes


History of Blood Disorders:  No


Adverse Reaction to Blood Hernandez:  No





Family History


Reviewed Nursing Family Hx





Completed stroke


  19 MOTHER


Diabetes mellitus


  19 MOTHER


Hypertension


  19 MOTHER


  G8 BROTHER


Myocardial infarction


  19 FATHER


CAD Under 55 Years Old, Diabetes, Hypertension, Other Conditions/Hx





Review of Systems


Constitutional:  see HPI


EENTM:  no symptoms reported


Respiratory:  short of breath


Cardiovascular:  chest pain, edema


Gastrointestinal:  no symptoms reported


Genitourinary:  no symptoms reported


Musculoskeletal:  joint pain


Skin:  no symptoms reported


Psychiatric/Neurological:  No Symptoms Reported





Physical Exam


Physical Exam


Vital Signs





Vital Signs - First Documented








 20





 21:03 01:00


 


Temp  36.9


 


Pulse 114 


 


Resp 22 


 


B/P (MAP) 149/67 (94) 


 


Pulse Ox 99 


 


O2 Delivery Room Air 





Capillary Refill : Less Than 3 Seconds


Height, Weight, BMI


Height: 6'0.00"


Weight: 422lbs. 6.4oz. 191.283440ym; 66.41 BMI


Method:Stated


General Appearance:  No Apparent Distress, WD/WN, Obese


HEENT:  Normal ENT Inspection


Neck:  Limited Range of Motion


Respiratory:  Chest Non Tender, Lungs Clear, Normal Breath Sounds, No Accessory 

Muscle Use, No Respiratory Distress


Cardiovascular:  Regular Rate, Rhythm, No Gallop


Gastrointestinal:  No Organomegaly, Soft


Extremity:  Pedal Edema


Neurologic/Psychiatric:  Alert, Oriented x3, No Motor/Sensory Deficits, Normal 

Mood/Affect, CNs II-XII Norm as Tested


Skin:  Normal Color, Warm/Dry





Results


Results/Procedures


Labs


Laboratory Tests


20 21:18








20 21:50








20 07:55








Patient resulted labs reviewed.





Assessment/Plan


Admission Diagnosis


Chest pain


Shortness of breath


Morbid obesity


Hypertension


Diabetes


Strong family history of heart disease








Plan to rule out COVID patient is currently pain-free, cardiology consult, and 

consideration for heart catheter in view of the multiple risk factors for early 

heart disease


Admission Status:  Observation





Clinical Quality Measures


AMI/AHF:


ASA po Prior to arrival:  Yes





DVT/VTE Risk/Contraindication:


Risk Factor Score Per Nursin


RFS Level Per Nursing on Admit:  1=Low/No VTE PPX





Copy


Copies To 1:   [facility]/YAQUELIN MORILLO MD         2020 12:09

## 2020-07-19 NOTE — CONSULTATION-CARDIOLOGY
HPI-Cardiology


Cardiology Consultation:


Date of Consultation


20


Date of Admission





Attending Physician


Yaquelin Newsome MD


Admitting Physician


[place]


Consulting Physician


ARIAN ETIENNE MD





HPI:


Time Seen by a Provider:  13:00


Chief Complaint:


Chest pain


This is a 39-year-old gentleman who has history of active smoking, diabetes, 

hypertension, hyperlipidemia, family history of premature CAD who presents with 

shortness of breath and central chest pain radiating to the back.  He has a 

wound on his left thigh area, under care of Dr. Guzmán.  Chest pain is much 

better today.  Previously it was mild to moderate intensity.  No exacerbating or

relieving factors.  Associated with shortness of breath.





Review of Systems-Cardiology


Review of Systems


Constitutional:  As described under HPI; No As described under HPI, No no 

symptoms reported, No chills, No fever, No lightheadedness


Eyes:  No As described under HPI, No no symptoms reported, No blindness, No 

blurred vision, No contact lenses, No drainage, No decreased acuity, No foreign 

body sensation, No pain, No vision change


Ears/Nose/Throat:  No As described under HPI, No no symptoms reported, No 

chronic hearing loss, No ear discharge, No ear pain, No nasal drainage, No 

ulcerations


Respiratory:  No no symptoms reported; As described under HPI; No As described 

under HPI, No cough, No orthopnea, No shortness of breath, No SOB with excertion


Cardiovascular:  No no symptoms reported; As described under HPI; No As 

described under HPI; chest pain; No edema, No irregular heart rate, No 

lightheadedness, No palpitations


Gastrointestinal:  No no symptoms reported, No As described under HPI, No 

abdomen distended, No abdominal pain, No blood streaked bowels, No constipation,

No diarrhea, No nausea, No vomiting, No stool coloration changes


Genitourinary:  No As described under HPI, No burning, No dysuria, No discharge,

No frequency, No flank pain, No hematuria, No urgency


Skin:  No rash, No skin related problems, No ulcerations


Psychiatric/Neurological:  No anxiety, No depression, No seizure, No focal 

weakness, No syncope


Hematologic:  No bleeding abnormalities





All Other Systems Reviewed


Negative Unless Noted:  Yes





PMH-Social-Family Hx


Patient Social History


Marrital Status:  


Employed/Student:  employed (Special ended)


Alcohol Use:  Rarely Uses


Recreational Drug Use:  No


Smoking Status:  Current Someday Smoker


Former smoker/When Quit:  2004


Type Used:  Cigarettes, Electronic/Vapor


Recent Foreign Travel:  No


Recent Infectious Disease Expo:  No





Immunizations Up To Date


Tetanus Booster (TDap):  More than 5yrs


Date of Pneumonia Vaccine:  2007





Past Medical History


PMH


As described under Assessment.





Family Medical History


Family History:  


Completed stroke


  19 MOTHER


Diabetes mellitus


  19 MOTHER


Hypertension


  19 MOTHER


  G8 BROTHER


Myocardial infarction


  19 FATHER





Allergies and Home Medications


Allergies


Coded Allergies:  


     levofloxacin (Verified  Allergy, Mild, 4/14/15)


     sweet potato (Unverified  Allergy, Unknown, 4/14/15)





Home Medications


Amoxicillin/Potassium Clav 1 Each Tablet, 1 EACH PO BID


   Prescribed by: NICOLE VILA on 20 1033


Aspirin 81 Mg Tablet.dr, 162 MG PO HS, (Reported)


   TAKES 2 (81MG) TABS AT BEDTIME 


Glyburide 2.5 Mg Tablet, 5 MG PO HS, (Reported)


   TAKES 2 (2.5MG) TABS DAILY 


Hydrocodone/Acetaminophen 1 Each Tablet, 1 EACH PO Q6H


   Prescribed by: NICOLE VILA on 20 1034


Insulin Aspart 300 Units/3 Ml Solution, 30 UNITS SQ TIDWM, (Reported)


Insulin Detemir 100 Unit/1 Ml Insuln.pen, 45 UNIT SQ BID, (Reported)


   LAST FILLED 2020 #10 PENS/33 DAY SUPPLY 


Lisinopril/Hydrochlorothiazide 1 Each Tablet, 2 EACH PO HS, (Reported)


Metformin HCl 500 Mg Tab.er.24, 1,000 MG PO BID, (Reported)





Patient Home Medication List


Home Medication List Reviewed:  Yes





Physical Exam-Cardiology


Physical Exam


Vital Signs/I&O











 20





 03:00 04:00 07:00 07:46


 


Temp 36.5 36.5  36.3


 


Pulse 95 90 88 90


 


Resp 20 20  16


 


B/P (MAP) 123/73 (90) 140/86 (104)  132/83 (99)


 


Pulse Ox 93 97  97


 


O2 Delivery Room Air Room Air  Room Air


 


    





 20   





 08:00   


 


Pulse Ox 97   


 


O2 Delivery Room Air   





Capillary Refill : Less Than 3 Seconds


Constitutional:  appears stated age, AAO x 3; No apparent distress; well-

developed, well-nourished


HEENT:  PERRL; No discharge; hearing is well preserved, oral hygience is good; 

No ulceration, No xanthelasmas are seen


Neck:  No carotid bruit; carotid pulses are 2 + bilaterally


Respiratory:  chest is bilaterally symmetric, lungs clear to auscultation


Cardiovascular:  regular rate-rhythm, S1 and S2


Gastrointestinal:  distended, audible bowel sounds; No spleenomegaly


Rectal:  deferred


Extremities:  No clubbing, No cyanosis; no lower extremity edema bilateral; No 

significant edema; wound


Neurologic/Psychiatric:  no motor/sensory deficits, alert, normal mood/affect, 

oriented x 3, power is 5/5 both on sides


Skin:  normal color, warm/dry; No rash, No ulcerations





Data Review


Labs


Laboratory Tests


20 21:18: 


Prothrombin Time 13.6, INR Comment 1.0, Activated Partial Thromboplast Time 26, 

D-Dimer 0.35, Sodium Level 139, Potassium Level 4.0, Chloride Level 108H, Carbon

Dioxide Level 18L, Anion Gap 13, Blood Urea Nitrogen 24H, Creatinine 0.89, 

Estimat Glomerular Filtration Rate > 60, BUN/Creatinine Ratio 27, Glucose Level 

132H, Calcium Level 8.9, Corrected Calcium 9.2, Magnesium Level 1.6, Total 

Bilirubin 0.2, Aspartate Amino Transf (AST/SGOT) 20, Alanine Aminotransferase 

(ALT/SGPT) 26, Alkaline Phosphatase 82, Lactate Dehydrogenase 220, Myoglobin 

45.8, Troponin I < 0.028, C-Reactive Protein High Sensitivity 0.33, Total 

Protein 6.9, Albumin 3.6, Procalcitonin 0.05


20 21:50: 


White Blood Count 7.8, Red Blood Count 4.42, Hemoglobin 11.8L, Hematocrit 36L, 

Mean Corpuscular Volume 82, Mean Corpuscular Hemoglobin 27, Mean Corpuscular 

Hemoglobin Concent 33, Red Cell Distribution Width 15.6H, Platelet Count 389, 

Mean Platelet Volume 9.4, Neutrophils (%) (Auto) 57, Lymphocytes (%) (Auto) 33, 

Monocytes (%) (Auto) 7, Eosinophils (%) (Auto) 2, Basophils (%) (Auto) 0, 

Neutrophils # (Auto) 4.5, Lymphocytes # (Auto) 2.6, Monocytes # (Auto) 0.6, 

Eosinophils # (Auto) 0.2, Basophils # (Auto) 0.0, Erythrocyte Sedimentation Rate

37H, B-Type Natriuretic Peptide 14.2


20 23:09: 


20 07:55: 


Sodium Level 139, Potassium Level 5.1H, Chloride Level 110H, Carbon Dioxide 

Level 17L, Anion Gap 12, Blood Urea Nitrogen 24H, Creatinine 0.76, Estimat 

Glomerular Filtration Rate > 60, BUN/Creatinine Ratio 32, Glucose Level 121H, 

Calcium Level 8.7, Corrected Calcium 9.3, Total Bilirubin 0.2, Aspartate Amino 

Transf (AST/SGOT) 25, Alanine Aminotransferase (ALT/SGPT) 26, Alkaline 

Phosphatase 62, Total Protein 6.3L, Albumin 3.2, White Blood Count 8.7, Red 

Blood Count 4.45, Hemoglobin 12.0L, Hematocrit 37L, Mean Corpuscular Volume 83, 

Mean Corpuscular Hemoglobin 27, Mean Corpuscular Hemoglobin Concent 33, Red Cell

Distribution Width 15.9H, Platelet Count 293, Mean Platelet Volume 9.7, 

Neutrophils (%) (Auto) 59, Lymphocytes (%) (Auto) 31, Monocytes (%) (Auto) 7, 

Eosinophils (%) (Auto) 3, Basophils (%) (Auto) 0, Neutrophils # (Auto) 5.1, 

Lymphocytes # (Auto) 2.7, Monocytes # (Auto) 0.6, Eosinophils # (Auto) 0.3, 

Basophils # (Auto) 0.0, Triglycerides Level 93, Cholesterol Level 140, LDL 

Cholesterol Direct 87, VLDL Cholesterol 19, HDL Cholesterol 41


20 13:20: Glucometer 223H








A/P-Cardiology


Assessment/Admission Diagnosis


Prolonged chest pain,


Active smoking,


Diabetes,


Hypertension,


Active wound in the left thigh region,


COVID-19 pending





Plan


Prolonged chest pain, serial troponin negative.  However due to significant risk

factors for CAD including diabetes, hypertension, premature family history of 

CAD, and prolonged episode of chest pain I have recommended coronary angiography

which will be done after COVID-19 results are back.  Informed consent was taken.

 1 percent risk of complication including vascular damage, heart damage and even

death was discussed.  Echocardiogram when COVID results back.





Active smoking, smoking cessation was strongly recommended.





Diabetes, deferred to the primary team.  Hold metformin.





Hypertension, continue outpatient medical therapy.





Active wound in the left thigh region, defer to the primary team.





COVID-19 pending








Thank you for your consultation. Please call me if you have any questions.








VASHTI Etienne MD, FACP, FACC, FSCAI, FHRS, CCDS


Interventional Cardiology


Cardiac Electrophysiology


Vascular Medicine and Endovascular Interventions





Clinical Quality Measures


AMI/AHF:


ASA po Prior to arrival:  Yes





DVT/VTE Risk/Contraindication:


Risk Factor Score Per Nursin


RFS Level Per Nursing on Admit:  1=Low/No VTE PPX











ARIAN ETIENNE MD             2020 14:23

## 2020-07-19 NOTE — NUR
DR. NOLASCO NOTIFIED OF PT NO LONGER TAKING AUGMENTIN 875MG PO ON EMAR, NEW ORDERS RECEIVED 
TO D/C AUGMENTIN 875MG PO. DID LET  KNOW WHAT ANTIBIOTICS THE PT DOES TAKE AT HOME. NEW 
ORDERS RECEIVED FOR DOXYCYCLINE 100MG PO BID, FLAGYL 500MG PO QID, AND BACTRIM DS PO BID TO 
START TONIGHT.

## 2020-07-20 VITALS — DIASTOLIC BLOOD PRESSURE: 68 MMHG | SYSTOLIC BLOOD PRESSURE: 126 MMHG

## 2020-07-20 VITALS — DIASTOLIC BLOOD PRESSURE: 82 MMHG | SYSTOLIC BLOOD PRESSURE: 128 MMHG

## 2020-07-20 VITALS — DIASTOLIC BLOOD PRESSURE: 88 MMHG | SYSTOLIC BLOOD PRESSURE: 133 MMHG

## 2020-07-20 VITALS — SYSTOLIC BLOOD PRESSURE: 126 MMHG | DIASTOLIC BLOOD PRESSURE: 75 MMHG

## 2020-07-20 VITALS — DIASTOLIC BLOOD PRESSURE: 86 MMHG | SYSTOLIC BLOOD PRESSURE: 133 MMHG

## 2020-07-20 VITALS — SYSTOLIC BLOOD PRESSURE: 112 MMHG | DIASTOLIC BLOOD PRESSURE: 74 MMHG

## 2020-07-20 VITALS — DIASTOLIC BLOOD PRESSURE: 62 MMHG | SYSTOLIC BLOOD PRESSURE: 131 MMHG

## 2020-07-20 RX ADMIN — INSULIN ASPART SCH UNIT: 100 INJECTION, SOLUTION INTRAVENOUS; SUBCUTANEOUS at 08:06

## 2020-07-20 RX ADMIN — INSULIN ASPART SCH UNIT: 100 INJECTION, SOLUTION INTRAVENOUS; SUBCUTANEOUS at 14:00

## 2020-07-20 RX ADMIN — METRONIDAZOLE SCH MG: 500 TABLET ORAL at 14:14

## 2020-07-20 RX ADMIN — Medication SCH ML: at 06:25

## 2020-07-20 RX ADMIN — ASPIRIN SCH MG: 81 TABLET ORAL at 08:28

## 2020-07-20 RX ADMIN — METRONIDAZOLE SCH MG: 500 TABLET ORAL at 08:29

## 2020-07-20 RX ADMIN — DOXYCYCLINE HYCLATE SCH MG: 100 TABLET, COATED ORAL at 08:29

## 2020-07-20 NOTE — DISCHARGE INST-POST CATH
Discharge Inst-CATH/EP


Problems Reviewed?:  Yes


Final Diagnosis


Severe three-vessel CAD.


Post Cardiac Cath/EP D/C Inst


Follow Up/Plan


Dr. Etienne


<b>CARDIAC CATH/EP PROCEDURE DISCHARGE INSTRUCTIONS</b>





ACTIVITY





* Go Home directly and rest.


* Limit activity of the leg (or wrist if it was used) for 7 days including 

aerobics, swimming,


   jogging, bicycling, etc.


* Restrict stair-climbing for 7 days if possible, if not, climb up with your 

non-cath leg, then


   bring together on the same step.


* Avoid lifting, pushing, pulling or excessive movement of the affected 

extremity for 7 days.


* Customary sexual activity may be resumed after 2 days-use caution not to use a

position  


   that strains or causes pain to the affected extremity.


* No driving for 24 hours.


* NO SMOKING. 


* Avoid straining for bowel movements for 7 days.


* Gentle walking on level ground is allowed.


* Returning to work will depend on the type of procedure and the results. Your 

doctor will discuss


   this with you.





CALL YOUR DOCTOR FOR ANY OF THE FOLLOWING:





*If bleeding from the puncture site occurs- Apply gentle pressure to site with 

clean cloth and call


   your doctor or EMS.


* If a knot or lump forms under the skin, increases in size, or causes pain.


* If bruising appears to be worsening or moving further down your leg instead of

disappearing.


* Temperature above 101 F.





CARE OF YOUR GROIN INCISION;





* Bruising or purple discoloration of the skin near the puncture site is common.


* You may shower only, no bathtub bathing for 5 days.  Be careful to avoid 

slipping as your


   leg may feel stiff.


* If a closure device was used on your femoral artery, please see the attached 

guide regarding


   care of the device and your leg.


* Leave dressing on FOR 24 hours.





CARE OF YOUR WRIST INCISION;





* Bruising or purple discoloration of the skin near the puncture site is common.


* You may shower.


* DO NOT submerge wrist.


* Leave dressing on FOR 24 hours.











ARIAN ETIENNE MD             Jul 20, 2020 12:38

## 2020-07-20 NOTE — NUR
1030: PT TAKEN FOR HEART CATHETERIZATION.



THIS RN NOTIFIED AT THIS TIME THAT PT WILL BE TRANSFERRED TO [facility].

## 2020-07-20 NOTE — NUR
DR. NOLASCO NOTIFIED OF COVID RESULTS BEING NEGATIVE, VERBAL ORDERS RECEIVED TO MOVE PT OUT 
OF ISOLATION.

## 2020-07-20 NOTE — CARDIOLOGY PROGRESS NOTE
Cardiology SOAP Progress Note


Subjective:


No further chest pain.





Objective:


I&O/Vital Signs











 7/20/20 7/20/20 7/20/20 7/20/20





 01:00 03:25 07:00 07:50


 


Temp  36.7  36.5


 


Pulse 96 88 87 89


 


Resp  18  22


 


B/P (MAP)  128/82 (97)  112/74 (87)


 


Pulse Ox  97  96


 


O2 Delivery  Room Air  Room Air


 


    





 7/20/20   





 08:32   


 


O2 Delivery Room Air   














 7/20/20





 00:00


 


Intake Total 1140 ml


 


Balance 1140 ml








Weight (Pounds):  422


Weight (Ounces):  6.4


Weight (Calculated Kilograms):  191.242289


Constitutional:  appears stated age, AAO x 3; No apparent distress; well-

developed, well-nourished, other (morbidly obese)


Respiratory:  chest is bilaterally symmetric, lungs clear to auscultation


Cardiovascular:  regular rate-rhythm, S1 and S2


Gastrointestional:  distended, audible bowel sounds; No spleenomegaly


Extremities:  No clubbing, No cyanosis; no lower extremity edema bilateral; No 

significant edema; wound


Neurologic/Psychiatric:  no motor/sensory deficits, alert, normal mood/affect, 

oriented x 3, power is 5/5 both on sides


Skin:  normal color, warm/dry; No rash, No ulcerations





Results/Procedures:


Labs


Laboratory Tests


7/19/20 13:20: Glucometer 223H


7/19/20 17:51: Glucometer 153H


7/19/20 20:27: Glucometer 140H


7/20/20 06:34: Glucometer 136H


7/20/20 10:16: Glucometer 145H








A/P:


Assessment/Dx:


Prolonged chest pain,


Active smoking,


Diabetes,


Hypertension,


Active wound in the left thigh region,


COVID-19 pending


Plan:


Prolonged chest pain, serial troponin negative.  However due to significant risk

factors for CAD including diabetes, hypertension, premature family history of 

CAD, and prolonged episode of chest pain I have recommended coronary angiography

which which was done today 7/20/2020.  Challenging angiography due to morbid 

obesity and significant tortuosity in the aortic arch.  Also the patient have 

separate ostia in the left coronary cusp.  Occluded RCA.  Severe proximal OM1 

stenosis.  Moderate proximal LAD with mid/distal segment has severe stenosis.  

Cardiac surgery versus PCI.  I spoke to Dr. Hickman at [facility] and 

will transfer the patient for cardiac surgery consultation there.





Active smoking, smoking cessation was strongly recommended.





Diabetes, deferred to the primary team.  Hold metformin.





Hypertension, continue outpatient medical therapy.





Active wound in the thigh region, defer to the primary team.





COVID-19 negative








Thank you for your consultation. Please call me if you have any questions.








VASHTI Etienne MD, FACP, FACC, FSCAI, FHRS, CCDS


Interventional Cardiology


Cardiac Electrophysiology


Vascular Medicine and Endovascular Interventions





Clinical Quality Measures


AMI/AHF:


ASA po Prior to arrival:  Yes











ARIAN ETIENNE MD             Jul 20, 2020 12:22

## 2020-07-20 NOTE — DISCHARGE SUMMARY
Discharge Summary


Hospital Course


Hospital Course


Date of Admission: 2020 at 23:05 


Admission Diagnosis :  





Family Physician/Provider: Deya Jorge  





Date of Discharge: 20 


Discharge Diagnosis: 


Severe triple vessel coronary artery disease


DMII


HTN


Morbid obesity





Hospital Course:


Pt was admitted with episode of chest pain, no ST elevation or troponin 

elevation, but high risk, so cardiac catheterization was done after COVID19 

results negative. Found to have multi vessel disease and was transferred to 

[facility] for Cardiothoracic surgery availability per Dr. Etienne. Had wound vac 

still in place and on multiple antibiotics after recent inpatient stay with left

leg cellulitis.














Labs and Pending Lab Test:


Laboratory Tests


20 13:20: Glucometer 223H


20 17:51: Glucometer 153H


20 20:27: Glucometer 140H


20 06:34: Glucometer 136H


20 10:16: Glucometer 145H





Home Meds


Active


Hydrocodone-Acetamin 7.5-325 (Hydrocodone/Acetaminophen) 1 Each Tablet 1 Each PO

Q6H


Augmentin 875-125 Tablet (Amoxicillin/Potassium Clav) 1 Each Tablet 1 Each PO 

BID


Reported


Aspirin EC (Aspirin) 81 Mg Tablet.dr 162 Mg PO HS


     TAKES 2 (81MG) TABS AT BEDTIME


Lisinopril-Hctz 20-25 mg Tab (Lisinopril/Hydrochlorothiazide) 1 Each Tablet 2 

Each PO HS


Glyburide 2.5 Mg Tablet 5 Mg PO HS


     TAKES 2 (2.5MG) TABS DAILY


Metformin HCl ER (Metformin HCl) 500 Mg Tab.er.24 1,000 Mg PO BID


Levemir Flextouch (Insulin Detemir) 100 Unit/1 Ml Insuln.pen 45 Unit SQ BID


     LAST FILLED 2020 #10 PENS/33 DAY SUPPLY


Novolog Flexpen (Insulin Aspart) 300 Units/3 Ml Solution 30 Units SQ TIDWM


Assessment/Pt DC Instructions


Transferred to [facility]





Orders-Post D/C & Referrals


Pneu Vac Indicated:  Yes





Discharge Physical Examination


Allergies:  


Coded Allergies:  


     levofloxacin (Verified  Allergy, Mild, 4/14/15)


     sweet potato (Unverified  Allergy, Unknown, 4/14/15)


General Appearance:  No Apparent Distress


Respiratory:  Lungs Clear


Cardiovascular:  Regular Rate, Rhythm, No Murmur


Neurologic/Psychiatric:  Alert, Normal Mood/Affect





Clinical Quality Measures


AMI/AHF:


ASA po Prior to arrival:  Yes





DVT/VTE Risk/Contraindication:


Risk Factor Score Per Nursin


RFS Level Per Nursing on Admit:  1=Low/No VTE PPX











SHEBA PIMENTEL MD             2020 12:30

## 2020-07-20 NOTE — CORONARY ANGIOGRAPHY REPORT
Coronary Angiography Report


DATE OF PROCEDURE: 7/20/20 





INDICATION: Unstable angina.





PREOPERATIVE DIAGNOSIS: Unstable angina.





POSTOPERATIVE DIAGNOSIS: Severe three-vessel CAD.





HISTORY:  This is a 39-year-old gentleman with history of morbid obesity, 

smoking, diabetes, premature family history of CAD.  He presents with a 

prolonged episode of chest pain.  Serial troponin negative.  COVID-19 negative. 

Therefore, the patient was scheduled for coronary angiography. 





PROCEDURES PERFORMED: 


1.Coronary angiography. 


2.Left heart catheterization. 


3.  Aortic arch angiogram: Medical necessity: Pre cardiac surgery.





COMPLICATIONS: None. 


SPECIMENS: None. 


ESTIMATED BLOOD LOSS: 10 mL


ANESTHESIA: Conscious sedation


ANTICOAGULATION: IV heparin


CONTRAST: 200 mL.


FLUOROSCOPY: 10.9 minutes.


FLOUROSCOPY DOSE: 2325 mgy. 





PROCEDURE DETAILS: The patient is a 39  male  and was brought to the cath lab 

after informed consent was taken. All the risks and complications were explained

in detail; this included the risk of bleeding, vascular damage, stroke, MI and 

even death. The patient was draped and prepped in the usual sterile fashion.  

Access was gained in the right radial artery with a 6 Sami sheath.  Coronary 

angiography and left heart catheterization was performed with the Tiger 

catheter, JR4, JL4.





FINDINGS: 


1.Left main: Separate ostia.


2.LAD: Moderate proximal and mid LAD stenosis.  Severe mid/distal LAD stenosis.


3.Left circumflex artery: A large OM1 artery has proximal severe stenosis.  AV 

groove artery does not have any significant disease.


4.RCA: Proximal chronic total occlusion after the conus branch.  Supplied with 

reasonable left-to-right collaterals.


5.Left heart catheterization: LV pressure 124/20 mmHg.  LVEDP 24 mmHg.  Aortic 

pressure 120/90 mmHg.  Normal LV function with no wall motion abnormalities.  No

gradient across the aortic valve.


6.  Aortic arch angiogram: No evidence of aneurysm or dissection.





CONCLUSIONS: 


This is a diabetic patient with 3 vessel severe CAD.  


Surgical revascularization if possible is superior to multivessel PCI.  


Therefore I will transfer the patient to [facility] for surgical 

consultation.  I've also spoken to Dr. Hickman who is the accepting 

cardiologist.





VASHTI Etienne MD, FACP, FACC, [facility]


Interventional Cardiology











ARIAN ETIENNE MD             Jul 20, 2020 12:42

## 2020-07-20 NOTE — NUR
REPORT GIVEN TO PRADEEP WALL IN ICU. THIS RN GAVE THE REPORT NUMBER FOR THE NURSE AT [facility] 
WHO WILL ASSUME CARE AFTER TRANSFER ([phone], [phone])

## 2020-07-23 ENCOUNTER — HOSPITAL ENCOUNTER (OUTPATIENT)
Dept: HOSPITAL 75 - WOUNDCARE | Age: 40
End: 2020-07-23
Attending: ORTHOPAEDIC SURGERY
Payer: COMMERCIAL

## 2020-07-23 DIAGNOSIS — E11.42: ICD-10-CM

## 2020-07-23 DIAGNOSIS — E66.01: ICD-10-CM

## 2020-07-23 DIAGNOSIS — L97.112: Primary | ICD-10-CM

## 2020-07-23 DIAGNOSIS — M72.6: ICD-10-CM

## 2020-07-23 DIAGNOSIS — E11.52: ICD-10-CM

## 2020-07-23 DIAGNOSIS — E11.622: ICD-10-CM

## 2020-07-23 DIAGNOSIS — Z79.01: ICD-10-CM

## 2020-07-23 PROCEDURE — 11045 DBRDMT SUBQ TISS EACH ADDL: CPT

## 2020-07-23 PROCEDURE — 97605 NEG PRS WND THER DME<=50SQCM: CPT

## 2020-07-23 PROCEDURE — 11042 DBRDMT SUBQ TIS 1ST 20SQCM/<: CPT

## 2020-07-27 ENCOUNTER — HOSPITAL ENCOUNTER (OUTPATIENT)
Dept: HOSPITAL 75 - WOUNDCARE | Age: 40
End: 2020-07-27
Attending: SURGERY
Payer: COMMERCIAL

## 2020-07-27 DIAGNOSIS — E11.52: ICD-10-CM

## 2020-07-27 DIAGNOSIS — M72.6: ICD-10-CM

## 2020-07-27 DIAGNOSIS — L97.112: ICD-10-CM

## 2020-07-27 DIAGNOSIS — E11.622: Primary | ICD-10-CM

## 2020-07-27 DIAGNOSIS — E66.01: ICD-10-CM

## 2020-07-27 DIAGNOSIS — I96: ICD-10-CM

## 2020-07-27 DIAGNOSIS — E11.42: ICD-10-CM

## 2020-07-27 PROCEDURE — 99213 OFFICE O/P EST LOW 20 MIN: CPT

## 2020-08-03 ENCOUNTER — HOSPITAL ENCOUNTER (OUTPATIENT)
Dept: HOSPITAL 75 - WOUNDCARE | Age: 40
End: 2020-08-03
Attending: SURGERY
Payer: COMMERCIAL

## 2020-08-03 DIAGNOSIS — E11.622: Primary | ICD-10-CM

## 2020-08-03 DIAGNOSIS — L97.112: ICD-10-CM

## 2020-08-03 DIAGNOSIS — E66.01: ICD-10-CM

## 2020-08-03 DIAGNOSIS — M72.6: ICD-10-CM

## 2020-08-03 DIAGNOSIS — E11.42: ICD-10-CM

## 2020-08-03 PROCEDURE — 99212 OFFICE O/P EST SF 10 MIN: CPT

## 2020-08-10 ENCOUNTER — HOSPITAL ENCOUNTER (OUTPATIENT)
Dept: HOSPITAL 75 - WOUNDCARE | Age: 40
End: 2020-08-10
Attending: SURGERY
Payer: COMMERCIAL

## 2020-08-10 DIAGNOSIS — L97.112: Primary | ICD-10-CM

## 2020-08-10 DIAGNOSIS — E66.01: ICD-10-CM

## 2020-08-10 DIAGNOSIS — E11.622: ICD-10-CM

## 2020-08-10 DIAGNOSIS — E11.52: ICD-10-CM

## 2020-08-10 DIAGNOSIS — M72.6: ICD-10-CM

## 2020-08-10 DIAGNOSIS — E11.42: ICD-10-CM

## 2020-08-10 PROCEDURE — 99213 OFFICE O/P EST LOW 20 MIN: CPT

## 2020-08-17 ENCOUNTER — HOSPITAL ENCOUNTER (OUTPATIENT)
Dept: HOSPITAL 75 - WOUNDCARE | Age: 40
End: 2020-08-17
Attending: SURGERY
Payer: COMMERCIAL

## 2020-08-17 DIAGNOSIS — L97.112: ICD-10-CM

## 2020-08-17 DIAGNOSIS — M72.6: ICD-10-CM

## 2020-08-17 DIAGNOSIS — E11.622: Primary | ICD-10-CM

## 2020-08-17 DIAGNOSIS — E11.42: ICD-10-CM

## 2020-08-17 DIAGNOSIS — I96: ICD-10-CM

## 2020-08-17 DIAGNOSIS — E66.01: ICD-10-CM

## 2020-08-17 DIAGNOSIS — E11.52: ICD-10-CM

## 2020-08-17 PROCEDURE — 99212 OFFICE O/P EST SF 10 MIN: CPT

## 2020-08-24 ENCOUNTER — HOSPITAL ENCOUNTER (OUTPATIENT)
Dept: HOSPITAL 75 - WOUNDCARE | Age: 40
End: 2020-08-24
Attending: SURGERY
Payer: COMMERCIAL

## 2020-08-24 DIAGNOSIS — E66.01: ICD-10-CM

## 2020-08-24 DIAGNOSIS — E11.42: ICD-10-CM

## 2020-08-24 DIAGNOSIS — M72.6: ICD-10-CM

## 2020-08-24 DIAGNOSIS — L97.112: ICD-10-CM

## 2020-08-24 DIAGNOSIS — E11.52: ICD-10-CM

## 2020-08-24 DIAGNOSIS — E11.622: Primary | ICD-10-CM

## 2020-08-24 PROCEDURE — 99212 OFFICE O/P EST SF 10 MIN: CPT

## 2020-09-02 ENCOUNTER — HOSPITAL ENCOUNTER (OUTPATIENT)
Dept: HOSPITAL 75 - WOUNDCARE | Age: 40
End: 2020-09-02
Attending: SURGERY
Payer: COMMERCIAL

## 2020-09-02 DIAGNOSIS — E66.01: ICD-10-CM

## 2020-09-02 DIAGNOSIS — E11.52: ICD-10-CM

## 2020-09-02 DIAGNOSIS — E11.622: ICD-10-CM

## 2020-09-02 DIAGNOSIS — Z20.828: ICD-10-CM

## 2020-09-02 DIAGNOSIS — E11.42: ICD-10-CM

## 2020-09-02 DIAGNOSIS — L92.8: Primary | ICD-10-CM

## 2020-09-02 DIAGNOSIS — M72.6: ICD-10-CM

## 2020-09-02 DIAGNOSIS — L97.112: ICD-10-CM

## 2020-09-02 PROCEDURE — 17250 CHEM CAUT OF GRANLTJ TISSUE: CPT

## 2020-09-09 ENCOUNTER — HOSPITAL ENCOUNTER (OUTPATIENT)
Dept: HOSPITAL 75 - WOUNDCARE | Age: 40
End: 2020-09-09
Attending: SURGERY
Payer: COMMERCIAL

## 2020-09-09 DIAGNOSIS — E11.622: ICD-10-CM

## 2020-09-09 DIAGNOSIS — L97.112: ICD-10-CM

## 2020-09-09 DIAGNOSIS — L92.8: Primary | ICD-10-CM

## 2020-09-09 DIAGNOSIS — M72.6: ICD-10-CM

## 2020-09-09 DIAGNOSIS — Z20.828: ICD-10-CM

## 2020-09-09 DIAGNOSIS — E11.42: ICD-10-CM

## 2020-09-09 DIAGNOSIS — E66.01: ICD-10-CM

## 2020-09-09 DIAGNOSIS — E11.52: ICD-10-CM

## 2020-09-09 PROCEDURE — 17250 CHEM CAUT OF GRANLTJ TISSUE: CPT

## 2020-09-14 ENCOUNTER — HOSPITAL ENCOUNTER (OUTPATIENT)
Dept: HOSPITAL 75 - WOUNDCARE | Age: 40
End: 2020-09-14
Attending: SURGERY
Payer: COMMERCIAL

## 2020-09-14 DIAGNOSIS — E11.42: ICD-10-CM

## 2020-09-14 DIAGNOSIS — I96: ICD-10-CM

## 2020-09-14 DIAGNOSIS — E11.52: ICD-10-CM

## 2020-09-14 DIAGNOSIS — L92.8: ICD-10-CM

## 2020-09-14 DIAGNOSIS — M72.6: ICD-10-CM

## 2020-09-14 DIAGNOSIS — L97.112: ICD-10-CM

## 2020-09-14 DIAGNOSIS — E11.622: Primary | ICD-10-CM

## 2020-09-14 PROCEDURE — 17250 CHEM CAUT OF GRANLTJ TISSUE: CPT

## 2020-09-21 ENCOUNTER — HOSPITAL ENCOUNTER (OUTPATIENT)
Dept: HOSPITAL 75 - WOUNDCARE | Age: 40
End: 2020-09-21
Attending: SURGERY
Payer: COMMERCIAL

## 2020-09-21 DIAGNOSIS — L97.112: ICD-10-CM

## 2020-09-21 DIAGNOSIS — E11.622: ICD-10-CM

## 2020-09-21 DIAGNOSIS — E11.42: ICD-10-CM

## 2020-09-21 DIAGNOSIS — M72.6: ICD-10-CM

## 2020-09-21 DIAGNOSIS — E66.01: ICD-10-CM

## 2020-09-21 DIAGNOSIS — L92.8: Primary | ICD-10-CM

## 2020-09-21 PROCEDURE — 11042 DBRDMT SUBQ TIS 1ST 20SQCM/<: CPT

## 2020-09-28 ENCOUNTER — HOSPITAL ENCOUNTER (OUTPATIENT)
Dept: HOSPITAL 75 - WOUNDCARE | Age: 40
End: 2020-09-28
Attending: SURGERY
Payer: COMMERCIAL

## 2020-09-28 DIAGNOSIS — M72.6: ICD-10-CM

## 2020-09-28 DIAGNOSIS — E11.42: ICD-10-CM

## 2020-09-28 DIAGNOSIS — E11.622: ICD-10-CM

## 2020-09-28 DIAGNOSIS — L92.8: Primary | ICD-10-CM

## 2020-09-28 DIAGNOSIS — L97.112: ICD-10-CM

## 2020-09-28 DIAGNOSIS — E66.01: ICD-10-CM

## 2020-09-28 PROCEDURE — 17250 CHEM CAUT OF GRANLTJ TISSUE: CPT

## 2020-10-05 ENCOUNTER — HOSPITAL ENCOUNTER (OUTPATIENT)
Dept: HOSPITAL 75 - WOUNDCARE | Age: 40
End: 2020-10-05
Attending: SURGERY
Payer: COMMERCIAL

## 2020-10-05 DIAGNOSIS — E11.52: ICD-10-CM

## 2020-10-05 DIAGNOSIS — E11.42: ICD-10-CM

## 2020-10-05 DIAGNOSIS — L97.112: ICD-10-CM

## 2020-10-05 DIAGNOSIS — E11.622: ICD-10-CM

## 2020-10-05 DIAGNOSIS — M72.6: ICD-10-CM

## 2020-10-05 DIAGNOSIS — L92.8: Primary | ICD-10-CM

## 2020-10-05 DIAGNOSIS — E66.01: ICD-10-CM

## 2020-10-05 DIAGNOSIS — Z20.828: ICD-10-CM

## 2020-10-05 PROCEDURE — 99212 OFFICE O/P EST SF 10 MIN: CPT

## 2020-10-12 ENCOUNTER — HOSPITAL ENCOUNTER (OUTPATIENT)
Dept: HOSPITAL 75 - WOUNDCARE | Age: 40
End: 2020-10-12
Attending: SURGERY
Payer: COMMERCIAL

## 2020-10-12 DIAGNOSIS — E11.42: ICD-10-CM

## 2020-10-12 DIAGNOSIS — E66.01: ICD-10-CM

## 2020-10-12 DIAGNOSIS — E11.52: ICD-10-CM

## 2020-10-12 DIAGNOSIS — L92.8: Primary | ICD-10-CM

## 2020-10-12 DIAGNOSIS — E11.622: ICD-10-CM

## 2020-10-12 DIAGNOSIS — M72.6: ICD-10-CM

## 2020-10-12 DIAGNOSIS — L97.812: ICD-10-CM

## 2020-10-12 PROCEDURE — 17250 CHEM CAUT OF GRANLTJ TISSUE: CPT

## 2020-10-19 ENCOUNTER — HOSPITAL ENCOUNTER (OUTPATIENT)
Dept: HOSPITAL 75 - WOUNDCARE | Age: 40
End: 2020-10-19
Attending: SURGERY
Payer: COMMERCIAL

## 2020-10-19 DIAGNOSIS — M72.6: ICD-10-CM

## 2020-10-19 DIAGNOSIS — L92.8: Primary | ICD-10-CM

## 2020-10-19 DIAGNOSIS — E11.52: ICD-10-CM

## 2020-10-19 DIAGNOSIS — L97.112: ICD-10-CM

## 2020-10-19 DIAGNOSIS — E11.42: ICD-10-CM

## 2020-10-19 DIAGNOSIS — Z20.828: ICD-10-CM

## 2020-10-19 DIAGNOSIS — E11.622: ICD-10-CM

## 2020-10-19 DIAGNOSIS — E66.01: ICD-10-CM

## 2020-10-19 PROCEDURE — 99212 OFFICE O/P EST SF 10 MIN: CPT

## 2020-10-28 ENCOUNTER — HOSPITAL ENCOUNTER (OUTPATIENT)
Dept: HOSPITAL 75 - WOUNDCARE | Age: 40
End: 2020-10-28
Attending: SURGERY
Payer: COMMERCIAL

## 2020-10-28 DIAGNOSIS — L92.8: Primary | ICD-10-CM

## 2020-10-28 DIAGNOSIS — E11.42: ICD-10-CM

## 2020-10-28 DIAGNOSIS — L97.112: ICD-10-CM

## 2020-10-28 DIAGNOSIS — E11.622: ICD-10-CM

## 2020-10-28 DIAGNOSIS — E66.01: ICD-10-CM

## 2020-10-28 DIAGNOSIS — I96: ICD-10-CM

## 2020-10-28 PROCEDURE — 99212 OFFICE O/P EST SF 10 MIN: CPT

## 2020-11-02 ENCOUNTER — HOSPITAL ENCOUNTER (OUTPATIENT)
Dept: HOSPITAL 75 - WOUNDCARE | Age: 40
End: 2020-11-02
Attending: SURGERY
Payer: COMMERCIAL

## 2020-11-02 DIAGNOSIS — I96: ICD-10-CM

## 2020-11-02 DIAGNOSIS — E11.622: ICD-10-CM

## 2020-11-02 DIAGNOSIS — M72.6: ICD-10-CM

## 2020-11-02 DIAGNOSIS — L97.112: ICD-10-CM

## 2020-11-02 DIAGNOSIS — E66.01: ICD-10-CM

## 2020-11-02 DIAGNOSIS — E11.42: ICD-10-CM

## 2020-11-02 DIAGNOSIS — L92.8: Primary | ICD-10-CM

## 2020-11-02 PROCEDURE — 17250 CHEM CAUT OF GRANLTJ TISSUE: CPT

## 2020-11-11 ENCOUNTER — HOSPITAL ENCOUNTER (OUTPATIENT)
Dept: HOSPITAL 75 - WOUNDCARE | Age: 40
End: 2020-11-11
Attending: SURGERY
Payer: COMMERCIAL

## 2020-11-11 DIAGNOSIS — E66.01: ICD-10-CM

## 2020-11-11 DIAGNOSIS — L92.8: Primary | ICD-10-CM

## 2020-11-11 DIAGNOSIS — L97.112: ICD-10-CM

## 2020-11-11 DIAGNOSIS — E11.622: ICD-10-CM

## 2020-11-11 DIAGNOSIS — M72.6: ICD-10-CM

## 2020-11-11 DIAGNOSIS — E11.42: ICD-10-CM

## 2020-11-11 PROCEDURE — 99213 OFFICE O/P EST LOW 20 MIN: CPT

## 2020-11-16 ENCOUNTER — HOSPITAL ENCOUNTER (OUTPATIENT)
Dept: HOSPITAL 75 - WOUNDCARE | Age: 40
End: 2020-11-16
Attending: SURGERY
Payer: COMMERCIAL

## 2020-11-16 DIAGNOSIS — L97.112: ICD-10-CM

## 2020-11-16 DIAGNOSIS — E11.42: ICD-10-CM

## 2020-11-16 DIAGNOSIS — M72.6: ICD-10-CM

## 2020-11-16 DIAGNOSIS — L92.8: ICD-10-CM

## 2020-11-16 DIAGNOSIS — E66.01: ICD-10-CM

## 2020-11-16 DIAGNOSIS — E11.52: Primary | ICD-10-CM

## 2020-11-16 DIAGNOSIS — E11.622: ICD-10-CM

## 2020-11-16 PROCEDURE — 17250 CHEM CAUT OF GRANLTJ TISSUE: CPT

## 2020-11-23 ENCOUNTER — HOSPITAL ENCOUNTER (OUTPATIENT)
Dept: HOSPITAL 75 - WOUNDCARE | Age: 40
End: 2020-11-23
Attending: SURGERY
Payer: COMMERCIAL

## 2020-11-23 DIAGNOSIS — I96: ICD-10-CM

## 2020-11-23 DIAGNOSIS — L97.112: ICD-10-CM

## 2020-11-23 DIAGNOSIS — L92.8: Primary | ICD-10-CM

## 2020-11-23 DIAGNOSIS — E11.622: ICD-10-CM

## 2020-11-23 DIAGNOSIS — E11.42: ICD-10-CM

## 2020-11-23 DIAGNOSIS — E66.01: ICD-10-CM

## 2020-11-23 DIAGNOSIS — M72.6: ICD-10-CM

## 2020-11-23 PROCEDURE — 11042 DBRDMT SUBQ TIS 1ST 20SQCM/<: CPT

## 2020-11-30 ENCOUNTER — HOSPITAL ENCOUNTER (OUTPATIENT)
Dept: HOSPITAL 75 - WOUNDCARE | Age: 40
End: 2020-11-30
Attending: SURGERY
Payer: COMMERCIAL

## 2020-11-30 DIAGNOSIS — E11.52: ICD-10-CM

## 2020-11-30 DIAGNOSIS — E11.622: ICD-10-CM

## 2020-11-30 DIAGNOSIS — E66.01: ICD-10-CM

## 2020-11-30 DIAGNOSIS — Z20.828: ICD-10-CM

## 2020-11-30 DIAGNOSIS — M72.6: ICD-10-CM

## 2020-11-30 DIAGNOSIS — E11.42: ICD-10-CM

## 2020-11-30 DIAGNOSIS — L92.8: Primary | ICD-10-CM

## 2020-11-30 DIAGNOSIS — L97.112: ICD-10-CM

## 2020-11-30 PROCEDURE — 11042 DBRDMT SUBQ TIS 1ST 20SQCM/<: CPT

## 2020-12-14 ENCOUNTER — HOSPITAL ENCOUNTER (OUTPATIENT)
Dept: HOSPITAL 75 - WOUNDCARE | Age: 40
End: 2020-12-14
Attending: SURGERY
Payer: COMMERCIAL

## 2020-12-14 ENCOUNTER — HOSPITAL ENCOUNTER (OUTPATIENT)
Dept: HOSPITAL 75 - LAB | Age: 40
End: 2020-12-14
Attending: SURGERY
Payer: COMMERCIAL

## 2020-12-14 DIAGNOSIS — M72.6: ICD-10-CM

## 2020-12-14 DIAGNOSIS — E11.42: ICD-10-CM

## 2020-12-14 DIAGNOSIS — E11.622: ICD-10-CM

## 2020-12-14 DIAGNOSIS — L92.8: Primary | ICD-10-CM

## 2020-12-14 DIAGNOSIS — E66.01: ICD-10-CM

## 2020-12-14 DIAGNOSIS — L97.112: ICD-10-CM

## 2020-12-14 LAB
ALBUMIN SERPL-MCNC: 3.6 GM/DL (ref 3.2–4.5)
ALP SERPL-CCNC: 105 U/L (ref 40–136)
ALT SERPL-CCNC: 28 U/L (ref 0–55)
BASOPHILS # BLD AUTO: 0 10^3/UL (ref 0–0.1)
BASOPHILS NFR BLD AUTO: 0 % (ref 0–10)
BILIRUB SERPL-MCNC: 0.3 MG/DL (ref 0.1–1)
BUN/CREAT SERPL: 19
CALCIUM SERPL-MCNC: 8.8 MG/DL (ref 8.5–10.1)
CHLORIDE SERPL-SCNC: 97 MMOL/L (ref 98–107)
CO2 SERPL-SCNC: 25 MMOL/L (ref 21–32)
CREAT SERPL-MCNC: 1.26 MG/DL (ref 0.6–1.3)
EOSINOPHIL # BLD AUTO: 0.2 10^3/UL (ref 0–0.3)
EOSINOPHIL NFR BLD AUTO: 3 % (ref 0–10)
GFR SERPLBLD BASED ON 1.73 SQ M-ARVRAT: > 60 ML/MIN
GLUCOSE SERPL-MCNC: 658 MG/DL (ref 70–105)
HCT VFR BLD CALC: 41 % (ref 40–54)
HGB BLD-MCNC: 12.9 G/DL (ref 13.3–17.7)
LYMPHOCYTES # BLD AUTO: 2.1 10^3/UL (ref 1–4)
LYMPHOCYTES NFR BLD AUTO: 28 % (ref 12–44)
MANUAL DIFFERENTIAL PERFORMED BLD QL: NO
MCH RBC QN AUTO: 28 PG (ref 25–34)
MCHC RBC AUTO-ENTMCNC: 32 G/DL (ref 32–36)
MCV RBC AUTO: 87 FL (ref 80–99)
MONOCYTES # BLD AUTO: 0.4 10^3/UL (ref 0–1)
MONOCYTES NFR BLD AUTO: 5 % (ref 0–12)
NEUTROPHILS # BLD AUTO: 4.7 10^3/UL (ref 1.8–7.8)
NEUTROPHILS NFR BLD AUTO: 63 % (ref 42–75)
PLATELET # BLD: 286 10^3/UL (ref 130–400)
PMV BLD AUTO: 10 FL (ref 9–12.2)
POTASSIUM SERPL-SCNC: 5.2 MMOL/L (ref 3.6–5)
PROT SERPL-MCNC: 6.8 GM/DL (ref 6.4–8.2)
SODIUM SERPL-SCNC: 132 MMOL/L (ref 135–145)
WBC # BLD AUTO: 7.5 10^3/UL (ref 4.3–11)

## 2020-12-14 PROCEDURE — 85025 COMPLETE CBC W/AUTO DIFF WBC: CPT

## 2020-12-14 PROCEDURE — 11042 DBRDMT SUBQ TIS 1ST 20SQCM/<: CPT

## 2020-12-14 PROCEDURE — 80053 COMPREHEN METABOLIC PANEL: CPT

## 2020-12-14 PROCEDURE — 36415 COLL VENOUS BLD VENIPUNCTURE: CPT

## 2020-12-23 ENCOUNTER — HOSPITAL ENCOUNTER (OUTPATIENT)
Dept: HOSPITAL 75 - WOUNDCARE | Age: 40
End: 2020-12-23
Attending: SURGERY
Payer: COMMERCIAL

## 2020-12-23 DIAGNOSIS — I96: ICD-10-CM

## 2020-12-23 DIAGNOSIS — E11.622: ICD-10-CM

## 2020-12-23 DIAGNOSIS — L92.8: ICD-10-CM

## 2020-12-23 DIAGNOSIS — E11.65: Primary | ICD-10-CM

## 2020-12-23 DIAGNOSIS — M72.6: ICD-10-CM

## 2020-12-23 DIAGNOSIS — E66.01: ICD-10-CM

## 2020-12-23 DIAGNOSIS — L97.112: ICD-10-CM

## 2020-12-23 PROCEDURE — 99213 OFFICE O/P EST LOW 20 MIN: CPT

## 2020-12-28 ENCOUNTER — HOSPITAL ENCOUNTER (OUTPATIENT)
Dept: HOSPITAL 75 - WOUNDCARE | Age: 40
End: 2020-12-28
Attending: SURGERY
Payer: COMMERCIAL

## 2020-12-28 DIAGNOSIS — L97.422: ICD-10-CM

## 2020-12-28 DIAGNOSIS — E11.622: ICD-10-CM

## 2020-12-28 DIAGNOSIS — E11.621: ICD-10-CM

## 2020-12-28 DIAGNOSIS — E11.65: ICD-10-CM

## 2020-12-28 DIAGNOSIS — I96: ICD-10-CM

## 2020-12-28 DIAGNOSIS — E11.52: Primary | ICD-10-CM

## 2020-12-28 DIAGNOSIS — E66.01: ICD-10-CM

## 2020-12-28 DIAGNOSIS — L92.8: ICD-10-CM

## 2020-12-28 DIAGNOSIS — L97.112: ICD-10-CM

## 2020-12-28 PROCEDURE — 11045 DBRDMT SUBQ TISS EACH ADDL: CPT

## 2020-12-28 PROCEDURE — 11042 DBRDMT SUBQ TIS 1ST 20SQCM/<: CPT

## 2021-01-04 ENCOUNTER — HOSPITAL ENCOUNTER (OUTPATIENT)
Dept: HOSPITAL 75 - WOUNDCARE | Age: 41
End: 2021-01-04
Attending: SURGERY
Payer: COMMERCIAL

## 2021-01-04 DIAGNOSIS — E11.621: Primary | ICD-10-CM

## 2021-01-04 DIAGNOSIS — E11.622: ICD-10-CM

## 2021-01-04 DIAGNOSIS — E16.2: ICD-10-CM

## 2021-01-04 DIAGNOSIS — L97.422: ICD-10-CM

## 2021-01-04 DIAGNOSIS — L92.8: ICD-10-CM

## 2021-01-04 DIAGNOSIS — L97.112: ICD-10-CM

## 2021-01-04 DIAGNOSIS — E11.65: ICD-10-CM

## 2021-01-04 DIAGNOSIS — E66.01: ICD-10-CM

## 2021-01-04 DIAGNOSIS — E11.52: ICD-10-CM

## 2021-01-04 PROCEDURE — 82962 GLUCOSE BLOOD TEST: CPT

## 2021-01-04 PROCEDURE — 11042 DBRDMT SUBQ TIS 1ST 20SQCM/<: CPT

## 2021-01-14 ENCOUNTER — HOSPITAL ENCOUNTER (OUTPATIENT)
Dept: HOSPITAL 75 - WOUNDCARE | Age: 41
End: 2021-01-14
Attending: ORTHOPAEDIC SURGERY
Payer: COMMERCIAL

## 2021-01-14 DIAGNOSIS — E16.2: ICD-10-CM

## 2021-01-14 DIAGNOSIS — E11.65: ICD-10-CM

## 2021-01-14 DIAGNOSIS — E66.01: ICD-10-CM

## 2021-01-14 DIAGNOSIS — E11.621: Primary | ICD-10-CM

## 2021-01-14 DIAGNOSIS — L97.422: ICD-10-CM

## 2021-01-14 DIAGNOSIS — I73.9: ICD-10-CM

## 2021-01-14 DIAGNOSIS — L97.112: ICD-10-CM

## 2021-01-14 DIAGNOSIS — L92.8: ICD-10-CM

## 2021-01-14 DIAGNOSIS — E11.622: ICD-10-CM

## 2021-01-14 PROCEDURE — 11042 DBRDMT SUBQ TIS 1ST 20SQCM/<: CPT

## 2021-01-14 PROCEDURE — 11045 DBRDMT SUBQ TISS EACH ADDL: CPT

## 2021-01-18 ENCOUNTER — HOSPITAL ENCOUNTER (OUTPATIENT)
Dept: HOSPITAL 75 - WOUNDCARE | Age: 41
End: 2021-01-18
Attending: SURGERY
Payer: COMMERCIAL

## 2021-01-18 DIAGNOSIS — E11.622: ICD-10-CM

## 2021-01-18 DIAGNOSIS — L97.112: ICD-10-CM

## 2021-01-18 DIAGNOSIS — E11.621: Primary | ICD-10-CM

## 2021-01-18 DIAGNOSIS — L97.422: ICD-10-CM

## 2021-01-18 DIAGNOSIS — E11.65: ICD-10-CM

## 2021-01-18 DIAGNOSIS — E11.52: ICD-10-CM

## 2021-01-18 DIAGNOSIS — E66.01: ICD-10-CM

## 2021-01-18 PROCEDURE — 11042 DBRDMT SUBQ TIS 1ST 20SQCM/<: CPT

## 2021-01-18 PROCEDURE — 11045 DBRDMT SUBQ TISS EACH ADDL: CPT

## 2021-01-25 ENCOUNTER — HOSPITAL ENCOUNTER (OUTPATIENT)
Dept: HOSPITAL 75 - WOUNDCARE | Age: 41
End: 2021-01-25
Attending: ORTHOPAEDIC SURGERY
Payer: COMMERCIAL

## 2021-01-25 DIAGNOSIS — E66.01: ICD-10-CM

## 2021-01-25 DIAGNOSIS — L97.112: ICD-10-CM

## 2021-01-25 DIAGNOSIS — E11.622: ICD-10-CM

## 2021-01-25 DIAGNOSIS — E11.621: Primary | ICD-10-CM

## 2021-01-25 DIAGNOSIS — I96: ICD-10-CM

## 2021-01-25 DIAGNOSIS — E11.65: ICD-10-CM

## 2021-01-25 DIAGNOSIS — L97.422: ICD-10-CM

## 2021-01-25 PROCEDURE — 11045 DBRDMT SUBQ TISS EACH ADDL: CPT

## 2021-01-25 PROCEDURE — 11042 DBRDMT SUBQ TIS 1ST 20SQCM/<: CPT

## 2021-02-01 ENCOUNTER — HOSPITAL ENCOUNTER (OUTPATIENT)
Dept: HOSPITAL 75 - WOUNDCARE | Age: 41
End: 2021-02-01
Attending: SURGERY
Payer: COMMERCIAL

## 2021-02-01 DIAGNOSIS — L97.112: ICD-10-CM

## 2021-02-01 DIAGNOSIS — E11.621: Primary | ICD-10-CM

## 2021-02-01 DIAGNOSIS — E11.65: ICD-10-CM

## 2021-02-01 DIAGNOSIS — E66.01: ICD-10-CM

## 2021-02-01 DIAGNOSIS — E11.622: ICD-10-CM

## 2021-02-01 DIAGNOSIS — L97.424: ICD-10-CM

## 2021-02-01 PROCEDURE — 11044 DBRDMT BONE 1ST 20 SQ CM/<: CPT

## 2021-02-01 PROCEDURE — 11047 DBRDMT BONE EACH ADDL: CPT

## 2021-02-22 ENCOUNTER — HOSPITAL ENCOUNTER (OUTPATIENT)
Dept: HOSPITAL 75 - WOUNDCARE | Age: 41
End: 2021-02-22
Attending: SURGERY
Payer: COMMERCIAL

## 2021-02-22 DIAGNOSIS — E66.01: ICD-10-CM

## 2021-02-22 DIAGNOSIS — L97.112: ICD-10-CM

## 2021-02-22 DIAGNOSIS — E11.621: Primary | ICD-10-CM

## 2021-02-22 DIAGNOSIS — E11.622: ICD-10-CM

## 2021-02-22 DIAGNOSIS — E11.65: ICD-10-CM

## 2021-02-22 DIAGNOSIS — L92.8: ICD-10-CM

## 2021-02-22 DIAGNOSIS — L97.426: ICD-10-CM

## 2021-02-22 DIAGNOSIS — I96: ICD-10-CM

## 2021-02-22 PROCEDURE — 11046 DBRDMT MUSC&/FSCA EA ADDL: CPT

## 2021-02-22 PROCEDURE — 11043 DBRDMT MUSC&/FSCA 1ST 20/<: CPT

## 2021-03-01 ENCOUNTER — HOSPITAL ENCOUNTER (OUTPATIENT)
Dept: HOSPITAL 75 - WOUNDCARE | Age: 41
End: 2021-03-01
Attending: SURGERY
Payer: COMMERCIAL

## 2021-03-01 DIAGNOSIS — E66.01: ICD-10-CM

## 2021-03-01 DIAGNOSIS — L97.112: ICD-10-CM

## 2021-03-01 DIAGNOSIS — L97.426: ICD-10-CM

## 2021-03-01 DIAGNOSIS — I96: ICD-10-CM

## 2021-03-01 DIAGNOSIS — E11.65: ICD-10-CM

## 2021-03-01 DIAGNOSIS — E11.621: Primary | ICD-10-CM

## 2021-03-01 DIAGNOSIS — E11.622: ICD-10-CM

## 2021-03-01 PROCEDURE — 11043 DBRDMT MUSC&/FSCA 1ST 20/<: CPT

## 2021-03-01 PROCEDURE — 11046 DBRDMT MUSC&/FSCA EA ADDL: CPT

## 2021-03-08 ENCOUNTER — HOSPITAL ENCOUNTER (OUTPATIENT)
Dept: HOSPITAL 75 - WOUNDCARE | Age: 41
End: 2021-03-08
Attending: SURGERY
Payer: COMMERCIAL

## 2021-03-08 DIAGNOSIS — E11.65: ICD-10-CM

## 2021-03-08 DIAGNOSIS — L97.112: ICD-10-CM

## 2021-03-08 DIAGNOSIS — I96: ICD-10-CM

## 2021-03-08 DIAGNOSIS — E11.622: ICD-10-CM

## 2021-03-08 DIAGNOSIS — E66.01: ICD-10-CM

## 2021-03-08 DIAGNOSIS — L97.426: ICD-10-CM

## 2021-03-08 DIAGNOSIS — E11.621: Primary | ICD-10-CM

## 2021-03-08 PROCEDURE — 11046 DBRDMT MUSC&/FSCA EA ADDL: CPT

## 2021-03-08 PROCEDURE — 11043 DBRDMT MUSC&/FSCA 1ST 20/<: CPT

## 2021-03-15 ENCOUNTER — HOSPITAL ENCOUNTER (OUTPATIENT)
Dept: HOSPITAL 75 - WOUNDCARE | Age: 41
End: 2021-03-15
Attending: SURGERY
Payer: COMMERCIAL

## 2021-03-15 DIAGNOSIS — L97.426: ICD-10-CM

## 2021-03-15 DIAGNOSIS — E11.65: ICD-10-CM

## 2021-03-15 DIAGNOSIS — I96: ICD-10-CM

## 2021-03-15 DIAGNOSIS — E11.622: ICD-10-CM

## 2021-03-15 DIAGNOSIS — E11.621: Primary | ICD-10-CM

## 2021-03-15 DIAGNOSIS — L97.112: ICD-10-CM

## 2021-03-15 DIAGNOSIS — E66.01: ICD-10-CM

## 2021-03-15 PROCEDURE — 11045 DBRDMT SUBQ TISS EACH ADDL: CPT

## 2021-03-15 PROCEDURE — 11043 DBRDMT MUSC&/FSCA 1ST 20/<: CPT

## 2021-03-23 ENCOUNTER — HOSPITAL ENCOUNTER (INPATIENT)
Dept: HOSPITAL 75 - ER | Age: 41
LOS: 6 days | Discharge: NURSING, REHAB OR LONG-TERM CARE | DRG: 629 | End: 2021-03-29
Attending: INTERNAL MEDICINE | Admitting: INTERNAL MEDICINE
Payer: COMMERCIAL

## 2021-03-23 VITALS — BODY MASS INDEX: 43.13 KG/M2 | WEIGHT: 315 LBS | HEIGHT: 71.77 IN

## 2021-03-23 VITALS — SYSTOLIC BLOOD PRESSURE: 144 MMHG | DIASTOLIC BLOOD PRESSURE: 89 MMHG

## 2021-03-23 DIAGNOSIS — Z79.82: ICD-10-CM

## 2021-03-23 DIAGNOSIS — Z88.1: ICD-10-CM

## 2021-03-23 DIAGNOSIS — E11.42: ICD-10-CM

## 2021-03-23 DIAGNOSIS — E11.628: ICD-10-CM

## 2021-03-23 DIAGNOSIS — E83.51: ICD-10-CM

## 2021-03-23 DIAGNOSIS — E11.649: ICD-10-CM

## 2021-03-23 DIAGNOSIS — Z82.49: ICD-10-CM

## 2021-03-23 DIAGNOSIS — G47.33: ICD-10-CM

## 2021-03-23 DIAGNOSIS — E78.00: ICD-10-CM

## 2021-03-23 DIAGNOSIS — E11.65: ICD-10-CM

## 2021-03-23 DIAGNOSIS — M54.9: ICD-10-CM

## 2021-03-23 DIAGNOSIS — L02.612: ICD-10-CM

## 2021-03-23 DIAGNOSIS — Z79.4: ICD-10-CM

## 2021-03-23 DIAGNOSIS — Z87.891: ICD-10-CM

## 2021-03-23 DIAGNOSIS — Z79.2: ICD-10-CM

## 2021-03-23 DIAGNOSIS — Z91.018: ICD-10-CM

## 2021-03-23 DIAGNOSIS — I10: ICD-10-CM

## 2021-03-23 DIAGNOSIS — E87.6: ICD-10-CM

## 2021-03-23 DIAGNOSIS — M19.91: ICD-10-CM

## 2021-03-23 DIAGNOSIS — D64.9: ICD-10-CM

## 2021-03-23 DIAGNOSIS — Z95.5: ICD-10-CM

## 2021-03-23 DIAGNOSIS — E66.01: ICD-10-CM

## 2021-03-23 DIAGNOSIS — Z83.3: ICD-10-CM

## 2021-03-23 DIAGNOSIS — E11.621: Primary | ICD-10-CM

## 2021-03-23 LAB
ALBUMIN SERPL-MCNC: 2.9 GM/DL (ref 3.2–4.5)
ALP SERPL-CCNC: 87 U/L (ref 40–136)
ALT SERPL-CCNC: 26 U/L (ref 0–55)
BASOPHILS # BLD AUTO: 0 10^3/UL (ref 0–0.1)
BASOPHILS NFR BLD AUTO: 0 % (ref 0–10)
BILIRUB SERPL-MCNC: 0.3 MG/DL (ref 0.1–1)
BUN/CREAT SERPL: 13
CALCIUM SERPL-MCNC: 9.3 MG/DL (ref 8.5–10.1)
CHLORIDE SERPL-SCNC: 100 MMOL/L (ref 98–107)
CO2 SERPL-SCNC: 24 MMOL/L (ref 21–32)
CREAT SERPL-MCNC: 0.82 MG/DL (ref 0.6–1.3)
EOSINOPHIL # BLD AUTO: 0.2 10^3/UL (ref 0–0.3)
EOSINOPHIL NFR BLD AUTO: 1 % (ref 0–10)
ERYTHROCYTE [SEDIMENTATION RATE] IN BLOOD: > 140 MM/HR (ref 0–15)
GFR SERPLBLD BASED ON 1.73 SQ M-ARVRAT: > 60 ML/MIN
GLUCOSE SERPL-MCNC: 96 MG/DL (ref 70–105)
HCT VFR BLD CALC: 34 % (ref 40–54)
HGB BLD-MCNC: 10.7 G/DL (ref 13.3–17.7)
LYMPHOCYTES # BLD AUTO: 1.6 10^3/UL (ref 1–4)
LYMPHOCYTES NFR BLD AUTO: 15 % (ref 12–44)
MANUAL DIFFERENTIAL PERFORMED BLD QL: NO
MCH RBC QN AUTO: 25 PG (ref 25–34)
MCHC RBC AUTO-ENTMCNC: 31 G/DL (ref 32–36)
MCV RBC AUTO: 81 FL (ref 80–99)
MONOCYTES # BLD AUTO: 0.9 10^3/UL (ref 0–1)
MONOCYTES NFR BLD AUTO: 8 % (ref 0–12)
NEUTROPHILS # BLD AUTO: 8.1 10^3/UL (ref 1.8–7.8)
NEUTROPHILS NFR BLD AUTO: 75 % (ref 42–75)
PLATELET # BLD: 365 10^3/UL (ref 130–400)
PMV BLD AUTO: 9.9 FL (ref 9–12.2)
POTASSIUM SERPL-SCNC: 3.9 MMOL/L (ref 3.6–5)
PROT SERPL-MCNC: 7.6 GM/DL (ref 6.4–8.2)
SODIUM SERPL-SCNC: 135 MMOL/L (ref 135–145)
WBC # BLD AUTO: 10.8 10^3/UL (ref 4.3–11)

## 2021-03-23 PROCEDURE — 86141 C-REACTIVE PROTEIN HS: CPT

## 2021-03-23 PROCEDURE — 96375 TX/PRO/DX INJ NEW DRUG ADDON: CPT

## 2021-03-23 PROCEDURE — 87205 SMEAR GRAM STAIN: CPT

## 2021-03-23 PROCEDURE — 87081 CULTURE SCREEN ONLY: CPT

## 2021-03-23 PROCEDURE — 80053 COMPREHEN METABOLIC PANEL: CPT

## 2021-03-23 PROCEDURE — 87077 CULTURE AEROBIC IDENTIFY: CPT

## 2021-03-23 PROCEDURE — 82962 GLUCOSE BLOOD TEST: CPT

## 2021-03-23 PROCEDURE — 36415 COLL VENOUS BLD VENIPUNCTURE: CPT

## 2021-03-23 PROCEDURE — 85652 RBC SED RATE AUTOMATED: CPT

## 2021-03-23 PROCEDURE — 85007 BL SMEAR W/DIFF WBC COUNT: CPT

## 2021-03-23 PROCEDURE — 86920 COMPATIBILITY TEST SPIN: CPT

## 2021-03-23 PROCEDURE — 87101 SKIN FUNGI CULTURE: CPT

## 2021-03-23 PROCEDURE — 87186 SC STD MICRODIL/AGAR DIL: CPT

## 2021-03-23 PROCEDURE — 85025 COMPLETE CBC W/AUTO DIFF WBC: CPT

## 2021-03-23 PROCEDURE — 36569 INSJ PICC 5 YR+ W/O IMAGING: CPT

## 2021-03-23 PROCEDURE — 86850 RBC ANTIBODY SCREEN: CPT

## 2021-03-23 PROCEDURE — 80202 ASSAY OF VANCOMYCIN: CPT

## 2021-03-23 PROCEDURE — 83605 ASSAY OF LACTIC ACID: CPT

## 2021-03-23 PROCEDURE — 83540 ASSAY OF IRON: CPT

## 2021-03-23 PROCEDURE — 73630 X-RAY EXAM OF FOOT: CPT

## 2021-03-23 PROCEDURE — 85027 COMPLETE CBC AUTOMATED: CPT

## 2021-03-23 PROCEDURE — 86901 BLOOD TYPING SEROLOGIC RH(D): CPT

## 2021-03-23 PROCEDURE — 87070 CULTURE OTHR SPECIMN AEROBIC: CPT

## 2021-03-23 PROCEDURE — 82010 KETONE BODYS QUAN: CPT

## 2021-03-23 PROCEDURE — 87075 CULTR BACTERIA EXCEPT BLOOD: CPT

## 2021-03-23 PROCEDURE — 87040 BLOOD CULTURE FOR BACTERIA: CPT

## 2021-03-23 PROCEDURE — 87076 CULTURE ANAEROBE IDENT EACH: CPT

## 2021-03-23 PROCEDURE — 87185 SC STD ENZYME DETCJ PER NZM: CPT

## 2021-03-23 PROCEDURE — 88304 TISSUE EXAM BY PATHOLOGIST: CPT

## 2021-03-23 PROCEDURE — 86900 BLOOD TYPING SEROLOGIC ABO: CPT

## 2021-03-23 PROCEDURE — 96374 THER/PROPH/DIAG INJ IV PUSH: CPT

## 2021-03-23 PROCEDURE — 76937 US GUIDE VASCULAR ACCESS: CPT

## 2021-03-23 RX ADMIN — DOCUSATE SODIUM AND SENNOSIDES SCH EA: 8.6; 5 TABLET, FILM COATED ORAL at 22:12

## 2021-03-23 RX ADMIN — SODIUM CHLORIDE SCH MLS/HR: 900 INJECTION, SOLUTION INTRAVENOUS at 21:40

## 2021-03-23 RX ADMIN — SODIUM CHLORIDE SCH MLS/HR: 900 INJECTION, SOLUTION INTRAVENOUS at 19:01

## 2021-03-23 RX ADMIN — ENOXAPARIN SODIUM SCH MG: 100 INJECTION SUBCUTANEOUS at 22:43

## 2021-03-23 RX ADMIN — SODIUM CHLORIDE, SODIUM LACTATE, POTASSIUM CHLORIDE, AND CALCIUM CHLORIDE SCH MLS/HR: 600; 310; 30; 20 INJECTION, SOLUTION INTRAVENOUS at 22:43

## 2021-03-23 NOTE — CONSULTATION REPORT
DATE OF SERVICE:  



ATTENDING PRIMARY CLINICIAN:

GUERRERO Fry



ADMITTING PHYSICIAN:

Dr. Barrera.



HISTORY OF PRESENT ILLNESS:

The patient is a 40-year-old male, who we have seen before in the past.  In

April 2020, he presented with redness, swelling and fever along the right medial

thigh.  He was found to have a necrotizing soft tissue infection and underwent a

wide debridement as well as a wound VAC placement.  He is an insulin-dependent

diabetic.  He has also had multiple other infections.  He has also had a

longstanding issue of diabetic left foot ulcer at the heel.  He has been seeing

wound care for this for an extended period of time.  He presented to the

Emergency Department with worsening of the wound as well as increased drainage. 

Upon examination, the ulceration is full thickness with exposed calcaneus bone. 

He is also morbidly obese.  An x-ray was performed of the left foot, which did

show some subcutaneous gas.  There did not appear to be any lytic changes of the

calcaneus that would indicate any current osteomyelitis at this time.  At this

time, we will proceed with debridement of the skin, subcutaneous tissue and

muscle as well as overlying fascia and again proceed with a wound care in hopes

of allowing for granulation tissue to that to develop an eventual healing. 

Long-term if this does not work and osteomyelitis develops the calcaneus, he

would most likely need to proceed with a left below-the-knee amputation.



PAST MEDICAL HISTORY:

Insulin-dependent diabetes, morbid obesity, degenerative joint disease,

hypertension, peripheral neuropathy.



PAST SURGICAL HISTORY:

Incision and drainage of scrotal abscess in 2015, wide debridement, right medial

thigh for necrotizing soft tissue infection in 04/2020.



ALLERGIES:

LEVOFLOXACIN.



MEDICATIONS:

Amoxicillin, Augmentin b.i.d., aspirin 81 mg daily, glyburide 2.5 mg daily,

hydrocodone p.r.n., aspartate insulin 30 units t.i.d., detemir insulin 45 units

b.i.d., lisinopril/hydrochlorothiazide daily, metformin 1000 mg b.i.d.



SOCIAL HISTORY:

Previous smoking as well as vaporized nicotine.  Negative alcohol.



FAMILY HISTORY:

Mother, diabetes, hypertension, stroke.  Father, myocardial infarction under age

55.



VITAL SIGNS:

Temperature 36.2, blood pressure 143/99, pulse 108, respirations 20, pulse ox

99% on room air.



REVIEW OF SYSTEMS:

This is a morbidly obese male, currently in no acute distress.  He is not

experiencing any shortness of breath or difficulty breathing.  No chest pain,

palpitations, diaphoresis.  No nausea, vomiting, no diarrhea or constipation. 

No fever, chills, no recent inadvertent weight loss.  All other review of

systems negative.



PHYSICAL EXAMINATION:

CHEST:  Few scattered rales bilaterally.

HEART:  Regular, no murmurs.

EXTREMITIES:  There is a large full thickness ulceration of the left heel with

exposed bone with necrotic soft tissue surrounding the decubitus ulceration. 

Negative Homans sign.

HEENT:  No scleral icterus.

NECK:  No cervical lymphadenopathy.

ABDOMEN:  Soft, nontender, nondistended.

SKIN:  Warm, dry.



LABORATORY DATA:

WBC 10.8, hemoglobin 10.7, hematocrit 34, platelets 365.  BUN 11, creatinine

0.82.



ASSESSMENT AND PLAN:

A 40-year-old male with a history of insulin-dependent diabetes, morbid obesity

and diabetic left heel full thickness decubitus ulceration with exposed bone. 

The x-ray at this time does not show any signs of lytic lesions that would

indicate osteomyelitis; however, there is exposed bone.  We will initially

proceed with conservative therapy with IV antibiotics as well as debridement of

the necrotic soft tissue surrounding the area and then proceed with wound care

in hopes of allowing granulation tissue to

fill in the wound.  He has been struggling with this wound for several years and

if this does not work and he does develop osteomyelitis of the calcaneus bone,

the next step would likely entail a below-the-knee amputation.





Job ID: [id number]

DocumentID: [id number]

Dictated Date:  03/23/2021 21:21:13

Transcription Date: 03/23/2021 21:55:08

Dictated By: CLAUDY ALEJO MD

[facility]

## 2021-03-23 NOTE — DIAGNOSTIC IMAGING REPORT
EXAMINATION: Left foot 3 views



HISTORY: left foot wound



COMPARISON: 01/18/2021



FINDINGS: 



There is soft tissue gas and skin wound in the left heel. There

is severe soft tissue swelling about the left foot. The gas

extends close to the calcaneus, but no definite erosions are

seen. There is chronic deformity of the heads of the 4th and 5th

metatarsals which is unchanged. No acute fracture is seen. There

is old healed fracture of the distal tibia.



IMPRESSION:



1. Large heel wound with soft tissue gas in the base of the left

foot. No definitive erosions are seen, but the gas is near to the

calcaneus placing the patient at risk of osteomyelitis.



Dictated by: 



  Dictated on workstation # [id number]

## 2021-03-23 NOTE — PROGRESS NOTE-PRE OPERATIVE
Pre-Operative Progress Note


H&P Reviewed


The H&P was reviewed, patient examined and no changes noted.


Date Seen by Provider:  Mar 23, 2021


Time Seen by Provider:  21:30


Date H&P Reviewed:  Mar 23, 2021


Time H&P Reviewed:  21:30


Pre-Operative Diagnosis:  left heel diabetic foot ulcer











CLAUDY ALEJO MD                Mar 23, 2021 21:28

## 2021-03-23 NOTE — ED INTEGUMENTARY GENERAL
General


Chief Complaint:  Skin/Wound Problems


Stated Complaint:  ABCESS L LEG, DM WOUND


Source:  patient


Exam Limitations:  no limitations





History of Present Illness


Date Seen by Provider:  Mar 23, 2021


Time Seen by Provider:  17:52


Initial Comments


To ER with a worsening wound to the plantar surface of the left heel.  He is 

diabetic.  Has been following with Dr. Guzmán from wound care.  Denies fevers or 

chills.


Timing/Duration:  getting worse


Severity:  moderate


Location:  feet


Possible Cause:  no cause identified


Associated Symptoms:  denies symptoms





Allergies and Home Medications


Allergies


Coded Allergies:  


     levofloxacin (Verified  Allergy, Mild, 4/14/15)


     sweet potato (Unverified  Allergy, Unknown, 4/14/15)





Home Medications


Amoxicillin/Potassium Clav 1 Each Tablet, 1 EACH PO BID


   Prescribed by: NICOLE VILA on 5/4/20 1033


Aspirin 81 Mg Tablet.dr, 162 MG PO HS, (Reported)


   TAKES 2 (81MG) TABS AT BEDTIME 


Glyburide 2.5 Mg Tablet, 5 MG PO HS, (Reported)


   TAKES 2 (2.5MG) TABS DAILY 


Hydrocodone/Acetaminophen 1 Each Tablet, 1 EACH PO Q6H


   Prescribed by: NICOLE VILA on 5/4/20 1034


Insulin Aspart 300 Units/3 Ml Solution, 30 UNITS SQ TIDWM, (Reported)


Insulin Detemir 100 Unit/1 Ml Insuln.pen, 45 UNIT SQ BID, (Reported)


   LAST FILLED 01- #10 PENS/33 DAY SUPPLY 


Lisinopril/Hydrochlorothiazide 1 Each Tablet, 2 EACH PO HS, (Reported)


Metformin HCl 500 Mg Tab.er.24, 1,000 MG PO BID, (Reported)





Patient Home Medication List


Home Medication List Reviewed:  Yes





Review of Systems


Review of Systems


Constitutional:  see HPI


EENTM:  see HPI


Respiratory:  no symptoms reported


Cardiovascular:  no symptoms reported


Genitourinary:  no symptoms reported


Musculoskeletal:  no symptoms reported


Skin:  no symptoms reported


Psychiatric/Neurological:  No Symptoms Reported


Endocrine:  No Symptoms Reported





Past Medical-Social-Family Hx


Patient Social History


Type Used:  Cigarettes, Electronic/Vapor


Former Smoker, Quit:  Jan 1, 2003


Recent Hopitalizations:  No





Immunizations Up To Date


Tetanus Booster (TDap):  More than 5yrs


Date of Pneumonia Vaccine:  Jan 1, 2007





Seasonal Allergies


Seasonal Allergies:  No





Past Medical History


Surgeries:  Yes (SURGERY ON SCROTAL ABSCESS 2015)


Respiratory:  No


Currently Using CPAP:  No


Currently Using BIPAP:  No


Cardiac:  Yes


Hypertension


Neurological:  Yes


Neuropathy


Reproductive Disorders:  No


Sexually Transmitted Disease:  No


HIV/AIDS:  No


Genitourinary:  No


Gastrointestinal:  No


Musculoskeletal:  Yes


Chronic Back Pain


Endocrine:  Yes (MORBID OBESITY)


Diabetes, Insulin dep


HEENT:  No


Loss of Vision:  Denies


Hearing Impairment:  Denies


Cancer:  No


Psychosocial:  No


Integumentary:  Yes (SCROTAL ABSCESS 2015; LEFT FOOT DIABETIC FOOT ULCER DX 

09/18/17)


Recent Skin Changes


Blood Disorders:  No


Adverse Reaction/Blood Tranf:  No





Family Medical History





Completed stroke


  17 MOTHER


Diabetes mellitus


  19 MOTHER


Hypertension


  19 MOTHER


  G8 BROTHER


Myocardial infarction


  17 FATHER


CAD Under 55 Years Old, Diabetes, Hypertension, Other Conditions/Hx





Physical Exam


Vital Signs





Vital Signs - First Documented








 3/23/21





 17:08


 


Temp 36.2


 


Pulse 108


 


Resp 20


 


B/P (MAP) 143/99 (114)


 


Pulse Ox 99


 


O2 Delivery Room Air





Capillary Refill :


General Appearance:  WD/WN, no apparent distress


Respiratory:  no respiratory distress, no accessory muscle use


Extremities:  normal range of motion, non-tender, other (There is a large wound 

to the plantar surface of the left heel that measures about quarter size in 

diameter and is about 2-1/2 to 3 cm deep directly overlying the calcaneus on the

plantar surface.)


Neurologic/Psychiatric:  alert, normal mood/affect, oriented x 3


Skin:  normal color, warm/dry


Skin Problem Location:  lower extremities





Progress/Results/Core Measures


Results/Orders


Lab Results





Laboratory Tests








Test


 3/23/21


17:32 Range/Units


 


 


White Blood Count


 10.8 


 4.3-11.0


10^3/uL


 


Red Blood Count


 4.23 L


 4.30-5.52


10^6/uL


 


Hemoglobin 10.7 L 13.3-17.7  g/dL


 


Hematocrit 34 L 40-54  %


 


Mean Corpuscular Volume 81  80-99  fL


 


Mean Corpuscular Hemoglobin 25  25-34  pg


 


Mean Corpuscular Hemoglobin


Concent 31 L


 32-36  g/dL





 


Red Cell Distribution Width 16.3 H 10.0-14.5  %


 


Platelet Count


 365 


 130-400


10^3/uL


 


Mean Platelet Volume 9.9  9.0-12.2  fL


 


Immature Granulocyte % (Auto) 1   %


 


Neutrophils (%) (Auto) 75  42-75  %


 


Lymphocytes (%) (Auto) 15  12-44  %


 


Monocytes (%) (Auto) 8  0-12  %


 


Eosinophils (%) (Auto) 1  0-10  %


 


Basophils (%) (Auto) 0  0-10  %


 


Neutrophils # (Auto)


 8.1 H


 1.8-7.8


10^3/uL


 


Lymphocytes # (Auto)


 1.6 


 1.0-4.0


10^3/uL


 


Monocytes # (Auto)


 0.9 


 0.0-1.0


10^3/uL


 


Eosinophils # (Auto)


 0.2 


 0.0-0.3


10^3/uL


 


Basophils # (Auto)


 0.0 


 0.0-0.1


10^3/uL


 


Immature Granulocyte # (Auto)


 0.1 


 0.0-0.1


10^3/uL


 


Sodium Level 135  135-145  MMOL/L


 


Potassium Level 3.9  3.6-5.0  MMOL/L


 


Chloride Level 100    MMOL/L


 


Carbon Dioxide Level 24  21-32  MMOL/L


 


Anion Gap 11  5-14  MMOL/L


 


Blood Urea Nitrogen 11  7-18  MG/DL


 


Creatinine


 0.82 


 0.60-1.30


MG/DL


 


Estimat Glomerular Filtration


Rate > 60 


  





 


BUN/Creatinine Ratio 13   


 


Glucose Level 96    MG/DL


 


Lactic Acid Level


 1.52 


 0.50-2.00


MMOL/L


 


Calcium Level 9.3  8.5-10.1  MG/DL


 


Corrected Calcium 10.2 H 8.5-10.1  MG/DL


 


Total Bilirubin 0.3  0.1-1.0  MG/DL


 


Aspartate Amino Transf


(AST/SGOT) 55 H


 5-34  U/L





 


Alanine Aminotransferase


(ALT/SGPT) 26 


 0-55  U/L





 


Alkaline Phosphatase 87    U/L


 


C-Reactive Protein High


Sensitivity 28.79 H


 0.00-0.50


MG/DL


 


Total Protein 7.6  6.4-8.2  GM/DL


 


Albumin 2.9 L 3.2-4.5  GM/DL








My Orders





Orders - VINNY LÓPEZ APRN


Cbc With Automated Diff (3/23/21 17:51)


Comprehensive Metabolic Panel (3/23/21 17:51)


Hs C Reactive Protein (3/23/21 17:51)


Erythrocyte Sedimentation Rate (3/23/21 17:51)


Foot, Left, 3 Views (3/23/21 17:51)


Blood Culture (3/23/21 17:51)


Lactic Acid Analyzer (3/23/21 17:51)


Ed Iv/Invasive Line Start (3/23/21 17:51)


Cefepime Injection (Maxipime Injection) (3/23/21 18:30)


Vancomycin Injection (Vancomycin Injecti (3/23/21 18:30)


Ns Iv 1000 Ml (Sodium Chloride 0.9%) (3/23/21 18:30)


Ns Iv 500 Ml (Sodium Chloride 0.9%) (3/23/21 18:30)


Beta Hydroxybutyrate (3/23/21 18:29)





Vital Signs/I&O











 3/23/21





 17:08


 


Temp 36.2


 


Pulse 108


 


Resp 20


 


B/P (MAP) 143/99 (114)


 


Pulse Ox 99


 


O2 Delivery Room Air











Departure


Communication (Admissions)


 ideal body weight of 77 kg.  Heart rates a little high at 108.





Impression





   Primary Impression:  


   Diabetic ulcer of foot associated with diabetes mellitus due to underlying 

   condition, with necrosis of muscle


   Additional Impressions:  


   Sepsis


   Diabetes mellitus, type 2


Disposition:  09 ADMITTED AS INPATIENT


Condition:  Stable





Admissions


Decision to Admit Reason:  Admit from ER (General)


Decision to Admit/Date:  Mar 23, 2021


Time/Decision to Admit Time:  18:30





Departure-Patient Inst.


Referrals:  


[facility] (PCP)


Primary Care Physician








RUT DAVIS (Family)


Primary Care Physician











VINNY LÓPEZ             Mar 23, 2021 17:54

## 2021-03-24 VITALS — SYSTOLIC BLOOD PRESSURE: 135 MMHG | DIASTOLIC BLOOD PRESSURE: 76 MMHG

## 2021-03-24 VITALS — DIASTOLIC BLOOD PRESSURE: 90 MMHG | SYSTOLIC BLOOD PRESSURE: 149 MMHG

## 2021-03-24 VITALS — SYSTOLIC BLOOD PRESSURE: 177 MMHG | DIASTOLIC BLOOD PRESSURE: 88 MMHG

## 2021-03-24 VITALS — DIASTOLIC BLOOD PRESSURE: 88 MMHG | SYSTOLIC BLOOD PRESSURE: 149 MMHG

## 2021-03-24 VITALS — SYSTOLIC BLOOD PRESSURE: 137 MMHG | DIASTOLIC BLOOD PRESSURE: 83 MMHG

## 2021-03-24 VITALS — DIASTOLIC BLOOD PRESSURE: 63 MMHG | SYSTOLIC BLOOD PRESSURE: 96 MMHG

## 2021-03-24 VITALS — SYSTOLIC BLOOD PRESSURE: 102 MMHG | DIASTOLIC BLOOD PRESSURE: 66 MMHG

## 2021-03-24 VITALS — SYSTOLIC BLOOD PRESSURE: 110 MMHG | DIASTOLIC BLOOD PRESSURE: 71 MMHG

## 2021-03-24 VITALS — DIASTOLIC BLOOD PRESSURE: 72 MMHG | SYSTOLIC BLOOD PRESSURE: 101 MMHG

## 2021-03-24 VITALS — SYSTOLIC BLOOD PRESSURE: 104 MMHG | DIASTOLIC BLOOD PRESSURE: 70 MMHG

## 2021-03-24 LAB
ALBUMIN SERPL-MCNC: 2.4 GM/DL (ref 3.2–4.5)
ALP SERPL-CCNC: 70 U/L (ref 40–136)
ALT SERPL-CCNC: 23 U/L (ref 0–55)
BASOPHILS # BLD AUTO: 0 10^3/UL (ref 0–0.1)
BASOPHILS NFR BLD AUTO: 0 % (ref 0–10)
BILIRUB SERPL-MCNC: 0.2 MG/DL (ref 0.1–1)
BUN/CREAT SERPL: 13
CALCIUM SERPL-MCNC: 8.3 MG/DL (ref 8.5–10.1)
CHLORIDE SERPL-SCNC: 103 MMOL/L (ref 98–107)
CO2 SERPL-SCNC: 20 MMOL/L (ref 21–32)
CREAT SERPL-MCNC: 0.75 MG/DL (ref 0.6–1.3)
EOSINOPHIL # BLD AUTO: 0.2 10^3/UL (ref 0–0.3)
EOSINOPHIL NFR BLD AUTO: 2 % (ref 0–10)
GFR SERPLBLD BASED ON 1.73 SQ M-ARVRAT: > 60 ML/MIN
GLUCOSE SERPL-MCNC: 135 MG/DL (ref 70–105)
HCT VFR BLD CALC: 28 % (ref 40–54)
HGB BLD-MCNC: 8.6 G/DL (ref 13.3–17.7)
LYMPHOCYTES # BLD AUTO: 2.1 10^3/UL (ref 1–4)
LYMPHOCYTES NFR BLD AUTO: 23 % (ref 12–44)
MANUAL DIFFERENTIAL PERFORMED BLD QL: NO
MCH RBC QN AUTO: 25 PG (ref 25–34)
MCHC RBC AUTO-ENTMCNC: 31 G/DL (ref 32–36)
MCV RBC AUTO: 82 FL (ref 80–99)
MONOCYTES # BLD AUTO: 0.9 10^3/UL (ref 0–1)
MONOCYTES NFR BLD AUTO: 9 % (ref 0–12)
NEUTROPHILS # BLD AUTO: 5.9 10^3/UL (ref 1.8–7.8)
NEUTROPHILS NFR BLD AUTO: 63 % (ref 42–75)
PLATELET # BLD: 345 10^3/UL (ref 130–400)
PMV BLD AUTO: 9.4 FL (ref 9–12.2)
POTASSIUM SERPL-SCNC: 3.5 MMOL/L (ref 3.6–5)
PROT SERPL-MCNC: 6.2 GM/DL (ref 6.4–8.2)
SODIUM SERPL-SCNC: 134 MMOL/L (ref 135–145)
WBC # BLD AUTO: 9.3 10^3/UL (ref 4.3–11)

## 2021-03-24 PROCEDURE — 0QBM0ZZ EXCISION OF LEFT TARSAL, OPEN APPROACH: ICD-10-PCS | Performed by: SURGERY

## 2021-03-24 RX ADMIN — VANCOMYCIN HYDROCHLORIDE SCH MLS/HR: 500 INJECTION, POWDER, LYOPHILIZED, FOR SOLUTION INTRAVENOUS at 18:24

## 2021-03-24 RX ADMIN — POTASSIUM CHLORIDE SCH MEQ: 1500 TABLET, EXTENDED RELEASE ORAL at 07:00

## 2021-03-24 RX ADMIN — INSULIN ASPART SCH UNITS: 100 INJECTION, SOLUTION INTRAVENOUS; SUBCUTANEOUS at 18:24

## 2021-03-24 RX ADMIN — INSULIN ASPART SCH UNIT: 100 INJECTION, SOLUTION INTRAVENOUS; SUBCUTANEOUS at 22:02

## 2021-03-24 RX ADMIN — INSULIN ASPART SCH UNIT: 100 INJECTION, SOLUTION INTRAVENOUS; SUBCUTANEOUS at 17:40

## 2021-03-24 RX ADMIN — SODIUM CHLORIDE, SODIUM LACTATE, POTASSIUM CHLORIDE, AND CALCIUM CHLORIDE SCH MLS/HR: 600; 310; 30; 20 INJECTION, SOLUTION INTRAVENOUS at 06:16

## 2021-03-24 RX ADMIN — SODIUM CHLORIDE SCH MLS/HR: 900 INJECTION INTRAVENOUS at 00:31

## 2021-03-24 RX ADMIN — ENOXAPARIN SODIUM SCH MG: 100 INJECTION SUBCUTANEOUS at 08:59

## 2021-03-24 RX ADMIN — SODIUM CHLORIDE SCH MLS/HR: 900 INJECTION INTRAVENOUS at 06:16

## 2021-03-24 RX ADMIN — SODIUM CHLORIDE SCH MLS/HR: 900 INJECTION INTRAVENOUS at 18:27

## 2021-03-24 RX ADMIN — SODIUM CHLORIDE, SODIUM LACTATE, POTASSIUM CHLORIDE, AND CALCIUM CHLORIDE SCH MLS/HR: 600; 310; 30; 20 INJECTION, SOLUTION INTRAVENOUS at 22:01

## 2021-03-24 RX ADMIN — VANCOMYCIN HYDROCHLORIDE SCH MLS/HR: 500 INJECTION, POWDER, LYOPHILIZED, FOR SOLUTION INTRAVENOUS at 07:01

## 2021-03-24 RX ADMIN — DOCUSATE SODIUM AND SENNOSIDES SCH EA: 8.6; 5 TABLET, FILM COATED ORAL at 21:59

## 2021-03-24 RX ADMIN — DOCUSATE SODIUM AND SENNOSIDES SCH EA: 8.6; 5 TABLET, FILM COATED ORAL at 08:59

## 2021-03-24 RX ADMIN — SODIUM CHLORIDE SCH MLS/HR: 900 INJECTION INTRAVENOUS at 14:11

## 2021-03-24 RX ADMIN — INSULIN ASPART SCH UNIT: 100 INJECTION, SOLUTION INTRAVENOUS; SUBCUTANEOUS at 06:08

## 2021-03-24 RX ADMIN — INSULIN ASPART SCH UNITS: 100 INJECTION, SOLUTION INTRAVENOUS; SUBCUTANEOUS at 08:59

## 2021-03-24 RX ADMIN — ENOXAPARIN SODIUM SCH MG: 100 INJECTION SUBCUTANEOUS at 22:02

## 2021-03-24 RX ADMIN — SODIUM CHLORIDE, SODIUM LACTATE, POTASSIUM CHLORIDE, AND CALCIUM CHLORIDE SCH MLS/HR: 600; 310; 30; 20 INJECTION, SOLUTION INTRAVENOUS at 14:47

## 2021-03-24 RX ADMIN — INSULIN ASPART SCH UNITS: 100 INJECTION, SOLUTION INTRAVENOUS; SUBCUTANEOUS at 13:48

## 2021-03-24 RX ADMIN — INSULIN ASPART SCH UNIT: 100 INJECTION, SOLUTION INTRAVENOUS; SUBCUTANEOUS at 11:15

## 2021-03-24 NOTE — PROGRESS NOTE-POST OPERATIVE
Post-Operative Progess Note


Surgeon (s)/Assistant (s)


Surgeon


CLAUDY ALEJO MD


Assistant:  none





Pre-Operative Diagnosis


left foot necrotizing soft tissue infection





Post-Operative Diagnosis





same





Procedure & Operative Findings


Date of Procedure


3/24/21


Procedure Performed/Findings


wide debridement left ankle and heal encompassing skin, subcut, fascia,


muscle and bone [id number] and 4x4cm.


Anesthesia Type


mac with local





Estimated Blood Loss


Estimated blood loss (mL):  minimal





Specimens/Packing


Specimens Removed


skin, subcut, fascia, muscle, bone left foot and ankle











CLAUDY ALEJO MD                Mar 24, 2021 16:35

## 2021-03-24 NOTE — HISTORY & PHYSICAL-HOSPITALIST
KEATON RUEDA MED STUDENT 3/24/21 0818:


History of Present Illness


HPI/Chief Complaint


40 yr old M presents to ED for diabetic ulcer of the left foot and Left lateral 

malleolus abscess. Pt notes that the foot ulcer, located on the plantar surface 

of the heel, has been managed by Dr. Guzmán since the beginning of the year 

following Dr. Darleen cho. Last Thursday though, he began to have 

symptoms of chills, body aches, nausea, and vomiting which caused him to be sent

home from work. On Monday (3/22) the pt noticed that the heel ulcer was worse

keli and that the lateral side of his ankle was swelling. On Tuesday (3/23), 

home wound care noticed the lateral ankle had swollen even further and begun to 

weep fluid. Pt notes that the weeping is profuse, causing bandage changes every 

hour or so due to them becoming soaked.


Source:  patient


Date Seen


3/24/21


Time Seen by a Provider:  08:13


Attending Physician


Siobhan Vila DO


[facility]


Referring Physician





Date of Admission


Mar 23, 2021 at 18:41





Home Medications & Allergies


Home Medications


Reviewed patient Home Medication Reconciliation performed by pharmacy medication

reconciliations technician and/or nursing.


Patients Allergies have been reviewed.





Allergies





Allergies


Coded Allergies


  levofloxacin (Verified Allergy, Mild, 4/14/15)


  sweet potato (Unverified Allergy, Unknown, 4/14/15)








Past Medical-Social-Family Hx


Patient Social History


Marrital Status:  


Employed/Student:  employed


Alcohol Use:  Denies Use


Recreational Drug Use:  No


Smoking Status:  Never a Smoker


Former Smoker, Quit:  Jan 1, 2003


Type Used:  Cigarettes, Electronic/Vapor


Recent Foreign Travel:  No


Contact w/other who traveled:  No


Recent Hopitalizations:  No


Recent Infectious Disease Expo:  No





Immunizations Up To Date


Tetanus Booster (TDap):  More than 5yrs


Date of Pneumonia Vaccine:  Jan 1, 2007





Seasonal Allergies


Seasonal Allergies:  No





Past Medical History


Surgeries:  Coronary Stent, Orthopedic


Currently Using CPAP:  No


Currently Using BIPAP:  No


Cardiac:  Hypertension


Neurological:  Neuropathy


Reproductive:  No


Sexually Transmitted Disease:  No


HIV/AIDS:  No


Musculoskeletal:  Chronic Back Pain


Endocrine:  Diabetes, Insulin dep


Loss of Vision:  Denies


Hearing Impairment:  Denies


Skin/Integumentary:  Recent Skin Changes


History of Blood Disorders:  No


Adverse Reaction to Blood Hernandez:  No





Family History





Completed stroke


  19 MOTHER


Diabetes mellitus


  19 MOTHER


Hypertension


  19 MOTHER


  G8 BROTHER


Myocardial infarction


  19 FATHER


CAD Under 55 Years Old, Diabetes, Hypertension, Other Conditions/Hx





Review of Systems


Constitutional:  No chills, No weakness


EENTM:  no symptoms reported


Respiratory:  No cough, No short of breath


Cardiovascular:  No chest pain, No palpitations


Gastrointestinal:  abdominal pain, constipation (2 days, prior was diarrhea ); 

No nausea, No vomiting


Genitourinary:  No dysuria, No hematuria


Musculoskeletal:  back pain (Chronic), joint pain (Chronic L knee pain)


Skin:  No pruritus, No rash


Psychiatric/Neurological:  Headache, Pre-Existing Deficit (Diabetic neuropathy)





Physical Exam


Physical Exam


Vital Signs





Vital Signs - First Documented








 3/23/21





 17:08


 


Temp 36.2


 


Pulse 108


 


Resp 20


 


B/P (MAP) 143/99 (114)


 


Pulse Ox 99


 


O2 Delivery Room Air





Capillary Refill : Less Than 3 Seconds


Height, Weight, BMI


Height: 6'0.00"


Weight: 422lbs. 6.4oz. [id number]; 48.14 BMI


Method:Stated


General Appearance:  No Apparent Distress, Obese


Eyes:  Bilateral Eye PERRL, Bilateral Eye EOMI


HEENT:  PERRL/EOMI, Moist Mucous Membranes


Neck:  Normal Inspection, Non Tender, Supple


Respiratory:  Chest Non Tender, Lungs Clear, Normal Breath Sounds, No Accessory 

Muscle Use, No Respiratory Distress


Cardiovascular:  Regular Rate, Rhythm, No Gallop, No Murmur


Gastrointestinal:  Normal Bowel Sounds, Non Tender, Soft


Rectal:  Deferred


Back:  Normal Inspection, No Vertebral Tenderness


Extremity:  Non Tender, No Calf Tenderness, Pedal Edema (Left), Swelling (Left 

lower extremity/ ankle), Other (Diabetic ulcer on Left plantar surface of heel)


Neurologic/Psychiatric:  Alert, Oriented x3, No Motor/Sensory Deficits, Normal 

Mood/Affect


Skin:  Normal Color, Warm/Dry, Tattoos/Piercings


Lymphatic:  No Adenopathy





Results


Results/Procedures


Labs


Laboratory Tests


3/23/21 17:32








3/24/21 05:05








Patient resulted labs reviewed.





Assessment/Plan


Assessment and Plan


ASSESSMENT:


Diabetic foot ulcer (Left plantar heel)


Left lateral malleolar abscess


Anemia - 8.6


Hypokalemia - 3.5


T2DM


Obese


HTN (hx of coronary stent placement)





PLAN:


Continue abx (cefepime & vancomycin)


Appreciate Dr. Morejon recs - debridement, should osteomyelitis occur consider 

BKA


Consult Dr. Guzmán/ wound care


Serial LE Xrays - possible formation of osteomyelitis


Monitor Hgb


Monitor K and replace as needed





SIOBHAN VILA DO 3/25/21 0530:


History of Present Illness


HPI/Chief Complaint


CC: Left lateral skin abscess with left heal osteomyelitis





HPI: This is [facility] 40[facility] [facility] who has a PMH of super-morbid obesity with DM who 

has experienced left heal, chronic diabetic ulcer for the past three months with

Dr. Guzmán at wound care, who presented to the ER with drainage from a lateral 

skin abscess, Dr. Jiménez was evaluation the Pt for a possible amputation but he is

not aware of that plan. The goal would be to perform a debridement, monitor labs

and his Hgb is 8.6, and potassium 3.5, we will have a picc line placed with 

Cefepime and Vancomycin empirically to treat the osteomyelitis presumptively in 

addition to gram-negative coverage. At this current time Pt denies any pain.


Source:  patient





Past Medical-Social-Family Hx


Past Med/Social Hx:  Reviewed Nursing Past Med/Soc Hx, Reviewed and Corrections 

made


Patient Social History


Marrital Status:  


Employed/Student:  employed


Alcohol Use:  Denies Use


Smoking Status:  Never a Smoker





Past Medical History


Surgeries:  Orthopedic


Respiratory:  Sleep Apnea


Cardiac:  High Cholesterol, Hypertension


Endocrine:  Diabetes, Insulin dep





Family History





Completed stroke


  19 MOTHER


Diabetes mellitus


  19 MOTHER


Hypertension


  19 MOTHER


  G8 BROTHER


Myocardial infarction


  19 FATHER





Review of Systems


Constitutional:  see HPI, malaise


Musculoskeletal:  back pain (Chronic), joint pain (Chronic L knee pain)





Physical Exam


Physical Exam


General Appearance:  No Apparent Distress, Chronically ill, Obese


Respiratory:  Lungs Clear


Cardiovascular:  Regular Rate, Rhythm


Neurologic/Psychiatric:  Alert, Oriented x3, No Motor/Sensory Deficits, Normal 

Mood/Affect





Assessment/Plan


Admission Diagnosis


Assessment:


Left lateral ankle DM ulcer abscess


Left heel ulceration to the bone


Morbid obesity


DM OOC


HTN





Plan:


Monitor closely


IV abx


Debridement


PICC


Admission Status:  Inpatient Order (span 2 midnights)


Reason for Inpatient Admission:  


DM ulcer management





Diagnosis/Problems


Diagnosis/Problems





(1) Diabetic foot ulcer


(2) Hyperglycemia


Status:  Acute


(3) Hypertension


Status:  Chronic


(4) Diabetes mellitus, type 2


Status:  Chronic


(5) Diabetic ulcer of foot associated with diabetes mellitus due to underlying 

condition, with necrosis of muscle


Status:  Acute


(6) ANCA (acute kidney injury)


Status:  Resolved


Resolution Date/Time:  4/30/20 @ 10:28





Supervisory-Addendum Brief


Verification & Attestation


Participated in pt care:  history, MDM, physical


Personally performed:  exam, history, MDM, supervision of care


Care discussed with:  Medical Student


Procedures:  n/a


Results interpretation:  Verified all documentation


Verification and Attestation of Medical Student E/M Service





A medical student performed and documented this service in my presence. I 

reviewed and verified all information documented by the medical student and made

modifications to such information, when appropriate. I personally performed the 

physical exam and medical decision making. 





 Siobhan Vila, Mar 25, 2021,05:30











KEATON RUEDA MED STUDENT    Mar 24, 2021 08:18


SIOBHAN VILA DO                Mar 25, 2021 05:30

## 2021-03-24 NOTE — OPERATIVE REPORT
DATE OF SERVICE:  03/24/2021



ADMITTING PHYSICIAN:

Siobhan Barrera DO



PREOPERATIVE DIAGNOSIS:

Necrotizing soft tissue infection, left ankle and heel.



POSTOPERATIVE DIAGNOSIS:

Necrotizing soft tissue infection, left ankle and heel with the dimensions of

the heel 4 x 4 cm in size, dimensions of the left ankle 15 x 10 cm.



PROCEDURE:

Wide debridement of skin, subcutaneous tissue, muscle, fascia and bone of the

left ankle and heel 15 x 10 cm as well as 4 x 4 cm.



SURGEON:

Claudy Alejo MD



ANESTHESIA:

Monitored anesthesia care with local.



ESTIMATED BLOOD LOSS:

150 mL.



FINDINGS:

Necrotic tissue as well as a bulla and edema along the left lateral ankle

encompassing a wide area of 15 x 10 cm.  The heel decubitus ulcer did have some

mild necrotic debris, which was debrided to the calcaneus bone.



DISPOSITION:

The patient tolerated the procedure well.



INDICATIONS:

The patient is a 41-year-old male who has had a longstanding history of

infection complications including multiple boils as well as scrotal abscess and

necrotizing soft tissue infection of the right medial thigh requiring a wide

excision.  He has had a left heel ulcer for years, which he has been seeing

wound care for; however, this has not been able to close.  He does have

insulin-dependent diabetes as well as other medical comorbidities and is

morbidly obese at greater than 400 pounds.  Upon examination, there is redness,

swelling bulla and crepitance along the left lateral ankle extending into the

heel, likely consistent with a necrotizing soft tissue infection.  He does not

meet sepsis criteria.



DESCRIPTION OF PROCEDURE:

The patient was brought to the operating room, laid supine on the table.  After

adequate IV pain and sedative medications and monitored anesthesia care, the

patient's left leg was elevated and the foot and ankle were prepped and draped

in standard surgical fashion.



We first proceeded with debridement of the heel decubitus ulcer using a 10

blade.  Good hemostasis was achieved using electrocautery.  We then proceeded

with a wide excision of the skin, subcutaneous tissue, muscle and fascia of the

necrotic tissue as well as the undermining identified of the left ankle, which

equals 15 x 10 cm.  Good hemostasis was achieved using electrocautery.  There

was also necrotic debris on the tarsal bones which were debrided using rongeurs.

 Good hemostasis was achieved using electrocautery.  The wound was then

copiously irrigated with a power  and 4 liters of saline.  A wound VAC

was then placed to negative 125 mmHg suction.



The patient tolerated the procedure well.  We will continue with IV antibiotics

and consult wound care nursing for wound VAC change twice a week.  We feel there

is a chance for healing and granulation tissue; however, due to the extensive

nature of the wound infection, he will likely develop an osteomyelitis of the

bones of his foot and in that scenario would require a below-the-knee

amputation.





Job ID: [id number]

DocumentID: [id number]

Dictated Date:  03/24/2021 16:30:54

Transcription Date: 03/24/2021 20:44:52

Dictated By: CLAUDY ALEJO MD

## 2021-03-25 VITALS — DIASTOLIC BLOOD PRESSURE: 72 MMHG | SYSTOLIC BLOOD PRESSURE: 126 MMHG

## 2021-03-25 VITALS — SYSTOLIC BLOOD PRESSURE: 111 MMHG | DIASTOLIC BLOOD PRESSURE: 79 MMHG

## 2021-03-25 VITALS — DIASTOLIC BLOOD PRESSURE: 69 MMHG | SYSTOLIC BLOOD PRESSURE: 108 MMHG

## 2021-03-25 VITALS — SYSTOLIC BLOOD PRESSURE: 112 MMHG | DIASTOLIC BLOOD PRESSURE: 74 MMHG

## 2021-03-25 VITALS — SYSTOLIC BLOOD PRESSURE: 137 MMHG | DIASTOLIC BLOOD PRESSURE: 66 MMHG

## 2021-03-25 VITALS — SYSTOLIC BLOOD PRESSURE: 142 MMHG | DIASTOLIC BLOOD PRESSURE: 60 MMHG

## 2021-03-25 VITALS — DIASTOLIC BLOOD PRESSURE: 71 MMHG | SYSTOLIC BLOOD PRESSURE: 110 MMHG

## 2021-03-25 VITALS — DIASTOLIC BLOOD PRESSURE: 74 MMHG | SYSTOLIC BLOOD PRESSURE: 115 MMHG

## 2021-03-25 VITALS — DIASTOLIC BLOOD PRESSURE: 63 MMHG | SYSTOLIC BLOOD PRESSURE: 129 MMHG

## 2021-03-25 LAB
ALBUMIN SERPL-MCNC: 2.3 GM/DL (ref 3.2–4.5)
ALP SERPL-CCNC: 64 U/L (ref 40–136)
ALT SERPL-CCNC: 36 U/L (ref 0–55)
BASOPHILS # BLD AUTO: 0 10^3/UL (ref 0–0.1)
BASOPHILS NFR BLD AUTO: 1 % (ref 0–10)
BILIRUB SERPL-MCNC: 0.1 MG/DL (ref 0.1–1)
BUN/CREAT SERPL: 12
CALCIUM SERPL-MCNC: 7.7 MG/DL (ref 8.5–10.1)
CHLORIDE SERPL-SCNC: 104 MMOL/L (ref 98–107)
CO2 SERPL-SCNC: 20 MMOL/L (ref 21–32)
CREAT SERPL-MCNC: 0.77 MG/DL (ref 0.6–1.3)
EOSINOPHIL # BLD AUTO: 0.2 10^3/UL (ref 0–0.3)
EOSINOPHIL NFR BLD AUTO: 3 % (ref 0–10)
GFR SERPLBLD BASED ON 1.73 SQ M-ARVRAT: > 60 ML/MIN
GLUCOSE SERPL-MCNC: 190 MG/DL (ref 70–105)
HCT VFR BLD CALC: 25 % (ref 40–54)
HGB BLD-MCNC: 7.6 G/DL (ref 13.3–17.7)
LYMPHOCYTES # BLD AUTO: 1.8 10^3/UL (ref 1–4)
LYMPHOCYTES NFR BLD AUTO: 22 % (ref 12–44)
MANUAL DIFFERENTIAL PERFORMED BLD QL: NO
MCH RBC QN AUTO: 25 PG (ref 25–34)
MCHC RBC AUTO-ENTMCNC: 31 G/DL (ref 32–36)
MCV RBC AUTO: 82 FL (ref 80–99)
MONOCYTES # BLD AUTO: 0.7 10^3/UL (ref 0–1)
MONOCYTES NFR BLD AUTO: 8 % (ref 0–12)
NEUTROPHILS # BLD AUTO: 5.3 10^3/UL (ref 1.8–7.8)
NEUTROPHILS NFR BLD AUTO: 65 % (ref 42–75)
PLATELET # BLD: 369 10^3/UL (ref 130–400)
PMV BLD AUTO: 9.3 FL (ref 9–12.2)
POTASSIUM SERPL-SCNC: 3.9 MMOL/L (ref 3.6–5)
PROT SERPL-MCNC: 5.8 GM/DL (ref 6.4–8.2)
SODIUM SERPL-SCNC: 133 MMOL/L (ref 135–145)
VANCOMYCIN TROUGH SERPL-MCNC: 20.2 UG/ML (ref 10–20)
WBC # BLD AUTO: 8.2 10^3/UL (ref 4.3–11)

## 2021-03-25 RX ADMIN — VANCOMYCIN HYDROCHLORIDE SCH MLS/HR: 500 INJECTION, POWDER, LYOPHILIZED, FOR SOLUTION INTRAVENOUS at 21:37

## 2021-03-25 RX ADMIN — POTASSIUM CHLORIDE SCH MEQ: 1500 TABLET, EXTENDED RELEASE ORAL at 06:47

## 2021-03-25 RX ADMIN — INSULIN ASPART SCH UNITS: 100 INJECTION, SOLUTION INTRAVENOUS; SUBCUTANEOUS at 13:23

## 2021-03-25 RX ADMIN — ENOXAPARIN SODIUM SCH MG: 100 INJECTION SUBCUTANEOUS at 09:12

## 2021-03-25 RX ADMIN — SODIUM CHLORIDE SCH MLS/HR: 900 INJECTION INTRAVENOUS at 18:21

## 2021-03-25 RX ADMIN — DOCUSATE SODIUM AND SENNOSIDES SCH EA: 8.6; 5 TABLET, FILM COATED ORAL at 09:11

## 2021-03-25 RX ADMIN — INSULIN ASPART SCH UNITS: 100 INJECTION, SOLUTION INTRAVENOUS; SUBCUTANEOUS at 09:10

## 2021-03-25 RX ADMIN — SODIUM CHLORIDE SCH MLS/HR: 900 INJECTION INTRAVENOUS at 12:33

## 2021-03-25 RX ADMIN — INSULIN ASPART SCH UNIT: 100 INJECTION, SOLUTION INTRAVENOUS; SUBCUTANEOUS at 16:14

## 2021-03-25 RX ADMIN — SODIUM CHLORIDE SCH MLS/HR: 900 INJECTION INTRAVENOUS at 23:46

## 2021-03-25 RX ADMIN — SODIUM CHLORIDE SCH MLS/HR: 900 INJECTION INTRAVENOUS at 06:46

## 2021-03-25 RX ADMIN — HYDROCHLOROTHIAZIDE SCH MG: 25 TABLET ORAL at 09:11

## 2021-03-25 RX ADMIN — ENOXAPARIN SODIUM SCH MG: 100 INJECTION SUBCUTANEOUS at 21:37

## 2021-03-25 RX ADMIN — SODIUM CHLORIDE, SODIUM LACTATE, POTASSIUM CHLORIDE, AND CALCIUM CHLORIDE SCH MLS/HR: 600; 310; 30; 20 INJECTION, SOLUTION INTRAVENOUS at 09:10

## 2021-03-25 RX ADMIN — SODIUM CHLORIDE SCH MLS/HR: 900 INJECTION INTRAVENOUS at 01:14

## 2021-03-25 RX ADMIN — INSULIN ASPART SCH UNIT: 100 INJECTION, SOLUTION INTRAVENOUS; SUBCUTANEOUS at 06:47

## 2021-03-25 RX ADMIN — INSULIN ASPART SCH UNIT: 100 INJECTION, SOLUTION INTRAVENOUS; SUBCUTANEOUS at 11:47

## 2021-03-25 RX ADMIN — VANCOMYCIN HYDROCHLORIDE SCH MLS/HR: 500 INJECTION, POWDER, LYOPHILIZED, FOR SOLUTION INTRAVENOUS at 09:10

## 2021-03-25 RX ADMIN — INSULIN ASPART SCH UNITS: 100 INJECTION, SOLUTION INTRAVENOUS; SUBCUTANEOUS at 18:21

## 2021-03-25 RX ADMIN — DOCUSATE SODIUM AND SENNOSIDES SCH EA: 8.6; 5 TABLET, FILM COATED ORAL at 21:42

## 2021-03-25 RX ADMIN — INSULIN ASPART SCH UNIT: 100 INJECTION, SOLUTION INTRAVENOUS; SUBCUTANEOUS at 20:59

## 2021-03-25 RX ADMIN — LISINOPRIL SCH MG: 40 TABLET ORAL at 09:10

## 2021-03-25 RX ADMIN — ASPIRIN SCH MG: 81 TABLET ORAL at 09:11

## 2021-03-25 NOTE — PROGRESS NOTE
Progress Note


Assessment/Plan


Date Seen by Provider:  Mar 25, 2021


Time Seen by Provider:  15:00


Assessment/Plan


Dressing intact left foot, pt denies pain, only tingling


Plan for transfusion as Hgb 7.6 today.


VSS, afebrile


Surgery will continue to follow left foot ulcer for healing progress.





Vitals


Last set of Vitals Signs





Vital Signs








  Date Time  Temp Pulse Resp B/P (MAP) Pulse Ox O2 Delivery O2 Flow Rate FiO2


 


3/25/21 12:00 36.2 89 20 108/69 (82) 98 Room Air  











I&O


I&O











Intake and Output 


 


 3/25/21





 00:00


 


Intake Total 2340 ml


 


Balance 2340 ml


 


 


 


Intake Oral 1300 ml


 


IV Total 1040 ml


 


# Voids 3


 


# Bowel Movements 1











Labs


Laboratory Tests


3/24/21 17:05: Glucometer 195H


3/24/21 19:58: Glucometer 274H


3/25/21 05:38: 


White Blood Count 8.2, Red Blood Count 3.01L, Hemoglobin 7.6L, Hematocrit 25L, 

Mean Corpuscular Volume 82, Mean Corpuscular Hemoglobin 25, Mean Corpuscular 

Hemoglobin Concent 31L, Red Cell Distribution Width 16.8H, Platelet Count 369, 

Mean Platelet Volume 9.3, Immature Granulocyte % (Auto) 2, Neutrophils (%) 

(Auto) 65, Lymphocytes (%) (Auto) 22, Monocytes (%) (Auto) 8, Eosinophils (%) 

(Auto) 3, Basophils (%) (Auto) 1, Neutrophils # (Auto) 5.3, Lymphocytes # (Auto)

1.8, Monocytes # (Auto) 0.7, Eosinophils # (Auto) 0.2, Basophils # (Auto) 0.0, 

Immature Granulocyte # (Auto) 0.2H, Sodium Level 133L, Potassium Level 3.9, 

Chloride Level 104, Carbon Dioxide Level 20L, Anion Gap 9, Blood Urea Nitrogen 

9, Creatinine 0.77, Estimat Glomerular Filtration Rate > 60, BUN/Creatinine 

Ratio 12, Glucose Level 190H, Calcium Level 7.7L, Corrected Calcium 9.1, Total 

Bilirubin 0.1, Aspartate Amino Transf (AST/SGOT) 69H, Alanine Aminotransferase 

(ALT/SGPT) 36, Alkaline Phosphatase 64, Total Protein 5.8L, Albumin 2.3L, 

Vancomycin Level Trough 20.2H


3/25/21 11:17: Glucometer 172H





Microbiology


3/24/21 Gram Stain - Final, Resulted


          


3/24/21 Anaerobic Culture, Resulted


          Pending


3/24/21 Surgical Culture - Preliminary, Resulted


          Klebsiella oxytoca


          Pseudomonas aeruginosa


          Enterococcus faecalis


3/24/21 Fungal Culture 1, Resulted


          Pending


3/24/21 MRSA Screen - Final, Complete


          MRSA not isolated


3/23/21 Blood Culture - Preliminary, Resulted


          No growth





Focused Exam


Lactate Level


3/23/21 17:32: Lactic Acid Level 1.52











MUKUL RIVERA [facility] 25, 2021 15:34

## 2021-03-25 NOTE — PROGRESS NOTE - HOSPITALIST
KEATON RUEDA MED STUDENT 3/25/21 1313:


Subjective


HPI/CC On Admission


Date Seen by Provider:  Mar 25, 2021


Time Seen by Provider:  08:00


CC: Left lateral skin abscess with left heal osteomyelitis





HPI: This is a 40[place] of [facility] who has a PMH of super-morbid obesity with DM who 

has experienced left heal, chronic diabetic ulcer for the past three months with

Dr. Guzmán at wound care, who presented to the ER with drainage from a lateral 

skin abscess, Dr. Jiménez was evaluation the Pt for a possible amputation but he is

not aware of that plan. The goal would be to perform a debridement, monitor labs

and his Hgb is 8.6, and potassium 3.5, we will have a picc line placed with 

Cefepime and Vancomycin empirically to treat the osteomyelitis presumptively in 

addition to gram-negative coverage. At this current time Pt denies any pain.


Subjective/Events-last exam


Pt awake and lying in bed upon entry. Pt had surgery with Dr. Jiménez yesterday for

debridement of wound. Pt has wound VAC in position. Pt denies any complaints or 

concerns. Pt denies urinating since surgery, which nurses were concerned about, 

but feels like he will need to soon. No BM yet today, but passing gas. Pt does 

note that hes had a slight cough which yielded blood. Pt denies pain and 

instead feels more of a tingling sensation. No acute events over night.





Review of Systems


General:  No Chills


HEENT:  No Head Aches, No Dysphasia


Pulmonary:  No Dyspnea; Cough (Hemoptsis post intubation)


Cardiovascular:  No: Chest Pain, Palpitations


Gastrointestinal:  No: Nausea, Vomiting


Genitourinary:  No Dysuria, No Hematuria


Musculoskeletal:  No: back pain, leg pain


Neurological:  Numbness (L foot); No: Weakness





Focused Exam


Lactate Level


3/23/21 17:32: Lactic Acid Level 1.52








Objective


Exam


Vital Signs





Vital Signs








  Date Time  Temp Pulse Resp B/P (MAP) Pulse Ox O2 Delivery O2 Flow Rate FiO2


 


3/25/21 08:00      Room Air  


 


3/25/21 07:57 36.9 93 18 115/74 (88) 97   





Capillary Refill : Less Than 3 SecondsLess Than 3 Seconds


General Appearance:  No Apparent Distress, WD/WN, Obese


HEENT:  PERRL/EOMI, Moist Mucous Membranes


Neck:  Normal Inspection, Non Tender, Supple


Respiratory:  Chest Non Tender, Lungs Clear, Normal Breath Sounds, No Accessory 

Muscle Use, No Respiratory Distress


Cardiovascular:  Regular Rate, Rhythm, No Edema, No Gallop, No Murmur


Gastrointestinal:  Normal Bowel Sounds, Non Tender, Soft


Rectal:  Deferred


Extremity:  Pedal Edema (L foot), Swelling (LLE), Other (L lateral ankle abcess 

debridement/ L heel ulcer)


Neurologic/Psychiatric:  Alert, Oriented x3, No Motor/Sensory Deficits, Normal 

Mood/Affect


Skin:  Normal Color, Warm/Dry, Tattoos/Piercings





Results/Procedures


Lab


Laboratory Tests


3/25/21 05:38








Patient resulted labs reviewed.





Assessment/Plan


Assessment and Plan


Assess & Plan/Chief Complaint


ASSESSMENT:


Diabetic foot ulcer (Left plantar heel)


Left lateral malleolar abscess


Anemia - 7.6


Hypokalemia - 3.9


T2DM


Obese


HTN (hx of coronary stent placement)





PLAN:


Continue abx (cefepime & vancomycin)


Appreciate Dr. Darleen giraldo - debridement yesterday, should osteomyelitis occur 

consider BKA


Consult Dr. Guzmán/ wound care


Serial LE Xrays - possible formation of osteomyelitis


Monitor Hgb - PRBC transfusion


Monitor K and replace as needed





SIOBHAN VILA DO 3/26/21 0533:


Subjective


Subjective/Events-last exam


Patient doing well


Hgb low so will initiate 1 unit of blood


HLIVF to avoid overload


Creat normal


UOP ok now


Review of Systems


General:  Fatigue





Objective


Exam


General Appearance:  No Apparent Distress, WD/WN, Chronically ill


Respiratory:  Lungs Clear


Cardiovascular:  Regular Rate, Rhythm


Neurologic/Psychiatric:  Alert, Oriented x3, No Motor/Sensory Deficits, Normal 

Mood/Affect





Assessment/Plan


Assessment and Plan


Assess & Plan/Chief Complaint


Transfuse


HLIVF


Abx


Wound vac


PICC





Supervisory-Addendum Brief


Verification & Attestation


Participated in pt care:  history, MDM, physical


Personally performed:  exam, history, MDM, supervision of care


Care discussed with:  Medical Student


Procedures:  n/a


Results interpretation:  Verified all documentation


Verification and Attestation of Medical Student E/M Service





A medical student performed and documented this service in my presence. I 

reviewed and verified all information documented by the medical student and made

modifications to such information, when appropriate. I personally performed the 

physical exam and medical decision making. 





 Siobhan Vila, Mar 26, 2021,05:32











KEATON RUEDA STUDENT    Mar 25, 2021 13:13


SIOBHAN VILA DO                Mar 26, 2021 05:33

## 2021-03-26 VITALS — DIASTOLIC BLOOD PRESSURE: 69 MMHG | SYSTOLIC BLOOD PRESSURE: 115 MMHG

## 2021-03-26 VITALS — SYSTOLIC BLOOD PRESSURE: 136 MMHG | DIASTOLIC BLOOD PRESSURE: 67 MMHG

## 2021-03-26 VITALS — DIASTOLIC BLOOD PRESSURE: 72 MMHG | SYSTOLIC BLOOD PRESSURE: 131 MMHG

## 2021-03-26 VITALS — SYSTOLIC BLOOD PRESSURE: 117 MMHG | DIASTOLIC BLOOD PRESSURE: 57 MMHG

## 2021-03-26 VITALS — SYSTOLIC BLOOD PRESSURE: 130 MMHG | DIASTOLIC BLOOD PRESSURE: 77 MMHG

## 2021-03-26 LAB
%HYPO/RBC NFR BLD AUTO: SLIGHT %
ALBUMIN SERPL-MCNC: 2.4 GM/DL (ref 3.2–4.5)
ALP SERPL-CCNC: 58 U/L (ref 40–136)
ALT SERPL-CCNC: 30 U/L (ref 0–55)
ANISOCYTOSIS BLD QL SMEAR: SLIGHT
BASOPHILS # BLD AUTO: 0 10^3/UL (ref 0–0.1)
BASOPHILS NFR BLD AUTO: 0 % (ref 0–10)
BILIRUB SERPL-MCNC: 0.2 MG/DL (ref 0.1–1)
BUN/CREAT SERPL: 9
CALCIUM SERPL-MCNC: 7.9 MG/DL (ref 8.5–10.1)
CHLORIDE SERPL-SCNC: 105 MMOL/L (ref 98–107)
CO2 SERPL-SCNC: 23 MMOL/L (ref 21–32)
CREAT SERPL-MCNC: 0.69 MG/DL (ref 0.6–1.3)
EOSINOPHIL # BLD AUTO: 0.3 10^3/UL (ref 0–0.3)
EOSINOPHIL NFR BLD AUTO: 4 % (ref 0–10)
EOSINOPHIL NFR BLD MANUAL: 4 %
GFR SERPLBLD BASED ON 1.73 SQ M-ARVRAT: > 60 ML/MIN
GLUCOSE SERPL-MCNC: 126 MG/DL (ref 70–105)
HCT VFR BLD CALC: 27 % (ref 40–54)
HGB BLD-MCNC: 8.1 G/DL (ref 13.3–17.7)
LYMPHOCYTES # BLD AUTO: 2.1 10^3/UL (ref 1–4)
LYMPHOCYTES NFR BLD AUTO: 28 % (ref 12–44)
MANUAL DIFFERENTIAL PERFORMED BLD QL: YES
MCH RBC QN AUTO: 25 PG (ref 25–34)
MCHC RBC AUTO-ENTMCNC: 31 G/DL (ref 32–36)
MCV RBC AUTO: 83 FL (ref 80–99)
MICROCYTES BLD QL SMEAR: SLIGHT
MONOCYTES # BLD AUTO: 0.5 10^3/UL (ref 0–1)
MONOCYTES NFR BLD AUTO: 6 % (ref 0–12)
MONOCYTES NFR BLD: 11 %
NEUTROPHILS # BLD AUTO: 4.1 10^3/UL (ref 1.8–7.8)
NEUTROPHILS NFR BLD AUTO: 55 % (ref 42–75)
NEUTS BAND NFR BLD MANUAL: 51 %
NEUTS BAND NFR BLD: 7 %
PLATELET # BLD: 416 10^3/UL (ref 130–400)
PMV BLD AUTO: 9.2 FL (ref 9–12.2)
POTASSIUM SERPL-SCNC: 3.6 MMOL/L (ref 3.6–5)
PROT SERPL-MCNC: 6.1 GM/DL (ref 6.4–8.2)
SODIUM SERPL-SCNC: 136 MMOL/L (ref 135–145)
VANCOMYCIN TROUGH SERPL-MCNC: 20.1 UG/ML (ref 10–20)
VARIANT LYMPHS NFR BLD MANUAL: 27 %
WBC # BLD AUTO: 7.5 10^3/UL (ref 4.3–11)

## 2021-03-26 RX ADMIN — INSULIN ASPART SCH UNITS: 100 INJECTION, SOLUTION INTRAVENOUS; SUBCUTANEOUS at 11:53

## 2021-03-26 RX ADMIN — INSULIN ASPART SCH UNIT: 100 INJECTION, SOLUTION INTRAVENOUS; SUBCUTANEOUS at 15:25

## 2021-03-26 RX ADMIN — VANCOMYCIN HYDROCHLORIDE SCH MLS/HR: 500 INJECTION, POWDER, LYOPHILIZED, FOR SOLUTION INTRAVENOUS at 09:10

## 2021-03-26 RX ADMIN — ENOXAPARIN SODIUM SCH MG: 100 INJECTION SUBCUTANEOUS at 09:06

## 2021-03-26 RX ADMIN — INSULIN ASPART SCH UNITS: 100 INJECTION, SOLUTION INTRAVENOUS; SUBCUTANEOUS at 09:09

## 2021-03-26 RX ADMIN — DOCUSATE SODIUM AND SENNOSIDES SCH EA: 8.6; 5 TABLET, FILM COATED ORAL at 09:09

## 2021-03-26 RX ADMIN — VANCOMYCIN HYDROCHLORIDE SCH MLS/HR: 500 INJECTION, POWDER, LYOPHILIZED, FOR SOLUTION INTRAVENOUS at 21:04

## 2021-03-26 RX ADMIN — POTASSIUM CHLORIDE SCH MEQ: 1500 TABLET, EXTENDED RELEASE ORAL at 05:20

## 2021-03-26 RX ADMIN — DOCUSATE SODIUM AND SENNOSIDES SCH EA: 8.6; 5 TABLET, FILM COATED ORAL at 19:51

## 2021-03-26 RX ADMIN — LISINOPRIL SCH MG: 40 TABLET ORAL at 09:04

## 2021-03-26 RX ADMIN — ENOXAPARIN SODIUM SCH MG: 100 INJECTION SUBCUTANEOUS at 21:02

## 2021-03-26 RX ADMIN — SODIUM CHLORIDE SCH MLS/HR: 900 INJECTION INTRAVENOUS at 05:20

## 2021-03-26 RX ADMIN — INSULIN ASPART SCH UNIT: 100 INJECTION, SOLUTION INTRAVENOUS; SUBCUTANEOUS at 11:53

## 2021-03-26 RX ADMIN — INSULIN ASPART SCH UNIT: 100 INJECTION, SOLUTION INTRAVENOUS; SUBCUTANEOUS at 20:07

## 2021-03-26 RX ADMIN — SODIUM CHLORIDE SCH MLS/HR: 900 INJECTION INTRAVENOUS at 18:20

## 2021-03-26 RX ADMIN — HYDROCHLOROTHIAZIDE SCH MG: 25 TABLET ORAL at 09:08

## 2021-03-26 RX ADMIN — INSULIN ASPART SCH UNITS: 100 INJECTION, SOLUTION INTRAVENOUS; SUBCUTANEOUS at 18:19

## 2021-03-26 RX ADMIN — ASPIRIN SCH MG: 81 TABLET ORAL at 09:04

## 2021-03-26 RX ADMIN — SODIUM CHLORIDE SCH MLS/HR: 900 INJECTION INTRAVENOUS at 11:52

## 2021-03-26 RX ADMIN — INSULIN ASPART SCH UNIT: 100 INJECTION, SOLUTION INTRAVENOUS; SUBCUTANEOUS at 05:15

## 2021-03-26 NOTE — PHYSICIAN QUERY CLARIFICATION
Physician Query-General


Query to Physician:


Clinical Validation Clarification


  : Nicole Barrera


Sepsis has been documented in the medical record.


The patient, in the setting of History/Risk factors,  DM, Obesity, Clinical 

Findings P 108, RR   20, T  36.2   Treatment Cefepime, Vancomycin, NS 1500 Mls 





After study, do you consider  Sepsis to be a clinically valid diagnosis? If not 

clinically valid, please document "Sepsis, ruled out" on the progress notes 

and/or discharge summary.





1. Not agreed, Sepsis not clinically valid/ruled out


2. Agreed, Sepsis clinically valid diagnosis


3. Other, with explanation of the clinical findings


4. Clinically undetermined, no explanation for the clinical findings








Please remember a lack of response to the above will prompt a phone page by 

CDI/coding staff.





In responding to this query, please exercise your independent professional 

judgment.  The purpose of this communication is to more accurately reflect the 

complexity of your patients condition. The fact that a question is asked does 

not imply that any particular answer is desired or expected.  





Thank you for timely response to this clarification.     





Emily Leal, MSN, RN


RN Specialist-Clinical Doc Improvement 


CD -Health Info Mgmt Operations [address]


[address] [facility]


t: [phone] | f: [phone]


If you are unable to reach me at my extension, I may be working from home. 

Please contact me at [phone]





PHYSICIAN RESPONSE:


Based on the clinical findings in the record, please respond to the query above 

on this document as an addendum. 








Physician Response:


Physician Response


1














If you have questions please contact:


                   


:


Ext:





Thank you for your time and cooperation.


Clinical /








*********************This is a permanent part of the medical 

record*******************











EMILY LEAL                   Mar 26, 2021 17:34


NICOLE BARRERA DO                Mar 26, 2021 17:47

## 2021-03-26 NOTE — PHYSICAL THERAPY EVALUATION
PT Evaluation-General


Medical Diagnosis


Admission Date


Mar 23, 2021 at 18:41


Medical Diagnosis:  diabetic ulcer L foot


Onset Date:  Mar 23, 2021





Therapy Diagnosis


Therapy Diagnosis:  weakness, decreased functional status





Height/Weight


Height (Feet):  6


Height (Inches):  0.00


Weight (Pounds):  422


Weight (Ounces):  6.4





Precautions


Precautions/Isolations:  Standard Precautions





Weight Bear Status


Orders state WBAT; pt states Dr. Celestin said NWB





Referral


Physician:  Holly


Reason for Referral:  Evaluation/Treatment





Medical History


Pertinent Medical History:  DM, HTN, Neuropathy


Current History


ED due to diabetic foot ulcer L foot & L lateral malleolus abscess





Social History


Home:  Single Level


Current Living Status:  Spouse


PT Steps Into Home:  2





Prior


Prior Level of Function


SCALE: Activities may be completed with or without assistive devices.





6-Indepedent-patient completes the activity by him/herself with no assistance 

from a helper.


5-Set-up or Clean-up Assistance-helper sets up or cleans up; patient completes 

activity. [place] assists only prior to or  


    following the activity.


4-Supervision or Touching Assistance-helper provides verbal cues and/or 

touching/steadying and/or contact guard assistance as patient completes 

activity. Assistance may be provided   


    throughout the activity or intermittently.


3-Partial/Moderate Assistance-helper does LESS THAN HALF the effort. [place] 

lifts, holds or supports trunk or limbs, but provides less than half the effort.


2-Substantial/Maximal Assistance-helper does MORE THAN HALF the effort. [place] 

lifts or holds trunk or limbs and provides more than half the effort.


6-Zczmfxrnt-lznebu does ALL the effort. Patient does none of the effort to 

complete the activity. Or, the assistance of 2 or more helpers is required for 

the patient to complete the  


    activity.


If activity was not attempted, code reason:


7-Patient Refused.


9-Not Applicable-not attempted and the patient did not perform the activity 

before the current illness, exacerbation or injury.


10-Not Attempted due to Environmental Limitations-(lack of equipment, weather 

restraints, etc.).


88-Not Attempted due to Medical Conditions or Safety Concerns.


Bed Mobility:  6


Transfers (B,C,W/C):  6


Gait:  6


Stairs:  6


Stairs:  Independent


Prior Device Use:  cane





PT Evaluation-Current


Subjective


Pt up in shower with RN upon arrival to room, agreeable to PT treatment at this 

time. reports pain in (L) ankle, not rated.





Pt/Family Goals


return home





Objective


Patient Orientation:  Person, Place, Situation


Attachments:  Other-See Comments (wound vac on (L) ankle )





ROM/Strength


ROM Lower Extremities


WFL for transfers


Strength Lower Extremities


grossly 3/5 with functional mobility





Integumentary/Posture


Integumentary


refer to nursing notes





Sensory


Vision:  Wears Glasses


Hand Dominance:  Right


Sensation Lower Extremities


does have N/T in BLE and UEs





Transfers


Sit to Stand (QC):  4


Chair/Bed-to-Chair Xfer(QC):  4





Gait


Anticipated Mode of Locomotion:  Walk


Walk 10 feet (QC):  4


Distance:  10'


Gait Assistive Device:  FWW


Comments/Gait Description


Pt ambulates from bathroom to bed with CGA, requires CGA for sit to stand as 

well as ambulation. Unable to maintain NWB precautions throughout ambulation, 

requires frequent cueing to maintain.





Assessment/Needs


Pt would benefit from skilled PT to address limitations in strength, balance, 

functional mobility, and activity tolerance in order to restore strength and 

return to PLOF upon dc from hospital


Rehab Potential:  Fair





PT Long Term Goals


Long Term Goals


PT Long Term Goals Time Frame:  Apr 9, 2021


Roll Left & Right (QC):  6


Lying-Sitting on Side/Bed(QC):  6


Sit to Stand (QC):  6


Chair/Bed-to-Chair Xfer(QC):  6


Toilet Transfer (QC):  6


Car Transfer (QC):  6


Walk 10 feet (QC):  6


Walk 50ft with 2 Turns (QC):  6


Walk 150 ft (QC):  6


Walking 10ft on Uneven Surface:  6


1 Step (curb) (QC):  6


4 Steps (QC):  6


12 Steps (QC):  6





PT Plan


Problem List


Problem List:  Activity Tolerance, Functional Strength, Safety, Balance, Gait, 

Transfer, Bed Mobility, ROM





Treatment/Plan


Treatment Plan:  Continue Plan of Care


Treatment Plan:  Bed Mobility, Education, Functional Activity Aung, Functional 

Strength, Gait, Safety, Therapeutic Exercise, Transfers


Treatment Duration:  Apr 9, 2021


Frequency:  6 times per week


Estimated Hrs Per Day:  .25 hour per day





Time/GCodes


Time In:  1145


Time Out:  1208


Total Billed Treatment


1 visit 


JOSÉ (10') 


CHITO (13')











JESENIA BLANCO PT                 Mar 26, 2021 13:52

## 2021-03-26 NOTE — PROGRESS NOTE
Subjective


Date Seen by a Provider:  Mar 26, 2021


Time Seen by a Provider:  12:00


Subjective/Events-last exam


doing ok. no complaints. no systemic symptoms. wound vac changed today with no 

new areas necrosis and small amount of granulation tissue.





Focused Exam


Lactate Level


3/23/21 17:32: Lactic Acid Level 1.52





Objective


Exam





Vital Signs








  Date Time  Temp Pulse Resp B/P (MAP) Pulse Ox O2 Delivery O2 Flow Rate FiO2


 


3/26/21 12:00 34.8 95 20 131/72 (91) 94 Room Air  


 


3/26/21 08:00      Room Air  


 


3/26/21 08:00 36.0 77 18 130/77 (94) 100 Room Air  


 


3/26/21 05:00 36.1 85 18 136/67 (90) 100 Room Air  


 


3/25/21 23:48 36.0 88 18 129/63 (85) 98 Room Air  


 


3/25/21 21:00      Room Air  


 


3/25/21 19:21 36.2 95 19 110/71 (84) 100 Room Air  


 


3/25/21 17:57 35.9 92 20 112/74 100 Room Air  


 


3/25/21 16:20 36.0 92 20 111/79 99 Room Air  


 


3/25/21 16:00 37.7 92 18 126/72 93 Room Air  


 


3/25/21 15:44 37.7 92 18 126/72 (90) 93 Room Air  














I & O 


 


 3/26/21





 07:00


 


Intake Total 2235 ml


 


Output Total 2000 ml


 


Balance 235 ml





Capillary Refill : Less Than 3 SecondsLess Than 3 Seconds


General Appearance:  No Apparent Distress


HEENT:  PERRL/EOMI


Neck:  Full Range of Motion


Respiratory:  Chest Non Tender, Lungs Clear, Decreased Breath Sounds


Cardiovascular:  Regular Rate, Rhythm


Gastrointestinal:  normal bowel sounds, non tender, soft


Extremity:  Normal Capillary Refill


Neurologic/Psychiatric:  Alert, Oriented x3


Skin:  Normal Color


Lymphatic:  No Adenopathy





Results


Lab


Laboratory Tests


3/25/21 15:48: Glucometer 113H


3/25/21 20:34: Glucometer 76


3/26/21 05:10: Glucometer 108


3/26/21 06:25: 


Sodium Level 136, Potassium Level 3.6, Chloride Level 105, Carbon Dioxide Level 

23, Anion Gap 8, Blood Urea Nitrogen 6L, Creatinine 0.69, Estimat Glomerular 

Filtration Rate > 60, BUN/Creatinine Ratio 9, Glucose Level 126H, Calcium Level 

7.9L, Corrected Calcium 9.2, Total Bilirubin 0.2, Aspartate Amino Transf 

(AST/SGOT) 36H, Alanine Aminotransferase (ALT/SGPT) 30, Alkaline Phosphatase 58,

Total Protein 6.1L, Albumin 2.4L, Vancomycin Level Trough 20.1H


3/26/21 06:50: 


White Blood Count 7.5, Red Blood Count 3.21L, Hemoglobin 8.1L, Hematocrit 27L, 

Mean Corpuscular Volume 83, Mean Corpuscular Hemoglobin 25, Mean Corpuscular 

Hemoglobin Concent 31L, Red Cell Distribution Width 16.6H, Platelet Count 416H, 

Mean Platelet Volume 9.2, Immature Granulocyte % (Auto) 7, Neutrophils (%) 

(Auto) 55, Lymphocytes (%) (Auto) 28, Monocytes (%) (Auto) 6, Eosinophils (%) 

(Auto) 4, Basophils (%) (Auto) 0, Neutrophils # (Auto) 4.1, Lymphocytes # (Auto)

2.1, Monocytes # (Auto) 0.5, Eosinophils # (Auto) 0.3, Basophils # (Auto) 0.0, 

Immature Granulocyte # (Auto) 0.5H, Neutrophils % (Manual) 51, Lymphocytes % 

(Manual) 27, Monocytes % (Manual) 11, Eosinophils % (Manual) 4, Band Neutrophils

7, Hypochromasia SLIGHT, Anisocytosis SLIGHT, Microcytosis SLIGHT


3/26/21 10:51: Glucometer 205H





Microbiology


3/24/21 Gram Stain - Final, Resulted


          


3/24/21 Anaerobic Culture, Resulted


          Pending


3/24/21 Surgical Culture - Preliminary, Resulted


          Klebsiella oxytoca


          Pseudomonas aeruginosa


          Enterococcus faecalis


3/24/21 Fungal Culture 1 - Preliminary, Resulted


          


3/24/21 MRSA Screen - Final, Complete


          MRSA not isolated


3/23/21 Blood Culture - Preliminary, Resulted


          No growth





Assessment/Plan


Assessment/Plan


Assess & Plan/Chief Complaint


s/p debridement left heel and ankle secondary nectrotizing soft tissue infection

.











CLAUDY ALEJO MD                Mar 26, 2021 13:20

## 2021-03-27 VITALS — DIASTOLIC BLOOD PRESSURE: 75 MMHG | SYSTOLIC BLOOD PRESSURE: 129 MMHG

## 2021-03-27 VITALS — DIASTOLIC BLOOD PRESSURE: 82 MMHG | SYSTOLIC BLOOD PRESSURE: 141 MMHG

## 2021-03-27 VITALS — SYSTOLIC BLOOD PRESSURE: 106 MMHG | DIASTOLIC BLOOD PRESSURE: 72 MMHG

## 2021-03-27 VITALS — DIASTOLIC BLOOD PRESSURE: 68 MMHG | SYSTOLIC BLOOD PRESSURE: 134 MMHG

## 2021-03-27 VITALS — DIASTOLIC BLOOD PRESSURE: 78 MMHG | SYSTOLIC BLOOD PRESSURE: 111 MMHG

## 2021-03-27 VITALS — SYSTOLIC BLOOD PRESSURE: 130 MMHG | DIASTOLIC BLOOD PRESSURE: 78 MMHG

## 2021-03-27 VITALS — SYSTOLIC BLOOD PRESSURE: 122 MMHG | DIASTOLIC BLOOD PRESSURE: 80 MMHG

## 2021-03-27 LAB
ALBUMIN SERPL-MCNC: 2.3 GM/DL (ref 3.2–4.5)
ALP SERPL-CCNC: 63 U/L (ref 40–136)
ALT SERPL-CCNC: 25 U/L (ref 0–55)
BASOPHILS # BLD AUTO: 0 10^3/UL (ref 0–0.1)
BASOPHILS NFR BLD AUTO: 1 % (ref 0–10)
BILIRUB SERPL-MCNC: 0.1 MG/DL (ref 0.1–1)
BUN/CREAT SERPL: 9
CALCIUM SERPL-MCNC: 8.1 MG/DL (ref 8.5–10.1)
CHLORIDE SERPL-SCNC: 105 MMOL/L (ref 98–107)
CO2 SERPL-SCNC: 23 MMOL/L (ref 21–32)
CREAT SERPL-MCNC: 0.67 MG/DL (ref 0.6–1.3)
EOSINOPHIL # BLD AUTO: 0.2 10^3/UL (ref 0–0.3)
EOSINOPHIL NFR BLD AUTO: 3 % (ref 0–10)
GFR SERPLBLD BASED ON 1.73 SQ M-ARVRAT: > 60 ML/MIN
GLUCOSE SERPL-MCNC: 131 MG/DL (ref 70–105)
HCT VFR BLD CALC: 32 % (ref 40–54)
HGB BLD-MCNC: 9.9 G/DL (ref 13.3–17.7)
LYMPHOCYTES # BLD AUTO: 1.8 10^3/UL (ref 1–4)
LYMPHOCYTES NFR BLD AUTO: 31 % (ref 12–44)
MANUAL DIFFERENTIAL PERFORMED BLD QL: NO
MCH RBC QN AUTO: 25 PG (ref 25–34)
MCHC RBC AUTO-ENTMCNC: 31 G/DL (ref 32–36)
MCV RBC AUTO: 83 FL (ref 80–99)
MONOCYTES # BLD AUTO: 0.4 10^3/UL (ref 0–1)
MONOCYTES NFR BLD AUTO: 7 % (ref 0–12)
NEUTROPHILS # BLD AUTO: 2.9 10^3/UL (ref 1.8–7.8)
NEUTROPHILS NFR BLD AUTO: 50 % (ref 42–75)
PLATELET # BLD: 392 10^3/UL (ref 130–400)
PMV BLD AUTO: 9 FL (ref 9–12.2)
POTASSIUM SERPL-SCNC: 3.8 MMOL/L (ref 3.6–5)
PROT SERPL-MCNC: 6.1 GM/DL (ref 6.4–8.2)
SODIUM SERPL-SCNC: 138 MMOL/L (ref 135–145)
VANCOMYCIN TROUGH SERPL-MCNC: 17.6 UG/ML (ref 10–20)
WBC # BLD AUTO: 5.8 10^3/UL (ref 4.3–11)

## 2021-03-27 RX ADMIN — POTASSIUM CHLORIDE SCH MEQ: 1500 TABLET, EXTENDED RELEASE ORAL at 06:56

## 2021-03-27 RX ADMIN — ENOXAPARIN SODIUM SCH MG: 100 INJECTION SUBCUTANEOUS at 21:09

## 2021-03-27 RX ADMIN — INSULIN ASPART SCH UNITS: 100 INJECTION, SOLUTION INTRAVENOUS; SUBCUTANEOUS at 13:56

## 2021-03-27 RX ADMIN — ENOXAPARIN SODIUM SCH MG: 100 INJECTION SUBCUTANEOUS at 09:34

## 2021-03-27 RX ADMIN — INSULIN ASPART SCH UNIT: 100 INJECTION, SOLUTION INTRAVENOUS; SUBCUTANEOUS at 11:19

## 2021-03-27 RX ADMIN — INSULIN ASPART SCH UNITS: 100 INJECTION, SOLUTION INTRAVENOUS; SUBCUTANEOUS at 18:11

## 2021-03-27 RX ADMIN — INSULIN ASPART SCH UNIT: 100 INJECTION, SOLUTION INTRAVENOUS; SUBCUTANEOUS at 16:15

## 2021-03-27 RX ADMIN — SODIUM CHLORIDE SCH MLS/HR: 900 INJECTION INTRAVENOUS at 13:56

## 2021-03-27 RX ADMIN — VANCOMYCIN HYDROCHLORIDE SCH MLS/HR: 500 INJECTION, POWDER, LYOPHILIZED, FOR SOLUTION INTRAVENOUS at 21:09

## 2021-03-27 RX ADMIN — LISINOPRIL SCH MG: 40 TABLET ORAL at 09:34

## 2021-03-27 RX ADMIN — PANTOPRAZOLE SODIUM SCH MG: 40 TABLET, DELAYED RELEASE ORAL at 09:34

## 2021-03-27 RX ADMIN — SODIUM CHLORIDE SCH MLS/HR: 900 INJECTION INTRAVENOUS at 06:59

## 2021-03-27 RX ADMIN — DOCUSATE SODIUM AND SENNOSIDES SCH EA: 8.6; 5 TABLET, FILM COATED ORAL at 09:34

## 2021-03-27 RX ADMIN — HYDROCHLOROTHIAZIDE SCH MG: 25 TABLET ORAL at 09:34

## 2021-03-27 RX ADMIN — INSULIN ASPART SCH UNIT: 100 INJECTION, SOLUTION INTRAVENOUS; SUBCUTANEOUS at 21:09

## 2021-03-27 RX ADMIN — VANCOMYCIN HYDROCHLORIDE SCH MLS/HR: 500 INJECTION, POWDER, LYOPHILIZED, FOR SOLUTION INTRAVENOUS at 09:35

## 2021-03-27 RX ADMIN — SODIUM CHLORIDE SCH MLS/HR: 900 INJECTION INTRAVENOUS at 00:38

## 2021-03-27 RX ADMIN — ASPIRIN SCH MG: 81 TABLET ORAL at 09:33

## 2021-03-27 RX ADMIN — INSULIN ASPART SCH UNITS: 100 INJECTION, SOLUTION INTRAVENOUS; SUBCUTANEOUS at 09:34

## 2021-03-27 RX ADMIN — SODIUM CHLORIDE SCH MLS/HR: 900 INJECTION INTRAVENOUS at 18:11

## 2021-03-27 RX ADMIN — DOCUSATE SODIUM AND SENNOSIDES SCH EA: 8.6; 5 TABLET, FILM COATED ORAL at 21:09

## 2021-03-27 RX ADMIN — INSULIN ASPART SCH UNIT: 100 INJECTION, SOLUTION INTRAVENOUS; SUBCUTANEOUS at 06:59

## 2021-03-27 NOTE — PROGRESS NOTE - HOSPITALIST
Subjective


HPI/CC On Admission


Date Seen by Provider:  Mar 27, 2021


Time Seen by Provider:  11:00


CC: Left lateral skin abscess with left heal osteomyelitis





HPI: This is a 40yoWM of [facility] who has a PMH of super-morbid obesity with DM who 

has experienced left heal, chronic diabetic ulcer for the past three months with

Dr. Guzmán at wound care, who presented to the ER with drainage from a lateral 

skin abscess, Dr. Jiménez was evaluation the Pt for a possible amputation but he is

not aware of that plan. The goal would be to perform a debridement, monitor labs

and his Hgb is 8.6, and potassium 3.5, we will have a picc line placed with 

Cefepime and Vancomycin empirically to treat the osteomyelitis presumptively in 

addition to gram-negative coverage. At this current time Pt denies any pain.


Subjective/Events-last exam


Patient doing well


Hgb 9.9 today


No pain reported


Wound vac in place


IV abx maintained


PT OT


IRF eval awaiting insurance approval





Review of Systems


General:  Fatigue, Malaise





Objective


Exam


Vital Signs





Vital Signs








  Date Time  Temp Pulse Resp B/P (MAP) Pulse Ox O2 Delivery O2 Flow Rate FiO2


 


3/27/21 15:34 35.7 84 16 141/82 (101) 97 Room Air  





Capillary Refill : Less Than 3 SecondsLess Than 3 Seconds


General Appearance:  No Apparent Distress, WD/WN, Chronically ill


Respiratory:  Lungs Clear


Cardiovascular:  Regular Rate, Rhythm


Neurologic/Psychiatric:  Alert, Oriented x3, No Motor/Sensory Deficits, Normal 

Mood/Affect





Results/Procedures


Lab


Laboratory Tests


3/27/21 06:50








Patient resulted labs reviewed.





Assessment/Plan


Assessment and Plan


Assess & Plan/Chief Complaint








Assessment:


Left lateral ankle abscess s/p debridement with wound vac placement


Heel DM ulcer s/p debridement


Obesity


DM


Anemia s/p 1 unit of blood iron level pending





Plan:


IV abx


Wound vac


Insulin


Monitor hgb





3/27/21:


IV abx


Monitor closely





Diagnosis/Problems


Diagnosis/Problems





(1) Diabetic foot ulcer


(2) Hyperglycemia


Status:  Acute


(3) Hypertension


Status:  Chronic


(4) Diabetes mellitus, type 2


Status:  Chronic


(5) Diabetic ulcer of foot associated with diabetes mellitus due to underlying 

condition, with necrosis of muscle


Status:  Acute


(6) ANCA (acute kidney injury)


Status:  Resolved


Resolution Date/Time:  4/30/20 @ 10:28











NICOLE VILA DO                Mar 27, 2021 07:58

## 2021-03-27 NOTE — PHYSICAL THERAPY DAILY NOTE
PT Daily Note-Current


Subjective


(L) foot soreness reported on arrival.





Pain





   Numeric Pain Scale:  5-Moderate Pain


   Location:  Left


   Location Body Site:  Foot


   Pain Description:  Ache, Dull





Mental Status


Patient Orientation:  Person, Place, Time, Situation


Attachments:  Drains





Transfers


SCALE: Activities may be completed with or without assistive devices.





6-Indepedent-patient completes the activity by him/herself with no assistance 

from a helper.


5-Set-up or Clean-up Assistance-helper sets up or cleans up; patient completes 

activity. [place] assists only prior to or  


    following the activity.


4-Supervision or Touching Assistance-helper provides verbal cues and/or 

touching/steadying and/or contact guard assistance as patient completes 

activity. Assistance may be provided   


    throughout the activity or intermittently.


3-Partial/Moderate Assistance-helper does LESS THAN HALF the effort. [place] 

lifts, holds or supports trunk or limbs, but provides less than half the effort.


2-Substantial/Maximal Assistance-helper does MORE THAN HALF the effort. [place] 

lifts or holds trunk or limbs and provides more than half the effort.


7-Vzkydyieb-lxsvpy does ALL the effort. Patient does none of the effort to 

complete the activity. Or, the assistance of 2 or more helpers is required for 

the patient to complete the  


    activity.


If activity was not attempted, code reason:


7-Patient Refused.


9-Not Applicable-not attempted and the patient did not perform the activity 

before the current illness, exacerbation or injury.


10-Not Attempted due to Environmental Limitations-(lack of equipment, weather 

restraints, etc.).


88-Not Attempted due to Medical Conditions or Safety Concerns.


Roll Left & Right (QC):  5


Sit to Lying (QC):  5


Lying to Sitting/Side of Bed(Q:  5


Sit to Stand (QC):  5


Chair/Bed-to-Chair Xfer(QC):  5


Toilet Transfer (QC):  5





Weight Bearing


Orders state WBAT; pt states Dr. Celestin said NWB





Gait Training


Does the Patient Walk?:  Yes


Distance:  30ft


Walk 10 feet (QC):  6


Gait Persons Needed:  1


Gait Assistive Device:  FWW





Exercises


Supine Ex:  LE Protocol


Supine Reps:  20





Assessment


Current Status:  Good Progress


Pt was able to perform ambulation with NWB on the (L) as directed.  Good 

performance of supine exercises without assist.





PT Long Term Goals


Long Term Goals


PT Long Term Goals Time Frame:  Apr 9, 2021


Roll Left & Right (QC):  6


Lying-Sitting on Side/Bed(QC):  6


Sit to Stand (QC):  6


Chair/Bed-to-Chair Xfer(QC):  6


Toilet Transfer (QC):  6


Car Transfer (QC):  6


Walk 10 feet (QC):  6


Walk 50ft with 2 Turns (QC):  6


Walk 150 ft (QC):  6


Walking 10ft on Uneven Surface:  6


1 Step (curb) (QC):  6


4 Steps (QC):  6


12 Steps (QC):  6





PT Plan


Treatment/Plan


Treatment Plan:  Continue Plan of Care


Treatment Plan:  Bed Mobility, Education, Functional Activity Aung, Functional 

Strength, Gait, Safety, Therapeutic Exercise, Transfers


Treatment Duration:  Apr 9, 2021


Frequency:  6 times per week


Estimated Hrs Per Day:  .25 hour per day





Time/GCodes


Time In:  0905


Time Out:  0930


Total Billed Treatment Time:  25


Total Billed Treatment


1, ex 15, gt 10











TL HARRINGTON PT            Mar 27, 2021 11:15

## 2021-03-28 VITALS — SYSTOLIC BLOOD PRESSURE: 129 MMHG | DIASTOLIC BLOOD PRESSURE: 84 MMHG

## 2021-03-28 VITALS — DIASTOLIC BLOOD PRESSURE: 97 MMHG | SYSTOLIC BLOOD PRESSURE: 162 MMHG

## 2021-03-28 VITALS — DIASTOLIC BLOOD PRESSURE: 82 MMHG | SYSTOLIC BLOOD PRESSURE: 123 MMHG

## 2021-03-28 RX ADMIN — DOCUSATE SODIUM AND SENNOSIDES SCH EA: 8.6; 5 TABLET, FILM COATED ORAL at 19:32

## 2021-03-28 RX ADMIN — INSULIN ASPART SCH UNIT: 100 INJECTION, SOLUTION INTRAVENOUS; SUBCUTANEOUS at 18:39

## 2021-03-28 RX ADMIN — ENOXAPARIN SODIUM SCH MG: 100 INJECTION SUBCUTANEOUS at 19:32

## 2021-03-28 RX ADMIN — DOCUSATE SODIUM AND SENNOSIDES SCH EA: 8.6; 5 TABLET, FILM COATED ORAL at 08:11

## 2021-03-28 RX ADMIN — VANCOMYCIN HYDROCHLORIDE SCH MLS/HR: 500 INJECTION, POWDER, LYOPHILIZED, FOR SOLUTION INTRAVENOUS at 19:32

## 2021-03-28 RX ADMIN — INSULIN ASPART SCH UNIT: 100 INJECTION, SOLUTION INTRAVENOUS; SUBCUTANEOUS at 20:37

## 2021-03-28 RX ADMIN — SODIUM CHLORIDE SCH MLS/HR: 900 INJECTION INTRAVENOUS at 18:39

## 2021-03-28 RX ADMIN — POTASSIUM CHLORIDE SCH MEQ: 1500 TABLET, EXTENDED RELEASE ORAL at 06:31

## 2021-03-28 RX ADMIN — DOCUSATE SODIUM AND SENNOSIDES SCH EA: 8.6; 5 TABLET, FILM COATED ORAL at 08:37

## 2021-03-28 RX ADMIN — INSULIN ASPART SCH UNIT: 100 INJECTION, SOLUTION INTRAVENOUS; SUBCUTANEOUS at 11:36

## 2021-03-28 RX ADMIN — SODIUM CHLORIDE SCH MLS/HR: 900 INJECTION INTRAVENOUS at 06:31

## 2021-03-28 RX ADMIN — LISINOPRIL SCH MG: 40 TABLET ORAL at 08:11

## 2021-03-28 RX ADMIN — INSULIN ASPART SCH UNIT: 100 INJECTION, SOLUTION INTRAVENOUS; SUBCUTANEOUS at 06:26

## 2021-03-28 RX ADMIN — ASPIRIN SCH MG: 81 TABLET ORAL at 08:11

## 2021-03-28 RX ADMIN — VANCOMYCIN HYDROCHLORIDE SCH MLS/HR: 500 INJECTION, POWDER, LYOPHILIZED, FOR SOLUTION INTRAVENOUS at 08:17

## 2021-03-28 RX ADMIN — INSULIN ASPART SCH UNIT: 100 INJECTION, SOLUTION INTRAVENOUS; SUBCUTANEOUS at 13:25

## 2021-03-28 RX ADMIN — INSULIN ASPART SCH UNIT: 100 INJECTION, SOLUTION INTRAVENOUS; SUBCUTANEOUS at 16:55

## 2021-03-28 RX ADMIN — ENOXAPARIN SODIUM SCH MG: 100 INJECTION SUBCUTANEOUS at 08:12

## 2021-03-28 RX ADMIN — PANTOPRAZOLE SODIUM SCH MG: 40 TABLET, DELAYED RELEASE ORAL at 08:11

## 2021-03-28 RX ADMIN — SODIUM CHLORIDE SCH MLS/HR: 900 INJECTION INTRAVENOUS at 13:25

## 2021-03-28 RX ADMIN — SODIUM CHLORIDE SCH MLS/HR: 900 INJECTION INTRAVENOUS at 00:40

## 2021-03-28 RX ADMIN — INSULIN ASPART SCH UNIT: 100 INJECTION, SOLUTION INTRAVENOUS; SUBCUTANEOUS at 08:29

## 2021-03-28 RX ADMIN — HYDROCHLOROTHIAZIDE SCH MG: 25 TABLET ORAL at 08:11

## 2021-03-28 NOTE — PROGRESS NOTE - HOSPITALIST
Subjective


HPI/CC On Admission


Date Seen by Provider:  Mar 28, 2021


Time Seen by Provider:  11:30


CC: Left lateral skin abscess with left heal osteomyelitis





HPI: This is a 40yo[place] of [place] who has a PMH of super-morbid obesity with DM who 

has experienced left heal, chronic diabetic ulcer for the past three months with

Dr. Guzmán at wound care, who presented to the ER with drainage from a lateral 

skin abscess, Dr. Jiménez was evaluation the Pt for a possible amputation but he is

not aware of that plan. The goal would be to perform a debridement, monitor labs

and his Hgb is 8.6, and potassium 3.5, we will have a picc line placed with 

Cefepime and Vancomycin empirically to treat the osteomyelitis presumptively in 

addition to gram-negative coverage. At this current time Pt denies any pain.


Subjective/Events-last exam


Patient doing well


Labs due tomorrow


Empiric abx maintained


Reviewed meds and labs


No pain


BM+





Review of Systems


Musculoskeletal:  foot pain





Objective


Exam


Vital Signs





Vital Signs








  Date Time  Temp Pulse Resp B/P (MAP) Pulse Ox O2 Delivery O2 Flow Rate FiO2


 


3/28/21 19:30      Room Air  


 


3/28/21 15:40 35.9 86 18 123/82 (96) 98   





Capillary Refill : Less Than 3 SecondsLess Than 3 Seconds


General Appearance:  No Apparent Distress, WD/WN, Chronically ill, Obese


Respiratory:  Lungs Clear


Cardiovascular:  Regular Rate, Rhythm


Neurologic/Psychiatric:  Alert, Oriented x3, No Motor/Sensory Deficits, Normal 

Mood/Affect





Results/Procedures


Lab


Patient resulted labs reviewed.





Assessment/Plan


Assessment and Plan


Assess & Plan/Chief Complaint








Assessment:


Left lateral ankle abscess s/p debridement with wound vac placement


Heel DM ulcer s/p debridement


Obesity


DM


Anemia s/p 1 unit of blood iron level 8 so will order iron infusions





Plan:


IV abx


Wound vac


Insulin


Monitor hgb





3/27/21:


IV abx


Monitor closely





3/28/21:


Monitor wound vac


Abx


IRF


Iron infusions


Lovenox





Diagnosis/Problems


Diagnosis/Problems





(1) Diabetic foot ulcer


(2) Hyperglycemia


Status:  Acute


(3) Hypertension


Status:  Chronic


(4) Diabetes mellitus, type 2


Status:  Chronic


(5) Diabetic ulcer of foot associated with diabetes mellitus due to underlying 

condition, with necrosis of muscle


Status:  Acute


(6) ANCA (acute kidney injury)


Status:  Resolved


Resolution Date/Time:  4/30/20 @ 10:28











NICOLE VILA DO                Mar 28, 2021 07:13

## 2021-03-29 ENCOUNTER — HOSPITAL ENCOUNTER (INPATIENT)
Dept: HOSPITAL 75 - IRF | Age: 41
LOS: 16 days | Discharge: ANOTHER HOSPITAL | DRG: 638 | End: 2021-04-14
Attending: INTERNAL MEDICINE | Admitting: INTERNAL MEDICINE
Payer: COMMERCIAL

## 2021-03-29 VITALS — BODY MASS INDEX: 42.66 KG/M2 | WEIGHT: 315 LBS | HEIGHT: 72.01 IN

## 2021-03-29 VITALS — SYSTOLIC BLOOD PRESSURE: 152 MMHG | DIASTOLIC BLOOD PRESSURE: 70 MMHG

## 2021-03-29 VITALS — DIASTOLIC BLOOD PRESSURE: 65 MMHG | SYSTOLIC BLOOD PRESSURE: 137 MMHG

## 2021-03-29 VITALS — SYSTOLIC BLOOD PRESSURE: 160 MMHG | DIASTOLIC BLOOD PRESSURE: 90 MMHG

## 2021-03-29 VITALS — DIASTOLIC BLOOD PRESSURE: 71 MMHG | SYSTOLIC BLOOD PRESSURE: 107 MMHG

## 2021-03-29 VITALS — SYSTOLIC BLOOD PRESSURE: 117 MMHG | DIASTOLIC BLOOD PRESSURE: 77 MMHG

## 2021-03-29 DIAGNOSIS — Z79.4: ICD-10-CM

## 2021-03-29 DIAGNOSIS — Z83.3: ICD-10-CM

## 2021-03-29 DIAGNOSIS — M54.9: ICD-10-CM

## 2021-03-29 DIAGNOSIS — E11.65: ICD-10-CM

## 2021-03-29 DIAGNOSIS — L02.416: ICD-10-CM

## 2021-03-29 DIAGNOSIS — Z79.82: ICD-10-CM

## 2021-03-29 DIAGNOSIS — R29.898: ICD-10-CM

## 2021-03-29 DIAGNOSIS — L97.426: ICD-10-CM

## 2021-03-29 DIAGNOSIS — E11.42: ICD-10-CM

## 2021-03-29 DIAGNOSIS — Z82.49: ICD-10-CM

## 2021-03-29 DIAGNOSIS — Z87.891: ICD-10-CM

## 2021-03-29 DIAGNOSIS — E11.621: ICD-10-CM

## 2021-03-29 DIAGNOSIS — E11.69: Primary | ICD-10-CM

## 2021-03-29 DIAGNOSIS — M86.9: ICD-10-CM

## 2021-03-29 DIAGNOSIS — D64.9: ICD-10-CM

## 2021-03-29 DIAGNOSIS — E66.01: ICD-10-CM

## 2021-03-29 DIAGNOSIS — I10: ICD-10-CM

## 2021-03-29 DIAGNOSIS — R26.89: ICD-10-CM

## 2021-03-29 DIAGNOSIS — E78.00: ICD-10-CM

## 2021-03-29 DIAGNOSIS — M25.372: ICD-10-CM

## 2021-03-29 DIAGNOSIS — G47.33: ICD-10-CM

## 2021-03-29 DIAGNOSIS — E11.649: ICD-10-CM

## 2021-03-29 DIAGNOSIS — I96: ICD-10-CM

## 2021-03-29 DIAGNOSIS — Z88.1: ICD-10-CM

## 2021-03-29 LAB
ALBUMIN SERPL-MCNC: 2.9 GM/DL (ref 3.2–4.5)
ALP SERPL-CCNC: 76 U/L (ref 40–136)
ALT SERPL-CCNC: 25 U/L (ref 0–55)
BASOPHILS # BLD AUTO: 0 10^3/UL (ref 0–0.1)
BASOPHILS NFR BLD AUTO: 0 % (ref 0–10)
BILIRUB SERPL-MCNC: 0.2 MG/DL (ref 0.1–1)
BUN/CREAT SERPL: 13
CALCIUM SERPL-MCNC: 9 MG/DL (ref 8.5–10.1)
CHLORIDE SERPL-SCNC: 101 MMOL/L (ref 98–107)
CO2 SERPL-SCNC: 24 MMOL/L (ref 21–32)
CREAT SERPL-MCNC: 0.79 MG/DL (ref 0.6–1.3)
EOSINOPHIL # BLD AUTO: 0.3 10^3/UL (ref 0–0.3)
EOSINOPHIL NFR BLD AUTO: 3 % (ref 0–10)
GFR SERPLBLD BASED ON 1.73 SQ M-ARVRAT: > 60 ML/MIN
GLUCOSE SERPL-MCNC: 167 MG/DL (ref 70–105)
HCT VFR BLD CALC: 34 % (ref 40–54)
HGB BLD-MCNC: 9.7 G/DL (ref 13.3–17.7)
LYMPHOCYTES # BLD AUTO: 3.1 10^3/UL (ref 1–4)
LYMPHOCYTES NFR BLD AUTO: 38 % (ref 12–44)
MANUAL DIFFERENTIAL PERFORMED BLD QL: NO
MCH RBC QN AUTO: 25 PG (ref 25–34)
MCHC RBC AUTO-ENTMCNC: 29 G/DL (ref 32–36)
MCV RBC AUTO: 86 FL (ref 80–99)
MONOCYTES # BLD AUTO: 0.4 10^3/UL (ref 0–1)
MONOCYTES NFR BLD AUTO: 5 % (ref 0–12)
NEUTROPHILS # BLD AUTO: 3.9 10^3/UL (ref 1.8–7.8)
NEUTROPHILS NFR BLD AUTO: 47 % (ref 42–75)
PLATELET # BLD: 716 10^3/UL (ref 130–400)
PMV BLD AUTO: 8.5 FL (ref 9–12.2)
POTASSIUM SERPL-SCNC: 4.5 MMOL/L (ref 3.6–5)
PROT SERPL-MCNC: 7.4 GM/DL (ref 6.4–8.2)
SODIUM SERPL-SCNC: 136 MMOL/L (ref 135–145)
WBC # BLD AUTO: 8.3 10^3/UL (ref 4.3–11)

## 2021-03-29 PROCEDURE — 80048 BASIC METABOLIC PNL TOTAL CA: CPT

## 2021-03-29 PROCEDURE — 80053 COMPREHEN METABOLIC PANEL: CPT

## 2021-03-29 PROCEDURE — 36415 COLL VENOUS BLD VENIPUNCTURE: CPT

## 2021-03-29 PROCEDURE — 73701 CT LOWER EXTREMITY W/DYE: CPT

## 2021-03-29 PROCEDURE — 86141 C-REACTIVE PROTEIN HS: CPT

## 2021-03-29 PROCEDURE — 85652 RBC SED RATE AUTOMATED: CPT

## 2021-03-29 PROCEDURE — 85027 COMPLETE CBC AUTOMATED: CPT

## 2021-03-29 PROCEDURE — 82274 ASSAY TEST FOR BLOOD FECAL: CPT

## 2021-03-29 PROCEDURE — 82962 GLUCOSE BLOOD TEST: CPT

## 2021-03-29 PROCEDURE — 85025 COMPLETE CBC W/AUTO DIFF WBC: CPT

## 2021-03-29 RX ADMIN — ASPIRIN SCH MG: 81 TABLET ORAL at 08:27

## 2021-03-29 RX ADMIN — LISINOPRIL SCH MG: 40 TABLET ORAL at 08:26

## 2021-03-29 RX ADMIN — INSULIN ASPART SCH UNIT: 100 INJECTION, SOLUTION INTRAVENOUS; SUBCUTANEOUS at 13:27

## 2021-03-29 RX ADMIN — DOCUSATE SODIUM AND SENNOSIDES SCH EA: 8.6; 5 TABLET, FILM COATED ORAL at 08:35

## 2021-03-29 RX ADMIN — HYDROCHLOROTHIAZIDE SCH MG: 25 TABLET ORAL at 08:27

## 2021-03-29 RX ADMIN — METFORMIN HYDROCHLORIDE SCH MG: 500 TABLET, EXTENDED RELEASE ORAL at 17:11

## 2021-03-29 RX ADMIN — DOCUSATE SODIUM SCH MG: 100 CAPSULE ORAL at 21:30

## 2021-03-29 RX ADMIN — PANTOPRAZOLE SODIUM SCH MG: 40 TABLET, DELAYED RELEASE ORAL at 08:27

## 2021-03-29 RX ADMIN — INSULIN ASPART SCH UNIT: 100 INJECTION, SOLUTION INTRAVENOUS; SUBCUTANEOUS at 13:35

## 2021-03-29 RX ADMIN — ENOXAPARIN SODIUM SCH MG: 100 INJECTION SUBCUTANEOUS at 08:34

## 2021-03-29 RX ADMIN — SODIUM CHLORIDE SCH MLS/HR: 900 INJECTION, SOLUTION INTRAVENOUS at 18:28

## 2021-03-29 RX ADMIN — INSULIN ASPART SCH UNIT: 100 INJECTION, SOLUTION INTRAVENOUS; SUBCUTANEOUS at 08:29

## 2021-03-29 RX ADMIN — POLYETHYLENE GLYCOL (3350) SCH GM: 17 POWDER, FOR SOLUTION ORAL at 21:30

## 2021-03-29 RX ADMIN — VANCOMYCIN HYDROCHLORIDE SCH MLS/HR: 500 INJECTION, POWDER, LYOPHILIZED, FOR SOLUTION INTRAVENOUS at 09:36

## 2021-03-29 RX ADMIN — GLYBURIDE SCH MG: 2.5 TABLET ORAL at 16:56

## 2021-03-29 RX ADMIN — INSULIN ASPART SCH UNIT: 100 INJECTION, SOLUTION INTRAVENOUS; SUBCUTANEOUS at 16:57

## 2021-03-29 RX ADMIN — DOCUSATE SODIUM AND SENNOSIDES SCH EA: 8.6; 5 TABLET, FILM COATED ORAL at 21:30

## 2021-03-29 RX ADMIN — INSULIN ASPART SCH UNIT: 100 INJECTION, SOLUTION INTRAVENOUS; SUBCUTANEOUS at 06:06

## 2021-03-29 RX ADMIN — SODIUM CHLORIDE SCH MLS/HR: 900 INJECTION INTRAVENOUS at 00:52

## 2021-03-29 RX ADMIN — POTASSIUM CHLORIDE SCH MEQ: 1500 TABLET, EXTENDED RELEASE ORAL at 05:49

## 2021-03-29 RX ADMIN — INSULIN ASPART SCH UNIT: 100 INJECTION, SOLUTION INTRAVENOUS; SUBCUTANEOUS at 21:20

## 2021-03-29 RX ADMIN — SODIUM CHLORIDE SCH MLS/HR: 900 INJECTION INTRAVENOUS at 05:49

## 2021-03-29 RX ADMIN — INSULIN ASPART SCH UNIT: 100 INJECTION, SOLUTION INTRAVENOUS; SUBCUTANEOUS at 15:55

## 2021-03-29 NOTE — PHYSICAL THERAPY EVALUATION
PT Evaluation-General


Medical Diagnosis


Admission Date


Mar 29, 2021 at 10:09


Medical Diagnosis:  L Foot Diabetic Ulcer


Onset Date:  Mar 23, 2021





Therapy Diagnosis


Therapy Diagnosis:  Impaired functional mobility





Height/Weight


Height (Feet):  6


Height (Inches):  0.00


Weight (Pounds):  422


Weight (Ounces):  6.4





Weight Bear Status


Right Lower Extremity:  Right


Full Weight Bearing


Left Lower Extremity:  Left


Non Weight Bearing





Patient NWB on L foot, no pressure allowed





Referral


Physician:  Holly


Reason for Referral:  Evaluation/Treatment





Medical History


Pertinent Medical History:  DM, HTN, Neuropathy


Additional Medical History


Severe obesity


Current History


ED due to diabetic foot ulcer and lateral malleolus abscess


Reviewed History:  Yes





Social History


Home:  Single Level


Current Living Status:  Spouse


Entry Into Home:  Stairs With [place]ailing


PT Steps Into Home:  2





Prior


Prior Level of Function


SCALE: Activities may be completed with or without assistive devices.





6-Indepedent-patient completes the activity by him/herself with no assistance 

from a helper.


5-Set-up or Clean-up Assistance-helper sets up or cleans up; patient completes 

activity. [place] assists only prior to or  


    following the activity.


4-Supervision or Touching Assistance-helper provides verbal cues and/or 

touching/steadying and/or contact guard assistance as patient completes 

activity. Assistance may be provided   


    throughout the activity or intermittently.


3-Partial/Moderate Assistance-helper does LESS THAN HALF the effort. [place] 

lifts, holds or supports trunk or limbs, but provides less than half the effort.


2-Substantial/Maximal Assistance-helper does MORE THAN HALF the effort. [place] 

lifts or holds trunk or limbs and provides more than half the effort.


5-Mkofglvcj-xetxux does ALL the effort. Patient does none of the effort to 

complete the activity. Or, the assistance of 2 or more helpers is required for 

the patient to complete the  


    activity.


If activity was not attempted, code reason:


7-Patient Refused.


9-Not Applicable-not attempted and the patient did not perform the activity 

before the current illness, exacerbation or injury.


10-Not Attempted due to Environmental Limitations-(lack of equipment, weather 

restraints, etc.).


88-Not Attempted due to Medical Conditions or Safety Concerns.


Bed Mobility:  6


Transfers (B,C,W/C):  6


Gait:  6


Stairs:  6


Wheelchair Mobility:  6


Indoor Mobility (Ambulation):  Independent


Stairs:  Independent


Prior Devices Use:  Manual wheelchair


Prior Device Use:  Cane


Patient has been using a cane to get around home for several years. Patient has 

been utilizing manual w/c at [employer] where he works since February.





PT Evaluation-Current


Subjective


Patient reports no pain during treatment secondary to neuropathy, but notes N/T 

in bilateral feet and UE's. Patient was seated EOB pre tx. Patient consented to 

begin PT treatment.





Pt/Family Goals


[place] at home with functional mobility





Objective


Patient Orientation:  Person, Place, Time, Normal For Age


Wound Vac





ROM/Strength


ROM Lower Extremities


Moderately diminished (greatest deficits in bilateral DF/PF)


Strength Lower Extremities


(L) Grossly diminished (quads 4-/5, knee extension 4/5, DF 3-/5) (R) Grossly 

diminished (quads 4-/5, knee extension 4/5, DF 3+/5) Patient experiences 

bilateral foot drop secondary to neuropathy





Integumentary/Posture


Integumentary


See nursing report


Bowel Incontinence:  No


Bladder Incontinence:  No


Posture


mild kyphosis





Neuromuscular


(Tone, Coordination, Reflexes)


Patient has bilateral foot drop.





Sensory


Vision:  Wears Glasses


Hand Dominance:  Right


Sensation Right Upper Extremit:  Impaired


Sensation Left Upper Extremity:  Impaired


Sensation Right Lower Extremit:  Impaired


Sensation Left Lower Extremity:  Impaired


Sensation Lower Extremities


Secondary to peripheral neuropathy





Transfers


Roll Left & Right (QC):  6


Sit to Lying (QC):  6


Lying to Sitting/Side of Bed(Q:  6


Sit to Stand (QC):  4


Chair/Bed-to-Chair Xfer(QC):  4


Toilet Transfer (QC):  4


Car Transfer (QC):  4


CGA x1 utilized: patient has difficulty managing bilateral LE's while 

maintaining NWB status without mild assistance





Gait


Does the Patient Walk?:  No and Walking Goal IS indicated


Mode of Locomotion:  Wheelchair


Anticipated Mode of Locomotion:  Walk


Walk 10 feet (QC):  88


Walk 50 ft with 2 Turns(QC):  88


Walk 150 ft (QC):  88


Walking 10ft/uneven surface-QC:  88


Comments/Gait Description


Patient unable to maintain NWB status with ambulation





Wheelchair Training


Does the Pt Use a Wheelchair?:  Yes


Distance:  200' x2


Wheel 50 ft with 2 turns (QC):  6


Wheel 150 ft (QC):  6


Type of Wheelchair:  Manual


Patient able to slowly maneuver w/c forward with use of UE's and R LE. Patient 

pushed w/c backwards with R LE extension. Patient noted this is his preferred 

method of manipulating w/c at occupation.





Stairs


1 Step (curb) (QC):  88


4 Steps (QC):  88


12 Steps (QC):  88


Unable to maintain NWB status on stairs





Balance


Sitting Static:  Good


Sitting Dynamic:  Good


Picking up an Object (QC):  88





Treatment


Patient was able to  paralell bars for ~2' and perform L LE strengthe

keli exercises including hip abduction, extension, and hamstring curls, while WB

on R. Patient performed 2 sets of 20





Assessment/Needs


Patient demonstrates independence with bed mobility. Patient is motivated to 

participated in therapy, but is limited secondary to weakness and impaired 

sensation in LE's that causes difficulty with maintained NWB status on L foot. 

Patient will benefit from endurance, balance, and functional mobility 

interventions to maintain strength to manage mobility requirements for safe 

return home.


Rehab Potential:  Fair





PT Short Term Goals


Short Term Goals


Time Frame:  2021


Roll Left & Right:  6


Sit to lyin


Lying to sitting on side of be:  6


Sit to stand:  4 (SBA)


Chair/bed-to-chair transfer:  4 (SBA)


Toilet transfer:  4 (SBA)


Car transfer:  4 (SBA)


Walk 10 feet:  4 (SBA)


Does pt use a wc or scooter:  Yes


Wheel 50ft w/2 turns:  6


Wheel 150 feet:  6


Type:  Manual





PT Long Term Goals


Long Term Goals


PT Long Term Goals Time Frame:  2021


Roll Left & Right (QC):  6


Sit to Lying (QC):  6


Lying-Sitting on Side/Bed(QC):  6


Sit to Stand (QC):  6


Chair/Bed-to-Chair Xfer(QC):  6


Toilet Transfer (QC):  6


Car Transfer (QC):  6


Does the Patient Walk:  No and Walking Goal IS indicated


Walk 10 feet (QC):  6


Walk 50ft with 2 Turns (QC):  6


Walk 150 ft (QC):  88


Walking 10ft on Uneven Surface:  6


1 Step (curb) (QC):  4


4 Steps (QC):  88


12 Steps (QC):  88


Picking up an Object (QC):  88


Does the Pt use WC or Scooter?:  Yes


Wheel 50 feet with 2 turns (QC:  6


Type:  Manual


Wheel 150 feet:  6


Type:  Manual





PT Plan


Problem List


Problem List:  Activity Tolerance, Functional Strength, Safety, Balance, Gait, 

Transfer, Bed Mobility, ROM





Treatment/Plan


Treatment Plan:  Continue Plan of Care


Treatment Plan:  Bed Mobility, Education, Functional Activity Aung, Functional 

Strength, Group Therapy, Gait, Safety, Therapeutic Exercise, Transfers


Treatment Duration:  2021


Frequency:  At least 5 of 7 days/Wk (IRF)


Estimated Hrs Per Day:  1.5 hours per day


Patient and/or Family Agrees t:  Yes





Safety Risks/Education


Patient Education:  Transfer Techniques, Reviewed Precautions, Correct 

Positioning, W/C Management, Disease Process, Safety Issues


Teaching Recipient:  Patient


Teaching Methods:  Demonstration, Discussion


Response to Teaching:  Verbalize Understanding


Educated patient on calling for assistance with transfer to/from bathroom 

secondary to inability to maintain NWB status with ambulation, to promote 

optimal wound healing.





Discharge Recommendations


Plan


Patient will perform bed mobility and transfer training, balance and endurance 

training, functional strengthening, stair training, gait training, and 

education, to improve functional mobility and independence at home.


Therapy Discharge Recommendati:  Home & Family





Time/GCodes


Time In:  1030


Time Out:  1130


Total Billed Treatment Time:  60


Total Billed Treatment


1 visit: 


EVH: 10' 


EX x3: 50'











CAMILLE HARRY PT                Mar 29, 2021 11:23

## 2021-03-29 NOTE — PHYSICAL THERAPY DAILY NOTE
PT Daily Note-Current


Subjective


Patient reports no pain during therapy tx. Patient seated upright in bed, with L

lower leg elevated by pillow with heel hanging off edge with no pressure applied

to heel. Patient consented to therapy.





Appearance


Patient left upright in bed, with tray table nearby and call light within reach.

Patient left with L leg elevated by pillow with heel hanging off edge with no 

pressure applied to heel to maintain pressure restrictions.





Mental Status


Patient Orientation:  Person, Place, Time, Normal For Age


Wound vac





Transfers


SCALE: Activities may be completed with or without assistive devices.





6-Indepedent-patient completes the activity by him/herself with no assistance 

from a helper.


5-Set-up or Clean-up Assistance-helper sets up or cleans up; patient completes 

activity. [place] assists only prior to or  


    following the activity.


4-Supervision or Touching Assistance-helper provides verbal cues and/or 

touching/steadying and/or contact guard assistance as patient completes 

activity. Assistance may be provided   


    throughout the activity or intermittently.


3-Partial/Moderate Assistance-helper does LESS THAN HALF the effort. [place] 

lifts, holds or supports trunk or limbs, but provides less than half the effort.


2-Substantial/Maximal Assistance-helper does MORE THAN HALF the effort. [place] 

lifts or holds trunk or limbs and provides more than half the effort.


4-Ilvcxfppq-vlfxtr does ALL the effort. Patient does none of the effort to 

complete the activity. Or, the assistance of 2 or more helpers is required for 

the patient to complete the  


    activity.


If activity was not attempted, code reason:


7-Patient Refused.


9-Not Applicable-not attempted and the patient did not perform the activity 

before the current illness, exacerbation or injury.


10-Not Attempted due to Environmental Limitations-(lack of equipment, weather 

restraints, etc.).


88-Not Attempted due to Medical Conditions or Safety Concerns.


Roll Left & Right (QC):  6


Lying to Sitting/Side of Bed(Q:  6


Transfers outside of bed were not utilized secondary to patient inability to 

correctly maintain NWB restrictions.





Weight Bearing


Right Lower Extremity:  Right


Full Weight Bearing


Left Lower Extremity:  Left


Non Weight Bearing





Patient NWB on L foot, no pressure allowed





Exercises


Supine Ex:  Quad Set, Glut sets, Heel Slides (R LE ), Straight leg raise, Hip 

abd/add (R LE only)


Supine Reps:  15


Patient able to perform 2 sets of each exercise. L LE only performed SLR and 

quad sets and pressure restrictions were maintained with exercise. Patient did 

not need assistance with LE bilateral SLR.





Treatments


LE strengthening, ROM, mobility





Assessment


Current Status:  Fair Progress


Patient demonstrates active participation in exercise. Patient will continue to 

benefit from regular intervention to promote mobility, strength, and endurance 

for return to home functional independence.





PT Short Term Goals


Short Term Goals


Time Frame:  2021


Roll Left & Right:  6


Sit to lyin


Lying to sitting on side of be:  6


Sit to stand:  4 (SBA)


Chair/bed-to-chair transfer:  4 (SBA)


Toilet transfer:  4 (SBA)


Car transfer:  4 (SBA)


Walk 10 feet:  4 (SBA)


Does pt use a wc or scooter:  Yes


Wheel 50ft w/2 turns:  6


Wheel 150 feet:  6


Type:  Manual





PT Long Term Goals


Long Term Goals


PT Long Term Goals Time Frame:  2021


Roll Left & Right (QC):  6


Sit to Lying (QC):  6


Lying-Sitting on Side/Bed(QC):  6


Sit to Stand (QC):  6


Chair/Bed-to-Chair Xfer(QC):  6


Toilet Transfer (QC):  6


Car Transfer (QC):  6


Does the Patient Walk:  No and Walking Goal IS indicated


Walk 10 feet (QC):  6


Walk 50ft with 2 Turns (QC):  6


Walk 150 ft (QC):  88


Walking 10ft on Uneven Surface:  6


1 Step (curb) (QC):  4


4 Steps (QC):  88


12 Steps (QC):  88


Picking up an Object (QC):  88


Does the Pt use WC or Scooter?:  Yes


Wheel 50 feet with 2 turns (QC:  6


Type:  Manual


Wheel 150 feet:  6


Type:  Manual





PT Plan


Problem List


Problem List:  Activity Tolerance, Functional Strength, Safety, Balance, Gait, 

Transfer, Bed Mobility, ROM





Treatment/Plan


Treatment Plan:  Continue Plan of Care


Treatment Plan:  Bed Mobility, Education, Functional Activity Aung, Functional 

Strength, Group Therapy, Gait, Safety, Therapeutic Exercise, Transfers


Treatment Duration:  2021


Frequency:  At least 5 of 7 days/Wk (IRF)


Estimated Hrs Per Day:  1.5 hours per day


Patient and/or Family Agrees t:  Yes





Safety Risks/Education


Patient Education:  Reviewed Precautions, Correct Positioning, Disease Process


Teaching Recipient:  Patient


Teaching Methods:  Discussion


Response to Teaching:  Verbalize Understanding


Continued to discuss importance of maintaining NWB restrictions for optimal 

wound healing and patient safety.





Time/GCodes


Time In:  1258


Time Out:  1328


Total Billed Treatment Time:  30


Total Billed Treatment


1 visit: 


EX x2: 30'











CAMILLE HARRY PT                Mar 29, 2021 14:05

## 2021-03-29 NOTE — PM&R POST ADMISSION ASSESSMENT
PM&R HP


Date of Visit:  Mar 29, 2021


Time of Visit:  10:35


History of Present Illness


CC: Recovery following left diabetic wound debridement





HPI: This is a 40yoWM clinic Pt of [facility] with a PMH of morbid obesity, HTN and DM,

who is s/p significant debridement of the left lateral ankle abscess and left 

heel abscess presumed to be near osteomyelitis, had picc placed and maintained 

on broad-spectrum antibiotics who presents to inpatient rehab in order to 

strengthen up and increase the use of assistive devices in order to go home on 

wound-vac. He has had a history of a wound-vac on his right thigh when he went 

home remotely in the passed. At this current time I have decreased his insulin 

due to severe hypoglycemia during the night, his Hgb remains a good at 9.7 after

one unit of blood and iron infusions and he is at high risk for amputation of 

the left leg. 








Med surg DC summary:


HOSPITAL COURSE:


3/23


* Pt presents to ER due to acute onset left ankle abscess 


* Previous left heel ulcer followed by Dr. Guzmán with continued wound care


* X ray indicated no evidence of osteomyelitis at this time


* Placed on IV vancomycin and cefepime





3/24


* Pt doing well


* Dr. Jiménez took pt to surgery for debridement - wound VAC in place


* Dr. Guzmán informed


* Decreased urine output


* Anemia - Hgb 8.6


* Hypocalemia - 3.5 (replacement in progress)





3/25


* Pt doing well, tolerated surgery


* Diet advanced


* Anemia - slight decrease in Hgb 7.6


* Received 1 unit of blood 


* Hypocalemia - 3.9


* Continued antibiotics





3/26


* Pt doing well


* PT/OT initiated


* Difficulty walking due to diabetic neuropathy


* Anemia - stable 8.1


* Continued antibiotics





3/27


* Pt doing well


* PT/OT maintained


* Anemia - improved 9.9


* Continued antibiotics





3/28


* Pt doing well


* PT/OT maintained


* Iron infusions


* Continued antibiotics





3/29


* Pt doing well


* Has been working with PT/OT


* Episodes of hypoglycemia - adjusted insulin


* Stool sample yet to be obtained


* Accepted for inpatient rehab


* Transferred to 2nd floor


* Anemia - 9.7











KEATON RUEDA STUDENT





Past Medical-Social-Family Hx


Past Med/Social Hx:  Reviewed Nursing Past Med/Soc Hx, Reviewed and Corrections 

made


Patient Social History


Marrital Status:  


Employed/Student:  employed


Alcohol Use:  Denies Use


Smoking Status:  Never a Smoker


Former Smoker, Quit:  Jan 1, 2003


Type Used:  Cigarettes, Electronic/Vapor


Recent Hopitalizations:  No





Immunizations Up To Date


Tetanus Booster (TDap):  More than 5yrs


Date of Pneumonia Vaccine:  Jan 1, 2007





Seasonal Allergies


Seasonal Allergies:  No





Past Medical History


Surgeries:  Orthopedic


Respiratory:  Sleep Apnea


Currently Using CPAP:  No


Currently Using BIPAP:  No


Cardiac:  High Cholesterol, Hypertension


Neurological:  Neuropathy


Reproductive:  No


Sexually Transmitted Disease:  No


HIV/AIDS:  No


Musculoskeletal:  Chronic Back Pain


Endocrine:  Diabetes, Insulin dep


Loss of Vision:  Denies


Hearing Impairment:  Denies


Skin/Integumentary:  Recent Skin Changes


History of Blood Disorders:  No


Adverse Reaction to Blood Hernandez:  No





Family History





Completed stroke


  19 MOTHER


Diabetes mellitus


  19 MOTHER


Hypertension


  19 MOTHER


  G8 BROTHER


Myocardial infarction


  17 FATHER


CAD Under 55 Years Old, Diabetes, Hypertension, Other Conditions/Hx





PM&R Allergy/Meds/Data Review


Allergies


Coded Allergies:  


     levofloxacin (Verified  Allergy, Mild, 4/14/15)


     sweet potato (Unverified  Allergy, Unknown, 4/14/15)





Home Medications


Scheduled


Aspirin (Aspirin EC), 81 MG PO DAILY, (Reported)


Atorvastatin Calcium (Atorvastatin Calcium), 80 MG PO DAILY, (Reported)


Glyburide (Glyburide), 2.5 MG PO BID, (Reported)


Insulin Aspart (Novolog Flexpen), 30-40 UNITS SQ AC, (Reported)


Insulin Detemir (Levemir Flextouch), 45 UNIT SQ BID, (Reported)


Lisinopril/Hydrochlorothiazide (Lisinopril-Hctz 20-25 mg Tab), 1 EACH PO DAILY, 

(Reported)


Metformin HCl (Metformin HCl ER), 500 MG PO BID, (Reported)


Metoprolol Succinate (Metoprolol Succinate), 100 MG PO DAILY, (Reported)


Prasugrel HCl (Prasugrel HCl), 10 MG PO DAILY, (Reported)





Scheduled PRN


Calcium Carbonate (Tums Ultra), 400-800 MG PO PRN PRN for INDIGESTION, 

(Reported)





Discontinued Medications


Amoxicillin/Potassium Clav (Augmentin 875-125 Tablet), 1 EACH PO BID


   Discontinued Reason: No Longer Taking


Hydrocodone/Acetaminophen (Hydrocodone-Acetamin 7.5-325), 1 EACH PO Q6H


   Discontinued Reason: No Longer Taking





Current Medications


Current Medications


Reviewed





Review of Systems


Constitutional:  see HPI, malaise, weakness


EENTM:  no symptoms reported


Respiratory:  no symptoms reported


Cardiovascular:  no symptoms reported


Gastrointestinal:  no symptoms reported


Genitourinary:  no symptoms reported


Musculoskeletal:  joint pain


Skin:  no symptoms reported


Psychiatric/Neurological:  No Symptoms Reported


All Other Systems Reviewed


Negative Unless Noted:  Yes





Physical Exam


Physical Exam


Vital Signs


Capillary Refill :


Height, Weight, BMI


Height: 6'0.00"


Weight: 422lbs. 6.4oz. [phone]; 48.14 BMI


Method:Stated


General Appearance:  No Apparent Distress, WD/WN, Chronically ill, Obese


Eyes:  Bilateral Eye Normal Inspection, Bilateral Eye PERRL


HEENT:  PERRL/EOMI, Normal ENT Inspection, Pharynx Normal


Neck:  Full Range of Motion, Normal Inspection, Non Tender, Supple, Carotid 

Bruit


Respiratory:  Chest Non Tender, Lungs Clear, Normal Breath Sounds, No Accessory 

Muscle Use, No Respiratory Distress


Cardiovascular:  Regular Rate, Rhythm, No Edema, No Gallop, No JVD, No Murmur, 

Normal Peripheral Pulses


Gastrointestinal:  Normal Bowel Sounds, No Organomegaly, No Pulsatile Mass, Non 

Tender, Soft


Back:  Normal Inspection, No CVA Tenderness, No Vertebral Tenderness


Extremity:  Normal Capillary Refill, Normal Inspection, Normal Range of Motion, 

Non Tender, No Calf Tenderness, No Pedal Edema, Other (left foot with wound vac)


Neurologic/Psychiatric:  Alert, Oriented x3, No Motor/Sensory Deficits, Normal 

Mood/Affect, CNs II-XII Norm as Tested, Abnormal Gait, Motor Weakness 

(generalized lower extremities)


Skin:  Normal Color, Warm/Dry


Lymphatic:  No Adenopathy





PM&R Medical Assessment & Plan


REHAB/MEDICAL ASSESSMENT AND PLAN:





REHAB IMPAIRMENT GROUP: 


Left foot DM ulcer s/p debridement now with wound vac NWB





ETIOLOGIC DIAGNOSIS: 


Left foot DM ulcer s/p debridement now with wound vac NWB





The comorbidities that impact the patients function and/or functional outcome 

by: morbid obesity, NWB left foot, DM insulin dependence, abscess





REHAB PLAN:


The patient is being admitted to our comprehensive inpatient rehabilitation 

facility and can tolerate the intensity of service consisting of at least:


      180 minutes of therapy a day, 5 out of 7 days a week 


    


Rehab treatment will consist of: PT OT will focus on regaining function in the 

midst of left foot NWB in order to function at home and return to independent 

living





The patient/family has a good understanding of our discharge process and will 

benefit from an interdisciplinary inpatient rehabilitation program. The patient 

has potential to make improvement and is in need of at least two of the 

following multidisciplinary therapies including but not limited to physical, 

occupational, speech, and prosthetics and orthotics.  Additionally the patient 

will need services from respiratory, nutritional services, wound care, 

psychology, etc. (Customize this to each patient). Given the patients complex 

condition and risk of further medical complications, rehabilitation services 

cannot be safely or effectively provided at a lower level of care such as a 

skilled nursing facility.





BARRIERS TO DISCHARGE:


Obesity and NWB status





ESTIMATED LOS:


10 days





DISPOSITION:


Home





RELEVANT CHANGES SINCE PREADMISSION SCREENING: 


I have compared the patients medical and functional status at the time of the 

preadmission screening and there are: 


      no changes





PROGNOSIS:


Fair





REHABILITATION GOALS:  


1. PT OT will focus on regaining function in the midst of left foot NWB in order

to function at home and return to independent living








All the above goals were reviewed with the patient and he/she is in agreement.


By signing this document, I acknowledge that I have personally performed a full 

physical examination on this patient within 24 hours of admission to this 

inpatient rehabilitation facility and have determined the patient to be able to 

tolerate the above course of treatment at an intensive level for a reasonable 

period of time. I will be completing a detailed individualized Plan of Care for 

this patient by day #4 of the patients stay based upon the Preadmission Screen,

the Post-Admission Evaluation, and the therapy evaluations.


Admission Dx/Comorbidities:  


(1) Diabetic ulcer of foot associated with diabetes mellitus due to underlying 

condition, with necrosis of muscle


Status:  Acute


ICD Codes:  E08.621 - Diabetes mellitus due to underlying condition with foot 

ulcer; L97.503 - Non-pressure chronic ulcer of other part of unspecified foot 

with necrosis of muscle


(2) Diabetes mellitus, type 2


Status:  Chronic


ICD Codes:  E11.9 - Type 2 diabetes mellitus without complications


(3) Hypertension


Status:  Chronic


ICD Codes:  I10 - Essential (primary) hypertension


(4) Peripheral neuropathy


Status:  Acute


ICD Codes:  G62.9 - Polyneuropathy, unspecified


(5) Renal insufficiency


Status:  Acute


ICD Codes:  N28.9 - Disorder of kidney and ureter, unspecified


(6) Morbid obesity


Status:  Acute


ICD Codes:  E66.01 - Morbid (severe) obesity due to excess calories





Assessment/Plan


Assessment and Plan


Assess & Plan/Chief Complaint


Assessment:


Left lateral ankle abscess and left heel ulceration s/p debridement with wound 

vac placement and abx Zosyn maintained


TERRI not on CPAP


Obesity


DM OOC


Anemia s/p 1 unit of blood iron level 8 so will order iron infusions





Plan:


Abx


Monitor pain


Wound vac


Dr Jiménez


IRF protocol











NICOLE VILA DO                Mar 29, 2021 10:36

## 2021-03-29 NOTE — PROGRESS NOTE
KEATON RUEDA MED STUDENT 3/29/21 1047:


Progress Note


HOSPITAL COURSE:


3/23


* Pt presents to ER due to acute onset left ankle abscess 


* Previous left heel ulcer followed by Dr. Guzmán with continued wound care


* X ray indicated no evidence of osteomyelitis at this time


* Placed on IV vancomycin and cefepime





3/24


* Pt doing well


* Dr. Jiménez took pt to surgery for debridement - wound VAC in place


* Dr. Guzmán informed


* Decreased urine output


* Anemia - Hgb 8.6


* Hypocalemia - 3.5 (replacement in progress)





3/25


* Pt doing well, tolerated surgery


* Diet advanced


* Anemia - slight decrease in Hgb 7.6


* Received 1 unit of blood 


* Hypocalemia - 3.9


* Continued antibiotics





3/26


* Pt doing well


* PT/OT initiated


* Difficulty walking due to diabetic neuropathy


* Anemia - stable 8.1


* Continued antibiotics





3/27


* Pt doing well


* PT/OT maintained


* Anemia - improved 9.9


* Continued antibiotics





3/28


* Pt doing well


* PT/OT maintained


* Iron infusions


* Continued antibiotics





3/29


* Pt doing well


* Has been working with PT/OT


* Episodes of hypoglycemia - adjusted insulin


* Stool sample yet to be obtained


* Accepted for inpatient rehab


* Transferred to 2nd floor


* Anemia - 9.7





SIOBHAN VILA DO 3/30/21 0530:


Supervisory-Addendum Brief


Verification & Attestation


Participated in pt care:  history, MDM, physical


Personally performed:  exam, history, MDM, supervision of care


Care discussed with:  Medical Student


Procedures:  n/a


Results interpretation:  Verified all documentation


Verification and Attestation of Medical Student E/M Service





A medical student performed and documented this service in my presence. I 

reviewed and verified all information documented by the medical student and made

modifications to such information, when appropriate. I personally performed the 

physical exam and medical decision making. 





 Siobhan Vila, Mar 30, 2021,05:30











KEATON RUEDA MED STUDENT    Mar 29, 2021 10:47


SIOBHAN VILA DO                Mar 30, 2021 05:30

## 2021-03-29 NOTE — DISCHARGE SUMMARY
Diagnosis/Chief Complaint


Date of Admission


Mar 23, 2021 at 18:41


Date of Discharge





Discharge Date:  Mar 29, 2021


Discharge Diagnosis


Assessment:


Left lateral ankle abscess s/p debridement with wound vac placement


Heel DM ulcer s/p debridement


Obesity


DM


Anemia s/p 1 unit of blood iron level 8 so will order iron infusions


TERRI not on CPAP





Discharge Summary


Discharge Physical Examination


Allergies:  


Coded Allergies:  


     levofloxacin (Verified  Allergy, Mild, 4/14/15)


     sweet potato (Unverified  Allergy, Unknown, 4/14/15)


Vitals & I&Os





Vital Signs








  Date Time  Temp Pulse Resp B/P (MAP) Pulse Ox O2 Delivery O2 Flow Rate FiO2


 


3/29/21 08:02 35.6 87 20 117/77 (90) 77 Room Air  








General Appearance:  Alert, Oriented X3, Cooperative


Respiratory:  Clear to Auscultation


Cardiovascular:  Regular Rate





Hospital Course


Was the Problem List Reviewed?:  Yes


HOSPITAL COURSE PER KEATON RUEDA MSIII:


3/23


* Pt presents to ER due to acute onset left ankle abscess 


* Previous left heel ulcer followed by Dr. Guzmán with continued wound care


* X ray indicated no evidence of osteomyelitis at this time


* Placed on IV vancomycin and cefepime





3/24


* Pt doing well


* Dr. Jiménez took pt to surgery for debridement - wound VAC in place


* Dr. Guzmán informed


* Decreased urine output


* Anemia - Hgb 8.6


* Hypocalemia - 3.5 (replacement in progress)





3/25


* Pt doing well, tolerated surgery


* Diet advanced


* Anemia - slight decrease in Hgb 7.6


* Received 1 unit of blood 


* Hypocalemia - 3.9


* Continued antibiotics





3/26


* Pt doing well


* PT/OT initiated


* Difficulty walking due to diabetic neuropathy


* Anemia - stable 8.1


* Continued antibiotics





3/27


* Pt doing well


* PT/OT maintained


* Anemia - improved 9.9


* Continued antibiotics





3/28


* Pt doing well


* PT/OT maintained


* Iron infusions


* Continued antibiotics





3/29


* Pt doing well


* Has been working with PT/OT


* Episodes of hypoglycemia - adjusted insulin


* Stool sample yet to be obtained


* Accepted for inpatient rehab


* Transferred to 2nd floor


* Anemia - 9.7


Labs (last 24 hrs)


Laboratory Tests


3/23/21 17:32: 


White Blood Count 10.8, Red Blood Count 4.23L, Hemoglobin 10.7L, Hematocrit 34L,

Mean Corpuscular Volume 81, Mean Corpuscular Hemoglobin 25, Mean Corpuscular 

Hemoglobin Concent 31L, Red Cell Distribution Width 16.3H, Platelet Count 365, 

Mean Platelet Volume 9.9, Immature Granulocyte % (Auto) 1, Neutrophils (%) 

(Auto) 75, Lymphocytes (%) (Auto) 15, Monocytes (%) (Auto) 8, Eosinophils (%) (

Auto) 1, Basophils (%) (Auto) 0, Neutrophils # (Auto) 8.1H, Lymphocytes # (Auto)

1.6, Monocytes # (Auto) 0.9, Eosinophils # (Auto) 0.2, Basophils # (Auto) 0.0, 

Immature Granulocyte # (Auto) 0.1, Erythrocyte Sedimentation Rate > 140H, Sodium

Level 135, Potassium Level 3.9, Chloride Level 100, Carbon Dioxide Level 24, 

Anion Gap 11, Blood Urea Nitrogen 11, Creatinine 0.82, Estimat Glomerular 

Filtration Rate > 60, BUN/Creatinine Ratio 13, Glucose Level 96, Lactic Acid L

evel 1.52, Calcium Level 9.3, Corrected Calcium 10.2H, Total Bilirubin 0.3, 

Aspartate Amino Transf (AST/SGOT) 55H, Alanine Aminotransferase (ALT/SGPT) 26, 

Alkaline Phosphatase 87, C-Reactive Protein High Sensitivity 28.79H, Total 

Protein 7.6, Albumin 2.9L, Beta-Hydroxybutyrate (Chem panel) 0.09


3/23/21 18:41: Lab Scanned Report Referred Lab Report


3/23/21 20:50: Glucometer 84


3/24/21 05:05: 


White Blood Count 9.3, Red Blood Count 3.44L, Hemoglobin 8.6L, Hematocrit 28L, 

Mean Corpuscular Volume 82, Mean Corpuscular Hemoglobin 25, Mean Corpuscular 

Hemoglobin Concent 31L, Red Cell Distribution Width 16.7H, Platelet Count 345, 

Mean Platelet Volume 9.4, Immature Granulocyte % (Auto) 2, Neutrophils (%) 

(Auto) 63, Lymphocytes (%) (Auto) 23, Monocytes (%) (Auto) 9, Eosinophils (%) 

(Auto) 2, Basophils (%) (Auto) 0, Neutrophils # (Auto) 5.9, Lymphocytes # (Auto)

2.1, Monocytes # (Auto) 0.9, Eosinophils # (Auto) 0.2, Basophils # (Auto) 0.0, 

Immature Granulocyte # (Auto) 0.2H, Sodium Level 134L, Potassium Level 3.5L, 

Chloride Level 103, Carbon Dioxide Level 20L, Anion Gap 11, Blood Urea Nitrogen 

10, Creatinine 0.75, Estimat Glomerular Filtration Rate > 60, BUN/Creatinine 

Ratio 13, Glucose Level 135H, Calcium Level 8.3L, Corrected Calcium 9.6, Total 

Bilirubin 0.2, Aspartate Amino Transf (AST/SGOT) 40H, Alanine Aminotransferase 

(ALT/SGPT) 23, Alkaline Phosphatase 70, Total Protein 6.2L, Albumin 2.4L, Iron 

Level 8L


3/24/21 11:02: Glucometer 180H


3/24/21 17:05: Glucometer 195H


3/24/21 19:58: Glucometer 274H


3/25/21 05:38: 


White Blood Count 8.2, Red Blood Count 3.01L, Hemoglobin 7.6L, Hematocrit 25L, 

Mean Corpuscular Volume 82, Mean Corpuscular Hemoglobin 25, Mean Corpuscular 

Hemoglobin Concent 31L, Red Cell Distribution Width 16.8H, Platelet Count 369, 

Mean Platelet Volume 9.3, Immature Granulocyte % (Auto) 2, Neutrophils (%) 

(Auto) 65, Lymphocytes (%) (Auto) 22, Monocytes (%) (Auto) 8, Eosinophils (%) 

(Auto) 3, Basophils (%) (Auto) 1, Neutrophils # (Auto) 5.3, Lymphocytes # (Auto)

1.8, Monocytes # (Auto) 0.7, Eosinophils # (Auto) 0.2, Basophils # (Auto) 0.0, 

Immature Granulocyte # (Auto) 0.2H, Sodium Level 133L, Potassium Level 3.9, 

Chloride Level 104, Carbon Dioxide Level 20L, Anion Gap 9, Blood Urea Nitrogen 

9, Creatinine 0.77, Estimat Glomerular Filtration Rate > 60, BUN/Creatinine Rat

io 12, Glucose Level 190H, Calcium Level 7.7L, Corrected Calcium 9.1, Total 

Bilirubin 0.1, Aspartate Amino Transf (AST/SGOT) 69H, Alanine Aminotransferase 

(ALT/SGPT) 36, Alkaline Phosphatase 64, Total Protein 5.8L, Albumin 2.3L, 

Vancomycin Level Trough 20.2H


3/25/21 11:17: Glucometer 172H


3/25/21 15:48: Glucometer 113H


3/25/21 20:34: Glucometer 76


3/26/21 05:10: Glucometer 108


3/26/21 06:25: 


Sodium Level 136, Potassium Level 3.6, Chloride Level 105, Carbon Dioxide Level 

23, Anion Gap 8, Blood Urea Nitrogen 6L, Creatinine 0.69, Estimat Glomerular 

Filtration Rate > 60, BUN/Creatinine Ratio 9, Glucose Level 126H, Calcium Level 

7.9L, Corrected Calcium 9.2, Total Bilirubin 0.2, Aspartate Amino Transf 

(AST/SGOT) 36H, Alanine Aminotransferase (ALT/SGPT) 30, Alkaline Phosphatase 58,

Total Protein 6.1L, Albumin 2.4L, Vancomycin Level Trough 20.1H


3/26/21 06:50: 


White Blood Count 7.5, Red Blood Count 3.21L, Hemoglobin 8.1L, Hematocrit 27L, 

Mean Corpuscular Volume 83, Mean Corpuscular Hemoglobin 25, Mean Corpuscular 

Hemoglobin Concent 31L, Red Cell Distribution Width 16.6H, Platelet Count 416H, 

Mean Platelet Volume 9.2, Immature Granulocyte % (Auto) 7, Neutrophils (%) 

(Auto) 55, Lymphocytes (%) (Auto) 28, Monocytes (%) (Auto) 6, Eosinophils (%) 

(Auto) 4, Basophils (%) (Auto) 0, Neutrophils # (Auto) 4.1, Lymphocytes # (Auto)

2.1, Monocytes # (Auto) 0.5, Eosinophils # (Auto) 0.3, Basophils # (Auto) 0.0, 

Immature Granulocyte # (Auto) 0.5H, Neutrophils % (Manual) 51, Lymphocytes % 

(Manual) 27, Monocytes % (Manual) 11, Eosinophils % (Manual) 4, Band Neutrophils

7, Hypochromasia SLIGHT, Anisocytosis SLIGHT, Microcytosis SLIGHT


3/26/21 10:51: Glucometer 205H


3/26/21 15:20: Glucometer 134H


3/26/21 18:19: Glucometer 65L


3/26/21 20:03: Glucometer 101


3/27/21 06:50: 


White Blood Count 5.8, Red Blood Count 3.89L, Hemoglobin 9.9#L, Hematocrit 32L, 

Mean Corpuscular Volume 83, Mean Corpuscular Hemoglobin 25, Mean Corpuscular 

Hemoglobin Concent 31L, Red Cell Distribution Width 16.8H, Platelet Count 392, 

Mean Platelet Volume 9.0, Immature Granulocyte % (Auto) 8, Neutrophils (%) 

(Auto) 50, Lymphocytes (%) (Auto) 31, Monocytes (%) (Auto) 7, Eosinophils (%) 

(Auto) 3, Basophils (%) (Auto) 1, Neutrophils # (Auto) 2.9, Lymphocytes # (Auto)

1.8, Monocytes # (Auto) 0.4, Eosinophils # (Auto) 0.2, Basophils # (Auto) 0.0, 

Immature Granulocyte # (Auto) 0.5H, Sodium Level 138, Potassium Level 3.8, 

Chloride Level 105, Carbon Dioxide Level 23, Anion Gap 10, Blood Urea Nitrogen 

6L, Creatinine 0.67, Estimat Glomerular Filtration Rate > 60, BUN/Creatinine 

Ratio 9, Glucose Level 131H, Calcium Level 8.1L, Corrected Calcium 9.5, Total 

Bilirubin 0.1, Aspartate Amino Transf (AST/SGOT) 34, Alanine Aminotransferase 

(ALT/SGPT) 25, Alkaline Phosphatase 63, Total Protein 6.1L, Albumin 2.3L, 

Vancomycin Level Trough 17.6


3/27/21 06:57: Glucometer 125H


3/27/21 10:42: Glucometer 211H


3/27/21 15:38: Glucometer 153H


3/27/21 15:56: Glucometer 160H


3/27/21 21:01: Glucometer 81


3/28/21 06:25: Glucometer 143H


3/28/21 11:06: Glucometer 134H


3/28/21 15:12: Glucometer 133H


3/28/21 20:08: Glucometer 113H


3/28/21 22:30: Glucometer 57*L


3/29/21 01:03: Glucometer 93


3/29/21 06:05: 


White Blood Count 8.3, Red Blood Count 3.91L, Hemoglobin 9.7L, Hematocrit 34L, 

Mean Corpuscular Volume 86, Mean Corpuscular Hemoglobin 25, Mean Corpuscular 

Hemoglobin Concent 29L, Red Cell Distribution Width 17.1H, Platelet Count 716H, 

Mean Platelet Volume 8.5L, Immature Granulocyte % (Auto) 7, Neutrophils (%) 

(Auto) 47, Lymphocytes (%) (Auto) 38, Monocytes (%) (Auto) 5, Eosinophils (%) 

(Auto) 3, Basophils (%) (Auto) 0, Neutrophils # (Auto) 3.9, Lymphocytes # (Auto)

3.1, Monocytes # (Auto) 0.4, Eosinophils # (Auto) 0.3, Basophils # (Auto) 0.0, 

Immature Granulocyte # (Auto) 0.6H, Sodium Level 136, Potassium Level 4.5, 

Chloride Level 101, Carbon Dioxide Level 24, Anion Gap 11, Blood Urea Nitrogen 

10, Creatinine 0.79, Estimat Glomerular Filtration Rate > 60, BUN/Creatinine 

Ratio 13, Glucose Level 167H, Calcium Level 9.0, Corrected Calcium 9.9, Total 

Bilirubin 0.2, Aspartate Amino Transf (AST/SGOT) 39H, Alanine Aminotransferase 

(ALT/SGPT) 25, Alkaline Phosphatase 76, Total Protein 7.4, Albumin 2.9L


3/29/21 06:06: Glucometer 155H





Microbiology


3/24/21 Gram Stain - Final, Resulted


          


3/24/21 Anaerobic Culture - Final, Resulted


          Porphyromonas somerae


3/24/21 Surgical Culture - Final, Resulted


          Klebsiella oxytoca


          Pseudomonas aeruginosa


          Enterococcus faecalis


3/24/21 Fungal Culture 1 - Preliminary, Resulted


          


3/24/21 MRSA Screen - Final, Complete


          MRSA not isolated


3/23/21 Blood Culture - Final, Complete


          No growth





Pending Labs





Microbiology








 Date/Time


Source Procedure


Growth Status





 


 3/24/21 15:30


Wound Ankle, Left Gram Stain - Final Resulted





 3/24/21 15:30 Anaerobic Culture - Final


Porphyromonas somerae Resulted





 3/24/21 15:30 Surgical Culture - Final


Klebsiella oxytoca


Pseudomonas aeruginosa


Enterococcus faecalis Resulted


 


 3/24/21 15:30


Wound Ankle, Left Fungal Culture 1 - Preliminary Resulted


 


 3/24/21 06:18


Nasal MRSA Screen - Final


MRSA not isolated Complete


 


 3/23/21 17:40


Peripheral Rt Ac Blood Culture - Final


No growth Complete


 


 3/23/21 17:32


Peripheral Arm, Left Blood Culture - Final


No growth Complete





Laboratory Tests


3/23/21 17:32: 


White Blood Count 10.8, Red Blood Count 4.23, Hemoglobin 10.7, Hematocrit 34, 

Mean Corpuscular Volume 81, Mean Corpuscular Hemoglobin 25, Mean Corpuscular 

Hemoglobin Concent 31, Red Cell Distribution Width 16.3, Platelet Count 365, 

Mean Platelet Volume 9.9, Immature Granulocyte % (Auto) 1, Neutrophils (%) 

(Auto) 75, Lymphocytes (%) (Auto) 15, Monocytes (%) (Auto) 8, Eosinophils (%) 

(Auto) 1, Basophils (%) (Auto) 0, Neutrophils # (Auto) 8.1, Lymphocytes # (Auto)

1.6, Monocytes # (Auto) 0.9, Eosinophils # (Auto) 0.2, Basophils # (Auto) 0.0, 

Immature Granulocyte # (Auto) 0.1, Erythrocyte Sedimentation Rate > 140, Sodium 

Level 135, Potassium Level 3.9, Chloride Level 100, Carbon Dioxide Level 24, 

Anion Gap 11, Blood Urea Nitrogen 11, Creatinine 0.82, Estimat Glomerular 

Filtration Rate > 60, BUN/Creatinine Ratio 13, Glucose Level 96, Lactic Acid Le

gemini 1.52, Calcium Level 9.3, Corrected Calcium 10.2, Total Bilirubin 0.3, 

Aspartate Amino Transf (AST/SGOT) 55, Alanine Aminotransferase (ALT/SGPT) 26, 

Alkaline Phosphatase 87, C-Reactive Protein High Sensitivity 28.79, Total 

Protein 7.6, Albumin 2.9, Beta-Hydroxybutyrate (Chem panel) 0.09


3/23/21 18:41: Lab Scanned Report Referred Lab Report


3/23/21 20:50: Glucometer 84


3/24/21 05:05: 


White Blood Count 9.3, Red Blood Count 3.44, Hemoglobin 8.6, Hematocrit 28, Mean

Corpuscular Volume 82, Mean Corpuscular Hemoglobin 25, Mean Corpuscular 

Hemoglobin Concent 31, Red Cell Distribution Width 16.7, Platelet Count 345, 

Mean Platelet Volume 9.4, Immature Granulocyte % (Auto) 2, Neutrophils (%) 

(Auto) 63, Lymphocytes (%) (Auto) 23, Monocytes (%) (Auto) 9, Eosinophils (%) 

(Auto) 2, Basophils (%) (Auto) 0, Neutrophils # (Auto) 5.9, Lymphocytes # (Auto)

2.1, Monocytes # (Auto) 0.9, Eosinophils # (Auto) 0.2, Basophils # (Auto) 0.0, 

Immature Granulocyte # (Auto) 0.2, Sodium Level 134, Potassium Level 3.5, 

Chloride Level 103, Carbon Dioxide Level 20, Anion Gap 11, Blood Urea Nitrogen 

10, Creatinine 0.75, Estimat Glomerular Filtration Rate > 60, BUN/Creatinine 

Ratio 13, Glucose Level 135, Calcium Level 8.3, Corrected Calcium 9.6, Total 

Bilirubin 0.2, Aspartate Amino Transf (AST/SGOT) 40, Alanine Aminotransferase 

(ALT/SGPT) 23, Alkaline Phosphatase 70, Total Protein 6.2, Albumin 2.4, Iron 

Level 8


3/24/21 11:02: Glucometer 180


3/24/21 17:05: Glucometer 195


3/24/21 19:58: Glucometer 274


3/25/21 05:38: 


White Blood Count 8.2, Red Blood Count 3.01, Hemoglobin 7.6, Hematocrit 25, Mean

Corpuscular Volume 82, Mean Corpuscular Hemoglobin 25, Mean Corpuscular 

Hemoglobin Concent 31, Red Cell Distribution Width 16.8, Platelet Count 369, 

Mean Platelet Volume 9.3, Immature Granulocyte % (Auto) 2, Neutrophils (%) 

(Auto) 65, Lymphocytes (%) (Auto) 22, Monocytes (%) (Auto) 8, Eosinophils (%) 

(Auto) 3, Basophils (%) (Auto) 1, Neutrophils # (Auto) 5.3, Lymphocytes # (Auto)

1.8, Monocytes # (Auto) 0.7, Eosinophils # (Auto) 0.2, Basophils # (Auto) 0.0, 

Immature Granulocyte # (Auto) 0.2, Sodium Level 133, Potassium Level 3.9, 

Chloride Level 104, Carbon Dioxide Level 20, Anion Gap 9, Blood Urea Nitrogen 9,

Creatinine 0.77, Estimat Glomerular Filtration Rate > 60, BUN/Creatinine Ratio 

12, Glucose Level 190, Calcium Level 7.7, Corrected Calcium 9.1, Total Bilirubin

0.1, Aspartate Amino Transf (AST/SGOT) 69, Alanine Aminotransferase (ALT/SGPT) 

36, Alkaline Phosphatase 64, Total Protein 5.8, Albumin 2.3, Vancomycin Level 

Trough 20.2


3/25/21 11:17: Glucometer 172


3/25/21 15:48: Glucometer 113


3/25/21 20:34: Glucometer 76


3/26/21 05:10: Glucometer 108


3/26/21 06:25: 


Sodium Level 136, Potassium Level 3.6, Chloride Level 105, Carbon Dioxide Level 

23, Anion Gap 8, Blood Urea Nitrogen 6, Creatinine 0.69, Estimat Glomerular 

Filtration Rate > 60, BUN/Creatinine Ratio 9, Glucose Level 126, Calcium Level 

7.9, Corrected Calcium 9.2, Total Bilirubin 0.2, Aspartate Amino Transf 

(AST/SGOT) 36, Alanine Aminotransferase (ALT/SGPT) 30, Alkaline Phosphatase 58, 

Total Protein 6.1, Albumin 2.4, Vancomycin Level Trough 20.1


3/26/21 06:50: 


White Blood Count 7.5, Red Blood Count 3.21, Hemoglobin 8.1, Hematocrit 27, Mean

Corpuscular Volume 83, Mean Corpuscular Hemoglobin 25, Mean Corpuscular 

Hemoglobin Concent 31, Red Cell Distribution Width 16.6, Platelet Count 416, 

Mean Platelet Volume 9.2, Immature Granulocyte % (Auto) 7, Neutrophils (%) 

(Auto) 55, Lymphocytes (%) (Auto) 28, Monocytes (%) (Auto) 6, Eosinophils (%) 

(Auto) 4, Basophils (%) (Auto) 0, Neutrophils # (Auto) 4.1, Lymphocytes # (Auto)

2.1, Monocytes # (Auto) 0.5, Eosinophils # (Auto) 0.3, Basophils # (Auto) 0.0, 

Immature Granulocyte # (Auto) 0.5, Neutrophils % (Manual) 51, Lymphocytes % 

(Manual) 27, Monocytes % (Manual) 11, Eosinophils % (Manual) 4, Band Neutrophils

7, Hypochromasia SLIGHT, Anisocytosis SLIGHT, Microcytosis SLIGHT


3/26/21 10:51: Glucometer 205


3/26/21 15:20: Glucometer 134


3/26/21 18:19: Glucometer 65


3/26/21 20:03: Glucometer 101


3/27/21 06:50: 


White Blood Count 5.8, Red Blood Count 3.89, Hemoglobin 9.9, Hematocrit 32, Mean

Corpuscular Volume 83, Mean Corpuscular Hemoglobin 25, Mean Corpuscular 

Hemoglobin Concent 31, Red Cell Distribution Width 16.8, Platelet Count 392, 

Mean Platelet Volume 9.0, Immature Granulocyte % (Auto) 8, Neutrophils (%) 

(Auto) 50, Lymphocytes (%) (Auto) 31, Monocytes (%) (Auto) 7, Eosinophils (%) 

(Auto) 3, Basophils (%) (Auto) 1, Neutrophils # (Auto) 2.9, Lymphocytes # (Auto)

1.8, Monocytes # (Auto) 0.4, Eosinophils # (Auto) 0.2, Basophils # (Auto) 0.0, 

Immature Granulocyte # (Auto) 0.5, Sodium Level 138, Potassium Level 3.8, 

Chloride Level 105, Carbon Dioxide Level 23, Anion Gap 10, Blood Urea Nitrogen 

6, Creatinine 0.67, Estimat Glomerular Filtration Rate > 60, BUN/Creatinine 

Ratio 9, Glucose Level 131, Calcium Level 8.1, Corrected Calcium 9.5, Total 

Bilirubin 0.1, Aspartate Amino Transf (AST/SGOT) 34, Alanine Aminotransferase 

(ALT/SGPT) 25, Alkaline Phosphatase 63, Total Protein 6.1, Albumin 2.3, 

Vancomycin Level Trough 17.6


3/27/21 06:57: Glucometer 125


3/27/21 10:42: Glucometer 211


3/27/21 15:38: Glucometer 153


3/27/21 15:56: Glucometer 160


3/27/21 21:01: Glucometer 81


3/28/21 06:25: Glucometer 143


3/28/21 11:06: Glucometer 134


3/28/21 15:12: Glucometer 133


3/28/21 20:08: Glucometer 113


3/28/21 22:30: Glucometer 57


3/29/21 01:03: Glucometer 93


3/29/21 06:05: 


White Blood Count 8.3, Red Blood Count 3.91, Hemoglobin 9.7, Hematocrit 34, Mean

Corpuscular Volume 86, Mean Corpuscular Hemoglobin 25, Mean Corpuscular 

Hemoglobin Concent 29, Red Cell Distribution Width 17.1, Platelet Count 716, 

Mean Platelet Volume 8.5, Immature Granulocyte % (Auto) 7, Neutrophils (%) 

(Auto) 47, Lymphocytes (%) (Auto) 38, Monocytes (%) (Auto) 5, Eosinophils (%) 

(Auto) 3, Basophils (%) (Auto) 0, Neutrophils # (Auto) 3.9, Lymphocytes # (Auto)

3.1, Monocytes # (Auto) 0.4, Eosinophils # (Auto) 0.3, Basophils # (Auto) 0.0, 

Immature Granulocyte # (Auto) 0.6, Sodium Level 136, Potassium Level 4.5, Chl

oride Level 101, Carbon Dioxide Level 24, Anion Gap 11, Blood Urea Nitrogen 10, 

Creatinine 0.79, Estimat Glomerular Filtration Rate > 60, BUN/Creatinine Ratio 

13, Glucose Level 167, Calcium Level 9.0, Corrected Calcium 9.9, Total Bilirubin

0.2, Aspartate Amino Transf (AST/SGOT) 39, Alanine Aminotransferase (ALT/SGPT) 

25, Alkaline Phosphatase 76, Total Protein 7.4, Albumin 2.9


3/29/21 06:06: Glucometer 155








Discharge


Home Medications:





Active Scripts


Active


Reported


Metoprolol Succinate 100 Mg Tab.er.24h 100 Mg PO DAILY


Tums Ultra (Calcium Carbonate) 400 Mg Tab.chew 400-800 Mg PO PRN PRN


Atorvastatin Calcium 80 Mg Tablet 80 Mg PO DAILY


     LAST FILL DATE 01- #30


Prasugrel HCl 10 Mg Tablet 10 Mg PO DAILY


Aspirin EC (Aspirin) 81 Mg Tablet.dr 81 Mg PO DAILY


Lisinopril-Hctz 20-25 mg Tab (Lisinopril/Hydrochlorothiazide) 1 Each Tablet 1 

Each PO DAILY


     LAST FILLED 01- #60/60 DAY SUPPLY


Glyburide 2.5 Mg Tablet 2.5 Mg PO BID


Metformin HCl ER (Metformin HCl) 500 Mg Tab.er.24 500 Mg PO BID


Levemir Flextouch (Insulin Detemir) 100 Unit/1 Ml Insuln.pen 45 Unit SQ BID


Novolog Flexpen (Insulin Aspart) 300 Units/3 Ml Solution 30-40 Units SQ AC





Instructions to patient/family


Please see electronic discharge instructions given to patient.





Diagnosis/Problems


Diagnosis/Problems





(1) Diabetic foot ulcer


(2) Hyperglycemia


Status:  Acute


(3) Hypertension


Status:  Chronic


(4) Diabetes mellitus, type 2


Status:  Chronic


(5) Diabetic ulcer of foot associated with diabetes mellitus due to underlying 

condition, with necrosis of muscle


Status:  Acute


(6) ANCA (acute kidney injury)


Status:  Resolved


Resolution Date/Time:  4/30/20 @ 10:28











NICOLE VILA DO                Mar 29, 2021 08:50

## 2021-03-29 NOTE — OCCUPATIONAL THERAPY EVAL
OT Evaluation-General/PLF


Medical Diagnosis


Admission Date


Mar 29, 2021 at 10:09


Medical Diagnosis:  L foot diabetic ulcer


Onset Date:  Mar 23, 2021





Therapy Diagnosis


Therapy Diagnosis:  weakness, decreased ADL status





Height/Weight


Height (Feet):  6


Height (Inches):  0.00


Weight (Pounds):  422


Weight (Ounces):  6.4





Weight Bear Status


Weight Bearing Restriction:  Non Weight Bearing


Location Restriction:  LT FOOT





Referral


Physician:  Holly


Referral Reason:  Evaluation/Treatment





Medical History


Pertinent Medical History:  DM, HTN, Neuropathy


Additional Medical History


coronary stent, chronic back pain


Current History


ED due to diabetic foot ulcer L foot & L lateral malleolus abscess. Transfer to 

[facility]U 3/29/21 for continued medication management and skilled therapies.





Social History


Home:  Single Level


Current Living Status:  Spouse


 Steps Into Home:  2





ADL-Prior Level of Function


SCALE: Activities may be completed with or without assistive devices.





6-Indepedent-patient completes the activity by him/herself with no assistance 

from a helper.


5-Set-up or Clean-up Assistance-helper sets up or cleans up; patient completes 

activity. [place] assists only prior to or  


    following the activity.


4-Supervision or Touching Assistance-helper provides verbal cues and/or 

touching/steadying and/or contact guard assistance as patient completes 

activity. Assistance may be provided   


    throughout the activity or intermittently.


3-Partial/Moderate Assistance-helper does LESS THAN HALF the effort. [place] 

lifts, holds or supports trunk or limbs, but provides less than half the effort.


2-Substantial/Maximal Assistance-helper does MORE THAN HALF the effort. Stanley 

lifts or holds trunk or limbs and provides more than half the effort.


8-Myknngpke-arsfef does ALL the effort. Patient does none of the effort to 

complete the activity. Or, the assistance of 2 or more helpers is required for 

the patient to complete the  


    activity.


If activity was not attempted, code reason:


7-Patient Refused.


9-Not Applicable-not attempted and the patient did not perform the activity 

before the current illness, exacerbation or injury.


10-Not Attempted due to Environmental Limitations-(lack of equipment, weather 

restraints, etc.).


88-Not Attempted due to Medical Conditions or Safety Concerns.


ADL PLOF Comments


Pt reports being independent wtih ADLs at [facility], wife assists with footwear. Pt 

completes the cooking and cleaning. He uses a cane for functional mobility.


Self Care:  Needed Some Help


Functional Cognition:  Independent


DME/Equipment:  Tub/Shower


DME/Equipment Comments


cane


Occupation:  Works for [employer]


Drive Self:  Yes





OT Current Status


Subjective


Pt agreeable to OT tx. Denies pain.





Mental Status/Objective


Patient Orientation:  Person, Place, Time, Situation


Attachments:  Drains (wound vac L foot), IV





Current


Glasses/Contacts:  Yes


Hearing Aids:  No


Dentures/Partials:  No


Hand Dominance:  Right


Upper Extremity ROM


WFL


Upper Extremity Coordination


WFL


Upper Extremity Sensation


Pt reports neuropathy in Bilateral hands


Upper Extremity Strength


grossly 4/5 BUEs





ADL-Treatment


Eating (QC):  6 (Per pt report)


Oral Hygiene (QC):  5 (based on clinical judgement, set up assist)


Shower/Bathe Self (QC):  3 (Assist BLE lower legs/feet and buttocks. Pt able to 

wash all other parts, SBA in stand at FWW)


Upper Body Dressing (QC):  5 (set up)


Lower Body Dressing (QC):  4 (assist managing wound vac, SBA in stand)


On/Off Footwear (QC):  1 (Total assist donning/doffing RLE gripper sock)


Toileting Hygiene (QC):  4 (SBA, pt able to manage clothes and perform hygiene)





Other Treatments


5935-3849 OT evaluation complete, pt transferred bed to w/c using FWW, CGA. 

Transferred back to bed CGA. Pt performed functional transfers supine <-> sit, 

SBA. Pt sat EOB and performed sponge bath, assist with BLEs lower legs/feet, and

buttocks, CGA in stand at FWW as pt completed pericare. Pt is nonweight bearing 

LLE, has difficulty maintaining NWB status with transfers. Post tx, pt seated 

EOB, call light in reach and all needs met. 





[id number]: Pt laying in bed, transferred supine to sit EOB, SBA. Pt donned 

clothes at EOB, assist to manage wound vac. Pt then states need to toilet, using

FWW to transfer into bathroom and onto toilet, assist with IV pole. Pt completed

toileting, stood at sink to wash his hands, then  transferred back to bed and 

supine. Pt non weight bearing in L foot, but unable to maintain non weight 

bearing status with walker. OT educated pt on plan for tomorrow, coordinating 

shower with wound care, he verbalized agreement. Post tx, pt laying in bed, call

light in reach and all needs met.





Education


OT Patient Education:  Correct positioning, Energy conservation, Modified ADL 

techniques, Progress toward Goal/Update tx plan, Purpose of tx/functional 

activities, Rehab process, Safety issues, Transfer techniques


Teaching Recipient:  Patient


Teaching Methods:  Discussion


Response to Teaching:  Verbalize Understanding





OT Short Term Goals


Short Term Goals


Time Frame:  2021


Shower/bathe self:  4


Lower body dressin


Putting on/taking off footwear:  3





OT Long Term Goals


Long Term Goals


Time Frame:  2021


Eating (QC):  6


Oral Hygiene (QC):  6


Toileting Hygiene (QC):  6


Shower/Bathe Self (QC):  6


Upper Body Dressing (QC):  6


Lower Body Dressing (QC):  6


On/Off Footwear (QC):  6


Additional Goals:  1-Demonstrate ADL Tasks, 2-Verbalize Understanding, 3-

ImproveStrength/Aung


1=Demonstrate adherence to instructed precautions during ADL tasks.


2=Patient will verbalize/demonstrate understanding of assistive 

devices/modifications for ADL.


3=Patient will improve strength/tolerance for activity to enable patient to 

perform ADL's.





OT Education/Plan


Problem List/Assessment


Assessment:  Decreased Activ Tolerance, Decreased UE Strength, Impaired I ADL's,

Impaired Self-Care Skills





Discharge Recommendations


Plan/Recommendations:  Continue POC


Equpiment Recommendations-D/C:  Extended Bath Bench





Treatment Plan/Plan of Care


Treatment,Training & Education:  Yes


Patient would benefit from OT for education, treatment and training to promote 

independence in ADL's, mobility, safety and/or upper extremity function for 

ADL's.


Plan of Care:  ADL Retraining, Functional Mobility, Group Exercise/Act as Ind, 

UE Funct Exercise/Act


Treatment Duration:  2021


Frequency:  At least 5 of 7 days/Wk (IRF)


Estimated Hrs Per Day:  1.5 hours per day


Agreement:  Yes


Rehab Potential:  Good





Time/GCodes


Start Time:  09:45 ([id number])


Stop Time:  14:15 ([id number])


Total Time Billed (hr/min):  45 (90)


Billed Treatment Time


2814-6223 OT 


1FERNANDO (10'), ADL 2 (35')





3811-5806 


1, ADL 3 (45')











YAMIL FLORES OT          Mar 29, 2021 10:36

## 2021-03-30 VITALS — SYSTOLIC BLOOD PRESSURE: 125 MMHG | DIASTOLIC BLOOD PRESSURE: 70 MMHG

## 2021-03-30 VITALS — SYSTOLIC BLOOD PRESSURE: 119 MMHG | DIASTOLIC BLOOD PRESSURE: 76 MMHG

## 2021-03-30 VITALS — SYSTOLIC BLOOD PRESSURE: 111 MMHG | DIASTOLIC BLOOD PRESSURE: 61 MMHG

## 2021-03-30 LAB
ALBUMIN SERPL-MCNC: 2.7 GM/DL (ref 3.2–4.5)
ALP SERPL-CCNC: 62 U/L (ref 40–136)
ALT SERPL-CCNC: 20 U/L (ref 0–55)
BASOPHILS # BLD AUTO: 0 10^3/UL (ref 0–0.1)
BASOPHILS NFR BLD AUTO: 0 % (ref 0–10)
BILIRUB SERPL-MCNC: 0.2 MG/DL (ref 0.1–1)
BUN/CREAT SERPL: 13
CALCIUM SERPL-MCNC: 8.5 MG/DL (ref 8.5–10.1)
CHLORIDE SERPL-SCNC: 101 MMOL/L (ref 98–107)
CO2 SERPL-SCNC: 25 MMOL/L (ref 21–32)
CREAT SERPL-MCNC: 0.77 MG/DL (ref 0.6–1.3)
EOSINOPHIL # BLD AUTO: 0.3 10^3/UL (ref 0–0.3)
EOSINOPHIL NFR BLD AUTO: 4 % (ref 0–10)
GFR SERPLBLD BASED ON 1.73 SQ M-ARVRAT: > 60 ML/MIN
GLUCOSE SERPL-MCNC: 135 MG/DL (ref 70–105)
HCT VFR BLD CALC: 30 % (ref 40–54)
HGB BLD-MCNC: 8.7 G/DL (ref 13.3–17.7)
LYMPHOCYTES # BLD AUTO: 2.4 10^3/UL (ref 1–4)
LYMPHOCYTES NFR BLD AUTO: 36 % (ref 12–44)
MANUAL DIFFERENTIAL PERFORMED BLD QL: NO
MCH RBC QN AUTO: 25 PG (ref 25–34)
MCHC RBC AUTO-ENTMCNC: 29 G/DL (ref 32–36)
MCV RBC AUTO: 86 FL (ref 80–99)
MONOCYTES # BLD AUTO: 0.5 10^3/UL (ref 0–1)
MONOCYTES NFR BLD AUTO: 7 % (ref 0–12)
NEUTROPHILS # BLD AUTO: 3.3 10^3/UL (ref 1.8–7.8)
NEUTROPHILS NFR BLD AUTO: 49 % (ref 42–75)
PLATELET # BLD: 545 10^3/UL (ref 130–400)
PMV BLD AUTO: 8.6 FL (ref 9–12.2)
POTASSIUM SERPL-SCNC: 4.2 MMOL/L (ref 3.6–5)
PROT SERPL-MCNC: 6.6 GM/DL (ref 6.4–8.2)
SODIUM SERPL-SCNC: 135 MMOL/L (ref 135–145)
WBC # BLD AUTO: 6.7 10^3/UL (ref 4.3–11)

## 2021-03-30 RX ADMIN — INSULIN ASPART SCH UNIT: 100 INJECTION, SOLUTION INTRAVENOUS; SUBCUTANEOUS at 21:25

## 2021-03-30 RX ADMIN — GLYBURIDE SCH MG: 2.5 TABLET ORAL at 17:21

## 2021-03-30 RX ADMIN — INSULIN ASPART SCH UNIT: 100 INJECTION, SOLUTION INTRAVENOUS; SUBCUTANEOUS at 17:21

## 2021-03-30 RX ADMIN — PRASUGREL SCH MG: 10 TABLET, FILM COATED ORAL at 07:47

## 2021-03-30 RX ADMIN — POLYETHYLENE GLYCOL (3350) SCH GM: 17 POWDER, FOR SOLUTION ORAL at 09:15

## 2021-03-30 RX ADMIN — LISINOPRIL SCH MG: 20 TABLET ORAL at 07:47

## 2021-03-30 RX ADMIN — POLYETHYLENE GLYCOL (3350) SCH GM: 17 POWDER, FOR SOLUTION ORAL at 21:24

## 2021-03-30 RX ADMIN — METFORMIN HYDROCHLORIDE SCH MG: 500 TABLET, EXTENDED RELEASE ORAL at 17:21

## 2021-03-30 RX ADMIN — METOPROLOL SUCCINATE SCH MG: 100 TABLET, EXTENDED RELEASE ORAL at 07:48

## 2021-03-30 RX ADMIN — METFORMIN HYDROCHLORIDE SCH MG: 500 TABLET, EXTENDED RELEASE ORAL at 07:47

## 2021-03-30 RX ADMIN — HYDROCHLOROTHIAZIDE SCH MG: 25 TABLET ORAL at 07:47

## 2021-03-30 RX ADMIN — DOCUSATE SODIUM SCH MG: 100 CAPSULE ORAL at 07:48

## 2021-03-30 RX ADMIN — INSULIN ASPART SCH UNIT: 100 INJECTION, SOLUTION INTRAVENOUS; SUBCUTANEOUS at 07:01

## 2021-03-30 RX ADMIN — INSULIN ASPART SCH UNIT: 100 INJECTION, SOLUTION INTRAVENOUS; SUBCUTANEOUS at 16:54

## 2021-03-30 RX ADMIN — SODIUM CHLORIDE SCH MLS/HR: 900 INJECTION, SOLUTION INTRAVENOUS at 03:08

## 2021-03-30 RX ADMIN — DOCUSATE SODIUM AND SENNOSIDES SCH EA: 8.6; 5 TABLET, FILM COATED ORAL at 21:24

## 2021-03-30 RX ADMIN — ASPIRIN SCH MG: 81 TABLET ORAL at 07:48

## 2021-03-30 RX ADMIN — INSULIN ASPART SCH UNIT: 100 INJECTION, SOLUTION INTRAVENOUS; SUBCUTANEOUS at 06:00

## 2021-03-30 RX ADMIN — SODIUM CHLORIDE SCH MLS/HR: 900 INJECTION, SOLUTION INTRAVENOUS at 18:27

## 2021-03-30 RX ADMIN — DOCUSATE SODIUM SCH MG: 100 CAPSULE ORAL at 21:24

## 2021-03-30 RX ADMIN — DOCUSATE SODIUM AND SENNOSIDES SCH EA: 8.6; 5 TABLET, FILM COATED ORAL at 08:01

## 2021-03-30 RX ADMIN — INSULIN ASPART SCH UNIT: 100 INJECTION, SOLUTION INTRAVENOUS; SUBCUTANEOUS at 11:05

## 2021-03-30 RX ADMIN — SODIUM CHLORIDE SCH MLS/HR: 900 INJECTION, SOLUTION INTRAVENOUS at 10:11

## 2021-03-30 RX ADMIN — GLYBURIDE SCH MG: 2.5 TABLET ORAL at 06:58

## 2021-03-30 RX ADMIN — INSULIN ASPART SCH UNIT: 100 INJECTION, SOLUTION INTRAVENOUS; SUBCUTANEOUS at 11:58

## 2021-03-30 NOTE — ST COGNITIVE LINGUISTIC EVAL
Speech Evaluation-General


Medical Diagnosis


L foot diabetic ulcer


Onset Date:  Mar 23, 2021





Therapy Diagnosis


Therapy Diagnosis:  Cognitive-communication





Referral


Referring Physician:  Dr. Barrera





Medical History


Pertinent Medical History:  DM, HTN, Neuropathy


Reviewed History:  Yes





Social History


Current Living Status:  Spouse





Speech PLF-Current Status


Prior Level of Function





Patient lives in the home with his wife where he is independent for much


of his daily needs.





Subjective


Patient was pleasant and cooperative with the cognitive assessment.





Language Eval: Auditory


Comprehends Simple Yes/No Ques:  Functional


Indent/Objects Multiple Fields:  Functional


Ident/Pics in Multiple Fields:  Functional


Follows 1-Step Commands:  Functional


Follows Complex Directions:  Functional


Follows General Conversations:  Functional





Language Eval: Verbal Language


Completes Spontaneous Greeting:  Functional


Produces Auto, Serial Info:  Functional


Imitates Simple Words/Phrases:  Functional


Word Finding:  Functional


Requests Basic Needs:  Functional


States Basic Personal Info:  Functional


Expresses Complex Ideas:  Functional





Objective Cognitive Domain


Attention:  WNL


Memory:  WNL


Problem Solving:  Functional


Executive Functions:  WNL


Visuospatial Skills:  WNL


Composite Severity Rating:  WNL


Clock Drawing Severity Rating:  WNL





Objective


Formal/Standardized Tests


[facility] Mental Status ([facility])


Results


29/30, within normal range of function


Oral Motor/Speech Production


Within Normal Limits


Impression


Patient is a pleasant 41 y/o male who was admitted to the ARU s/p foot wound 

secondary to neuropathy. Patient was given the SLUMS at bedside with a score of 

29/30, which is within the normal range of function. Patient does not require 

further ST services at this time.





Speech Patient Assess


Expression of Ideas/Wants:  Expression (4)


Understanding Verbal Content:  Understands (4)


Brief Interview-Mental Status:  Yes


Repetition of Three Words:  Three (3)


Temporal Orientation: Year:  Correct (3)


Temporal Orientation: Month:  Accurate within 5 days(2)


Temporal Orientation: Day:  Correct (1)


Recall : Wear to say "Sock":  Yes, no cue required (2)


Recall : Color:  Yes, no cue required (2)


Recall : Bed:  Yes, no cue required (2)


Memory/Recall Ability:  Current season, Location of own room, Staff names and 

faces, That he or she is in a hsp/hsp unit





Speech-Plan


Patient/Family Goals


Patient/Family Goals:  


Patient plans on returning to his home where he lives with his wife.





Treatment Plan


Speech Therapy Treatment Plan:  Discontinue ST


Treatment Duration:  Mar 30, 2021


Frequency:  1 time per week


Estimated Hrs Per Day:  .5 hour per day


Rehab Potential:  Fair


Barriers to Learning:  


None identified


Pt/Family Agrees to Plan:  Yes





Safety Risks/Education


Teaching Recipient:  Patient


Teaching Methods:  Discussion


Response to Teaching:  Verbalize Understanding


Education Topics Provided:  


Safety within his room





Time


Speech Therapy Time In:  11:00


Speech Therapy Time Out:  11:30


Total Billed Time:  30


Billed Treatment Time


1, ROSARIOCOMRONNI Walker            Mar 30, 2021 11:55

## 2021-03-30 NOTE — PROGRESS NOTE
Subjective


Date Seen by a Provider:  Mar 30, 2021


Time Seen by a Provider:  14:00


Subjective/Events-last exam


clinically doing well. no systemic sx. some new areas of necrosis left foot and 

ankle





Objective


Exam





Vital Signs








  Date Time  Temp Pulse Resp B/P (MAP) Pulse Ox O2 Delivery O2 Flow Rate FiO2


 


3/30/21 09:00      Room Air  


 


3/30/21 08:05  108  111/61 (78)    


 


3/30/21 05:50 36.3 85 16 119/76 (90) 95 Room Air  


 


3/29/21 21:30     96 Room Air  


 


3/29/21 21:20  76 18 152/70 (97) 96 Room Air  


 


3/29/21 17:38 36.9 81 18 137/65 (89) 100 Room Air  














I & O 


 


 3/30/21





 07:00


 


Intake Total 1590 ml


 


Balance 1590 ml





Capillary Refill :


General Appearance:  No Apparent Distress


Neck:  Full Range of Motion


Respiratory:  Chest Non Tender, Lungs Clear


Cardiovascular:  Regular Rate, Rhythm


Gastrointestinal:  normal bowel sounds, non tender, soft


Extremity:  Normal Capillary Refill


Neurologic/Psychiatric:  Alert, Oriented x3


Lymphatic:  No Adenopathy





Results


Lab


Laboratory Tests


3/29/21 15:48: Glucometer 83


3/29/21 21:18: Glucometer 91


3/30/21 05:46: Glucometer 109


3/30/21 06:15: 


White Blood Count 6.7, Red Blood Count 3.44L, Hemoglobin 8.7L, Hematocrit 30L, 

Mean Corpuscular Volume 86, Mean Corpuscular Hemoglobin 25, Mean Corpuscular 

Hemoglobin Concent 29L, Red Cell Distribution Width 17.2H, Platelet Count 545H, 

Mean Platelet Volume 8.6L, Immature Granulocyte % (Auto) 5, Neutrophils (%) 

(Auto) 49, Lymphocytes (%) (Auto) 36, Monocytes (%) (Auto) 7, Eosinophils (%) 

(Auto) 4, Basophils (%) (Auto) 0, Neutrophils # (Auto) 3.3, Lymphocytes # (Auto)

2.4, Monocytes # (Auto) 0.5, Eosinophils # (Auto) 0.3, Basophils # (Auto) 0.0, 

Immature Granulocyte # (Auto) 0.3H, Sodium Level 135, Potassium Level 4.2, 

Chloride Level 101, Carbon Dioxide Level 25, Anion Gap 9, Blood Urea Nitrogen 

10, Creatinine 0.77, Estimat Glomerular Filtration Rate > 60, BUN/Creatinine 

Ratio 13, Glucose Level 135H, Calcium Level 8.5, Corrected Calcium 9.5, Total 

Bilirubin 0.2, Aspartate Amino Transf (AST/SGOT) 25, Alanine Aminotransferase 

(ALT/SGPT) 20, Alkaline Phosphatase 62, Total Protein 6.6, Albumin 2.7L


3/30/21 10:09: Glucometer 144H





Assessment/Plan


Assessment/Plan


Assess & Plan/Chief Complaint


hx insulin dependent diabetes/morbid obesity with NSTI left ankle s/p 

debridement.


more areas of necrosis. explained to pt likely will require a BKA. the option 

was also given to temporize with further debridement and abx. patient states he 

would like to think on it. 


will continue current therapies for now.











CLAUDY ALEJO MD                Mar 30, 2021 15:31

## 2021-03-30 NOTE — PHYSICAL THERAPY DAILY NOTE
PT Daily Note-Current


Subjective


Patient laying supine pre tx. Patient consented to therapy and reported no pain 

during session. L heel NWB restrictions were maintained throughout tx.





Appearance


Patient left seated upright in bed, with pillow placed below L LE without 

pressure under the heel, with call button within reach, and tray table nearby.





Mental Status


Patient Orientation:  Person, Place, Time, Normal For Age


Attachments:  Other-See Comments (Wound vac)





Transfers


SCALE: Activities may be completed with or without assistive devices.





6-Indepedent-patient completes the activity by him/herself with no assistance fr

om a helper.


5-Set-up or Clean-up Assistance-helper sets up or cleans up; patient completes 

activity. [place] assists only prior to or  


    following the activity.


4-Supervision or Touching Assistance-helper provides verbal cues and/or 

touching/steadying and/or contact guard assistance as patient completes 

activity. Assistance may be provided   


    throughout the activity or intermittently.


3-Partial/Moderate Assistance-helper does LESS THAN HALF the effort. [place] 

lifts, holds or supports trunk or limbs, but provides less than half the effort.


2-Substantial/Maximal Assistance-helper does MORE THAN HALF the effort. [place] 

lifts or holds trunk or limbs and provides more than half the effort.


6-Tblafuexk-fdbiid does ALL the effort. Patient does none of the effort to 

complete the activity. Or, the assistance of 2 or more helpers is required for 

the patient to complete the  


    activity.


If activity was not attempted, code reason:


7-Patient Refused.


9-Not Applicable-not attempted and the patient did not perform the activity 

before the current illness, exacerbation or injury.


10-Not Attempted due to Environmental Limitations-(lack of equipment, weather 

restraints, etc.).


88-Not Attempted due to Medical Conditions or Safety Concerns.


Roll Left & Right (QC):  4


Patient was able to adjust position of body in bed by using grab bars in bed and

utilizing UE's to pull body towards HOB. PT CGA x1 was needed to adjust pillows 

and L LE to maintain NWB restriction.





Weight Bearing


Right Lower Extremity:  Right


Full Weight Bearing


Left Lower Extremity:  Left


Non Weight Bearing





Patient NWB on L foot, no pressure allowed





Exercises


Supine Ex:  Ankle pumps, Quad Set, Glut sets, Heel Slides (R LE only), Short Arc

Quads (R LE only), Straight leg raise, Hip abd/add


Patient tolerated supine bed mobility well, with increased reps allowed in 

between sets. L LE did not participate in abd/add, quad sets, or heel slides 

secondary to NWB restrictions.





Treatments


LE strengthening, functional mobility, LE ROM





Assessment


Current Status:  Fair Progress


Patient will continue to benefit from interventions involving LE strengthening 

promoting independent functional mobility.





PT Short Term Goals


Short Term Goals


Time Frame:  2021


Roll Left & Right:  6


Sit to lyin


Lying to sitting on side of be:  6


Sit to stand:  4 (SBA)


Chair/bed-to-chair transfer:  4 (SBA)


Toilet transfer:  4 (SBA)


Car transfer:  4 (SBA)


Walk 10 feet:  4 (SBA)


Does pt use a wc or scooter:  Yes


Wheel 50ft w/2 turns:  6


Wheel 150 feet:  6


Type:  Manual





PT Long Term Goals


Long Term Goals


PT Long Term Goals Time Frame:  2021


Roll Left & Right (QC):  6


Sit to Lying (QC):  6


Lying-Sitting on Side/Bed(QC):  6


Sit to Stand (QC):  6


Chair/Bed-to-Chair Xfer(QC):  6


Toilet Transfer (QC):  6


Car Transfer (QC):  6


Does the Patient Walk:  No and Walking Goal IS indicated


Walk 10 feet (QC):  6


Walk 50ft with 2 Turns (QC):  6


Walk 150 ft (QC):  88


Walking 10ft on Uneven Surface:  6


1 Step (curb) (QC):  4


4 Steps (QC):  88


12 Steps (QC):  88


Picking up an Object (QC):  88


Does the Pt use WC or Scooter?:  Yes


Wheel 50 feet with 2 turns (QC:  6


Type:  Manual


Wheel 150 feet:  6


Type:  Manual





PT Plan


Problem List


Problem List:  Activity Tolerance, Functional Strength, Safety, Balance, Gait, 

Transfer, Bed Mobility, ROM





Treatment/Plan


Treatment Plan:  Continue Plan of Care


Treatment Plan:  Bed Mobility, Education, Functional Activity Aung, Functional 

Strength, Group Therapy, Gait, Safety, Therapeutic Exercise, Transfers


Treatment Duration:  2021


Frequency:  At least 5 of 7 days/Wk (IRF)


Estimated Hrs Per Day:  1.5 hours per day


Patient and/or Family Agrees t:  Yes





Safety Risks/Education


Patient Education:  Reviewed Precautions, Correct Positioning, Safety Issues


Teaching Recipient:  Patient


Teaching Methods:  Demonstration, Discussion


Response to Teaching:  Verbalize Understanding, Return Demonstration





Time/GCodes


Time In:  1300


Time Out:  1315


Total Billed Treatment Time:  15


Total Billed Treatment


1 visit: 


EX: 15'











CAMILLE HARRY PT                Mar 30, 2021 14:09

## 2021-03-30 NOTE — OCCUPATIONAL THER DAILY NOTE
OT Current Status-Daily Note


Subjective


Pt alert, lying in bed.  Pt agrees to therapy.  No c/o pain.  Wound care to 

change dressing today.





Mental Status/Objective


Patient Orientation:  Person, Place, Time, Situation


Attachments:  Drains (wound vac), IV (midline)





ADL-Treatment


Pt agrees to shower.  Pt states that he will not be able to use w/c or BSC at 

home due to narrow doors, hallway and too small of bedroom.  Pt does not 

maintain wt bearing status and pt reported that last time he had a NWB status 

that he did not maintain that either due to same situations.  Pt does use w/c at

work to maintain NWB.  Pt transferred to toilet and completed toileting 

independent using grabbars.  Pt sat at sink to complete oral care.  Sitting on 

shower bench, pt completed shower using LH sponge, grabbar and hand held shower.

 After set up, pt able to complete upper body dressing by self and lower body 

dressing with SBA.  Max A to don/doff R sock.  Pt independent with bed mobility.


Therapy Code Descriptions/Definitions 





Functional [place] Measure:


0=Not Assessed/NA        4=Minimal Assistance


1=Total Assistance        5=Supervision or Setup


2=Maximal Assistance  6=Modified [place]


3=Moderate Assistance 7=Complete IndependenceSCALE: Activities may be completed 

with or without assistive devices.





6-Indepedent-patient completes the activity by him/herself with no assistance 

from a helper.


5-Set-up or Clean-up Assistance-helper sets up or cleans up; patient completes 

activity. [place] assists only prior to or  


    following the activity.


4-Supervision or Touching Assistance-helper provides verbal cues and/or 

touching/steadying and/or contact guard assistance as patient completes 

activity. Assistance may be provided   


    throughout the activity or intermittently.


3-Partial/Moderate Assistance-helper does LESS THAN HALF the effort. [place] 

lifts, holds or supports trunk or limbs, but provides less than half the effort.


2-Substantial/Maximal Assistance-helper does MORE THAN HALF the effort. [place] 

lifts or holds trunk or limbs and provides more than half the effort.


0-Cokvpjyfo-dywqhs does ALL the effort. Patient does none of the effort to 

complete the activity. Or, the assistance of 2 or more helpers is required for 

the patient to complete the  


    activity.


If activity was not attempted, code reason:


7-Patient Refused.


9-Not Applicable-not attempted and the patient did not perform the activity 

before the current illness, exacerbation or injury.


10-Not Attempted due to Environmental Limitations-(lack of equipment, weather 

restraints, etc.).


88-Not Attempted due to Medical Conditions or Safety Concerns.


Eating (QC):  6 (Using clinical judgement, pt able to complete independently.)


Oral Hygiene (QC):  6


Shower/Bathe Self (QC):  4


Upper Body Dressing (QC):  5


Lower Body Dressing (QC):  4


On/Off Footwear:  2


Toileting Hygiene (QC):  6


Toilet Transfer (QC):  6





Other Treatment


Pt demonstrated understanding of B UE theraband exercises after instruction.  3 

sets 10 reps of 5 exercises.  After therapy, pt lying in bed with call 

light/phone in reach.  Nrsg in room to change wound vac.





OT Short Term Goals


Short Term Goals


Time Frame:  2021


Shower/bathe self:  4


Lower body dressin


Putting on/taking off footwear:  3





OT Long Term Goals


Long Term Goals


Time Frame:  2021


Eating (QC):  6


Oral Hygiene (QC):  6


Toileting Hygiene (QC):  6


Shower/Bathe Self (QC):  6


Upper Body Dressing (QC):  6


Lower Body Dressing (QC):  6


On/Off Footwear (QC):  6


Additional Goals:  1-Demonstrate ADL Tasks, 2-Verbalize Understanding, 3-

ImproveStrength/Aung


1=Demonstrate adherence to instructed precautions during ADL tasks.


2=Patient will verbalize/demonstrate understanding of assistive 

devices/modifications for ADL.


3=Patient will improve strength/tolerance for activity to enable patient to 

perform ADL's.





OT Education/Plan


Problem List/Assessment


Assessment:  Decreased UE Strength, Impaired Self-Care Skills





Discharge Recommendations


Plan/Recommendations:  Continue POC





Treatment Plan/Plan of Care


Patient would benefit from OT for education, treatment and training to promote 

independence in ADL's, mobility, safety and/or upper extremity function for 

ADL's.


Plan of Care:  ADL Retraining, Functional Mobility, Group Exercise/Act as Ind, 

UE Funct Exercise/Act


Treatment Duration:  2021


Frequency:  At least 5 of 7 days/Wk (IRF)


Estimated Hrs Per Day:  1.5 hours per day


Agreement:  Yes


Rehab Potential:  Fair





Time/GCodes


Start Time:  07:30


Stop Time:  09:00


Total Time Billed (hr/min):  90


Billed Treatment Time


1 visit-ADL 5 (70 min)  EX 1 (20 min)











GUDELIA GONZALEZ               Mar 30, 2021 11:32

## 2021-03-30 NOTE — PHYSICAL THERAPY DAILY NOTE
PT Daily Note-Current


Subjective


Patient supine in bed pre tx. Patient denied any pain, and consented to begin 

PT.





Appearance


Patient placed supine in bed. Nurse in room upon departure. L LE elevated by 

pillows without pressure on heel.





Mental Status


Patient Orientation:  Person, Place, Time, Normal For Age


Attachments:  Other-See Comments (Wound vac)





Transfers


SCALE: Activities may be completed with or without assistive devices.





6-Indepedent-patient completes the activity by him/herself with no assistance 

from a helper.


5-Set-up or Clean-up Assistance-helper sets up or cleans up; patient completes 

activity. [place] assists only prior to or  


    following the activity.


4-Supervision or Touching Assistance-helper provides verbal cues and/or nicholas

verna/steadying and/or contact guard assistance as patient completes activity. 

Assistance may be provided   


    throughout the activity or intermittently.


3-Partial/Moderate Assistance-helper does LESS THAN HALF the effort. [place] 

lifts, holds or supports trunk or limbs, but provides less than half the effort.


2-Substantial/Maximal Assistance-helper does MORE THAN HALF the effort. [place] 

lifts or holds trunk or limbs and provides more than half the effort.


4-Cjqgtqdlk-clvbqf does ALL the effort. Patient does none of the effort to 

complete the activity. Or, the assistance of 2 or more helpers is required for 

the patient to complete the  


    activity.


If activity was not attempted, code reason:


7-Patient Refused.


9-Not Applicable-not attempted and the patient did not perform the activity 

before the current illness, exacerbation or injury.


10-Not Attempted due to Environmental Limitations-(lack of equipment, weather 

restraints, etc.).


88-Not Attempted due to Medical Conditions or Safety Concerns.


Roll Left & Right (QC):  6


Sit to Lying (QC):  6


Lying to Sitting/Side of Bed(Q:  6


Sit to Stand (QC):  6


Chair/Bed-to-Chair Xfer(QC):  6


Patient was able to complete sit <-> stand transfer, but required 3 attempts 

with sit <-> stand transfer from w/c.





Weight Bearing


Right Lower Extremity:  Right


Full Weight Bearing


Left Lower Extremity:  Left


Non Weight Bearing





Patient NWB on L foot, no pressure allowed





Gait Training


Does the Patient Walk?:  No and Walking Goal IS indicated


Patient unable to maintain NWB restrictions with ambulation.





Wheelchair Training


Does the Pt Use a Wheelchair?:  Yes


Wheel 50 ft with 2 turns (QC):  6


Wheel 150 ft (QC):  6


Type of Wheelchair:  Manual


Patient able to maneuver w/c independently with UEs and R LE. Patient noted 

slight discomfort with  of w/c today, but was able to make distance 300' 

slowly, without rest break.





Exercises


Seated Therapy Exercises:  Ankle pumps, Long arc quads (2 sets), Hip flexion, 

Hip abd/add (GTB for abduction, big blue ball for adduction), Glut set


Seated Reps:  20





Treatments


LE strengthening, UE strengthening, LE ROM





Assessment


Current Status:  Fair Progress


Patient will benefit from interventions to promote muscular endurance for 

optimal functional independence.





PT Short Term Goals


Short Term Goals


Time Frame:  2021


Roll Left & Right:  6


Sit to lyin


Lying to sitting on side of be:  6


Sit to stand:  4 (SBA)


Chair/bed-to-chair transfer:  4 (SBA)


Toilet transfer:  4 (SBA)


Car transfer:  4 (SBA)


Walk 10 feet:  4 (SBA)


Does pt use a wc or scooter:  Yes


Wheel 50ft w/2 turns:  6


Wheel 150 feet:  6


Type:  Manual





PT Long Term Goals


Long Term Goals


PT Long Term Goals Time Frame:  2021


Roll Left & Right (QC):  6


Sit to Lying (QC):  6


Lying-Sitting on Side/Bed(QC):  6


Sit to Stand (QC):  6


Chair/Bed-to-Chair Xfer(QC):  6


Toilet Transfer (QC):  6


Car Transfer (QC):  6


Does the Patient Walk:  No and Walking Goal IS indicated


Walk 10 feet (QC):  6


Walk 50ft with 2 Turns (QC):  6


Walk 150 ft (QC):  88


Walking 10ft on Uneven Surface:  6


1 Step (curb) (QC):  4


4 Steps (QC):  88


12 Steps (QC):  88


Picking up an Object (QC):  88


Does the Pt use WC or Scooter?:  Yes


Wheel 50 feet with 2 turns (QC:  6


Type:  Manual


Wheel 150 feet:  6


Type:  Manual





PT Plan


Problem List


Problem List:  Activity Tolerance, Functional Strength, Safety, Balance, Gait, 

Transfer, Bed Mobility, ROM





Treatment/Plan


Treatment Plan:  Continue Plan of Care


Treatment Plan:  Bed Mobility, Education, Functional Activity Aung, Functional 

Strength, Group Therapy, Gait, Safety, Therapeutic Exercise, Transfers


Treatment Duration:  2021


Frequency:  At least 5 of 7 days/Wk (IRF)


Estimated Hrs Per Day:  1.5 hours per day


Patient and/or Family Agrees t:  Yes





Safety Risks/Education


Patient Education:  Transfer Techniques, Reviewed Precautions, Correct 

Positioning, Safety Issues


Teaching Recipient:  Patient


Teaching Methods:  Demonstration, Discussion


Response to Teaching:  Verbalize Understanding, Return Demonstration





Time/GCodes


Time In:  1000


Time Out:  1100


Total Billed Treatment Time:  60


Total Billed Treatment


1 visit: 


FA x3: 45' 


EX: 15'











CAMILLE HARRY PT                Mar 30, 2021 10:57

## 2021-03-30 NOTE — INDIVIDUALIZED PLAN OF CARE
Individualized Plan of Care


Rehab Nursing IPOC Order


Admission Date


Mar 29, 2021 at 10:09


Current Orders





Orders


Admission Order(Inpt,Obs,Sdc) (3/29/21 10:07)


Vital Signs: Per Unit Policy ( 08,16,00 (3/29/21 10:07)


-Inpt Rehab Con (3/29/21 10:07)


Rehab Nursing Orders-Ipoc (3/29/21 10:07)


Physical Therapy Rehab Orders (3/29/21 10:07)


Occupational Therapy Rehab Ord (3/29/21 10:07)


Speech Therapy Rehab Orders (3/29/21 10:07)


Cbc With Automated Diff (3/30/21 06:00)


Comprehensive Metabolic Panel (3/30/21 06:00)


Precautions (Aru) (3/29/21 10:07)


Weekly Weight WEEK (3/29/21 10:07)


Rehab-Intensity Of Therapy (3/29/21 10:07)


Initiate Admission Nursing Pro .admission (3/29/21 10:07)


Alprazolam Tablet (Xanax Tablet) (3/29/21 10:15)


Calcium Carbonate Chew Tablet (Antacid C (3/29/21 10:15)


Diphenhydramine Tablet (Benadryl Tablet) (3/29/21 10:15)


Docusate Sodium Capsule (Colace Capsule) (3/29/21 21:00)


Docusate Sodium Capsule (Colace Capsule) (3/29/21 10:15)


Bisacodyl Suppository (Dulcolax Supposit (3/29/21 10:15)


Lactulose Oral Solution (Enulose Oral So (3/29/21 10:15)


Na Phos/Na Biphos Enema (Fleet Enema Guille (3/29/21 10:15)


Guaifenesin/Codeine Syrup (Robitussin Ac (3/29/21 10:15)


Loperamide Tablet (Imodium Tablet) (3/29/21 10:15)


Melatonin  Tablet (Melatonin Tablet) (3/29/21 10:15)


Polyethylene Glycol Powder Pkt (Miralax (3/29/21 21:00)


Ondansetron  Oral Dissolve Tab (Zofran (3/29/21 10:15)


Senna S Tablet (Senokot S Tablet) (3/29/21 21:00)


Initiate Admission Nursing Pro .admission (3/29/21 10:07)


Transfer - Bed/Room/Location (3/29/21 10:08)


Cho 75g/M 1snack ( [place]) (3/29/21 Lunch)


Accucheck Achs ACHS (3/29/21 10:32)


Insulin Aspart (Novolog) (Novolog (Charg (3/29/21 11:00)


Iron Sucrose Injection (Venofer Injectio (3/31/21 09:00)


Aspirin Enteric Coated Tablet (Ecotrin T (3/30/21 09:00)


Glyburide Tablet (Micronase Tablet) (3/29/21 16:30)


Metformin Xr Tablet (Glucophage Xr Table (3/29/21 18:00)


Metoprolol Succinate (Xl) Tab (Toprol Xl (3/30/21 09:00)


Prasugrel Tablet (Effient Tablet) (3/30/21 09:00)


Lisinopril  Tablet (Zestril Tablet) (3/30/21 09:00)


Insulin Determir (Per Unit) (Levemir (Pe (3/29/21 21:00)


Insulin Aspart (Novolog) (Novolog (Charg (3/29/21 12:00)


Atorvastatin Tablet (Lipitor Tablet) (3/29/21 21:00)


Hydrochlorothiazide Cap/Tablet (Hctz Cap (3/30/21 09:00)


Piperacillin/Tazobactam (Bulk) (Zosyn In (3/29/21 13:00)


Piperacillin/Tazobactam (Bulk) (Zosyn In (3/29/21 19:00)


Occult Blood Stool (3/29/21 14:10)


Patient Visit (3/29/21 )


Pt Eval High Complexity (3/29/21 )


Exercise Therap, Ea 15 Min (3/29/21 )


Insulin Aspart (Novolog) (Novolog (Charg (3/30/21 07:00)


Iron Sucrose Injection (Venofer Injectio (3/31/21 09:00)


Consult Physician (3/30/21 13:20)


Patient Visit (3/30/21 )


Speech Sound Lang Comp (3/30/21 )


Glucerna (3/30/21 16:00)


Patient Visit (3/30/21 )


Functional Activities, Ea 15 (3/30/21 )


Exercise Therap, Ea 15 Min (3/30/21 )





Rehab Nursing Orders:  Ongoing Assess. of Function Status, Bladder Management, 

Bladder Scan, Bladder Training, Bowel Management, Bowel Training, Disease 

Management & Educaiton, DVT Prophylaxis, Fall Prevention, Flui

d/Electrolyte/Nutrition Mgmt, Infection Prevention, Medication Management & 

Education, Management of Risks & Complications, Management of Skin Intergrity, 

Nutrition Management, Pain Management, Patient/Family Support, Safety 

Management, Wound Management





Intensity of Therapy to be met


Patient to be seen:  Min.3h per day/5 of 7d





PT IPOC


Problem List:  Activity Tolerance, Functional Strength, Safety, Balance, Gait, 

Transfer, Bed Mobility, ROM


Treatment Plan:  Continue Plan of Care


Bed Mobility, Education, Functional Activity Aung, Functional Strength, Group 

Therapy, Gait, Safety, Therapeutic Exercise, Transfers


Treatment Duration:  Apr 19, 2021


Frequency:  At least 5 of 7 days/Wk (IRF)


Estimated Hrs Per Day:  1.5 hours per day





OT IPOC


Problems:  Decreased Activ Tolerance, Decreased UE Strength, Impaired I ADL's, 

Impaired Self-Care Skills


OT Treatment, Training and Edu:  Yes


Plan of Care:  ADL Retraining, Functional Mobility, Group Exercise/Act as Ind, 

UE Funct Exercise/Act


Treatment Duration:  Apr 23, 2021


Frequency:  At least 5 of 7 days/Wk (IRF)


Estimated Hrs Per Day:  1.5 hours per day





ST IPOC


Speech Therapy Treatment Plan:  Discontinue ST


Treatment Duration:  Mar 30, 2021


Frequency:  1 time per week


Estimated Hrs Per Day:  .25 hour per day





/Case Mgmt


/Case Managemen:  Discharge Planning





Dietitian/Nutritionist


Dietitian/Nutritionist to monitor nutritional status and make changes and/or 

recommendations as needed and work with speech pathology on dietary upgrades as 

the occur.





Physician IPOC


Medical Issues being managed closely and that require the 24 hour availability 

of a physician:





Patient with wound vac and severe dm ulceration s/p aggressive debridement at 

high risk for sepsis and amputation


Medical Issues:  Bowel/Bladder Function, DVT Prophylaxis, Falls Precautions, 

Fluid/Electrolyte/Nutrition Balance, Infection Protection, Pain Management, 

Wound Care


Brief Synthesis of Preadmission Screen, Post-Admission Evaluation, and Therapy 

Evaluations:





PT OT will help assist patient in use of AD in order to regain independence 

while NWB on left foot in order to return to independent living


Medical Prognosis:  Fair


Anticipated Length of Stay:  10 days











NICOLE VILA DO                Mar 30, 2021 08:56

## 2021-03-31 VITALS — DIASTOLIC BLOOD PRESSURE: 78 MMHG | SYSTOLIC BLOOD PRESSURE: 118 MMHG

## 2021-03-31 VITALS — DIASTOLIC BLOOD PRESSURE: 70 MMHG | SYSTOLIC BLOOD PRESSURE: 123 MMHG

## 2021-03-31 VITALS — SYSTOLIC BLOOD PRESSURE: 154 MMHG | DIASTOLIC BLOOD PRESSURE: 81 MMHG

## 2021-03-31 RX ADMIN — LISINOPRIL SCH MG: 20 TABLET ORAL at 08:49

## 2021-03-31 RX ADMIN — POLYETHYLENE GLYCOL (3350) SCH GM: 17 POWDER, FOR SOLUTION ORAL at 20:44

## 2021-03-31 RX ADMIN — HYDROCHLOROTHIAZIDE SCH MG: 25 TABLET ORAL at 08:49

## 2021-03-31 RX ADMIN — PRASUGREL SCH MG: 10 TABLET, FILM COATED ORAL at 08:49

## 2021-03-31 RX ADMIN — SODIUM CHLORIDE SCH MLS/HR: 900 INJECTION, SOLUTION INTRAVENOUS at 02:42

## 2021-03-31 RX ADMIN — LOPERAMIDE HYDROCHLORIDE PRN MG: 2 CAPSULE ORAL at 20:43

## 2021-03-31 RX ADMIN — METFORMIN HYDROCHLORIDE SCH MG: 500 TABLET, EXTENDED RELEASE ORAL at 08:49

## 2021-03-31 RX ADMIN — GLYBURIDE SCH MG: 2.5 TABLET ORAL at 17:16

## 2021-03-31 RX ADMIN — POLYETHYLENE GLYCOL (3350) SCH GM: 17 POWDER, FOR SOLUTION ORAL at 08:55

## 2021-03-31 RX ADMIN — INSULIN ASPART SCH UNIT: 100 INJECTION, SOLUTION INTRAVENOUS; SUBCUTANEOUS at 16:54

## 2021-03-31 RX ADMIN — INSULIN ASPART SCH UNIT: 100 INJECTION, SOLUTION INTRAVENOUS; SUBCUTANEOUS at 17:16

## 2021-03-31 RX ADMIN — SODIUM CHLORIDE SCH MG: 900 INJECTION, SOLUTION INTRAVENOUS at 08:50

## 2021-03-31 RX ADMIN — SODIUM CHLORIDE SCH MLS/HR: 900 INJECTION, SOLUTION INTRAVENOUS at 10:56

## 2021-03-31 RX ADMIN — METFORMIN HYDROCHLORIDE SCH MG: 500 TABLET, EXTENDED RELEASE ORAL at 18:33

## 2021-03-31 RX ADMIN — ASPIRIN SCH MG: 81 TABLET ORAL at 08:49

## 2021-03-31 RX ADMIN — DOCUSATE SODIUM SCH MG: 100 CAPSULE ORAL at 08:55

## 2021-03-31 RX ADMIN — INSULIN ASPART SCH UNIT: 100 INJECTION, SOLUTION INTRAVENOUS; SUBCUTANEOUS at 20:45

## 2021-03-31 RX ADMIN — INSULIN ASPART SCH UNIT: 100 INJECTION, SOLUTION INTRAVENOUS; SUBCUTANEOUS at 06:29

## 2021-03-31 RX ADMIN — METOPROLOL SUCCINATE SCH MG: 100 TABLET, EXTENDED RELEASE ORAL at 08:49

## 2021-03-31 RX ADMIN — DOCUSATE SODIUM AND SENNOSIDES SCH EA: 8.6; 5 TABLET, FILM COATED ORAL at 20:45

## 2021-03-31 RX ADMIN — INSULIN ASPART SCH UNIT: 100 INJECTION, SOLUTION INTRAVENOUS; SUBCUTANEOUS at 12:26

## 2021-03-31 RX ADMIN — INSULIN ASPART SCH UNIT: 100 INJECTION, SOLUTION INTRAVENOUS; SUBCUTANEOUS at 12:24

## 2021-03-31 RX ADMIN — GLYBURIDE SCH MG: 2.5 TABLET ORAL at 06:29

## 2021-03-31 RX ADMIN — SODIUM CHLORIDE SCH MLS/HR: 900 INJECTION, SOLUTION INTRAVENOUS at 18:33

## 2021-03-31 RX ADMIN — DOCUSATE SODIUM AND SENNOSIDES SCH EA: 8.6; 5 TABLET, FILM COATED ORAL at 08:56

## 2021-03-31 RX ADMIN — DOCUSATE SODIUM SCH MG: 100 CAPSULE ORAL at 20:44

## 2021-03-31 NOTE — PHYSICAL THERAPY DAILY NOTE
PT Daily Note-Current


Subjective


Pt. up in recliner, agrees to Rx, waiting on his protein portion of his lunch 

which did not arrive with rest of his meal





Pain





   Location:  No Pain Reported





Mental Status


Patient Orientation:  Normal For Age


Attachments:  Other-See Comments ([place])





Transfers


SCALE: Activities may be completed with or without assistive devices.





6-Indepedent-patient completes the activity by him/herself with no assistance 

from a helper.


5-Set-up or Clean-up Assistance-helper sets up or cleans up; patient completes 

activity. [place] assists only prior to or  


    following the activity.


4-Supervision or Touching Assistance-helper provides verbal cues and/or 

touching/steadying and/or contact guard assistance as patient completes 

activity. Assistance may be provided   


    throughout the activity or intermittently.


3-Partial/Moderate Assistance-helper does LESS THAN HALF the effort. [place] 

lifts, holds or supports trunk or limbs, but provides less than half the effort.


2-Substantial/Maximal Assistance-helper does MORE THAN HALF the effort. [place] 

lifts or holds trunk or limbs and provides more than half the effort.


3-Fgdcuvrvw-kqnmjh does ALL the effort. Patient does none of the effort to 

complete the activity. Or, the assistance of 2 or more helpers is required for 

the patient to complete the  


    activity.


If activity was not attempted, code reason:


7-Patient Refused.


9-Not Applicable-not attempted and the patient did not perform the activity 

before the current illness, exacerbation or injury.


10-Not Attempted due to Environmental Limitations-(lack of equipment, weather 

restraints, etc.).


88-Not Attempted due to Medical Conditions or Safety Concerns.


pt. was instructed in how to recliner chair to a reclined position for exercises

in supine





Weight Bearing


Right Lower Extremity:  Right


Full Weight Bearing


Left Lower Extremity:  Left


Non Weight Bearing





Patient NWB on L foot, no pressure allowed





Exercises


Supine Ex:  Ankle pumps (HC stretches 4 x 15 sec), Quad Set, Glut sets, Heel 

Slides, Scooting, Straight leg raise, Hip abd/add


Supine Reps:  20


Seated Therapy Exercises:  Ankle pumps, Long arc quads, Hip flexion


Seated Reps:  15





Assessment


Current Status:  Good Progress





PT Short Term Goals


Short Term Goals


Time Frame:  2021


Roll Left & Right:  6


Sit to lyin


Lying to sitting on side of be:  6


Sit to stand:  4 (SBA)


Chair/bed-to-chair transfer:  4 (SBA)


Toilet transfer:  4 (SBA)


Car transfer:  4 (SBA)


Walk 10 feet:  4 (SBA)


Does pt use a wc or scooter:  Yes


Wheel 50ft w/2 turns:  6


Wheel 150 feet:  6


Type:  Manual





PT Long Term Goals


Long Term Goals


PT Long Term Goals Time Frame:  2021


Roll Left & Right (QC):  6


Sit to Lying (QC):  6


Lying-Sitting on Side/Bed(QC):  6


Sit to Stand (QC):  6


Chair/Bed-to-Chair Xfer(QC):  6


Toilet Transfer (QC):  6


Car Transfer (QC):  6


Does the Patient Walk:  No and Walking Goal IS indicated


Walk 10 feet (QC):  6


Walk 50ft with 2 Turns (QC):  6


Walk 150 ft (QC):  88


Walking 10ft on Uneven Surface:  6


1 Step (curb) (QC):  4


4 Steps (QC):  88


12 Steps (QC):  88


Picking up an Object (QC):  88


Does the Pt use WC or Scooter?:  Yes


Wheel 50 feet with 2 turns (QC:  6


Type:  Manual


Wheel 150 feet:  6


Type:  Manual





PT Plan


Treatment/Plan


Treatment Plan:  Continue Plan of Care


Treatment Plan:  Bed Mobility, Education, Functional Activity Aung, Functional 

Strength, Group Therapy, Gait, Safety, Therapeutic Exercise, Transfers


Treatment Duration:  2021


Frequency:  At least 5 of 7 days/Wk (IRF)


Estimated Hrs Per Day:  1.5 hours per day


Patient and/or Family Agrees t:  Yes





Safety Risks/Education


Patient Education:  Correct Positioning





Time/GCodes


Time In:  1315


Time Out:  1330


Total Billed Treatment Time:  15


Total Billed Treatment


1,EX15m











JUAN NOONAN PTA             Mar 31, 2021 13:37

## 2021-03-31 NOTE — OCCUPATIONAL THER DAILY NOTE
OT Current Status-Daily Note


Subjective


Pt alert, sitting in recliner.  Pt agrees to therapy.  No c/o pain.  Pt 

continues to worry about which type of surgery will be better for him, 

amputation or taking more necrotic tissue, due to extenuating  circumstances.





Mental Status/Objective


Patient Orientation:  Person, Place, Time, Situation


Attachments:  Drains (wound vac)





ADL-Treatment


Pt declined shower today.  Stated that he had already completed oral care and 

toileting prior to OT session.


Therapy Code Descriptions/Definitions 





Functional [place] Measure:


0=Not Assessed/NA        4=Minimal Assistance


1=Total Assistance        5=Supervision or Setup


2=Maximal Assistance  6=Modified [place]


3=Moderate Assistance 7=Complete IndependenceSCALE: Activities may be completed 

with or without assistive devices.





6-Indepedent-patient completes the activity by him/herself with no assistance 

from a helper.


5-Set-up or Clean-up Assistance-helper sets up or cleans up; patient completes 

activity. [place] assists only prior to or  


    following the activity.


4-Supervision or Touching Assistance-helper provides verbal cues and/or 

touching/steadying and/or contact guard assistance as patient completes activ

ity. Assistance may be provided   


    throughout the activity or intermittently.


3-Partial/Moderate Assistance-helper does LESS THAN HALF the effort. [place] 

lifts, holds or supports trunk or limbs, but provides less than half the effort.


2-Substantial/Maximal Assistance-helper does MORE THAN HALF the effort. [place] 

lifts or holds trunk or limbs and provides more than half the effort.


6-Kcajjomgp-iuzmwf does ALL the effort. Patient does none of the effort to 

complete the activity. Or, the assistance of 2 or more helpers is required for 

the patient to complete the  


    activity.


If activity was not attempted, code reason:


7-Patient Refused.


9-Not Applicable-not attempted and the patient did not perform the activity 

before the current illness, exacerbation or injury.


10-Not Attempted due to Environmental Limitations-(lack of equipment, weather 

restraints, etc.).


88-Not Attempted due to Medical Conditions or Safety Concerns.





Other Treatment


Pt propelled w/c to therapy gym.  Pt completed B UE exercises to increase 

strength for daily functional tasks.  Arm bike for 15 min at 25 garcia 

resistance.  B wrist exercises with 5# wt flex, ext and radial dev 3 sets 10 

reps.  Dowel ashley with 6# wt attached to complete bicep curls, tricep ext, shldr 

press and chest press 3 sets 10 reps.  After session, pt sitting in recliner 

with call light/phone in reach.  All needs met in room.





OT Short Term Goals


Short Term Goals


Time Frame:  2021


Shower/bathe self:  4


Lower body dressin


Putting on/taking off footwear:  3





OT Long Term Goals


Long Term Goals


Time Frame:  2021


Eating (QC):  6


Oral Hygiene (QC):  6


Toileting Hygiene (QC):  6


Shower/Bathe Self (QC):  6


Upper Body Dressing (QC):  6


Lower Body Dressing (QC):  6


On/Off Footwear (QC):  6


Additional Goals:  1-Demonstrate ADL Tasks, 2-Verbalize Understanding, 3-

ImproveStrength/Aung


1=Demonstrate adherence to instructed precautions during ADL tasks.


2=Patient will verbalize/demonstrate understanding of assistive 

devices/modifications for ADL.


3=Patient will improve strength/tolerance for activity to enable patient to 

perform ADL's.





OT Education/Plan


Problem List/Assessment


Assessment:  Decreased UE Strength





Discharge Recommendations


Plan/Recommendations:  Continue POC





Treatment Plan/Plan of Care


Patient would benefit from OT for education, treatment and training to promote 

independence in ADL's, mobility, safety and/or upper extremity function for 

ADL's.


Plan of Care:  ADL Retraining, Functional Mobility, Group Exercise/Act as Ind, 

UE Funct Exercise/Act


Treatment Duration:  2021


Frequency:  At least 5 of 7 days/Wk (IRF)


Estimated Hrs Per Day:  1.5 hours per day


Agreement:  Yes


Rehab Potential:  Fair





Time/GCodes


Start Time:  07:30


Stop Time:  09:00


Total Time Billed (hr/min):  90


Billed Treatment Time


1 visit-EX 4 (60 min)  FA 2 (30 min)











GUDELIA GONZALEZ               Mar 31, 2021 08:57

## 2021-03-31 NOTE — PHYSICAL THERAPY DAILY NOTE
PT Daily Note-Current


Subjective


Pt. agrees to Rx, explains he is waiting to have discussion with surgeon.  Pt 

explains his situation and arrangement at home, ie steps and uneven floors.  

States he has had falls in last 3 mos as he has bilat foot drop and foot catches

on steps etc.





Pain





   Location:  No Pain Reported





Mental Status


Patient Orientation:  Normal For Age


Attachments:  Other-See Comments (wound vacc)





Transfers


SCALE: Activities may be completed with or without assistive devices.





6-Indepedent-patient completes the activity by him/herself with no assistance 

from a helper.


5-Set-up or Clean-up Assistance-helper sets up or cleans up; patient completes 

activity. [place] assists only prior to or  


    following the activity.


4-Supervision or Touching Assistance-helper provides verbal cues and/or 

touching/steadying and/or contact guard assistance as patient completes 

activity. Assistance may be provided   


    throughout the activity or intermittently.


3-Partial/Moderate Assistance-helper does LESS THAN HALF the effort. [place] 

lifts, holds or supports trunk or limbs, but provides less than half the effort.


2-Substantial/Maximal Assistance-helper does MORE THAN HALF the effort. [place] 

lifts or holds trunk or limbs and provides more than half the effort.


4-Zqzkosxdt-fsshwn does ALL the effort. Patient does none of the effort to 

complete the activity. Or, the assistance of 2 or more helpers is required for 

the patient to complete the  


    activity.


If activity was not attempted, code reason:


7-Patient Refused.


9-Not Applicable-not attempted and the patient did not perform the activity 

before the current illness, exacerbation or injury.


10-Not Attempted due to Environmental Limitations-(lack of equipment, weather 

restraints, etc.).


88-Not Attempted due to Medical Conditions or Safety Concerns.


Sit to Stand (QC):  6


Chair/Bed-to-Chair Xfer(QC):  6


Toilet Transfer (QC):  6





Weight Bearing


Right Lower Extremity:  Right


Full Weight Bearing


Left Lower Extremity:  Left


Non Weight Bearing





Patient NWB on L foot, no pressure allowed





Gait Training


Does the Patient Walk?:  Yes


Gait Persons Needed:  1


Gait Assistive Device:  FWW


12ftx3 Assist for [place] only, pt. unable to NWB L, sometimes forefoot only 

sometimes full foot





Wheelchair Training


Does the Pt Use a Wheelchair?:  Yes


Wheel 50 ft with 2 turns (QC):  6


Wheel 150 ft (QC):  6


Type of Wheelchair:  Manual


needs rest breaks





Exercises


Supine Ex:  Ankle pumps, Quad Set, Glut sets


Supine Reps:  15


Seated Therapy Exercises:  Ankle pumps, Sit to stand, Long arc quads, Hip 

flexion


Seated Reps:  15





Treatments


toileted and used lavatory to wash SBA to Mod I





Assessment


Current Status:  Good Progress


gives full effort but unable to NWB efficiently for gait





PT Short Term Goals


Short Term Goals


Time Frame:  2021


Roll Left & Right:  6


Sit to lyin


Lying to sitting on side of be:  6


Sit to stand:  4 (SBA)


Chair/bed-to-chair transfer:  4 (SBA)


Toilet transfer:  4 (SBA)


Car transfer:  4 (SBA)


Walk 10 feet:  4 (SBA)


Does pt use a wc or scooter:  Yes


Wheel 50ft w/2 turns:  6


Wheel 150 feet:  6


Type:  Manual





PT Long Term Goals


Long Term Goals


PT Long Term Goals Time Frame:  2021


Roll Left & Right (QC):  6


Sit to Lying (QC):  6


Lying-Sitting on Side/Bed(QC):  6


Sit to Stand (QC):  6


Chair/Bed-to-Chair Xfer(QC):  6


Toilet Transfer (QC):  6


Car Transfer (QC):  6


Does the Patient Walk:  No and Walking Goal IS indicated


Walk 10 feet (QC):  6


Walk 50ft with 2 Turns (QC):  6


Walk 150 ft (QC):  88


Walking 10ft on Uneven Surface:  6


1 Step (curb) (QC):  4


4 Steps (QC):  88


12 Steps (QC):  88


Picking up an Object (QC):  88


Does the Pt use WC or Scooter?:  Yes


Wheel 50 feet with 2 turns (QC:  6


Type:  Manual


Wheel 150 feet:  6


Type:  Manual





PT Plan


Treatment/Plan


Treatment Plan:  Continue Plan of Care


Treatment Plan:  Bed Mobility, Education, Functional Activity Aung, Functional 

Strength, Group Therapy, Gait, Safety, Therapeutic Exercise, Transfers


Treatment Duration:  2021


Frequency:  At least 5 of 7 days/Wk (IRF)


Estimated Hrs Per Day:  1.5 hours per day


Patient and/or Family Agrees t:  Yes





Safety Risks/Education


Patient Education:  Gait Training, Transfer Techniques, Correct Positioning, W/C

Management, Disease Process, Safety Issues


Teaching Recipient:  Patient


Teaching Methods:  Demonstration, Discussion


Response to Teaching:  Verbalize Understanding, Return Demonstration, 

Reinforcement Needed





Time/GCodes


Time In:  1000


Time Out:  1100


Total Billed Treatment Time:  60


Total Billed Treatment


1,FA25m,w/c 20m,EX15m











JUAN NOONAN PTA             Mar 31, 2021 10:58

## 2021-03-31 NOTE — PROGRESS NOTE
Subjective


Date Seen by a Provider:  Mar 31, 2021


Time Seen by a Provider:  17:00


Subjective/Events-last exam


doing well. no complaints. states tolerated PT well today.





Objective


Exam





Vital Signs








  Date Time  Temp Pulse Resp B/P (MAP) Pulse Ox O2 Delivery O2 Flow Rate FiO2


 


3/31/21 09:00      Room Air  


 


3/31/21 08:30  96  123/70 (87)    


 


3/31/21 06:31 35.8 79 16 118/78 (91) 98   


 


3/30/21 21:29      Room Air  














I & O 


 


 3/31/21





 07:00


 


Intake Total 1790 ml


 


Balance 1790 ml





Capillary Refill :


General Appearance:  No Apparent Distress


HEENT:  PERRL/EOMI


Neck:  Full Range of Motion


Respiratory:  Chest Non Tender, Decreased Breath Sounds


Gastrointestinal:  normal bowel sounds, non tender, soft


Extremity:  Normal Capillary Refill


Neurologic/Psychiatric:  Alert, Oriented x3


Skin:  Normal Color


Lymphatic:  No Adenopathy





Results


Lab


Laboratory Tests


3/30/21 20:30: Glucometer 151H


3/31/21 05:07: Glucometer 136H


3/31/21 10:56: Glucometer 203H


3/31/21 16:53: Glucometer 174H





Assessment/Plan


Assessment/Plan


Assess & Plan/Chief Complaint


hx insulin dependent diabetes/morbid obesity with NSTI left ankle s/p 

debridement.


more areas of necrosis. explained to pt likely will require a BKA. the option 

was also given to temporize with further debridement and abx. patient states he 

would like to think on it. 


will continue current therapies for now.


patient would like to proceed with any modalities of foot salvage therapy even 

though he know success rates low. will be present to look at foot wounds during 

vac change on friday and likely schedule further debridement.











CLAUDY ALEJO MD                Mar 31, 2021 17:30

## 2021-04-01 VITALS — DIASTOLIC BLOOD PRESSURE: 67 MMHG | SYSTOLIC BLOOD PRESSURE: 131 MMHG

## 2021-04-01 VITALS — DIASTOLIC BLOOD PRESSURE: 79 MMHG | SYSTOLIC BLOOD PRESSURE: 110 MMHG

## 2021-04-01 VITALS — SYSTOLIC BLOOD PRESSURE: 179 MMHG | DIASTOLIC BLOOD PRESSURE: 83 MMHG

## 2021-04-01 LAB
ALBUMIN SERPL-MCNC: 2.8 GM/DL (ref 3.2–4.5)
ALP SERPL-CCNC: 70 U/L (ref 40–136)
ALT SERPL-CCNC: 20 U/L (ref 0–55)
BASOPHILS # BLD AUTO: 0 10^3/UL (ref 0–0.1)
BASOPHILS NFR BLD AUTO: 1 % (ref 0–10)
BILIRUB SERPL-MCNC: 0.2 MG/DL (ref 0.1–1)
BUN/CREAT SERPL: 12
CALCIUM SERPL-MCNC: 9 MG/DL (ref 8.5–10.1)
CHLORIDE SERPL-SCNC: 101 MMOL/L (ref 98–107)
CO2 SERPL-SCNC: 27 MMOL/L (ref 21–32)
CREAT SERPL-MCNC: 0.9 MG/DL (ref 0.6–1.3)
EOSINOPHIL # BLD AUTO: 0.2 10^3/UL (ref 0–0.3)
EOSINOPHIL NFR BLD AUTO: 3 % (ref 0–10)
GFR SERPLBLD BASED ON 1.73 SQ M-ARVRAT: > 60 ML/MIN
GLUCOSE SERPL-MCNC: 165 MG/DL (ref 70–105)
HCT VFR BLD CALC: 34 % (ref 40–54)
HGB BLD-MCNC: 10 G/DL (ref 13.3–17.7)
LYMPHOCYTES # BLD AUTO: 2.2 10^3/UL (ref 1–4)
LYMPHOCYTES NFR BLD AUTO: 38 % (ref 12–44)
MANUAL DIFFERENTIAL PERFORMED BLD QL: NO
MCH RBC QN AUTO: 25 PG (ref 25–34)
MCHC RBC AUTO-ENTMCNC: 29 G/DL (ref 32–36)
MCV RBC AUTO: 86 FL (ref 80–99)
MONOCYTES # BLD AUTO: 0.3 10^3/UL (ref 0–1)
MONOCYTES NFR BLD AUTO: 5 % (ref 0–12)
NEUTROPHILS # BLD AUTO: 3 10^3/UL (ref 1.8–7.8)
NEUTROPHILS NFR BLD AUTO: 52 % (ref 42–75)
PLATELET # BLD: 417 10^3/UL (ref 130–400)
PMV BLD AUTO: 8.5 FL (ref 9–12.2)
POTASSIUM SERPL-SCNC: 4.8 MMOL/L (ref 3.6–5)
PROT SERPL-MCNC: 6.6 GM/DL (ref 6.4–8.2)
SODIUM SERPL-SCNC: 138 MMOL/L (ref 135–145)
WBC # BLD AUTO: 5.8 10^3/UL (ref 4.3–11)

## 2021-04-01 RX ADMIN — ASPIRIN SCH MG: 81 TABLET ORAL at 08:24

## 2021-04-01 RX ADMIN — METFORMIN HYDROCHLORIDE SCH MG: 500 TABLET, EXTENDED RELEASE ORAL at 08:24

## 2021-04-01 RX ADMIN — DOCUSATE SODIUM AND SENNOSIDES SCH EA: 8.6; 5 TABLET, FILM COATED ORAL at 20:23

## 2021-04-01 RX ADMIN — POLYETHYLENE GLYCOL (3350) SCH GM: 17 POWDER, FOR SOLUTION ORAL at 08:28

## 2021-04-01 RX ADMIN — SODIUM CHLORIDE SCH MLS/HR: 900 INJECTION, SOLUTION INTRAVENOUS at 10:55

## 2021-04-01 RX ADMIN — SODIUM CHLORIDE SCH MLS/HR: 900 INJECTION, SOLUTION INTRAVENOUS at 02:48

## 2021-04-01 RX ADMIN — LISINOPRIL SCH MG: 20 TABLET ORAL at 08:25

## 2021-04-01 RX ADMIN — INSULIN ASPART SCH UNIT: 100 INJECTION, SOLUTION INTRAVENOUS; SUBCUTANEOUS at 20:19

## 2021-04-01 RX ADMIN — DOCUSATE SODIUM AND SENNOSIDES SCH EA: 8.6; 5 TABLET, FILM COATED ORAL at 08:28

## 2021-04-01 RX ADMIN — GLYBURIDE SCH MG: 2.5 TABLET ORAL at 17:36

## 2021-04-01 RX ADMIN — METOPROLOL SUCCINATE SCH MG: 100 TABLET, EXTENDED RELEASE ORAL at 08:25

## 2021-04-01 RX ADMIN — HYDROCHLOROTHIAZIDE SCH MG: 25 TABLET ORAL at 08:25

## 2021-04-01 RX ADMIN — POLYETHYLENE GLYCOL (3350) SCH GM: 17 POWDER, FOR SOLUTION ORAL at 20:23

## 2021-04-01 RX ADMIN — SODIUM CHLORIDE SCH MLS/HR: 900 INJECTION, SOLUTION INTRAVENOUS at 18:40

## 2021-04-01 RX ADMIN — GLYBURIDE SCH MG: 2.5 TABLET ORAL at 06:57

## 2021-04-01 RX ADMIN — INSULIN ASPART SCH UNIT: 100 INJECTION, SOLUTION INTRAVENOUS; SUBCUTANEOUS at 17:35

## 2021-04-01 RX ADMIN — INSULIN ASPART SCH UNIT: 100 INJECTION, SOLUTION INTRAVENOUS; SUBCUTANEOUS at 17:36

## 2021-04-01 RX ADMIN — PRASUGREL SCH MG: 10 TABLET, FILM COATED ORAL at 08:24

## 2021-04-01 RX ADMIN — METFORMIN HYDROCHLORIDE SCH MG: 500 TABLET, EXTENDED RELEASE ORAL at 18:40

## 2021-04-01 RX ADMIN — DOCUSATE SODIUM SCH MG: 100 CAPSULE ORAL at 20:23

## 2021-04-01 RX ADMIN — INSULIN ASPART SCH UNIT: 100 INJECTION, SOLUTION INTRAVENOUS; SUBCUTANEOUS at 06:57

## 2021-04-01 RX ADMIN — INSULIN ASPART SCH UNIT: 100 INJECTION, SOLUTION INTRAVENOUS; SUBCUTANEOUS at 12:23

## 2021-04-01 RX ADMIN — DOCUSATE SODIUM SCH MG: 100 CAPSULE ORAL at 08:25

## 2021-04-01 RX ADMIN — INSULIN ASPART SCH UNIT: 100 INJECTION, SOLUTION INTRAVENOUS; SUBCUTANEOUS at 06:00

## 2021-04-01 NOTE — PHYSICAL THERAPY DAILY NOTE
PT Daily Note-Current


Subjective


Patient sitting up in bed pre tx, reports no pain and consents to begin PT in 

bed to maintain NWB restrictions and continue strengthening LE.





Appearance


Patient seated upright in bed, with tray table nearby and call button within 

reach post tx.





Mental Status


Patient Orientation:  Normal For Age





Transfers


SCALE: Activities may be completed with or without assistive devices.





6-Indepedent-patient completes the activity by him/herself with no assistance 

from a helper.


5-Set-up or Clean-up Assistance-helper sets up or cleans up; patient completes 

activity. [place] assists only prior to or  


    following the activity.


4-Supervision or Touching Assistance-helper provides verbal cues and/or 

touching/steadying and/or contact guard assistance as patient completes 

activity. Assistance may be provided   


    throughout the activity or intermittently.


3-Partial/Moderate Assistance-helper does LESS THAN HALF the effort. [place] 

lifts, holds or supports trunk or limbs, but provides less than half the effort.


2-Substantial/Maximal Assistance-helper does MORE THAN HALF the effort. [place] 

lifts or holds trunk or limbs and provides more than half the effort.


8-Tocebykxe-wqnjxf does ALL the effort. Patient does none of the effort to 

complete the activity. Or, the assistance of 2 or more helpers is required for 

the patient to complete the  


    activity.


If activity was not attempted, code reason:


7-Patient Refused.


9-Not Applicable-not attempted and the patient did not perform the activity 

before the current illness, exacerbation or injury.


10-Not Attempted due to Environmental Limitations-(lack of equipment, weather 

restraints, etc.).


88-Not Attempted due to Medical Conditions or Safety Concerns.





Weight Bearing


Right Lower Extremity:  Right


Full Weight Bearing


Left Lower Extremity:  Left


Non Weight Bearing





Patient NWB on L foot, no pressure allowed





Exercises


Supine Ex:  Ankle pumps, Quad Set, Glut sets, Heel Slides (R LE only), Straight 

leg raise, Hip abd/add (R LE only)


Supine Reps:  20 (3 sets of each exercise)


Patient notes he has been completing sets of bed exercises in the evening to 

encourage movement in supine/seated position. Patient did require rest breaks in

between sets.





Treatments


LE exercises, muscular endurance





Assessment


Current Status:  Fair Progress


Patient able to complete ther ex with demonstration of good muscular endurance 

with increased sets of supine exercises.





PT Short Term Goals


Short Term Goals


Time Frame:  2021


Roll Left & Right:  6


Sit to lyin


Lying to sitting on side of be:  6


Sit to stand:  4 (SBA)


Chair/bed-to-chair transfer:  4 (SBA)


Toilet transfer:  4 (SBA)


Car transfer:  4 (SBA)


Walk 10 feet:  4 (SBA)


Does pt use a wc or scooter:  Yes


Wheel 50ft w/2 turns:  6


Wheel 150 feet:  6


Type:  Manual





PT Long Term Goals


Long Term Goals


PT Long Term Goals Time Frame:  2021


Roll Left & Right (QC):  6


Sit to Lying (QC):  6


Lying-Sitting on Side/Bed(QC):  6


Sit to Stand (QC):  6


Chair/Bed-to-Chair Xfer(QC):  6


Toilet Transfer (QC):  6


Car Transfer (QC):  6


Does the Patient Walk:  No and Walking Goal IS indicated


Walk 10 feet (QC):  6


Walk 50ft with 2 Turns (QC):  6


Walk 150 ft (QC):  88


Walking 10ft on Uneven Surface:  6


1 Step (curb) (QC):  4


4 Steps (QC):  88


12 Steps (QC):  88


Picking up an Object (QC):  88


Does the Pt use WC or Scooter?:  Yes


Wheel 50 feet with 2 turns (QC:  6


Type:  Manual


Wheel 150 feet:  6


Type:  Manual





PT Plan


Problem List


Problem List:  Activity Tolerance, Functional Strength, Safety, Balance, Gait, 

Transfer, Bed Mobility, ROM





Treatment/Plan


Treatment Plan:  Continue Plan of Care


Treatment Plan:  Bed Mobility, Education, Functional Activity Aung, Functional 

Strength, Group Therapy, Gait, Safety, Therapeutic Exercise, Transfers


Treatment Duration:  2021


Frequency:  At least 5 of 7 days/Wk (IRF)


Estimated Hrs Per Day:  1.5 hours per day


Patient and/or Family Agrees t:  Yes





Safety Risks/Education


Patient Education:  Correct Positioning


Teaching Recipient:  Patient


Teaching Methods:  Demonstration, Discussion


Response to Teaching:  Verbalize Understanding, Return Demonstration





Time/GCodes


Time In:  1315


Time Out:  1345


Total Billed Treatment Time:  30


Total Billed Treatment


1 visit: 


EX: 30'











CAMILLE HARRY PT                 2021 14:13

## 2021-04-01 NOTE — OCCUPATIONAL THER DAILY NOTE
OT Current Status-Daily Note


Subjective


Pt alert, sitting in recliner.  Pt agrees to therapy.  No c/o pain.  Pt's wound 

began to leak at foot bandage, nrsg came and fixed area.





Mental Status/Objective


Patient Orientation:  Person, Place, Time, Situation


Attachments:  IV (midline)





ADL-Treatment


Pt agrees to shower.  Pt is independent with shower using LH sponge, shower 

bench, grabbars and hand held shower.  Pt is set up for upper/lower body 

dressing.  Pt introduced to dressing stick and sock aide to don/doff R sock, L 

foot has wound vac.  Pt demonstrated understanding of AE for donning/doffing 

socks.  Pt stated that he had completed toileting and oral care prior to OT 

session.  Blood noted on pt's L sock, nrsg made aware and wound vac drsg was 

leaking.  Nrsg dressed that area.


Therapy Code Descriptions/Definitions 





Functional [place] Measure:


0=Not Assessed/NA        4=Minimal Assistance


1=Total Assistance        5=Supervision or Setup


2=Maximal Assistance  6=Modified [place]


3=Moderate Assistance 7=Complete IndependenceSCALE: Activities may be completed 

with or without assistive devices.





6-Indepedent-patient completes the activity by him/herself with no assistance 

from a helper.


5-Set-up or Clean-up Assistance-helper sets up or cleans up; patient completes 

activity. [place] assists only prior to or  


    following the activity.


4-Supervision or Touching Assistance-helper provides verbal cues and/or 

touching/steadying and/or contact guard assistance as patient completes 

activity. Assistance may be provided   


    throughout the activity or intermittently.


3-Partial/Moderate Assistance-helper does LESS THAN HALF the effort. [place] 

lifts, holds or supports trunk or limbs, but provides less than half the effort.


2-Substantial/Maximal Assistance-helper does MORE THAN HALF the effort. [place] 

lifts or holds trunk or limbs and provides more than half the effort.


4-Iihhtvolf-pjiaco does ALL the effort. Patient does none of the effort to 

complete the activity. Or, the assistance of 2 or more helpers is required for 

the patient to complete the  


    activity.


If activity was not attempted, code reason:


7-Patient Refused.


9-Not Applicable-not attempted and the patient did not perform the activity 

before the current illness, exacerbation or injury.


10-Not Attempted due to Environmental Limitations-(lack of equipment, weather 

restraints, etc.).


88-Not Attempted due to Medical Conditions or Safety Concerns.


Shower/Bathe Self (QC):  6


Upper Body Dressing (QC):  5


Lower Body Dressing (QC):  5


On/Off Footwear:  3





Other Treatment


Pt completed UE exercises to increase strength for daily functional tasks.  Pt 

used 6# wt for bicep curls, 3 sets 10 reps.  Due to midline, pt used 6# wt with 

R UE and gravity resistance with L UE to complete above head shldr exercises 3 

sets 10 reps.  After therapy, pt sitting in recliner with call light/phone in 

reach.  All needs met in room.





OT Short Term Goals


Short Term Goals


Time Frame:  2021


Shower/bathe self:  4


Lower body dressin


Putting on/taking off footwear:  3





OT Long Term Goals


Long Term Goals


Time Frame:  2021


Eating (QC):  6


Oral Hygiene (QC):  6


Toileting Hygiene (QC):  6


Shower/Bathe Self (QC):  6


Upper Body Dressing (QC):  6


Lower Body Dressing (QC):  6


On/Off Footwear (QC):  6


Additional Goals:  1-Demonstrate ADL Tasks, 2-Verbalize Understanding, 3-

ImproveStrength/Aung


1=Demonstrate adherence to instructed precautions during ADL tasks.


2=Patient will verbalize/demonstrate understanding of assistive 

devices/modifications for ADL.


3=Patient will improve strength/tolerance for activity to enable patient to 

perform ADL's.





OT Education/Plan


Problem List/Assessment


Assessment:  Decreased UE Strength, Impaired Self-Care Skills





Discharge Recommendations


Plan/Recommendations:  Continue POC





Treatment Plan/Plan of Care


Patient would benefit from OT for education, treatment and training to promote 

independence in ADL's, mobility, safety and/or upper extremity function for 

ADL's.


Plan of Care:  ADL Retraining, Functional Mobility, Group Exercise/Act as Ind, 

UE Funct Exercise/Act


Treatment Duration:  2021


Frequency:  At least 5 of 7 days/Wk (IRF)


Estimated Hrs Per Day:  1.5 hours per day


Agreement:  Yes


Rehab Potential:  Fair





Time/GCodes


Start Time:  07:30


Stop Time:  09:00


Total Time Billed (hr/min):  90


Billed Treatment Time


1 visit-ADL 3 (50 min)  FA 1 (15 min)  EX 2 (25 min)











GUDELIA GONZALEZ                2021 09:01

## 2021-04-01 NOTE — PROGRESS NOTE
Subjective


Date Seen by a Provider:  Apr 1, 2021


Time Seen by a Provider:  16:00


Subjective/Events-last exam


doing ok. no complaints. scheduled for vac change tomorrow.





Objective


Exam





Vital Signs








  Date Time  Temp Pulse Resp B/P (MAP) Pulse Ox O2 Delivery O2 Flow Rate FiO2


 


4/1/21 09:59      Room Air  


 


4/1/21 08:00  107  110/79 (89)    


 


4/1/21 06:00 36.2 88 18 131/67 (88) 96 Room Air  


 


3/31/21 21:00      Room Air  


 


3/31/21 18:49 36.0 81 18 154/81 (105) 100 Room Air  














I & O 


 


 4/1/21





 07:00


 


Intake Total 1800 ml


 


Balance 1800 ml





Capillary Refill :


General Appearance:  No Apparent Distress


HEENT:  PERRL/EOMI


Neck:  Full Range of Motion


Respiratory:  Chest Non Tender, Decreased Breath Sounds


Cardiovascular:  Regular Rate, Rhythm


Gastrointestinal:  normal bowel sounds, non tender, soft


Extremity:  Normal Capillary Refill


Neurologic/Psychiatric:  Alert, Oriented x3


Lymphatic:  No Adenopathy





Results


Lab


Laboratory Tests


3/31/21 16:53: Glucometer 174H


3/31/21 20:42: Glucometer 169H


4/1/21 05:15: 


White Blood Count 5.8, Red Blood Count 3.96L, Hemoglobin 10.0L, Hematocrit 34L, 

Mean Corpuscular Volume 86, Mean Corpuscular Hemoglobin 25, Mean Corpuscular 

Hemoglobin Concent 29L, Red Cell Distribution Width 17.2H, Platelet Count 417H, 

Mean Platelet Volume 8.5L, Immature Granulocyte % (Auto) 2, Neutrophils (%) 

(Auto) 52, Lymphocytes (%) (Auto) 38, Monocytes (%) (Auto) 5, Eosinophils (%) 

(Auto) 3, Basophils (%) (Auto) 1, Neutrophils # (Auto) 3.0, Lymphocytes # (Auto)

2.2, Monocytes # (Auto) 0.3, Eosinophils # (Auto) 0.2, Basophils # (Auto) 0.0, 

Immature Granulocyte # (Auto) 0.1, Sodium Level 138, Potassium Level 4.8, 

Chloride Level 101, Carbon Dioxide Level 27, Anion Gap 10, Blood Urea Nitrogen 

11, Creatinine 0.90, Estimat Glomerular Filtration Rate > 60, BUN/Creatinine 

Ratio 12, Glucose Level 165H, Calcium Level 9.0, Corrected Calcium 10.0, Total 

Bilirubin 0.2, Aspartate Amino Transf (AST/SGOT) 23, Alanine Aminotransferase 

(ALT/SGPT) 20, Alkaline Phosphatase 70, Total Protein 6.6, Albumin 2.8L


4/1/21 10:52: Glucometer 238H


4/1/21 15:31: Glucometer 207H





Assessment/Plan


Assessment/Plan


Assess & Plan/Chief Complaint


hx insulin dependent diabetes/morbid obesity with NSTI left ankle s/p 

debridement.


more areas of necrosis. explained to pt likely will require a BKA. the option 

was also given to temporize with further debridement and abx. patient states he 

would like to think on it. 


will continue current therapies for now.


patient would like to proceed with any modalities of foot salvage therapy even 

though he know success rates low. will be present to look at foot wounds during 

vac change on friday and likely schedule further debridement.











CLAUDY ALEJO MD                 Apr 1, 2021 16:07

## 2021-04-01 NOTE — PHYSICAL THERAPY DAILY NOTE
PT Daily Note-Current


Subjective


Patient reports no pain pre tx, but notes his wound vac adhesive rolled earlier 

in the morning so nursing had to readjust the taping. Patient was hesitant to 

transfer for therapy secondary to fear of breaking seal again, but consented to 

wheelchair and bed mobility.





Appearance


Patient in bed, sitting upright post tx. Doctor and nursing staff in room upon 

PT departure.





Mental Status


Patient Orientation:  Person, Place, Time, Normal For Age


Attachments:  Other-See Comments (Wound vac)





Transfers


SCALE: Activities may be completed with or without assistive devices.





6-Indepedent-patient completes the activity by him/herself with no assistance 

from a helper.


5-Set-up or Clean-up Assistance-helper sets up or cleans up; patient completes 

activity. [place] assists only prior to or  


    following the activity.


4-Supervision or Touching Assistance-helper provides verbal cues and/or 

touching/steadying and/or contact guard assistance as patient completes 

activity. Assistance may be provided   


    throughout the activity or intermittently.


3-Partial/Moderate Assistance-helper does LESS THAN HALF the effort. [place] 

lifts, holds or supports trunk or limbs, but provides less than half the effort.


2-Substantial/Maximal Assistance-helper does MORE THAN HALF the effort. [place] 

lifts or holds trunk or limbs and provides more than half the effort.


5-Zpskbbnzv-kvaimi does ALL the effort. Patient does none of the effort to 

complete the activity. Or, the assistance of 2 or more helpers is required for 

the patient to complete the  


    activity.


If activity was not attempted, code reason:


7-Patient Refused.


9-Not Applicable-not attempted and the patient did not perform the activity 

before the current illness, exacerbation or injury.


10-Not Attempted due to Environmental Limitations-(lack of equipment, weather 

restraints, etc.).


88-Not Attempted due to Medical Conditions or Safety Concerns.


Roll Left & Right (QC):  6


Sit to Lying (QC):  6


Sit to Stand (QC):  4


Chair/Bed-to-Chair Xfer(QC):  4


SBA x1. Patient continues to demonstrate difficulty maintaining NWB precautions 

with transfers, secondary to bad knees and excess weight.





Weight Bearing


Right Lower Extremity:  Right


Full Weight Bearing


Left Lower Extremity:  Left


Non Weight Bearing





Patient NWB on L foot, no pressure allowed





Wheelchair Training


Does the Pt Use a Wheelchair?:  Yes


Wheel 50 ft with 2 turns (QC):  6


Wheel 150 ft (QC):  6


Type of Wheelchair:  Manual


Patient wheeled 400', but utilized a backward wheeling secondary to patient 

reports of w/c wheels not having correct alignment. Patient utilizes this 

position to maneuver w/c at work as well.





Exercises


Supine Ex:  Ankle pumps, Quad Set, Heel Slides, Straight leg raise


Supine Reps:  20


Heel slides were only performed on the R LE


NuStep Minutes:  4 (Patient was unable to obtain comfortable position in w/c w

hile also maneuvering NuStep, plan to reposition at later visit to try again to 

work UE and LE endurance.)


 NuStep Workload:  4





Treatments


LE/UE strengthening, bed mobility, w/c endurance





Assessment


Current Status:  Fair Progress


Patient continues to demonstrate motivation to get better. Patient has good en

durance with overall w/c mobility.





PT Short Term Goals


Short Term Goals


Time Frame:  2021


Roll Left & Right:  6


Sit to lyin


Lying to sitting on side of be:  6


Sit to stand:  4 (SBA)


Chair/bed-to-chair transfer:  4 (SBA)


Toilet transfer:  4 (SBA)


Car transfer:  4 (SBA)


Walk 10 feet:  4 (SBA)


Does pt use a wc or scooter:  Yes


Wheel 50ft w/2 turns:  6


Wheel 150 feet:  6


Type:  Manual





PT Long Term Goals


Long Term Goals


PT Long Term Goals Time Frame:  2021


Roll Left & Right (QC):  6


Sit to Lying (QC):  6


Lying-Sitting on Side/Bed(QC):  6


Sit to Stand (QC):  6


Chair/Bed-to-Chair Xfer(QC):  6


Toilet Transfer (QC):  6


Car Transfer (QC):  6


Does the Patient Walk:  No and Walking Goal IS indicated


Walk 10 feet (QC):  6


Walk 50ft with 2 Turns (QC):  6


Walk 150 ft (QC):  88


Walking 10ft on Uneven Surface:  6


1 Step (curb) (QC):  4


4 Steps (QC):  88


12 Steps (QC):  88


Picking up an Object (QC):  88


Does the Pt use WC or Scooter?:  Yes


Wheel 50 feet with 2 turns (QC:  6


Type:  Manual


Wheel 150 feet:  6


Type:  Manual





PT Plan


Problem List


Problem List:  Activity Tolerance, Functional Strength, Safety, Balance, Gait, 

Transfer, Bed Mobility, ROM





Treatment/Plan


Treatment Plan:  Continue Plan of Care


Treatment Plan:  Bed Mobility, Education, Functional Activity Aung, Functional 

Strength, Group Therapy, Gait, Safety, Therapeutic Exercise, Transfers


Treatment Duration:  2021


Frequency:  At least 5 of 7 days/Wk (IRF)


Estimated Hrs Per Day:  1.5 hours per day


Patient and/or Family Agrees t:  Yes





Safety Risks/Education


Patient Education:  Transfer Techniques, Reviewed Precautions, Correct 

Positioning, Safety Issues


Teaching Recipient:  Patient


Teaching Methods:  Demonstration, Discussion


Response to Teaching:  Reinforcement Needed





Time/GCodes


Time In:  1000


Time Out:  1100


Total Billed Treatment Time:  60


Total Billed Treatment


1 visit


EX 30'


FA 30'











CAMILLE HARRY PT                 2021 10:55

## 2021-04-02 VITALS — DIASTOLIC BLOOD PRESSURE: 84 MMHG | SYSTOLIC BLOOD PRESSURE: 136 MMHG

## 2021-04-02 VITALS — DIASTOLIC BLOOD PRESSURE: 52 MMHG | SYSTOLIC BLOOD PRESSURE: 105 MMHG

## 2021-04-02 VITALS — SYSTOLIC BLOOD PRESSURE: 124 MMHG | DIASTOLIC BLOOD PRESSURE: 63 MMHG

## 2021-04-02 VITALS — DIASTOLIC BLOOD PRESSURE: 75 MMHG | SYSTOLIC BLOOD PRESSURE: 130 MMHG

## 2021-04-02 RX ADMIN — LOPERAMIDE HYDROCHLORIDE PRN MG: 2 CAPSULE ORAL at 20:32

## 2021-04-02 RX ADMIN — DOCUSATE SODIUM AND SENNOSIDES SCH EA: 8.6; 5 TABLET, FILM COATED ORAL at 09:00

## 2021-04-02 RX ADMIN — INSULIN ASPART SCH UNIT: 100 INJECTION, SOLUTION INTRAVENOUS; SUBCUTANEOUS at 06:22

## 2021-04-02 RX ADMIN — GLYBURIDE SCH MG: 2.5 TABLET ORAL at 17:00

## 2021-04-02 RX ADMIN — DOCUSATE SODIUM SCH MG: 100 CAPSULE ORAL at 09:00

## 2021-04-02 RX ADMIN — GLYBURIDE SCH MG: 2.5 TABLET ORAL at 06:41

## 2021-04-02 RX ADMIN — METFORMIN HYDROCHLORIDE SCH MG: 500 TABLET, EXTENDED RELEASE ORAL at 08:56

## 2021-04-02 RX ADMIN — SODIUM CHLORIDE SCH MLS/HR: 900 INJECTION, SOLUTION INTRAVENOUS at 11:36

## 2021-04-02 RX ADMIN — ASPIRIN SCH MG: 81 TABLET ORAL at 08:57

## 2021-04-02 RX ADMIN — SODIUM CHLORIDE SCH MG: 900 INJECTION, SOLUTION INTRAVENOUS at 08:57

## 2021-04-02 RX ADMIN — INSULIN ASPART SCH UNIT: 100 INJECTION, SOLUTION INTRAVENOUS; SUBCUTANEOUS at 06:42

## 2021-04-02 RX ADMIN — INSULIN ASPART SCH UNIT: 100 INJECTION, SOLUTION INTRAVENOUS; SUBCUTANEOUS at 17:01

## 2021-04-02 RX ADMIN — SODIUM CHLORIDE SCH MLS/HR: 900 INJECTION, SOLUTION INTRAVENOUS at 02:05

## 2021-04-02 RX ADMIN — INSULIN ASPART SCH UNIT: 100 INJECTION, SOLUTION INTRAVENOUS; SUBCUTANEOUS at 20:37

## 2021-04-02 RX ADMIN — PRASUGREL SCH MG: 10 TABLET, FILM COATED ORAL at 08:56

## 2021-04-02 RX ADMIN — DOCUSATE SODIUM AND SENNOSIDES SCH EA: 8.6; 5 TABLET, FILM COATED ORAL at 20:37

## 2021-04-02 RX ADMIN — DOCUSATE SODIUM SCH MG: 100 CAPSULE ORAL at 20:36

## 2021-04-02 RX ADMIN — SODIUM CHLORIDE SCH MLS/HR: 900 INJECTION, SOLUTION INTRAVENOUS at 18:49

## 2021-04-02 RX ADMIN — POLYETHYLENE GLYCOL (3350) SCH GM: 17 POWDER, FOR SOLUTION ORAL at 20:37

## 2021-04-02 RX ADMIN — METFORMIN HYDROCHLORIDE SCH MG: 500 TABLET, EXTENDED RELEASE ORAL at 18:48

## 2021-04-02 RX ADMIN — LISINOPRIL SCH MG: 20 TABLET ORAL at 08:56

## 2021-04-02 RX ADMIN — INSULIN ASPART SCH UNIT: 100 INJECTION, SOLUTION INTRAVENOUS; SUBCUTANEOUS at 12:04

## 2021-04-02 RX ADMIN — INSULIN ASPART SCH UNIT: 100 INJECTION, SOLUTION INTRAVENOUS; SUBCUTANEOUS at 15:55

## 2021-04-02 RX ADMIN — METOPROLOL SUCCINATE SCH MG: 100 TABLET, EXTENDED RELEASE ORAL at 08:56

## 2021-04-02 RX ADMIN — HYDROCHLOROTHIAZIDE SCH MG: 25 TABLET ORAL at 08:56

## 2021-04-02 RX ADMIN — INSULIN ASPART SCH UNIT: 100 INJECTION, SOLUTION INTRAVENOUS; SUBCUTANEOUS at 12:05

## 2021-04-02 RX ADMIN — POLYETHYLENE GLYCOL (3350) SCH GM: 17 POWDER, FOR SOLUTION ORAL at 09:00

## 2021-04-02 NOTE — PROGRESS NOTE
Subjective


Date Seen by a Provider:  Apr 2, 2021


Time Seen by a Provider:  10:00


Subjective/Events-last exam


doing ok. wound evaluated this am. deep tissue granulation bed forming. does 

have some necrotic debris around rim of lesion. no sytemic sx.





Objective


Exam





Vital Signs








  Date Time  Temp Pulse Resp B/P (MAP) Pulse Ox O2 Delivery O2 Flow Rate FiO2


 


4/2/21 05:00 35.7 96 17 136/84 (101) 96   


 


4/1/21 20:00      Room Air  


 


4/1/21 16:00 36.6 90 20 179/83 (115) 99   














I & O 


 


 4/2/21





 07:00


 


Intake Total 2580 ml


 


Balance 2580 ml





Capillary Refill :


General Appearance:  No Apparent Distress


HEENT:  PERRL/EOMI


Neck:  Full Range of Motion


Respiratory:  Chest Non Tender, Lungs Clear


Cardiovascular:  Regular Rate, Rhythm


Gastrointestinal:  normal bowel sounds, non tender, soft


Extremity:  Normal Capillary Refill


Neurologic/Psychiatric:  Alert, Oriented x3


Skin:  Normal Color


Lymphatic:  No Adenopathy





Results


Lab


Laboratory Tests


4/1/21 10:52: Glucometer 238H


4/1/21 15:31: Glucometer 207H


4/1/21 20:02: Glucometer 207H


4/2/21 06:20: Glucometer 138H





Assessment/Plan


Assessment/Plan


Assess & Plan/Chief Complaint


hx insulin dependent diabetes/morbid obesity with NSTI left ankle s/p 

debridement.


more areas of necrosis. explained to pt likely will require a BKA. the option 

was also given to temporize with further debridement and abx. patient states he 

would like to think on it. 


will continue current therapies for now.


patient would like to proceed with any modalities of foot salvage therapy even 

though he know success rates low. 


rim of necrotic debris including skin and subcutaneous tissue, deep tissue 

granulation appears to be forming. will continue vac and schedule for 

debridement next tues(4/6).











CLAUDY ALEJO MD                 Apr 2, 2021 10:26

## 2021-04-02 NOTE — PHYSICAL THERAPY DAILY NOTE
PT Daily Note-Current


Subjective


Patient reports no pain pre tx, and was supine in bed with leg elevated. Patient

consented to begin PT.





Appearance


Patient was left upright in chair, with L LE elevated without pressure on heel 

(on pillows), tray table nearby and call button within reach.





Mental Status


Patient Orientation:  Person, Place, Time, Normal For Age


Attachments:  Other-See Comments (Wound Vac)





Transfers


SCALE: Activities may be completed with or without assistive devices.





6-Indepedent-patient completes the activity by him/herself with no assistance 

from a helper.


5-Set-up or Clean-up Assistance-helper sets up or cleans up; patient completes 

activity. [place] assists only prior to or  


    following the activity.


4-Supervision or Touching Assistance-helper provides verbal cues and/or 

touching/steadying and/or contact guard assistance as patient completes activit

y. Assistance may be provided   


    throughout the activity or intermittently.


3-Partial/Moderate Assistance-helper does LESS THAN HALF the effort. [place] 

lifts, holds or supports trunk or limbs, but provides less than half the effort.


2-Substantial/Maximal Assistance-helper does MORE THAN HALF the effort. [place] 

lifts or holds trunk or limbs and provides more than half the effort.


0-Lhprkasxj-pwikaf does ALL the effort. Patient does none of the effort to 

complete the activity. Or, the assistance of 2 or more helpers is required for 

the patient to complete the  


    activity.


If activity was not attempted, code reason:


7-Patient Refused.


9-Not Applicable-not attempted and the patient did not perform the activity 

before the current illness, exacerbation or injury.


10-Not Attempted due to Environmental Limitations-(lack of equipment, weather 

restraints, etc.).


88-Not Attempted due to Medical Conditions or Safety Concerns.


Sit to Stand (QC):  6


Chair/Bed-to-Chair Xfer(QC):  6


Minimize number of transfers secondary to maintenance of NWB restrictions.





Weight Bearing


Right Lower Extremity:  Right


Full Weight Bearing


Left Lower Extremity:  Left


Non Weight Bearing





Patient NWB on L foot, no pressure allowed





Wheelchair Training


Does the Pt Use a Wheelchair?:  Yes


Wheel 50 ft with 2 turns (QC):  6


Wheel 150 ft (QC):  6


Type of Wheelchair:  Manual


Switched patient into a different w/c to see if  on wheels would be more 

comfortable, patient reported the wheels were better than previous w/c, and was 

able to wheel 400' with one rest break.





Exercises


Supine Ex:  Heel Slides (L LE only), Straight leg raise


Seated Therapy Exercises:  Ankle pumps, Long arc quads (small blue bolster)


Seated Reps:  20 (2 sets of each)





Treatments


LE exercises, w/c training, endurance





Assessment


Current Status:  Poor Progress (No change in mobility, poor compliance with WB)


Patient was able to more easily navigate w/c with UE's.





PT Short Term Goals


Short Term Goals


Time Frame:  2021


Roll Left & Right:  6


Sit to lyin


Lying to sitting on side of be:  6


Sit to stand:  4 (SBA)


Chair/bed-to-chair transfer:  4 (SBA)


Toilet transfer:  4 (SBA)


Car transfer:  4 (SBA)


Walk 10 feet:  4 (SBA)


Does pt use a wc or scooter:  Yes


Wheel 50ft w/2 turns:  6


Wheel 150 feet:  6


Type:  Manual





PT Long Term Goals


Long Term Goals


PT Long Term Goals Time Frame:  2021


Roll Left & Right (QC):  6


Sit to Lying (QC):  6


Lying-Sitting on Side/Bed(QC):  6


Sit to Stand (QC):  6


Chair/Bed-to-Chair Xfer(QC):  6


Toilet Transfer (QC):  6


Car Transfer (QC):  6


Does the Patient Walk:  No and Walking Goal IS indicated


Walk 10 feet (QC):  6


Walk 50ft with 2 Turns (QC):  6


Walk 150 ft (QC):  88


Walking 10ft on Uneven Surface:  6


1 Step (curb) (QC):  4


4 Steps (QC):  88


12 Steps (QC):  88


Picking up an Object (QC):  88


Does the Pt use WC or Scooter?:  Yes


Wheel 50 feet with 2 turns (QC:  6


Type:  Manual


Wheel 150 feet:  6


Type:  Manual





PT Plan


Problem List


Problem List:  Activity Tolerance, Functional Strength, Safety, Balance, Gait, 

Transfer, Bed Mobility, ROM





Treatment/Plan


Treatment Plan:  Continue Plan of Care


Treatment Plan:  Bed Mobility, Education, Functional Activity Aung, Functional 

Strength, Group Therapy, Gait, Safety, Therapeutic Exercise, Transfers


Treatment Duration:  2021


Frequency:  At least 5 of 7 days/Wk (IRF)


Estimated Hrs Per Day:  1.5 hours per day


Patient and/or Family Agrees t:  Yes





Safety Risks/Education


Patient Education:  Transfer Techniques, Reviewed Precautions, W/C Management, 

Safety Issues


Teaching Recipient:  Patient


Teaching Methods:  Demonstration, Discussion


Response to Teaching:  Verbalize Understanding, Return Demonstration





Time/GCodes


Time In:  1000


Time Out:  1100


Total Billed Treatment Time:  60


Total Billed Treatment


1 visit: 


FA x3: 45' 


EX: 15'











CAMILLE HARRY PT                 2021 10:57

## 2021-04-03 VITALS — SYSTOLIC BLOOD PRESSURE: 142 MMHG | DIASTOLIC BLOOD PRESSURE: 79 MMHG

## 2021-04-03 VITALS — DIASTOLIC BLOOD PRESSURE: 75 MMHG | SYSTOLIC BLOOD PRESSURE: 125 MMHG

## 2021-04-03 RX ADMIN — INSULIN ASPART SCH UNIT: 100 INJECTION, SOLUTION INTRAVENOUS; SUBCUTANEOUS at 17:09

## 2021-04-03 RX ADMIN — HYDROCHLOROTHIAZIDE SCH MG: 25 TABLET ORAL at 07:54

## 2021-04-03 RX ADMIN — LISINOPRIL SCH MG: 20 TABLET ORAL at 07:53

## 2021-04-03 RX ADMIN — ASPIRIN SCH MG: 81 TABLET ORAL at 07:52

## 2021-04-03 RX ADMIN — DOCUSATE SODIUM AND SENNOSIDES SCH EA: 8.6; 5 TABLET, FILM COATED ORAL at 21:26

## 2021-04-03 RX ADMIN — SODIUM CHLORIDE SCH MLS/HR: 900 INJECTION, SOLUTION INTRAVENOUS at 10:45

## 2021-04-03 RX ADMIN — SODIUM CHLORIDE SCH MLS/HR: 900 INJECTION, SOLUTION INTRAVENOUS at 18:29

## 2021-04-03 RX ADMIN — METFORMIN HYDROCHLORIDE SCH MG: 500 TABLET, EXTENDED RELEASE ORAL at 07:52

## 2021-04-03 RX ADMIN — METFORMIN HYDROCHLORIDE SCH MG: 500 TABLET, EXTENDED RELEASE ORAL at 17:10

## 2021-04-03 RX ADMIN — DOCUSATE SODIUM SCH MG: 100 CAPSULE ORAL at 07:54

## 2021-04-03 RX ADMIN — POLYETHYLENE GLYCOL (3350) SCH GM: 17 POWDER, FOR SOLUTION ORAL at 07:54

## 2021-04-03 RX ADMIN — GLYBURIDE SCH MG: 2.5 TABLET ORAL at 17:10

## 2021-04-03 RX ADMIN — SODIUM CHLORIDE SCH MLS/HR: 900 INJECTION, SOLUTION INTRAVENOUS at 03:09

## 2021-04-03 RX ADMIN — INSULIN ASPART SCH UNIT: 100 INJECTION, SOLUTION INTRAVENOUS; SUBCUTANEOUS at 11:02

## 2021-04-03 RX ADMIN — INSULIN ASPART SCH UNIT: 100 INJECTION, SOLUTION INTRAVENOUS; SUBCUTANEOUS at 16:24

## 2021-04-03 RX ADMIN — POLYETHYLENE GLYCOL (3350) SCH GM: 17 POWDER, FOR SOLUTION ORAL at 21:26

## 2021-04-03 RX ADMIN — DOCUSATE SODIUM SCH MG: 100 CAPSULE ORAL at 21:25

## 2021-04-03 RX ADMIN — GLYBURIDE SCH MG: 2.5 TABLET ORAL at 06:57

## 2021-04-03 RX ADMIN — PRASUGREL SCH MG: 10 TABLET, FILM COATED ORAL at 07:53

## 2021-04-03 RX ADMIN — INSULIN ASPART SCH UNIT: 100 INJECTION, SOLUTION INTRAVENOUS; SUBCUTANEOUS at 06:57

## 2021-04-03 RX ADMIN — INSULIN ASPART SCH UNIT: 100 INJECTION, SOLUTION INTRAVENOUS; SUBCUTANEOUS at 21:23

## 2021-04-03 RX ADMIN — INSULIN ASPART SCH UNIT: 100 INJECTION, SOLUTION INTRAVENOUS; SUBCUTANEOUS at 06:00

## 2021-04-03 RX ADMIN — DOCUSATE SODIUM AND SENNOSIDES SCH EA: 8.6; 5 TABLET, FILM COATED ORAL at 07:54

## 2021-04-03 RX ADMIN — METOPROLOL SUCCINATE SCH MG: 100 TABLET, EXTENDED RELEASE ORAL at 07:53

## 2021-04-03 NOTE — PHYSICAL THERAPY DAILY NOTE
PT Daily Note-Current


Subjective


Pt denies pain.  Pt requests BR privileges. Pt declined up in chair.





Mental Status


Patient Orientation:  Person, Place, Situation





Transfers


SCALE: Activities may be completed with or without assistive devices.





6-Indepedent-patient completes the activity by him/herself with no assistance 

from a helper.


5-Set-up or Clean-up Assistance-helper sets up or cleans up; patient completes 

activity. [place] assists only prior to or  


    following the activity.


4-Supervision or Touching Assistance-helper provides verbal cues and/or 

touching/steadying and/or contact guard assistance as patient completes 

activity. Assistance may be provided   


    throughout the activity or intermittently.


3-Partial/Moderate Assistance-helper does LESS THAN HALF the effort. [place] li

fts, holds or supports trunk or limbs, but provides less than half the effort.


2-Substantial/Maximal Assistance-helper does MORE THAN HALF the effort. [place] 

lifts or holds trunk or limbs and provides more than half the effort.


5-Vnsjwvmua-dwiuuk does ALL the effort. Patient does none of the effort to 

complete the activity. Or, the assistance of 2 or more helpers is required for 

the patient to complete the  


    activity.


If activity was not attempted, code reason:


7-Patient Refused.


9-Not Applicable-not attempted and the patient did not perform the activity 

before the current illness, exacerbation or injury.


10-Not Attempted due to Environmental Limitations-(lack of equipment, weather 

restraints, etc.).


88-Not Attempted due to Medical Conditions or Safety Concerns.





Weight Bearing


Right Lower Extremity:  Right


Full Weight Bearing


Left Lower Extremity:  Left


Non Weight Bearing





Patient NWB on L foot, no pressure allowed





Gait Training


Gait Assistive Device:  FWW


Pt amb to BR 2 x 15ftm with repeated instruction from this therapist to attempt 

non weightbearing.





Exercises


Supine Ex:  Ankle pumps, Quad Set, Glut sets, Straight leg raise


Supine Reps:  20


Seated Therapy Exercises:  Long arc quads, Hip flexion


Seated Reps:  20





Assessment


Current Status:  Good Progress


Good performance of ther ex.  Pt mod (I) with BR use.  Pt does not follow NWB 

instructions despite repeated instruction from this therapist.  Pt back to bed 

with L leg elevated and all needs met.





PT Short Term Goals


Short Term Goals


Time Frame:  2021


Roll Left & Right:  6


Sit to lyin


Lying to sitting on side of be:  6


Sit to stand:  4 (SBA)


Chair/bed-to-chair transfer:  4 (SBA)


Toilet transfer:  4 (SBA)


Car transfer:  4 (SBA)


Walk 10 feet:  4 (SBA)


Does pt use a wc or scooter:  Yes


Wheel 50ft w/2 turns:  6


Wheel 150 feet:  6


Type:  Manual





PT Long Term Goals


Long Term Goals


PT Long Term Goals Time Frame:  2021


Roll Left & Right (QC):  6


Sit to Lying (QC):  6


Lying-Sitting on Side/Bed(QC):  6


Sit to Stand (QC):  6


Chair/Bed-to-Chair Xfer(QC):  6


Toilet Transfer (QC):  6


Car Transfer (QC):  6


Does the Patient Walk:  No and Walking Goal IS indicated


Walk 10 feet (QC):  6


Walk 50ft with 2 Turns (QC):  6


Walk 150 ft (QC):  88


Walking 10ft on Uneven Surface:  6


1 Step (curb) (QC):  4


4 Steps (QC):  88


12 Steps (QC):  88


Picking up an Object (QC):  88


Does the Pt use WC or Scooter?:  Yes


Wheel 50 feet with 2 turns (QC:  6


Type:  Manual


Wheel 150 feet:  6


Type:  Manual





PT Plan


Treatment/Plan


Treatment Plan:  Continue Plan of Care


Treatment Plan:  Bed Mobility, Education, Functional Activity Aung, Functional 

Strength, Group Therapy, Gait, Safety, Therapeutic Exercise, Transfers


Treatment Duration:  2021


Frequency:  At least 5 of 7 days/Wk (IRF)


Estimated Hrs Per Day:  1.5 hours per day


Patient and/or Family Agrees t:  Yes





Time/GCodes


Time In:  805


Time Out:  830


Total Billed Treatment Time:  25


Total Billed Treatment


1, ther ex 15', FA 10'











KIMMY GRIFFIN CPTA             Apr 3, 2021 08:52

## 2021-04-04 VITALS — SYSTOLIC BLOOD PRESSURE: 144 MMHG | DIASTOLIC BLOOD PRESSURE: 85 MMHG

## 2021-04-04 VITALS — SYSTOLIC BLOOD PRESSURE: 137 MMHG | DIASTOLIC BLOOD PRESSURE: 78 MMHG

## 2021-04-04 RX ADMIN — LISINOPRIL SCH MG: 20 TABLET ORAL at 08:28

## 2021-04-04 RX ADMIN — DOCUSATE SODIUM AND SENNOSIDES SCH EA: 8.6; 5 TABLET, FILM COATED ORAL at 19:29

## 2021-04-04 RX ADMIN — ASPIRIN SCH MG: 81 TABLET ORAL at 08:28

## 2021-04-04 RX ADMIN — SODIUM CHLORIDE SCH MG: 900 INJECTION, SOLUTION INTRAVENOUS at 08:27

## 2021-04-04 RX ADMIN — SODIUM CHLORIDE SCH MLS/HR: 900 INJECTION, SOLUTION INTRAVENOUS at 03:18

## 2021-04-04 RX ADMIN — INSULIN ASPART SCH UNIT: 100 INJECTION, SOLUTION INTRAVENOUS; SUBCUTANEOUS at 11:26

## 2021-04-04 RX ADMIN — PRASUGREL SCH MG: 10 TABLET, FILM COATED ORAL at 08:28

## 2021-04-04 RX ADMIN — METFORMIN HYDROCHLORIDE SCH MG: 500 TABLET, EXTENDED RELEASE ORAL at 08:28

## 2021-04-04 RX ADMIN — GLYBURIDE SCH MG: 2.5 TABLET ORAL at 06:48

## 2021-04-04 RX ADMIN — HYDROCHLOROTHIAZIDE SCH MG: 25 TABLET ORAL at 08:28

## 2021-04-04 RX ADMIN — GLYBURIDE SCH MG: 2.5 TABLET ORAL at 16:54

## 2021-04-04 RX ADMIN — METOPROLOL SUCCINATE SCH MG: 100 TABLET, EXTENDED RELEASE ORAL at 08:28

## 2021-04-04 RX ADMIN — DOCUSATE SODIUM AND SENNOSIDES SCH EA: 8.6; 5 TABLET, FILM COATED ORAL at 07:51

## 2021-04-04 RX ADMIN — INSULIN ASPART SCH UNIT: 100 INJECTION, SOLUTION INTRAVENOUS; SUBCUTANEOUS at 06:48

## 2021-04-04 RX ADMIN — INSULIN ASPART SCH UNIT: 100 INJECTION, SOLUTION INTRAVENOUS; SUBCUTANEOUS at 16:55

## 2021-04-04 RX ADMIN — SODIUM CHLORIDE SCH MLS/HR: 900 INJECTION, SOLUTION INTRAVENOUS at 11:26

## 2021-04-04 RX ADMIN — DOCUSATE SODIUM SCH MG: 100 CAPSULE ORAL at 07:51

## 2021-04-04 RX ADMIN — INSULIN ASPART SCH UNIT: 100 INJECTION, SOLUTION INTRAVENOUS; SUBCUTANEOUS at 06:00

## 2021-04-04 RX ADMIN — SODIUM CHLORIDE SCH MLS/HR: 900 INJECTION, SOLUTION INTRAVENOUS at 18:38

## 2021-04-04 RX ADMIN — DOCUSATE SODIUM SCH MG: 100 CAPSULE ORAL at 19:29

## 2021-04-04 RX ADMIN — POLYETHYLENE GLYCOL (3350) SCH GM: 17 POWDER, FOR SOLUTION ORAL at 07:51

## 2021-04-04 RX ADMIN — INSULIN ASPART SCH UNIT: 100 INJECTION, SOLUTION INTRAVENOUS; SUBCUTANEOUS at 20:16

## 2021-04-04 RX ADMIN — METFORMIN HYDROCHLORIDE SCH MG: 500 TABLET, EXTENDED RELEASE ORAL at 18:38

## 2021-04-04 RX ADMIN — POLYETHYLENE GLYCOL (3350) SCH GM: 17 POWDER, FOR SOLUTION ORAL at 19:29

## 2021-04-05 VITALS — SYSTOLIC BLOOD PRESSURE: 133 MMHG | DIASTOLIC BLOOD PRESSURE: 77 MMHG

## 2021-04-05 VITALS — DIASTOLIC BLOOD PRESSURE: 65 MMHG | SYSTOLIC BLOOD PRESSURE: 119 MMHG

## 2021-04-05 VITALS — SYSTOLIC BLOOD PRESSURE: 109 MMHG | DIASTOLIC BLOOD PRESSURE: 64 MMHG

## 2021-04-05 LAB
ALBUMIN SERPL-MCNC: 3.2 GM/DL (ref 3.2–4.5)
ALP SERPL-CCNC: 78 U/L (ref 40–136)
ALT SERPL-CCNC: 21 U/L (ref 0–55)
BASOPHILS # BLD AUTO: 0.1 10^3/UL (ref 0–0.1)
BASOPHILS NFR BLD AUTO: 1 % (ref 0–10)
BILIRUB SERPL-MCNC: 0.2 MG/DL (ref 0.1–1)
BUN/CREAT SERPL: 16
CALCIUM SERPL-MCNC: 9.2 MG/DL (ref 8.5–10.1)
CHLORIDE SERPL-SCNC: 103 MMOL/L (ref 98–107)
CO2 SERPL-SCNC: 24 MMOL/L (ref 21–32)
CREAT SERPL-MCNC: 0.8 MG/DL (ref 0.6–1.3)
EOSINOPHIL # BLD AUTO: 0.2 10^3/UL (ref 0–0.3)
EOSINOPHIL NFR BLD AUTO: 2 % (ref 0–10)
GFR SERPLBLD BASED ON 1.73 SQ M-ARVRAT: > 60 ML/MIN
GLUCOSE SERPL-MCNC: 131 MG/DL (ref 70–105)
HCT VFR BLD CALC: 31 % (ref 40–54)
HGB BLD-MCNC: 9.3 G/DL (ref 13.3–17.7)
LYMPHOCYTES # BLD AUTO: 2.5 10^3/UL (ref 1–4)
LYMPHOCYTES NFR BLD AUTO: 30 % (ref 12–44)
MANUAL DIFFERENTIAL PERFORMED BLD QL: NO
MCH RBC QN AUTO: 26 PG (ref 25–34)
MCHC RBC AUTO-ENTMCNC: 30 G/DL (ref 32–36)
MCV RBC AUTO: 87 FL (ref 80–99)
MONOCYTES # BLD AUTO: 0.3 10^3/UL (ref 0–1)
MONOCYTES NFR BLD AUTO: 3 % (ref 0–12)
NEUTROPHILS # BLD AUTO: 5.3 10^3/UL (ref 1.8–7.8)
NEUTROPHILS NFR BLD AUTO: 63 % (ref 42–75)
PLATELET # BLD: 413 10^3/UL (ref 130–400)
PMV BLD AUTO: 8.9 FL (ref 9–12.2)
POTASSIUM SERPL-SCNC: 4.7 MMOL/L (ref 3.6–5)
PROT SERPL-MCNC: 7 GM/DL (ref 6.4–8.2)
SODIUM SERPL-SCNC: 138 MMOL/L (ref 135–145)
WBC # BLD AUTO: 8.4 10^3/UL (ref 4.3–11)

## 2021-04-05 RX ADMIN — POLYETHYLENE GLYCOL (3350) SCH GM: 17 POWDER, FOR SOLUTION ORAL at 08:57

## 2021-04-05 RX ADMIN — SODIUM CHLORIDE SCH MLS/HR: 900 INJECTION, SOLUTION INTRAVENOUS at 18:32

## 2021-04-05 RX ADMIN — METOPROLOL SUCCINATE SCH MG: 100 TABLET, EXTENDED RELEASE ORAL at 08:54

## 2021-04-05 RX ADMIN — METFORMIN HYDROCHLORIDE SCH MG: 500 TABLET, EXTENDED RELEASE ORAL at 08:56

## 2021-04-05 RX ADMIN — DOCUSATE SODIUM SCH MG: 100 CAPSULE ORAL at 08:57

## 2021-04-05 RX ADMIN — DOCUSATE SODIUM AND SENNOSIDES SCH EA: 8.6; 5 TABLET, FILM COATED ORAL at 20:59

## 2021-04-05 RX ADMIN — SODIUM CHLORIDE SCH MLS/HR: 900 INJECTION, SOLUTION INTRAVENOUS at 11:46

## 2021-04-05 RX ADMIN — HYDROCHLOROTHIAZIDE SCH MG: 25 TABLET ORAL at 08:54

## 2021-04-05 RX ADMIN — POLYETHYLENE GLYCOL (3350) SCH GM: 17 POWDER, FOR SOLUTION ORAL at 21:01

## 2021-04-05 RX ADMIN — GLYBURIDE SCH MG: 2.5 TABLET ORAL at 17:13

## 2021-04-05 RX ADMIN — INSULIN ASPART SCH UNIT: 100 INJECTION, SOLUTION INTRAVENOUS; SUBCUTANEOUS at 11:58

## 2021-04-05 RX ADMIN — PRASUGREL SCH MG: 10 TABLET, FILM COATED ORAL at 08:54

## 2021-04-05 RX ADMIN — INSULIN ASPART SCH UNIT: 100 INJECTION, SOLUTION INTRAVENOUS; SUBCUTANEOUS at 17:14

## 2021-04-05 RX ADMIN — INSULIN ASPART SCH UNIT: 100 INJECTION, SOLUTION INTRAVENOUS; SUBCUTANEOUS at 16:19

## 2021-04-05 RX ADMIN — INSULIN ASPART SCH UNIT: 100 INJECTION, SOLUTION INTRAVENOUS; SUBCUTANEOUS at 06:56

## 2021-04-05 RX ADMIN — GLYBURIDE SCH MG: 2.5 TABLET ORAL at 06:56

## 2021-04-05 RX ADMIN — DOCUSATE SODIUM SCH MG: 100 CAPSULE ORAL at 21:01

## 2021-04-05 RX ADMIN — INSULIN ASPART SCH UNIT: 100 INJECTION, SOLUTION INTRAVENOUS; SUBCUTANEOUS at 11:59

## 2021-04-05 RX ADMIN — ASPIRIN SCH MG: 81 TABLET ORAL at 08:55

## 2021-04-05 RX ADMIN — INSULIN ASPART SCH UNIT: 100 INJECTION, SOLUTION INTRAVENOUS; SUBCUTANEOUS at 20:14

## 2021-04-05 RX ADMIN — DOCUSATE SODIUM AND SENNOSIDES SCH EA: 8.6; 5 TABLET, FILM COATED ORAL at 08:57

## 2021-04-05 RX ADMIN — METFORMIN HYDROCHLORIDE SCH MG: 500 TABLET, EXTENDED RELEASE ORAL at 17:13

## 2021-04-05 RX ADMIN — LISINOPRIL SCH MG: 20 TABLET ORAL at 08:54

## 2021-04-05 RX ADMIN — SODIUM CHLORIDE SCH MLS/HR: 900 INJECTION, SOLUTION INTRAVENOUS at 03:36

## 2021-04-05 NOTE — PHYSICAL THERAPY DAILY NOTE
PT Daily Note-Current


Subjective


Patient reported no pain, and was seated upright in chair pre tx. Patient 

consented to PT.





Appearance


Patient was seated upright in chair, with call button within reach and tray 

table next to chair.





Mental Status


Patient Orientation:  Person, Place, Time, Normal For Age


wound vac





Transfers


SCALE: Activities may be completed with or without assistive devices.





6-Indepedent-patient completes the activity by him/herself with no assistance 

from a helper.


5-Set-up or Clean-up Assistance-helper sets up or cleans up; patient completes 

activity. [place] assists only prior to or  


    following the activity.


4-Supervision or Touching Assistance-helper provides verbal cues and/or 

touching/steadying and/or contact guard assistance as patient completes 

activity. Assistance may be provided   


    throughout the activity or intermittently.


3-Partial/Moderate Assistance-helper does LESS THAN HALF the effort. [place] 

lifts, holds or supports trunk or limbs, but provides less than half the effort.


2-Substantial/Maximal Assistance-helper does MORE THAN HALF the effort. [place] 

lifts or holds trunk or limbs and provides more than half the effort.


5-Gxctlsdoe-zkdsps does ALL the effort. Patient does none of the effort to 

complete the activity. Or, the assistance of 2 or more helpers is required for 

the patient to complete the  


    activity.


If activity was not attempted, code reason:


7-Patient Refused.


9-Not Applicable-not attempted and the patient did not perform the activity 

before the current illness, exacerbation or injury.


10-Not Attempted due to Environmental Limitations-(lack of equipment, weather 

restraints, etc.).


88-Not Attempted due to Medical Conditions or Safety Concerns.


Sit to Stand (QC):  6


Chair/Bed-to-Chair Xfer(QC):  6


Patient is unable to maintain NWB restrictions on L LE during transfers, despite

regulat cueing from PT to maintain restrictions.





Weight Bearing


Right Lower Extremity:  Right


Full Weight Bearing


Left Lower Extremity:  Left


Non Weight Bearing





Patient NWB on L foot, no pressure allowed





Wheelchair Training


Does the Pt Use a Wheelchair?:  Yes


Wheel 50 ft with 2 turns (QC):  6


Wheel 150 ft (QC):  6


Type of Wheelchair:  Manual


Patient was able to use wheelchair to wheel 1600' with one rest break, and 

maneuvered up and down one ramp.





Exercises


Supine Ex:  Ankle pumps


Supine Reps:  10


Seated Therapy Exercises:  Ankle pumps, Long arc quads


Seated Reps:  15





Treatments


LE strengthening, w/c training, endurance





Assessment


Current Status:  Fair Progress


Patient shows good improvement in w/c management with change in w/c. Maintained 

good control with speed during ramp descent. Patient needed to use both hands on

handrails with going up the ramp backwards, he could not use his right leg and 

both hands on the wheel rims





PT Short Term Goals


Short Term Goals


Time Frame:  2021


Roll Left & Right:  6


Sit to lyin


Lying to sitting on side of be:  6


Sit to stand:  4 (SBA)


Chair/bed-to-chair transfer:  4 (SBA)


Toilet transfer:  4 (SBA)


Car transfer:  4 (SBA)


Walk 10 feet:  4 (SBA)


Does pt use a wc or scooter:  Yes


Wheel 50ft w/2 turns:  6


Wheel 150 feet:  6


Type:  Manual





PT Long Term Goals


Long Term Goals


PT Long Term Goals Time Frame:  2021


Roll Left & Right (QC):  6


Sit to Lying (QC):  6


Lying-Sitting on Side/Bed(QC):  6


Sit to Stand (QC):  6


Chair/Bed-to-Chair Xfer(QC):  6


Toilet Transfer (QC):  6


Car Transfer (QC):  6


Does the Patient Walk:  No and Walking Goal IS indicated


Walk 10 feet (QC):  6


Walk 50ft with 2 Turns (QC):  6


Walk 150 ft (QC):  88


Walking 10ft on Uneven Surface:  6


1 Step (curb) (QC):  4


4 Steps (QC):  88


12 Steps (QC):  88


Picking up an Object (QC):  88


Does the Pt use WC or Scooter?:  Yes


Wheel 50 feet with 2 turns (QC:  6


Type:  Manual


Wheel 150 feet:  6


Type:  Manual





PT Plan


Problem List


Problem List:  Activity Tolerance, Functional Strength, Safety, Balance, Gait, 

Transfer, Bed Mobility, ROM





Treatment/Plan


Treatment Plan:  Continue Plan of Care


Treatment Plan:  Bed Mobility, Education, Functional Activity Aung, Functional 

Strength, Group Therapy, Gait, Safety, Therapeutic Exercise, Transfers


Treatment Duration:  2021


Frequency:  At least 5 of 7 days/Wk (IRF)


Estimated Hrs Per Day:  1.5 hours per day


Patient and/or Family Agrees t:  Yes





Safety Risks/Education


Patient Education:  Transfer Techniques, Correct Positioning, Safety Issues


Teaching Recipient:  Patient


Teaching Methods:  Demonstration, Discussion


Response to Teaching:  Verbalize Understanding, Return Demonstration





Time/GCodes


Time In:  1000


Time Out:  1100


Total Billed Treatment Time:  60


Total Billed Treatment


1 visit: 


[facility] x3: 45' 


EX : 15'











CAMILLE HARRY PT                 2021 10:58

## 2021-04-05 NOTE — PROGRESS NOTE
Subjective


Date Seen by a Provider:  Apr 5, 2021


Time Seen by a Provider:  19:00


Subjective/Events-last exam


doing well. no complaints. no pain issue. no fever/chills.





Objective


Exam





Vital Signs








  Date Time  Temp Pulse Resp B/P (MAP) Pulse Ox O2 Delivery O2 Flow Rate FiO2


 


4/5/21 16:00 36.3 76 16 109/64 (79) 95   


 


4/5/21 09:00      Room Air  


 


4/5/21 08:00  96  119/65 (83)    


 


4/5/21 06:16 36.0 75 20 133/77 (95) 98 Room Air  


 


4/4/21 19:50      Room Air  














I & O 


 


 4/5/21





 07:00


 


Intake Total 1620 ml


 


Balance 1620 ml





Capillary Refill :


General Appearance:  No Apparent Distress


HEENT:  PERRL/EOMI


Neck:  Full Range of Motion


Respiratory:  Chest Non Tender, Lungs Clear


Cardiovascular:  Regular Rate, Rhythm


Gastrointestinal:  normal bowel sounds, non tender, soft


Extremity:  Normal Capillary Refill


Neurologic/Psychiatric:  Alert, Oriented x3


Skin:  Normal Color


Lymphatic:  No Adenopathy





Results


Lab


Laboratory Tests


4/4/21 20:11: Glucometer 176H


4/5/21 06:25: Glucometer 129H


4/5/21 06:30: 


White Blood Count 8.4, Red Blood Count 3.63L, Hemoglobin 9.3L, Hematocrit 31L, 

Mean Corpuscular Volume 87, Mean Corpuscular Hemoglobin 26, Mean Corpuscular 

Hemoglobin Concent 30L, Red Cell Distribution Width 18.2H, Platelet Count 413H, 

Mean Platelet Volume 8.9L, Immature Granulocyte % (Auto) 1, Neutrophils (%) 

(Auto) 63, Lymphocytes (%) (Auto) 30, Monocytes (%) (Auto) 3, Eosinophils (%) 

(Auto) 2, Basophils (%) (Auto) 1, Neutrophils # (Auto) 5.3, Lymphocytes # (Auto)

2.5, Monocytes # (Auto) 0.3, Eosinophils # (Auto) 0.2, Basophils # (Auto) 0.1, 

Immature Granulocyte # (Auto) 0.0, Sodium Level 138, Potassium Level 4.7, 

Chloride Level 103, Carbon Dioxide Level 24, Anion Gap 11, Blood Urea Nitrogen 

13, Creatinine 0.80, Estimat Glomerular Filtration Rate > 60, BUN/Creatinine 

Ratio 16, Glucose Level 131H, Calcium Level 9.2, Corrected Calcium 9.8, Total 

Bilirubin 0.2, Aspartate Amino Transf (AST/SGOT) 17, Alanine Aminotransferase 

(ALT/SGPT) 21, Alkaline Phosphatase 78, Total Protein 7.0, Albumin 3.2


4/5/21 11:48: Glucometer 216H


4/5/21 15:35: Glucometer 178H





Assessment/Plan


Assessment/Plan


Assess & Plan/Chief Complaint


hx insulin dependent diabetes/morbid obesity with NSTI left ankle s/p 

debridement.


more areas of necrosis. explained to pt likely will require a BKA. the option wa

s also given to temporize with further debridement and abx. patient states he 

would like to think on it. 


will continue current therapies for now.


patient would like to proceed with any modalities of foot salvage therapy even 

though he know success rates low. 


rim of necrotic debris including skin and subcutaneous tissue, deep tissue 

granulation appears to be forming. will continue vac and schedule for 

debridement tues(4/6).











CLAUDY ALEJO MD                 Apr 5, 2021 19:15

## 2021-04-05 NOTE — PHYSICAL THERAPY DAILY NOTE
PT Daily Note-Current


Subjective


Patient was seated in chair with legs elevated pre tx. Patient reported no pain,

and consented to therapy.





Appearance


Patient was seated upright in chair post tx, with call button within reach and 

tray table positioned nearby.





Mental Status


Patient Orientation:  Person, Place, Time, Normal For Age


Attachments:  IV


wound vac





Transfers


SCALE: Activities may be completed with or without assistive devices.





6-Indepedent-patient completes the activity by him/herself with no assistance 

from a helper.


5-Set-up or Clean-up Assistance-helper sets up or cleans up; patient completes 

activity. [place] assists only prior to or  


    following the activity.


4-Supervision or Touching Assistance-helper provides verbal cues and/or 

touching/steadying and/or contact guard assistance as patient completes 

activity. Assistance may be provided   


    throughout the activity or intermittently.


3-Partial/Moderate Assistance-helper does LESS THAN HALF the effort. [place] 

lifts, holds or supports trunk or limbs, but provides less than half the effort.


2-Substantial/Maximal Assistance-helper does MORE THAN HALF the effort. [place] 

lifts or holds trunk or limbs and provides more than half the effort.


3-[place]-umcfvc does ALL the effort. Patient does none of the effort to 

complete the activity. Or, the assistance of 2 or more helpers is required for 

the patient to complete the  


    activity.


If activity was not attempted, code reason:


7-Patient Refused.


9-Not Applicable-not attempted and the patient did not perform the activity 

before the current illness, exacerbation or injury.


10-Not Attempted due to Environmental Limitations-(lack of equipment, weather 

restraints, etc.).


88-Not Attempted due to Medical Conditions or Safety Concerns.





Weight Bearing


Right Lower Extremity:  Right


Full Weight Bearing


Left Lower Extremity:  Left


Non Weight Bearing





Patient NWB on L foot, no pressure allowed





Exercises


Supine Ex:  Ankle pumps, Quad Set, Glut sets, Heel Slides (R only), Short Arc 

Quads (pillow under knee), Straight leg raise


Supine Reps:  20 (2 sets each)





Treatments


LE strengthening, ROM





Assessment


Current Status:  Fair Progress


Completed supine mobility exercises secondary to patient inability to maintain 

[place] restrictions on L LE. Patient shows good pacing with exercises, and is 

motivated to participate.





PT Short Term Goals


Short Term Goals


Time Frame:  2021


Roll Left & Right:  6


Sit to lyin


Lying to sitting on side of be:  6


Sit to stand:  4 (SBA)


Chair/bed-to-chair transfer:  4 (SBA)


Toilet transfer:  4 (SBA)


Car transfer:  4 (SBA)


Walk 10 feet:  4 (SBA)


Does pt use a wc or scooter:  Yes


Wheel 50ft w/2 turns:  6


Wheel 150 feet:  6


Type:  Manual





PT Long Term Goals


Long Term Goals


PT Long Term Goals Time Frame:  2021


Roll Left & Right (QC):  6


Sit to Lying (QC):  6


Lying-Sitting on Side/Bed(QC):  6


Sit to Stand (QC):  6


Chair/Bed-to-Chair Xfer(QC):  6


Toilet Transfer (QC):  6


Car Transfer (QC):  6


Does the Patient Walk:  No and Walking Goal IS indicated


Walk 10 feet (QC):  6


Walk 50ft with 2 Turns (QC):  6


Walk 150 ft (QC):  88


Walking 10ft on Uneven Surface:  6


1 Step (curb) (QC):  4


4 Steps (QC):  88


12 Steps (QC):  88


Picking up an Object (QC):  88


Does the Pt use WC or Scooter?:  Yes


Wheel 50 feet with 2 turns (QC:  6


Type:  Manual


Wheel 150 feet:  6


Type:  Manual





PT Plan


Problem List


Problem List:  Activity Tolerance, Functional Strength, Safety, Balance, Gait, 

Transfer, Bed Mobility, ROM





Treatment/Plan


Treatment Plan:  Continue Plan of Care


Treatment Plan:  Bed Mobility, Education, Functional Activity Aung, Functional 

Strength, Group Therapy, Gait, Safety, Therapeutic Exercise, Transfers


Treatment Duration:  2021


Frequency:  At least 5 of 7 days/Wk (IRF)


Estimated Hrs Per Day:  1.5 hours per day


Patient and/or Family Agrees t:  Yes





Safety Risks/Education


Patient Education:  Correct Positioning, Safety Issues


Teaching Recipient:  Patient


Teaching Methods:  Demonstration, Discussion


Response to Teaching:  Verbalize Understanding, Return Demonstration





Time/GCodes


Time In:  1330


Time Out:  1400


Total Billed Treatment Time:  30


Total Billed Treatment


1 visit: 


EX x2: 30'











CAMILLE HARYR PT                 2021 14:27

## 2021-04-05 NOTE — OCCUPATIONAL THER DAILY NOTE
OT Current Status-Daily Note


Subjective


Pt alert, lying in bed. Pt agrees to therapy.  No c/o pain at this time.





Mental Status/Objective


Patient Orientation:  Person, Place, Time, Situation


Attachments:  Drains, IV (midline)





ADL-Treatment


Pt agrees to shower.  Pt able to complete shower independently after RENE covers

wound and IV site.  Pt is able to manipulate wound vac independently.  Pt 

completes upper/lower body dressing by self after set up.  Pt declines toilet or

oral care today.  After session, pt sitting in recliner with call light/phone in

reach.  All needs met in room.


Therapy Code Descriptions/Definitions 





Functional [place] Measure:


0=Not Assessed/NA        4=Minimal Assistance


1=Total Assistance        5=Supervision or Setup


2=Maximal Assistance  6=Modified [place]


3=Moderate Assistance 7=Complete IndependenceSCALE: Activities may be completed 

with or without assistive devices.





6-Indepedent-patient completes the activity by him/herself with no assistance 

from a helper.


5-Set-up or Clean-up Assistance-helper sets up or cleans up; patient completes 

activity. [place] assists only prior to or  


    following the activity.


4-Supervision or Touching Assistance-helper provides verbal cues and/or 

touching/steadying and/or contact guard assistance as patient completes 

activity. Assistance may be provided   


    throughout the activity or intermittently.


3-Partial/Moderate Assistance-helper does LESS THAN HALF the effort. [place] 

lifts, holds or supports trunk or limbs, but provides less than half the effort.


2-Substantial/Maximal Assistance-helper does MORE THAN HALF the effort. [place] 

lifts or holds trunk or limbs and provides more than half the effort.


2-Feiqinmif-doaxar does ALL the effort. Patient does none of the effort to 

complete the activity. Or, the assistance of 2 or more helpers is required for 

the patient to complete the  


    activity.


If activity was not attempted, code reason:


7-Patient Refused.


9-Not Applicable-not attempted and the patient did not perform the activity 

before the current illness, exacerbation or injury.


10-Not Attempted due to Environmental Limitations-(lack of equipment, weather 

restraints, etc.).


88-Not Attempted due to Medical Conditions or Safety Concerns.


Eating (QC):  6


Shower/Bathe Self (QC):  6


Upper Body Dressing (QC):  5


Lower Body Dressing (QC):  5





Other Treatment


Pt completed B UE medium resistance theraband 8 exercises 1 set 30 reps 

independently.





OT Short Term Goals


Short Term Goals


Time Frame:  2021


Shower/bathe self:  4


Lower body dressin


Putting on/taking off footwear:  3





OT Long Term Goals


Long Term Goals


Time Frame:  2021


Eating (QC):  6


Oral Hygiene (QC):  6


Toileting Hygiene (QC):  6


Shower/Bathe Self (QC):  6


Upper Body Dressing (QC):  6


Lower Body Dressing (QC):  6


On/Off Footwear (QC):  6


Additional Goals:  1-Demonstrate ADL Tasks, 2-Verbalize Understanding, 3-

ImproveStrength/Aung


1=Demonstrate adherence to instructed precautions during ADL tasks.


2=Patient will verbalize/demonstrate understanding of assistive 

devices/modifications for ADL.


3=Patient will improve strength/tolerance for activity to enable patient to 

perform ADL's.





OT Education/Plan


Problem List/Assessment


Assessment:  Decreased Activ Tolerance, Decreased UE Strength





Discharge Recommendations


Plan/Recommendations:  Continue POC





Treatment Plan/Plan of Care


Patient would benefit from OT for education, treatment and training to promote 

independence in ADL's, mobility, safety and/or upper extremity function for 

ADL's.


Plan of Care:  ADL Retraining, Functional Mobility, Group Exercise/Act as Ind, 

UE Funct Exercise/Act


Treatment Duration:  2021


Frequency:  At least 5 of 7 days/Wk (IRF)


Estimated Hrs Per Day:  1.5 hours per day


Agreement:  Yes


Rehab Potential:  Fair





Time/GCodes


Start Time:  07:30


Stop Time:  09:00


Total Time Billed (hr/min):  90


Billed Treatment Time


1 visit-EX 2 (30 min)  ADL 4 (60 min)











GUDELIA GONZALEZ                2021 08:13

## 2021-04-06 ENCOUNTER — HOSPITAL ENCOUNTER (OUTPATIENT)
Dept: HOSPITAL 75 - SDC | Age: 41
End: 2021-04-06
Attending: SURGERY
Payer: COMMERCIAL

## 2021-04-06 VITALS — DIASTOLIC BLOOD PRESSURE: 64 MMHG | SYSTOLIC BLOOD PRESSURE: 136 MMHG

## 2021-04-06 VITALS — DIASTOLIC BLOOD PRESSURE: 61 MMHG | SYSTOLIC BLOOD PRESSURE: 95 MMHG

## 2021-04-06 VITALS — SYSTOLIC BLOOD PRESSURE: 136 MMHG | DIASTOLIC BLOOD PRESSURE: 76 MMHG

## 2021-04-06 VITALS — SYSTOLIC BLOOD PRESSURE: 118 MMHG | DIASTOLIC BLOOD PRESSURE: 75 MMHG

## 2021-04-06 VITALS — SYSTOLIC BLOOD PRESSURE: 112 MMHG | DIASTOLIC BLOOD PRESSURE: 76 MMHG

## 2021-04-06 VITALS — SYSTOLIC BLOOD PRESSURE: 123 MMHG | DIASTOLIC BLOOD PRESSURE: 66 MMHG

## 2021-04-06 VITALS — SYSTOLIC BLOOD PRESSURE: 115 MMHG | DIASTOLIC BLOOD PRESSURE: 77 MMHG

## 2021-04-06 VITALS — SYSTOLIC BLOOD PRESSURE: 136 MMHG | DIASTOLIC BLOOD PRESSURE: 66 MMHG

## 2021-04-06 DIAGNOSIS — Z79.82: ICD-10-CM

## 2021-04-06 DIAGNOSIS — E11.621: ICD-10-CM

## 2021-04-06 DIAGNOSIS — Z79.4: ICD-10-CM

## 2021-04-06 DIAGNOSIS — Z82.3: ICD-10-CM

## 2021-04-06 DIAGNOSIS — E11.42: ICD-10-CM

## 2021-04-06 DIAGNOSIS — Z88.1: ICD-10-CM

## 2021-04-06 DIAGNOSIS — Z20.822: ICD-10-CM

## 2021-04-06 DIAGNOSIS — Z87.891: ICD-10-CM

## 2021-04-06 DIAGNOSIS — L08.9: ICD-10-CM

## 2021-04-06 DIAGNOSIS — Z79.891: ICD-10-CM

## 2021-04-06 DIAGNOSIS — E66.01: ICD-10-CM

## 2021-04-06 DIAGNOSIS — I10: ICD-10-CM

## 2021-04-06 DIAGNOSIS — R60.0: ICD-10-CM

## 2021-04-06 DIAGNOSIS — Z79.899: ICD-10-CM

## 2021-04-06 DIAGNOSIS — M19.90: ICD-10-CM

## 2021-04-06 DIAGNOSIS — L97.428: Primary | ICD-10-CM

## 2021-04-06 RX ADMIN — INSULIN ASPART SCH UNIT: 100 INJECTION, SOLUTION INTRAVENOUS; SUBCUTANEOUS at 06:06

## 2021-04-06 RX ADMIN — INSULIN ASPART SCH UNIT: 100 INJECTION, SOLUTION INTRAVENOUS; SUBCUTANEOUS at 17:08

## 2021-04-06 RX ADMIN — SODIUM CHLORIDE SCH MG: 900 INJECTION, SOLUTION INTRAVENOUS at 09:20

## 2021-04-06 RX ADMIN — PRASUGREL SCH MG: 10 TABLET, FILM COATED ORAL at 08:52

## 2021-04-06 RX ADMIN — POLYETHYLENE GLYCOL (3350) SCH GM: 17 POWDER, FOR SOLUTION ORAL at 08:51

## 2021-04-06 RX ADMIN — GLYBURIDE SCH MG: 2.5 TABLET ORAL at 17:08

## 2021-04-06 RX ADMIN — POLYETHYLENE GLYCOL (3350) SCH GM: 17 POWDER, FOR SOLUTION ORAL at 19:41

## 2021-04-06 RX ADMIN — LISINOPRIL SCH MG: 20 TABLET ORAL at 08:53

## 2021-04-06 RX ADMIN — DOCUSATE SODIUM SCH MG: 100 CAPSULE ORAL at 19:41

## 2021-04-06 RX ADMIN — SODIUM CHLORIDE SCH MLS/HR: 900 INJECTION, SOLUTION INTRAVENOUS at 03:38

## 2021-04-06 RX ADMIN — GLYBURIDE SCH MG: 2.5 TABLET ORAL at 06:42

## 2021-04-06 RX ADMIN — SODIUM CHLORIDE SCH MLS/HR: 900 INJECTION, SOLUTION INTRAVENOUS at 10:59

## 2021-04-06 RX ADMIN — INSULIN ASPART SCH UNIT: 100 INJECTION, SOLUTION INTRAVENOUS; SUBCUTANEOUS at 13:23

## 2021-04-06 RX ADMIN — METFORMIN HYDROCHLORIDE SCH MG: 500 TABLET, EXTENDED RELEASE ORAL at 08:52

## 2021-04-06 RX ADMIN — SODIUM CHLORIDE SCH MLS/HR: 900 INJECTION, SOLUTION INTRAVENOUS at 18:07

## 2021-04-06 RX ADMIN — INSULIN ASPART SCH UNIT: 100 INJECTION, SOLUTION INTRAVENOUS; SUBCUTANEOUS at 11:11

## 2021-04-06 RX ADMIN — DOCUSATE SODIUM SCH MG: 100 CAPSULE ORAL at 08:51

## 2021-04-06 RX ADMIN — DOCUSATE SODIUM AND SENNOSIDES SCH EA: 8.6; 5 TABLET, FILM COATED ORAL at 08:51

## 2021-04-06 RX ADMIN — INSULIN ASPART SCH UNIT: 100 INJECTION, SOLUTION INTRAVENOUS; SUBCUTANEOUS at 20:12

## 2021-04-06 RX ADMIN — METFORMIN HYDROCHLORIDE SCH MG: 500 TABLET, EXTENDED RELEASE ORAL at 17:08

## 2021-04-06 RX ADMIN — HYDROCHLOROTHIAZIDE SCH MG: 25 TABLET ORAL at 08:53

## 2021-04-06 RX ADMIN — ASPIRIN SCH MG: 81 TABLET ORAL at 08:52

## 2021-04-06 RX ADMIN — METOPROLOL SUCCINATE SCH MG: 100 TABLET, EXTENDED RELEASE ORAL at 08:39

## 2021-04-06 RX ADMIN — INSULIN ASPART SCH UNIT: 100 INJECTION, SOLUTION INTRAVENOUS; SUBCUTANEOUS at 10:30

## 2021-04-06 RX ADMIN — DOCUSATE SODIUM AND SENNOSIDES SCH EA: 8.6; 5 TABLET, FILM COATED ORAL at 19:41

## 2021-04-06 NOTE — PHYSICAL THERAPY DAILY NOTE
PT Daily Note-Current


Subjective


Patient supine in bed pre tx. Patient reports feeling nervous about the 

procedure today. Patient reports no pain, and agrees to therapy.





Appearance


Patient in shower post tx, finishing up with OT.





Mental Status


Patient Orientation:  Person, Place, Time, Normal For Age


wound vac





Transfers


SCALE: Activities may be completed with or without assistive devices.





6-Indepedent-patient completes the activity by him/herself with no assistance 

from a helper.


5-Set-up or Clean-up Assistance-helper sets up or cleans up; patient completes 

activity. [place] assists only prior to or  


    following the activity.


4-Supervision or Touching Assistance-helper provides verbal cues and/or 

touching/steadying and/or contact guard assistance as patient completes 

activity. Assistance may be provided   


    throughout the activity or intermittently.


3-Partial/Moderate Assistance-helper does LESS THAN HALF the effort. [place] 

lifts, holds or supports trunk or limbs, but provides less than half the effort.


2-Substantial/Maximal Assistance-helper does MORE THAN HALF the effort. [place] 

lifts or holds trunk or limbs and provides more than half the effort.


6-Xtsjnzxze-brkezj does ALL the effort. Patient does none of the effort to 

complete the activity. Or, the assistance of 2 or more helpers is required for 

the patient to complete the  


    activity.


If activity was not attempted, code reason:


7-Patient Refused.


9-Not Applicable-not attempted and the patient did not perform the activity 

before the current illness, exacerbation or injury.


10-Not Attempted due to Environmental Limitations-(lack of equipment, weather 

restraints, etc.).


88-Not Attempted due to Medical Conditions or Safety Concerns.


Sit to Stand (QC):  6


Chair/Bed-to-Chair Xfer(QC):  6


Patient cannot maintain NWB restrictions on L LE during transfers, despite 

cueing and direction from PT.





Weight Bearing


Right Lower Extremity:  Right


Full Weight Bearing


Left Lower Extremity:  Left


Non Weight Bearing





Patient NWB on L foot, no pressure allowed





Wheelchair Training


Wheel 50 ft with 2 turns (QC):  6


Wheel 150 ft (QC):  6


Type of Wheelchair:  Manual


Patient is slow with w/c navigation. Patient prefers to wheel backwards using R 

LE to push off, which is the way he maneuvers w/c at occupation.





Exercises


Supine Ex:  Quad Set (L only)


Supine Reps:  20 (2 sets)


Seated Therapy Exercises:  Ankle pumps, Long arc quads (R only), Hip flexion (R 

only), Glut set


Patient was seated in w/c with L LE propped on foot rest during seated exercises





Treatments


PT focused on LE strengthening, ROM, and wheel chair training during first half 

of treatment session. PT co-treated with OT in last part of treatment session to

assist with transfers due to patient NWB restrictions and decreased balance 

while OT completed antibacterial shower pre-op.





Assessment


Current Status:  Fair Progress


good w/c endurance, good exercise tolerance





PT Short Term Goals


Short Term Goals


Time Frame:  2021


Roll Left & Right:  6


Sit to lyin


Lying to sitting on side of be:  6


Sit to stand:  4 (SBA)


Chair/bed-to-chair transfer:  4 (SBA)


Toilet transfer:  4 (SBA)


Car transfer:  4 (SBA)


Walk 10 feet:  4 (SBA)


Does pt use a wc or scooter:  Yes


Wheel 50ft w/2 turns:  6


Wheel 150 feet:  6


Type:  Manual





PT Long Term Goals


Long Term Goals


PT Long Term Goals Time Frame:  2021


Roll Left & Right (QC):  6


Sit to Lying (QC):  6


Lying-Sitting on Side/Bed(QC):  6


Sit to Stand (QC):  6


Chair/Bed-to-Chair Xfer(QC):  6


Toilet Transfer (QC):  6


Car Transfer (QC):  6


Does the Patient Walk:  No and Walking Goal IS indicated


Walk 10 feet (QC):  6


Walk 50ft with 2 Turns (QC):  6


Walk 150 ft (QC):  88


Walking 10ft on Uneven Surface:  6


1 Step (curb) (QC):  4


4 Steps (QC):  88


12 Steps (QC):  88


Picking up an Object (QC):  88


Does the Pt use WC or Scooter?:  Yes


Wheel 50 feet with 2 turns (QC:  6


Type:  Manual


Wheel 150 feet:  6


Type:  Manual





PT Plan


Problem List


Problem List:  Activity Tolerance, Functional Strength, Safety, Balance, Gait, 

Transfer, Bed Mobility, ROM





Treatment/Plan


Treatment Plan:  Continue Plan of Care


Treatment Plan:  Bed Mobility, Education, Functional Activity Aung, Functional 

Strength, Group Therapy, Gait, Safety, Therapeutic Exercise, Transfers


Treatment Duration:  2021


Frequency:  At least 5 of 7 days/Wk (IRF)


Estimated Hrs Per Day:  1.5 hours per day


Patient and/or Family Agrees t:  Yes





Safety Risks/Education


Patient Education:  Transfer Techniques, Reviewed Precautions, Correct 

Positioning, W/C Management, Safety Issues


Teaching Recipient:  Patient


Teaching Methods:  Demonstration, Discussion


Response to Teaching:  Verbalize Understanding, Return Demonstration





Time/GCodes


Time In:  0800


Time Out:  0900


Total Billed Treatment Time:  60


Total Billed Treatment


1 visit: 


EX: 30' 


FA: 30'





co-treated with OT from [id number]











CAMILLE HARRY PT                 2021 08:53

## 2021-04-06 NOTE — PHYSICAL THERAPY DAILY NOTE
PT Daily Note-Current


Subjective


Patient supine pre tx, with L LE elevated. Patient consented to therapy and 

reported no pain.





Appearance


Patient laying supine post tx with L LE elevated above heart level. Call button 

within reach and tray table nearby, all needs met.





Mental Status


Patient Orientation:  Person, Place, Time, Normal For Age





Transfers


SCALE: Activities may be completed with or without assistive devices.





6-Indepedent-patient completes the activity by him/herself with no assistance 

from a helper.


5-Set-up or Clean-up Assistance-helper sets up or cleans up; patient completes 

activity. [place] assists only prior to or  


    following the activity.


4-Supervision or Touching Assistance-helper provides verbal cues and/or 

touching/steadying and/or contact guard assistance as patient completes 

activity. Assistance may be provided   


    throughout the activity or intermittently.


3-Partial/Moderate Assistance-helper does LESS THAN HALF the effort. [place] 

lifts, holds or supports trunk or limbs, but provides less than half the effort.


2-Substantial/Maximal Assistance-helper does MORE THAN HALF the effort. [place] 

lifts or holds trunk or limbs and provides more than half the effort.


9-Vqttwienh-tyvapr does ALL the effort. Patient does none of the effort to 

complete the activity. Or, the assistance of 2 or more helpers is required for 

the patient to complete the  


    activity.


If activity was not attempted, code reason:


7-Patient Refused.


9-Not Applicable-not attempted and the patient did not perform the activity bef

ore the current illness, exacerbation or injury.


10-Not Attempted due to Environmental Limitations-(lack of equipment, weather r

estraints, etc.).


88-Not Attempted due to Medical Conditions or Safety Concerns.





Weight Bearing


Right Lower Extremity:  Right


Full Weight Bearing


Left Lower Extremity:  Left


Non Weight Bearing





Patient NWB on L foot, no pressure allowed





Exercises


Supine Ex:  Ankle pumps, Quad Set (Bilateral), Glut sets, Heel Slides, Short Arc

Quads, Straight leg raise, Hip abd/add


Supine Reps:  20 (3 sets)


L LE only completed quad sets secondary to recent procedure and NWB/pressure 

restrictions





Treatments


LE strengthening, ROM





Assessment


Current Status:  Fair Progress


Patient was able to quickly complete ther ex and added another set into ther ex 

today.





PT Short Term Goals


Short Term Goals


Time Frame:  2021


Roll Left & Right:  6


Sit to lyin


Lying to sitting on side of be:  6


Sit to stand:  4 (SBA)


Chair/bed-to-chair transfer:  4 (SBA)


Toilet transfer:  4 (SBA)


Car transfer:  4 (SBA)


Walk 10 feet:  4 (SBA)


Does pt use a wc or scooter:  Yes


Wheel 50ft w/2 turns:  6


Wheel 150 feet:  6


Type:  Manual





PT Long Term Goals


Long Term Goals


PT Long Term Goals Time Frame:  2021


Roll Left & Right (QC):  6


Sit to Lying (QC):  6


Lying-Sitting on Side/Bed(QC):  6


Sit to Stand (QC):  6


Chair/Bed-to-Chair Xfer(QC):  6


Toilet Transfer (QC):  6


Car Transfer (QC):  6


Does the Patient Walk:  No and Walking Goal IS indicated


Walk 10 feet (QC):  6


Walk 50ft with 2 Turns (QC):  6


Walk 150 ft (QC):  88


Walking 10ft on Uneven Surface:  6


1 Step (curb) (QC):  4


4 Steps (QC):  88


12 Steps (QC):  88


Picking up an Object (QC):  88


Does the Pt use WC or Scooter?:  Yes


Wheel 50 feet with 2 turns (QC:  6


Type:  Manual


Wheel 150 feet:  6


Type:  Manual





PT Plan


Problem List


Problem List:  Activity Tolerance, Functional Strength, Safety, Balance, Gait, 

Transfer, Bed Mobility, ROM





Treatment/Plan


Treatment Plan:  Continue Plan of Care


Treatment Plan:  Bed Mobility, Education, Functional Activity Aung, Functional 

Strength, Group Therapy, Gait, Safety, Therapeutic Exercise, Transfers


Treatment Duration:  2021


Frequency:  At least 5 of 7 days/Wk (IRF)


Estimated Hrs Per Day:  1.5 hours per day


Patient and/or Family Agrees t:  Yes





Safety Risks/Education


Patient Education:  Reviewed Precautions, Correct Positioning, Safety Issues


Teaching Recipient:  Patient


Teaching Methods:  Demonstration, Discussion


Response to Teaching:  Verbalize Understanding, Return Demonstration





Time/GCodes


Time In:  1335


Time Out:  1405


Total Billed Treatment Time:  30


Total Billed Treatment


1 visit: 


EX x2: 30'











CAMILLE HARRY PT                 2021 14:37

## 2021-04-06 NOTE — ANESTHESIA-GENERAL POST-OP
MAC


Patient Condition


Mental Status/LOC:  Same as Preop


Cardiovascular:  Satisfactory


Nausea/Vomiting:  Absent


Respiratory:  Satisfactory


Pain:  Controlled


Complications:  Absent





Post Op Complications


Complications


None





Follow Up Care/Instructions


Patient Instructions


None needed.





Anesthesiology Discharge Order


Discharge Order


Patient is doing well, no complaints, stable vital signs, no apparent adverse 

anesthesia problems.   


No complications reported per nursing.











YANG CHANDLER CRNA            Apr 6, 2021 13:55

## 2021-04-06 NOTE — OCCUPATIONAL THER DAILY NOTE
OT Current Status-Daily Note


Subjective


Pt alert, working with PT.  Co-treat with PT ([id number]) for skilled instruction

and modifications due to pt's NWB status during functional transfers.  See 

definition of roles below.  Pt agrees to therapy.  No c/o pain.  Pt to have 

surgical wound debridement today at 1100.





Mental Status/Objective


Patient Orientation:  Person, Place, Time, Situation


Attachments:  Drains (wound vac), IV (midline)





ADL-Treatment


PT focusing on w/c mobility and transfers while OT focuses on functional transf

ers and ADLs.  Set up for clothing required due to decreased mobility from wound

vac/tubing.  See PT notes for transfers.  Pt able to complete shower 

independently.  Foot wound/wound vac and IV site covered for shower.  Surgical 

bath completed due to surgical procedure today.  Pt independent with 

donning/doffing upper/lower body clothing.  Doffs socks by self and uses sock 

aide to don socks.  Sitting at sink to complete oral care independently.


Therapy Code Descriptions/Definitions 





Functional [place] Measure:


0=Not Assessed/NA        4=Minimal Assistance


1=Total Assistance        5=Supervision or Setup


2=Maximal Assistance  6=Modified [place]


3=Moderate Assistance 7=Complete IndependenceSCALE: Activities may be completed 

with or without assistive devices.





6-Indepedent-patient completes the activity by him/herself with no assistance 

from a helper.


5-Set-up or Clean-up Assistance-helper sets up or cleans up; patient completes 

activity. [place] assists only prior to or  


    following the activity.


4-Supervision or Touching Assistance-helper provides verbal cues and/or 

touching/steadying and/or contact guard assistance as patient completes 

activity. Assistance may be provided   


    throughout the activity or intermittently.


3-Partial/Moderate Assistance-helper does LESS THAN HALF the effort. [place] 

lifts, holds or supports trunk or limbs, but provides less than half the effort.


2-Substantial/Maximal Assistance-helper does MORE THAN HALF the effort. [place] 

lifts or holds trunk or limbs and provides more than half the effort.


1-Qzaqaqmbc-lwzqvy does ALL the effort. Patient does none of the effort to 

complete the activity. Or, the assistance of 2 or more helpers is required for 

the patient to complete the  


    activity.


If activity was not attempted, code reason:


7-Patient Refused.


9-Not Applicable-not attempted and the patient did not perform the activity 

before the current illness, exacerbation or injury.


10-Not Attempted due to Environmental Limitations-(lack of equipment, weather 

restraints, etc.).


88-Not Attempted due to Medical Conditions or Safety Concerns.


Eating (QC):  6 (Per clinical judgement, pt able to complete independently.)


Oral Hygiene (QC):  6


Shower/Bathe Self (QC):  6


Upper Body Dressing (QC):  5


Lower Body Dressing (QC):  5


On/Off Footwear:  5





Other Treatment


Pt completed B UE strengthening while completing fine motor tasks.  2# wt 

attached to each wrist while reaching, manipulating cards and grasping.  Pt 

tolerated well.  After therapy, pt sitting in recliner with call light/phone in 

reach.  All needs met in room.





OT Short Term Goals


Short Term Goals


Time Frame:  2021


Shower/bathe self:  4


Lower body dressin


Putting on/taking off footwear:  3





OT Long Term Goals


Long Term Goals


Time Frame:  2021


Eating (QC):  6


Oral Hygiene (QC):  6


Toileting Hygiene (QC):  6


Shower/Bathe Self (QC):  6


Upper Body Dressing (QC):  6


Lower Body Dressing (QC):  6


On/Off Footwear (QC):  6


Additional Goals:  1-Demonstrate ADL Tasks, 2-Verbalize Understanding, 3-

ImproveStrength/Aung


1=Demonstrate adherence to instructed precautions during ADL tasks.


2=Patient will verbalize/demonstrate understanding of assistive 

devices/modifications for ADL.


3=Patient will improve strength/tolerance for activity to enable patient to 

perform ADL's.





OT Education/Plan


Problem List/Assessment


Assessment:  Decreased UE Strength, Impaired Self-Care Skills





Discharge Recommendations


Plan/Recommendations:  Continue POC





Treatment Plan/Plan of Care


Patient would benefit from OT for education, treatment and training to promote 

independence in ADL's, mobility, safety and/or upper extremity function for 

ADL's.


Plan of Care:  ADL Retraining, Functional Mobility, Group Exercise/Act as Ind, 

UE Funct Exercise/Act


Treatment Duration:  2021


Frequency:  At least 5 of 7 days/Wk (IRF)


Estimated Hrs Per Day:  1.5 hours per day


Agreement:  Yes


Rehab Potential:  Fair





Time/GCodes


Start Time:  08:30


Stop Time:  10:00


Total Time Billed (hr/min):  90


Billed Treatment Time


1 visit-ADL 4 (60 min)  EX 2 (30 min)  Co-treat with PT [id number], individual 

[id number]











GUDELIA GONZALEZ                2021 08:54

## 2021-04-06 NOTE — OPERATIVE REPORT
DATE OF SERVICE:  04/06/2021



ADMITTING PHYSICIAN:

Siobhan Barrera DO



PREOPERATIVE DIAGNOSES:

Necrotizing soft tissue infection left foot, encompassing skin, subcutaneous

tissue, fascia, muscle and bone area 20 x 16 cm in size.



POSTOPERATIVE DIAGNOSES:

Necrotizing soft tissue infection left foot, encompassing skin, subcutaneous

tissue, fascia, muscle and bone area 20 x 16 cm in size.



PROCEDURE:

Debridement of skin, subcutaneous tissue, fascia, muscle and bone area 20 x 16

cm in size.



ANESTHESIA:

Monitored anesthesia care with local.



ESTIMATED BLOOD LOSS:

Minimal.



FINDINGS:

Necrotic skin, subcutaneous tissue, fascia, muscle and calcaneus bone with a

bridge between the ankle and heel, which was opened with the dimensions of the

wound 20 x 16 cm in size.



DISPOSITION:

The patient tolerated the procedure well.



INDICATIONS:

The patient is a 41-year-old male with a long-standing history of infection. 

Complications including multiple boils as well as scrotal abscess and

necrotizing soft tissue infection of the right medial thigh requiring a wide

excision.  He has had a left heel ulcer for years and has been seeing wound

care for this; however, this has not been able to close.  He does have

insulin-dependent diabetes as well as other medical comorbidities and is

morbidly obese at greater than 400 pounds and recently nonambulatory.  He was

seen and found to have a necrotizing soft tissue infection of the left ankle and

heel and underwent wide debridement on 03/24/2021.  He is currently in acute

inpatient rehabilitation and has been undergoing wound care with wound VAC

changes twice a week.  Upon examination of the last wound VAC change, he does

have continued development of necrosis including the open portion of the wound

as well as a bridge between the open ankle wound in the heel wound.  We will

proceed with debridement of the necrotic skin, subcutaneous tissue, fascia,

muscle and bone.



DESCRIPTION OF PROCEDURE:

The patient was brought to the operating room, laid supine on the table.  After

adequate IV pain and sedative medications and monitored anesthesia care, the

wound VAC was removed and the left leg raise in the left foot was prepped and

draped in standard surgical fashion.



A 1% lidocaine with epinephrine was then used to anesthetize the overlying skin,

subcutaneous tissue, fascia.  We then proceeded with systematic debridement of

any devitalized tissue that was soft, loose or not well perfused.  This was done

by using cautery dissection as well as Lopez scissors.  The bridge between the 
heel

and open ankle wound was then opened using electrocautery.  The estimated wound

dimensions approximately 20 x 16 cm in size.  Good hemostasis was then achieved

using electrocautery.  A bone rasp was then used to debride the calcaneus bone,

which appeared soft and likely consistent with osteomyelitis.  We then achieved

good hemostasis with electrocautery of the calcaneus bone.



Once good hemostasis was observed, wound care nursing placed a wound VAC on heel

and left ankle.



The patient tolerated the procedure well.  We will continue with the current

therapy as well as a wound VAC and IV antibiotics; however, he likely does have

osteomyelitis of the calcaneus and infectious complications.  We will likely

continue and reoccur despite maximal medical therapy and ultimately, he will

likely require below-the-knee amputation, which has been explained to the

patient before; however, at this time, he does not want to proceed with this

procedure.





Job ID: [id number]

DocumentID: [id number]

Dictated Date:  04/06/2021 12:35:15

Transcription Date: 04/06/2021 15:43:34

Dictated By: CLAUDY ALEJO MD

MTDD

## 2021-04-07 VITALS — SYSTOLIC BLOOD PRESSURE: 114 MMHG | DIASTOLIC BLOOD PRESSURE: 64 MMHG

## 2021-04-07 VITALS — DIASTOLIC BLOOD PRESSURE: 77 MMHG | SYSTOLIC BLOOD PRESSURE: 129 MMHG

## 2021-04-07 VITALS — SYSTOLIC BLOOD PRESSURE: 142 MMHG | DIASTOLIC BLOOD PRESSURE: 72 MMHG

## 2021-04-07 LAB
ALBUMIN SERPL-MCNC: 3.3 GM/DL (ref 3.2–4.5)
ALP SERPL-CCNC: 68 U/L (ref 40–136)
ALT SERPL-CCNC: 15 U/L (ref 0–55)
BASOPHILS # BLD AUTO: 0.1 10^3/UL (ref 0–0.1)
BASOPHILS NFR BLD AUTO: 1 % (ref 0–10)
BILIRUB SERPL-MCNC: 0.3 MG/DL (ref 0.1–1)
BUN/CREAT SERPL: 17
CALCIUM SERPL-MCNC: 9.1 MG/DL (ref 8.5–10.1)
CHLORIDE SERPL-SCNC: 103 MMOL/L (ref 98–107)
CO2 SERPL-SCNC: 23 MMOL/L (ref 21–32)
CREAT SERPL-MCNC: 0.92 MG/DL (ref 0.6–1.3)
EOSINOPHIL # BLD AUTO: 0.1 10^3/UL (ref 0–0.3)
EOSINOPHIL NFR BLD AUTO: 2 % (ref 0–10)
GFR SERPLBLD BASED ON 1.73 SQ M-ARVRAT: > 60 ML/MIN
GLUCOSE SERPL-MCNC: 156 MG/DL (ref 70–105)
HCT VFR BLD CALC: 31 % (ref 40–54)
HGB BLD-MCNC: 9.2 G/DL (ref 13.3–17.7)
LYMPHOCYTES # BLD AUTO: 2.1 10^3/UL (ref 1–4)
LYMPHOCYTES NFR BLD AUTO: 28 % (ref 12–44)
MANUAL DIFFERENTIAL PERFORMED BLD QL: NO
MCH RBC QN AUTO: 26 PG (ref 25–34)
MCHC RBC AUTO-ENTMCNC: 30 G/DL (ref 32–36)
MCV RBC AUTO: 87 FL (ref 80–99)
MONOCYTES # BLD AUTO: 0.3 10^3/UL (ref 0–1)
MONOCYTES NFR BLD AUTO: 4 % (ref 0–12)
NEUTROPHILS # BLD AUTO: 4.8 10^3/UL (ref 1.8–7.8)
NEUTROPHILS NFR BLD AUTO: 65 % (ref 42–75)
PLATELET # BLD: 340 10^3/UL (ref 130–400)
PMV BLD AUTO: 8.9 FL (ref 9–12.2)
POTASSIUM SERPL-SCNC: 4.6 MMOL/L (ref 3.6–5)
PROT SERPL-MCNC: 7 GM/DL (ref 6.4–8.2)
SODIUM SERPL-SCNC: 138 MMOL/L (ref 135–145)
WBC # BLD AUTO: 7.4 10^3/UL (ref 4.3–11)

## 2021-04-07 RX ADMIN — DOCUSATE SODIUM SCH MG: 100 CAPSULE ORAL at 08:00

## 2021-04-07 RX ADMIN — SODIUM CHLORIDE SCH MLS/HR: 900 INJECTION, SOLUTION INTRAVENOUS at 02:41

## 2021-04-07 RX ADMIN — INSULIN ASPART SCH UNIT: 100 INJECTION, SOLUTION INTRAVENOUS; SUBCUTANEOUS at 05:54

## 2021-04-07 RX ADMIN — INSULIN ASPART SCH UNIT: 100 INJECTION, SOLUTION INTRAVENOUS; SUBCUTANEOUS at 16:08

## 2021-04-07 RX ADMIN — ASPIRIN SCH MG: 81 TABLET ORAL at 07:59

## 2021-04-07 RX ADMIN — DOCUSATE SODIUM AND SENNOSIDES SCH EA: 8.6; 5 TABLET, FILM COATED ORAL at 08:00

## 2021-04-07 RX ADMIN — SODIUM CHLORIDE SCH MLS/HR: 900 INJECTION, SOLUTION INTRAVENOUS at 18:11

## 2021-04-07 RX ADMIN — GLYBURIDE SCH MG: 2.5 TABLET ORAL at 06:30

## 2021-04-07 RX ADMIN — PRASUGREL SCH MG: 10 TABLET, FILM COATED ORAL at 07:59

## 2021-04-07 RX ADMIN — METFORMIN HYDROCHLORIDE SCH MG: 500 TABLET, EXTENDED RELEASE ORAL at 07:59

## 2021-04-07 RX ADMIN — DOCUSATE SODIUM SCH MG: 100 CAPSULE ORAL at 19:50

## 2021-04-07 RX ADMIN — METFORMIN HYDROCHLORIDE SCH MG: 500 TABLET, EXTENDED RELEASE ORAL at 17:46

## 2021-04-07 RX ADMIN — INSULIN ASPART SCH UNIT: 100 INJECTION, SOLUTION INTRAVENOUS; SUBCUTANEOUS at 17:46

## 2021-04-07 RX ADMIN — INSULIN ASPART SCH UNIT: 100 INJECTION, SOLUTION INTRAVENOUS; SUBCUTANEOUS at 12:09

## 2021-04-07 RX ADMIN — POLYETHYLENE GLYCOL (3350) SCH GM: 17 POWDER, FOR SOLUTION ORAL at 19:49

## 2021-04-07 RX ADMIN — LISINOPRIL SCH MG: 20 TABLET ORAL at 07:58

## 2021-04-07 RX ADMIN — INSULIN ASPART SCH UNIT: 100 INJECTION, SOLUTION INTRAVENOUS; SUBCUTANEOUS at 20:55

## 2021-04-07 RX ADMIN — GLYBURIDE SCH MG: 2.5 TABLET ORAL at 17:46

## 2021-04-07 RX ADMIN — DOCUSATE SODIUM AND SENNOSIDES SCH EA: 8.6; 5 TABLET, FILM COATED ORAL at 19:49

## 2021-04-07 RX ADMIN — INSULIN ASPART SCH UNIT: 100 INJECTION, SOLUTION INTRAVENOUS; SUBCUTANEOUS at 11:16

## 2021-04-07 RX ADMIN — INSULIN ASPART SCH UNIT: 100 INJECTION, SOLUTION INTRAVENOUS; SUBCUTANEOUS at 06:30

## 2021-04-07 RX ADMIN — METOPROLOL SUCCINATE SCH MG: 100 TABLET, EXTENDED RELEASE ORAL at 07:59

## 2021-04-07 RX ADMIN — POLYETHYLENE GLYCOL (3350) SCH GM: 17 POWDER, FOR SOLUTION ORAL at 08:00

## 2021-04-07 RX ADMIN — SODIUM CHLORIDE SCH MLS/HR: 900 INJECTION, SOLUTION INTRAVENOUS at 11:20

## 2021-04-07 RX ADMIN — HYDROCHLOROTHIAZIDE SCH MG: 25 TABLET ORAL at 07:58

## 2021-04-07 NOTE — PROGRESS NOTE
Subjective


Date Seen by a Provider:  Apr 7, 2021


Time Seen by a Provider:  14:20


Subjective/Events-last exam


doing ok. no complaints. no fever/chills.





Objective


Exam





Vital Signs








  Date Time  Temp Pulse Resp B/P (MAP) Pulse Ox O2 Delivery O2 Flow Rate FiO2


 


4/7/21 08:54      Room Air  


 


4/7/21 08:03  87  129/77 (94)    


 


4/7/21 05:53 36.2 72 16 114/64 (81) 96   


 


4/6/21 20:34      Room Air  


 


4/6/21 17:37      Room Air  


 


4/6/21 15:59 36.3 78 16 136/76 (96) 97   














I & O 


 


 4/7/21





 07:00


 


Intake Total 2390 ml


 


Balance 2390 ml





Capillary Refill :


General Appearance:  No Apparent Distress


HEENT:  PERRL/EOMI


Neck:  Full Range of Motion


Respiratory:  Chest Non Tender, Decreased Breath Sounds


Cardiovascular:  Regular Rate, Rhythm


Gastrointestinal:  normal bowel sounds, non tender, soft


Extremity:  Normal Capillary Refill


Neurologic/Psychiatric:  Alert, Oriented x3


Skin:  Normal Color


Lymphatic:  No Adenopathy





Results


Lab


Laboratory Tests


4/6/21 15:23: Glucometer 204H


4/6/21 20:01: Glucometer 155H


4/7/21 05:50: Glucometer 153H


4/7/21 06:30: 


White Blood Count 7.4, Red Blood Count 3.54L, Hemoglobin 9.2L, Hematocrit 31L, 

Mean Corpuscular Volume 87, Mean Corpuscular Hemoglobin 26, Mean Corpuscular 

Hemoglobin Concent 30L, Red Cell Distribution Width 18.5H, Platelet Count 340, 

Mean Platelet Volume 8.9L, Immature Granulocyte % (Auto) 0, Neutrophils (%) 

(Auto) 65, Lymphocytes (%) (Auto) 28, Monocytes (%) (Auto) 4, Eosinophils (%) 

(Auto) 2, Basophils (%) (Auto) 1, Neutrophils # (Auto) 4.8, Lymphocytes # (Auto)

2.1, Monocytes # (Auto) 0.3, Eosinophils # (Auto) 0.1, Basophils # (Auto) 0.1, 

Immature Granulocyte # (Auto) 0.0, Sodium Level 138, Potassium Level 4.6, 

Chloride Level 103, Carbon Dioxide Level 23, Anion Gap 12, Blood Urea Nitrogen 

16, Creatinine 0.92, Estimat Glomerular Filtration Rate > 60, BUN/Creatinine 

Ratio 17, Glucose Level 156H, Calcium Level 9.1, Corrected Calcium 9.7, Total 

Bilirubin 0.3, Aspartate Amino Transf (AST/SGOT) 12, Alanine Aminotransferase (A

LT/SGPT) 15, Alkaline Phosphatase 68, Total Protein 7.0, Albumin 3.3


4/7/21 11:12: Glucometer 161H





Assessment/Plan


Assessment/Plan


Assess & Plan/Chief Complaint


hx insulin dependent diabetes/morbid obesity with NSTI left ankle s/p 

debridement.


more areas of necrosis. explained to pt likely will require a BKA. the option 

was also given to temporize with further debridement and abx. patient states he 

would like to think on it. 


will continue current therapies for now.


patient would like to proceed with any modalities of foot salvage therapy even 

though he know success rates low. 


rim of necrotic debris including skin and subcutaneous tissue s/p debridement 

4/6.


clinically positive osteomyelitis left calcaneous. BKA appropriate however pt 

resistant. 


cont vac and abx for now.











CLAUDY ALEJO MD                 Apr 7, 2021 14:27

## 2021-04-07 NOTE — PHYSICAL THERAPY DAILY NOTE
PT Daily Note-Current


Subjective


Pt sitting in recliner visiting with WC Nurse upon arrival.  Pt agrees to PT.





Pain





   Numeric Pain Scale:  3


   Location:  Left


   Location Body Site:  Foot


   Pain Description:  Tingling





Mental Status


Patient Orientation:  Person, Place, Time, Situation


Attachments:  Other-See Comments (Wound Vac.)





Transfers


SCALE: Activities may be completed with or without assistive devices.





6-Indepedent-patient completes the activity by him/herself with no assistance 

from a helper.


5-Set-up or Clean-up Assistance-helper sets up or cleans up; patient completes 

activity. [place] assists only prior to or  


    following the activity.


4-Supervision or Touching Assistance-helper provides verbal cues and/or 

touching/steadying and/or contact guard assistance as patient completes 

activity. Assistance may be provided   


    throughout the activity or intermittently.


3-Partial/Moderate Assistance-helper does LESS THAN HALF the effort. [place] 

lifts, holds or supports trunk or limbs, but provides less than half the effort.


2-Substantial/Maximal Assistance-helper does MORE THAN HALF the effort. [place] 

lifts or holds trunk or limbs and provides more than half the effort.


2-Paimmcdmw-efjddp does ALL the effort. Patient does none of the effort to 

complete the activity. Or, the assistance of 2 or more helpers is required for 

the patient to complete the  


    activity.


If activity was not attempted, code reason:


7-Patient Refused.


9-Not Applicable-not attempted and the patient did not perform the activity 

before the current illness, exacerbation or injury.


10-Not Attempted due to Environmental Limitations-(lack of equipment, weather 

restraints, etc.).


88-Not Attempted due to Medical Conditions or Safety Concerns.





Weight Bearing


Right Lower Extremity:  Right


Full Weight Bearing


Left Lower Extremity:  Left


Non Weight Bearing





Patient NWB on L foot, no pressure allowed





Exercises


Supine Ex:  Ankle pumps, Quad Set, Glut sets, Heel Slides (R LE only), Straight 

leg raise, Hip abd/add


Supine Reps:  20





Treatments


Pt declines need for bathroom.  Pt able to completes Supine EX from recliner.  

PTA and pt also discuss pt progress vs. limitations jc. possible upcoming 

changes/updates need to be made to house to make it accessible (ramp, widening 

doorways, etc.)  Researched knee scooter as possible AE for transferring through

narrow doorways, no bariatric option at this time.  Pt voices concern at 

possibility of losing L LE if [place]ough is down to bone when checked on Friday 

().  Pt resting at end of tx with all needs met in recliner.  Call light in 

hand and IV running.





Assessment


Current Status:  Good Progress


Pt is knowledgeable of what he needs to do to assist with healing process of 

wound.  Pt capable of completing Supine EX, limits WB while toileting per pt but

cannot remain NWB.





PT Short Term Goals


Short Term Goals


Time Frame:  2021


Roll Left & Right:  6


Sit to lyin


Lying to sitting on side of be:  6


Sit to stand:  4 (SBA)


Chair/bed-to-chair transfer:  4 (SBA)


Toilet transfer:  4 (SBA)


Car transfer:  4 (SBA)


Walk 10 feet:  4 (SBA)


Does pt use a wc or scooter:  Yes


Wheel 50ft w/2 turns:  6


Wheel 150 feet:  6


Type:  Manual





PT Long Term Goals


Long Term Goals


PT Long Term Goals Time Frame:  2021


Roll Left & Right (QC):  6


Sit to Lying (QC):  6


Lying-Sitting on Side/Bed(QC):  6


Sit to Stand (QC):  6


Chair/Bed-to-Chair Xfer(QC):  6


Toilet Transfer (QC):  6


Car Transfer (QC):  6


Does the Patient Walk:  No and Walking Goal IS indicated


Walk 10 feet (QC):  6


Walk 50ft with 2 Turns (QC):  6


Walk 150 ft (QC):  88


Walking 10ft on Uneven Surface:  6


1 Step (curb) (QC):  4


4 Steps (QC):  88


12 Steps (QC):  88


Picking up an Object (QC):  88


Does the Pt use WC or Scooter?:  Yes


Wheel 50 feet with 2 turns (QC:  6


Type:  Manual


Wheel 150 feet:  6


Type:  Manual





PT Plan


Problem List


Problem List:  Safety, Gait





Treatment/Plan


Treatment Plan:  Continue Plan of Care


Treatment Plan:  Bed Mobility, Education, Functional Activity Aung, Functional 

Strength, Group Therapy, Gait, Safety, Therapeutic Exercise, Transfers


Treatment Duration:  2021


Frequency:  At least 5 of 7 days/Wk (IRF)


Estimated Hrs Per Day:  1.5 hours per day


Patient and/or Family Agrees t:  Yes





Safety Risks/Education


Patient Education:  Correct Positioning, Disease Process, Safety Issues


Teaching Recipient:  Patient


Teaching Methods:  Discussion


Response to Teaching:  Verbalize Understanding





Time/GCodes


Time In:  1000


Time Out:  1130


Total Billed Treatment Time:  90


Total Billed Treatment


1, FA x4 (60m) & EX x2 (30m)











KWAKU CASTELLON PTA               2021 12:10

## 2021-04-07 NOTE — OCCUPATIONAL THER DAILY NOTE
OT Current Status-Daily Note


Subjective


Pt sleeping in bed, woke easily.  Pt agrees to therapy.  No c/o pain.





Mental Status/Objective


Patient Orientation:  Person, Place, Time, Situation


Attachments:  Drains (wound vac), IV (midline)





ADL-Treatment


Pt declined shower today.  Requested to use toilet and complete grooming and 

oral care.  Assist only to manipulate wound vac.  Pt able to complete toileting 

and toilet transfer independently from w/c level.  Pt completed oral care 

sitting at sink.  Donned/doffed shirt by self.  Due to manipulating wound vac, 

pt is set up for dressing.


Therapy Code Descriptions/Definitions 





Functional Warden Measure:


0=Not Assessed/NA        4=Minimal Assistance


1=Total Assistance        5=Supervision or Setup


2=Maximal Assistance  6=Modified Warden


3=Moderate Assistance 7=Complete IndependenceSCALE: Activities may be completed 

with or without assistive devices.





6-Indepedent-patient completes the activity by him/herself with no assistance 

from a helper.


5-Set-up or Clean-up Assistance-helper sets up or cleans up; patient completes 

activity. [place] assists only prior to or  


    following the activity.


4-Supervision or Touching Assistance-helper provides verbal cues and/or 

touching/steadying and/or contact guard assistance as patient completes 

activity. Assistance may be provided   


    throughout the activity or intermittently.


3-Partial/Moderate Assistance-helper does LESS THAN HALF the effort. [place] 

lifts, holds or supports trunk or limbs, but provides less than half the effort.


2-Substantial/Maximal Assistance-helper does MORE THAN HALF the effort. [place] 

lifts or holds trunk or limbs and provides more than half the effort.


6-Yoeahliwp-jkgeat does ALL the effort. Patient does none of the effort to 

complete the activity. Or, the assistance of 2 or more helpers is required for 

the patient to complete the  


    activity.


If activity was not attempted, code reason:


7-Patient Refused.


9-Not Applicable-not attempted and the patient did not perform the activity 

before the current illness, exacerbation or injury.


10-Not Attempted due to Environmental Limitations-(lack of equipment, weather 

restraints, etc.).


88-Not Attempted due to Medical Conditions or Safety Concerns.


Eating (QC):  6


Oral Hygiene (QC):  6


Toileting Hygiene (QC):  6


Toilet Transfer (QC):  6





Other Treatment


Pt completes B UE strengthening to increase strength and activity tolerance for 

daily functional tasks.  Arm bike completed for 15 min starting at 75 garcia 

resistance then decreasing throughout time to 45 garcia resistance.  Using 6# wt 

with R UE and 3# wt with L UE (due to midline) pt completed bicep curls, shldr 

press, row and shldr abd 1 set 20 reps.  Heavy resistance to complete  

strength completed 30x's.  After session, pt sitting in recliner with call 

light/phone in reach.  All needs met in room.





OT Short Term Goals


Short Term Goals


Time Frame:  2021


Shower/bathe self:  4


Lower body dressin


Putting on/taking off footwear:  3





OT Long Term Goals


Long Term Goals


Time Frame:  2021


Eating (QC):  6


Oral Hygiene (QC):  6


Toileting Hygiene (QC):  6


Shower/Bathe Self (QC):  6


Upper Body Dressing (QC):  6


Lower Body Dressing (QC):  6


On/Off Footwear (QC):  6


Additional Goals:  1-Demonstrate ADL Tasks, 2-Verbalize Understanding, 3-

ImproveStrength/Aung


1=Demonstrate adherence to instructed precautions during ADL tasks.


2=Patient will verbalize/demonstrate understanding of assistive devices/mo

difications for ADL.


3=Patient will improve strength/tolerance for activity to enable patient to 

perform ADL's.





OT Education/Plan


Problem List/Assessment


Assessment:  Decreased UE Strength





Discharge Recommendations


Plan/Recommendations:  Continue POC





Treatment Plan/Plan of Care


Patient would benefit from OT for education, treatment and training to promote 

independence in ADL's, mobility, safety and/or upper extremity function for 

ADL's.


Plan of Care:  ADL Retraining, Functional Mobility, Group Exercise/Act as Ind, 

UE Funct Exercise/Act


Treatment Duration:  2021


Frequency:  At least 5 of 7 days/Wk (IRF)


Estimated Hrs Per Day:  1.5 hours per day


Agreement:  Yes


Rehab Potential:  Fair





Time/GCodes


Start Time:  08:30


Stop Time:  10:00


Total Time Billed (hr/min):  90


Billed Treatment Time


1 visit-ADL 3 (45 min)  EX 3 (45 min)











GUDELIA GONZALEZ                2021 08:47

## 2021-04-08 VITALS — DIASTOLIC BLOOD PRESSURE: 65 MMHG | SYSTOLIC BLOOD PRESSURE: 116 MMHG

## 2021-04-08 VITALS — DIASTOLIC BLOOD PRESSURE: 58 MMHG | SYSTOLIC BLOOD PRESSURE: 122 MMHG

## 2021-04-08 VITALS — DIASTOLIC BLOOD PRESSURE: 69 MMHG | SYSTOLIC BLOOD PRESSURE: 128 MMHG

## 2021-04-08 RX ADMIN — HYDROCHLOROTHIAZIDE SCH MG: 25 TABLET ORAL at 08:25

## 2021-04-08 RX ADMIN — DOCUSATE SODIUM AND SENNOSIDES SCH EA: 8.6; 5 TABLET, FILM COATED ORAL at 19:18

## 2021-04-08 RX ADMIN — DOCUSATE SODIUM SCH MG: 100 CAPSULE ORAL at 09:32

## 2021-04-08 RX ADMIN — INSULIN ASPART SCH UNIT: 100 INJECTION, SOLUTION INTRAVENOUS; SUBCUTANEOUS at 11:52

## 2021-04-08 RX ADMIN — GLYBURIDE SCH MG: 2.5 TABLET ORAL at 18:11

## 2021-04-08 RX ADMIN — INSULIN ASPART SCH UNIT: 100 INJECTION, SOLUTION INTRAVENOUS; SUBCUTANEOUS at 06:26

## 2021-04-08 RX ADMIN — METOPROLOL SUCCINATE SCH MG: 100 TABLET, EXTENDED RELEASE ORAL at 08:25

## 2021-04-08 RX ADMIN — INSULIN ASPART SCH UNIT: 100 INJECTION, SOLUTION INTRAVENOUS; SUBCUTANEOUS at 06:29

## 2021-04-08 RX ADMIN — INSULIN ASPART SCH UNIT: 100 INJECTION, SOLUTION INTRAVENOUS; SUBCUTANEOUS at 18:11

## 2021-04-08 RX ADMIN — SODIUM CHLORIDE SCH MLS/HR: 900 INJECTION, SOLUTION INTRAVENOUS at 11:00

## 2021-04-08 RX ADMIN — DOCUSATE SODIUM SCH MG: 100 CAPSULE ORAL at 19:18

## 2021-04-08 RX ADMIN — SODIUM CHLORIDE SCH MLS/HR: 900 INJECTION, SOLUTION INTRAVENOUS at 03:48

## 2021-04-08 RX ADMIN — METFORMIN HYDROCHLORIDE SCH MG: 500 TABLET, EXTENDED RELEASE ORAL at 18:11

## 2021-04-08 RX ADMIN — INSULIN ASPART SCH UNIT: 100 INJECTION, SOLUTION INTRAVENOUS; SUBCUTANEOUS at 20:16

## 2021-04-08 RX ADMIN — POLYETHYLENE GLYCOL (3350) SCH GM: 17 POWDER, FOR SOLUTION ORAL at 09:32

## 2021-04-08 RX ADMIN — LISINOPRIL SCH MG: 20 TABLET ORAL at 08:27

## 2021-04-08 RX ADMIN — POLYETHYLENE GLYCOL (3350) SCH GM: 17 POWDER, FOR SOLUTION ORAL at 19:18

## 2021-04-08 RX ADMIN — PRASUGREL SCH MG: 10 TABLET, FILM COATED ORAL at 08:25

## 2021-04-08 RX ADMIN — ASPIRIN SCH MG: 81 TABLET ORAL at 08:26

## 2021-04-08 RX ADMIN — DOCUSATE SODIUM AND SENNOSIDES SCH EA: 8.6; 5 TABLET, FILM COATED ORAL at 09:32

## 2021-04-08 RX ADMIN — METFORMIN HYDROCHLORIDE SCH MG: 500 TABLET, EXTENDED RELEASE ORAL at 08:27

## 2021-04-08 RX ADMIN — GLYBURIDE SCH MG: 2.5 TABLET ORAL at 06:26

## 2021-04-08 RX ADMIN — SODIUM CHLORIDE SCH MLS/HR: 900 INJECTION, SOLUTION INTRAVENOUS at 19:12

## 2021-04-08 NOTE — PHYSICAL THERAPY DAILY NOTE
PT Daily Note-Current


Subjective


Patient reports no pain pre tx. Patient noted mild anxiety about his situation 

and the decision the physician will make in morning regarding amputation. 

Patient consented to tx, and noted he wanted to stay seated in chair in room to 

maintain NWB restrictions as much as possible.





Appearance


Patient seated upright, in chair, with call button within reach and tray table 

nearby post tx.





Mental Status


Patient Orientation:  Person, Place, Time, Normal For Age


Attachments:  IV


Wound vac





Transfers


SCALE: Activities may be completed with or without assistive devices.





6-Indepedent-patient completes the activity by him/herself with no assistance 

from a helper.


5-Set-up or Clean-up Assistance-helper sets up or cleans up; patient completes 

activity. [place] assists only prior to or  


    following the activity.


4-Supervision or Touching Assistance-helper provides verbal cues and/or 

touching/steadying and/or contact guard assistance as patient completes 

activity. Assistance may be provided   


    throughout the activity or intermittently.


3-Partial/Moderate Assistance-helper does LESS THAN HALF the effort. [place] 

lifts, holds or supports trunk or limbs, but provides less than half the effort.


2-Substantial/Maximal Assistance-helper does MORE THAN HALF the effort. [place] 

lifts or holds trunk or limbs and provides more than half the effort.


1-Mvbqplegr-zbguox does ALL the effort. Patient does none of the effort to 

complete the activity. Or, the assistance of 2 or more helpers is required for 

the patient to complete the  


    activity.


If activity was not attempted, code reason:


7-Patient Refused.


9-Not Applicable-not attempted and the patient did not perform the activity 

before the current illness, exacerbation or injury.


10-Not Attempted due to Environmental Limitations-(lack of equipment, weather 

restraints, etc.).


88-Not Attempted due to Medical Conditions or Safety Concerns.





Weight Bearing


Right Lower Extremity:  Right


Full Weight Bearing


Left Lower Extremity:  Left


Non Weight Bearing





Patient NWB on L foot, no pressure allowed





Exercises


Supine Ex:  Ankle pumps, Quad Set, Glut sets, Heel Slides (R LE only), Short Arc

Quads (blue bolster), Straight leg raise


Supine Reps:  25 (3 sets each done in recliner with legs elevated)





Treatments


LE strengthening/endurance, ROM





Assessment


Current Status:  Fair Progress


Patient demonstrated increased endurance with ther ex, completing more reps and 

sets with fewer rest breaks required.





PT Short Term Goals


Short Term Goals


Time Frame:  2021


Roll Left & Right:  6


Sit to lyin


Lying to sitting on side of be:  6


Sit to stand:  4 (SBA)


Chair/bed-to-chair transfer:  4 (SBA)


Toilet transfer:  4 (SBA)


Car transfer:  4 (SBA)


Walk 10 feet:  4 (SBA)


Does pt use a wc or scooter:  Yes


Wheel 50ft w/2 turns:  6


Wheel 150 feet:  6


Type:  Manual





PT Long Term Goals


Long Term Goals


PT Long Term Goals Time Frame:  2021


Roll Left & Right (QC):  6


Sit to Lying (QC):  6


Lying-Sitting on Side/Bed(QC):  6


Sit to Stand (QC):  6


Chair/Bed-to-Chair Xfer(QC):  6


Toilet Transfer (QC):  6


Car Transfer (QC):  6


Does the Patient Walk:  No and Walking Goal IS indicated


Walk 10 feet (QC):  6


Walk 50ft with 2 Turns (QC):  6


Walk 150 ft (QC):  88


Walking 10ft on Uneven Surface:  6


1 Step (curb) (QC):  4


4 Steps (QC):  88


12 Steps (QC):  88


Picking up an Object (QC):  88


Does the Pt use WC or Scooter?:  Yes


Wheel 50 feet with 2 turns (QC:  6


Type:  Manual


Wheel 150 feet:  6


Type:  Manual





PT Plan


Problem List


Problem List:  Activity Tolerance, Functional Strength, Safety, Balance, Gait, 

Transfer, Bed Mobility, ROM





Treatment/Plan


Treatment Plan:  Continue Plan of Care


Treatment Plan:  Bed Mobility, Education, Functional Activity Aung, Functional 

Strength, Group Therapy, Gait, Safety, Therapeutic Exercise, Transfers


Treatment Duration:  2021


Frequency:  At least 5 of 7 days/Wk (IRF)


Estimated Hrs Per Day:  1.5 hours per day


Patient and/or Family Agrees t:  Yes





Safety Risks/Education


Patient Education:  Correct Positioning, W/C Management, Safety Issues


Teaching Recipient:  Patient


Teaching Methods:  Demonstration


Response to Teaching:  Verbalize Understanding, Return Demonstration


Continued to educate patient on importance of regularly completing ther ex as 

much as possible throughout the day, to maintain LE function, strength, and ROM 

while seated/supine.





Time/GCodes


Time In:  1330


Time Out:  1400


Total Billed Treatment Time:  30


Total Billed Treatment


1 visit: 


EX x2: 30'











CAMILLE HARRY PT                 2021 14:10

## 2021-04-08 NOTE — PHYSICAL THERAPY DAILY NOTE
PT Daily Note-Current


Subjective


Pt reports no pain pre tx, and was seated upright in chair. Patient consented to

therapy.





Appearance


Patient is seated upright in chair, with call button within reach, and tray 

table nearby post tx. Nurse in room upon PT departure.





Mental Status


Patient Orientation:  Person, Place, Time, Normal For Age


Wound vac





Transfers


SCALE: Activities may be completed with or without assistive devices.





6-Indepedent-patient completes the activity by him/herself with no assistance 

from a helper.


5-Set-up or Clean-up Assistance-helper sets up or cleans up; patient completes 

activity. [place] assists only prior to or  


    following the activity.


4-Supervision or Touching Assistance-helper provides verbal cues and/or 

touching/steadying and/or contact guard assistance as patient completes 

activity. Assistance may be provided   


    throughout the activity or intermittently.


3-Partial/Moderate Assistance-helper does LESS THAN HALF the effort. [place] 

lifts, holds or supports trunk or limbs, but provides less than half the effort.


2-Substantial/Maximal Assistance-helper does MORE THAN HALF the effort. [place] 

lifts or holds trunk or limbs and provides more than half the effort.


3-Xueiwbpgs-kvlaxn does ALL the effort. Patient does none of the effort to com

plete the activity. Or, the assistance of 2 or more helpers is required for the 

patient to complete the  


    activity.


If activity was not attempted, code reason:


7-Patient Refused.


9-Not Applicable-not attempted and the patient did not perform the activity 

before the current illness, exacerbation or injury.


10-Not Attempted due to Environmental Limitations-(lack of equipment, weather 

restraints, etc.).


88-Not Attempted due to Medical Conditions or Safety Concerns.


Sit to Stand (QC):  4


Chair/Bed-to-Chair Xfer(QC):  4


SBA. Patient unable to maintain NWB status on L LE despite PT cues.





Weight Bearing


Right Lower Extremity:  Right


Full Weight Bearing


Left Lower Extremity:  Left


Non Weight Bearing





Patient NWB on L foot, no pressure allowed





Wheelchair Training


Does the Pt Use a Wheelchair?:  Yes


Wheel 50 ft with 2 turns (QC):  6


Wheel 150 ft (QC):  6


Type of Wheelchair:  Manual


Patient navigated 350' x2 with w/c, proppeling his chair backwards so that he 

could minimize stress on his R knee.





Exercises


Supine Ex:  Ankle pumps, Quad Set, Glut sets, Heel Slides, Straight leg raise


Supine Reps:  20





Treatments


LE strengthening, wheel chair training, endurance





Assessment


Current Status:  Fair Progress


Patient noted he liked to be by windows, so increased w/c distance to be able to

go to large windows in other areas of building. Patient continues to demonstrate

good tolerance with ther ex.





PT Short Term Goals


Short Term Goals


Time Frame:  2021


Roll Left & Right:  6


Sit to lyin


Lying to sitting on side of be:  6


Sit to stand:  4 (SBA)


Chair/bed-to-chair transfer:  4 (SBA)


Toilet transfer:  4 (SBA)


Car transfer:  4 (SBA)


Walk 10 feet:  4 (SBA)


Does pt use a wc or scooter:  Yes


Wheel 50ft w/2 turns:  6


Wheel 150 feet:  6


Type:  Manual





PT Long Term Goals


Long Term Goals


PT Long Term Goals Time Frame:  2021


Roll Left & Right (QC):  6


Sit to Lying (QC):  6


Lying-Sitting on Side/Bed(QC):  6


Sit to Stand (QC):  6


Chair/Bed-to-Chair Xfer(QC):  6


Toilet Transfer (QC):  6


Car Transfer (QC):  6


Does the Patient Walk:  No and Walking Goal IS indicated


Walk 10 feet (QC):  6


Walk 50ft with 2 Turns (QC):  6


Walk 150 ft (QC):  88


Walking 10ft on Uneven Surface:  6


1 Step (curb) (QC):  4


4 Steps (QC):  88


12 Steps (QC):  88


Picking up an Object (QC):  88


Does the Pt use WC or Scooter?:  Yes


Wheel 50 feet with 2 turns (QC:  6


Type:  Manual


Wheel 150 feet:  6


Type:  Manual





PT Plan


Problem List


Problem List:  Activity Tolerance, Functional Strength, Safety, Balance, Gait, 

Transfer, Bed Mobility, ROM





Treatment/Plan


Treatment Plan:  Continue Plan of Care


Treatment Plan:  Bed Mobility, Education, Functional Activity Aung, Functional 

Strength, Group Therapy, Gait, Safety, Therapeutic Exercise, Transfers


Treatment Duration:  2021


Frequency:  At least 5 of 7 days/Wk (IRF)


Estimated Hrs Per Day:  1.5 hours per day


Patient and/or Family Agrees t:  Yes





Safety Risks/Education


Patient Education:  Transfer Techniques, Reviewed Precautions, Correct 

Positioning, W/C Management, Safety Issues


Teaching Recipient:  Patient


Teaching Methods:  Demonstration, Discussion


Response to Teaching:  Verbalize Understanding, Return Demonstration





Time/GCodes


Time In:  1000


Time Out:  1100


Total Billed Treatment Time:  60


Total Billed Treatment


1 visit: 


[facility] x2: 30' 


FA x1 15'


EX 15'











CMAILLE HARRY PT                 2021 10:56

## 2021-04-08 NOTE — OCCUPATIONAL THER DAILY NOTE
OT Current Status-Daily Note


Subjective


Pt alert, lying in bed.  Pt agrees to therapy.  No c/o pain.





Mental Status/Objective


Patient Orientation:  Person, Place, Time, Situation


Attachments:  Drains (wound vac), IV (midline)





ADL-Treatment


Pt agrees to shower.  Pt able to complete toileting transfer and toileting 

independent.  Pt able to manipulate wound vac for dressing and shower.  Assist 

only to cover IV and wound vac for shower.  Pt completed shower independently.  

Setup for dressing then pt able to complete lower and upper body dressing by 

self.  Pt able to doff socks by self then using sock aide to don socks.  Sitting

at sink, pt able to complete oral care independently.


Therapy Code Descriptions/Definitions 





Functional [place] Measure:


0=Not Assessed/NA        4=Minimal Assistance


1=Total Assistance        5=Supervision or Setup


2=Maximal Assistance  6=Modified [place]


3=Moderate Assistance 7=Complete IndependenceSCALE: Activities may be completed 

with or without assistive devices.





6-Indepedent-patient completes the activity by him/herself with no assistance 

from a helper.


5-Set-up or Clean-up Assistance-helper sets up or cleans up; patient completes 

activity. [place] assists only prior to or  


    following the activity.


4-Supervision or Touching Assistance-helper provides verbal cues and/or 

touching/steadying and/or contact guard assistance as patient completes 

activity. Assistance may be provided   


    throughout the activity or intermittently.


3-Partial/Moderate Assistance-helper does LESS THAN HALF the effort. [place] 

lifts, holds or supports trunk or limbs, but provides less than half the effort.


2-Substantial/Maximal Assistance-helper does MORE THAN HALF the effort. [place] 

lifts or holds trunk or limbs and provides more than half the effort.


3-Gdseltnwb-jikhgl does ALL the effort. Patient does none of the effort to 

complete the activity. Or, the assistance of 2 or more helpers is required for 

the patient to complete the  


    activity.


If activity was not attempted, code reason:


7-Patient Refused.


9-Not Applicable-not attempted and the patient did not perform the activity 

before the current illness, exacerbation or injury.


10-Not Attempted due to Environmental Limitations-(lack of equipment, weather 

restraints, etc.).


88-Not Attempted due to Medical Conditions or Safety Concerns.


Eating (QC):  6


Oral Hygiene (QC):  6


Shower/Bathe Self (QC):  6


Upper Body Dressing (QC):  5


Lower Body Dressing (QC):  5


On/Off Footwear:  5


Toileting Hygiene (QC):  6


Toilet Transfer (QC):  6





Other Treatment


Pt completed B UE dowel ashley exercises with 3# wt attached.  30 reps 1 set of 

chest to shldr press and elbow flexion.  Pt tolerated well.  At end of session, 

pt sitting in recliner with call light/phone in reach.  All needs met in room.





OT Short Term Goals


Short Term Goals


Time Frame:  2021


Shower/bathe self:  4


Lower body dressin


Putting on/taking off footwear:  3





OT Long Term Goals


Long Term Goals


Time Frame:  2021


Eating (QC):  6


Oral Hygiene (QC):  6


Toileting Hygiene (QC):  6


Shower/Bathe Self (QC):  6


Upper Body Dressing (QC):  6


Lower Body Dressing (QC):  6


On/Off Footwear (QC):  6


Additional Goals:  1-Demonstrate ADL Tasks, 2-Verbalize Understanding, 3-

ImproveStrength/Aung


1=Demonstrate adherence to instructed precautions during ADL tasks.


2=Patient will verbalize/demonstrate understanding of assistive 

devices/modifications for ADL.


3=Patient will improve strength/tolerance for activity to enable patient to 

perform ADL's.





OT Education/Plan


Problem List/Assessment


Assessment:  Decreased UE Strength





Discharge Recommendations


Plan/Recommendations:  Continue POC





Treatment Plan/Plan of Care


Patient would benefit from OT for education, treatment and training to promote 

independence in ADL's, mobility, safety and/or upper extremity function for 

ADL's.


Plan of Care:  ADL Retraining, Functional Mobility, Group Exercise/Act as Ind, 

UE Funct Exercise/Act


Treatment Duration:  2021


Frequency:  At least 5 of 7 days/Wk (IRF)


Estimated Hrs Per Day:  1.5 hours per day


Agreement:  Yes


Rehab Potential:  Fair





Time/GCodes


Start Time:  08:30


Stop Time:  10:00


Total Time Billed (hr/min):  90


Billed Treatment Time


1 visit-ADL 5 (75 min)  EX 1 (15 min)











GUDELIA GONZALEZ                2021 10:00

## 2021-04-08 NOTE — PROGRESS NOTE
Subjective


Date Seen by a Provider:  Apr 8, 2021


Time Seen by a Provider:  12:00


Subjective/Events-last exam


doing well. no complaints. tolerating therapy.





Objective


Exam





Vital Signs








  Date Time  Temp Pulse Resp B/P (MAP) Pulse Ox O2 Delivery O2 Flow Rate FiO2


 


4/8/21 08:51      Room Air  


 


4/8/21 08:24  85  128/69 (88)    


 


4/8/21 05:00 36.0 83 16 116/65 (82) 96 Room Air  


 


4/7/21 20:45      Room Air  


 


4/7/21 16:59 36.4 79 16 142/72 (95) 99   














I & O 


 


 4/8/21





 07:00


 


Intake Total 1770 ml


 


Output Total 600 ml


 


Balance 1170 ml





Capillary Refill :


General Appearance:  No Apparent Distress


HEENT:  PERRL/EOMI


Neck:  Full Range of Motion


Respiratory:  Chest Non Tender, Lungs Clear, Decreased Breath Sounds


Cardiovascular:  Regular Rate, Rhythm


Gastrointestinal:  normal bowel sounds, non tender, soft


Extremity:  Normal Capillary Refill


Neurologic/Psychiatric:  Alert, Oriented x3


Skin:  Normal Color


Lymphatic:  No Adenopathy





Results


Lab


Laboratory Tests


4/7/21 15:53: Glucometer 116H


4/7/21 20:51: Glucometer 193H


4/8/21 06:26: Glucometer 134H


4/8/21 10:50: Glucometer 182H





Assessment/Plan


Assessment/Plan


Assess & Plan/Chief Complaint


hx insulin dependent diabetes/morbid obesity with NSTI left ankle s/p 

debridement.


more areas of necrosis. explained to pt likely will require a BKA. the option 

was also given to temporize with further debridement and abx. patient states he 

would like to think on it. 


will continue current therapies for now.


patient would like to proceed with any modalities of foot salvage therapy even 

though he know success rates low. 


rim of necrotic debris including skin and subcutaneous tissue s/p debridement 

4/6.


clinically positive osteomyelitis left calcaneous. BKA appropriate however pt 

resistant. 


cont vac and abx for now.











CLAUDY ALEJO MD                 Apr 8, 2021 13:04

## 2021-04-09 VITALS — SYSTOLIC BLOOD PRESSURE: 121 MMHG | DIASTOLIC BLOOD PRESSURE: 63 MMHG

## 2021-04-09 VITALS — DIASTOLIC BLOOD PRESSURE: 63 MMHG | SYSTOLIC BLOOD PRESSURE: 133 MMHG

## 2021-04-09 LAB
ALBUMIN SERPL-MCNC: 3.3 GM/DL (ref 3.2–4.5)
ALP SERPL-CCNC: 73 U/L (ref 40–136)
ALT SERPL-CCNC: 15 U/L (ref 0–55)
BASOPHILS # BLD AUTO: 0.1 10^3/UL (ref 0–0.1)
BASOPHILS NFR BLD AUTO: 1 % (ref 0–10)
BILIRUB SERPL-MCNC: 0.3 MG/DL (ref 0.1–1)
BUN/CREAT SERPL: 17
CALCIUM SERPL-MCNC: 9.2 MG/DL (ref 8.5–10.1)
CHLORIDE SERPL-SCNC: 105 MMOL/L (ref 98–107)
CO2 SERPL-SCNC: 23 MMOL/L (ref 21–32)
CREAT SERPL-MCNC: 0.86 MG/DL (ref 0.6–1.3)
EOSINOPHIL # BLD AUTO: 0.3 10^3/UL (ref 0–0.3)
EOSINOPHIL NFR BLD AUTO: 4 % (ref 0–10)
GFR SERPLBLD BASED ON 1.73 SQ M-ARVRAT: > 60 ML/MIN
GLUCOSE SERPL-MCNC: 132 MG/DL (ref 70–105)
HCT VFR BLD CALC: 30 % (ref 40–54)
HGB BLD-MCNC: 9 G/DL (ref 13.3–17.7)
LYMPHOCYTES # BLD AUTO: 2.2 10^3/UL (ref 1–4)
LYMPHOCYTES NFR BLD AUTO: 32 % (ref 12–44)
MANUAL DIFFERENTIAL PERFORMED BLD QL: NO
MCH RBC QN AUTO: 26 PG (ref 25–34)
MCHC RBC AUTO-ENTMCNC: 30 G/DL (ref 32–36)
MCV RBC AUTO: 86 FL (ref 80–99)
MONOCYTES # BLD AUTO: 0.3 10^3/UL (ref 0–1)
MONOCYTES NFR BLD AUTO: 5 % (ref 0–12)
NEUTROPHILS # BLD AUTO: 3.9 10^3/UL (ref 1.8–7.8)
NEUTROPHILS NFR BLD AUTO: 58 % (ref 42–75)
PLATELET # BLD: 298 10^3/UL (ref 130–400)
PMV BLD AUTO: 8.9 FL (ref 9–12.2)
POTASSIUM SERPL-SCNC: 4.1 MMOL/L (ref 3.6–5)
PROT SERPL-MCNC: 6.9 GM/DL (ref 6.4–8.2)
SODIUM SERPL-SCNC: 139 MMOL/L (ref 135–145)
WBC # BLD AUTO: 6.7 10^3/UL (ref 4.3–11)

## 2021-04-09 RX ADMIN — INSULIN ASPART SCH UNIT: 100 INJECTION, SOLUTION INTRAVENOUS; SUBCUTANEOUS at 16:48

## 2021-04-09 RX ADMIN — SODIUM CHLORIDE SCH MLS/HR: 900 INJECTION, SOLUTION INTRAVENOUS at 10:53

## 2021-04-09 RX ADMIN — INSULIN ASPART SCH UNIT: 100 INJECTION, SOLUTION INTRAVENOUS; SUBCUTANEOUS at 21:29

## 2021-04-09 RX ADMIN — METFORMIN HYDROCHLORIDE SCH MG: 500 TABLET, EXTENDED RELEASE ORAL at 08:14

## 2021-04-09 RX ADMIN — GLYBURIDE SCH MG: 2.5 TABLET ORAL at 17:16

## 2021-04-09 RX ADMIN — INSULIN ASPART SCH UNIT: 100 INJECTION, SOLUTION INTRAVENOUS; SUBCUTANEOUS at 17:16

## 2021-04-09 RX ADMIN — POLYETHYLENE GLYCOL (3350) SCH GM: 17 POWDER, FOR SOLUTION ORAL at 09:45

## 2021-04-09 RX ADMIN — DOCUSATE SODIUM AND SENNOSIDES SCH EA: 8.6; 5 TABLET, FILM COATED ORAL at 21:29

## 2021-04-09 RX ADMIN — METFORMIN HYDROCHLORIDE SCH MG: 500 TABLET, EXTENDED RELEASE ORAL at 17:16

## 2021-04-09 RX ADMIN — DOCUSATE SODIUM SCH MG: 100 CAPSULE ORAL at 21:29

## 2021-04-09 RX ADMIN — INSULIN ASPART SCH UNIT: 100 INJECTION, SOLUTION INTRAVENOUS; SUBCUTANEOUS at 06:42

## 2021-04-09 RX ADMIN — SODIUM CHLORIDE SCH MLS/HR: 900 INJECTION, SOLUTION INTRAVENOUS at 03:11

## 2021-04-09 RX ADMIN — LISINOPRIL SCH MG: 20 TABLET ORAL at 08:14

## 2021-04-09 RX ADMIN — SODIUM CHLORIDE SCH MLS/HR: 900 INJECTION, SOLUTION INTRAVENOUS at 18:47

## 2021-04-09 RX ADMIN — INSULIN ASPART SCH UNIT: 100 INJECTION, SOLUTION INTRAVENOUS; SUBCUTANEOUS at 06:15

## 2021-04-09 RX ADMIN — ASPIRIN SCH MG: 81 TABLET ORAL at 08:14

## 2021-04-09 RX ADMIN — HYDROCHLOROTHIAZIDE SCH MG: 25 TABLET ORAL at 08:14

## 2021-04-09 RX ADMIN — DOCUSATE SODIUM SCH MG: 100 CAPSULE ORAL at 09:45

## 2021-04-09 RX ADMIN — PRASUGREL SCH MG: 10 TABLET, FILM COATED ORAL at 08:14

## 2021-04-09 RX ADMIN — POLYETHYLENE GLYCOL (3350) SCH GM: 17 POWDER, FOR SOLUTION ORAL at 21:29

## 2021-04-09 RX ADMIN — INSULIN ASPART SCH UNIT: 100 INJECTION, SOLUTION INTRAVENOUS; SUBCUTANEOUS at 11:57

## 2021-04-09 RX ADMIN — GLYBURIDE SCH MG: 2.5 TABLET ORAL at 06:42

## 2021-04-09 RX ADMIN — METOPROLOL SUCCINATE SCH MG: 100 TABLET, EXTENDED RELEASE ORAL at 08:14

## 2021-04-09 RX ADMIN — DOCUSATE SODIUM AND SENNOSIDES SCH EA: 8.6; 5 TABLET, FILM COATED ORAL at 09:45

## 2021-04-09 NOTE — PHYSICAL THERAPY DAILY NOTE
PT Daily Note-Current


Subjective


Patient reported no pain pre tx, was sitting upright in chair, and noted he 

would prefer to do strengthening exercises in room secondary to IV attachment. 

Patient consented to tx.





Appearance


Patient seated upright in chair, with tray table in front and call button within

reach, L LE elevated, and all needs met.





Mental Status


Patient Orientation:  Person, Place, Time, Normal For Age


Attachments:  Other-See Comments, IV


wound vac





Transfers


SCALE: Activities may be completed with or without assistive devices.





6-Indepedent-patient completes the activity by him/herself with no assistance fr

om a helper.


5-Set-up or Clean-up Assistance-helper sets up or cleans up; patient completes 

activity. [place] assists only prior to or  


    following the activity.


4-Supervision or Touching Assistance-helper provides verbal cues and/or 

touching/steadying and/or contact guard assistance as patient completes 

activity. Assistance may be provided   


    throughout the activity or intermittently.


3-Partial/Moderate Assistance-helper does LESS THAN HALF the effort. [place] 

lifts, holds or supports trunk or limbs, but provides less than half the effort.


2-Substantial/Maximal Assistance-helper does MORE THAN HALF the effort. [place] 

lifts or holds trunk or limbs and provides more than half the effort.


1-Cotxeddhx-illwep does ALL the effort. Patient does none of the effort to 

complete the activity. Or, the assistance of 2 or more helpers is required for 

the patient to complete the  


    activity.


If activity was not attempted, code reason:


7-Patient Refused.


9-Not Applicable-not attempted and the patient did not perform the activity 

before the current illness, exacerbation or injury.


10-Not Attempted due to Environmental Limitations-(lack of equipment, weather 

restraints, etc.).


88-Not Attempted due to Medical Conditions or Safety Concerns.





Weight Bearing


Right Lower Extremity:  Right


Full Weight Bearing


Left Lower Extremity:  Left


Non Weight Bearing





Patient NWB on L foot, no pressure allowed





Exercises


Supine Ex:  Ankle pumps, Quad Set, Heel Slides (R LE only), Short Arc Quads 

(blue bolster, R LE added 2# weight), Straight leg raise


Ther ex was performed in recliner with legs elevated





Treatments


LE strengthening, ROM, endurance





Assessment


Current Status:  Fair Progress


Patient demonstrated good pacing and adequate endurance with SAQ's with added 

weight.





PT Short Term Goals


Short Term Goals


Time Frame:  2021


Roll Left & Right:  6


Sit to lyin


Lying to sitting on side of be:  6


Sit to stand:  4 (SBA)


Chair/bed-to-chair transfer:  4 (SBA)


Toilet transfer:  4 (SBA)


Car transfer:  4 (SBA)


Walk 10 feet:  4 (SBA)


Does pt use a wc or scooter:  Yes


Wheel 50ft w/2 turns:  6


Wheel 150 feet:  6


Type:  Manual





PT Long Term Goals


Long Term Goals


PT Long Term Goals Time Frame:  2021


Roll Left & Right (QC):  6


Sit to Lying (QC):  6


Lying-Sitting on Side/Bed(QC):  6


Sit to Stand (QC):  6


Chair/Bed-to-Chair Xfer(QC):  6


Toilet Transfer (QC):  6


Car Transfer (QC):  6


Does the Patient Walk:  No and Walking Goal IS indicated


Walk 10 feet (QC):  6


Walk 50ft with 2 Turns (QC):  6


Walk 150 ft (QC):  88


Walking 10ft on Uneven Surface:  6


1 Step (curb) (QC):  4


4 Steps (QC):  88


12 Steps (QC):  88


Picking up an Object (QC):  88


Does the Pt use WC or Scooter?:  Yes


Wheel 50 feet with 2 turns (QC:  6


Type:  Manual


Wheel 150 feet:  6


Type:  Manual





PT Plan


Problem List


Problem List:  Activity Tolerance, Functional Strength, Safety, Balance, Gait, 

Transfer, Bed Mobility, ROM





Treatment/Plan


Treatment Plan:  Continue Plan of Care


Treatment Plan:  Bed Mobility, Education, Functional Activity Aung, Functional 

Strength, Group Therapy, Gait, Safety, Therapeutic Exercise, Transfers


Treatment Duration:  2021


Frequency:  At least 5 of 7 days/Wk (IRF)


Estimated Hrs Per Day:  1.5 hours per day


Patient and/or Family Agrees t:  Yes





Safety Risks/Education


Patient Education:  Correct Positioning, Safety Issues


Teaching Recipient:  Patient


Teaching Methods:  Demonstration, Discussion


Response to Teaching:  Verbalize Understanding, Return Demonstration





Time/GCodes


Time In:  1400


Time Out:  1430


Total Billed Treatment Time:  30


Total Billed Treatment


1 visit: 


EX x2: 30'











CAMILLE HARRY PT                 2021 14:44

## 2021-04-09 NOTE — OCCUPATIONAL THER DAILY NOTE
OT Current Status-Daily Note


Subjective


Pt alert, lying in bed.  Pt agrees to therapy.  No c/o pain.  Wound vac to be 

changed today.





Mental Status/Objective


Patient Orientation:  Person, Place, Time, Situation


Attachments:  Drains (wound vac), IV (midline)





ADL-Treatment


1st session [id number]:  Pt agrees to shower.  Pt able to complete shower 

independently using hand held shower, LH sponge, shower bench and grabbars.  

Using grabbars and w/c to transfer into/out of shower.  After setup, pt able to 

complete dressing by self.  Pt able to manipulate wound vac while dressing.  

Assist needed to manipulate wound vac with ambulation.  Pt independent with 

eating.  Independent with bed mobility.  After session, pt lying in bed with 

call light/phone in reach.  All needs met in room.


Therapy Code Descriptions/Definitions 





Functional [place] Measure:


0=Not Assessed/NA        4=Minimal Assistance


1=Total Assistance        5=Supervision or Setup


2=Maximal Assistance  6=Modified [place]


3=Moderate Assistance 7=Complete IndependenceSCALE: Activities may be completed 

with or without assistive devices.





6-Indepedent-patient completes the activity by him/herself with no assistance 

from a helper.


5-Set-up or Clean-up Assistance-helper sets up or cleans up; patient completes 

activity. [place] assists only prior to or  


    following the activity.


4-Supervision or Touching Assistance-helper provides verbal cues and/or 

touching/steadying and/or contact guard assistance as patient completes 

activity. Assistance may be provided   


    throughout the activity or intermittently.


3-Partial/Moderate Assistance-helper does LESS THAN HALF the effort. [place] 

lifts, holds or supports trunk or limbs, but provides less than half the effort.


2-Substantial/Maximal Assistance-helper does MORE THAN HALF the effort. [place] 

lifts or holds trunk or limbs and provides more than half the effort.


6-Inwviyifi-dswzkv does ALL the effort. Patient does none of the effort to 

complete the activity. Or, the assistance of 2 or more helpers is required for 

the patient to complete the  


    activity.


If activity was not attempted, code reason:


7-Patient Refused.


9-Not Applicable-not attempted and the patient did not perform the activity 

before the current illness, exacerbation or injury.


10-Not Attempted due to Environmental Limitations-(lack of equipment, weather 

restraints, etc.).


88-Not Attempted due to Medical Conditions or Safety Concerns.


Eating (QC):  6


Oral Hygiene (QC):  7


Shower/Bathe Self (QC):  6


Upper Body Dressing (QC):  5


Lower Body Dressing (QC):  5


On/Off Footwear:  5





Other Treatment


2nd treatment [id number]:  Due to pt just finishing with wound vac change, 

increased pain in L foot, no OOB activity for session.  Pt educated and reviewed

home safety.  Pt able to answer questions and give own strategies for home 

safety to acknowledge understanding of topic.  After session, pt lying in bed 

with call light/phone in reach.  All needs met in room.





Education


OT Patient Education:  Other (home safety)


Teaching Recipient:  Patient


Teaching Methods:  Demonstration, Discussion


Response to Teaching:  Verbalize Understanding, Return Demonstration





OT Short Term Goals


Short Term Goals


Time Frame:  2021


Shower/bathe self:  4


Lower body dressin


Putting on/taking off footwear:  3





OT Long Term Goals


Long Term Goals


Time Frame:  2021


Eating (QC):  6


Oral Hygiene (QC):  6


Toileting Hygiene (QC):  6


Shower/Bathe Self (QC):  6


Upper Body Dressing (QC):  6


Lower Body Dressing (QC):  6


On/Off Footwear (QC):  6


Additional Goals:  1-Demonstrate ADL Tasks, 2-Verbalize Understanding, 3-

ImproveStrength/Aung


1=Demonstrate adherence to instructed precautions during ADL tasks.


2=Patient will verbalize/demonstrate understanding of assistive 

devices/modifications for ADL.


3=Patient will improve strength/tolerance for activity to enable patient to 

perform ADL's.





OT Education/Plan


Discharge Recommendations


Plan/Recommendations:  Continue POC





Treatment Plan/Plan of Care


Patient would benefit from OT for education, treatment and training to promote 

independence in ADL's, mobility, safety and/or upper extremity function for 

ADL's.


Plan of Care:  ADL Retraining, Functional Mobility, Group Exercise/Act as Ind, 

UE Funct Exercise/Act


Treatment Duration:  2021


Frequency:  At least 5 of 7 days/Wk (IRF)


Estimated Hrs Per Day:  1.5 hours per day


Agreement:  Yes


Rehab Potential:  Fair





Time/GCodes


Start Time:  07:00 (930)


Stop Time:  08:00 ()


Total Time Billed (hr/min):  90


Billed Treatment Time


2 visits-ADL 4 (60 min)  FA 2 (30 min)











GUDELIA GONZALEZ                2021 07:46

## 2021-04-09 NOTE — PHYSICAL THERAPY DAILY NOTE
PT Daily Note-Current


Subjective


Patient reported no pain pre tx, was laying supine in bed.





Appearance


Patient seated upright in reclining chair, with L LE elevated, and nurse in room

post tx. Call button within reach.





Mental Status


Patient Orientation:  Person, Place, Time, Normal For Age


Wound vac





Transfers


SCALE: Activities may be completed with or without assistive devices.





6-Indepedent-patient completes the activity by him/herself with no assistance 

from a helper.


5-Set-up or Clean-up Assistance-helper sets up or cleans up; patient completes 

activity. [place] assists only prior to or  


    following the activity.


4-Supervision or Touching Assistance-helper provides verbal cues and/or 

touching/steadying and/or contact guard assistance as patient completes activ

ity. Assistance may be provided   


    throughout the activity or intermittently.


3-Partial/Moderate Assistance-helper does LESS THAN HALF the effort. [place] 

lifts, holds or supports trunk or limbs, but provides less than half the effort.


2-Substantial/Maximal Assistance-helper does MORE THAN HALF the effort. [place] 

lifts or holds trunk or limbs and provides more than half the effort.


8-Tqdaksrxq-sbxcft does ALL the effort. Patient does none of the effort to 

complete the activity. Or, the assistance of 2 or more helpers is required for 

the patient to complete the  


    activity.


If activity was not attempted, code reason:


7-Patient Refused.


9-Not Applicable-not attempted and the patient did not perform the activity 

before the current illness, exacerbation or injury.


10-Not Attempted due to Environmental Limitations-(lack of equipment, weather 

restraints, etc.).


88-Not Attempted due to Medical Conditions or Safety Concerns.


Sit to Stand (QC):  4


Chair/Bed-to-Chair Xfer(QC):  4


CGA





Weight Bearing


Right Lower Extremity:  Right


Full Weight Bearing


Left Lower Extremity:  Left


Non Weight Bearing





Patient NWB on L foot, no pressure allowed





Gait Training


Does the Patient Walk?:  No and Walking Goal NOT indicated





Wheelchair Training


Does the Pt Use a Wheelchair?:  Yes


Wheel 50 ft with 2 turns (QC):  6


Wheel 150 ft (QC):  6


Type of Wheelchair:  Manual





Exercises


NuStep Minutes:  15 (R LE and UE only)


 NuStep Workload:  4





Treatments


LE strengthening, wheel chair training, endurance





Assessment


Current Status:  Fair Progress


Patient was able to tolerate using R UE and R LE on NuStep only better today, 

with more comfort, and demonstrated adequate pacing.





PT Short Term Goals


Short Term Goals


Time Frame:  2021


Roll Left & Right:  6


Sit to lyin


Lying to sitting on side of be:  6


Sit to stand:  4 (SBA)


Chair/bed-to-chair transfer:  4 (SBA)


Toilet transfer:  4 (SBA)


Car transfer:  4 (SBA)


Walk 10 feet:  4 (SBA)


Does pt use a wc or scooter:  Yes


Wheel 50ft w/2 turns:  6


Wheel 150 feet:  6


Type:  Manual





PT Long Term Goals


Long Term Goals


PT Long Term Goals Time Frame:  2021


Roll Left & Right (QC):  6


Sit to Lying (QC):  6


Lying-Sitting on Side/Bed(QC):  6


Sit to Stand (QC):  6


Chair/Bed-to-Chair Xfer(QC):  6


Toilet Transfer (QC):  6


Car Transfer (QC):  6


Does the Patient Walk:  No and Walking Goal IS indicated


Walk 10 feet (QC):  6


Walk 50ft with 2 Turns (QC):  6


Walk 150 ft (QC):  88


Walking 10ft on Uneven Surface:  6


1 Step (curb) (QC):  4


4 Steps (QC):  88


12 Steps (QC):  88


Picking up an Object (QC):  88


Does the Pt use WC or Scooter?:  Yes


Wheel 50 feet with 2 turns (QC:  6


Type:  Manual


Wheel 150 feet:  6


Type:  Manual





PT Plan


Problem List


Problem List:  Activity Tolerance, Functional Strength, Safety, Balance, Gait, 

Transfer, Bed Mobility, ROM





Treatment/Plan


Treatment Plan:  Continue Plan of Care


Treatment Plan:  Bed Mobility, Education, Functional Activity Aung, Functional 

Strength, Group Therapy, Gait, Safety, Therapeutic Exercise, Transfers


Treatment Duration:  2021


Frequency:  At least 5 of 7 days/Wk (IRF)


Estimated Hrs Per Day:  1.5 hours per day


Patient and/or Family Agrees t:  Yes





Safety Risks/Education


Patient Education:  Transfer Techniques, Correct Positioning, W/C Management, 

Safety Issues


Teaching Recipient:  Patient


Teaching Methods:  Demonstration, Discussion


Response to Teaching:  Verbalize Understanding, Return Demonstration





Time/GCodes


Time In:  1000


Time Out:  1100


Total Billed Treatment Time:  60


Total Billed Treatment


1 visit: 


[facility] x2: 30'


EX: 15' 


FA: 15











CAMILLE HARRY PT                 2021 10:59

## 2021-04-10 VITALS — DIASTOLIC BLOOD PRESSURE: 70 MMHG | SYSTOLIC BLOOD PRESSURE: 147 MMHG

## 2021-04-10 VITALS — SYSTOLIC BLOOD PRESSURE: 139 MMHG | DIASTOLIC BLOOD PRESSURE: 77 MMHG

## 2021-04-10 RX ADMIN — ASPIRIN SCH MG: 81 TABLET ORAL at 09:28

## 2021-04-10 RX ADMIN — DOCUSATE SODIUM SCH MG: 100 CAPSULE ORAL at 09:32

## 2021-04-10 RX ADMIN — METFORMIN HYDROCHLORIDE SCH MG: 500 TABLET, EXTENDED RELEASE ORAL at 17:54

## 2021-04-10 RX ADMIN — PRASUGREL SCH MG: 10 TABLET, FILM COATED ORAL at 09:28

## 2021-04-10 RX ADMIN — GLYBURIDE SCH MG: 2.5 TABLET ORAL at 17:54

## 2021-04-10 RX ADMIN — INSULIN ASPART SCH UNIT: 100 INJECTION, SOLUTION INTRAVENOUS; SUBCUTANEOUS at 17:55

## 2021-04-10 RX ADMIN — METOPROLOL SUCCINATE SCH MG: 100 TABLET, EXTENDED RELEASE ORAL at 09:28

## 2021-04-10 RX ADMIN — INSULIN ASPART SCH UNIT: 100 INJECTION, SOLUTION INTRAVENOUS; SUBCUTANEOUS at 21:07

## 2021-04-10 RX ADMIN — LISINOPRIL SCH MG: 20 TABLET ORAL at 09:28

## 2021-04-10 RX ADMIN — POLYETHYLENE GLYCOL (3350) SCH GM: 17 POWDER, FOR SOLUTION ORAL at 09:32

## 2021-04-10 RX ADMIN — SODIUM CHLORIDE SCH MLS/HR: 900 INJECTION, SOLUTION INTRAVENOUS at 18:29

## 2021-04-10 RX ADMIN — INSULIN ASPART SCH UNIT: 100 INJECTION, SOLUTION INTRAVENOUS; SUBCUTANEOUS at 06:42

## 2021-04-10 RX ADMIN — INSULIN ASPART SCH UNIT: 100 INJECTION, SOLUTION INTRAVENOUS; SUBCUTANEOUS at 15:42

## 2021-04-10 RX ADMIN — HYDROCHLOROTHIAZIDE SCH MG: 25 TABLET ORAL at 09:27

## 2021-04-10 RX ADMIN — DOCUSATE SODIUM AND SENNOSIDES SCH EA: 8.6; 5 TABLET, FILM COATED ORAL at 09:32

## 2021-04-10 RX ADMIN — INSULIN ASPART SCH UNIT: 100 INJECTION, SOLUTION INTRAVENOUS; SUBCUTANEOUS at 06:43

## 2021-04-10 RX ADMIN — DOCUSATE SODIUM SCH MG: 100 CAPSULE ORAL at 21:05

## 2021-04-10 RX ADMIN — INSULIN ASPART SCH UNIT: 100 INJECTION, SOLUTION INTRAVENOUS; SUBCUTANEOUS at 12:12

## 2021-04-10 RX ADMIN — METFORMIN HYDROCHLORIDE SCH MG: 500 TABLET, EXTENDED RELEASE ORAL at 09:27

## 2021-04-10 RX ADMIN — POLYETHYLENE GLYCOL (3350) SCH GM: 17 POWDER, FOR SOLUTION ORAL at 21:07

## 2021-04-10 RX ADMIN — SODIUM CHLORIDE SCH MLS/HR: 900 INJECTION, SOLUTION INTRAVENOUS at 12:03

## 2021-04-10 RX ADMIN — SODIUM CHLORIDE SCH MLS/HR: 900 INJECTION, SOLUTION INTRAVENOUS at 02:45

## 2021-04-10 RX ADMIN — GLYBURIDE SCH MG: 2.5 TABLET ORAL at 06:43

## 2021-04-10 RX ADMIN — DOCUSATE SODIUM AND SENNOSIDES SCH EA: 8.6; 5 TABLET, FILM COATED ORAL at 21:07

## 2021-04-10 RX ADMIN — INSULIN ASPART SCH UNIT: 100 INJECTION, SOLUTION INTRAVENOUS; SUBCUTANEOUS at 11:11

## 2021-04-10 NOTE — WOUND CARE ASSESSMENT
Wound Care Assessment


Date Seen by Provider:  Apr 10, 2021


Time Seen by Provider:  11:00


Chief Complaint


L foot and ankle wound.


HPI


The patient is a 40 year old male with extensive L foot and ankle wound due to 

foot abscess with necrosis.  His blood glucose levels are better controlled.  

His wound was examined at last Wound VAC change.  The majority of the wound base

is viable.  Obtaining coverage of the defect remains a significant challenge.  A

BKA has been recommended to the patient.  He desires to pursue any option with a

significant probability of limb salvage.  Numerous calls have been made and we 

have presented two options to him to attempt limb salvage.  We await his 

decision as to which he prefers.


Past Medical History:  Admits Diabetes Type II


Obesity


Smoking Status:  Never a Smoker


Alcohol Use:  Denies Use


Review of Systems


Pulmonary:  No Dyspnea


Cardiovascular:  No: Chest Pain





Exam





Vital Signs








  Date Time  Temp Pulse Resp B/P (MAP) Pulse Ox O2 Delivery O2 Flow Rate FiO2


 


4/10/21 06:00 36.0 80 18 139/77 (97) 98 Room Air  





Capillary Refill :


Neurologic/Psychiatric:  other (Large L foot and ankle wound with exposed tendon

and bone.)





Results


Laboratory Tests


4/9/21 15:25: Glucometer 150H


4/9/21 20:09: Glucometer 172H


4/10/21 06:35: Glucometer 149H


4/10/21 11:04: Glucometer 172H





Assessment/Plan/Dx


1. [place] grade 3 L foot and ankle wound, extensive.





2. History of poor control of blood glucose levels, now under better control.





3. Morbid obesity.





4. Coronary artery disease.





Plan:  Continue Wound VAC dressings MWF.  Arrange transfer to a higher level of 

care.











INES VERNON MD             Apr 10, 2021 11:30

## 2021-04-10 NOTE — PHYSICAL THERAPY DAILY NOTE
PT Daily Note-Current


Subjective


Pt sitting up in bed upon arrival.  Pt agrees to PT.





Pain





   Location:  No Pain Reported





Mental Status


Patient Orientation:  Person, Place, Time, Situation


Attachments:  Other-See Comments (Wound Vac.)





Transfers


SCALE: Activities may be completed with or without assistive devices.





6-Indepedent-patient completes the activity by him/herself with no assistance 

from a helper.


5-Set-up or Clean-up Assistance-helper sets up or cleans up; patient completes 

activity. [place] assists only prior to or  


    following the activity.


4-Supervision or Touching Assistance-helper provides verbal cues and/or 

touching/steadying and/or contact guard assistance as patient completes 

activity. Assistance may be provided   


    throughout the activity or intermittently.


3-Partial/Moderate Assistance-helper does LESS THAN HALF the effort. [place] 

lifts, holds or supports trunk or limbs, but provides less than half the effort.


2-Substantial/Maximal Assistance-helper does MORE THAN HALF the effort. [place] 

lifts or holds trunk or limbs and provides more than half the effort.


8-Ndavsepav-wdzfvd does ALL the effort. Patient does none of the effort to 

complete the activity. Or, the assistance of 2 or more helpers is required for 

the patient to complete the  


    activity.


If activity was not attempted, code reason:


7-Patient Refused.


9-Not Applicable-not attempted and the patient did not perform the activity 

before the current illness, exacerbation or injury.


10-Not Attempted due to Environmental Limitations-(lack of equipment, weather 

restraints, etc.).


88-Not Attempted due to Medical Conditions or Safety Concerns.


Lying to Sitting/Side of Bed(Q:  6


Sit to Stand (QC):  5


Toilet Transfer (QC):  5





Weight Bearing


Right Lower Extremity:  Right


Full Weight Bearing


Left Lower Extremity:  Left


Non Weight Bearing





Patient NWB on L foot, no pressure allowed





Gait Training


Does the Patient Walk?:  Yes


Distance:  15'


Walk 10 feet (QC):  5


Gait Persons Needed:  1


Gait Assistive Device:  FWW


Pt doesn't maintain WB status.





Exercises


Supine Ex:  Ankle pumps, Quad Set, Glut sets, Hip abd/add


Supine Reps:  15





Treatments


Pt completes Supine Ex then reports needing to use BR.  Pt TF to standing then 

amb. to BR.  Pt completes pericare and returns to bed as Wound Vac. seal is 

leaking.  Nurse is notified and pt repositioned on pillow to elevate.  Nurse 

assessing Wound Vac. leak.  All needs met, call light in hand.





Assessment


Current Status:  Good Progress


Pt cannot maintain WB status and this has been discussed.





PT Short Term Goals


Short Term Goals


Time Frame:  2021


Roll Left & Right:  6


Sit to lyin


Lying to sitting on side of be:  6


Sit to stand:  4 (SBA)


Chair/bed-to-chair transfer:  4 (SBA)


Toilet transfer:  4 (SBA)


Car transfer:  4 (SBA)


Walk 10 feet:  4 (SBA)


Does pt use a wc or scooter:  Yes


Wheel 50ft w/2 turns:  6


Wheel 150 feet:  6


Type:  Manual





PT Long Term Goals


Long Term Goals


PT Long Term Goals Time Frame:  2021


Roll Left & Right (QC):  6


Sit to Lying (QC):  6


Lying-Sitting on Side/Bed(QC):  6


Sit to Stand (QC):  6


Chair/Bed-to-Chair Xfer(QC):  6


Toilet Transfer (QC):  6


Car Transfer (QC):  6


Does the Patient Walk:  No and Walking Goal IS indicated


Walk 10 feet (QC):  6


Walk 50ft with 2 Turns (QC):  6


Walk 150 ft (QC):  88


Walking 10ft on Uneven Surface:  6


1 Step (curb) (QC):  4


4 Steps (QC):  88


12 Steps (QC):  88


Picking up an Object (QC):  88


Does the Pt use WC or Scooter?:  Yes


Wheel 50 feet with 2 turns (QC:  6


Type:  Manual


Wheel 150 feet:  6


Type:  Manual





PT Plan


Problem List


Problem List:  Gait





Treatment/Plan


Treatment Plan:  Continue Plan of Care


Treatment Plan:  Bed Mobility, Education, Functional Activity Aung, Functional 

Strength, Group Therapy, Gait, Safety, Therapeutic Exercise, Transfers


Treatment Duration:  2021


Frequency:  At least 5 of 7 days/Wk (IRF)


Estimated Hrs Per Day:  1.5 hours per day


Patient and/or Family Agrees t:  Yes





Safety Risks/Education


Patient Education:  Gait Training, Disease Process


Teaching Recipient:  Patient


Teaching Methods:  Discussion


Response to Teaching:  Verbalize Understanding





Time/GCodes


Time In:  745


Time Out:  810


Total Billed Treatment Time:  25


Total Billed Treatment


1, EX (15m) & FA (10m)











KWAKU CASTELLON PTA              Apr 10, 2021 08:57

## 2021-04-11 VITALS — DIASTOLIC BLOOD PRESSURE: 67 MMHG | SYSTOLIC BLOOD PRESSURE: 144 MMHG

## 2021-04-11 VITALS — SYSTOLIC BLOOD PRESSURE: 131 MMHG | DIASTOLIC BLOOD PRESSURE: 71 MMHG

## 2021-04-11 LAB
BUN/CREAT SERPL: 16
CALCIUM SERPL-MCNC: 9.4 MG/DL (ref 8.5–10.1)
CHLORIDE SERPL-SCNC: 101 MMOL/L (ref 98–107)
CO2 SERPL-SCNC: 25 MMOL/L (ref 21–32)
CREAT SERPL-MCNC: 0.87 MG/DL (ref 0.6–1.3)
ERYTHROCYTE [SEDIMENTATION RATE] IN BLOOD: > 140 MM/HR (ref 0–15)
GFR SERPLBLD BASED ON 1.73 SQ M-ARVRAT: > 60 ML/MIN
GLUCOSE SERPL-MCNC: 160 MG/DL (ref 70–105)
HCT VFR BLD CALC: 31 % (ref 40–54)
HGB BLD-MCNC: 9.5 G/DL (ref 13.3–17.7)
MCH RBC QN AUTO: 26 PG (ref 25–34)
MCHC RBC AUTO-ENTMCNC: 30 G/DL (ref 32–36)
MCV RBC AUTO: 86 FL (ref 80–99)
PLATELET # BLD: 312 10^3/UL (ref 130–400)
PMV BLD AUTO: 9.3 FL (ref 9–12.2)
POTASSIUM SERPL-SCNC: 4.6 MMOL/L (ref 3.6–5)
SODIUM SERPL-SCNC: 137 MMOL/L (ref 135–145)
WBC # BLD AUTO: 6.5 10^3/UL (ref 4.3–11)

## 2021-04-11 RX ADMIN — INSULIN ASPART SCH UNIT: 100 INJECTION, SOLUTION INTRAVENOUS; SUBCUTANEOUS at 17:28

## 2021-04-11 RX ADMIN — SODIUM CHLORIDE SCH MLS/HR: 900 INJECTION, SOLUTION INTRAVENOUS at 12:49

## 2021-04-11 RX ADMIN — POLYETHYLENE GLYCOL (3350) SCH GM: 17 POWDER, FOR SOLUTION ORAL at 21:00

## 2021-04-11 RX ADMIN — GLYBURIDE SCH MG: 2.5 TABLET ORAL at 06:43

## 2021-04-11 RX ADMIN — INSULIN ASPART SCH UNIT: 100 INJECTION, SOLUTION INTRAVENOUS; SUBCUTANEOUS at 06:16

## 2021-04-11 RX ADMIN — DOCUSATE SODIUM AND SENNOSIDES SCH EA: 8.6; 5 TABLET, FILM COATED ORAL at 12:42

## 2021-04-11 RX ADMIN — DOCUSATE SODIUM AND SENNOSIDES SCH EA: 8.6; 5 TABLET, FILM COATED ORAL at 21:00

## 2021-04-11 RX ADMIN — DOCUSATE SODIUM SCH MG: 100 CAPSULE ORAL at 12:42

## 2021-04-11 RX ADMIN — SODIUM CHLORIDE SCH MLS/HR: 900 INJECTION, SOLUTION INTRAVENOUS at 18:39

## 2021-04-11 RX ADMIN — HYDROCHLOROTHIAZIDE SCH MG: 25 TABLET ORAL at 08:15

## 2021-04-11 RX ADMIN — POLYETHYLENE GLYCOL (3350) SCH GM: 17 POWDER, FOR SOLUTION ORAL at 12:42

## 2021-04-11 RX ADMIN — LISINOPRIL SCH MG: 20 TABLET ORAL at 08:16

## 2021-04-11 RX ADMIN — METOPROLOL SUCCINATE SCH MG: 100 TABLET, EXTENDED RELEASE ORAL at 08:17

## 2021-04-11 RX ADMIN — INSULIN ASPART SCH UNIT: 100 INJECTION, SOLUTION INTRAVENOUS; SUBCUTANEOUS at 21:00

## 2021-04-11 RX ADMIN — DOCUSATE SODIUM SCH MG: 100 CAPSULE ORAL at 21:00

## 2021-04-11 RX ADMIN — METFORMIN HYDROCHLORIDE SCH MG: 500 TABLET, EXTENDED RELEASE ORAL at 08:16

## 2021-04-11 RX ADMIN — INSULIN ASPART SCH UNIT: 100 INJECTION, SOLUTION INTRAVENOUS; SUBCUTANEOUS at 16:06

## 2021-04-11 RX ADMIN — GLYBURIDE SCH MG: 2.5 TABLET ORAL at 17:24

## 2021-04-11 RX ADMIN — METFORMIN HYDROCHLORIDE SCH MG: 500 TABLET, EXTENDED RELEASE ORAL at 17:24

## 2021-04-11 RX ADMIN — PRASUGREL SCH MG: 10 TABLET, FILM COATED ORAL at 08:17

## 2021-04-11 RX ADMIN — SODIUM CHLORIDE SCH MLS/HR: 900 INJECTION, SOLUTION INTRAVENOUS at 03:11

## 2021-04-11 RX ADMIN — ASPIRIN SCH MG: 81 TABLET ORAL at 08:16

## 2021-04-11 RX ADMIN — INSULIN ASPART SCH UNIT: 100 INJECTION, SOLUTION INTRAVENOUS; SUBCUTANEOUS at 12:43

## 2021-04-11 RX ADMIN — INSULIN ASPART SCH UNIT: 100 INJECTION, SOLUTION INTRAVENOUS; SUBCUTANEOUS at 06:44

## 2021-04-11 RX ADMIN — INSULIN ASPART SCH UNIT: 100 INJECTION, SOLUTION INTRAVENOUS; SUBCUTANEOUS at 12:41

## 2021-04-11 NOTE — DIAGNOSTIC IMAGING REPORT
PROCEDURE: CT left lower extremity with contrast.



TECHNIQUE: Multiple axial images of the left lower extremity were

obtained after intravenous administration of iodinated contrast.

Auto Exposure Controls were utilized during the CT exam to meet

ALARA standards for radiation dose reduction. 

 

INDICATION: Osteomyelitis of calcaneus.



There is large ulcer along the lateral aspect of the hindfoot.

This is associated with skin thickening as well as subcutaneous

density and induration. There is indistinct appearance of the

peroneal tendons at the level of the lateral malleolus. There is

cortical thinning and resorption along the lateral aspect of the

posterior process of the calcaneus indicating probable

osteomyelitis. There is irregular loss of trabecular architecture

in the posterior process of the calcaneus which may indicate

intramedullary extension of infection. There is no focal fluid

collection to indicate an abscess. There is a prominent os

trigonum.



IMPRESSION: Lateral hindfoot ulcer with evidence of cellulitis

and probable peroneal tendinitis as well as lateral cortical

resorption of the calcaneus consistent with osteomyelitis. An

abscess is not identified. 



Dictated by: 



  Dictated on workstation # [id number]

## 2021-04-12 VITALS — SYSTOLIC BLOOD PRESSURE: 113 MMHG | DIASTOLIC BLOOD PRESSURE: 65 MMHG

## 2021-04-12 VITALS — SYSTOLIC BLOOD PRESSURE: 149 MMHG | DIASTOLIC BLOOD PRESSURE: 72 MMHG

## 2021-04-12 LAB
ALBUMIN SERPL-MCNC: 3.4 GM/DL (ref 3.2–4.5)
ALP SERPL-CCNC: 74 U/L (ref 40–136)
ALT SERPL-CCNC: 13 U/L (ref 0–55)
BASOPHILS # BLD AUTO: 0 10^3/UL (ref 0–0.1)
BASOPHILS NFR BLD AUTO: 1 % (ref 0–10)
BILIRUB SERPL-MCNC: 0.3 MG/DL (ref 0.1–1)
BUN/CREAT SERPL: 17
CALCIUM SERPL-MCNC: 9.3 MG/DL (ref 8.5–10.1)
CHLORIDE SERPL-SCNC: 104 MMOL/L (ref 98–107)
CO2 SERPL-SCNC: 24 MMOL/L (ref 21–32)
CREAT SERPL-MCNC: 0.83 MG/DL (ref 0.6–1.3)
EOSINOPHIL # BLD AUTO: 0.3 10^3/UL (ref 0–0.3)
EOSINOPHIL NFR BLD AUTO: 5 % (ref 0–10)
GFR SERPLBLD BASED ON 1.73 SQ M-ARVRAT: > 60 ML/MIN
GLUCOSE SERPL-MCNC: 149 MG/DL (ref 70–105)
HCT VFR BLD CALC: 31 % (ref 40–54)
HGB BLD-MCNC: 9.4 G/DL (ref 13.3–17.7)
LYMPHOCYTES # BLD AUTO: 2.2 10^3/UL (ref 1–4)
LYMPHOCYTES NFR BLD AUTO: 30 % (ref 12–44)
MANUAL DIFFERENTIAL PERFORMED BLD QL: NO
MCH RBC QN AUTO: 26 PG (ref 25–34)
MCHC RBC AUTO-ENTMCNC: 30 G/DL (ref 32–36)
MCV RBC AUTO: 87 FL (ref 80–99)
MONOCYTES # BLD AUTO: 0.4 10^3/UL (ref 0–1)
MONOCYTES NFR BLD AUTO: 5 % (ref 0–12)
NEUTROPHILS # BLD AUTO: 4.3 10^3/UL (ref 1.8–7.8)
NEUTROPHILS NFR BLD AUTO: 60 % (ref 42–75)
PLATELET # BLD: 286 10^3/UL (ref 130–400)
PMV BLD AUTO: 9 FL (ref 9–12.2)
POTASSIUM SERPL-SCNC: 4.3 MMOL/L (ref 3.6–5)
PROT SERPL-MCNC: 7.2 GM/DL (ref 6.4–8.2)
SODIUM SERPL-SCNC: 138 MMOL/L (ref 135–145)
WBC # BLD AUTO: 7.2 10^3/UL (ref 4.3–11)

## 2021-04-12 RX ADMIN — ASPIRIN SCH MG: 81 TABLET ORAL at 08:12

## 2021-04-12 RX ADMIN — SODIUM CHLORIDE SCH MLS/HR: 900 INJECTION, SOLUTION INTRAVENOUS at 20:01

## 2021-04-12 RX ADMIN — SODIUM CHLORIDE SCH MLS/HR: 900 INJECTION, SOLUTION INTRAVENOUS at 10:29

## 2021-04-12 RX ADMIN — INSULIN ASPART SCH UNIT: 100 INJECTION, SOLUTION INTRAVENOUS; SUBCUTANEOUS at 17:15

## 2021-04-12 RX ADMIN — HYDROCHLOROTHIAZIDE SCH MG: 25 TABLET ORAL at 08:12

## 2021-04-12 RX ADMIN — GLYBURIDE SCH MG: 2.5 TABLET ORAL at 06:13

## 2021-04-12 RX ADMIN — PRASUGREL SCH MG: 10 TABLET, FILM COATED ORAL at 08:12

## 2021-04-12 RX ADMIN — DOCUSATE SODIUM AND SENNOSIDES SCH EA: 8.6; 5 TABLET, FILM COATED ORAL at 21:00

## 2021-04-12 RX ADMIN — POLYETHYLENE GLYCOL (3350) SCH GM: 17 POWDER, FOR SOLUTION ORAL at 08:20

## 2021-04-12 RX ADMIN — DOCUSATE SODIUM SCH MG: 100 CAPSULE ORAL at 08:20

## 2021-04-12 RX ADMIN — INSULIN ASPART SCH UNIT: 100 INJECTION, SOLUTION INTRAVENOUS; SUBCUTANEOUS at 06:05

## 2021-04-12 RX ADMIN — INSULIN ASPART SCH UNIT: 100 INJECTION, SOLUTION INTRAVENOUS; SUBCUTANEOUS at 12:20

## 2021-04-12 RX ADMIN — MICONAZOLE NITRATE SCH GM: 20 POWDER TOPICAL at 21:22

## 2021-04-12 RX ADMIN — INSULIN ASPART SCH UNIT: 100 INJECTION, SOLUTION INTRAVENOUS; SUBCUTANEOUS at 12:19

## 2021-04-12 RX ADMIN — DOCUSATE SODIUM AND SENNOSIDES SCH EA: 8.6; 5 TABLET, FILM COATED ORAL at 08:20

## 2021-04-12 RX ADMIN — LISINOPRIL SCH MG: 20 TABLET ORAL at 08:12

## 2021-04-12 RX ADMIN — METOPROLOL SUCCINATE SCH MG: 100 TABLET, EXTENDED RELEASE ORAL at 08:11

## 2021-04-12 RX ADMIN — INSULIN ASPART SCH UNIT: 100 INJECTION, SOLUTION INTRAVENOUS; SUBCUTANEOUS at 15:41

## 2021-04-12 RX ADMIN — INSULIN ASPART SCH UNIT: 100 INJECTION, SOLUTION INTRAVENOUS; SUBCUTANEOUS at 21:21

## 2021-04-12 RX ADMIN — INSULIN ASPART SCH UNIT: 100 INJECTION, SOLUTION INTRAVENOUS; SUBCUTANEOUS at 06:13

## 2021-04-12 RX ADMIN — SODIUM CHLORIDE SCH MLS/HR: 900 INJECTION, SOLUTION INTRAVENOUS at 03:18

## 2021-04-12 RX ADMIN — DOCUSATE SODIUM SCH MG: 100 CAPSULE ORAL at 21:00

## 2021-04-12 RX ADMIN — GLYBURIDE SCH MG: 2.5 TABLET ORAL at 17:14

## 2021-04-12 RX ADMIN — POLYETHYLENE GLYCOL (3350) SCH GM: 17 POWDER, FOR SOLUTION ORAL at 21:00

## 2021-04-12 NOTE — OCCUPATIONAL THER DAILY NOTE
OT Current Status-Daily Note


Subjective


Pt alert, lying in bed.  Pt agrees to therapy.  No c/o pain at this time.  Pt 

states that physician explained to him this weekend that he will need to have 

his L foot amputated.





Mental Status/Objective


Patient Orientation:  Person, Place, Time, Situation


Attachments:  Drains (wound vac), IV (midline)





ADL-Treatment


1st session ([id number])Pt agrees to shower.  Pt is independent with meal set up 

and using regular utensils to eat.  Pt is independent in shower sitting on 

shower bench using grabbars, hand held shower and LH sponge.  Sitting at sink, 

pt is able to complete oral care independently.  Pt uses AE to don/doff socks.  

After set up, pt able to don/doff upper body and lower body by self.


Therapy Code Descriptions/Definitions 





Functional [place] Measure:


0=Not Assessed/NA        4=Minimal Assistance


1=Total Assistance        5=Supervision or Setup


2=Maximal Assistance  6=Modified [place]


3=Moderate Assistance 7=Complete IndependenceSCALE: Activities may be completed 

with or without assistive devices.





6-Indepedent-patient completes the activity by him/herself with no assistance 

from a helper.


5-Set-up or Clean-up Assistance-helper sets up or cleans up; patient completes 

activity. [place] assists only prior to or  


    following the activity.


4-Supervision or Touching Assistance-helper provides verbal cues and/or 

touching/steadying and/or contact guard assistance as patient completes 

activity. Assistance may be provided   


    throughout the activity or intermittently.


3-Partial/Moderate Assistance-helper does LESS THAN HALF the effort. [place] 

lifts, holds or supports trunk or limbs, but provides less than half the effort.


2-Substantial/Maximal Assistance-helper does MORE THAN HALF the effort. [place] 

lifts or holds trunk or limbs and provides more than half the effort.


4-Dcadmrnbr-lwwlik does ALL the effort. Patient does none of the effort to 

complete the activity. Or, the assistance of 2 or more helpers is required for 

the patient to complete the  


    activity.


If activity was not attempted, code reason:


7-Patient Refused.


9-Not Applicable-not attempted and the patient did not perform the activity 

before the current illness, exacerbation or injury.


10-Not Attempted due to Environmental Limitations-(lack of equipment, weather 

restraints, etc.).


88-Not Attempted due to Medical Conditions or Safety Concerns.


Eating (QC):  6


Oral Hygiene (QC):  6


Shower/Bathe Self (QC):  6


Upper Body Dressing (QC):  5


Lower Body Dressing (QC):  5


On/Off Footwear:  5


Toileting Hygiene (QC):  6 (Per clinical judgement, pt able to complete hygiene 

and clothing manipulation independently.)


Toilet Transfer (QC):  6 (Per clinical judgement, pt able to complete transfer 

independently.)





Other Treatment


2nd session([id number]) Discussed with pt about transfers, w/c information on 

which w/c would work better for transfers.  Discussing setting up bathroom, pt 

to get new shower stall put in.  After session, pt lying in bed with call 

light/phone in reach.  All needs met in room.





OT Short Term Goals


Short Term Goals


Time Frame:  2021


Shower/bathe self:  4


Lower body dressin


Putting on/taking off footwear:  3





OT Long Term Goals


Long Term Goals


Time Frame:  2021


Eating (QC):  6


Oral Hygiene (QC):  6


Toileting Hygiene (QC):  6


Shower/Bathe Self (QC):  6


Upper Body Dressing (QC):  6


Lower Body Dressing (QC):  6


On/Off Footwear (QC):  6


Additional Goals:  1-Demonstrate ADL Tasks, 2-Verbalize Understanding, 

3-ImproveStrength/Aung


1=Demonstrate adherence to instructed precautions during ADL tasks.


2=Patient will verbalize/demonstrate understanding of assistive 

devices/modifications for ADL.


3=Patient will improve strength/tolerance for activity to enable patient to 

perform ADL's.





OT Education/Plan


Problem List/Assessment


Assessment:  Impaired Self-Care Skills





Discharge Recommendations


Plan/Recommendations:  Continue POC





Treatment Plan/Plan of Care


Patient would benefit from OT for education, treatment and training to promote 

independence in ADL's, mobility, safety and/or upper extremity function for 

ADL's.


Plan of Care:  ADL Retraining, Functional Mobility, Group Exercise/Act as Ind, 

UE Funct Exercise/Act


Treatment Duration:  2021


Frequency:  At least 5 of 7 days/Wk (IRF)


Estimated Hrs Per Day:  1.5 hours per day


Agreement:  Yes


Rehab Potential:  Fair





Time/GCodes


Start Time:  07:00 (177)


Stop Time:  08:15 (950)


Total Time Billed (hr/min):  90


Billed Treatment Time


2 visits-ADL 5 (75 min)  FA 1 (15 min)











GUDELIA GONZALEZ               2021 07:46

## 2021-04-12 NOTE — PHYSICAL THERAPY DAILY NOTE
PT Daily Note-Current


Subjective


Patient reports no pain at start of tx, and consented to therapy.





Appearance


Patient seated upright in chair, with call button nearby and tray table 

positioned within reach to eat lunch.





Mental Status


Patient Orientation:  Person, Place, Time, Normal For Age


Attachments:  IV


wound vac





Transfers


SCALE: Activities may be completed with or without assistive devices.





6-Indepedent-patient completes the activity by him/herself with no assistance 

from a helper.


5-Set-up or Clean-up Assistance-helper sets up or cleans up; patient completes 

activity. [place] assists only prior to or  


    following the activity.


4-Supervision or Touching Assistance-helper provides verbal cues and/or 

touching/steadying and/or contact guard assistance as patient completes 

activity. Assistance may be provided   


    throughout the activity or intermittently.


3-Partial/Moderate Assistance-helper does LESS THAN HALF the effort. [place] 

lifts, holds or supports trunk or limbs, but provides less than half the effort.


2-Substantial/Maximal Assistance-helper does MORE THAN HALF the effort. [place] 

lifts or holds trunk or limbs and provides more than half the effort.


3-S[place]-ghcqbu does ALL the effort. Patient does none of the effort to 

complete the activity. Or, the assistance of 2 or more helpers is required for 

the patient to complete the  


    activity.


If activity was not attempted, code reason:


7-Patient Refused.


9-Not Applicable-not attempted and the patient did not perform the activity befo

re the current illness, exacerbation or injury.


10-Not Attempted due to Environmental Limitations-(lack of equipment, weather re

straints, etc.).


88-Not Attempted due to Medical Conditions or Safety Concerns.


Sit to Stand (QC):  4


Chair/Bed-to-Chair Xfer(QC):  4


CGA. Patient practiced using only R LE and Bilateral UE's with a walker to perfo

rm sit <-> stand transfers, to mimic the absence of L LE post amputation. 

Patient also practiced chair to bed transfer using sliding board without the 

addition of L LE effort. With last sit <-> stand, patient pivoted to sit in w/c 

to mimic a commode transfer. Patient reported he felt like these transfers would

be manageable with return home. He can perform these transfers fairly easily 

from an elevated surface but has a much harder time standing from lower surfaces





Weight Bearing


Right Lower Extremity:  Right


Full Weight Bearing


Left Lower Extremity:  Left


Non Weight Bearing





Patient NWB on L foot, no pressure allowed





Exercises


Seated Therapy Exercises:  Ankle pumps, Long arc quads, Hip flexion, Glut set


Seated Reps:  20 (2 sets)





Treatments


LE strengthening, transfer training





Assessment


Current Status:  Fair Progress


Patient demonstrated fair steadiness and strength with maneuvering body on one 

leg with first attempts this session.





PT Short Term Goals


Short Term Goals


Time Frame:  2021


Roll Left & Right:  6


Sit to lyin


Lying to sitting on side of be:  6


Sit to stand:  4 (SBA)


Chair/bed-to-chair transfer:  4 (SBA)


Toilet transfer:  4 (SBA)


Car transfer:  4 (SBA)


Walk 10 feet:  4 (SBA)


Does pt use a wc or scooter:  Yes


Wheel 50ft w/2 turns:  6


Wheel 150 feet:  6


Type:  Manual





PT Long Term Goals


Long Term Goals


PT Long Term Goals Time Frame:  2021


Roll Left & Right (QC):  6


Sit to Lying (QC):  6


Lying-Sitting on Side/Bed(QC):  6


Sit to Stand (QC):  6


Chair/Bed-to-Chair Xfer(QC):  6


Toilet Transfer (QC):  6


Car Transfer (QC):  6


Does the Patient Walk:  No and Walking Goal IS indicated


Walk 10 feet (QC):  6


Walk 50ft with 2 Turns (QC):  6


Walk 150 ft (QC):  88


Walking 10ft on Uneven Surface:  6


1 Step (curb) (QC):  4


4 Steps (QC):  88


12 Steps (QC):  88


Picking up an Object (QC):  88


Does the Pt use WC or Scooter?:  Yes


Wheel 50 feet with 2 turns (QC:  6


Type:  Manual


Wheel 150 feet:  6


Type:  Manual





PT Plan


Problem List


Problem List:  Activity Tolerance, Functional Strength, Safety, Balance, Gait, 

Transfer, Bed Mobility, ROM





Treatment/Plan


Treatment Plan:  Continue Plan of Care


Treatment Plan:  Bed Mobility, Education, Functional Activity Aung, Functional 

Strength, Group Therapy, Gait, Safety, Therapeutic Exercise, Transfers


Treatment Duration:  2021


Frequency:  At least 5 of 7 days/Wk (IRF)


Estimated Hrs Per Day:  1.5 hours per day


Patient and/or Family Agrees t:  Yes





Safety Risks/Education


Patient Education:  Transfer Techniques, Reviewed Precautions, Correct 

Positioning, Safety Issues


Teaching Recipient:  Patient


Teaching Methods:  Demonstration, Discussion


Response to Teaching:  Verbalize Understanding, Return Demonstration





Time/GCodes


Time In:  1100


Time Out:  1200


Total Billed Treatment Time:  60


Total Billed Treatment


1 visit: 


FA x3: 45' 


EX: 15'











CAMILLE HARRY PT                2021 12:05

## 2021-04-12 NOTE — PROGRESS NOTE
Subjective


Date Seen by a Provider:  Apr 12, 2021


Time Seen by a Provider:  18:00


Subjective/Events-last exam


doing ok. patient confirmed osteomyelitis on CT. pt now willing to proceed with 

left BKA.





Objective


Exam





Vital Signs








  Date Time  Temp Pulse Resp B/P (MAP) Pulse Ox O2 Delivery O2 Flow Rate FiO2


 


4/12/21 16:00 36.0 78 16 149/72 (97) 100 Room Air  


 


4/12/21 08:42      Room Air  


 


4/12/21 05:51 35.8 86 18 113/65 (81) 99 Room Air  


 


4/11/21 21:00      Room Air  














I & O 


 


 4/12/21





 07:00


 


Intake Total 2020 ml


 


Balance 2020 ml





Capillary Refill :


General Appearance:  No Apparent Distress


HEENT:  PERRL/EOMI


Neck:  Full Range of Motion


Respiratory:  Chest Non Tender, Lungs Clear, Normal Breath Sounds


Cardiovascular:  Regular Rate, Rhythm


Gastrointestinal:  normal bowel sounds, non tender, soft


Extremity:  Normal Capillary Refill


Neurologic/Psychiatric:  Alert, Oriented x3


Skin:  Normal Color


Lymphatic:  No Adenopathy





Results


Lab


Laboratory Tests


4/11/21 19:56: Glucometer 175H


4/12/21 05:25: 


White Blood Count 7.2, Red Blood Count 3.57L, Hemoglobin 9.4L, Hematocrit 31L, 

Mean Corpuscular Volume 87, Mean Corpuscular Hemoglobin 26, Mean Corpuscular He

moglobin Concent 30L, Red Cell Distribution Width 18.3H, Platelet Count 286, 

Mean Platelet Volume 9.0, Immature Granulocyte % (Auto) 0, Neutrophils (%) 

(Auto) 60, Lymphocytes (%) (Auto) 30, Monocytes (%) (Auto) 5, Eosinophils (%) 

(Auto) 5, Basophils (%) (Auto) 1, Neutrophils # (Auto) 4.3, Lymphocytes # (Auto)

2.2, Monocytes # (Auto) 0.4, Eosinophils # (Auto) 0.3, Basophils # (Auto) 0.0, 

Immature Granulocyte # (Auto) 0.0, Sodium Level 138, Potassium Level 4.3, 

Chloride Level 104, Carbon Dioxide Level 24, Anion Gap 10, Blood Urea Nitrogen 

14, Creatinine 0.83, Estimat Glomerular Filtration Rate > 60, BUN/Creatinine 

Ratio 17, Glucose Level 149H, Calcium Level 9.3, Corrected Calcium 9.8, Total 

Bilirubin 0.3, Aspartate Amino Transf (AST/SGOT) 12, Alanine Aminotransferase 

(ALT/SGPT) 13, Alkaline Phosphatase 74, Total Protein 7.2, Albumin 3.4


4/12/21 10:58: Glucometer 215H


4/12/21 15:32: Glucometer 202H





Assessment/Plan


Assessment/Plan


Assess & Plan/Chief Complaint


hx insulin dependent diabetes/morbid obesity with NSTI left ankle s/p beatriz

ridement.


will continue current therapies for now.


confirmed osteomyelitis left calcaneous. pt now willing to proceed with left BKA

after talking with family and will schedule for wednesday(4/14) at 1200.











CLAUDY ALEJO MD                Apr 12, 2021 19:12

## 2021-04-12 NOTE — PHYSICAL THERAPY DAILY NOTE
PT Daily Note-Current


Subjective


Patient reports no pain at start of treatment, notes he has to use bathroom, and

agrees to therapy.





Appearance


Patient supine in bed post tx, with call button within reach, L LE elevated, and

tray table nearby.





Mental Status


Patient Orientation:  Person, Place, Time, Normal For Age


wound vac





Transfers


SCALE: Activities may be completed with or without assistive devices.





6-Indepedent-patient completes the activity by him/herself with no assistance 

from a helper.


5-Set-up or Clean-up Assistance-helper sets up or cleans up; patient completes 

activity. [place] assists only prior to or  


    following the activity.


4-Supervision or Touching Assistance-helper provides verbal cues and/or 

touching/steadying and/or contact guard assistance as patient completes 

activity. Assistance may be provided   


    throughout the activity or intermittently.


3-Partial/Moderate Assistance-helper does LESS THAN HALF the effort. [place] 

lifts, holds or supports trunk or limbs, but provides less than half the effort.


2-Substantial/Maximal Assistance-helper does MORE THAN HALF the effort. [place] 

lifts or holds trunk or limbs and provides more than half the effort.


6-Vvafckzgk-wgulyo does ALL the effort. Patient does none of the effort to 

complete the activity. Or, the assistance of 2 or more helpers is required for 

the patient to complete the  


    activity.


If activity was not attempted, code reason:


7-Patient Refused.


9-Not Applicable-not attempted and the patient did not perform the activity 

before the current illness, exacerbation or injury.


10-Not Attempted due to Environmental Limitations-(lack of equipment, weather 

restraints, etc.).


88-Not Attempted due to Medical Conditions or Safety Concerns.


Roll Left & Right (QC):  6


Sit to Lying (QC):  6


Sit to Stand (QC):  4 (SBA)


Chair/Bed-to-Chair Xfer(QC):  4 (SBA)


Toilet Transfer (QC):  4 (SBA)


Patient is steady with all transfers. Slight LOB with chair to bed transfer 

secondary to specifically practicing w/c to bed transfers with only use of R LE.

Patient was able to recover balance well.





Weight Bearing


Right Lower Extremity:  Right


Full Weight Bearing


Left Lower Extremity:  Left


Non Weight Bearing





Patient NWB on L foot, no pressure allowed





Wheelchair Training


Does the Pt Use a Wheelchair?:  Yes


Wheel 50 ft with 2 turns (QC):  6


Type of Wheelchair:  Manual


80' x2





Assessment


Current Status:  Fair Progress


Patient demonstrated more confidence with transfer training during this session.





PT Short Term Goals


Short Term Goals


Time Frame:  2021


Roll Left & Right:  6


Sit to lyin


Lying to sitting on side of be:  6


Sit to stand:  4 (SBA)


Chair/bed-to-chair transfer:  4 (SBA)


Toilet transfer:  4 (SBA)


Car transfer:  4 (SBA)


Walk 10 feet:  4 (SBA)


Does pt use a wc or scooter:  Yes


Wheel 50ft w/2 turns:  6


Wheel 150 feet:  6


Type:  Manual





PT Long Term Goals


Long Term Goals


PT Long Term Goals Time Frame:  2021


Roll Left & Right (QC):  6


Sit to Lying (QC):  6


Lying-Sitting on Side/Bed(QC):  6


Sit to Stand (QC):  6


Chair/Bed-to-Chair Xfer(QC):  6


Toilet Transfer (QC):  6


Car Transfer (QC):  6


Does the Patient Walk:  No and Walking Goal IS indicated


Walk 10 feet (QC):  6


Walk 50ft with 2 Turns (QC):  6


Walk 150 ft (QC):  88


Walking 10ft on Uneven Surface:  6


1 Step (curb) (QC):  4


4 Steps (QC):  88


12 Steps (QC):  88


Picking up an Object (QC):  88


Does the Pt use WC or Scooter?:  Yes


Wheel 50 feet with 2 turns (QC:  6


Type:  Manual


Wheel 150 feet:  6


Type:  Manual





PT Plan


Problem List


Problem List:  Activity Tolerance, Functional Strength, Safety, Balance, Gait, 

Transfer, Bed Mobility, ROM





Treatment/Plan


Treatment Plan:  Continue Plan of Care


Treatment Plan:  Bed Mobility, Education, Functional Activity Aung, Functional 

Strength, Group Therapy, Gait, Safety, Therapeutic Exercise, Transfers


Treatment Duration:  2021


Frequency:  At least 5 of 7 days/Wk (IRF)


Estimated Hrs Per Day:  1.5 hours per day


Patient and/or Family Agrees t:  Yes





Safety Risks/Education


Patient Education:  Transfer Techniques, Correct Positioning, W/C Management, 

Safety Issues


Teaching Recipient:  Patient


Teaching Methods:  Demonstration, Discussion


Response to Teaching:  Verbalize Understanding, Return Demonstration





Time/GCodes


Time In:  1400


Time Out:  1430


Total Billed Treatment Time:  30


Total Billed Treatment


1 visit: 


FA x2: 30'











CAMILLE HARRY PT                2021 15:15

## 2021-04-13 VITALS — DIASTOLIC BLOOD PRESSURE: 69 MMHG | SYSTOLIC BLOOD PRESSURE: 138 MMHG

## 2021-04-13 VITALS — DIASTOLIC BLOOD PRESSURE: 75 MMHG | SYSTOLIC BLOOD PRESSURE: 116 MMHG

## 2021-04-13 RX ADMIN — INSULIN ASPART SCH UNIT: 100 INJECTION, SOLUTION INTRAVENOUS; SUBCUTANEOUS at 21:15

## 2021-04-13 RX ADMIN — INSULIN ASPART SCH UNIT: 100 INJECTION, SOLUTION INTRAVENOUS; SUBCUTANEOUS at 06:30

## 2021-04-13 RX ADMIN — MICONAZOLE NITRATE SCH APPLIC: 20 POWDER TOPICAL at 08:06

## 2021-04-13 RX ADMIN — POLYETHYLENE GLYCOL (3350) SCH GM: 17 POWDER, FOR SOLUTION ORAL at 19:30

## 2021-04-13 RX ADMIN — METOPROLOL SUCCINATE SCH MG: 100 TABLET, EXTENDED RELEASE ORAL at 08:05

## 2021-04-13 RX ADMIN — DOCUSATE SODIUM SCH MG: 100 CAPSULE ORAL at 19:30

## 2021-04-13 RX ADMIN — SODIUM CHLORIDE SCH MLS/HR: 900 INJECTION, SOLUTION INTRAVENOUS at 03:06

## 2021-04-13 RX ADMIN — GLYBURIDE SCH MG: 2.5 TABLET ORAL at 06:50

## 2021-04-13 RX ADMIN — HYDROCHLOROTHIAZIDE SCH MG: 25 TABLET ORAL at 08:05

## 2021-04-13 RX ADMIN — INSULIN ASPART SCH UNIT: 100 INJECTION, SOLUTION INTRAVENOUS; SUBCUTANEOUS at 19:30

## 2021-04-13 RX ADMIN — SODIUM CHLORIDE SCH MLS/HR: 900 INJECTION, SOLUTION INTRAVENOUS at 18:29

## 2021-04-13 RX ADMIN — DOCUSATE SODIUM AND SENNOSIDES SCH EA: 8.6; 5 TABLET, FILM COATED ORAL at 19:30

## 2021-04-13 RX ADMIN — GLYBURIDE SCH MG: 2.5 TABLET ORAL at 19:30

## 2021-04-13 RX ADMIN — INSULIN ASPART SCH UNIT: 100 INJECTION, SOLUTION INTRAVENOUS; SUBCUTANEOUS at 15:27

## 2021-04-13 RX ADMIN — MICONAZOLE NITRATE SCH APPLIC: 20 POWDER TOPICAL at 20:13

## 2021-04-13 RX ADMIN — SODIUM CHLORIDE SCH MLS/HR: 900 INJECTION, SOLUTION INTRAVENOUS at 12:06

## 2021-04-13 RX ADMIN — POLYETHYLENE GLYCOL (3350) SCH GM: 17 POWDER, FOR SOLUTION ORAL at 08:04

## 2021-04-13 RX ADMIN — INSULIN ASPART SCH UNIT: 100 INJECTION, SOLUTION INTRAVENOUS; SUBCUTANEOUS at 12:02

## 2021-04-13 RX ADMIN — GLYBURIDE SCH MG: 2.5 TABLET ORAL at 15:49

## 2021-04-13 RX ADMIN — DOCUSATE SODIUM AND SENNOSIDES SCH EA: 8.6; 5 TABLET, FILM COATED ORAL at 08:04

## 2021-04-13 RX ADMIN — INSULIN ASPART SCH UNIT: 100 INJECTION, SOLUTION INTRAVENOUS; SUBCUTANEOUS at 16:36

## 2021-04-13 RX ADMIN — INSULIN ASPART SCH UNIT: 100 INJECTION, SOLUTION INTRAVENOUS; SUBCUTANEOUS at 12:24

## 2021-04-13 RX ADMIN — ASPIRIN SCH MG: 81 TABLET ORAL at 07:53

## 2021-04-13 RX ADMIN — INSULIN ASPART SCH UNIT: 100 INJECTION, SOLUTION INTRAVENOUS; SUBCUTANEOUS at 06:51

## 2021-04-13 RX ADMIN — PRASUGREL SCH MG: 10 TABLET, FILM COATED ORAL at 07:53

## 2021-04-13 RX ADMIN — DOCUSATE SODIUM SCH MG: 100 CAPSULE ORAL at 08:06

## 2021-04-13 RX ADMIN — LISINOPRIL SCH MG: 20 TABLET ORAL at 08:05

## 2021-04-13 NOTE — PHYSICAL THERAPY DAILY NOTE
PT Daily Note-Current


Subjective


Patient upright in bed pre tx. Patient denies any pain, notes scheduled BKA for 

L LE tomorrow. Notes slight anxiety about amputation, but says he knows it is 

the right decision. Patient consents to tx and states he would like to focus on 

transfers, UE and LE strengthening.





Appearance


Patient seated upright in recliner post tx, with call button within reach and L 

LE elevated. Left 6# weights an 3# ankle cuff in room so patient could perform 

exercise reps throughout day.





Mental Status


Patient Orientation:  Person, Place, Time, Normal For Age


wound vac





Transfers


SCALE: Activities may be completed with or without assistive devices.





6-Indepedent-patient completes the activity by him/herself with no assistance 

from a helper.


5-Set-up or Clean-up Assistance-helper sets up or cleans up; patient completes 

activity. [place] assists only prior to or  


    following the activity.


4-Supervision or Touching Assistance-helper provides verbal cues and/or 

touching/steadying and/or contact guard assistance as patient completes 

activity. Assistance may be provided   


    throughout the activity or intermittently.


3-Partial/Moderate Assistance-helper does LESS THAN HALF the effort. [place] 

lifts, holds or supports trunk or limbs, but provides less than half the effort.


2-Substantial/Maximal Assistance-helper does MORE THAN HALF the effort. [place] 

lifts or holds trunk or limbs and provides more than half the effort.


8-Gjzvpbrge-uoxuyb does ALL the effort. Patient does none of the effort to 

complete the activity. Or, the assistance of 2 or more helpers is required for 

the patient to complete the  


    activity.


If activity was not attempted, code reason:


7-Patient Refused.


9-Not Applicable-not attempted and the patient did not perform the activity 

before the current illness, exacerbation or injury.


10-Not Attempted due to Environmental Limitations-(lack of equipment, weather 

restraints, etc.).


88-Not Attempted due to Medical Conditions or Safety Concerns.


Lying to Sitting/Side of Bed(Q:  6


Sit to Stand (QC):  5


Chair/Bed-to-Chair Xfer(QC):  5


Patient attempted single leg sit to stand from w/c to mat in gym, patient is 

still a little wobbly with weight shifting with using walker as stability, but 

continues to be determined to practice.





Weight Bearing


Right Lower Extremity:  Right


Full Weight Bearing


Left Lower Extremity:  Left


Non Weight Bearing





Patient NWB on L foot, no pressure allowed





Wheelchair Training


Does the Pt Use a Wheelchair?:  Yes


Wheel 50 ft with 2 turns (QC):  6





Exercises


Seated Therapy Exercises:  Biceps, Ankle pumps, Shoulder Flex, Long arc quads, 

Shoulder Abd, Hip flexion, Glut set, Tricep


Seated Reps:  20 (3 sets)


Focused on full body strengthening to prepare for physical demand of amputation.

Instructed patient in UE exercises he could perform throughout the day in the 

recliner in addition to usual LE exercises





Treatments


LE strengthening, transfers, ROM, UE strengthening





Assessment


Current Status:  Fair Progress


Patient is motivated to make the most of the situation, is willing to exercise 

and strengthen to become as functional as possible for safe return home.





PT Short Term Goals


Short Term Goals


Time Frame:  2021


Roll Left & Right:  6


Sit to lyin


Lying to sitting on side of be:  6


Sit to stand:  4 (SBA)


Chair/bed-to-chair transfer:  4 (SBA)


Toilet transfer:  4 (SBA)


Car transfer:  4 (SBA)


Walk 10 feet:  4 (SBA)


Does pt use a wc or scooter:  Yes


Wheel 50ft w/2 turns:  6


Wheel 150 feet:  6


Type:  Manual





PT Long Term Goals


Long Term Goals


PT Long Term Goals Time Frame:  2021


Roll Left & Right (QC):  6


Sit to Lying (QC):  6


Lying-Sitting on Side/Bed(QC):  6


Sit to Stand (QC):  6


Chair/Bed-to-Chair Xfer(QC):  6


Toilet Transfer (QC):  6


Car Transfer (QC):  6


Does the Patient Walk:  No and Walking Goal IS indicated


Walk 10 feet (QC):  6


Walk 50ft with 2 Turns (QC):  6


Walk 150 ft (QC):  88


Walking 10ft on Uneven Surface:  6


1 Step (curb) (QC):  4


4 Steps (QC):  88


12 Steps (QC):  88


Picking up an Object (QC):  88


Does the Pt use WC or Scooter?:  Yes


Wheel 50 feet with 2 turns (QC:  6


Type:  Manual


Wheel 150 feet:  6


Type:  Manual





PT Plan


Problem List


Problem List:  Activity Tolerance, Functional Strength, Safety, Balance, Gait, 

Transfer, Bed Mobility, ROM





Treatment/Plan


Treatment Plan:  Continue Plan of Care


Treatment Plan:  Bed Mobility, Education, Functional Activity Aung, Functional 

Strength, Group Therapy, Gait, Safety, Therapeutic Exercise, Transfers


Treatment Duration:  2021


Frequency:  At least 5 of 7 days/Wk (IRF)


Estimated Hrs Per Day:  1.5 hours per day


Patient and/or Family Agrees t:  Yes





Safety Risks/Education


Patient Education:  Transfer Techniques, Correct Positioning, W/C Management, 

Safety Issues


Teaching Recipient:  Patient


Teaching Methods:  Demonstration, Discussion


Response to Teaching:  Verbalize Understanding, Return Demonstration





Time/GCodes


Time In:  0800


Time Out:  0900


Total Billed Treatment Time:  60


Total Billed Treatment


1 visit: 


FA x2: 30' 


EX x2: 30'











CAMILLE HARRY PT                2021 08:55

## 2021-04-13 NOTE — PROGRESS NOTE-PRE OPERATIVE
Pre-Operative Progress Note


H&P Reviewed


The H&P was reviewed, patient examined and no changes noted.


Date Seen by Provider:  Apr 13, 2021


Time Seen by Provider:  14:30


Date H&P Reviewed:  Apr 13, 2021


Time H&P Reviewed:  14:30


Pre-Operative Diagnosis:  left foot NSTI and osteomyelitis











CLAUDY ALEJO MD                Apr 13, 2021 14:07

## 2021-04-13 NOTE — PROGRESS NOTE
Subjective


Date Seen by a Provider:  Apr 13, 2021


Time Seen by a Provider:  13:45


Subjective/Events-last exam


doing well. no new issues. left BKA scheduled tomorrow.





Objective


Exam





Vital Signs








  Date Time  Temp Pulse Resp B/P (MAP) Pulse Ox O2 Delivery O2 Flow Rate FiO2


 


4/13/21 09:25      Room Air  


 


4/13/21 06:00 36.6 83 16 116/75 (89) 98 Room Air  


 


4/12/21 21:00      Room Air  


 


4/12/21 16:00 36.0 78 16 149/72 (97) 100 Room Air  














I & O 


 


 4/13/21





 06:59


 


Intake Total 1800 ml


 


Balance 1800 ml





Capillary Refill :


General Appearance:  No Apparent Distress


HEENT:  PERRL/EOMI


Neck:  Full Range of Motion


Respiratory:  Chest Non Tender, Lungs Clear, Normal Breath Sounds


Cardiovascular:  Regular Rate, Rhythm


Gastrointestinal:  normal bowel sounds, non tender, soft


Extremity:  Normal Capillary Refill


Neurologic/Psychiatric:  Alert, Oriented x3


Skin:  Normal Color


Lymphatic:  No Adenopathy





Results


Lab


Laboratory Tests


4/12/21 15:32: Glucometer 202H


4/12/21 20:14: Glucometer 207H


4/13/21 06:20: Glucometer 167H


4/13/21 11:00: Glucometer 180H





Assessment/Plan


Assessment/Plan


Assess & Plan/Chief Complaint


hx insulin dependent diabetes/morbid obesity with NSTI left ankle s/p 

debridement.


will continue current therapies for now.


confirmed osteomyelitis left calcaneous. pt now willing to proceed with left BKA

after talking with family and will schedule for wednesday(4/14) at 1200.











CLAUDY ALEJO MD                Apr 13, 2021 13:53

## 2021-04-13 NOTE — PM&R PROGRESS NOTE
Subjective


HPI/CC On Admission


Date Seen by Provider:  Apr 1, 2021


Time Seen by Provider:  10:30


Subjective/Events-last exam


4/1/21:


Pt doing a little better today


Wound vac will be changed tomorrow and Dr. Jiménez will evaluate where additionally

he needs to deride


Talking about getting the lap band


Having some loose stool, Imodium was given


Zosyn maintained


Will restart Levemir of 5 units twice a day to avoid hypoglycemia








3/31/21:


Pt doing okay today 


Dr. Jiménez presented another debridement option versus a left below the knee 

amputation


The Pt has decided to go the debridement route


Bowels moved yesterday 


Sugars are doing okay 


Overall doing much better clinically 








3/30/21:


Pt doing pretty well


No significant complaints


I did evaluate the left foot and he has significant amount of loss of tissue 

since surgery, unsure it he can have any stabilization of that. He may very well

need a below the knee amputation which was originally discussed in the past


Anxious because his ankle is unstable


Hemoccult is negative


Took a shower today


Changed the wound vac


Hgb 8.7


Iron infusions continue considering his level is eight





Review of Systems


General:  Fatigue, Malaise


Musculoskeletal:  foot pain





Objective


Exam


Vital Signs





Vital Signs








  Date Time  Temp Pulse Resp B/P (MAP) Pulse Ox O2 Delivery O2 Flow Rate FiO2


 


4/2/21 05:00 35.7 96 17 136/84 (101) 96   


 


4/1/21 20:00      Room Air  





Capillary Refill :


General Appearance:  No Apparent Distress, WD/WN, Chronically ill, Obese


HEENT:  PERRL/EOMI, Normal ENT Inspection, Pharynx Normal


Neck:  Full Range of Motion, Normal Inspection, Non Tender, Supple, Carotid 

Bruit


Respiratory:  Chest Non Tender, Lungs Clear, Normal Breath Sounds, No Accessory 

Muscle Use, No Respiratory Distress


Cardiovascular:  Regular Rate, Rhythm, No Edema, No Gallop, No JVD, No Murmur, 

Normal Peripheral Pulses


Gastrointestinal:  Normal Bowel Sounds, No Organomegaly, No Pulsatile Mass, Non 

Tender, Soft


Back:  Normal Inspection, No CVA Tenderness, No Vertebral Tenderness


Extremity:  Normal Capillary Refill, Normal Inspection, Normal Range of Motion, 

Non Tender, No Calf Tenderness, No Pedal Edema, Other (left foot with wound vac)


Neurologic/Psychiatric:  Alert, Oriented x3, No Motor/Sensory Deficits, Normal 

Mood/Affect, CNs II-XII Norm as Tested, Abnormal Gait, Motor Weakness 

(generalized lower extremities)


Skin:  Normal Color, Warm/Dry


Lymphatic:  No Adenopathy





Results/Procedures


Lab


Patient resulted labs reviewed.





FIM


Transfers


Therapy Code Descriptions/Definitions 





Functional [place] Measure:


0=Not Assessed/NA        4=Minimal Assistance


1=Total Assistance        5=Supervision or Setup


2=Maximal Assistance  6=Modified [place]


3=Moderate Assistance 7=Complete IndependenceSCALE: Activities may be completed 

with or without assistive devices.





6-Indepedent-patient completes the activity by him/herself with no assistance 

from a helper.


5-Set-up or Clean-up Assistance-helper sets up or cleans up; patient completes 

activity. [place] assists only prior to or  


    following the activity.


4-Supervision or Touching Assistance-helper provides verbal cues and/or 

touching/steadying and/or contact guard assistance as patient completes 

activity. Assistance may be provided   


    throughout the activity or intermittently.


3-Partial/Moderate Assistance-helper does LESS THAN HALF the effort. [place] 

lifts, holds or supports trunk or limbs, but provides less than half the effort.


2-Substantial/Maximal Assistance-helper does MORE THAN HALF the effort. [place] 

lifts or holds trunk or limbs and provides more than half the effort.


4-Uofsnbrwx-ykmbqy does ALL the effort. Patient does none of the effort to 

complete the activity. Or, the assistance of 2 or more helpers is required for 

the patient to complete the  


    activity.


If activity was not attempted, code reason:


7-Patient Refused.


9-Not Applicable-not attempted and the patient did not perform the activity 

before the current illness, exacerbation or injury.


10-Not Attempted due to Environmental Limitations-(lack of equipment, weather 

restraints, etc.).


88-Not Attempted due to Medical Conditions or Safety Concerns.


Roll Left to Right (QC):  4


Sit to Lying (QC):  6


Sit to Stand (QC):  6


Chair/Bed-to-Chair Xfer(QC):  6


Car Transfer (QC):  4





Gait Training


Does the Patient Walk?:  Yes


Walk 10 feet (QC):  88


Walk 50 ft with 2 Turns(QC):  88


Walk 150 ft (QC):  88


Walking 10ft/uneven surface-QC:  88


Gait Persons Needed:  1


Gait Assistive Device:  FWW





Wheelchair Training


Does the Pt Use a Wheelchair?:  Yes


Distance:  200' x2


Wheel 50 ft with 2 turns (QC):  6


Wheel 150 ft (QC):  6


Type of Wheelchair:  Manual





Stair Training


1 Step (curb) (QC):  88


4 Steps (QC):  88


12 Steps (QC):  88





Balance


Picking up an Object (QC):  88





ADL-Treatment


Eating (QC):  6 (Using clinical judgement, pt able to complete independently.)


Oral Hygiene (QC):  6


Shower/Bathe Self (QC):  4


Upper Body Dressing (QC):  5


Lower Body Dressing (QC):  4


On/Off Footwear (QC):  2


Toileting Hygiene (QC):  6


Toilet Transfer (QC):  6





Assessment/Plan


Assessment and Plan


Assess & Plan/Chief Complaint


Assessment:


Left lateral ankle abscess and left heel ulceration s/p debridement with wound 

vac placement and abx Zosyn maintained


TERRI not on CPAP


Obesity


DM OOC


Anemia s/p 1 unit of blood iron level 8 so will order iron infusions





Plan:


Abx


Monitor pain


Wound vac


Dr Jiménez


IRF protocol





3/30/21:


Monitor hgb


Monitor sugars due to hypoglycemia





3/31/21:


Monitor sugar levels


Increase insulin may be needed


Dr Jiménez appreciated


BKA likely end result





4/1/21:


Await Dr Jiménez assessment tomorrow after wound vac changed


Needs BKA


Patient waiting on bariatric surgery, which he decided this 2 days ago, before 

contemplating BKA





(1) Diabetic ulcer of foot associated with diabetes mellitus due to underlying 

condition, with necrosis of muscle


Status:  Acute


(2) Diabetes mellitus, type 2


Status:  Chronic


(3) Hypertension


Status:  Chronic


(4) Peripheral neuropathy


Status:  Acute


(5) Renal insufficiency


Status:  Acute


(6) Morbid obesity


Status:  Acute











NICOLE VILA DO                 Apr 1, 2021 05:52
Subjective


HPI/CC On Admission


Date Seen by Provider:  Apr 10, 2021


Time Seen by Provider:  13:40


Subjective/Events-last exam


4/10/21:


Dr Guzmán has arranged transfer to [facility] next week


Wife will transport


BM+


Sugar 172 this am.


Will evaluate any needs [facility] may need to facilitate transfer on Monday.


Maintained on wound vac and Zosyn.








4/9/21:


Wound vac changed


Sugars are ok


No pain


Checked meds and labs








4/8/21:


Patient doing well


Wound vac change tomorrow


Sugars are OK


Blister on right great toe 


[place]?








4/7/21:


Pt had a debridement yesterday and took off most of his heel


Now has a right great toe blister


Hgb 9.2


WBC is normal


Will refer to [place] because he will need a ramp and an entire remodel for him

to be able to navigate in a wheelchair at home








4/6/21:


Pt going into surgery today for debridement


Wound vac will be placed after debridement


No pain is reported at this current time








4/5/21:


Pt doing pretty well


Bowels are moving


Debridement scheduled for Tuesday


Denies any other significant problems








4/4/21:


No major issues


Labs will be checked tomorrow


IV Zosyn maintained


Wound vac will be changed this week








4/3/21:


Patient doing well


No issues


PICC line fixed


Wound vac changed yesterday








4/2/21:


Patient doing well


Changed wound vac and I reviewed pics


Discussed with Dr Jiménez and Dr Guzmán about possible BKA


Plastics will need to see him after wound heals 


Long recovery expected.


BM+


Debridement Tuesday


PICC cath panda used








4/1/21:


Pt doing a little better today


Wound vac will be changed tomorrow and Dr. Jiménez will evaluate where additionally

he needs to deride


Talking about getting the lap band


Having some loose stool, Imodium was given


Zosyn maintained


Will restart Levemir of 5 units twice a day to avoid hypoglycemia








3/31/21:


Pt doing okay today 


Dr. Jiménez presented another debridement option versus a left below the knee 

amputation


The Pt has decided to go the debridement route


Bowels moved yesterday 


Sugars are doing okay 


Overall doing much better clinically 








3/30/21:


Pt doing pretty well


No significant complaints


I did evaluate the left foot and he has significant amount of loss of tissue 

since surgery, unsure it he can have any stabilization of that. He may very well

need a below the knee amputation which was originally discussed in the past


Anxious because his ankle is unstable


Hemoccult is negative


Took a shower today


Changed the wound vac


Hgb 8.7


Iron infusions continue considering his level is eight





Review of Systems


General:  Fatigue, Malaise


Musculoskeletal:  foot pain





Objective


Exam


Vital Signs





Vital Signs








  Date Time  Temp Pulse Resp B/P (MAP) Pulse Ox O2 Delivery O2 Flow Rate FiO2


 


4/10/21 06:00 36.0 80 18 139/77 (97) 98 Room Air  





Capillary Refill :


General Appearance:  No Apparent Distress, Chronically ill, Obese


HEENT:  PERRL/EOMI, Normal ENT Inspection, Pharynx Normal


Neck:  Full Range of Motion, Normal Inspection, Non Tender, Supple


Respiratory:  Chest Non Tender, Lungs Clear, Normal Breath Sounds, No Accessory 

Muscle Use, No Respiratory Distress


Cardiovascular:  Regular Rate, Rhythm, No Edema, No Gallop, No JVD, No Murmur, 

Normal Peripheral Pulses


Gastrointestinal:  Normal Bowel Sounds, No Organomegaly, No Pulsatile Mass, Non 

Tender, Soft


Back:  Normal Inspection, No CVA Tenderness, No Vertebral Tenderness


Extremity:  Normal Capillary Refill


Neurologic/Psychiatric:  Alert, Oriented x3, Normal Mood/Affect, CNs II-XII Norm

as Tested


Skin:  Normal Color


Lymphatic:  No Adenopathy





Results/Procedures


Lab


Patient resulted labs reviewed.





FIM


Transfers


Therapy Code Descriptions/Definitions 





Functional [place] Measure:


0=Not Assessed/NA        4=Minimal Assistance


1=Total Assistance        5=Supervision or Setup


2=Maximal Assistance  6=Modified [place]


3=Moderate Assistance 7=Complete IndependenceSCALE: Activities may be completed 

with or without assistive devices.





6-Indepedent-patient completes the activity by him/herself with no assistance 

from a helper.


5-Set-up or Clean-up Assistance-helper sets up or cleans up; patient completes 

activity. [place] assists only prior to or  


    following the activity.


4-Supervision or Touching Assistance-helper provides verbal cues and/or 

touching/steadying and/or contact guard assistance as patient completes 

activity. Assistance may be provided   


    throughout the activity or intermittently.


3-Partial/Moderate Assistance-helper does LESS THAN HALF the effort. [place] 

lifts, holds or supports trunk or limbs, but provides less than half the effort.


2-Substantial/Maximal Assistance-helper does MORE THAN HALF the effort. [place] 

lifts or holds trunk or limbs and provides more than half the effort.


7-Njstdigld-wngbgf does ALL the effort. Patient does none of the effort to 

complete the activity. Or, the assistance of 2 or more helpers is required for 

the patient to complete the  


    activity.


If activity was not attempted, code reason:


7-Patient Refused.


9-Not Applicable-not attempted and the patient did not perform the activity 

before the current illness, exacerbation or injury.


10-Not Attempted due to Environmental Limitations-(lack of equipment, weather 

restraints, etc.).


88-Not Attempted due to Medical Conditions or Safety Concerns.


Roll Left to Right (QC):  6


Sit to Lying (QC):  6


Sit to Stand (QC):  5


Chair/Bed-to-Chair Xfer(QC):  4


Car Transfer (QC):  4





Gait Training


Does the Patient Walk?:  Yes


Distance:  15'


Walk 10 feet (QC):  5


Walk 50 ft with 2 Turns(QC):  88


Walk 150 ft (QC):  88


Walking 10ft/uneven surface-QC:  88


Gait Persons Needed:  1


Gait Assistive Device:  FWW





Wheelchair Training


Does the Pt Use a Wheelchair?:  Yes


Distance:  200' x2


Wheel 50 ft with 2 turns (QC):  6


Wheel 150 ft (QC):  6


Type of Wheelchair:  Manual





Stair Training


1 Step (curb) (QC):  88


4 Steps (QC):  88


12 Steps (QC):  88





Balance


Picking up an Object (QC):  88





ADL-Treatment


Eating (QC):  6


Oral Hygiene (QC):  7


Shower/Bathe Self (QC):  6


Upper Body Dressing (QC):  5


Lower Body Dressing (QC):  5


On/Off Footwear (QC):  5


Toileting Hygiene (QC):  6


Toilet Transfer (QC):  6





Assessment/Plan


Assessment and Plan


Assess & Plan/Chief Complaint


Assessment:


Left lateral ankle abscess and left heel ulceration s/p debridement with wound 

vac placement and abx Zosyn maintained


TERRI not on CPAP


Obesity


DM OOC


Anemia s/p 1 unit of blood iron level 8 so will order iron infusions





Plan:


Abx


Monitor pain


Wound vac


Dr Jiménez


IRF protocol





3/30/21:


Monitor hgb


Monitor sugars due to hypoglycemia





3/31/21:


Monitor sugar levels


Increase insulin may be needed


Dr Jiménez appreciated


BKA likely end result





4/1/21:


Await Dr Jiménez assessment tomorrow after wound vac changed


Needs BKA


Patient waiting on bariatric surgery, which he decided this 2 days ago, before 

contemplating BKA





4/2/21:


Dr Guzmán assessed the patient at my request and patient not interested in BKA so

plastics will need to reconstruct after wound healed


Hyperbaric chamber indicated once DC





4/3/21:


Monitor sugar


Monitor BP





4/4/21:


Monitor labs


Monitor wound vac





4/5/21:


Debridement tomorrow


Monitor closely


Abx per Dr Jiménez





4/6/21:


Monitor closely


Debridement today


Check labs in am





4/7/21:


Monitor pain


Glucose management


[place]





4/8/21:


Monitor closely


Wound vac change





4/9/21:


Monitor foot


Insulin





4/10/21:


Day # 20 Zosyn


Transfer to [facility] [place] Monday?





(1) Diabetic ulcer of foot associated with diabetes mellitus due to underlying 

condition, with necrosis of muscle


Status:  Acute


(2) Diabetes mellitus, type 2


Status:  Chronic


(3) Hypertension


Status:  Chronic


(4) Peripheral neuropathy


Status:  Acute


(5) Renal insufficiency


Status:  Acute


(6) Morbid obesity


Status:  Acute











NICOLE VILA DO                Apr 10, 2021 13:33
Subjective


HPI/CC On Admission


Date Seen by Provider:  Apr 11, 2021


Time Seen by Provider:  11:30


Subjective/Events-last exam


4/11/21:


Patient doing well


Incontinent of urine last night


BM yesterday


Sugar 126


Dr Guzmán needed MRI for [facility] to check for osteomyelitis which would 

preclude reconstruction but his weight precludes it.








4/10/21:


Dr Guzmán has arranged transfer to [facility] next week


Wife will transport


BM+


Sugar 172 this am.


Will evaluate any needs [place] may need to facilitate transfer on Monday.


Maintained on wound vac and Zosyn.








4/9/21:


Wound vac changed


Sugars are ok


No pain


Checked meds and labs








4/8/21:


Patient doing well


Wound vac change tomorrow


Sugars are OK


Blister on right great toe 


[place]?








4/7/21:


Pt had a debridement yesterday and took off most of his heel


Now has a right great toe blister


Hgb 9.2


WBC is normal


Will refer to [place] because he will need a ramp and an entire remodel for him

to be able to navigate in a wheelchair at home








4/6/21:


Pt going into surgery today for debridement


Wound vac will be placed after debridement


No pain is reported at this current time








4/5/21:


Pt doing pretty well


Bowels are moving


Debridement scheduled for Tuesday


Denies any other significant problems








4/4/21:


No major issues


Labs will be checked tomorrow


IV Zosyn maintained


Wound vac will be changed this week








4/3/21:


Patient doing well


No issues


PICC line fixed


Wound vac changed yesterday








4/2/21:


Patient doing well


Changed wound vac and I reviewed pics


Discussed with Dr Jiménez and Dr Guzmán about possible BKA


Plastics will need to see him after wound heals 


Long recovery expected.


BM+


Debridement Tuesday


PICC cath panda used








4/1/21:


Pt doing a little better today


Wound vac will be changed tomorrow and Dr. Jiménez will evaluate where additionally

he needs to deride


Talking about getting the lap band


Having some loose stool, Imodium was given


Zosyn maintained


Will restart Levemir of 5 units twice a day to avoid hypoglycemia








3/31/21:


Pt doing okay today 


Dr. Jiménez presented another debridement option versus a left below the knee 

amputation


The Pt has decided to go the debridement route


Bowels moved yesterday 


Sugars are doing okay 


Overall doing much better clinically 








3/30/21:


Pt doing pretty well


No significant complaints


I did evaluate the left foot and he has significant amount of loss of tissue 

since surgery, unsure it he can have any stabilization of that. He may very well

need a below the knee amputation which was originally discussed in the past


Anxious because his ankle is unstable


Hemoccult is negative


Took a shower today


Changed the wound vac


Hgb 8.7


Iron infusions continue considering his level is eight





Review of Systems


General:  Fatigue


Musculoskeletal:  foot pain





Objective


Exam


Vital Signs





Vital Signs








  Date Time  Temp Pulse Resp B/P (MAP) Pulse Ox O2 Delivery O2 Flow Rate FiO2


 


4/11/21 17:00 36.5 84 18 144/67 (92) 99 Room Air  





Capillary Refill :


General Appearance:  No Apparent Distress, Chronically ill, Obese


HEENT:  PERRL/EOMI, Normal ENT Inspection, Pharynx Normal


Neck:  Full Range of Motion, Normal Inspection, Non Tender, Supple


Respiratory:  Chest Non Tender, Lungs Clear, Normal Breath Sounds, No Accessory 

Muscle Use, No Respiratory Distress


Cardiovascular:  Regular Rate, Rhythm, No Edema, No Gallop, No JVD, No Murmur, 

Normal Peripheral Pulses


Gastrointestinal:  Normal Bowel Sounds, No Organomegaly, No Pulsatile Mass, Non 

Tender, Soft


Back:  Normal Inspection, No CVA Tenderness, No Vertebral Tenderness


Extremity:  Normal Capillary Refill


Neurologic/Psychiatric:  Alert, Oriented x3, Normal Mood/Affect, CNs II-XII Norm

as Tested


Skin:  Normal Color


Lymphatic:  No Adenopathy





Results/Procedures


Lab


Laboratory Tests


4/11/21 16:45








Patient resulted labs reviewed.





FIM


Transfers


Therapy Code Descriptions/Definitions 





Functional [place] Measure:


0=Not Assessed/NA        4=Minimal Assistance


1=Total Assistance        5=Supervision or Setup


2=Maximal Assistance  6=Modified [place]


3=Moderate Assistance 7=Complete IndependenceSCALE: Activities may be completed 

with or without assistive devices.





6-Indepedent-patient completes the activity by him/herself with no assistance 

from a helper.


5-Set-up or Clean-up Assistance-helper sets up or cleans up; patient completes 

activity. [place] assists only prior to or  


    following the activity.


4-Supervision or Touching Assistance-helper provides verbal cues and/or 

touching/steadying and/or contact guard assistance as patient completes 

activity. Assistance may be provided   


    throughout the activity or intermittently.


3-Partial/Moderate Assistance-helper does LESS THAN HALF the effort. [place] 

lifts, holds or supports trunk or limbs, but provides less than half the effort.


2-Substantial/Maximal Assistance-helper does MORE THAN HALF the effort. [place] 

lifts or holds trunk or limbs and provides more than half the effort.


0-Nlgcofjdb-wqawyg does ALL the effort. Patient does none of the effort to 

complete the activity. Or, the assistance of 2 or more helpers is required for 

the patient to complete the  


    activity.


If activity was not attempted, code reason:


7-Patient Refused.


9-Not Applicable-not attempted and the patient did not perform the activity 

before the current illness, exacerbation or injury.


10-Not Attempted due to Environmental Limitations-(lack of equipment, weather 

restraints, etc.).


88-Not Attempted due to Medical Conditions or Safety Concerns.


Roll Left to Right (QC):  6


Sit to Lying (QC):  6


Sit to Stand (QC):  5


Chair/Bed-to-Chair Xfer(QC):  4


Car Transfer (QC):  4





Gait Training


Does the Patient Walk?:  Yes


Distance:  15'


Walk 10 feet (QC):  5


Walk 50 ft with 2 Turns(QC):  88


Walk 150 ft (QC):  88


Walking 10ft/uneven surface-QC:  88


Gait Persons Needed:  1


Gait Assistive Device:  FWW





Wheelchair Training


Does the Pt Use a Wheelchair?:  Yes


Distance:  200' x2


Wheel 50 ft with 2 turns (QC):  6


Wheel 150 ft (QC):  6


Type of Wheelchair:  Manual





Stair Training


1 Step (curb) (QC):  88


4 Steps (QC):  88


12 Steps (QC):  88





Balance


Picking up an Object (QC):  88





ADL-Treatment


Eating (QC):  6


Oral Hygiene (QC):  7


Shower/Bathe Self (QC):  6


Upper Body Dressing (QC):  5


Lower Body Dressing (QC):  5


On/Off Footwear (QC):  5


Toileting Hygiene (QC):  6


Toilet Transfer (QC):  6





Assessment/Plan


Assessment and Plan


Assess & Plan/Chief Complaint


Assessment:


Left lateral ankle abscess and left heel ulceration s/p debridement with wound 

vac placement and abx Zosyn maintained


TERRI not on CPAP


Obesity


DM OOC


Anemia s/p 1 unit of blood iron level 8 so will order iron infusions





Plan:


Abx


Monitor pain


Wound vac


Dr Jiménez


IRF protocol





3/30/21:


Monitor hgb


Monitor sugars due to hypoglycemia





3/31/21:


Monitor sugar levels


Increase insulin may be needed


Dr Jiménez appreciated


BKA likely end result





4/1/21:


Await Dr Jiménez assessment tomorrow after wound vac changed


Needs BKA


Patient waiting on bariatric surgery, which he decided this 2 days ago, before 

contemplating BKA





4/2/21:


Dr Guzmán assessed the patient at my request and patient not interested in BKA so

plastics will need to reconstruct after wound healed


Hyperbaric chamber indicated once DC





4/3/21:


Monitor sugar


Monitor BP





4/4/21:


Monitor labs


Monitor wound vac





4/5/21:


Debridement tomorrow


Monitor closely


Abx per Dr Jiménez





4/6/21:


Monitor closely


Debridement today


Check labs in am





4/7/21:


Monitor pain


Glucose management


[place]





4/8/21:


Monitor closely


Wound vac change





4/9/21:


Monitor foot


Insulin





4/10/21:


Day # 20 Zosyn


Transfer to [place] Monday?





4/11/21:


Cant do MRI as requested


Monitor closely





(1) Diabetic ulcer of foot associated with diabetes mellitus due to underlying 

condition, with necrosis of muscle


Status:  Acute


(2) Diabetes mellitus, type 2


Status:  Chronic


(3) Hypertension


Status:  Chronic


(4) Peripheral neuropathy


Status:  Acute


(5) Renal insufficiency


Status:  Acute


(6) Morbid obesity


Status:  Acute











NICOLE VILA DO                Apr 11, 2021 07:04
Subjective


HPI/CC On Admission


Date Seen by Provider:  Apr 12, 2021


Time Seen by Provider:  10:30


Subjective/Events-last exam


4/12/21:


A decision was made for left below the knee amputation by Dr. Jiménez


Awaiting the plan


Hgb 9.4


Sugars are adequate


Pain is controlled








4/11/21:


Patient doing well


Incontinent of urine last night


BM yesterday


Sugar 126


Dr Guzmán needed MRI for [facility] to check for osteomyelitis which would 

preclude reconstruction but his weight precludes it.








4/10/21:


Dr Guzmán has arranged transfer to [facility] next week


Wife will transport


BM+


Sugar 172 this am.


Will evaluate any needs [place] may need to facilitate transfer on Monday.


Maintained on wound vac and Zosyn.








4/9/21:


Wound vac changed


Sugars are ok


No pain


Checked meds and labs








4/8/21:


Patient doing well


Wound vac change tomorrow


Sugars are OK


Blister on right great toe 


[place]?








4/7/21:


Pt had a debridement yesterday and took off most of his heel


Now has a right great toe blister


Hgb 9.2


WBC is normal


Will refer to [place] because he will need a ramp and an entire remodel for him

to be able to navigate in a wheelchair at home








4/6/21:


Pt going into surgery today for debridement


Wound vac will be placed after debridement


No pain is reported at this current time








4/5/21:


Pt doing pretty well


Bowels are moving


Debridement scheduled for Tuesday


Denies any other significant problems








4/4/21:


No major issues


Labs will be checked tomorrow


IV Zosyn maintained


Wound vac will be changed this week








4/3/21:


Patient doing well


No issues


PICC line fixed


Wound vac changed yesterday








4/2/21:


Patient doing well


Changed wound vac and I reviewed pics


Discussed with Dr Jiménez and Dr Guzmán about possible BKA


Plastics will need to see him after wound heals 


Long recovery expected.


BM+


Debridement Tuesday


PICC cath panda used








4/1/21:


Pt doing a little better today


Wound vac will be changed tomorrow and Dr. Jiménez will evaluate where additionally

he needs to deride


Talking about getting the lap band


Having some loose stool, Imodium was given


Zosyn maintained


Will restart Levemir of 5 units twice a day to avoid hypoglycemia








3/31/21:


Pt doing okay today 


Dr. Jiménez presented another debridement option versus a left below the knee 

amputation


The Pt has decided to go the debridement route


Bowels moved yesterday 


Sugars are doing okay 


Overall doing much better clinically 








3/30/21:


Pt doing pretty well


No significant complaints


I did evaluate the left foot and he has significant amount of loss of tissue 

since surgery, unsure it he can have any stabilization of that. He may very well

need a below the knee amputation which was originally discussed in the past


Anxious because his ankle is unstable


Hemoccult is negative


Took a shower today


Changed the wound vac


Hgb 8.7


Iron infusions continue considering his level is eight





Review of Systems


General:  Fatigue


Musculoskeletal:  foot pain





Objective


Exam


Vital Signs





Vital Signs








  Date Time  Temp Pulse Resp B/P (MAP) Pulse Ox O2 Delivery O2 Flow Rate FiO2


 


4/12/21 16:00 36.0 78 16 149/72 (97) 100 Room Air  





Capillary Refill :


General Appearance:  No Apparent Distress, Chronically ill, Obese


HEENT:  PERRL/EOMI, Normal ENT Inspection, Pharynx Normal


Neck:  Full Range of Motion, Normal Inspection, Non Tender, Supple


Respiratory:  Chest Non Tender, Lungs Clear, Normal Breath Sounds, No Accessory 

Muscle Use, No Respiratory Distress


Cardiovascular:  Regular Rate, Rhythm, No Edema, No Gallop, No JVD, No Murmur, 

Normal Peripheral Pulses


Gastrointestinal:  Normal Bowel Sounds, No Organomegaly, No Pulsatile Mass, Non 

Tender, Soft


Back:  Normal Inspection, No CVA Tenderness, No Vertebral Tenderness


Extremity:  Normal Capillary Refill


Neurologic/Psychiatric:  Alert, Oriented x3, Normal Mood/Affect, CNs II-XII Norm

as Tested


Skin:  Normal Color


Lymphatic:  No Adenopathy





Results/Procedures


Lab


Laboratory Tests


4/12/21 05:25








Patient resulted labs reviewed.





FIM


Transfers


Therapy Code Descriptions/Definitions 





Functional [place] Measure:


0=Not Assessed/NA        4=Minimal Assistance


1=Total Assistance        5=Supervision or Setup


2=Maximal Assistance  6=Modified [place]


3=Moderate Assistance 7=Complete IndependenceSCALE: Activities may be completed 

with or without assistive devices.





6-Indepedent-patient completes the activity by him/herself with no assistance 

from a helper.


5-Set-up or Clean-up Assistance-helper sets up or cleans up; patient completes 

activity. [place] assists only prior to or  


    following the activity.


4-Supervision or Touching Assistance-helper provides verbal cues and/or 

touching/steadying and/or contact guard assistance as patient completes 

activity. Assistance may be provided   


    throughout the activity or intermittently.


3-Partial/Moderate Assistance-helper does LESS THAN HALF the effort. [place] 

lifts, holds or supports trunk or limbs, but provides less than half the effort.


2-Substantial/Maximal Assistance-helper does MORE THAN HALF the effort. [place] 

lifts or holds trunk or limbs and provides more than half the effort.


7-Tzegqjatk-ykqbzx does ALL the effort. Patient does none of the effort to c

omplete the activity. Or, the assistance of 2 or more helpers is required for 

the patient to complete the  


    activity.


If activity was not attempted, code reason:


7-Patient Refused.


9-Not Applicable-not attempted and the patient did not perform the activity 

before the current illness, exacerbation or injury.


10-Not Attempted due to Environmental Limitations-(lack of equipment, weather 

restraints, etc.).


88-Not Attempted due to Medical Conditions or Safety Concerns.


Roll Left to Right (QC):  6


Sit to Lying (QC):  6


Sit to Stand (QC):  5


Chair/Bed-to-Chair Xfer(QC):  4


Car Transfer (QC):  4





Gait Training


Does the Patient Walk?:  Yes


Distance:  15'


Walk 10 feet (QC):  5


Walk 50 ft with 2 Turns(QC):  88


Walk 150 ft (QC):  88


Walking 10ft/uneven surface-QC:  88


Gait Persons Needed:  1


Gait Assistive Device:  FWW





Wheelchair Training


Does the Pt Use a Wheelchair?:  Yes


Distance:  200' x2


Wheel 50 ft with 2 turns (QC):  6


Wheel 150 ft (QC):  6


Type of Wheelchair:  Manual





Stair Training


1 Step (curb) (QC):  88


4 Steps (QC):  88


12 Steps (QC):  88





Balance


Picking up an Object (QC):  88





ADL-Treatment


Eating (QC):  6


Oral Hygiene (QC):  7


Shower/Bathe Self (QC):  6


Upper Body Dressing (QC):  5


Lower Body Dressing (QC):  5


On/Off Footwear (QC):  5


Toileting Hygiene (QC):  6


Toilet Transfer (QC):  6





Assessment/Plan


Assessment and Plan


Assess & Plan/Chief Complaint


Assessment:


Left lateral ankle abscess and left heel ulceration s/p debridement with wound 

vac placement and abx Zosyn maintained


TERRI not on CPAP


Obesity


DM OOC


Anemia s/p 1 unit of blood iron level 8 so will order iron infusions





Plan:


Abx


Monitor pain


Wound vac


Dr Jiménez


IRF protocol





3/30/21:


Monitor hgb


Monitor sugars due to hypoglycemia





3/31/21:


Monitor sugar levels


Increase insulin may be needed


Dr Jiménez appreciated


BKA likely end result





4/1/21:


Await Dr Jiménez assessment tomorrow after wound vac changed


Needs BKA


Patient waiting on bariatric surgery, which he decided this 2 days ago, before 

contemplating BKA





4/2/21:


Dr Guzmán assessed the patient at my request and patient not interested in BKA so

plastics will need to reconstruct after wound healed


Hyperbaric chamber indicated once DC





4/3/21:


Monitor sugar


Monitor BP





4/4/21:


Monitor labs


Monitor wound vac





4/5/21:


Debridement tomorrow


Monitor closely


Abx per Dr Jiménez





4/6/21:


Monitor closely


Debridement today


Check labs in am





4/7/21:


Monitor pain


Glucose management


[place]





4/8/21:


Monitor closely


Wound vac change





4/9/21:


Monitor foot


Insulin





4/10/21:


Day # 20 Zosyn


Transfer to [place] Monday?





4/11/21:


Cant do MRI as requested


Monitor closely





4/12/21:


Left BKA Wed


Increase insulin





(1) Diabetic ulcer of foot associated with diabetes mellitus due to underlying 

condition, with necrosis of muscle


Status:  Acute


(2) Diabetes mellitus, type 2


Status:  Chronic


(3) Hypertension


Status:  Chronic


(4) Peripheral neuropathy


Status:  Acute


(5) Renal insufficiency


Status:  Acute


(6) Morbid obesity


Status:  Acute











NICOLE VILA DO                Apr 12, 2021 10:16
Subjective


HPI/CC On Admission


Date Seen by Provider:  Apr 13, 2021


Time Seen by Provider:  08:15


Subjective/Events-last exam


4/13/21:


Pt having a left below the knee amputation tomorrow by Dr. Jiménez


Will DC from inpatient rehab to have that surgery done


Sugars are much better


Increased Levemir from 10 to 20 twice daily which has helped








4/12/21:


A decision was made for left below the knee amputation by Dr. Jiménez


Awaiting the plan


Hgb 9.4


Sugars are adequate


Pain is controlled








4/11/21:


Patient doing well


Incontinent of urine last night


BM yesterday


Sugar 126


Dr Guzmán needed MRI for [facility] to check for osteomyelitis which would 

preclude reconstruction but his weight precludes it.








4/10/21:


Dr Guzmán has arranged transfer to [facility] next week


Wife will transport


BM+


Sugar 172 this am.


Will evaluate any needs [facility] may need to facilitate transfer on Monday.


Maintained on wound vac and Zosyn.








4/9/21:


Wound vac changed


Sugars are ok


No pain


Checked meds and labs








4/8/21:


Patient doing well


Wound vac change tomorrow


Sugars are OK


Blister on right great toe 


[facility]?








4/7/21:


Pt had a debridement yesterday and took off most of his heel


Now has a right great toe blister


Hgb 9.2


WBC is normal


Will refer to [facility] because he will need a ramp and an entire remodel for him

to be able to navigate in a wheelchair at home








4/6/21:


Pt going into surgery today for debridement


Wound vac will be placed after debridement


No pain is reported at this current time








4/5/21:


Pt doing pretty well


Bowels are moving


Debridement scheduled for Tuesday


Denies any other significant problems








4/4/21:


No major issues


Labs will be checked tomorrow


IV Zosyn maintained


Wound vac will be changed this week








4/3/21:


Patient doing well


No issues


PICC line fixed


Wound vac changed yesterday








4/2/21:


Patient doing well


Changed wound vac and I reviewed pics


Discussed with Dr Jiménez and Dr Guzmán about possible BKA


Plastics will need to see him after wound heals 


Long recovery expected.


BM+


Debridement Tuesday


PICC cath panda used








4/1/21:


Pt doing a little better today


Wound vac will be changed tomorrow and Dr. Jiménez will evaluate where additionally

he needs to deride


Talking about getting the lap band


Having some loose stool, Imodium was given


Zosyn maintained


Will restart Levemir of 5 units twice a day to avoid hypoglycemia








3/31/21:


Pt doing okay today 


Dr. Jiménez presented another debridement option versus a left below the knee 

amputation


The Pt has decided to go the debridement route


Bowels moved yesterday 


Sugars are doing okay 


Overall doing much better clinically 








3/30/21:


Pt doing pretty well


No significant complaints


I did evaluate the left foot and he has significant amount of loss of tissue 

since surgery, unsure it he can have any stabilization of that. He may very well

need a below the knee amputation which was originally discussed in the past


Anxious because his ankle is unstable


Hemoccult is negative


Took a shower today


Changed the wound vac


Hgb 8.7


Iron infusions continue considering his level is eight





Review of Systems


General:  Fatigue, Malaise


Neurological:  Weakness





Objective


Exam


Vital Signs





Vital Signs








  Date Time  Temp Pulse Resp B/P (MAP) Pulse Ox O2 Delivery O2 Flow Rate FiO2


 


4/13/21 20:00      Room Air  


 


4/13/21 16:17 36.3 82 16 138/69 (92) 98   





Capillary Refill :


General Appearance:  No Apparent Distress, Chronically ill, Obese


HEENT:  PERRL/EOMI, Normal ENT Inspection, Pharynx Normal


Neck:  Full Range of Motion, Normal Inspection, Non Tender, Supple


Respiratory:  Chest Non Tender, Lungs Clear, Normal Breath Sounds, No Accessory 

Muscle Use, No Respiratory Distress


Cardiovascular:  Regular Rate, Rhythm, No Edema, No Gallop, No JVD, No Murmur, 

Normal Peripheral Pulses


Gastrointestinal:  Normal Bowel Sounds, No Organomegaly, No Pulsatile Mass, Non 

Tender, Soft


Back:  Normal Inspection, No CVA Tenderness, No Vertebral Tenderness


Extremity:  Normal Capillary Refill


Neurologic/Psychiatric:  Alert, Oriented x3, Normal Mood/Affect, CNs II-XII Norm

as Tested


Skin:  Normal Color


Lymphatic:  No Adenopathy





Results/Procedures


Lab


Patient resulted labs reviewed.





FIM


Transfers


Therapy Code Descriptions/Definitions 





Functional [place] Measure:


0=Not Assessed/NA        4=Minimal Assistance


1=Total Assistance        5=Supervision or Setup


2=Maximal Assistance  6=Modified [place]


3=Moderate Assistance 7=Complete IndependenceSCALE: Activities may be completed 

with or without assistive devices.





6-Indepedent-patient completes the activity by him/herself with no assistance 

from a helper.


5-Set-up or Clean-up Assistance-helper sets up or cleans up; patient completes 

activity. [place] assists only prior to or  


    following the activity.


4-Supervision or Touching Assistance-helper provides verbal cues and/or touching

/steadying and/or contact guard assistance as patient completes activity. 

Assistance may be provided   


    throughout the activity or intermittently.


3-Partial/Moderate Assistance-helper does LESS THAN HALF the effort. [place] 

lifts, holds or supports trunk or limbs, but provides less than half the effort.


2-Substantial/Maximal Assistance-helper does MORE THAN HALF the effort. [place] 

lifts or holds trunk or limbs and provides more than half the effort.


8-Hrbstjxjw-swarnb does ALL the effort. Patient does none of the effort to 

complete the activity. Or, the assistance of 2 or more helpers is required for 

the patient to complete the  


    activity.


If activity was not attempted, code reason:


7-Patient Refused.


9-Not Applicable-not attempted and the patient did not perform the activity 

before the current illness, exacerbation or injury.


10-Not Attempted due to Environmental Limitations-(lack of equipment, weather 

restraints, etc.).


88-Not Attempted due to Medical Conditions or Safety Concerns.


Roll Left to Right (QC):  6


Sit to Lying (QC):  6


Sit to Stand (QC):  4 (SBA)


Chair/Bed-to-Chair Xfer(QC):  4 (SBA)


Car Transfer (QC):  4





Gait Training


Does the Patient Walk?:  Yes


Distance:  15'


Walk 10 feet (QC):  5


Walk 50 ft with 2 Turns(QC):  88


Walk 150 ft (QC):  88


Walking 10ft/uneven surface-QC:  88


Gait Persons Needed:  1


Gait Assistive Device:  FWW





Wheelchair Training


Does the Pt Use a Wheelchair?:  Yes


Distance:  200' x2


Wheel 50 ft with 2 turns (QC):  6


Wheel 150 ft (QC):  6


Type of Wheelchair:  Manual





Stair Training


1 Step (curb) (QC):  88


4 Steps (QC):  88


12 Steps (QC):  88





Balance


Picking up an Object (QC):  88





ADL-Treatment


Eating (QC):  6


Oral Hygiene (QC):  6


Shower/Bathe Self (QC):  6


Upper Body Dressing (QC):  5


Lower Body Dressing (QC):  5


On/Off Footwear (QC):  5


Toileting Hygiene (QC):  6 (Per clinical judgement, pt able to complete hygiene 

and clothing manipulation independently.)


Toilet Transfer (QC):  6 (Per clinical judgement, pt able to complete transfer 

independently.)





Assessment/Plan


Assessment and Plan


Assess & Plan/Chief Complaint


Assessment:


Left lateral ankle abscess and left heel ulceration s/p debridement with wound 

vac placement and abx Zosyn maintained


TERRI not on CPAP


Obesity


DM OOC


Anemia s/p 1 unit of blood iron level 8 so will order iron infusions





Plan:


Abx


Monitor pain


Wound vac


Dr Jiménez


IRF protocol





3/30/21:


Monitor hgb


Monitor sugars due to hypoglycemia





3/31/21:


Monitor sugar levels


Increase insulin may be needed


Dr Jiménez appreciated


BKA likely end result





4/1/21:


Await Dr Jiménez assessment tomorrow after wound vac changed


Needs BKA


Patient waiting on bariatric surgery, which he decided this 2 days ago, before 

contemplating BKA





4/2/21:


Dr Guzmán assessed the patient at my request and patient not interested in BKA so

plastics will need to reconstruct after wound healed


Hyperbaric chamber indicated once DC





4/3/21:


Monitor sugar


Monitor BP





4/4/21:


Monitor labs


Monitor wound vac





4/5/21:


Debridement tomorrow


Monitor closely


Abx per Dr Jiménez





4/6/21:


Monitor closely


Debridement today


Check labs in am





4/7/21:


Monitor pain


Glucose management


[facility]





4/8/21:


Monitor closely


Wound vac change





4/9/21:


Monitor foot


Insulin





4/10/21:


Day # 20 Zosyn


Transfer to [facility] Monday?





4/11/21:


Cant do MRI as requested


Monitor closely





4/12/21:


Left BKA Wed


Increase insulin





4/13/21:


Monitor closely


Increase insulin


BKA tomorrow





(1) Diabetic ulcer of foot associated with diabetes mellitus due to underlying 

condition, with necrosis of muscle


Status:  Acute


(2) Diabetes mellitus, type 2


Status:  Chronic


(3) Hypertension


Status:  Chronic


(4) Peripheral neuropathy


Status:  Acute


(5) Renal insufficiency


Status:  Acute


(6) Morbid obesity


Status:  Acute











NICOLE VILA DO                Apr 13, 2021 05:28
Subjective


HPI/CC On Admission


Date Seen by Provider:  Apr 2, 2021


Time Seen by Provider:  11:15


Subjective/Events-last exam


4/21/21:


Patient doing well


Changed wound vac and I reviewed pics


Discussed with Dr Jiménez and Dr Guzmán about possible BKA


Plastics will need to see him after wound heals 


Long recovery expected.


BM+


Debridement Tuesday


PICC cath panda used








4/1/21:


Pt doing a little better today


Wound vac will be changed tomorrow and Dr. Jiménez will evaluate where additionally

he needs to deride


Talking about getting the lap band


Having some loose stool, Imodium was given


Zosyn maintained


Will restart Levemir of 5 units twice a day to avoid hypoglycemia








3/31/21:


Pt doing okay today 


Dr. Jiménez presented another debridement option versus a left below the knee 

amputation


The Pt has decided to go the debridement route


Bowels moved yesterday 


Sugars are doing okay 


Overall doing much better clinically 








3/30/21:


Pt doing pretty well


No significant complaints


I did evaluate the left foot and he has significant amount of loss of tissue 

since surgery, unsure it he can have any stabilization of that. He may very well

need a below the knee amputation which was originally discussed in the past


Anxious because his ankle is unstable


Hemoccult is negative


Took a shower today


Changed the wound vac


Hgb 8.7


Iron infusions continue considering his level is eight





Review of Systems


General:  Fatigue, Malaise


Musculoskeletal:  leg pain, foot pain





Objective


Exam


Vital Signs





Vital Signs








  Date Time  Temp Pulse Resp B/P (MAP) Pulse Ox O2 Delivery O2 Flow Rate FiO2


 


4/3/21 05:30 36.0 80 16 142/79 (100) 96 Room Air  





Capillary Refill :


General Appearance:  No Apparent Distress, Chronically ill, Obese


HEENT:  PERRL/EOMI, Normal ENT Inspection, Pharynx Normal


Neck:  Full Range of Motion, Normal Inspection, Non Tender, Supple


Respiratory:  Chest Non Tender, Lungs Clear, Normal Breath Sounds, No Accessory 

Muscle Use, No Respiratory Distress


Cardiovascular:  Regular Rate, Rhythm, No Edema


Gastrointestinal:  Normal Bowel Sounds, No Organomegaly, No Pulsatile Mass, Non 

Tender, Soft


Back:  Normal Inspection, No CVA Tenderness, No Vertebral Tenderness


Extremity:  Normal Capillary Refill


Neurologic/Psychiatric:  Alert, Oriented x3, Normal Mood/Affect, CNs II-XII Norm

as Tested


Skin:  Normal Color


Lymphatic:  No Adenopathy





Results/Procedures


Lab


Patient resulted labs reviewed.





FIM


Transfers


Therapy Code Descriptions/Definitions 





Functional [place] Measure:


0=Not Assessed/NA        4=Minimal Assistance


1=Total Assistance        5=Supervision or Setup


2=Maximal Assistance  6=Modified [place]


3=Moderate Assistance 7=Complete IndependenceSCALE: Activities may be completed 

with or without assistive devices.





6-Indepedent-patient completes the activity by him/herself with no assistance 

from a helper.


5-Set-up or Clean-up Assistance-helper sets up or cleans up; patient completes 

activity. [place] assists only prior to or  


    following the activity.


4-Supervision or Touching Assistance-helper provides verbal cues and/or 

touching/steadying and/or contact guard assistance as patient completes 

activity. Assistance may be provided   


    throughout the activity or intermittently.


3-Partial/Moderate Assistance-helper does LESS THAN HALF the effort. [place] 

lifts, holds or supports trunk or limbs, but provides less than half the effort.


2-Substantial/Maximal Assistance-helper does MORE THAN HALF the effort. [place] 

lifts or holds trunk or limbs and provides more than half the effort.


1-Eookfpumk-bskper does ALL the effort. Patient does none of the effort to 

complete the activity. Or, the assistance of 2 or more helpers is required for 

the patient to complete the  


    activity.


If activity was not attempted, code reason:


7-Patient Refused.


9-Not Applicable-not attempted and the patient did not perform the activity 

before the current illness, exacerbation or injury.


10-Not Attempted due to Environmental Limitations-(lack of equipment, weather 

restraints, etc.).


88-Not Attempted due to Medical Conditions or Safety Concerns.


Roll Left to Right (QC):  6


Sit to Lying (QC):  6


Sit to Stand (QC):  6


Chair/Bed-to-Chair Xfer(QC):  6


Car Transfer (QC):  4





Gait Training


Does the Patient Walk?:  Yes


Walk 10 feet (QC):  88


Walk 50 ft with 2 Turns(QC):  88


Walk 150 ft (QC):  88


Walking 10ft/uneven surface-QC:  88


Gait Persons Needed:  1


Gait Assistive Device:  FWW





Wheelchair Training


Does the Pt Use a Wheelchair?:  Yes


Distance:  200' x2


Wheel 50 ft with 2 turns (QC):  6


Wheel 150 ft (QC):  6


Type of Wheelchair:  Manual





Stair Training


1 Step (curb) (QC):  88


4 Steps (QC):  88


12 Steps (QC):  88





Balance


Picking up an Object (QC):  88





ADL-Treatment


Eating (QC):  6


Oral Hygiene (QC):  6


Shower/Bathe Self (QC):  6


Upper Body Dressing (QC):  5


Lower Body Dressing (QC):  5


On/Off Footwear (QC):  4


Toileting Hygiene (QC):  6


Toilet Transfer (QC):  6





Assessment/Plan


Assessment and Plan


Assess & Plan/Chief Complaint


Assessment:


Left lateral ankle abscess and left heel ulceration s/p debridement with wound 

vac placement and abx Zosyn maintained


TERRI not on CPAP


Obesity


DM OOC


Anemia s/p 1 unit of blood iron level 8 so will order iron infusions





Plan:


Abx


Monitor pain


Wound vac


Dr Jiménez


IRF protocol





3/30/21:


Monitor hgb


Monitor sugars due to hypoglycemia





3/31/21:


Monitor sugar levels


Increase insulin may be needed


Dr Jiménez appreciated


BKA likely end result





4/1/21:


Await Dr iJménez assessment tomorrow after wound vac changed


Needs BKA


Patient waiting on bariatric surgery, which he decided this 2 days ago, before 

contemplating BKA





4/2/21:


Dr Guzmán assessed the patient at my request and patient not interested in BKA so

plastics will need to reconstruct after wound healed


Hyperbaric chamber indicated once DC





(1) Diabetic ulcer of foot associated with diabetes mellitus due to underlying 

condition, with necrosis of muscle


Status:  Acute


(2) Diabetes mellitus, type 2


Status:  Chronic


(3) Hypertension


Status:  Chronic


(4) Peripheral neuropathy


Status:  Acute


(5) Renal insufficiency


Status:  Acute


(6) Morbid obesity


Status:  Acute











NICOLE VILA DO                 Apr 2, 2021 11:04
Subjective


HPI/CC On Admission


Date Seen by Provider:  Apr 3, 2021


Time Seen by Provider:  12:40


Subjective/Events-last exam


4/3/21:


Patient doing well


No issues


PICC line fixed


Wound vac changed yesterday








4/2/21:


Patient doing well


Changed wound vac and I reviewed pics


Discussed with Dr Jiménez and Dr Guzmán about possible BKA


Plastics will need to see him after wound heals 


Long recovery expected.


BM+


Debridement Tuesday


PICC cath panda used








4/1/21:


Pt doing a little better today


Wound vac will be changed tomorrow and Dr. Jiménez will evaluate where additionally

he needs to deride


Talking about getting the lap band


Having some loose stool, Imodium was given


Zosyn maintained


Will restart Levemir of 5 units twice a day to avoid hypoglycemia








3/31/21:


Pt doing okay today 


Dr. Jiménez presented another debridement option versus a left below the knee 

amputation


The Pt has decided to go the debridement route


Bowels moved yesterday 


Sugars are doing okay 


Overall doing much better clinically 








3/30/21:


Pt doing pretty well


No significant complaints


I did evaluate the left foot and he has significant amount of loss of tissue 

since surgery, unsure it he can have any stabilization of that. He may very well

need a below the knee amputation which was originally discussed in the past


Anxious because his ankle is unstable


Hemoccult is negative


Took a shower today


Changed the wound vac


Hgb 8.7


Iron infusions continue considering his level is eight





Review of Systems


General:  Fatigue, Malaise


Musculoskeletal:  leg pain, foot pain


Neurological:  Weakness





Objective


Exam


Vital Signs





Vital Signs








  Date Time  Temp Pulse Resp B/P (MAP) Pulse Ox O2 Delivery O2 Flow Rate FiO2


 


4/4/21 05:49 35.3 78 17 144/85 (104) 99 Room Air  





Capillary Refill :


General Appearance:  No Apparent Distress, Chronically ill, Obese


HEENT:  PERRL/EOMI, Normal ENT Inspection, Pharynx Normal


Neck:  Full Range of Motion, Normal Inspection, Non Tender, Supple


Respiratory:  Chest Non Tender, Lungs Clear, Normal Breath Sounds, No Accessory 

Muscle Use, No Respiratory Distress


Cardiovascular:  Regular Rate, Rhythm, No Edema, No Gallop, No JVD, No Murmur, 

Normal Peripheral Pulses


Gastrointestinal:  Normal Bowel Sounds, No Organomegaly, No Pulsatile Mass, Non 

Tender, Soft


Back:  Normal Inspection, No CVA Tenderness, No Vertebral Tenderness


Extremity:  Normal Capillary Refill


Neurologic/Psychiatric:  Alert, Oriented x3, Normal Mood/Affect, CNs II-XII Norm

as Tested


Skin:  Normal Color


Lymphatic:  No Adenopathy





Results/Procedures


Lab


Patient resulted labs reviewed.





FIM


Transfers


Therapy Code Descriptions/Definitions 





Functional [place] Measure:


0=Not Assessed/NA        4=Minimal Assistance


1=Total Assistance        5=Supervision or Setup


2=Maximal Assistance  6=Modified [place]


3=Moderate Assistance 7=Complete IndependenceSCALE: Activities may be completed 

with or without assistive devices.





6-Indepedent-patient completes the activity by him/herself with no assistance 

from a helper.


5-Set-up or Clean-up Assistance-helper sets up or cleans up; patient completes 

activity. [place] assists only prior to or  


    following the activity.


4-Supervision or Touching Assistance-helper provides verbal cues and/or 

touching/steadying and/or contact guard assistance as patient completes 

activity. Assistance may be provided   


    throughout the activity or intermittently.


3-Partial/Moderate Assistance-helper does LESS THAN HALF the effort. [place] 

lifts, holds or supports trunk or limbs, but provides less than half the effort.


2-Substantial/Maximal Assistance-helper does MORE THAN HALF the effort. [place] 

lifts or holds trunk or limbs and provides more than half the effort.


7-Mpdotgqlo-cvghhk does ALL the effort. Patient does none of the effort to 

complete the activity. Or, the assistance of 2 or more helpers is required for 

the patient to complete the  


    activity.


If activity was not attempted, code reason:


7-Patient Refused.


9-Not Applicable-not attempted and the patient did not perform the activity be

fore the current illness, exacerbation or injury.


10-Not Attempted due to Environmental Limitations-(lack of equipment, weather 

restraints, etc.).


88-Not Attempted due to Medical Conditions or Safety Concerns.


Roll Left to Right (QC):  6


Sit to Lying (QC):  6


Sit to Stand (QC):  6


Chair/Bed-to-Chair Xfer(QC):  6


Car Transfer (QC):  4





Gait Training


Does the Patient Walk?:  Yes


Walk 10 feet (QC):  88


Walk 50 ft with 2 Turns(QC):  88


Walk 150 ft (QC):  88


Walking 10ft/uneven surface-QC:  88


Gait Persons Needed:  1


Gait Assistive Device:  FWW





Wheelchair Training


Does the Pt Use a Wheelchair?:  Yes


Distance:  200' x2


Wheel 50 ft with 2 turns (QC):  6


Wheel 150 ft (QC):  6


Type of Wheelchair:  Manual





Stair Training


1 Step (curb) (QC):  88


4 Steps (QC):  88


12 Steps (QC):  88





Balance


Picking up an Object (QC):  88





ADL-Treatment


Eating (QC):  6


Oral Hygiene (QC):  6


Shower/Bathe Self (QC):  6


Upper Body Dressing (QC):  5


Lower Body Dressing (QC):  5


On/Off Footwear (QC):  4


Toileting Hygiene (QC):  6


Toilet Transfer (QC):  6





Assessment/Plan


Assessment and Plan


Assess & Plan/Chief Complaint


Assessment:


Left lateral ankle abscess and left heel ulceration s/p debridement with wound 

vac placement and abx Zosyn maintained


TERRI not on CPAP


Obesity


DM OOC


Anemia s/p 1 unit of blood iron level 8 so will order iron infusions





Plan:


Abx


Monitor pain


Wound vac


Dr Jiménez


IRF protocol





3/30/21:


Monitor hgb


Monitor sugars due to hypoglycemia





3/31/21:


Monitor sugar levels


Increase insulin may be needed


Dr Jiménez appreciated


BKA likely end result





4/1/21:


Await Dr Jiménez assessment tomorrow after wound vac changed


Needs BKA


Patient waiting on bariatric surgery, which he decided this 2 days ago, before 

contemplating BKA





4/2/21:


Dr Guzmán assessed the patient at my request and patient not interested in BKA so

plastics will need to reconstruct after wound healed


Hyperbaric chamber indicated once DC





4/3/21:


Monitor sugar


Monitor BP





(1) Diabetic ulcer of foot associated with diabetes mellitus due to underlying 

condition, with necrosis of muscle


Status:  Acute


(2) Diabetes mellitus, type 2


Status:  Chronic


(3) Hypertension


Status:  Chronic


(4) Peripheral neuropathy


Status:  Acute


(5) Renal insufficiency


Status:  Acute


(6) Morbid obesity


Status:  Acute











NICOLE VILA DO                 Apr 3, 2021 12:31
Subjective


HPI/CC On Admission


Date Seen by Provider:  Apr 4, 2021


Time Seen by Provider:  14:15


Subjective/Events-last exam


4/4/21:


No major issues


Labs will be checked tomorrow


IV Zosyn maintained


Wound vac will be changed this week








4/3/21:


Patient doing well


No issues


PICC line fixed


Wound vac changed yesterday








4/2/21:


Patient doing well


Changed wound vac and I reviewed pics


Discussed with Dr Jiménez and Dr Guzmán about possible BKA


Plastics will need to see him after wound heals 


Long recovery expected.


BM+


Debridement Tuesday


PICC cath panda used








4/1/21:


Pt doing a little better today


Wound vac will be changed tomorrow and Dr. Jiménez will evaluate where additionally

he needs to deride


Talking about getting the lap band


Having some loose stool, Imodium was given


Zosyn maintained


Will restart Levemir of 5 units twice a day to avoid hypoglycemia








3/31/21:


Pt doing okay today 


Dr. Jiménez presented another debridement option versus a left below the knee 

amputation


The Pt has decided to go the debridement route


Bowels moved yesterday 


Sugars are doing okay 


Overall doing much better clinically 








3/30/21:


Pt doing pretty well


No significant complaints


I did evaluate the left foot and he has significant amount of loss of tissue 

since surgery, unsure it he can have any stabilization of that. He may very well

need a below the knee amputation which was originally discussed in the past


Anxious because his ankle is unstable


Hemoccult is negative


Took a shower today


Changed the wound vac


Hgb 8.7


Iron infusions continue considering his level is eight





Review of Systems


General:  Fatigue


Musculoskeletal:  foot pain





Objective


Exam


Vital Signs





Vital Signs








  Date Time  Temp Pulse Resp B/P (MAP) Pulse Ox O2 Delivery O2 Flow Rate FiO2


 


4/4/21 15:59 36.4 73 16 137/78 (97) 97   


 


4/4/21 09:00      Room Air  





Capillary Refill :


General Appearance:  No Apparent Distress, Chronically ill, Obese


HEENT:  PERRL/EOMI, Normal ENT Inspection, Pharynx Normal


Neck:  Full Range of Motion, Normal Inspection, Non Tender, Supple


Respiratory:  Chest Non Tender, Lungs Clear, Normal Breath Sounds, No Accessory 

Muscle Use, No Respiratory Distress


Cardiovascular:  Regular Rate, Rhythm, No Edema, No Gallop, No JVD, No Murmur, 

Normal Peripheral Pulses


Gastrointestinal:  Normal Bowel Sounds, No Organomegaly, No Pulsatile Mass, Non 

Tender, Soft


Back:  Normal Inspection, No CVA Tenderness, No Vertebral Tenderness


Extremity:  Normal Capillary Refill


Neurologic/Psychiatric:  Alert, Oriented x3, Normal Mood/Affect, CNs II-XII Norm

as Tested


Skin:  Normal Color


Lymphatic:  No Adenopathy





Results/Procedures


Lab


Patient resulted labs reviewed.





FIM


Transfers


Therapy Code Descriptions/Definitions 





Functional [place] Measure:


0=Not Assessed/NA        4=Minimal Assistance


1=Total Assistance        5=Supervision or Setup


2=Maximal Assistance  6=Modified [place]


3=Moderate Assistance 7=Complete IndependenceSCALE: Activities may be completed 

with or without assistive devices.





6-Indepedent-patient completes the activity by him/herself with no assistance 

from a helper.


5-Set-up or Clean-up Assistance-helper sets up or cleans up; patient completes 

activity. [place] assists only prior to or  


    following the activity.


4-Supervision or Touching Assistance-helper provides verbal cues and/or 

touching/steadying and/or contact guard assistance as patient completes 

activity. Assistance may be provided   


    throughout the activity or intermittently.


3-Partial/Moderate Assistance-helper does LESS THAN HALF the effort. [place] 

lifts, holds or supports trunk or limbs, but provides less than half the effort.


2-Substantial/Maximal Assistance-helper does MORE THAN HALF the effort. [place] 

lifts or holds trunk or limbs and provides more than half the effort.


3-Hwivwuuek-mjemrl does ALL the effort. Patient does none of the effort to 

complete the activity. Or, the assistance of 2 or more helpers is required for 

the patient to complete the  


    activity.


If activity was not attempted, code reason:


7-Patient Refused.


9-Not Applicable-not attempted and the patient did not perform the activity 

before the current illness, exacerbation or injury.


10-Not Attempted due to Environmental Limitations-(lack of equipment, weather 

restraints, etc.).


88-Not Attempted due to Medical Conditions or Safety Concerns.


Roll Left to Right (QC):  6


Sit to Lying (QC):  6


Sit to Stand (QC):  6


Chair/Bed-to-Chair Xfer(QC):  6


Car Transfer (QC):  4





Gait Training


Does the Patient Walk?:  Yes


Walk 10 feet (QC):  88


Walk 50 ft with 2 Turns(QC):  88


Walk 150 ft (QC):  88


Walking 10ft/uneven surface-QC:  88


Gait Persons Needed:  1


Gait Assistive Device:  FWW





Wheelchair Training


Does the Pt Use a Wheelchair?:  Yes


Distance:  200' x2


Wheel 50 ft with 2 turns (QC):  6


Wheel 150 ft (QC):  6


Type of Wheelchair:  Manual





Stair Training


1 Step (curb) (QC):  88


4 Steps (QC):  88


12 Steps (QC):  88





Balance


Picking up an Object (QC):  88





ADL-Treatment


Eating (QC):  6


Oral Hygiene (QC):  6


Shower/Bathe Self (QC):  6


Upper Body Dressing (QC):  5


Lower Body Dressing (QC):  5


On/Off Footwear (QC):  4


Toileting Hygiene (QC):  6


Toilet Transfer (QC):  6





Assessment/Plan


Assessment and Plan


Assess & Plan/Chief Complaint


Assessment:


Left lateral ankle abscess and left heel ulceration s/p debridement with wound 

vac placement and abx Zosyn maintained


TERRI not on CPAP


Obesity


DM OOC


Anemia s/p 1 unit of blood iron level 8 so will order iron infusions





Plan:


Abx


Monitor pain


Wound vac


Dr Jiménez


IRF protocol





3/30/21:


Monitor hgb


Monitor sugars due to hypoglycemia





3/31/21:


Monitor sugar levels


Increase insulin may be needed


Dr Jiménez appreciated


BKA likely end result





4/1/21:


Await Dr Jiménez assessment tomorrow after wound vac changed


Needs BKA


Patient waiting on bariatric surgery, which he decided this 2 days ago, before 

contemplating BKA





4/2/21:


Dr Guzmán assessed the patient at my request and patient not interested in BKA so

plastics will need to reconstruct after wound healed


Hyperbaric chamber indicated once DC





4/3/21:


Monitor sugar


Monitor BP





4/4/21:


Monitor labs


Monitor wound vac





(1) Diabetic ulcer of foot associated with diabetes mellitus due to underlying 

condition, with necrosis of muscle


Status:  Acute


(2) Diabetes mellitus, type 2


Status:  Chronic


(3) Hypertension


Status:  Chronic


(4) Peripheral neuropathy


Status:  Acute


(5) Renal insufficiency


Status:  Acute


(6) Morbid obesity


Status:  Acute











NICOLE VILA DO                 Apr 4, 2021 14:07
Subjective


HPI/CC On Admission


Date Seen by Provider:  Apr 5, 2021


Time Seen by Provider:  09:00


Subjective/Events-last exam


4/5/21:


Pt doing pretty well


Bowels are moving


Debridement scheduled for Tuesday


Denies any other significant problems








4/4/21:


No major issues


Labs will be checked tomorrow


IV Zosyn maintained


Wound vac will be changed this week








4/3/21:


Patient doing well


No issues


PICC line fixed


Wound vac changed yesterday








4/2/21:


Patient doing well


Changed wound vac and I reviewed pics


Discussed with Dr Jiménez and Dr Guzmán about possible BKA


Plastics will need to see him after wound heals 


Long recovery expected.


BM+


Debridement Tuesday


PICC cath panda used








4/1/21:


Pt doing a little better today


Wound vac will be changed tomorrow and Dr. Jiménez will evaluate where additionally

he needs to deride


Talking about getting the lap band


Having some loose stool, Imodium was given


Zosyn maintained


Will restart Levemir of 5 units twice a day to avoid hypoglycemia








3/31/21:


Pt doing okay today 


Dr. Jiménez presented another debridement option versus a left below the knee 

amputation


The Pt has decided to go the debridement route


Bowels moved yesterday 


Sugars are doing okay 


Overall doing much better clinically 








3/30/21:


Pt doing pretty well


No significant complaints


I did evaluate the left foot and he has significant amount of loss of tissue 

since surgery, unsure it he can have any stabilization of that. He may very well

need a below the knee amputation which was originally discussed in the past


Anxious because his ankle is unstable


Hemoccult is negative


Took a shower today


Changed the wound vac


Hgb 8.7


Iron infusions continue considering his level is eight





Review of Systems


General:  Fatigue, Malaise


Musculoskeletal:  foot pain





Objective


Exam


Vital Signs





Vital Signs








  Date Time  Temp Pulse Resp B/P (MAP) Pulse Ox O2 Delivery O2 Flow Rate FiO2


 


4/5/21 21:07      Room Air  


 


4/5/21 16:00 36.3 76 16 109/64 (79) 95   





Capillary Refill :


General Appearance:  No Apparent Distress, Chronically ill, Obese


HEENT:  PERRL/EOMI, Normal ENT Inspection, Pharynx Normal


Neck:  Full Range of Motion, Normal Inspection, Non Tender, Supple


Respiratory:  Chest Non Tender, Lungs Clear, Normal Breath Sounds, No Accessory 

Muscle Use, No Respiratory Distress


Cardiovascular:  Regular Rate, Rhythm, No Edema, No Gallop, No JVD, No Murmur, 

Normal Peripheral Pulses


Gastrointestinal:  Normal Bowel Sounds, No Organomegaly, No Pulsatile Mass, Non 

Tender, Soft


Back:  Normal Inspection, No CVA Tenderness, No Vertebral Tenderness


Extremity:  Normal Capillary Refill


Neurologic/Psychiatric:  Alert, Oriented x3, Normal Mood/Affect, CNs II-XII Norm

as Tested


Skin:  Normal Color


Lymphatic:  No Adenopathy





Results/Procedures


Lab


Laboratory Tests


4/5/21 06:30








Patient resulted labs reviewed.





FIM


Transfers


Therapy Code Descriptions/Definitions 





Functional [place] Measure:


0=Not Assessed/NA        4=Minimal Assistance


1=Total Assistance        5=Supervision or Setup


2=Maximal Assistance  6=Modified [place]


3=Moderate Assistance 7=Complete IndependenceSCALE: Activities may be completed 

with or without assistive devices.





6-Indepedent-patient completes the activity by him/herself with no assistance fr

om a helper.


5-Set-up or Clean-up Assistance-helper sets up or cleans up; patient completes 

activity. [place] assists only prior to or  


    following the activity.


4-Supervision or Touching Assistance-helper provides verbal cues and/or 

touching/steadying and/or contact guard assistance as patient completes 

activity. Assistance may be provided   


    throughout the activity or intermittently.


3-Partial/Moderate Assistance-helper does LESS THAN HALF the effort. [place] 

lifts, holds or supports trunk or limbs, but provides less than half the effort.


2-Substantial/Maximal Assistance-helper does MORE THAN HALF the effort. [place] 

lifts or holds trunk or limbs and provides more than half the effort.


2-Pywrkmsbn-iunotq does ALL the effort. Patient does none of the effort to 

complete the activity. Or, the assistance of 2 or more helpers is required for 

the patient to complete the  


    activity.


If activity was not attempted, code reason:


7-Patient Refused.


9-Not Applicable-not attempted and the patient did not perform the activity 

before the current illness, exacerbation or injury.


10-Not Attempted due to Environmental Limitations-(lack of equipment, weather 

restraints, etc.).


88-Not Attempted due to Medical Conditions or Safety Concerns.


Roll Left to Right (QC):  6


Sit to Lying (QC):  6


Sit to Stand (QC):  6


Chair/Bed-to-Chair Xfer(QC):  6


Car Transfer (QC):  4





Gait Training


Does the Patient Walk?:  Yes


Walk 10 feet (QC):  88


Walk 50 ft with 2 Turns(QC):  88


Walk 150 ft (QC):  88


Walking 10ft/uneven surface-QC:  88


Gait Persons Needed:  1


Gait Assistive Device:  FWW





Wheelchair Training


Does the Pt Use a Wheelchair?:  Yes


Distance:  200' x2


Wheel 50 ft with 2 turns (QC):  6


Wheel 150 ft (QC):  6


Type of Wheelchair:  Manual





Stair Training


1 Step (curb) (QC):  88


4 Steps (QC):  88


12 Steps (QC):  88





Balance


Picking up an Object (QC):  88





ADL-Treatment


Eating (QC):  6


Oral Hygiene (QC):  6


Shower/Bathe Self (QC):  6


Upper Body Dressing (QC):  5


Lower Body Dressing (QC):  5


On/Off Footwear (QC):  4


Toileting Hygiene (QC):  6


Toilet Transfer (QC):  6





Assessment/Plan


Assessment and Plan


Assess & Plan/Chief Complaint


Assessment:


Left lateral ankle abscess and left heel ulceration s/p debridement with wound 

vac placement and abx Zosyn maintained


TERRI not on CPAP


Obesity


DM OOC


Anemia s/p 1 unit of blood iron level 8 so will order iron infusions





Plan:


Abx


Monitor pain


Wound vac


Dr Jiménez


IRF protocol





3/30/21:


Monitor hgb


Monitor sugars due to hypoglycemia





3/31/21:


Monitor sugar levels


Increase insulin may be needed


Dr Jiménez appreciated


BKA likely end result





4/1/21:


Await Dr Jiménez assessment tomorrow after wound vac changed


Needs BKA


Patient waiting on bariatric surgery, which he decided this 2 days ago, before 

contemplating BKA





4/2/21:


Dr Guzmán assessed the patient at my request and patient not interested in BKA so

plastics will need to reconstruct after wound healed


Hyperbaric chamber indicated once DC





4/3/21:


Monitor sugar


Monitor BP





4/4/21:


Monitor labs


Monitor wound vac





4/5/21:


Debridement tomorrow


Monitor closely


Abx per Dr Jiménez





(1) Diabetic ulcer of foot associated with diabetes mellitus due to underlying 

condition, with necrosis of muscle


Status:  Acute


(2) Diabetes mellitus, type 2


Status:  Chronic


(3) Hypertension


Status:  Chronic


(4) Peripheral neuropathy


Status:  Acute


(5) Renal insufficiency


Status:  Acute


(6) Morbid obesity


Status:  Acute











NICOLE VILA DO                 Apr 5, 2021 08:05
Subjective


HPI/CC On Admission


Date Seen by Provider:  Apr 6, 2021


Time Seen by Provider:  10:00


Subjective/Events-last exam


4/6/21:


Pt going into surgery today for debridement


Wound vac will be placed after debridement


No pain is reported at this current time








4/5/21:


Pt doing pretty well


Bowels are moving


Debridement scheduled for Tuesday


Denies any other significant problems








4/4/21:


No major issues


Labs will be checked tomorrow


IV Zosyn maintained


Wound vac will be changed this week








4/3/21:


Patient doing well


No issues


PICC line fixed


Wound vac changed yesterday








4/2/21:


Patient doing well


Changed wound vac and I reviewed pics


Discussed with Dr Jiménez and Dr Guzmán about possible BKA


Plastics will need to see him after wound heals 


Long recovery expected.


BM+


Debridement Tuesday


PICC cath panda used








4/1/21:


Pt doing a little better today


Wound vac will be changed tomorrow and Dr. Jiménez will evaluate where additionally

he needs to deride


Talking about getting the lap band


Having some loose stool, Imodium was given


Zosyn maintained


Will restart Levemir of 5 units twice a day to avoid hypoglycemia








3/31/21:


Pt doing okay today 


Dr. Jiménez presented another debridement option versus a left below the knee 

amputation


The Pt has decided to go the debridement route


Bowels moved yesterday 


Sugars are doing okay 


Overall doing much better clinically 








3/30/21:


Pt doing pretty well


No significant complaints


I did evaluate the left foot and he has significant amount of loss of tissue 

since surgery, unsure it he can have any stabilization of that. He may very well

need a below the knee amputation which was originally discussed in the past


Anxious because his ankle is unstable


Hemoccult is negative


Took a shower today


Changed the wound vac


Hgb 8.7


Iron infusions continue considering his level is eight





Review of Systems


General:  Fatigue, Malaise


Musculoskeletal:  foot pain





Objective


Exam


Vital Signs





Vital Signs








  Date Time  Temp Pulse Resp B/P (MAP) Pulse Ox O2 Delivery O2 Flow Rate FiO2


 


4/6/21 20:34      Room Air  


 


4/6/21 15:59 36.3 78 16 136/76 (96) 97   





Capillary Refill :


General Appearance:  No Apparent Distress, Chronically ill, Obese


HEENT:  PERRL/EOMI, Normal ENT Inspection, Pharynx Normal


Neck:  Full Range of Motion, Normal Inspection, Non Tender, Supple


Respiratory:  Chest Non Tender, Lungs Clear, Normal Breath Sounds, No Accessory 

Muscle Use, No Respiratory Distress


Cardiovascular:  Regular Rate, Rhythm, No Edema, No Gallop, No JVD, No Murmur, 

Normal Peripheral Pulses


Gastrointestinal:  Normal Bowel Sounds, No Organomegaly, No Pulsatile Mass, Non 

Tender, Soft


Back:  Normal Inspection, No CVA Tenderness, No Vertebral Tenderness


Extremity:  Normal Capillary Refill


Neurologic/Psychiatric:  Alert, Oriented x3, Normal Mood/Affect, CNs II-XII Norm

as Tested


Skin:  Normal Color


Lymphatic:  No Adenopathy





Results/Procedures


Lab


Patient resulted labs reviewed.





FIM


Transfers


Therapy Code Descriptions/Definitions 





Functional [place] Measure:


0=Not Assessed/NA        4=Minimal Assistance


1=Total Assistance        5=Supervision or Setup


2=Maximal Assistance  6=Modified [place]


3=Moderate Assistance 7=Complete IndependenceSCALE: Activities may be completed 

with or without assistive devices.





6-Indepedent-patient completes the activity by him/herself with no assistance 

from a helper.


5-Set-up or Clean-up Assistance-helper sets up or cleans up; patient completes 

activity. [place] assists only prior to or  


    following the activity.


4-Supervision or Touching Assistance-helper provides verbal cues and/or 

touching/steadying and/or contact guard assistance as patient completes 

activity. Assistance may be provided   


    throughout the activity or intermittently.


3-Partial/Moderate Assistance-helper does LESS THAN HALF the effort. [place] 

lifts, holds or supports trunk or limbs, but provides less than half the effort.


2-Substantial/Maximal Assistance-helper does MORE THAN HALF the effort. [place] 

lifts or holds trunk or limbs and provides more than half the effort.


9-Qajnyeygm-tmeqjb does ALL the effort. Patient does none of the effort to 

complete the activity. Or, the assistance of 2 or more helpers is required for 

the patient to complete the  


    activity.


If activity was not attempted, code reason:


7-Patient Refused.


9-Not Applicable-not attempted and the patient did not perform the activity 

before the current illness, exacerbation or injury.


10-Not Attempted due to Environmental Limitations-(lack of equipment, weather 

restraints, etc.).


88-Not Attempted due to Medical Conditions or Safety Concerns.


Roll Left to Right (QC):  6


Sit to Lying (QC):  6


Sit to Stand (QC):  6


Chair/Bed-to-Chair Xfer(QC):  6


Car Transfer (QC):  4





Gait Training


Does the Patient Walk?:  Yes


Walk 10 feet (QC):  88


Walk 50 ft with 2 Turns(QC):  88


Walk 150 ft (QC):  88


Walking 10ft/uneven surface-QC:  88


Gait Persons Needed:  1


Gait Assistive Device:  FWW





Wheelchair Training


Does the Pt Use a Wheelchair?:  Yes


Distance:  200' x2


Wheel 50 ft with 2 turns (QC):  6


Wheel 150 ft (QC):  6


Type of Wheelchair:  Manual





Stair Training


1 Step (curb) (QC):  88


4 Steps (QC):  88


12 Steps (QC):  88





Balance


Picking up an Object (QC):  88





ADL-Treatment


Eating (QC):  6


Oral Hygiene (QC):  6


Shower/Bathe Self (QC):  6


Upper Body Dressing (QC):  5


Lower Body Dressing (QC):  5


On/Off Footwear (QC):  4


Toileting Hygiene (QC):  6


Toilet Transfer (QC):  6





Assessment/Plan


Assessment and Plan


Assess & Plan/Chief Complaint


Assessment:


Left lateral ankle abscess and left heel ulceration s/p debridement with wound 

vac placement and abx Zosyn maintained


TERRI not on CPAP


Obesity


DM OOC


Anemia s/p 1 unit of blood iron level 8 so will order iron infusions





Plan:


Abx


Monitor pain


Wound vac


Dr Jiménez


IRF protocol





3/30/21:


Monitor hgb


Monitor sugars due to hypoglycemia





3/31/21:


Monitor sugar levels


Increase insulin may be needed


Dr Jiménez appreciated


BKA likely end result





4/1/21:


Await Dr Jiménez assessment tomorrow after wound vac changed


Needs BKA


Patient waiting on bariatric surgery, which he decided this 2 days ago, before 

contemplating BKA





4/2/21:


Dr Guzmán assessed the patient at my request and patient not interested in BKA so

plastics will need to reconstruct after wound healed


Hyperbaric chamber indicated once DC





4/3/21:


Monitor sugar


Monitor BP





4/4/21:


Monitor labs


Monitor wound vac





4/5/21:


Debridement tomorrow


Monitor closely


Abx per Dr Jiménez





4/6/21:


Monitor closely


Debridement today


Check labs in am





(1) Diabetic ulcer of foot associated with diabetes mellitus due to underlying 

condition, with necrosis of muscle


Status:  Acute


(2) Diabetes mellitus, type 2


Status:  Chronic


(3) Hypertension


Status:  Chronic


(4) Peripheral neuropathy


Status:  Acute


(5) Renal insufficiency


Status:  Acute


(6) Morbid obesity


Status:  Acute











NICOLE VILA DO                 Apr 6, 2021 05:24
Subjective


HPI/CC On Admission


Date Seen by Provider:  Apr 7, 2021


Time Seen by Provider:  08:45


Subjective/Events-last exam


4/7/21:


Pt had a debridement yesterday and took off most of his heel


Now has a right great toe blister


Hgb 9.2


WBC is normal


Will refer to [place] because he will need a ramp and an entire remodel for him

to be able to navigate in a wheelchair at home








4/6/21:


Pt going into surgery today for debridement


Wound vac will be placed after debridement


No pain is reported at this current time








4/5/21:


Pt doing pretty well


Bowels are moving


Debridement scheduled for Tuesday


Denies any other significant problems








4/4/21:


No major issues


Labs will be checked tomorrow


IV Zosyn maintained


Wound vac will be changed this week








4/3/21:


Patient doing well


No issues


PICC line fixed


Wound vac changed yesterday








4/2/21:


Patient doing well


Changed wound vac and I reviewed pics


Discussed with Dr Jiménez and Dr Guzmán about possible BKA


Plastics will need to see him after wound heals 


Long recovery expected.


BM+


Debridement Tuesday


PICC cath panda used








4/1/21:


Pt doing a little better today


Wound vac will be changed tomorrow and Dr. Jiménez will evaluate where additionally

he needs to deride


Talking about getting the lap band


Having some loose stool, Imodium was given


Zosyn maintained


Will restart Levemir of 5 units twice a day to avoid hypoglycemia








3/31/21:


Pt doing okay today 


Dr. Jiménez presented another debridement option versus a left below the knee 

amputation


The Pt has decided to go the debridement route


Bowels moved yesterday 


Sugars are doing okay 


Overall doing much better clinically 








3/30/21:


Pt doing pretty well


No significant complaints


I did evaluate the left foot and he has significant amount of loss of tissue 

since surgery, unsure it he can have any stabilization of that. He may very well

need a below the knee amputation which was originally discussed in the past


Anxious because his ankle is unstable


Hemoccult is negative


Took a shower today


Changed the wound vac


Hgb 8.7


Iron infusions continue considering his level is eight





Review of Systems


General:  Fatigue, Malaise


Musculoskeletal:  leg pain, foot pain





Objective


Exam


Vital Signs





Vital Signs








  Date Time  Temp Pulse Resp B/P (MAP) Pulse Ox O2 Delivery O2 Flow Rate FiO2


 


4/7/21 16:59 36.4 79 16 142/72 (95) 99   


 


4/7/21 08:54      Room Air  





Capillary Refill :


General Appearance:  No Apparent Distress, Chronically ill, Obese


HEENT:  PERRL/EOMI, Normal ENT Inspection, Pharynx Normal


Neck:  Full Range of Motion, Normal Inspection, Non Tender, Supple


Respiratory:  Chest Non Tender, Lungs Clear, Normal Breath Sounds, No Accessory 

Muscle Use, No Respiratory Distress


Cardiovascular:  Regular Rate, Rhythm, No Edema, No Gallop, No JVD, No Murmur, 

Normal Peripheral Pulses


Gastrointestinal:  Normal Bowel Sounds, No Organomegaly, No Pulsatile Mass, Non 

Tender, Soft


Back:  Normal Inspection, No CVA Tenderness, No Vertebral Tenderness


Extremity:  Normal Capillary Refill


Neurologic/Psychiatric:  Alert, Oriented x3, Normal Mood/Affect, CNs II-XII Norm

as Tested


Skin:  Normal Color


Lymphatic:  No Adenopathy





Results/Procedures


Lab


Laboratory Tests


4/7/21 06:30








Patient resulted labs reviewed.





FIM


Transfers


Therapy Code Descriptions/Definitions 





Functional [place] Measure:


0=Not Assessed/NA        4=Minimal Assistance


1=Total Assistance        5=Supervision or Setup


2=Maximal Assistance  6=Modified [place]


3=Moderate Assistance 7=Complete IndependenceSCALE: Activities may be completed 

with or without assistive devices.





6-Indepedent-patient completes the activity by him/herself with no assistance 

from a helper.


5-Set-up or Clean-up Assistance-helper sets up or cleans up; patient completes 

activity. [place] assists only prior to or  


    following the activity.


4-Supervision or Touching Assistance-helper provides verbal cues and/or 

touching/steadying and/or contact guard assistance as patient completes 

activity. Assistance may be provided   


    throughout the activity or intermittently.


3-Partial/Moderate Assistance-helper does LESS THAN HALF the effort. [place] 

lifts, holds or supports trunk or limbs, but provides less than half the effort.


2-Substantial/Maximal Assistance-helper does MORE THAN HALF the effort. [place] 

lifts or holds trunk or limbs and provides more than half the effort.


6-Ypjzcpinc-ijkblg does ALL the effort. Patient does none of the effort to 

complete the activity. Or, the assistance of 2 or more helpers is required for 

the patient to complete the  


    activity.


If activity was not attempted, code reason:


7-Patient Refused.


9-Not Applicable-not attempted and the patient did not perform the activity 

before the current illness, exacerbation or injury.


10-Not Attempted due to Environmental Limitations-(lack of equipment, weather 

restraints, etc.).


88-Not Attempted due to Medical Conditions or Safety Concerns.


Roll Left to Right (QC):  6


Sit to Lying (QC):  6


Sit to Stand (QC):  6


Chair/Bed-to-Chair Xfer(QC):  6


Car Transfer (QC):  4





Gait Training


Does the Patient Walk?:  Yes


Walk 10 feet (QC):  88


Walk 50 ft with 2 Turns(QC):  88


Walk 150 ft (QC):  88


Walking 10ft/uneven surface-QC:  88


Gait Persons Needed:  1


Gait Assistive Device:  FWW





Wheelchair Training


Does the Pt Use a Wheelchair?:  Yes


Distance:  200' x2


Wheel 50 ft with 2 turns (QC):  6


Wheel 150 ft (QC):  6


Type of Wheelchair:  Manual





Stair Training


1 Step (curb) (QC):  88


4 Steps (QC):  88


12 Steps (QC):  88





Balance


Picking up an Object (QC):  88





ADL-Treatment


Eating (QC):  6 (Per clinical judgement, pt able to complete independently.)


Oral Hygiene (QC):  6


Shower/Bathe Self (QC):  6


Upper Body Dressing (QC):  5


Lower Body Dressing (QC):  5


On/Off Footwear (QC):  5


Toileting Hygiene (QC):  6


Toilet Transfer (QC):  6





Assessment/Plan


Assessment and Plan


Assess & Plan/Chief Complaint


Assessment:


Left lateral ankle abscess and left heel ulceration s/p debridement with wound 

vac placement and abx Zosyn maintained


TERRI not on CPAP


Obesity


DM OOC


Anemia s/p 1 unit of blood iron level 8 so will order iron infusions





Plan:


Abx


Monitor pain


Wound vac


Dr Jiménez


IRF protocol





3/30/21:


Monitor hgb


Monitor sugars due to hypoglycemia





3/31/21:


Monitor sugar levels


Increase insulin may be needed


Dr Jiménez appreciated


BKA likely end result





4/1/21:


Await Dr Jiménez assessment tomorrow after wound vac changed


Needs BKA


Patient waiting on bariatric surgery, which he decided this 2 days ago, before 

contemplating BKA





4/2/21:


Dr Guzmán assessed the patient at my request and patient not interested in BKA so

plastics will need to reconstruct after wound healed


Hyperbaric chamber indicated once DC





4/3/21:


Monitor sugar


Monitor BP





4/4/21:


Monitor labs


Monitor wound vac





4/5/21:


Debridement tomorrow


Monitor closely


Abx per Dr Jiménez





4/6/21:


Monitor closely


Debridement today


Check labs in am





4/7/21:


Monitor pain


Glucose management


[place]





(1) Diabetic ulcer of foot associated with diabetes mellitus due to underlying 

condition, with necrosis of muscle


Status:  Acute


(2) Diabetes mellitus, type 2


Status:  Chronic


(3) Hypertension


Status:  Chronic


(4) Peripheral neuropathy


Status:  Acute


(5) Renal insufficiency


Status:  Acute


(6) Morbid obesity


Status:  Acute











NICOLE VILA DO                 Apr 7, 2021 08:38
Subjective


HPI/CC On Admission


Date Seen by Provider:  Apr 8, 2021


Time Seen by Provider:  11:00


Subjective/Events-last exam


4/8/21:


Patient doing well


Wound vac change tomorrow


Sugars are OK


Blister on right great toe 


[place]?








4/7/21:


Pt had a debridement yesterday and took off most of his heel


Now has a right great toe blister


Hgb 9.2


WBC is normal


Will refer to [place] because he will need a ramp and an entire remodel for him

to be able to navigate in a wheelchair at home








4/6/21:


Pt going into surgery today for debridement


Wound vac will be placed after debridement


No pain is reported at this current time








4/5/21:


Pt doing pretty well


Bowels are moving


Debridement scheduled for Tuesday


Denies any other significant problems








4/4/21:


No major issues


Labs will be checked tomorrow


IV Zosyn maintained


Wound vac will be changed this week








4/3/21:


Patient doing well


No issues


PICC line fixed


Wound vac changed yesterday








4/2/21:


Patient doing well


Changed wound vac and I reviewed pics


Discussed with Dr Jiménez and Dr Guzmán about possible BKA


Plastics will need to see him after wound heals 


Long recovery expected.


BM+


Debridement Tuesday


PICC cath panda used








4/1/21:


Pt doing a little better today


Wound vac will be changed tomorrow and Dr. Jiménez will evaluate where additionally

he needs to deride


Talking about getting the lap band


Having some loose stool, Imodium was given


Zosyn maintained


Will restart Levemir of 5 units twice a day to avoid hypoglycemia








3/31/21:


Pt doing okay today 


Dr. Jiménez presented another debridement option versus a left below the knee 

amputation


The Pt has decided to go the debridement route


Bowels moved yesterday 


Sugars are doing okay 


Overall doing much better clinically 








3/30/21:


Pt doing pretty well


No significant complaints


I did evaluate the left foot and he has significant amount of loss of tissue 

since surgery, unsure it he can have any stabilization of that. He may very well

need a below the knee amputation which was originally discussed in the past


Anxious because his ankle is unstable


Hemoccult is negative


Took a shower today


Changed the wound vac


Hgb 8.7


Iron infusions continue considering his level is eight





Review of Systems


General:  Fatigue, Malaise


Neurological:  Weakness





Objective


Exam


Vital Signs





Vital Signs








  Date Time  Temp Pulse Resp B/P (MAP) Pulse Ox O2 Delivery O2 Flow Rate FiO2


 


4/8/21 20:59      Room Air  


 


4/8/21 17:07 36.5 80 18 122/58 (79) 95   





Capillary Refill :


General Appearance:  No Apparent Distress, Chronically ill, Obese


HEENT:  PERRL/EOMI, Normal ENT Inspection, Pharynx Normal


Neck:  Full Range of Motion, Normal Inspection, Non Tender, Supple


Respiratory:  Chest Non Tender, Lungs Clear, Normal Breath Sounds, No Accessory 

Muscle Use, No Respiratory Distress


Cardiovascular:  Regular Rate, Rhythm, No Edema, No Gallop, No JVD, No Murmur, 

Normal Peripheral Pulses


Gastrointestinal:  Normal Bowel Sounds, No Organomegaly, No Pulsatile Mass, Non 

Tender, Soft


Back:  Normal Inspection, No CVA Tenderness, No Vertebral Tenderness


Extremity:  Normal Capillary Refill


Neurologic/Psychiatric:  Alert, Oriented x3, Normal Mood/Affect, CNs II-XII Norm

as Tested


Skin:  Normal Color


Lymphatic:  No Adenopathy





Results/Procedures


Lab


Patient resulted labs reviewed.





FIM


Transfers


Therapy Code Descriptions/Definitions 





Functional [place] Measure:


0=Not Assessed/NA        4=Minimal Assistance


1=Total Assistance        5=Supervision or Setup


2=Maximal Assistance  6=Modified [place]


3=Moderate Assistance 7=Complete IndependenceSCALE: Activities may be completed 

with or without assistive devices.





6-Indepedent-patient completes the activity by him/herself with no assistance 

from a helper.


5-Set-up or Clean-up Assistance-helper sets up or cleans up; patient completes 

activity. [place] assists only prior to or  


    following the activity.


4-Supervision or Touching Assistance-helper provides verbal cues and/or 

touching/steadying and/or contact guard assistance as patient completes 

activity. Assistance may be provided   


    throughout the activity or intermittently.


3-Partial/Moderate Assistance-helper does LESS THAN HALF the effort. [place] l

ifts, holds or supports trunk or limbs, but provides less than half the effort.


2-Substantial/Maximal Assistance-helper does MORE THAN HALF the effort. [place] 

lifts or holds trunk or limbs and provides more than half the effort.


0-Lvkgltclf-wpsrbu does ALL the effort. Patient does none of the effort to 

complete the activity. Or, the assistance of 2 or more helpers is required for t

he patient to complete the  


    activity.


If activity was not attempted, code reason:


7-Patient Refused.


9-Not Applicable-not attempted and the patient did not perform the activity 

before the current illness, exacerbation or injury.


10-Not Attempted due to Environmental Limitations-(lack of equipment, weather 

restraints, etc.).


88-Not Attempted due to Medical Conditions or Safety Concerns.


Roll Left to Right (QC):  6


Sit to Lying (QC):  6


Sit to Stand (QC):  4


Chair/Bed-to-Chair Xfer(QC):  4


Car Transfer (QC):  4





Gait Training


Does the Patient Walk?:  Yes


Walk 10 feet (QC):  88


Walk 50 ft with 2 Turns(QC):  88


Walk 150 ft (QC):  88


Walking 10ft/uneven surface-QC:  88


Gait Persons Needed:  1


Gait Assistive Device:  FWW





Wheelchair Training


Does the Pt Use a Wheelchair?:  Yes


Distance:  200' x2


Wheel 50 ft with 2 turns (QC):  6


Wheel 150 ft (QC):  6


Type of Wheelchair:  Manual





Stair Training


1 Step (curb) (QC):  88


4 Steps (QC):  88


12 Steps (QC):  88





Balance


Picking up an Object (QC):  88





ADL-Treatment


Eating (QC):  6


Oral Hygiene (QC):  6


Shower/Bathe Self (QC):  6


Upper Body Dressing (QC):  5


Lower Body Dressing (QC):  5


On/Off Footwear (QC):  5


Toileting Hygiene (QC):  6


Toilet Transfer (QC):  6





Assessment/Plan


Assessment and Plan


Assess & Plan/Chief Complaint


Assessment:


Left lateral ankle abscess and left heel ulceration s/p debridement with wound 

vac placement and abx Zosyn maintained


TERRI not on CPAP


Obesity


DM OOC


Anemia s/p 1 unit of blood iron level 8 so will order iron infusions





Plan:


Abx


Monitor pain


Wound vac


Dr Jiménez


IRF protocol





3/30/21:


Monitor hgb


Monitor sugars due to hypoglycemia





3/31/21:


Monitor sugar levels


Increase insulin may be needed


Dr Jiménez appreciated


BKA likely end result





4/1/21:


Await Dr Jiménez assessment tomorrow after wound vac changed


Needs BKA


Patient waiting on bariatric surgery, which he decided this 2 days ago, before 

contemplating BKA





4/2/21:


Dr Guzmán assessed the patient at my request and patient not interested in BKA so

plastics will need to reconstruct after wound healed


Hyperbaric chamber indicated once DC





4/3/21:


Monitor sugar


Monitor BP





4/4/21:


Monitor labs


Monitor wound vac





4/5/21:


Debridement tomorrow


Monitor closely


Abx per Dr Jiménez





4/6/21:


Monitor closely


Debridement today


Check labs in am





4/7/21:


Monitor pain


Glucose management


[place]





4/8/21:


Monitor closely


Wound vac change





(1) Diabetic ulcer of foot associated with diabetes mellitus due to underlying 

condition, with necrosis of muscle


Status:  Acute


(2) Diabetes mellitus, type 2


Status:  Chronic


(3) Hypertension


Status:  Chronic


(4) Peripheral neuropathy


Status:  Acute


(5) Renal insufficiency


Status:  Acute


(6) Morbid obesity


Status:  Acute











NICOLE VILA DO                 Apr 8, 2021 11:58
Subjective


HPI/CC On Admission


Date Seen by Provider:  Apr 9, 2021


Time Seen by Provider:  10:30


Subjective/Events-last exam


4/9/21:


Wound vac changed


Sugars are ok


No pain


Checked meds and labs








4/8/21:


Patient doing well


Wound vac change tomorrow


Sugars are OK


Blister on right great toe 


[place]?








4/7/21:


Pt had a debridement yesterday and took off most of his heel


Now has a right great toe blister


Hgb 9.2


WBC is normal


Will refer to [place] because he will need a ramp and an entire remodel for him

to be able to navigate in a wheelchair at home








4/6/21:


Pt going into surgery today for debridement


Wound vac will be placed after debridement


No pain is reported at this current time








4/5/21:


Pt doing pretty well


Bowels are moving


Debridement scheduled for Tuesday


Denies any other significant problems








4/4/21:


No major issues


Labs will be checked tomorrow


IV Zosyn maintained


Wound vac will be changed this week








4/3/21:


Patient doing well


No issues


PICC line fixed


Wound vac changed yesterday








4/2/21:


Patient doing well


Changed wound vac and I reviewed pics


Discussed with Dr Jiménez and Dr Guzmán about possible BKA


Plastics will need to see him after wound heals 


Long recovery expected.


BM+


Debridement Tuesday


PICC cath panda used








4/1/21:


Pt doing a little better today


Wound vac will be changed tomorrow and Dr. Jiménez will evaluate where additionally

he needs to deride


Talking about getting the lap band


Having some loose stool, Imodium was given


Zosyn maintained


Will restart Levemir of 5 units twice a day to avoid hypoglycemia








3/31/21:


Pt doing okay today 


Dr. Jiménez presented another debridement option versus a left below the knee 

amputation


The Pt has decided to go the debridement route


Bowels moved yesterday 


Sugars are doing okay 


Overall doing much better clinically 








3/30/21:


Pt doing pretty well


No significant complaints


I did evaluate the left foot and he has significant amount of loss of tissue 

since surgery, unsure it he can have any stabilization of that. He may very well

need a below the knee amputation which was originally discussed in the past


Anxious because his ankle is unstable


Hemoccult is negative


Took a shower today


Changed the wound vac


Hgb 8.7


Iron infusions continue considering his level is eight





Review of Systems


General:  Fatigue, Malaise


Musculoskeletal:  leg pain, foot pain


Neurological:  Weakness





Objective


Exam


Vital Signs





Vital Signs








  Date Time  Temp Pulse Resp B/P (MAP) Pulse Ox O2 Delivery O2 Flow Rate FiO2


 


4/9/21 21:00      Room Air  


 


4/9/21 17:01 36.2 81 20 121/63 ([phone] 98   





Capillary Refill :


General Appearance:  No Apparent Distress, Chronically ill, Obese


HEENT:  PERRL/EOMI, Normal ENT Inspection, Pharynx Normal


Neck:  Full Range of Motion, Normal Inspection, Non Tender, Supple


Respiratory:  Chest Non Tender, Lungs Clear, Normal Breath Sounds, No Accessory 

Muscle Use, No Respiratory Distress


Cardiovascular:  Regular Rate, Rhythm, No Edema, No Gallop, No JVD, No Murmur, 

Normal Peripheral Pulses


Gastrointestinal:  Normal Bowel Sounds, No Organomegaly, No Pulsatile Mass, Non 

Tender, Soft


Back:  Normal Inspection, No CVA Tenderness, No Vertebral Tenderness


Extremity:  Normal Capillary Refill


Neurologic/Psychiatric:  Alert, Oriented x3, Normal Mood/Affect, CNs II-XII Norm

as Tested


Skin:  Normal Color


Lymphatic:  No Adenopathy





Results/Procedures


Lab


Patient resulted labs reviewed.





FIM


Transfers


Therapy Code Descriptions/Definitions 





Functional [place] Measure:


0=Not Assessed/NA        4=Minimal Assistance


1=Total Assistance        5=Supervision or Setup


2=Maximal Assistance  6=Modified [place]


3=Moderate Assistance 7=Complete IndependenceSCALE: Activities may be completed 

with or without assistive devices.





6-Indepedent-patient completes the activity by him/herself with no assistance 

from a helper.


5-Set-up or Clean-up Assistance-helper sets up or cleans up; patient completes 

activity. [place] assists only prior to or  


    following the activity.


4-Supervision or Touching Assistance-helper provides verbal cues and/or 

touching/steadying and/or contact guard assistance as patient completes 

activity. Assistance may be provided   


    throughout the activity or intermittently.


3-Partial/Moderate Assistance-helper does LESS THAN HALF the effort. [place] 

lifts, holds or supports trunk or limbs, but provides less than half the effort.


2-Substantial/Maximal Assistance-helper does MORE THAN HALF the effort. [place] 

lifts or holds trunk or limbs and provides more than half the effort.


5-Gojtjemxw-ofyyec does ALL the effort. Patient does none of the effort to 

complete the activity. Or, the assistance of 2 or more helpers is required for 

the patient to complete the  


    activity.


If activity was not attempted, code reason:


7-Patient Refused.


9-Not Applicable-not attempted and the patient did not perform the activity 

before the current illness, exacerbation or injury.


10-Not Attempted due to Environmental Limitations-(lack of equipment, weather 

restraints, etc.).


88-Not Attempted due to Medical Conditions or Safety Concerns.


Roll Left to Right (QC):  6


Sit to Lying (QC):  6


Sit to Stand (QC):  4


Chair/Bed-to-Chair Xfer(QC):  4


Car Transfer (QC):  4





Gait Training


Does the Patient Walk?:  No and Walking Goal NOT indicated


Walk 10 feet (QC):  88


Walk 50 ft with 2 Turns(QC):  88


Walk 150 ft (QC):  88


Walking 10ft/uneven surface-QC:  88


Gait Persons Needed:  1


Gait Assistive Device:  FWW





Wheelchair Training


Does the Pt Use a Wheelchair?:  Yes


Distance:  200' x2


Wheel 50 ft with 2 turns (QC):  6


Wheel 150 ft (QC):  6


Type of Wheelchair:  Manual





Stair Training


1 Step (curb) (QC):  88


4 Steps (QC):  88


12 Steps (QC):  88





Balance


Picking up an Object (QC):  88





ADL-Treatment


Eating (QC):  6


Oral Hygiene (QC):  7


Shower/Bathe Self (QC):  6


Upper Body Dressing (QC):  5


Lower Body Dressing (QC):  5


On/Off Footwear (QC):  5


Toileting Hygiene (QC):  6


Toilet Transfer (QC):  6





Assessment/Plan


Assessment and Plan


Assess & Plan/Chief Complaint


Assessment:


Left lateral ankle abscess and left heel ulceration s/p debridement with wound 

vac placement and abx Zosyn maintained


TERRI not on CPAP


Obesity


DM OOC


Anemia s/p 1 unit of blood iron level 8 so will order iron infusions





Plan:


Abx


Monitor pain


Wound vac


Dr Jiménez


IRF protocol





3/30/21:


Monitor hgb


Monitor sugars due to hypoglycemia





3/31/21:


Monitor sugar levels


Increase insulin may be needed


Dr Jiménez appreciated


BKA likely end result





4/1/21:


Await Dr Jiménez assessment tomorrow after wound vac changed


Needs BKA


Patient waiting on bariatric surgery, which he decided this 2 days ago, before 

contemplating BKA





4/2/21:


Dr Guzmán assessed the patient at my request and patient not interested in BKA so

plastics will need to reconstruct after wound healed


Hyperbaric chamber indicated once DC





4/3/21:


Monitor sugar


Monitor BP





4/4/21:


Monitor labs


Monitor wound vac





4/5/21:


Debridement tomorrow


Monitor closely


Abx per Dr Jiménez





4/6/21:


Monitor closely


Debridement today


Check labs in am





4/7/21:


Monitor pain


Glucose management


[place]





4/8/21:


Monitor closely


Wound vac change





4/9/21:


Monitor foot


Insulin





(1) Diabetic ulcer of foot associated with diabetes mellitus due to underlying 

condition, with necrosis of muscle


Status:  Acute


(2) Diabetes mellitus, type 2


Status:  Chronic


(3) Hypertension


Status:  Chronic


(4) Peripheral neuropathy


Status:  Acute


(5) Renal insufficiency


Status:  Acute


(6) Morbid obesity


Status:  Acute











NICOLE VILA DO                 Apr 9, 2021 12:16
Subjective


HPI/CC On Admission


Date Seen by Provider:  Mar 30, 2021


Time Seen by Provider:  09:00


Subjective/Events-last exam


3/30/21:


Pt doing pretty well


No significant complaints


I did evaluate the left foot and he has significant amount of loss of tissue 

since surgery, unsure it he can have any stabilization of that. He may very well

need a below the knee amputation which was originally discussed in the past


Anxious because his ankle is unstable


Hemoccult is negative


Took a shower today


Changed the wound vac


Hgb 8.7


Iron infusions continue considering his level is eight





Review of Systems


General:  Fatigue, Malaise


Musculoskeletal:  leg pain, foot pain





Objective


Exam


Vital Signs





Vital Signs








  Date Time  Temp Pulse Resp B/P (MAP) Pulse Ox O2 Delivery O2 Flow Rate FiO2


 


3/30/21 21:29      Room Air  


 


3/30/21 17:19 36.6 82 18 125/70 (88) 98   





Capillary Refill :


General Appearance:  No Apparent Distress, WD/WN, Chronically ill, Obese


HEENT:  PERRL/EOMI, Normal ENT Inspection, Pharynx Normal


Neck:  Full Range of Motion, Normal Inspection, Non Tender, Supple, Carotid 

Bruit


Respiratory:  Chest Non Tender, Lungs Clear, Normal Breath Sounds, No Accessory 

Muscle Use, No Respiratory Distress


Cardiovascular:  Regular Rate, Rhythm, No Edema, No Gallop, No JVD, No Murmur, 

Normal Peripheral Pulses


Gastrointestinal:  Normal Bowel Sounds, No Organomegaly, No Pulsatile Mass, Non 

Tender, Soft


Back:  Normal Inspection, No CVA Tenderness, No Vertebral Tenderness


Extremity:  Normal Capillary Refill, Normal Inspection, Normal Range of Motion, 

Non Tender, No Calf Tenderness, No Pedal Edema, Other (left foot with wound vac)


Neurologic/Psychiatric:  Alert, Oriented x3, No Motor/Sensory Deficits, Normal 

Mood/Affect, CNs II-XII Norm as Tested, Abnormal Gait, Motor Weakness 

(generalized lower extremities)


Skin:  Normal Color, Warm/Dry


Lymphatic:  No Adenopathy





Results/Procedures


Lab


Laboratory Tests


3/30/21 06:15








Patient resulted labs reviewed.





FIM


Transfers


Therapy Code Descriptions/Definitions 





Functional [place] Measure:


0=Not Assessed/NA        4=Minimal Assistance


1=Total Assistance        5=Supervision or Setup


2=Maximal Assistance  6=Modified [place]


3=Moderate Assistance 7=Complete IndependenceSCALE: Activities may be completed 

with or without assistive devices.





6-Indepedent-patient completes the activity by him/herself with no assistance 

from a helper.


5-Set-up or Clean-up Assistance-helper sets up or cleans up; patient completes 

activity. [place] assists only prior to or  


    following the activity.


4-Supervision or Touching Assistance-helper provides verbal cues and/or 

touching/steadying and/or contact guard assistance as patient completes 

activity. Assistance may be provided   


    throughout the activity or intermittently.


3-Partial/Moderate Assistance-helper does LESS THAN HALF the effort. [place] 

lifts, holds or supports trunk or limbs, but provides less than half the effort.


2-Substantial/Maximal Assistance-helper does MORE THAN HALF the effort. [place] 

lifts or holds trunk or limbs and provides more than half the effort.


3-Wseuejyjk-eyvawo does ALL the effort. Patient does none of the effort to 

complete the activity. Or, the assistance of 2 or more helpers is required for 

the patient to complete the  


    activity.


If activity was not attempted, code reason:


7-Patient Refused.


9-Not Applicable-not attempted and the patient did not perform the activity 

before the current illness, exacerbation or injury.


10-Not Attempted due to Environmental Limitations-(lack of equipment, weather 

restraints, etc.).


88-Not Attempted due to Medical Conditions or Safety Concerns.


Roll Left to Right (QC):  6


Sit to Lying (QC):  6


Sit to Stand (QC):  4


Chair/Bed-to-Chair Xfer(QC):  4


Car Transfer (QC):  4





Gait Training


Does the Patient Walk?:  No and Walking Goal IS indicated


Walk 10 feet (QC):  88


Walk 50 ft with 2 Turns(QC):  88


Walk 150 ft (QC):  88


Walking 10ft/uneven surface-QC:  88





Wheelchair Training


Does the Pt Use a Wheelchair?:  Yes


Distance:  200' x2


Wheel 50 ft with 2 turns (QC):  6


Wheel 150 ft (QC):  6


Type of Wheelchair:  Manual





Stair Training


1 Step (curb) (QC):  88


4 Steps (QC):  88


12 Steps (QC):  88





Balance


Picking up an Object (QC):  88





ADL-Treatment


Eating (QC):  6 (Per pt report)


Oral Hygiene (QC):  5 (based on clinical judgement, set up assist)


Shower/Bathe Self (QC):  3 (Assist BLE lower legs/feet and buttocks. Pt able to 

wash all other parts, SBA in stand at FWW)


Upper Body Dressing (QC):  5 (set up)


Lower Body Dressing (QC):  4 (assist managing wound vac, SBA in stand)


On/Off Footwear (QC):  1 (Total assist donning/doffing RLE gripper sock)


Toileting Hygiene (QC):  4 (SBA, pt able to manage clothes and perform hygiene)





Assessment/Plan


Assessment and Plan


Assess & Plan/Chief Complaint


Assessment:


Left lateral ankle abscess and left heel ulceration s/p debridement with wound 

vac placement and abx Zosyn maintained


TERRI not on CPAP


Obesity


DM OOC


Anemia s/p 1 unit of blood iron level 8 so will order iron infusions





Plan:


Abx


Monitor pain


Wound vac


Dr Jiménez


IRF protocol





3/30/21:


Monitor hgb


Monitor sugars due to hypoglycemia





(1) Diabetic ulcer of foot associated with diabetes mellitus due to underlying 

condition, with necrosis of muscle


Status:  Acute


(2) Diabetes mellitus, type 2


Status:  Chronic


(3) Hypertension


Status:  Chronic


(4) Peripheral neuropathy


Status:  Acute


(5) Renal insufficiency


Status:  Acute


(6) Morbid obesity


Status:  Acute











NICOLE VILA DO                Mar 30, 2021 08:55
Subjective


HPI/CC On Admission


Date Seen by Provider:  Mar 31, 2021


Time Seen by Provider:  09:00


Subjective/Events-last exam


3/31/21:


Pt doing okay today 


Dr. Jiménez presented another debridement option versus a left below the knee 

amputation


The Pt has decided to go the debridement route


Bowels moved yesterday 


Sugars are doing okay 


Overall doing much better clinically 








3/30/21:


Pt doing pretty well


No significant complaints


I did evaluate the left foot and he has significant amount of loss of tissue 

since surgery, unsure it he can have any stabilization of that. He may very well

need a below the knee amputation which was originally discussed in the past


Anxious because his ankle is unstable


Hemoccult is negative


Took a shower today


Changed the wound vac


Hgb 8.7


Iron infusions continue considering his level is eight





Review of Systems


General:  Fatigue, Malaise


Neurological:  Weakness, Incoordination





Objective


Exam


Vital Signs





Vital Signs








  Date Time  Temp Pulse Resp B/P (MAP) Pulse Ox O2 Delivery O2 Flow Rate FiO2


 


3/31/21 21:00      Room Air  


 


3/31/21 18:49 36.0 81 18 154/81 (105) 100   





Capillary Refill :


General Appearance:  No Apparent Distress, WD/WN, Chronically ill, Obese


HEENT:  PERRL/EOMI, Normal ENT Inspection, Pharynx Normal


Neck:  Full Range of Motion, Normal Inspection, Non Tender, Supple, Carotid 

Bruit


Respiratory:  Chest Non Tender, Lungs Clear, Normal Breath Sounds, No Accessory 

Muscle Use, No Respiratory Distress


Cardiovascular:  Regular Rate, Rhythm, No Edema, No Gallop, No JVD, No Murmur, 

Normal Peripheral Pulses


Gastrointestinal:  Normal Bowel Sounds, No Organomegaly, No Pulsatile Mass, Non 

Tender, Soft


Back:  Normal Inspection, No CVA Tenderness, No Vertebral Tenderness


Extremity:  Normal Capillary Refill, Normal Inspection, Normal Range of Motion, 

Non Tender, No Calf Tenderness, No Pedal Edema, Other (left foot with wound vac)


Neurologic/Psychiatric:  Alert, Oriented x3, No Motor/Sensory Deficits, Normal 

Mood/Affect, CNs II-XII Norm as Tested, Abnormal Gait, Motor Weakness (gener

alized lower extremities)


Skin:  Normal Color, Warm/Dry


Lymphatic:  No Adenopathy





Results/Procedures


Lab


Patient resulted labs reviewed.





FIM


Transfers


Therapy Code Descriptions/Definitions 





Functional [place] Measure:


0=Not Assessed/NA        4=Minimal Assistance


1=Total Assistance        5=Supervision or Setup


2=Maximal Assistance  6=Modified [place]


3=Moderate Assistance 7=Complete IndependenceSCALE: Activities may be completed 

with or without assistive devices.





6-Indepedent-patient completes the activity by him/herself with no assistance 

from a helper.


5-Set-up or Clean-up Assistance-helper sets up or cleans up; patient completes 

activity. [place] assists only prior to or  


    following the activity.


4-Supervision or Touching Assistance-helper provides verbal cues and/or 

touching/steadying and/or contact guard assistance as patient completes 

activity. Assistance may be provided   


    throughout the activity or intermittently.


3-Partial/Moderate Assistance-helper does LESS THAN HALF the effort. [place] 

lifts, holds or supports trunk or limbs, but provides less than half the effort.


2-Substantial/Maximal Assistance-helper does MORE THAN HALF the effort. [place] 

lifts or holds trunk or limbs and provides more than half the effort.


5-Cektmtoom-cmoyfm does ALL the effort. Patient does none of the effort to 

complete the activity. Or, the assistance of 2 or more helpers is required for 

the patient to complete the  


    activity.


If activity was not attempted, code reason:


7-Patient Refused.


9-Not Applicable-not attempted and the patient did not perform the activity 

before the current illness, exacerbation or injury.


10-Not Attempted due to Environmental Limitations-(lack of equipment, weather 

restraints, etc.).


88-Not Attempted due to Medical Conditions or Safety Concerns.


Roll Left to Right (QC):  4


Sit to Lying (QC):  6


Sit to Stand (QC):  6


Chair/Bed-to-Chair Xfer(QC):  6


Car Transfer (QC):  4





Gait Training


Does the Patient Walk?:  No and Walking Goal IS indicated


Walk 10 feet (QC):  88


Walk 50 ft with 2 Turns(QC):  88


Walk 150 ft (QC):  88


Walking 10ft/uneven surface-QC:  88





Wheelchair Training


Does the Pt Use a Wheelchair?:  Yes


Distance:  200' x2


Wheel 50 ft with 2 turns (QC):  6


Wheel 150 ft (QC):  6


Type of Wheelchair:  Manual





Stair Training


1 Step (curb) (QC):  88


4 Steps (QC):  88


12 Steps (QC):  88





Balance


Picking up an Object (QC):  88





ADL-Treatment


Eating (QC):  6 (Using clinical judgement, pt able to complete independently.)


Oral Hygiene (QC):  6


Shower/Bathe Self (QC):  4


Upper Body Dressing (QC):  5


Lower Body Dressing (QC):  4


On/Off Footwear (QC):  2


Toileting Hygiene (QC):  6


Toilet Transfer (QC):  6





Assessment/Plan


Assessment and Plan


Assess & Plan/Chief Complaint


Assessment:


Left lateral ankle abscess and left heel ulceration s/p debridement with wound 

vac placement and abx Zosyn maintained


TERRI not on CPAP


Obesity


DM OOC


Anemia s/p 1 unit of blood iron level 8 so will order iron infusions





Plan:


Abx


Monitor pain


Wound vac


Dr Jiménez


IRF protocol





3/30/21:


Monitor hgb


Monitor sugars due to hypoglycemia





3/31/21:


Monitor sugar levels


Increase insulin may be needed


Dr Jiménez appreciated


BKA likely end result





(1) Diabetic ulcer of foot associated with diabetes mellitus due to underlying 

condition, with necrosis of muscle


Status:  Acute


(2) Diabetes mellitus, type 2


Status:  Chronic


(3) Hypertension


Status:  Chronic


(4) Peripheral neuropathy


Status:  Acute


(5) Renal insufficiency


Status:  Acute


(6) Morbid obesity


Status:  Acute











NICOLE VILA DO                Mar 31, 2021 05:21
Labs:                        9.3    14.67 )-----------( 274      ( 2019 12:26 )             28.4                                       138  |  91<L>  |  108<HH>  ----------------------------<  248<H>  2.5<LL>   |  30  |  1.6<H>    Ca    8.5      2019 12:26    TPro  7.2  /  Alb  2.3<L>  /  TBili  0.5  /  DBili  x   /  AST  100<H>  /  ALT  50<H>  /  AlkPhos  261<H>      LIVER FUNCTIONS - ( 2019 12:26 )  Alb: 2.3 g/dL / Pro: 7.2 g/dL / ALK PHOS: 261 U/L / ALT: 50 U/L / AST: 100 U/L / GGT: x                                        CARDIAC MARKERS ( 2019 12:26 )  x     / 0.04 ng/mL / x     / x     / x        Lactate, Blood: 3.8 mmol/L (19 @ 12:26)    Serum Pro-Brain Natriuretic Peptide: 6349 pg/mL (19 @ 12:26)  Lactate, Blood: 3.8 mmol/L (19 @ 12:26)     Troponin T, Serum: 0.04 ng/mL (19 @ 12:26)    Serum Pro-Brain Natriuretic Peptide: 6349 pg/mL (19 @ 12:26)    Imaging:  < from: Xray Chest 1 View- PORTABLE-Urgent (19 @ 14:47) >    Impression:      No consolidation effusion or pneumothorax.    EC/25/19: UNOFFICIAL read -> atrial fibrillation VR 70   f/u official read    < from: 12 Lead ECG (19 @ 10:02) >    Diagnosis Line Sinus rhythm with Premature atrial complexes  Poor R wave progression  Abnormal ECG    < end of copied text >    Echo:  < from: Transthoracic Echocardiogram (19 @ 10:02) >    Summary:   1. Normal global left ventricular systolic function.   2. LV Ejection Fraction by Cho's Method with a biplane EF of 60 %.   3. Mildly increased LV wall thickness.   4. There is mild concentric left ventricular hypertrophy.   5. The mean global longitudinal strain by speckle tracking is -18.4%   which iw normal.   6. Moderate mitral valve regurgitation.   7. Severe mitral annular calcification.   8. Mild tricuspid regurgitation.   9. Mild aortic regurgitation.  10. Peak transaortic gradient is 43.6 mmHg, mean transaortic gradient   equals 27.6 mmHg, the calculated aortic valve area equals 0.67 cm² by the   continuity equation consistent with moderate aortic stenosis.    < end of copied text >

## 2021-04-13 NOTE — PHYSICAL THERAPY DAILY NOTE
PT Daily Note-Current


Subjective


Patient upright in recliner. Discussed obtaining final quality codes during 

today's session. Patient requested to stay in room and work on strengthening 

secondary to fatigue, excess lines/tubes, and the understanding from a 

conversation earlier in the day that he would not be discharged from IRF. 

Patient agreed to obtain quality codes in the morning. Patient consented to 

therapy.





Appearance


Patient upright in chair, with call button within reach and tray table nearby.





Mental Status


Patient Orientation:  Person, Place, Time, Normal For Age


Attachments:  IV


wound vac





Transfers


SCALE: Activities may be completed with or without assistive devices.





6-Indepedent-patient completes the activity by him/herself with no assistance 

from a helper.


5-Set-up or Clean-up Assistance-helper sets up or cleans up; patient completes 

activity. [place] assists only prior to or  


    following the activity.


4-Supervision or Touching Assistance-helper provides verbal cues and/or touching

/steadying and/or contact guard assistance as patient completes activity. 

Assistance may be provided   


    throughout the activity or intermittently.


3-Partial/Moderate Assistance-helper does LESS THAN HALF the effort. [place] 

lifts, holds or supports trunk or limbs, but provides less than half the effort.


2-Substantial/Maximal Assistance-helper does MORE THAN HALF the effort. [place] 

lifts or holds trunk or limbs and provides more than half the effort.


0-Djkxttlzx-unyfsp does ALL the effort. Patient does none of the effort to 

complete the activity. Or, the assistance of 2 or more helpers is required for 

the patient to complete the  


    activity.


If activity was not attempted, code reason:


7-Patient Refused.


9-Not Applicable-not attempted and the patient did not perform the activity 

before the current illness, exacerbation or injury.


10-Not Attempted due to Environmental Limitations-(lack of equipment, weather 

restraints, etc.).


88-Not Attempted due to Medical Conditions or Safety Concerns.





Weight Bearing


Right Lower Extremity:  Right


Full Weight Bearing


Left Lower Extremity:  Left


Non Weight Bearing





Patient NWB on L foot, no pressure allowed





Exercises


Supine Ex:  Ankle pumps, Quad Set, Glut sets, Heel Slides (R only), Straight leg

raise


Supine Reps:  20 (3 sets)


Seated Therapy Exercises:  Biceps, Shoulder Flex, Chair press-ups, Shoulder Abd


Seated Reps:  20 (3 sets)


seated in recliner with legs elevated





Treatments


LE strengthening, UE strengthening





Assessment


Current Status:  Fair Progress


Patient will continue to benefit from UE/LE strengthening to prepare for post op

needs to improve functional mobility and safety with return home.





PT Short Term Goals


Short Term Goals


Time Frame:  2021


Roll Left & Right:  6


Sit to lyin


Lying to sitting on side of be:  6


Sit to stand:  4 (SBA)


Chair/bed-to-chair transfer:  4 (SBA)


Toilet transfer:  4 (SBA)


Car transfer:  4 (SBA)


Walk 10 feet:  4 (SBA)


Does pt use a wc or scooter:  Yes


Wheel 50ft w/2 turns:  6


Wheel 150 feet:  6


Type:  Manual





PT Long Term Goals


Long Term Goals


PT Long Term Goals Time Frame:  2021


Roll Left & Right (QC):  6


Sit to Lying (QC):  6


Lying-Sitting on Side/Bed(QC):  6


Sit to Stand (QC):  6


Chair/Bed-to-Chair Xfer(QC):  6


Toilet Transfer (QC):  6


Car Transfer (QC):  6


Does the Patient Walk:  No and Walking Goal IS indicated


Walk 10 feet (QC):  6


Walk 50ft with 2 Turns (QC):  6


Walk 150 ft (QC):  88


Walking 10ft on Uneven Surface:  6


1 Step (curb) (QC):  4


4 Steps (QC):  88


12 Steps (QC):  88


Picking up an Object (QC):  88


Does the Pt use WC or Scooter?:  Yes


Wheel 50 feet with 2 turns (QC:  6


Type:  Manual


Wheel 150 feet:  6


Type:  Manual





PT Plan


Problem List


Problem List:  Activity Tolerance, Functional Strength, Safety, Balance, Gait, 

Transfer, Bed Mobility, ROM





Treatment/Plan


Treatment Plan:  Continue Plan of Care


Treatment Plan:  Bed Mobility, Education, Functional Activity Aung, Functional 

Strength, Group Therapy, Gait, Safety, Therapeutic Exercise, Transfers


Treatment Duration:  2021


Frequency:  At least 5 of 7 days/Wk (IRF)


Estimated Hrs Per Day:  1.5 hours per day


Patient and/or Family Agrees t:  Yes





Safety Risks/Education


Patient Education:  Correct Positioning, Safety Issues


Teaching Recipient:  Patient


Teaching Methods:  Demonstration, Discussion


Response to Teaching:  Verbalize Understanding, Return Demonstration





Time/GCodes


Time In:  1300


Time Out:  1330


Total Billed Treatment Time:  30


Total Billed Treatment


1 visit: 


EX x2: 30'











CAMILLE HARRY PT                2021 15:30

## 2021-04-13 NOTE — PHYSICAL THERAPY DAILY NOTE
PT Daily Note-Current


Subjective


Patient seated upright in chair pre tx, consented to therapy. Discussed 

obtaining further quality codes before amputation scheduled for following day. 

Patient reported he would prefer to demonstrate car transfer and wheelchair 

ambulation in the morning, as he was tired, had excess lines and tubes hooked up

to him at the present time, and did not feel he would be discharged based on the

information he had been given. Patient requested chair strengthening exercises 

to prepare him for surgery the following day.





Appearance


Patient seated upright in chair post tx, call button within reach and tray table

nearby.





Mental Status


Patient Orientation:  Person, Place, Time, Normal For Age


Attachments:  IV


wound vac





Transfers


SCALE: Activities may be completed with or without assistive devices.





6-Indepedent-patient completes the activity by him/herself with no assistance 

from a helper.


5-Set-up or Clean-up Assistance-helper sets up or cleans up; patient completes 

activity. [place] assists only prior to or  


    following the activity.


4-Supervision or Touching Assistance-helper provides verbal cues and/or 

touching/steadying and/or contact guard assistance as patient completes 

activity. Assistance may be provided   


    throughout the activity or intermittently.


3-Partial/Moderate Assistance-helper does LESS THAN HALF the effort. [place] 

lifts, holds or supports trunk or limbs, but provides less than half the effort.


2-Substantial/Maximal Assistance-helper does MORE THAN HALF the effort. [place] 

lifts or holds trunk or limbs and provides more than half the effort.


3-Qbqkvzuhm-gdpfpp does ALL the effort. Patient does none of the effort to 

complete the activity. Or, the assistance of 2 or more helpers is required for 

the patient to complete the  


    activity.


If activity was not attempted, code reason:


7-Patient Refused.


9-Not Applicable-not attempted and the patient did not perform the activity 

before the current illness, exacerbation or injury.


10-Not Attempted due to Environmental Limitations-(lack of equipment, weather 

restraints, etc.).


88-Not Attempted due to Medical Conditions or Safety Concerns.





Weight Bearing


Right Lower Extremity:  Right


Full Weight Bearing


Left Lower Extremity:  Left


Non Weight Bearing





Patient NWB on L foot, no pressure allowed





Exercises


Supine Ex:  Ankle pumps, Quad Set, Glut sets, Heel Slides (R only), Bicep Curls


Supine Reps:  20 (3 sets)


Seated Therapy Exercises:  Shoulder Flex, Chair press-ups, Shoulder Abd


Seated Reps:  20 (3 sets)


seated in recliner with legs elevated





Treatments


LE/UE strengthening, ROM





Assessment


Current Status:  Fair Progress


Patient will continue to benefit from interventions focused on UE and LE 

strengthening to promote safe return home post BKA.





PT Short Term Goals


Short Term Goals


Time Frame:  2021


Roll Left & Right:  6


Sit to lyin


Lying to sitting on side of be:  6


Sit to stand:  4 (SBA)


Chair/bed-to-chair transfer:  4 (SBA)


Toilet transfer:  4 (SBA)


Car transfer:  4 (SBA)


Walk 10 feet:  4 (SBA)


Does pt use a wc or scooter:  Yes


Wheel 50ft w/2 turns:  6


Wheel 150 feet:  6


Type:  Manual





PT Long Term Goals


Long Term Goals


PT Long Term Goals Time Frame:  2021


Roll Left & Right (QC):  6


Sit to Lying (QC):  6


Lying-Sitting on Side/Bed(QC):  6


Sit to Stand (QC):  6


Chair/Bed-to-Chair Xfer(QC):  6


Toilet Transfer (QC):  6


Car Transfer (QC):  6


Does the Patient Walk:  No and Walking Goal IS indicated


Walk 10 feet (QC):  6


Walk 50ft with 2 Turns (QC):  6


Walk 150 ft (QC):  88


Walking 10ft on Uneven Surface:  6


1 Step (curb) (QC):  4


4 Steps (QC):  88


12 Steps (QC):  88


Picking up an Object (QC):  88


Does the Pt use WC or Scooter?:  Yes


Wheel 50 feet with 2 turns (QC:  6


Type:  Manual


Wheel 150 feet:  6


Type:  Manual





PT Plan


Problem List


Problem List:  Activity Tolerance, Functional Strength, Safety, Balance, Gait, 

Transfer, Bed Mobility, ROM





Treatment/Plan


Treatment Plan:  Continue Plan of Care


Treatment Plan:  Bed Mobility, Education, Functional Activity Aung, Functional 

Strength, Group Therapy, Gait, Safety, Therapeutic Exercise, Transfers


Treatment Duration:  2021


Frequency:  At least 5 of 7 days/Wk (IRF)


Estimated Hrs Per Day:  1.5 hours per day


Patient and/or Family Agrees t:  Yes





Time/GCodes


Time In:  1300


Time Out:  1330


Total Billed Treatment Time:  30


Total Billed Treatment


1 visit: 


EX x2: 30' 








This note was cancelled due to wrong patient.











ADRIANNA BERNABE PT              2021 14:47

## 2021-04-13 NOTE — OCCUPATIONAL THER DAILY NOTE
OT Current Status-Daily Note


Subjective


Pt alert, sitting in recliner.  Pt agrees to therapy.  No c/o pain.  Pt to have 

BKA of L foot tomorrow.





Mental Status/Objective


Patient Orientation:  Person, Place, Time, Situation


Attachments:  Drains (wound vac), IV (midline)





ADL-Treatment


Pt agrees to shower.  Pt is independent with toileting and toilet transfers 

using FWW/wheelchair, grabbars.  Transfers in/out of shower independently using 

FWW/wheelchair, grabbars and shower bench.  Completes shower sitting on shower 

bench using grabbars, hand held shower and LH sponge independently.  Set up for 

dressing only, pt manipulates wound vac to thread in/out of pant legs.  Pt using

drsg stick and sock aide to don/doff socks.  Sits at sink to complete oral care.


Therapy Code Descriptions/Definitions 





Functional [place] Measure:


0=Not Assessed/NA        4=Minimal Assistance


1=Total Assistance        5=Supervision or Setup


2=Maximal Assistance  6=Modified Hoffman


3=Moderate Assistance 7=Complete IndependenceSCALE: Activities may be completed 

with or without assistive devices.





6-Indepedent-patient completes the activity by him/herself with no assistance 

from a helper.


5-Set-up or Clean-up Assistance-helper sets up or cleans up; patient completes 

activity. [place] assists only prior to or  


    following the activity.


4-Supervision or Touching Assistance-helper provides verbal cues and/or 

touching/steadying and/or contact guard assistance as patient completes 

activity. Assistance may be provided   


    throughout the activity or intermittently.


3-Partial/Moderate Assistance-helper does LESS THAN HALF the effort. [place] 

lifts, holds or supports trunk or limbs, but provides less than half the effort.


2-Substantial/Maximal Assistance-helper does MORE THAN HALF the effort. [place] 

lifts or holds trunk or limbs and provides more than half the effort.


7-Ikihhpgjt-iskqdj does ALL the effort. Patient does none of the effort to 

complete the activity. Or, the assistance of 2 or more helpers is required for 

the patient to complete the  


    activity.


If activity was not attempted, code reason:


7-Patient Refused.


9-Not Applicable-not attempted and the patient did not perform the activity 

before the current illness, exacerbation or injury.


10-Not Attempted due to Environmental Limitations-(lack of equipment, weather 

restraints, etc.).


88-Not Attempted due to Medical Conditions or Safety Concerns.


Eating (QC):  6 (Using clinical judgement, pt able to open packages and 

containers then use regular utensils to eat.)


Oral Hygiene (QC):  6


Shower/Bathe Self (QC):  6


Upper Body Dressing (QC):  5


Lower Body Dressing (QC):  5


On/Off Footwear:  5


Toileting Hygiene (QC):  6


Toilet Transfer (QC):  6


Due to manipulating wound vac/tubing assist is needed to retrieve items for 

dressing with either w/c or FWW.





Other Treatment


Pt educated on wrist and forearm exercises with medium resistance theraband to 

increase strength for daily functional tasks.  1 set 30 reps of wrist flex/ext. 

After session, pt sitting in recliner with call light/phone in reach.  All needs

met in room.





OT Short Term Goals


Short Term Goals


Time Frame:  2021


Shower/bathe self:  4


Lower body dressin


Putting on/taking off footwear:  3





OT Long Term Goals


Long Term Goals


Time Frame:  2021


Eating (QC):  6 (met)


Oral Hygiene (QC):  6 (met)


Toileting Hygiene (QC):  6 (met)


Shower/Bathe Self (QC):  6 (met)


Upper Body Dressing (QC):  6 (not met)


Lower Body Dressing (QC):  6 (not met)


On/Off Footwear (QC):  6 (not met)


Additional Goals:  1-Demonstrate ADL Tasks, 2-Verbalize Understanding, 3-

ImproveStrength/Aung


1=Demonstrate adherence to instructed precautions during ADL tasks.


2=Patient will verbalize/demonstrate understanding of assistive 

devices/modifications for ADL.


3=Patient will improve strength/tolerance for activity to enable patient to 

perform ADL's.





OT Education/Plan


Problem List/Assessment


Assessment:  Impaired I ADL's, Impaired Self-Care Skills





Discharge Recommendations


Plan/Recommendations:  Continue POC





Treatment Plan/Plan of Care


Patient would benefit from OT for education, treatment and training to promote 

independence in ADL's, mobility, safety and/or upper extremity function for 

ADL's.


Plan of Care:  ADL Retraining, Functional Mobility, Group Exercise/Act as Ind, 

UE Funct Exercise/Act


Treatment Duration:  2021


Frequency:  At least 5 of 7 days/Wk (IRF)


Estimated Hrs Per Day:  1.5 hours per day


Agreement:  Yes


Rehab Potential:  Fair





Time/GCodes


Start Time:  10:00


Stop Time:  11:30


Total Time Billed (hr/min):  90


Billed Treatment Time


1 visit-ADL 5 (75 min)  EX 1 (15 min)











GUDELIA GONZALEZ               2021 11:36

## 2021-04-14 ENCOUNTER — HOSPITAL ENCOUNTER (INPATIENT)
Dept: HOSPITAL 75 - SURG | Age: 41
LOS: 7 days | Discharge: NURSING, REHAB OR LONG-TERM CARE | DRG: 616 | End: 2021-04-21
Attending: SURGERY | Admitting: SURGERY
Payer: COMMERCIAL

## 2021-04-14 VITALS — SYSTOLIC BLOOD PRESSURE: 109 MMHG | DIASTOLIC BLOOD PRESSURE: 52 MMHG

## 2021-04-14 VITALS — BODY MASS INDEX: 42.66 KG/M2 | WEIGHT: 315 LBS | HEIGHT: 72.01 IN

## 2021-04-14 VITALS — DIASTOLIC BLOOD PRESSURE: 75 MMHG | SYSTOLIC BLOOD PRESSURE: 127 MMHG

## 2021-04-14 VITALS — DIASTOLIC BLOOD PRESSURE: 71 MMHG | SYSTOLIC BLOOD PRESSURE: 120 MMHG

## 2021-04-14 VITALS — SYSTOLIC BLOOD PRESSURE: 136 MMHG | DIASTOLIC BLOOD PRESSURE: 82 MMHG

## 2021-04-14 VITALS — DIASTOLIC BLOOD PRESSURE: 52 MMHG | SYSTOLIC BLOOD PRESSURE: 109 MMHG

## 2021-04-14 VITALS — SYSTOLIC BLOOD PRESSURE: 116 MMHG | DIASTOLIC BLOOD PRESSURE: 70 MMHG

## 2021-04-14 VITALS — DIASTOLIC BLOOD PRESSURE: 75 MMHG | SYSTOLIC BLOOD PRESSURE: 130 MMHG

## 2021-04-14 VITALS — SYSTOLIC BLOOD PRESSURE: 120 MMHG | DIASTOLIC BLOOD PRESSURE: 72 MMHG

## 2021-04-14 VITALS — SYSTOLIC BLOOD PRESSURE: 111 MMHG | DIASTOLIC BLOOD PRESSURE: 70 MMHG

## 2021-04-14 VITALS — DIASTOLIC BLOOD PRESSURE: 75 MMHG | SYSTOLIC BLOOD PRESSURE: 125 MMHG

## 2021-04-14 VITALS — DIASTOLIC BLOOD PRESSURE: 64 MMHG | SYSTOLIC BLOOD PRESSURE: 127 MMHG

## 2021-04-14 DIAGNOSIS — Z79.82: ICD-10-CM

## 2021-04-14 DIAGNOSIS — L97.523: ICD-10-CM

## 2021-04-14 DIAGNOSIS — Z88.2: ICD-10-CM

## 2021-04-14 DIAGNOSIS — E78.5: ICD-10-CM

## 2021-04-14 DIAGNOSIS — Z88.1: ICD-10-CM

## 2021-04-14 DIAGNOSIS — E66.01: ICD-10-CM

## 2021-04-14 DIAGNOSIS — M86.8X7: ICD-10-CM

## 2021-04-14 DIAGNOSIS — E11.65: ICD-10-CM

## 2021-04-14 DIAGNOSIS — M54.9: ICD-10-CM

## 2021-04-14 DIAGNOSIS — E78.00: ICD-10-CM

## 2021-04-14 DIAGNOSIS — M72.6: ICD-10-CM

## 2021-04-14 DIAGNOSIS — N17.9: ICD-10-CM

## 2021-04-14 DIAGNOSIS — E11.69: Primary | ICD-10-CM

## 2021-04-14 DIAGNOSIS — E11.42: ICD-10-CM

## 2021-04-14 DIAGNOSIS — Z87.891: ICD-10-CM

## 2021-04-14 DIAGNOSIS — G89.29: ICD-10-CM

## 2021-04-14 DIAGNOSIS — D50.9: ICD-10-CM

## 2021-04-14 DIAGNOSIS — E11.621: ICD-10-CM

## 2021-04-14 DIAGNOSIS — Z79.4: ICD-10-CM

## 2021-04-14 DIAGNOSIS — I10: ICD-10-CM

## 2021-04-14 PROCEDURE — 86900 BLOOD TYPING SEROLOGIC ABO: CPT

## 2021-04-14 PROCEDURE — 85025 COMPLETE CBC W/AUTO DIFF WBC: CPT

## 2021-04-14 PROCEDURE — 82962 GLUCOSE BLOOD TEST: CPT

## 2021-04-14 PROCEDURE — 83540 ASSAY OF IRON: CPT

## 2021-04-14 PROCEDURE — 94760 N-INVAS EAR/PLS OXIMETRY 1: CPT

## 2021-04-14 PROCEDURE — 86850 RBC ANTIBODY SCREEN: CPT

## 2021-04-14 PROCEDURE — 0Y6D0Z3 DETACHMENT AT LEFT UPPER LEG, LOW, OPEN APPROACH: ICD-10-PCS | Performed by: SURGERY

## 2021-04-14 PROCEDURE — 86901 BLOOD TYPING SEROLOGIC RH(D): CPT

## 2021-04-14 PROCEDURE — 36415 COLL VENOUS BLD VENIPUNCTURE: CPT

## 2021-04-14 PROCEDURE — 86920 COMPATIBILITY TEST SPIN: CPT

## 2021-04-14 PROCEDURE — 88307 TISSUE EXAM BY PATHOLOGIST: CPT

## 2021-04-14 PROCEDURE — 80053 COMPREHEN METABOLIC PANEL: CPT

## 2021-04-14 RX ADMIN — HYDROCODONE BITARTRATE AND ACETAMINOPHEN PRN EA: 7.5; 325 TABLET ORAL at 19:45

## 2021-04-14 RX ADMIN — INSULIN ASPART SCH UNIT: 100 INJECTION, SOLUTION INTRAVENOUS; SUBCUTANEOUS at 12:10

## 2021-04-14 RX ADMIN — ASPIRIN SCH MG: 81 TABLET ORAL at 10:10

## 2021-04-14 RX ADMIN — DOCUSATE SODIUM AND SENNOSIDES SCH EA: 8.6; 5 TABLET, FILM COATED ORAL at 10:10

## 2021-04-14 RX ADMIN — SODIUM CHLORIDE SCH MLS/HR: 900 INJECTION, SOLUTION INTRAVENOUS at 19:44

## 2021-04-14 RX ADMIN — INSULIN ASPART SCH UNIT: 100 INJECTION, SOLUTION INTRAVENOUS; SUBCUTANEOUS at 18:44

## 2021-04-14 RX ADMIN — PRASUGREL SCH MG: 10 TABLET, FILM COATED ORAL at 10:10

## 2021-04-14 RX ADMIN — METFORMIN HYDROCHLORIDE SCH MG: 500 TABLET, EXTENDED RELEASE ORAL at 18:47

## 2021-04-14 RX ADMIN — POLYETHYLENE GLYCOL (3350) SCH GM: 17 POWDER, FOR SOLUTION ORAL at 21:20

## 2021-04-14 RX ADMIN — DOCUSATE SODIUM AND SENNOSIDES SCH EA: 8.6; 5 TABLET, FILM COATED ORAL at 21:20

## 2021-04-14 RX ADMIN — GLYBURIDE SCH MG: 2.5 TABLET ORAL at 18:48

## 2021-04-14 RX ADMIN — POLYETHYLENE GLYCOL (3350) SCH GM: 17 POWDER, FOR SOLUTION ORAL at 10:10

## 2021-04-14 RX ADMIN — DOCUSATE SODIUM SCH MG: 100 CAPSULE ORAL at 10:09

## 2021-04-14 RX ADMIN — MICONAZOLE NITRATE SCH APPLIC: 20 POWDER TOPICAL at 10:12

## 2021-04-14 RX ADMIN — METOPROLOL SUCCINATE SCH MG: 100 TABLET, EXTENDED RELEASE ORAL at 09:47

## 2021-04-14 RX ADMIN — HYDROCHLOROTHIAZIDE SCH MG: 25 TABLET ORAL at 10:10

## 2021-04-14 RX ADMIN — INSULIN ASPART SCH UNIT: 100 INJECTION, SOLUTION INTRAVENOUS; SUBCUTANEOUS at 05:29

## 2021-04-14 RX ADMIN — SODIUM CHLORIDE SCH MLS/HR: 900 INJECTION, SOLUTION INTRAVENOUS at 10:28

## 2021-04-14 RX ADMIN — MICONAZOLE NITRATE SCH GM: 20 POWDER TOPICAL at 21:20

## 2021-04-14 RX ADMIN — LISINOPRIL SCH MG: 20 TABLET ORAL at 10:10

## 2021-04-14 RX ADMIN — SODIUM CHLORIDE SCH MLS/HR: 900 INJECTION, SOLUTION INTRAVENOUS at 03:15

## 2021-04-14 RX ADMIN — HYDROCODONE BITARTRATE AND ACETAMINOPHEN PRN EA: 7.5; 325 TABLET ORAL at 23:35

## 2021-04-14 NOTE — PHYSICAL THERAPY DAILY NOTE
PT Daily Note-Current


Subjective


Pt had just received his surgical bath for TKA.  Pt agrees to QC scoring then 

returning to room to rest.





Pain





   Location:  No Pain Reported





Mental Status


Patient Orientation:  Person, Place, Time, Situation


Attachments:  Other-See Comments (Wound Vac. ), IV





Transfers


SCALE: Activities may be completed with or without assistive devices.





6-Indepedent-patient completes the activity by him/herself with no assistance 

from a helper.


5-Set-up or Clean-up Assistance-helper sets up or cleans up; patient completes 

activity. [place] assists only prior to or  


    following the activity.


4-Supervision or Touching Assistance-helper provides verbal cues and/or 

touching/steadying and/or contact guard assistance as patient completes 

activity. Assistance may be provided   


    throughout the activity or intermittently.


3-Partial/Moderate Assistance-helper does LESS THAN HALF the effort. [place] 

lifts, holds or supports trunk or limbs, but provides less than half the effort.


2-Substantial/Maximal Assistance-helper does MORE THAN HALF the effort. [place] 

lifts or holds trunk or limbs and provides more than half the effort.


5-[place]rx[place]az[place] does ALL the effort. Patient does none of the effort to 

complete the activity. Or, the assistance of 2 or more helpers is required for 

the patient to complete the  


    activity.


If activity was not attempted, code reason:


7-Patient Refused.


9-Not Applicable-not attempted and the patient did not perform the activity 

before the current illness, exacerbation or injury.


10-Not Attempted due to Environmental Limitations-(lack of equipment, weather 

restraints, etc.).


88-Not Attempted due to Medical Conditions or Safety Concerns.


Sit to Lying (QC):  6


Lying to Sitting/Side of Bed(Q:  6


Sit to Stand (QC):  6


Chair/Bed-to-Chair Xfer(QC):  6


Car Transfer (QC):  5





Weight Bearing


Right Lower Extremity:  Right


Full Weight Bearing


Left Lower Extremity:  Left


Non Weight Bearing





Patient NWB on L foot, no pressure allowed





Wheelchair Training


Does the Pt Use a Wheelchair?:  Yes


Wheel 50 ft with 2 turns (QC):  6


Wheel 150 ft (QC):  6


Type of Wheelchair:  Manual





Treatments


TF from bed to [facility] and completes [facility] mobility as well as car transfer before 

returning to room to rest.  All needs met, call light in hand.





Assessment


Current Status:  Good Progress


Pt does not maintain WB status but does not feel the need to since leg will be 

amputated in a few short hours.





PT Short Term Goals


Short Term Goals


Time Frame:  2021


Roll Left & Right:  6


Sit to lyin


Lying to sitting on side of be:  6


Sit to stand:  4 (SBA)


Chair/bed-to-chair transfer:  4 (SBA)


Toilet transfer:  4 (SBA)


Car transfer:  4 (SBA)


Walk 10 feet:  4 (SBA)


Does pt use a wc or scooter:  Yes


Wheel 50ft w/2 turns:  6


Wheel 150 feet:  6


Type:  Manual





PT Long Term Goals


Long Term Goals


PT Long Term Goals Time Frame:  2021


Roll Left & Right (QC):  6


Sit to Lying (QC):  6


Lying-Sitting on Side/Bed(QC):  6


Sit to Stand (QC):  6


Chair/Bed-to-Chair Xfer(QC):  6


Toilet Transfer (QC):  6


Car Transfer (QC):  6


Does the Patient Walk:  No and Walking Goal IS indicated


Walk 10 feet (QC):  6


Walk 50ft with 2 Turns (QC):  6


Walk 150 ft (QC):  88


Walking 10ft on Uneven Surface:  6


1 Step (curb) (QC):  4


4 Steps (QC):  88


12 Steps (QC):  88


Picking up an Object (QC):  88


Does the Pt use WC or Scooter?:  Yes


Wheel 50 feet with 2 turns (QC:  6


Type:  Manual


Wheel 150 feet:  6


Type:  Manual





PT Plan


Treatment/Plan


Treatment Plan:  Continue Plan of Care


Treatment Plan:  Bed Mobility, Education, Functional Activity Aung, Functional 

Strength, Group Therapy, Gait, Safety, Therapeutic Exercise, Transfers


Treatment Duration:  2021


Frequency:  At least 5 of 7 days/Wk (IRF)


Estimated Hrs Per Day:  1.5 hours per day


Patient and/or Family Agrees t:  Yes





Time/GCodes


Time In:  1030


Time Out:  1100


Total Billed Treatment Time:  30


Total Billed Treatment


1, FA (15m) & WC (15m)











KWAKU CASTELLON PTA              2021 12:22

## 2021-04-14 NOTE — THERAPY TEAM DISCHARGE SUMMARY
Therapy Discharge Summary


Discharge Recommendations


Date of Discharge


Apr 14, 2021 at 13:15





Occupational Therapy


Pt admitted to ARU with diabetic foot ulcer. At PLOF, pt was independent with 

all ADLs except footwear (wife assisted pt), he was independent with functional 

mobility using cane. At admission, pt was independent with eating, required set 

up assist oral care, min A showering, set up upper body dressing, SBA lower body

dressing, total assist footwear, and SBA toileting. OT txs focused on increasing

independence with ADLs and functional mobility and increasing strength and 

functional endurance. At discharge, pt was independent with eating, oral care, 

showering and toilet, required set up assistance with upper/lower body dressing 

and footwear (set up required in order to manage wound vac tubing). Pt made 

functional progress towards all goals, meeting eating, oral care, showering, and

toileting goals. Pt discharged from ARU on this date due to having surgery, d/c 

from OT at this time.


Impaired I ADL's, Impaired Self-Care Skills





PT Long Term Goals


Long Term Goals


PT Long Term Goals Time Frame:  Apr 19, 2021


Roll Left to Right (QC):  6


Sit to Lying (QC):  6


Lying-Sitting on Side/Bed(QC):  6


Sit to Stand (QC):  6


Chair/Bed-to-Chair Xfer(QC):  6


Car Transfer (QC):  6


Does the Patient Walk:  No and Walking Goal IS indicated


Walk 10 feet (QC):  6


Walk 10ft-Uneven Surface(QC):  6


Walk 50ft with 2 Turns (QC):  6


Walk 150 ft (QC):  88


Does the Pt use WC or Scooter?:  Yes


Wheel 50 feet with 2 turns (QC:  6


1 Step (curb) (QC):  4


4 Steps (QC):  88


12 Steps (QC):  88


Picking up an Object (QC):  88





OT Long Term Goals


Long Term Goals


Time Frame:  Apr 23, 2021


Eating (QC):  6 (met)


Oral Hygiene (QC):  6 (met)


Shower/Bathe Self (QC):  6 (met)


Upper Body Dressing (QC):  6 (not met)


Lower Body Dressing (QC):  6 (not met)


On/Off Footwear (QC):  6 (not met)


Toileting Hygiene (QC):  6 (met)


Toilet/Commode Transfer (QC):  6


Additional Goals:  1-Demonstrate ADL Tasks, 2-Verbalize Understanding, 3-

ImproveStrength/Aung


1=Demonstrate adherence to instructed precautions during ADL tasks.


2=Patient will verbalize/demonstrate understanding of assistive 

devices/modifications for ADL.


3=Patient will improve strength/tolerance for activity to enable patient to 

perform ADL's.











YAMIL FLORES OT          Apr 14, 2021 15:46

## 2021-04-14 NOTE — PROGRESS NOTE-POST OPERATIVE
Post-Operative Progess Note


Surgeon (s)/Assistant (s)


Surgeon


CLAUDY ALEJO MD


Assistant:  gali de la torre APRN





Pre-Operative Diagnosis


left foot necrotizing soft tissue infection and osteomyelitis





Post-Operative Diagnosis





same





Procedure & Operative Findings


Date of Procedure


4/14/21


Procedure Performed/Findings


left below the knee amputation


Anesthesia Type


general LMA





Estimated Blood Loss


Estimated blood loss (mL):  minimal





Specimens/Packing


Specimens Removed


left foot and ankle











CLAUDY ALEJO MD                Apr 14, 2021 15:51

## 2021-04-14 NOTE — OPERATIVE REPORT
DATE OF SERVICE:  04/14/2021



ATTENDING PHYSICIAN:

Dr. Barrera.



PREOPERATIVE DIAGNOSES:

Necrotizing soft tissue infection, osteomyelitis of the left foot and ankle,

status post debridement x2.



POSTOPERATIVE DIAGNOSES:

Necrotizing soft tissue infection, osteomyelitis of the left foot and ankle,

status post debridement x2.



PROCEDURE:

Left below-the-knee amputation.



SURGEON:

Claudy Jiménez MD



ASSISTANT:

GUERRERO Figueredo.



ANESTHESIA:

General laryngeal mask airway and local.



ESTIMATED BLOOD LOSS:

Minimal.



FINDINGS:

Below-the-knee amputation with viable tissue.



DISPOSITION:

The patient tolerated the procedure well.



INDICATIONS:

The patient is a 40-year-old male with a longstanding history of infectious

complications including multiple boils as well as scrotal abscess and

necrotizing soft tissue infection of the right medial thigh requiring a wide

debridement.  He had a heel ulcer for years and he has been seeing wound care

for this; however, this has been unable to close.  He does have a number of

medical comorbidities, which makes it much harder including insulin-dependent

diabetes as well as other medical comorbidities including morbid obesity and is

currently greater than 400 pounds and recently nonambulatory.  He was initially

seen and found to have a necrotizing soft tissue infection of the left ankle and

underwent wide debridement on 03/24/2021.  He was then eventually transferred to

acute inpatient rehabilitation and has been undergoing wound care with wound VAC

changes twice a week by wound care nursing.  He was evaluated again and there

was new areas of necrotic debris requiring debridement, which was done on

04/06/2021.



Wound care continued as well as IV antibiotics; however, the wound did not

appear to be healing in a timely manner and other consultations were sought with

the recommendation to proceed with an MRI or CT scan and the CT scan did show

extensive osteomyelitis of the calcaneus bone.  Due to these complications, it

was recommended to proceed with a left below-the-knee amputation.



DESCRIPTION OF PROCEDURE:

The patient was brought to the operating room, laid supine on the table.  After

adequate IV pain and sedative medications and general laryngeal mask airway

intubation, the left lower extremity was prepped and draped in standard surgical

fashion.



The skin flaps in the area of resection were then measured out in a manner

encompassing a posterior flap.  Once this was done, the skin and subcutaneous

tissue was anesthetized using 0.5% Marcaine with epinephrine.



We first started with the anterior incision using a 10 blade.  Subcutaneous

tissue and fascia were then opened using electrocautery.  We then proceeded with

opening her dissection of the anterior tibialis and peroneal muscles using

electrocautery.  We then proceeded medially towards the gastrocnemius and soleus

muscle.  We then proceeded with the bilateral skin incisions to create the

posterior flap using a 10 blade.  We then proceeded with continued dissection of

the subcutaneous tissue, fascia; however, sparing the soleus and gastrocnemius

muscle for the posterior flap.  The inferior incision was then made and

dissected out as well.



We then proceeded to dissect the tissue along the tibia and fibula using

electrocautery as well as a periosteal elevator and approximately 2 cm above the

incision.  The tibia and fibula were then transected using a bone saw.  Bone wax

was then placed on the end of each bone with visualization of good hemostasis. 

The specimen was then sent to pathology.



We then proceeded to create the posterior flap by suturing the fascia of the

soleus and gastrocnemius muscle to the anterior fascia of the anterior tibialis

and peroneal muscles using 2-0 Vicryl interrupted sutures.  Subcutaneous tissue

was then reapproximated using 3-0 Vicryl interrupted sutures.  Skin was closed

using skin staples.  We then proceeded with pressure bolstering sutures with a

wide 0 Prolene sutures.



The wound was then cleaned and covered with nonstick followed by 4 x 4 gauze

followed by Kerlix, followed by wide Ortho-Glass encompassing the midthigh to

the posterior flap to prevent contractures.  This was then held in place by a

6-inch Ace wrap.



The patient tolerated the procedure well.  We will keep the dressing on for the

next 3 to 4 days and then remove the dressing and then proceed with 4 x 4

dressing changes on the skin incision site followed by Kerlix, followed by

reapplication of the splint and 6-inch Ace wrap again to prevent contractures

and he has to do no weightbearing on the left lower extremity.





Job ID: [id number]

DocumentID: [id number]

Dictated Date:  04/14/2021 16:18:29

Transcription Date: 04/14/2021 23:20:05

Dictated By: CLAUDY JIMÉNEZ MD

## 2021-04-14 NOTE — THERAPY TEAM DISCHARGE SUMMARY
Therapy Discharge Summary


Discharge Recommendations


Date of Discharge








Physical Therapy


Patient came to rehab with a left diabetic foot ulcer.  Upon admission patient 

performed bed mobility and supine <-> sit with independence, sit <-> stand and 

transfers with CGA, car transfer CGA, no ambulation because he cannot maintain 

NWB on the left leg, propelled a manual ' with independence.  Patient has 

been performing bed mobility and transfer training, balance and endurance 

training, functional strengthening, and education.  Patient has made fair 

progress but has not met his long term goals for ambulation due to not being 

able to maintain NWB on the left leg.  Now, patient performs bed mobility and 

transfers with independence, car transfer with setup, propels manual WC with 

independence.  Patient is having surgery today and will be discharged from PT at

this time.





Occupational Therapy


Impaired I ADL's, Impaired Self-Care Skills





PT Long Term Goals


Long Term Goals


PT Long Term Goals Time Frame:  Apr 19, 2021


Roll Left to Right (QC):  6


Sit to Lying (QC):  6


Lying-Sitting on Side/Bed(QC):  6


Sit to Stand (QC):  6


Chair/Bed-to-Chair Xfer(QC):  6


Car Transfer (QC):  6


Does the Patient Walk:  No and Walking Goal IS indicated


Walk 10 feet (QC):  6


Walk 10ft-Uneven Surface(QC):  6


Walk 50ft with 2 Turns (QC):  6


Walk 150 ft (QC):  88


Does the Pt use WC or Scooter?:  Yes


Wheel 50 feet with 2 turns (QC:  6


1 Step (curb) (QC):  4


4 Steps (QC):  88


12 Steps (QC):  88


Picking up an Object (QC):  88





OT Long Term Goals


Long Term Goals


Time Frame:  Apr 23, 2021


Eating (QC):  6 (met)


Oral Hygiene (QC):  6 (met)


Shower/Bathe Self (QC):  6 (met)


Upper Body Dressing (QC):  6 (not met)


Lower Body Dressing (QC):  6 (not met)


On/Off Footwear (QC):  6 (not met)


Toileting Hygiene (QC):  6 (met)


Toilet/Commode Transfer (QC):  6


Additional Goals:  1-Demonstrate ADL Tasks, 2-Verbalize Understanding, 3-

ImproveStrength/Aung


1=Demonstrate adherence to instructed precautions during ADL tasks.


2=Patient will verbalize/demonstrate understanding of assistive 

devices/modifications for ADL.


3=Patient will improve strength/tolerance for activity to enable patient to 

perform ADL's.











CAMILLE HARRY PT                Apr 14, 2021 13:06

## 2021-04-14 NOTE — DISCHARGE SUMMARY
Diagnosis/Chief Complaint


Date of Admission


Mar 29, 2021 at 10:09


Date of Discharge





Discharge Date:  Apr 14, 2021


Discharge Diagnosis


Assessment:


Left lateral ankle abscess and left heel ulceration s/p debridement with wound 

vac placement and abx Zosyn maintained then required DC for left BKA 4/14/21


TERRI not on CPAP


Obesity


DM OOC


Anemia s/p 1 unit of blood iron level 8 so will order iron infusions





Plan:


Abx


Monitor pain


Wound vac


Dr Jiménez


IRF protocol





3/30/21:


Monitor hgb


Monitor sugars due to hypoglycemia





3/31/21:


Monitor sugar levels


Increase insulin may be needed


Dr Jiménez appreciated


BKA likely end result





4/1/21:


Await Dr Jiménez assessment tomorrow after wound vac changed


Needs BKA


Patient waiting on bariatric surgery, which he decided this 2 days ago, before 

contemplating BKA





4/2/21:


Dr Guzmán assessed the patient at my request and patient not interested in BKA so

plastics will need to reconstruct after wound healed


Hyperbaric chamber indicated once DC





4/3/21:


Monitor sugar


Monitor BP





4/4/21:


Monitor labs


Monitor wound vac





4/5/21:


Debridement tomorrow


Monitor closely


Abx per Dr Jiménez





4/6/21:


Monitor closely


Debridement today


Check labs in am





4/7/21:


Monitor pain


Glucose management


[place]





4/8/21:


Monitor closely


Wound vac change





4/9/21:


Monitor foot


Insulin





4/10/21:


Day # 20 Zosyn


Transfer to [facility] Monday?





4/11/21:


Cant do MRI as requested


Monitor closely





4/12/21:


Left BKA Wed


Increase insulin





4/13/21:


Monitor closely


Increase insulin


BKA tomorrow





(1) Diabetic ulcer of foot associated with diabetes mellitus due to underlying 

condition, with necrosis of muscle


Status:  Acute


(2) Diabetes mellitus, type 2


Status:  Chronic


(3) Hypertension


Status:  Chronic


(4) Peripheral neuropathy


Status:  Acute


(5) Renal insufficiency


Status:  Acute


(6) Morbid obesity


Status:  Acute





Discharge Summary


Discharge Physical Examination


Allergies:  


Coded Allergies:  


     levofloxacin (Verified  Allergy, Mild, 4/14/15)


Vitals & I&Os





Vital Signs








  Date Time  Temp Pulse Resp B/P (MAP) Pulse Ox O2 Delivery O2 Flow Rate FiO2


 


4/14/21 15:00 36.0 90 18 136/82 97 Room Air  








General Appearance:  Alert, Oriented X3, Cooperative


Respiratory:  Clear to Auscultation


Cardiovascular:  Regular Rate


Psych/Mental Status:  Mental Status NL





Hospital Course


Was the Problem List Reviewed?:  Yes


Hospital course:


Pt had a lengthy hospital course for 17 days when he was admitted for wound-vac 

management of his left foot cellulitis s/p abscess debridement by Dr. Jiménez, he 

did undergo one more debridement which left a significant portion of his foot 

resected, so Dr. Guzmán was consulted, CT scan was obtained since he had a weight

restriction of 440lbs, 140lbs more than 300 for MRI and he was found to have 

widespread osteomyelitis and unable to have any type of reconstruction, so it 

was decided that he will go for a left below the knee amputation by Dr. Jiménez and

will be monitored closely and will likely come back to inpatient rehab for am

putation management.


Labs (last 24 hrs)


Laboratory Tests


3/29/21 12:31: Glucometer 108


3/29/21 14:05: Stool Occult Blood Immunoassay NEGATIVE


3/29/21 15:48: Glucometer 83


3/29/21 21:18: Glucometer 91


3/30/21 05:46: Glucometer 109


3/30/21 06:15: 


White Blood Count 6.7, Red Blood Count 3.44L, Hemoglobin 8.7L, Hematocrit 30L, 

Mean Corpuscular Volume 86, Mean Corpuscular Hemoglobin 25, Mean Corpuscular 

Hemoglobin Concent 29L, Red Cell Distribution Width 17.2H, Platelet Count 545H, 

Mean Platelet Volume 8.6L, Immature Granulocyte % (Auto) 5, Neutrophils (%) 

(Auto) 49, Lymphocytes (%) (Auto) 36, Monocytes (%) (Auto) 7, Eosinophils (%) 

(Auto) 4, Basophils (%) (Auto) 0, Neutrophils # (Auto) 3.3, Lymphocytes # (Auto)

2.4, Monocytes # (Auto) 0.5, Eosinophils # (Auto) 0.3, Basophils # (Auto) 0.0, 

Immature Granulocyte # (Auto) 0.3H, Sodium Level 135, Potassium Level 4.2, 

Chloride Level 101, Carbon Dioxide Level 25, Anion Gap 9, Blood Urea Nitrogen 

10, Creatinine 0.77, Estimat Glomerular Filtration Rate > 60, BUN/Creatinine 

Ratio 13, Glucose Level 135H, Calcium Level 8.5, Corrected Calcium 9.5, Total 

Bilirubin 0.2, Aspartate Amino Transf (AST/SGOT) 25, Alanine Aminotransferase 

(ALT/SGPT) 20, Alkaline Phosphatase 62, Total Protein 6.6, Albumin 2.7L


3/30/21 10:09: Glucometer 144H


3/30/21 15:46: Glucometer 148H


3/30/21 20:30: Glucometer 151H


3/31/21 05:07: Glucometer 136H


3/31/21 10:56: Glucometer 203H


3/31/21 16:53: Glucometer 174H


3/31/21 20:42: Glucometer 169H


4/1/21 05:15: 


White Blood Count 5.8, Red Blood Count 3.96L, Hemoglobin 10.0L, Hematocrit 34L, 

Mean Corpuscular Volume 86, Mean Corpuscular Hemoglobin 25, Mean Corpuscular 

Hemoglobin Concent 29L, Red Cell Distribution Width 17.2H, Platelet Count 417H, 

Mean Platelet Volume 8.5L, Immature Granulocyte % (Auto) 2, Neutrophils (%) 

(Auto) 52, Lymphocytes (%) (Auto) 38, Monocytes (%) (Auto) 5, Eosinophils (%) 

(Auto) 3, Basophils (%) (Auto) 1, Neutrophils # (Auto) 3.0, Lymphocytes # (Auto)

2.2, Monocytes # (Auto) 0.3, Eosinophils # (Auto) 0.2, Basophils # (Auto) 0.0, 

Immature Granulocyte # (Auto) 0.1, Sodium Level 138, Potassium Level 4.8, 

Chloride Level 101, Carbon Dioxide Level 27, Anion Gap 10, Blood Urea Nitrogen 

11, Creatinine 0.90, Estimat Glomerular Filtration Rate > 60, BUN/Creatinine 

Ratio 12, Glucose Level 165H, Calcium Level 9.0, Corrected Calcium 10.0, Total 

Bilirubin 0.2, Aspartate Amino Transf (AST/SGOT) 23, Alanine Aminotransferase 

(ALT/SGPT) 20, Alkaline Phosphatase 70, Total Protein 6.6, Albumin 2.8L


4/1/21 10:52: Glucometer 238H


4/1/21 15:31: Glucometer 207H


4/1/21 20:02: Glucometer 207H


4/2/21 06:20: Glucometer 138H


4/2/21 10:51: Glucometer 231H


4/2/21 15:26: Glucometer 179H


4/2/21 19:59: Glucometer 174H


4/3/21 06:14: Glucometer 127H


4/3/21 10:53: Glucometer 213H


4/3/21 16:22: Glucometer 170H


4/3/21 21:20: Glucometer 167H


4/4/21 05:43: Glucometer 141H


4/4/21 10:51: Glucometer 187H


4/4/21 15:20: Glucometer 227H


4/4/21 20:11: Glucometer 176H


4/5/21 06:25: Glucometer 129H


4/5/21 06:30: 


White Blood Count 8.4, Red Blood Count 3.63L, Hemoglobin 9.3L, Hematocrit 31L, 

Mean Corpuscular Volume 87, Mean Corpuscular Hemoglobin 26, Mean Corpuscular 

Hemoglobin Concent 30L, Red Cell Distribution Width 18.2H, Platelet Count 413H, 

Mean Platelet Volume 8.9L, Immature Granulocyte % (Auto) 1, Neutrophils (%) 

(Auto) 63, Lymphocytes (%) (Auto) 30, Monocytes (%) (Auto) 3, Eosinophils (%) 

(Auto) 2, Basophils (%) (Auto) 1, Neutrophils # (Auto) 5.3, Lymphocytes # (Auto)

2.5, Monocytes # (Auto) 0.3, Eosinophils # (Auto) 0.2, Basophils # (Auto) 0.1, 

Immature Granulocyte # (Auto) 0.0, Sodium Level 138, Potassium Level 4.7, 

Chloride Level 103, Carbon Dioxide Level 24, Anion Gap 11, Blood Urea Nitrogen 

13, Creatinine 0.80, Estimat Glomerular Filtration Rate > 60, BUN/Creatinine 

Ratio 16, Glucose Level 131H, Calcium Level 9.2, Corrected Calcium 9.8, Total 

Bilirubin 0.2, Aspartate Amino Transf (AST/SGOT) 17, Alanine Aminotransferase 

(ALT/SGPT) 21, Alkaline Phosphatase 78, Total Protein 7.0, Albumin 3.2


4/5/21 11:48: Glucometer 216H


4/5/21 15:35: Glucometer 178H


4/5/21 19:59: Glucometer 152H


4/6/21 05:59: Glucometer 143H


4/6/21 10:28: Glucometer 165H


4/6/21 15:23: Glucometer 204H


4/6/21 20:01: Glucometer 155H


4/7/21 05:50: Glucometer 153H


4/7/21 06:30: 


White Blood Count 7.4, Red Blood Count 3.54L, Hemoglobin 9.2L, Hematocrit 31L, 

Mean Corpuscular Volume 87, Mean Corpuscular Hemoglobin 26, Mean Corpuscular 

Hemoglobin Concent 30L, Red Cell Distribution Width 18.5H, Platelet Count 340, 

Mean Platelet Volume 8.9L, Immature Granulocyte % (Auto) 0, Neutrophils (%) 

(Auto) 65, Lymphocytes (%) (Auto) 28, Monocytes (%) (Auto) 4, Eosinophils (%) 

(Auto) 2, Basophils (%) (Auto) 1, Neutrophils # (Auto) 4.8, Lymphocytes # (Auto)

2.1, Monocytes # (Auto) 0.3, Eosinophils # (Auto) 0.1, Basophils # (Auto) 0.1, 

Immature Granulocyte # (Auto) 0.0, Sodium Level 138, Potassium Level 4.6, 

Chloride Level 103, Carbon Dioxide Level 23, Anion Gap 12, Blood Urea Nitrogen 

16, Creatinine 0.92, Estimat Glomerular Filtration Rate > 60, BUN/Creatinine 

Ratio 17, Glucose Level 156H, Calcium Level 9.1, Corrected Calcium 9.7, Total 

Bilirubin 0.3, Aspartate Amino Transf (AST/SGOT) 12, Alanine Aminotransferase 

(ALT/SGPT) 15, Alkaline Phosphatase 68, Total Protein 7.0, Albumin 3.3


4/7/21 11:12: Glucometer 161H


4/7/21 15:53: Glucometer 116H


4/7/21 20:51: Glucometer 193H


4/8/21 06:26: Glucometer 134H


4/8/21 10:50: Glucometer 182H


4/8/21 15:29: Glucometer 186H


4/8/21 20:07: Glucometer 164H


4/9/21 05:50: 


White Blood Count 6.7, Red Blood Count 3.46L, Hemoglobin 9.0L, Hematocrit 30L, 

Mean Corpuscular Volume 86, Mean Corpuscular Hemoglobin 26, Mean Corpuscular 

Hemoglobin Concent 30L, Red Cell Distribution Width 18.6H, Platelet Count 298, 

Mean Platelet Volume 8.9L, Immature Granulocyte % (Auto) 0, Neutrophils (%) 

(Auto) 58, Lymphocytes (%) (Auto) 32, Monocytes (%) (Auto) 5, Eosinophils (%) 

(Auto) 4, Basophils (%) (Auto) 1, Neutrophils # (Auto) 3.9, Lymphocytes # (Auto)

2.2, Monocytes # (Auto) 0.3, Eosinophils # (Auto) 0.3, Basophils # (Auto) 0.1, 

Immature Granulocyte # (Auto) 0.0, Sodium Level 139, Potassium Level 4.1, 

Chloride Level 105, Carbon Dioxide Level 23, Anion Gap 11, Blood Urea Nitrogen 

15, Creatinine 0.86, Estimat Glomerular Filtration Rate > 60, BUN/Creatinine 

Ratio 17, Glucose Level 132H, Calcium Level 9.2, Corrected Calcium 9.8, Total 

Bilirubin 0.3, Aspartate Amino Transf (AST/SGOT) 15, Alanine Aminotransferase 

(ALT/SGPT) 15, Alkaline Phosphatase 73, Total Protein 6.9, Albumin 3.3


4/9/21 10:53: Glucometer 213H


4/9/21 15:25: Glucometer 150H


4/9/21 20:09: Glucometer 172H


4/10/21 06:35: Glucometer 149H


4/10/21 11:04: Glucometer 172H


4/10/21 15:35: Glucometer 159H


4/10/21 20:47: Glucometer 178H


4/11/21 06:04: Glucometer 126H


4/11/21 10:48: Glucometer 163H


4/11/21 15:52: Glucometer 164H


4/11/21 16:45: 


White Blood Count 6.5, Red Blood Count 3.62L, Hemoglobin 9.5L, Hematocrit 31L, 

Mean Corpuscular Volume 86, Mean Corpuscular Hemoglobin 26, Mean Corpuscular 

Hemoglobin Concent 30L, Red Cell Distribution Width 18.1H, Platelet Count 312, 

Mean Platelet Volume 9.3, Erythrocyte Sedimentation Rate > 140H, Sodium Level 

137, Potassium Level 4.6, Chloride Level 101, Carbon Dioxide Level 25, Anion Gap

11, Blood Urea Nitrogen 14, Creatinine 0.87, Estimat Glomerular Filtration Rate 

> 60, BUN/Creatinine Ratio 16, Glucose Level 160H, Calcium Level 9.4, C-Reactive

Protein High Sensitivity 2.28H


4/11/21 19:56: Glucometer 175H


4/12/21 05:25: 


White Blood Count 7.2, Red Blood Count 3.57L, Hemoglobin 9.4L, Hematocrit 31L, 

Mean Corpuscular Volume 87, Mean Corpuscular Hemoglobin 26, Mean Corpuscular 

Hemoglobin Concent 30L, Red Cell Distribution Width 18.3H, Platelet Count 286, 

Mean Platelet Volume 9.0, Immature Granulocyte % (Auto) 0, Neutrophils (%) 

(Auto) 60, Lymphocytes (%) (Auto) 30, Monocytes (%) (Auto) 5, Eosinophils (%) 

(Auto) 5, Basophils (%) (Auto) 1, Neutrophils # (Auto) 4.3, Lymphocytes # (Auto)

2.2, Monocytes # (Auto) 0.4, Eosinophils # (Auto) 0.3, Basophils # (Auto) 0.0, 

Immature Granulocyte # (Auto) 0.0, Sodium Level 138, Potassium Level 4.3, 

Chloride Level 104, Carbon Dioxide Level 24, Anion Gap 10, Blood Urea Nitrogen 

14, Creatinine 0.83, Estimat Glomerular Filtration Rate > 60, BUN/Creatinine 

Ratio 17, Glucose Level 149H, Calcium Level 9.3, Corrected Calcium 9.8, Total 

Bilirubin 0.3, Aspartate Amino Transf (AST/SGOT) 12, Alanine Aminotransferase 

(ALT/SGPT) 13, Alkaline Phosphatase 74, Total Protein 7.2, Albumin 3.4


4/12/21 10:58: Glucometer 215H


4/12/21 15:32: Glucometer 202H


4/12/21 20:14: Glucometer 207H


4/13/21 06:20: Glucometer 167H


4/13/21 11:00: Glucometer 180H


4/13/21 15:25: Glucometer 161H


4/13/21 21:07: Glucometer 137H


4/14/21 05:20: Glucometer 131H


4/14/21 11:53: Glucometer 149H





Pending Labs


Laboratory Tests


3/29/21 12:31: Glucometer 108


3/29/21 14:05: Stool Occult Blood Immunoassay NEGATIVE


3/29/21 15:48: Glucometer 83


3/29/21 21:18: Glucometer 91


3/30/21 05:46: Glucometer 109


3/30/21 06:15: 


White Blood Count 6.7, Red Blood Count 3.44, Hemoglobin 8.7, Hematocrit 30, Mean

Corpuscular Volume 86, Mean Corpuscular Hemoglobin 25, Mean Corpuscular 

Hemoglobin Concent 29, Red Cell Distribution Width 17.2, Platelet Count 545, 

Mean Platelet Volume 8.6, Immature Granulocyte % (Auto) 5, Neutrophils (%) 

(Auto) 49, Lymphocytes (%) (Auto) 36, Monocytes (%) (Auto) 7, Eosinophils (%) 

(Auto) 4, Basophils (%) (Auto) 0, Neutrophils # (Auto) 3.3, Lymphocytes # (Auto)

2.4, Monocytes # (Auto) 0.5, Eosinophils # (Auto) 0.3, Basophils # (Auto) 0.0, 

Immature Granulocyte # (Auto) 0.3, Sodium Level 135, Potassium Level 4.2, 

Chloride Level 101, Carbon Dioxide Level 25, Anion Gap 9, Blood Urea Nitrogen 

10, Creatinine 0.77, Estimat Glomerular Filtration Rate > 60, BUN/Creatinine 

Ratio 13, Glucose Level 135, Calcium Level 8.5, Corrected Calcium 9.5, Total 

Bilirubin 0.2, Aspartate Amino Transf (AST/SGOT) 25, Alanine Aminotransferase 

(ALT/SGPT) 20, Alkaline Phosphatase 62, Total Protein 6.6, Albumin 2.7


3/30/21 10:09: Glucometer 144


3/30/21 15:46: Glucometer 148


3/30/21 20:30: Glucometer 151


3/31/21 05:07: Glucometer 136


3/31/21 10:56: Glucometer 203


3/31/21 16:53: Glucometer 174


3/31/21 20:42: Glucometer 169


4/1/21 05:15: 


White Blood Count 5.8, Red Blood Count 3.96, Hemoglobin 10.0, Hematocrit 34, 

Mean Corpuscular Volume 86, Mean Corpuscular Hemoglobin 25, Mean Corpuscular 

Hemoglobin Concent 29, Red Cell Distribution Width 17.2, Platelet Count 417, 

Mean Platelet Volume 8.5, Immature Granulocyte % (Auto) 2, Neutrophils (%) (

Auto) 52, Lymphocytes (%) (Auto) 38, Monocytes (%) (Auto) 5, Eosinophils (%) 

(Auto) 3, Basophils (%) (Auto) 1, Neutrophils # (Auto) 3.0, Lymphocytes # (Auto)

2.2, Monocytes # (Auto) 0.3, Eosinophils # (Auto) 0.2, Basophils # (Auto) 0.0, 

Immature Granulocyte # (Auto) 0.1, Sodium Level 138, Potassium Level 4.8, 

Chloride Level 101, Carbon Dioxide Level 27, Anion Gap 10, Blood Urea Nitrogen 

11, Creatinine 0.90, Estimat Glomerular Filtration Rate > 60, BUN/Creatinine 

Ratio 12, Glucose Level 165, Calcium Level 9.0, Corrected Calcium 10.0, Total 

Bilirubin 0.2, Aspartate Amino Transf (AST/SGOT) 23, Alanine Aminotransferase 

(ALT/SGPT) 20, Alkaline Phosphatase 70, Total Protein 6.6, Albumin 2.8


4/1/21 10:52: Glucometer 238


4/1/21 15:31: Glucometer 207


4/1/21 20:02: Glucometer 207


4/2/21 06:20: Glucometer 138


4/2/21 10:51: Glucometer 231


4/2/21 15:26: Glucometer 179


4/2/21 19:59: Glucometer 174


4/3/21 06:14: Glucometer 127


4/3/21 10:53: Glucometer 213


4/3/21 16:22: Glucometer 170


4/3/21 21:20: Glucometer 167


4/4/21 05:43: Glucometer 141


4/4/21 10:51: Glucometer 187


4/4/21 15:20: Glucometer 227


4/4/21 20:11: Glucometer 176


4/5/21 06:25: Glucometer 129


4/5/21 06:30: 


White Blood Count 8.4, Red Blood Count 3.63, Hemoglobin 9.3, Hematocrit 31, Mean

Corpuscular Volume 87, Mean Corpuscular Hemoglobin 26, Mean Corpuscular 

Hemoglobin Concent 30, Red Cell Distribution Width 18.2, Platelet Count 413, 

Mean Platelet Volume 8.9, Immature Granulocyte % (Auto) 1, Neutrophils (%) 

(Auto) 63, Lymphocytes (%) (Auto) 30, Monocytes (%) (Auto) 3, Eosinophils (%) 

(Auto) 2, Basophils (%) (Auto) 1, Neutrophils # (Auto) 5.3, Lymphocytes # (Auto)

2.5, Monocytes # (Auto) 0.3, Eosinophils # (Auto) 0.2, Basophils # (Auto) 0.1, 

Immature Granulocyte # (Auto) 0.0, Sodium Level 138, Potassium Level 4.7, 

Chloride Level 103, Carbon Dioxide Level 24, Anion Gap 11, Blood Urea Nitrogen 

13, Creatinine 0.80, Estimat Glomerular Filtration Rate > 60, BUN/Creatinine R

atio 16, Glucose Level 131, Calcium Level 9.2, Corrected Calcium 9.8, Total 

Bilirubin 0.2, Aspartate Amino Transf (AST/SGOT) 17, Alanine Aminotransferase 

(ALT/SGPT) 21, Alkaline Phosphatase 78, Total Protein 7.0, Albumin 3.2


4/5/21 11:48: Glucometer 216


4/5/21 15:35: Glucometer 178


4/5/21 19:59: Glucometer 152


4/6/21 05:59: Glucometer 143


4/6/21 10:28: Glucometer 165


4/6/21 15:23: Glucometer 204


4/6/21 20:01: Glucometer 155


4/7/21 05:50: Glucometer 153


4/7/21 06:30: 


White Blood Count 7.4, Red Blood Count 3.54, Hemoglobin 9.2, Hematocrit 31, Mean

Corpuscular Volume 87, Mean Corpuscular Hemoglobin 26, Mean Corpuscular 

Hemoglobin Concent 30, Red Cell Distribution Width 18.5, Platelet Count 340, 

Mean Platelet Volume 8.9, Immature Granulocyte % (Auto) 0, Neutrophils (%) 

(Auto) 65, Lymphocytes (%) (Auto) 28, Monocytes (%) (Auto) 4, Eosinophils (%) 

(Auto) 2, Basophils (%) (Auto) 1, Neutrophils # (Auto) 4.8, Lymphocytes # (Auto)

2.1, Monocytes # (Auto) 0.3, Eosinophils # (Auto) 0.1, Basophils # (Auto) 0.1, 

Immature Granulocyte # (Auto) 0.0, Sodium Level 138, Potassium Level 4.6, Ch

loride Level 103, Carbon Dioxide Level 23, Anion Gap 12, Blood Urea Nitrogen 16,

Creatinine 0.92, Estimat Glomerular Filtration Rate > 60, BUN/Creatinine Ratio 

17, Glucose Level 156, Calcium Level 9.1, Corrected Calcium 9.7, Total Bilirubin

0.3, Aspartate Amino Transf (AST/SGOT) 12, Alanine Aminotransferase (ALT/SGPT) 

15, Alkaline Phosphatase 68, Total Protein 7.0, Albumin 3.3


4/7/21 11:12: Glucometer 161


4/7/21 15:53: Glucometer 116


4/7/21 20:51: Glucometer 193


4/8/21 06:26: Glucometer 134


4/8/21 10:50: Glucometer 182


4/8/21 15:29: Glucometer 186


4/8/21 20:07: Glucometer 164


4/9/21 05:50: 


White Blood Count 6.7, Red Blood Count 3.46, Hemoglobin 9.0, Hematocrit 30, Mean

Corpuscular Volume 86, Mean Corpuscular Hemoglobin 26, Mean Corpuscular 

Hemoglobin Concent 30, Red Cell Distribution Width 18.6, Platelet Count 298, 

Mean Platelet Volume 8.9, Immature Granulocyte % (Auto) 0, Neutrophils (%) 

(Auto) 58, Lymphocytes (%) (Auto) 32, Monocytes (%) (Auto) 5, Eosinophils (%) 

(Auto) 4, Basophils (%) (Auto) 1, Neutrophils # (Auto) 3.9, Lymphocytes # (Auto)

2.2, Monocytes # (Auto) 0.3, Eosinophils # (Auto) 0.3, Basophils # (Auto) 0.1, 

Immature Granulocyte # (Auto) 0.0, Sodium Level 139, Potassium Level 4.1, 

Chloride Level 105, Carbon Dioxide Level 23, Anion Gap 11, Blood Urea Nitrogen 

15, Creatinine 0.86, Estimat Glomerular Filtration Rate > 60, BUN/Creatinine 

Ratio 17, Glucose Level 132, Calcium Level 9.2, Corrected Calcium 9.8, Total 

Bilirubin 0.3, Aspartate Amino Transf (AST/SGOT) 15, Alanine Aminotransferase 

(ALT/SGPT) 15, Alkaline Phosphatase 73, Total Protein 6.9, Albumin 3.3


4/9/21 10:53: Glucometer 213


4/9/21 15:25: Glucometer 150


4/9/21 20:09: Glucometer 172


4/10/21 06:35: Glucometer 149


4/10/21 11:04: Glucometer 172


4/10/21 15:35: Glucometer 159


4/10/21 20:47: Glucometer 178


4/11/21 06:04: Glucometer 126


4/11/21 10:48: Glucometer 163


4/11/21 15:52: Glucometer 164


4/11/21 16:45: 


White Blood Count 6.5, Red Blood Count 3.62, Hemoglobin 9.5, Hematocrit 31, Mean

Corpuscular Volume 86, Mean Corpuscular Hemoglobin 26, Mean Corpuscular 

Hemoglobin Concent 30, Red Cell Distribution Width 18.1, Platelet Count 312, 

Mean Platelet Volume 9.3, Erythrocyte Sedimentation Rate > 140, Sodium Level 

137, Potassium Level 4.6, Chloride Level 101, Carbon Dioxide Level 25, Anion Gap

11, Blood Urea Nitrogen 14, Creatinine 0.87, Estimat Glomerular Filtration Rate 

> 60, BUN/Creatinine Ratio 16, Glucose Level 160, Calcium Level 9.4, C-Reactive 

Protein High Sensitivity 2.28


4/11/21 19:56: Glucometer 175


4/12/21 05:25: 


White Blood Count 7.2, Red Blood Count 3.57, Hemoglobin 9.4, Hematocrit 31, Mean

Corpuscular Volume 87, Mean Corpuscular Hemoglobin 26, Mean Corpuscular 

Hemoglobin Concent 30, Red Cell Distribution Width 18.3, Platelet Count 286, 

Mean Platelet Volume 9.0, Immature Granulocyte % (Auto) 0, Neutrophils (%) 

(Auto) 60, Lymphocytes (%) (Auto) 30, Monocytes (%) (Auto) 5, Eosinophils (%) 

(Auto) 5, Basophils (%) (Auto) 1, Neutrophils # (Auto) 4.3, Lymphocytes # (Auto)

2.2, Monocytes # (Auto) 0.4, Eosinophils # (Auto) 0.3, Basophils # (Auto) 0.0, 

Immature Granulocyte # (Auto) 0.0, Sodium Level 138, Potassium Level 4.3, 

Chloride Level 104, Carbon Dioxide Level 24, Anion Gap 10, Blood Urea Nitrogen 

14, Creatinine 0.83, Estimat Glomerular Filtration Rate > 60, BUN/Creatinine 

Ratio 17, Glucose Level 149, Calcium Level 9.3, Corrected Calcium 9.8, Total 

Bilirubin 0.3, Aspartate Amino Transf (AST/SGOT) 12, Alanine Aminotransferase 

(ALT/SGPT) 13, Alkaline Phosphatase 74, Total Protein 7.2, Albumin 3.4


4/12/21 10:58: Glucometer 215


4/12/21 15:32: Glucometer 202


4/12/21 20:14: Glucometer 207


4/13/21 06:20: Glucometer 167


4/13/21 11:00: Glucometer 180


4/13/21 15:25: Glucometer 161


4/13/21 21:07: Glucometer 137


4/14/21 05:20: Glucometer 131


4/14/21 11:53: Glucometer 149








Discharge


Home Medications:





Active Scripts


Active


Reported


Metoprolol Succinate 100 Mg Tab.er.24h 100 Mg PO DAILY


Tums Ultra (Calcium Carbonate) 400 Mg Tab.chew 400-800 Mg PO PRN PRN


Atorvastatin Calcium 80 Mg Tablet 80 Mg PO DAILY


     LAST FILL DATE 01- #30


Prasugrel HCl 10 Mg Tablet 10 Mg PO DAILY


Aspirin EC (Aspirin) 81 Mg Tablet.dr 81 Mg PO DAILY


Lisinopril-Hctz 20-25 mg Tab (Lisinopril/Hydrochlorothiazide) 1 Each Tablet 1 

Each PO DAILY


     LAST FILLED 01- #60/60 DAY SUPPLY


Glyburide 2.5 Mg Tablet 2.5 Mg PO BID


Metformin HCl ER (Metformin HCl) 500 Mg Tab.er.24 500 Mg PO BID


Levemir Flextouch (Insulin Detemir) 100 Unit/1 Ml Insuln.pen 45 Unit SQ BID


Novolog Flexpen (Insulin Aspart) 300 Units/3 Ml Solution 30-40 Units SQ AC





Instructions to patient/family


Please see electronic discharge instructions given to patient.





Diagnosis/Problems


Diagnosis/Problems





(1) Diabetic ulcer of foot associated with diabetes mellitus due to underlying 

condition, with necrosis of muscle


Status:  Acute


(2) Diabetes mellitus, type 2


Status:  Chronic


(3) Hypertension


Status:  Chronic


(4) Peripheral neuropathy


Status:  Acute


(5) Renal insufficiency


Status:  Acute


(6) Morbid obesity


Status:  Acute











NICOLE VILA DO                Apr 14, 2021 09:01

## 2021-04-15 VITALS — SYSTOLIC BLOOD PRESSURE: 101 MMHG | DIASTOLIC BLOOD PRESSURE: 67 MMHG

## 2021-04-15 VITALS — DIASTOLIC BLOOD PRESSURE: 60 MMHG | SYSTOLIC BLOOD PRESSURE: 130 MMHG

## 2021-04-15 VITALS — SYSTOLIC BLOOD PRESSURE: 118 MMHG | DIASTOLIC BLOOD PRESSURE: 79 MMHG

## 2021-04-15 VITALS — SYSTOLIC BLOOD PRESSURE: 101 MMHG | DIASTOLIC BLOOD PRESSURE: 68 MMHG

## 2021-04-15 VITALS — DIASTOLIC BLOOD PRESSURE: 56 MMHG | SYSTOLIC BLOOD PRESSURE: 120 MMHG

## 2021-04-15 VITALS — DIASTOLIC BLOOD PRESSURE: 67 MMHG | SYSTOLIC BLOOD PRESSURE: 100 MMHG

## 2021-04-15 LAB
ALBUMIN SERPL-MCNC: 3.3 GM/DL (ref 3.2–4.5)
ALP SERPL-CCNC: 72 U/L (ref 40–136)
ALT SERPL-CCNC: 13 U/L (ref 0–55)
BASOPHILS # BLD AUTO: 0 10^3/UL (ref 0–0.1)
BASOPHILS NFR BLD AUTO: 0 % (ref 0–10)
BILIRUB SERPL-MCNC: 0.3 MG/DL (ref 0.1–1)
BUN/CREAT SERPL: 16
CALCIUM SERPL-MCNC: 8.4 MG/DL (ref 8.5–10.1)
CHLORIDE SERPL-SCNC: 104 MMOL/L (ref 98–107)
CO2 SERPL-SCNC: 22 MMOL/L (ref 21–32)
CREAT SERPL-MCNC: 1.16 MG/DL (ref 0.6–1.3)
EOSINOPHIL # BLD AUTO: 0.3 10^3/UL (ref 0–0.3)
EOSINOPHIL NFR BLD AUTO: 5 % (ref 0–10)
GFR SERPLBLD BASED ON 1.73 SQ M-ARVRAT: > 60 ML/MIN
GLUCOSE SERPL-MCNC: 190 MG/DL (ref 70–105)
HCT VFR BLD CALC: 28 % (ref 40–54)
HGB BLD-MCNC: 8.4 G/DL (ref 13.3–17.7)
LYMPHOCYTES # BLD AUTO: 2 10^3/UL (ref 1–4)
LYMPHOCYTES NFR BLD AUTO: 28 % (ref 12–44)
MANUAL DIFFERENTIAL PERFORMED BLD QL: NO
MCH RBC QN AUTO: 26 PG (ref 25–34)
MCHC RBC AUTO-ENTMCNC: 30 G/DL (ref 32–36)
MCV RBC AUTO: 88 FL (ref 80–99)
MONOCYTES # BLD AUTO: 0.4 10^3/UL (ref 0–1)
MONOCYTES NFR BLD AUTO: 6 % (ref 0–12)
NEUTROPHILS # BLD AUTO: 4.3 10^3/UL (ref 1.8–7.8)
NEUTROPHILS NFR BLD AUTO: 61 % (ref 42–75)
PLATELET # BLD: 255 10^3/UL (ref 130–400)
PMV BLD AUTO: 9.2 FL (ref 9–12.2)
POTASSIUM SERPL-SCNC: 4.4 MMOL/L (ref 3.6–5)
PROT SERPL-MCNC: 6.7 GM/DL (ref 6.4–8.2)
SODIUM SERPL-SCNC: 138 MMOL/L (ref 135–145)
WBC # BLD AUTO: 7 10^3/UL (ref 4.3–11)

## 2021-04-15 RX ADMIN — SODIUM CHLORIDE SCH MLS/HR: 900 INJECTION, SOLUTION INTRAVENOUS at 11:32

## 2021-04-15 RX ADMIN — HYDROCODONE BITARTRATE AND ACETAMINOPHEN PRN EA: 7.5; 325 TABLET ORAL at 08:20

## 2021-04-15 RX ADMIN — POLYETHYLENE GLYCOL (3350) SCH GM: 17 POWDER, FOR SOLUTION ORAL at 09:34

## 2021-04-15 RX ADMIN — DOCUSATE SODIUM AND SENNOSIDES SCH EA: 8.6; 5 TABLET, FILM COATED ORAL at 08:21

## 2021-04-15 RX ADMIN — METFORMIN HYDROCHLORIDE SCH MG: 500 TABLET, EXTENDED RELEASE ORAL at 16:25

## 2021-04-15 RX ADMIN — INSULIN ASPART SCH UNIT: 100 INJECTION, SOLUTION INTRAVENOUS; SUBCUTANEOUS at 16:25

## 2021-04-15 RX ADMIN — HYDROCODONE BITARTRATE AND ACETAMINOPHEN PRN EA: 7.5; 325 TABLET ORAL at 20:34

## 2021-04-15 RX ADMIN — MICONAZOLE NITRATE SCH GM: 20 POWDER TOPICAL at 09:37

## 2021-04-15 RX ADMIN — LISINOPRIL SCH MG: 20 TABLET ORAL at 08:20

## 2021-04-15 RX ADMIN — MICONAZOLE NITRATE SCH APPLIC: 20 POWDER TOPICAL at 20:37

## 2021-04-15 RX ADMIN — INSULIN ASPART SCH UNIT: 100 INJECTION, SOLUTION INTRAVENOUS; SUBCUTANEOUS at 08:22

## 2021-04-15 RX ADMIN — GLYBURIDE SCH MG: 2.5 TABLET ORAL at 16:25

## 2021-04-15 RX ADMIN — FENTANYL CITRATE PRN MCG: 50 INJECTION, SOLUTION INTRAMUSCULAR; INTRAVENOUS at 14:34

## 2021-04-15 RX ADMIN — DOCUSATE SODIUM AND SENNOSIDES SCH EA: 8.6; 5 TABLET, FILM COATED ORAL at 19:38

## 2021-04-15 RX ADMIN — PANTOPRAZOLE SODIUM SCH MG: 40 TABLET, DELAYED RELEASE ORAL at 08:20

## 2021-04-15 RX ADMIN — SODIUM CHLORIDE SCH MLS/HR: 900 INJECTION, SOLUTION INTRAVENOUS at 03:15

## 2021-04-15 RX ADMIN — ASPIRIN SCH MG: 81 TABLET ORAL at 08:21

## 2021-04-15 RX ADMIN — GLYBURIDE SCH MG: 2.5 TABLET ORAL at 06:08

## 2021-04-15 RX ADMIN — METFORMIN HYDROCHLORIDE SCH MG: 500 TABLET, EXTENDED RELEASE ORAL at 08:21

## 2021-04-15 RX ADMIN — POLYETHYLENE GLYCOL (3350) SCH GM: 17 POWDER, FOR SOLUTION ORAL at 19:38

## 2021-04-15 RX ADMIN — HYDROCODONE BITARTRATE AND ACETAMINOPHEN PRN EA: 7.5; 325 TABLET ORAL at 12:25

## 2021-04-15 RX ADMIN — INSULIN ASPART SCH UNIT: 100 INJECTION, SOLUTION INTRAVENOUS; SUBCUTANEOUS at 11:32

## 2021-04-15 RX ADMIN — HYDROCODONE BITARTRATE AND ACETAMINOPHEN PRN EA: 7.5; 325 TABLET ORAL at 16:26

## 2021-04-15 RX ADMIN — SODIUM CHLORIDE SCH MLS/HR: 900 INJECTION, SOLUTION INTRAVENOUS at 18:12

## 2021-04-15 RX ADMIN — PRASUGREL SCH MG: 10 TABLET, FILM COATED ORAL at 08:21

## 2021-04-15 RX ADMIN — HYDROCODONE BITARTRATE AND ACETAMINOPHEN PRN EA: 7.5; 325 TABLET ORAL at 03:24

## 2021-04-15 RX ADMIN — FENTANYL CITRATE PRN MCG: 50 INJECTION, SOLUTION INTRAMUSCULAR; INTRAVENOUS at 20:31

## 2021-04-15 RX ADMIN — METOPROLOL SUCCINATE SCH MG: 100 TABLET, EXTENDED RELEASE ORAL at 08:20

## 2021-04-15 NOTE — CONSULTATION
ELSY GOODWIN MED STUDENT 4/15/21 1227:


HPI


History of Present Illness:


HPI/Chief Complaint





CC: s/p L BKA





HPI: Mr. Gary is a 40yoWM clinic pt of [facility] with a hx of uncontrolled DM, HTN, 

obesity, peripheral neuropathy, and stent placement who remained in [facility] rehab 

for awhile nursing a severely infected diabetic foot ulcer which ultimately 

required L BKA due to extensive osteomyelitis and muscle necrosis.  He is S/P 

Day 1 doing well with stable vitals, pain level and glucose relatively 

controlled, hgb and wbc WNL.  Labs will be monitored for any pertinent changes. 

Pt is still awaiting bowel fxn to return and denies BM since before operation.  

We will restart home meds today and focus on pain management, glucose control, 

as well as increased mobility with PT/OT.  Pending insurance approval and pt 

progress, he will likely move down to [facility] rehab floor once again early next week

to finish recovery.


Source:  patient


Exam Limitations:  no limitations


Date Seen


4/15/21


Attending Physician


Elvira Jiménez MD


[facility]


Center/[facility]


Referring Physician





Date of Admission


Apr 14, 2021 at 09:11





Home Medications & Allergies


Home Medications


Reviewed patient Home Medication Reconciliation performed by pharmacy medication

reconciliations technician and/or nursing.


Patients Allergies have been reviewed.





Allergies





Allergies


Coded Allergies


  levofloxacin (Verified Allergy, Mild, 4/14/15)








Past Medical-Social-Family Hx


Patient Social History


Marrital Status:  


Employed/Student:  employed


Former Smoker, Quit:  Jan 1, 2003


Type Used:  Cigarettes, Electronic/Vapor


Recent Hopitalizations:  No





Immunizations Up To Date


Tetanus Booster (TDap):  More than 5yrs


Date of Pneumonia Vaccine:  Jan 1, 2007





Seasonal Allergies


Seasonal Allergies:  No





Past Medical History


Surgeries:  Orthopedic


Respiratory:  Sleep Apnea


Currently Using CPAP:  No


Currently Using BIPAP:  No


Cardiac:  High Cholesterol, Hypertension


Neurological:  Neuropathy


Reproductive:  No


Sexually Transmitted Disease:  No


HIV/AIDS:  No


Musculoskeletal:  Chronic Back Pain


Endocrine:  Diabetes, Insulin dep


Loss of Vision:  Denies


Hearing Impairment:  Denies


Skin/Integumentary:  Recent Skin Changes


History of Blood Disorders:  No


Adverse Reaction to Blood Hernandez:  No





Family History





Completed stroke


  19 MOTHER


Diabetes mellitus


  19 MOTHER


Hypertension


  19 MOTHER


  G8 BROTHER


Myocardial infarction


  17 FATHER


CAD Under 55 Years Old, Diabetes, Hypertension, Other Conditions/Hx





Review of Systems


Constitutional:  No chills, No diaphoresis, No fever, No weakness


EENTM:  No hearing loss, No blurred vision, No hoarseness


Respiratory:  No cough, No dyspnea on exertion, No orthopnea, No short of 

breath, No wheezing


Cardiovascular:  No chest pain, No palpitations


Gastrointestinal:  No abdominal pain, No diarrhea, No dysphagia


Genitourinary:  No dysuria, No frequency, No hematuria


Musculoskeletal:  No back pain; other (pain at BKA site L leg)


Skin:  No change in color


Psychiatric/Neurological:  Denies Headache, Denies Paresthesia





Physical Exam


Physical Exam


Vital Signs





Vital Signs - First Documented








 4/14/21





 17:20


 


Pulse 81





Capillary Refill :


Height, Weight, BMI


Height: 6'0.00"


Weight: 422lbs. 6.4oz. [phone]; 60.47 BMI


Method:Stated


General Appearance:  No Apparent Distress, Obese


HEENT:  PERRL/EOMI, Moist Mucous Membranes


Neck:  Full Range of Motion, Non Tender; No JVD


Respiratory:  Chest Non Tender, Lungs Clear, Normal Breath Sounds, No Res

piratory Distress


Cardiovascular:  Regular Rate, Rhythm, No JVD, Normal Peripheral Pulses


Gastrointestinal:  Normal Bowel Sounds, Non Tender, Soft


Back:  No CVA Tenderness, No Vertebral Tenderness


Extremity:  Normal Capillary Refill, No Calf Tenderness, Pedal Edema (R foot 

+1), Other (wound dressed/dry)


Neurologic/Psychiatric:  Alert, Oriented x3, Normal Mood/Affect


Skin:  Normal Color, Warm/Dry, Other (stasis dermatitis R foot)


Lymphatic:  No Adenopathy





Results


Results/Procedures


Labs


Laboratory Tests


4/15/21 05:15








Patient resulted labs reviewed.





Assessment/Plan


Assessment and Plan


Assess & Plan/Chief Complaint





Assessment:


s/p Left BKA


Morbid obesity


DM OOC


HTN





Plan:


Monitor closely


Pain control


IV abx


Tight glucose control


Restart home meds


PT/OT





Diagnosis/Problems


Diagnosis/Problems





(1) Status post below-knee amputation of left lower extremity


Status:  Acute


(2) ANCA (acute kidney injury)


Status:  Resolved


Resolution Date/Time:  4/30/20 @ 10:28


(3) Diabetic ulcer of foot associated with diabetes mellitus due to underlying 

condition, with necrosis of muscle


Status:  Resolved


(4) left foot osteomyelitis and necrotizing fasciitis


Status:  Resolved


(5) Hyperglycemia


Status:  Chronic


(6) Hypertension


Status:  Chronic


(7) Peripheral neuropathy


Status:  Chronic





SIOBHAN VILA DO 4/16/21 0602:


HPI


History of Present Illness:


HPI/Chief Complaint


CC: Medical management following left below the knee amputation


HPI: This is a 40yoWM who I have been taking care of for a total of four weeks, 

three weeks on rehab who presents following an uncomplicated left below the knee

amputation due to severe osteomyelitis and necrotizing fasciitis of the tissue. 

I reviewed his home medications and restarted most. Hgb is 8.4 today, Zosyn has 

been maintained and the bandage will be changed on Saturday. Will look to go to 

rehab post-amputation to learn how to be managing that at home.


Source:  patient


Exam Limitations:  no limitations


Referring Physician


Jessy





Past Medical-Social-Family Hx


Past Med/Social Hx:  Reviewed Nursing Past Med/Soc Hx, Reviewed and Corrections 

made


Patient Social History


Marrital Status:  


Employed/Student:  employed


Smoking Status:  Never a Smoker





Past Medical History


Surgeries:  Orthopedic


Cardiac:  High Cholesterol, Hypertension


Endocrine:  Diabetes, Insulin dep





Family History





Completed stroke


  19 MOTHER


Diabetes mellitus


  19 MOTHER


Hypertension


  19 MOTHER


  G8 BROTHER


Myocardial infarction


  17 FATHER





Review of Systems


Constitutional:  see HPI


Musculoskeletal:  other (pain at BKA site L leg)





Physical Exam


Physical Exam


General Appearance:  No Apparent Distress, WD/WN, Chronically ill, Obese


Eyes:  Bilateral Eye Normal Inspection, Bilateral Eye PERRL


HEENT:  PERRL/EOMI, Normal ENT Inspection, Pharynx Normal


Neck:  Full Range of Motion, Normal Inspection, Non Tender, Supple, Carotid 

Bruit


Respiratory:  Chest Non Tender, Lungs Clear, Normal Breath Sounds, No Accessory 

Muscle Use, No Respiratory Distress


Cardiovascular:  Regular Rate, Rhythm, No Edema, No Gallop, No JVD, No Murmur, 

Normal Peripheral Pulses


Gastrointestinal:  Normal Bowel Sounds, No Organomegaly, No Pulsatile Mass, Non 

Tender, Soft


Back:  Normal Inspection, No CVA Tenderness, No Vertebral Tenderness


Extremity:  Normal Capillary Refill, Normal Inspection, Normal Range of Motion, 

Non Tender, No Calf Tenderness, No Pedal Edema, Other (wound dressed/dry)


Neurologic/Psychiatric:  Alert, Oriented x3, No Motor/Sensory Deficits, Normal 

Mood/Affect


Skin:  Normal Color, Warm/Dry


Lymphatic:  No Adenopathy





Assessment/Plan


Assessment and Plan


Assess & Plan/Chief Complaint


Assessment:


s/p LBKA


DM


HTN


Obesity





Plan:


Pain control


Insulin





Supervisory-Addendum Brief


Verification & Attestation


Participated in pt care:  history, MDM, physical


Personally performed:  exam, history, MDM, supervision of care


Care discussed with:  Medical Student


Procedures:  n/a


Results interpretation:  Verified all documentation


Verification and Attestation of Medical Student E/M Service





A medical student performed and documented this service in my presence. I 

reviewed and verified all information documented by the medical student and made

modifications to such information, when appropriate. I personally performed the 

physical exam and medical decision making. 





 Siobhan Vila, Apr 16, 2021,06:02











ELSY GOODWIN MED STUDENT  Apr 15, 2021 12:27


SIOBHAN VILA DO                Apr 16, 2021 06:02

## 2021-04-15 NOTE — ANESTHESIA-GENERAL POST-OP
General


Patient Condition


Mental Status/LOC:  Same as Preop


Cardiovascular:  Satisfactory


Nausea/Vomiting:  Absent


Respiratory:  Satisfactory


Pain:  Controlled


Complications:  Absent





Post Op Complications


Complications


None





Follow Up Care/Instructions


Patient Instructions


None needed.





Anesthesia/Patient Condition


Patient Condition


Patient is doing well, no complaints, stable vital signs, no apparent adverse 

anesthesia problems.











RUTH BELL DO         Apr 15, 2021 13:57

## 2021-04-15 NOTE — SPEECH THERAPY PROGRESS NOTE
Therapy Progress Note


ST orders received for evaluation. Patient was not receiving ST services during 

[facility] admit. Patient's level of function does not indicate a need at this time. 

D/C ST evaluation orders.











RONNI VICTORIA            Apr 15, 2021 08:05

## 2021-04-15 NOTE — OCCUPATIONAL THERAPY EVAL
OT Evaluation-General/PLF


Medical Diagnosis


Admission Date


Apr 14, 2021 at 09:11


Medical Diagnosis:  left BKA


Onset Date:  Apr 14, 2021





Therapy Diagnosis


Therapy Diagnosis:  Weakness, Decreased ADL skills





Height/Weight


Height (Feet):  6


Height (Inches):  0.00


Weight (Pounds):  422


Weight (Ounces):  6.4





Precautions


Precautions/Isolations:  Fall Prevention, Standard Precautions





Referral


Physician:  Jessy


Referral Reason:  Activity Tolerance, Self Care, Evaluation/Treatment, 

Strengthening/ROM





Medical History


Pertinent Medical History:  DM, HTN, Neuropathy


Additional Medical History


Debridement x 2 left foot


Current History


Pt. on rehab following debridement on left foot for wound care.  Pt. had BKA on 

4-14-21.


Reviewed History:  Yes





Social History


Home:  Single Level


Current Living Status:  Spouse


Entry Into Home:  Ramp


Pt. currently has two steps to get into home.  However, pt's father in law will 

be building ramp this week.





ADL-Prior Level of Function


SCALE: Activities may be completed with or without assistive devices.





6-Indepedent-patient completes the activity by him/herself with no assistance 

from a helper.


5-Set-up or Clean-up Assistance-helper sets up or cleans up; patient completes 

activity. [place] assists only prior to or  


    following the activity.


4-Supervision or Touching Assistance-helper provides verbal cues and/or 

touching/steadying and/or contact guard assistance as patient completes 

activity. Assistance may be provided   


    throughout the activity or intermittently.


3-Partial/Moderate Assistance-helper does LESS THAN HALF the effort. [place] 

lifts, holds or supports trunk or limbs, but provides less than half the effort.


2-Substantial/Maximal Assistance-helper does MORE THAN HALF the effort. [place] 

lifts or holds trunk or limbs and provides more than half the effort.


7-Kuanupweq-ejuzbt does ALL the effort. Patient does none of the effort to 

complete the activity. Or, the assistance of 2 or more helpers is required for 

the patient to complete the  


    activity.


If activity was not attempted, code reason:


7-Patient Refused.


9-Not Applicable-not attempted and the patient did not perform the activity 

before the current illness, exacerbation or injury.


10-Not Attempted due to Environmental Limitations-(lack of equipment, weather 

restraints, etc.).


88-Not Attempted due to Medical Conditions or Safety Concerns.


ADL PLOF Comments


Pt. was independent prior to this hospitalization.  He is able to bathe/dress 

self.  Pt. 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


Self Care:  Independent


Functional Cognition:  Independent


DME/Equipment Comments


Pt. currently has a wheelchair at home but it does not have removable sides.  He

does not have a walker.


Occupation:  Highschool teacher


Drive Self:  Yes





OT Current Status


Subjective


Pt. reports pain in left LE, but does not report pain level.  Pt. has had 

medication.





Mental Status/Objective


Patient Orientation:  Person, Place, Time, Situation





Current


Upper Extremity ROM


WFL


Upper Extremity Strength


WFL





ADL-Treatment


Eating (QC):  6 (per pt.)


On/Off Footwear (QC):  4





Other Treatments


Pt. is alert and oriented.  Agreeable to work with OT.  Pt. attempts to transfer

fully to side of bed.  He is able to make it midway, and still position himself 

in sitting.  He completed this with only min assist to move left LE.  Pt. 

indicates that he can't fully sit on EOB, as he has a metal positioner placed 

behind his left knee, that goes up to mid thigh.  Pt. reports that sitting 

firmly on it (and on side of bed), makes his LE more painful.  However, he has 

reported this to his physician and states that it is to come off on Saturday.  O

T educates him regarding wounds and pressure sores.  Pt. aware and is watching 

area.  At this time, no problems with this.  Pt. declines sponge bathing at this

time, and states that he will sit this way to eat lunch.  OT educates him 

regarding continued goals for home.  Pt. has been in inpt. rehab prior to this, 

and is aware of OT role.  All needs met up in bed.





Education


OT Patient Education:  Correct positioning, Modified ADL techniques, Progress 

toward Goal/Update tx plan, Purpose of tx/functional activities, Reviewed 

precautions, Rehab process, Transfer techniques


Teaching Recipient:  Patient


Teaching Methods:  Demonstration, Discussion


Response to Teaching:  Verbalize Understanding, Return Demonstration





OT Short Term Goals


Short Term Goals


Time Frame:  Apr 15, 2021





OT Long Term Goals


Long Term Goals


Time Frame:  Apr 29, 2021


Eating (QC):  6


Oral Hygiene (QC):  6


Toileting Hygiene (QC):  6


Shower/Bathe Self (QC):  5


Upper Body Dressing (QC):  6


Lower Body Dressing (QC):  6


On/Off Footwear (QC):  6


Additional Goals:  1-Demonstrate ADL Tasks, 2-Verbalize Understanding, 3-

ImproveStrength/Aung


1=Demonstrate adherence to instructed precautions during ADL tasks.


2=Patient will verbalize/demonstrate understanding of assistive 

devices/modifications for ADL.


3=Patient will improve strength/tolerance for activity to enable patient to 

perform ADL's.





OT Education/Plan


Problem List/Assessment


Assessment:  Decreased Activ Tolerance, Impaired I ADL's, Impaired Self-Care 

Skills





Discharge Recommendations


Plan/Recommendations:  Continue POC


Therapy Discharge Recommendati:  Post Acute OT





Treatment Plan/Plan of Care


Treatment,Training & Education:  Yes


Patient would benefit from OT for education, treatment and training to promote 

independence in ADL's, mobility, safety and/or upper extremity function for 

ADL's.


Plan of Care:  ADL Retraining, Functional Mobility, UE Funct Exercise/Act


Treatment Duration:  Apr 29, 2021


Frequency:  5 times per week


Estimated Hrs Per Day:  .25 hour per day


Agreement:  Yes


Rehab Potential:  Good





Time/GCodes


Start Time:  11:02


Stop Time:  11:14


Total Time Billed (hr/min):  12


Billed Treatment Time


1, JAROCHO NEWMAN OT           Apr 15, 2021 12:00 Statement Selected

## 2021-04-15 NOTE — PROGRESS NOTE
Subjective


Date Seen by a Provider:  Apr 15, 2021


Time Seen by a Provider:  10:00


Subjective/Events-last exam


doing well. pain controlled. able to transfer/ambulate with assistance.





Objective


Exam





Vital Signs








  Date Time  Temp Pulse Resp B/P (MAP) Pulse Ox O2 Delivery O2 Flow Rate FiO2


 


4/15/21 08:26 36.5 89 14 118/79 (92) 98 Room Air  


 


4/15/21 08:00      Room Air  


 


4/15/21 04:00 35.8 87 18 100/67 (78) 98 Room Air  


 


4/15/21 00:19 35.5 89 18 101/67 (78) 98 Room Air  


 


4/14/21 20:00 36.0 97 20 109/52 (71) 97 Room Air  


 


4/14/21 20:00      Room Air  


 


4/14/21 17:36      Nasal Cannula 1.00 


 


4/14/21 17:20 35.8 81 18 125/67 (86) 100 Room Air  


 


4/14/21 17:20      Nasal Cannula 3 


 


4/14/21 17:20 36.4  20 127/75 (92) 98 Nasal Cannula 3 


 


4/14/21 17:15      Nasal Cannula 4 


 


4/14/21 17:10   20 130/75 (93) 100 Nasal Cannula 3 


 


4/14/21 17:00   20 127/75 (92) 96 Nasal Cannula 4 


 


4/14/21 17:00      Nasal Cannula 4 


 


4/14/21 16:50   20 116/70 (85) 100 Nasal Cannula 4 


 


4/14/21 16:45      Nasal Cannula 4 


 


4/14/21 16:40   20 111/70 (84) 95 Nasal Cannula 3 


 


4/14/21 16:30   20 120/72 (88) 98 Nasal Cannula 3 


 


4/14/21 16:30      Nasal Cannula 3 


 


4/14/21 16:26      Nasal Cannula 3 


 


4/14/21 16:26 36.4  18 120/71 (87) 99 Nasal Cannula 3 














I & O 


 


 4/15/21





 07:00


 


Intake Total 600 ml


 


Output Total 1100 ml


 


Balance -500 ml





Capillary Refill :


General Appearance:  No Apparent Distress


HEENT:  PERRL/EOMI


Neck:  Full Range of Motion


Respiratory:  Chest Non Tender, Lungs Clear, Normal Breath Sounds


Cardiovascular:  Regular Rate, Rhythm


Gastrointestinal:  normal bowel sounds, non tender, soft


Extremity:  Normal Capillary Refill, Other (wound dressed/dry)


Neurologic/Psychiatric:  Alert, Oriented x3


Skin:  Normal Color


Lymphatic:  No Adenopathy





Results


Lab


Laboratory Tests


4/14/21 21:05: Glucometer 225H


4/15/21 05:15: 


White Blood Count 7.0, Red Blood Count 3.19L, Hemoglobin 8.4L, Hematocrit 28L, 

Mean Corpuscular Volume 88, Mean Corpuscular Hemoglobin 26, Mean Corpuscular 

Hemoglobin Concent 30L, Red Cell Distribution Width 18.3H, Platelet Count 255, 

Mean Platelet Volume 9.2, Immature Granulocyte % (Auto) 0, Neutrophils (%) (

Auto) 61, Lymphocytes (%) (Auto) 28, Monocytes (%) (Auto) 6, Eosinophils (%) 

(Auto) 5, Basophils (%) (Auto) 0, Neutrophils # (Auto) 4.3, Lymphocytes # (Auto)

2.0, Monocytes # (Auto) 0.4, Eosinophils # (Auto) 0.3, Basophils # (Auto) 0.0, 

Immature Granulocyte # (Auto) 0.0, Sodium Level 138, Potassium Level 4.4, 

Chloride Level 104, Carbon Dioxide Level 22, Anion Gap 12, Blood Urea Nitrogen 

18, Creatinine 1.16, Estimat Glomerular Filtration Rate > 60, BUN/Creatinine 

Ratio 16, Glucose Level 190H, Calcium Level 8.4L, Corrected Calcium 9.0, Total 

Bilirubin 0.3, Aspartate Amino Transf (AST/SGOT) 15, Alanine Aminotransferase 

(ALT/SGPT) 13, Alkaline Phosphatase 72, Total Protein 6.7, Albumin 3.3


4/15/21 10:46: Glucometer 191H





Assessment/Plan


Assessment/Plan


Assess & Plan/Chief Complaint


s/p left BKA.


continue OOB to chair. 


will check wound on saturday or sunday.











CLAUDY ALEJO MD                Apr 15, 2021 10:57

## 2021-04-15 NOTE — PHYSICAL THERAPY EVALUATION
PT Evaluation-General


Medical Diagnosis


Admission Date


Apr 14, 2021 at 09:11


Medical Diagnosis:  left BKA


Onset Date:  Apr 14, 2021





Therapy Diagnosis


Therapy Diagnosis:  debility/weakness





Height/Weight


Height (Feet):  6


Height (Inches):  0.00


Weight (Pounds):  422


Weight (Ounces):  6.4





Precautions


Precautions/Isolations:  Fall Prevention, Standard Precautions





Weight Bear Status


Right Lower Extremity:  Right


Full Weight Bearing


Left Lower Extremity:  Left


Non Weight Bearing





Referral


Physician:  Jessy


Reason for Referral:  Evaluation/Treatment





Medical History


Pertinent Medical History:  DM, HTN, Neuropathy


Current History


s/p left BKA


Reviewed History:  Yes





Social History


Home:  Single Level


Current Living Status:  Spouse


Entry Into Home:  Ramp





Prior


Prior Level of Function


SCALE: Activities may be completed with or without assistive devices.





6-Indepedent-patient completes the activity by him/herself with no assistance 

from a helper.


5-Set-up or Clean-up Assistance-helper sets up or cleans up; patient completes 

activity. [place] assists only prior to or  


    following the activity.


4-Supervision or Touching Assistance-helper provides verbal cues and/or 

touching/steadying and/or contact guard assistance as patient completes 

activity. Assistance may be provided   


    throughout the activity or intermittently.


3-Partial/Moderate Assistance-helper does LESS THAN HALF the effort. [place] 

lifts, holds or supports trunk or limbs, but provides less than half the effort.


2-Substantial/Maximal Assistance-helper does MORE THAN HALF the effort. [place] 

lifts or holds trunk or limbs and provides more than half the effort.


0-Nqjppstgp-dxldva does ALL the effort. Patient does none of the effort to 

complete the activity. Or, the assistance of 2 or more helpers is required for 

the patient to complete the  


    activity.


If activity was not attempted, code reason:


7-Patient Refused.


9-Not Applicable-not attempted and the patient did not perform the activity 

before the current illness, exacerbation or injury.


10-Not Attempted due to Environmental Limitations-(lack of equipment, weather 

restraints, etc.).


88-Not Attempted due to Medical Conditions or Safety Concerns.


Bed Mobility:  6


Transfers (B,C,W/C):  6


Gait:  6


Indoor Mobility (Ambulation):  Independent





PT Evaluation-Current


Subjective


Patient agrees to PT.





Pain





   Numeric Pain Scale:  10-Worst Possible Pain


   Location:  Left, Soft Tissue


   Location Body Site:  Generalized (left BKA)





Objective


Patient Orientation:  Normal For Age





ROM/Strength


ROM Lower Extremities


left LE immobilized in extension/right LE WFL


Strength Lower Extremities


left LE NT/right LE 3/5 grossly





Integumentary/Posture


Integumentary


refer to nursing notes


Bowel Incontinence:  No


Bladder Incontinence:  No


Posture


WFL





Neuromuscular


(Tone, Coordination, Reflexes)


grossly intact





Sensory


Vision:  Wears Glasses


Hearing:  Functional





Transfers


Roll Left to Right (QC):  4


Sit to Lying (QC):  4


Lying to Sitting/Side of Bed(Q:  4


Sit to Stand (QC):  88


Chair/Bed-to-Chair Xfer(QC):  88


Toilet Transfer (QC):  88





Gait


Does the Patient Walk?:  No and Walking Goal IS indicated


Mode of Locomotion:  Both


Anticipated Mode of Locomotion:  Both


Walk 10 feet (QC):  88


Walk 50 ft with 2 Turns(QC):  88





Wheelchair Training


Does the Pt Use a Wheelchair?:  Yes


Wheel 50 ft with 2 turns (QC):  88


Wheel 150 ft (QC):  88


Type of Wheelchair:  Manual





Stairs


1 Step (curb) (QC):  9


4 Steps (QC):  9


12 Steps (QC):  9





Balance


Sitting Static:  Normal


Sitting Dynamic:  Normal


Picking up an Object (QC):  88





Assessment/Needs


40 y.o. male, will benefit from skilled PT to address functional strength and 

mobility to improve current LOF.  per patient, Ramp will be in place at home in 

1 week.


Rehab Potential:  Fair





PT Long Term Goals


Long Term Goals


PT Long Term Goals Time Frame:  May 1, 2021


Roll Left & Right (QC):  6


Sit to Lying (QC):  6


Lying-Sitting on Side/Bed(QC):  6


Sit to Stand (QC):  6


Chair/Bed-to-Chair Xfer(QC):  6


Toilet Transfer (QC):  6


Walk 10 feet (QC):  4


Does the Pt use WC or Scooter?:  Yes


Wheel 50 feet with 2 turns (QC:  6


Type:  Manual


Wheel 150 feet:  6


Type:  Manual





PT Plan


Problem List


Problem List:  Activity Tolerance, Functional Strength, Balance, Transfer





Treatment/Plan


Treatment Plan:  Continue Plan of Care


Treatment Plan:  Bed Mobility, Education, Functional Activity Aung, Functional 

Strength, Gait, Safety, Therapeutic Exercise, Transfers


Treatment Duration:  May 1, 2021


Frequency:  6 times per week


Estimated Hrs Per Day:  .5 hour per day


Patient and/or Family Agrees t:  Yes





Time/GCodes


Time In:  918


Time Out:  930


Total Billed Treatment Time:  12


Total Billed Treatment


1 visit


EVModC 12 min











ADRIANNA BERNABE PT              Apr 15, 2021 10:41

## 2021-04-16 VITALS — DIASTOLIC BLOOD PRESSURE: 62 MMHG | SYSTOLIC BLOOD PRESSURE: 136 MMHG

## 2021-04-16 VITALS — SYSTOLIC BLOOD PRESSURE: 133 MMHG | DIASTOLIC BLOOD PRESSURE: 76 MMHG

## 2021-04-16 VITALS — DIASTOLIC BLOOD PRESSURE: 74 MMHG | SYSTOLIC BLOOD PRESSURE: 131 MMHG

## 2021-04-16 VITALS — DIASTOLIC BLOOD PRESSURE: 64 MMHG | SYSTOLIC BLOOD PRESSURE: 121 MMHG

## 2021-04-16 LAB
ALBUMIN SERPL-MCNC: 3.4 GM/DL (ref 3.2–4.5)
ALP SERPL-CCNC: 72 U/L (ref 40–136)
ALT SERPL-CCNC: 12 U/L (ref 0–55)
BASOPHILS # BLD AUTO: 0 10^3/UL (ref 0–0.1)
BASOPHILS NFR BLD AUTO: 0 % (ref 0–10)
BILIRUB SERPL-MCNC: 0.3 MG/DL (ref 0.1–1)
BUN/CREAT SERPL: 16
CALCIUM SERPL-MCNC: 8.6 MG/DL (ref 8.5–10.1)
CHLORIDE SERPL-SCNC: 105 MMOL/L (ref 98–107)
CO2 SERPL-SCNC: 23 MMOL/L (ref 21–32)
CREAT SERPL-MCNC: 1 MG/DL (ref 0.6–1.3)
EOSINOPHIL # BLD AUTO: 0.5 10^3/UL (ref 0–0.3)
EOSINOPHIL NFR BLD AUTO: 7 % (ref 0–10)
GFR SERPLBLD BASED ON 1.73 SQ M-ARVRAT: > 60 ML/MIN
GLUCOSE SERPL-MCNC: 166 MG/DL (ref 70–105)
HCT VFR BLD CALC: 28 % (ref 40–54)
HGB BLD-MCNC: 8.1 G/DL (ref 13.3–17.7)
LYMPHOCYTES # BLD AUTO: 2 10^3/UL (ref 1–4)
LYMPHOCYTES NFR BLD AUTO: 29 % (ref 12–44)
MANUAL DIFFERENTIAL PERFORMED BLD QL: NO
MCH RBC QN AUTO: 26 PG (ref 25–34)
MCHC RBC AUTO-ENTMCNC: 30 G/DL (ref 32–36)
MCV RBC AUTO: 88 FL (ref 80–99)
MONOCYTES # BLD AUTO: 0.5 10^3/UL (ref 0–1)
MONOCYTES NFR BLD AUTO: 7 % (ref 0–12)
NEUTROPHILS # BLD AUTO: 3.8 10^3/UL (ref 1.8–7.8)
NEUTROPHILS NFR BLD AUTO: 56 % (ref 42–75)
PLATELET # BLD: 256 10^3/UL (ref 130–400)
PMV BLD AUTO: 9.3 FL (ref 9–12.2)
POTASSIUM SERPL-SCNC: 4.5 MMOL/L (ref 3.6–5)
PROT SERPL-MCNC: 7 GM/DL (ref 6.4–8.2)
SODIUM SERPL-SCNC: 141 MMOL/L (ref 135–145)
WBC # BLD AUTO: 6.8 10^3/UL (ref 4.3–11)

## 2021-04-16 RX ADMIN — MICONAZOLE NITRATE SCH APPLIC: 20 POWDER TOPICAL at 09:18

## 2021-04-16 RX ADMIN — FENTANYL CITRATE PRN MCG: 50 INJECTION, SOLUTION INTRAMUSCULAR; INTRAVENOUS at 15:05

## 2021-04-16 RX ADMIN — SODIUM CHLORIDE SCH MLS/HR: 900 INJECTION, SOLUTION INTRAVENOUS at 17:38

## 2021-04-16 RX ADMIN — METFORMIN HYDROCHLORIDE SCH MG: 500 TABLET, EXTENDED RELEASE ORAL at 17:38

## 2021-04-16 RX ADMIN — INSULIN ASPART SCH UNIT: 100 INJECTION, SOLUTION INTRAVENOUS; SUBCUTANEOUS at 17:38

## 2021-04-16 RX ADMIN — DOCUSATE SODIUM AND SENNOSIDES SCH EA: 8.6; 5 TABLET, FILM COATED ORAL at 19:22

## 2021-04-16 RX ADMIN — PRASUGREL SCH MG: 10 TABLET, FILM COATED ORAL at 09:17

## 2021-04-16 RX ADMIN — FENTANYL CITRATE PRN MCG: 50 INJECTION, SOLUTION INTRAMUSCULAR; INTRAVENOUS at 03:16

## 2021-04-16 RX ADMIN — SODIUM CHLORIDE SCH MLS/HR: 900 INJECTION, SOLUTION INTRAVENOUS at 03:10

## 2021-04-16 RX ADMIN — DOCUSATE SODIUM AND SENNOSIDES SCH EA: 8.6; 5 TABLET, FILM COATED ORAL at 09:17

## 2021-04-16 RX ADMIN — INSULIN ASPART SCH UNIT: 100 INJECTION, SOLUTION INTRAVENOUS; SUBCUTANEOUS at 07:46

## 2021-04-16 RX ADMIN — LISINOPRIL SCH MG: 20 TABLET ORAL at 09:17

## 2021-04-16 RX ADMIN — POLYETHYLENE GLYCOL (3350) SCH GM: 17 POWDER, FOR SOLUTION ORAL at 19:22

## 2021-04-16 RX ADMIN — FENTANYL CITRATE PRN MCG: 50 INJECTION, SOLUTION INTRAMUSCULAR; INTRAVENOUS at 19:31

## 2021-04-16 RX ADMIN — FENTANYL CITRATE PRN MCG: 50 INJECTION, SOLUTION INTRAMUSCULAR; INTRAVENOUS at 07:52

## 2021-04-16 RX ADMIN — HYDROCODONE BITARTRATE AND ACETAMINOPHEN PRN EA: 7.5; 325 TABLET ORAL at 07:51

## 2021-04-16 RX ADMIN — PANTOPRAZOLE SODIUM SCH MG: 40 TABLET, DELAYED RELEASE ORAL at 09:17

## 2021-04-16 RX ADMIN — METFORMIN HYDROCHLORIDE SCH MG: 500 TABLET, EXTENDED RELEASE ORAL at 07:46

## 2021-04-16 RX ADMIN — HYDROCODONE BITARTRATE AND ACETAMINOPHEN PRN EA: 7.5; 325 TABLET ORAL at 19:30

## 2021-04-16 RX ADMIN — HYDROCODONE BITARTRATE AND ACETAMINOPHEN PRN EA: 7.5; 325 TABLET ORAL at 15:05

## 2021-04-16 RX ADMIN — GLYBURIDE SCH MG: 2.5 TABLET ORAL at 06:32

## 2021-04-16 RX ADMIN — ASPIRIN SCH MG: 81 TABLET ORAL at 09:17

## 2021-04-16 RX ADMIN — SODIUM CHLORIDE SCH MLS/HR: 900 INJECTION, SOLUTION INTRAVENOUS at 11:42

## 2021-04-16 RX ADMIN — GLYBURIDE SCH MG: 2.5 TABLET ORAL at 16:30

## 2021-04-16 RX ADMIN — MICONAZOLE NITRATE SCH APPLIC: 20 POWDER TOPICAL at 19:23

## 2021-04-16 RX ADMIN — POLYETHYLENE GLYCOL (3350) SCH GM: 17 POWDER, FOR SOLUTION ORAL at 09:18

## 2021-04-16 RX ADMIN — METOPROLOL SUCCINATE SCH MG: 100 TABLET, EXTENDED RELEASE ORAL at 09:17

## 2021-04-16 RX ADMIN — INSULIN ASPART SCH UNIT: 100 INJECTION, SOLUTION INTRAVENOUS; SUBCUTANEOUS at 12:30

## 2021-04-16 NOTE — PHYSICAL THERAPY DAILY NOTE
PT Daily Note-Current


Subjective


Patient agrees to PT.





Pain





   Numeric Pain Scale:  8


   Location:  Left, Soft Tissue


   Pain Description:  Acute


   Comment:  BKA





Mental Status


Patient Orientation:  Normal For Age





Transfers


SCALE: Activities may be completed with or without assistive devices.





6-Indepedent-patient completes the activity by him/herself with no assistance 

from a helper.


5-Set-up or Clean-up Assistance-helper sets up or cleans up; patient completes 

activity. [place] assists only prior to or  


    following the activity.


4-Supervision or Touching Assistance-helper provides verbal cues and/or 

touching/steadying and/or contact guard assistance as patient completes activit

y. Assistance may be provided   


    throughout the activity or intermittently.


3-Partial/Moderate Assistance-helper does LESS THAN HALF the effort. [place] 

lifts, holds or supports trunk or limbs, but provides less than half the effort.


2-Substantial/Maximal Assistance-helper does MORE THAN HALF the effort. [place] 

lifts or holds trunk or limbs and provides more than half the effort.


1-Hbalrtfjm-bmijeh does ALL the effort. Patient does none of the effort to 

complete the activity. Or, the assistance of 2 or more helpers is required for 

the patient to complete the  


    activity.


If activity was not attempted, code reason:


7-Patient Refused.


9-Not Applicable-not attempted and the patient did not perform the activity 

before the current illness, exacerbation or injury.


10-Not Attempted due to Environmental Limitations-(lack of equipment, weather 

restraints, etc.).


88-Not Attempted due to Medical Conditions or Safety Concerns.


Lying to Sitting/Side of Bed(Q:  6


Patient remains sitting EOB after session





Weight Bearing


Right Lower Extremity:  Right


Full Weight Bearing


Left Lower Extremity:  Left


Non Weight Bearing





Exercises


Supine Ex:  Ankle pumps (right LE), Glut sets, Heel Slides (right LE), Straight 

leg raise, Hip abd/add


Supine Reps:  10 (x 2 sets)





Assessment


Patient declined OOB due to left LE immobilized until tomorrow.  Increase 

activity as tolerated by patient.





PT Long Term Goals


Long Term Goals


PT Long Term Goals Time Frame:  May 1, 2021


Roll Left & Right (QC):  6


Sit to Lying (QC):  6


Lying-Sitting on Side/Bed(QC):  6


Sit to Stand (QC):  6


Chair/Bed-to-Chair Xfer(QC):  6


Toilet Transfer (QC):  6


Walk 10 feet (QC):  4


Does the Pt use WC or Scooter?:  Yes


Wheel 50 feet with 2 turns (QC:  6


Type:  Manual


Wheel 150 feet:  6


Type:  Manual





PT Plan


Treatment/Plan


Treatment Plan:  Continue Plan of Care


Treatment Plan:  Bed Mobility, Education, Functional Activity Aung, Functional 

Strength, Gait, Safety, Therapeutic Exercise, Transfers


Treatment Duration:  May 1, 2021


Frequency:  6 times per week


Estimated Hrs Per Day:  .5 hour per day


Patient and/or Family Agrees t:  Yes





Time/GCodes


Time In:  917


Time Out:  935


Total Billed Treatment Time:  18


Total Billed Treatment


1 visit


EX 18 min











ADRIANNA BERNABE PT              Apr 16, 2021 09:47

## 2021-04-16 NOTE — OCCUPATIONAL THER DAILY NOTE
OT Current Status-Daily Note


Subjective


Pt alert, lying in bed.  Pt agrees to therapy.  Pt c/o sharp pain with movement 

at L LE.





Mental Status/Objective


Patient Orientation:  Person, Place, Time, Situation


Attachments:  IV





ADL-Treatment


Therapy Code Descriptions/Definitions 





Functional [place] Measure:


0=Not Assessed/NA        4=Minimal Assistance


1=Total Assistance        5=Supervision or Setup


2=Maximal Assistance  6=Modified [place]


3=Moderate Assistance 7=Complete IndependenceSCALE: Activities may be completed 

with or without assistive devices.





6-Indepedent-patient completes the activity by him/herself with no assistance 

from a helper.


5-Set-up or Clean-up Assistance-helper sets up or cleans up; patient completes 

activity. [place] assists only prior to or  


    following the activity.


4-Supervision or Touching Assistance-helper provides verbal cues and/or 

touching/steadying and/or contact guard assistance as patient completes 

activity. Assistance may be provided   


    throughout the activity or intermittently.


3-Partial/Moderate Assistance-helper does LESS THAN HALF the effort. [place] 

lifts, holds or supports trunk or limbs, but provides less than half the effort.


2-Substantial/Maximal Assistance-helper does MORE THAN HALF the effort. [place] 

lifts or holds trunk or limbs and provides more than half the effort.


5-Wtkibgwgh-njdjac does ALL the effort. Patient does none of the effort to 

complete the activity. Or, the assistance of 2 or more helpers is required for 

the patient to complete the  


    activity.


If activity was not attempted, code reason:


7-Patient Refused.


9-Not Applicable-not attempted and the patient did not perform the activity 

before the current illness, exacerbation or injury.


10-Not Attempted due to Environmental Limitations-(lack of equipment, weather 

restraints, etc.).


88-Not Attempted due to Medical Conditions or Safety Concerns.


When pt moves L LE pain from positioning device for stump.  Pt declines any OOB 

activity for this reason.





Other Treatment


Pt educated on heavy resistance therapy sponge exercises for B hands.  Pt 

demonstrated understanding of each exercise and completed 1 set 30 reps each.  

Pt then began his HEP for B UE strengthening using 6# wts, completing 1 set 30 

reps or more.  Discussed what ADLs would look like and expectations with his L 

BKA.  Pt verbalized understanding.  Toilet aid and Medium resistance theraband 

will be given to pt.  After session, pt lying in bed with call light/phone in 

reach.  All needs met in room.





OT Short Term Goals


Short Term Goals


Time Frame:  Apr 15, 2021





OT Long Term Goals


Long Term Goals


Time Frame:  Apr 29, 2021


Eating (QC):  6


Oral Hygiene (QC):  6


Toileting Hygiene (QC):  6


Shower/Bathe Self (QC):  5


Upper Body Dressing (QC):  6


Lower Body Dressing (QC):  6


On/Off Footwear (QC):  6


Additional Goals:  1-Demonstrate ADL Tasks, 2-Verbalize Understanding, 3-

ImproveStrength/Aung


1=Demonstrate adherence to instructed precautions during ADL tasks.


2=Patient will verbalize/demonstrate understanding of assistive 

devices/modifications for ADL.


3=Patient will improve strength/tolerance for activity to enable patient to 

perform ADL's.





OT Education/Plan


Problem List/Assessment


Assessment:  Impaired Self-Care Skills





Discharge Recommendations


Plan/Recommendations:  Continue POC





Treatment Plan/Plan of Care


Patient would benefit from OT for education, treatment and training to promote 

independence in ADL's, mobility, safety and/or upper extremity function for 

ADL's.


Plan of Care:  ADL Retraining, Functional Mobility, UE Funct Exercise/Act


Treatment Duration:  Apr 29, 2021


Frequency:  5 times per week


Estimated Hrs Per Day:  .25 hour per day


Agreement:  Yes


Rehab Potential:  Good





Time/GCodes


Start Time:  08:40


Stop Time:  09:00


Total Time Billed (hr/min):  20


Billed Treatment Time


1 visit-FA 1 (20 min)











GUDELIA GONZALEZ               Apr 16, 2021 09:35

## 2021-04-16 NOTE — PROGRESS NOTE
ELSY GOODWIN MED STUDENT 4/16/21 1204:


Subjective


Date Seen by a Provider:  Apr 16, 2021


Time Seen by a Provider:  09:30


Subjective/Events-last exam





4/16/21





Pain well-controlled; generally at a 4/10 


No fever and WBC WNL (6.8)


Hgb remains stable at 8.1 but will consider transfusion if below 7


Pt working well with PT discovering new ways to do things after recent operation


Still has not had BM; given Senna and Miralax. Will treat more aggressively if 

unsuccessful today


Dressing to be changed Saturday morning





Objective


Exam


Last Set of Vital Signs





Vital Signs








  Date Time  Temp Pulse Resp B/P (MAP) Pulse Ox O2 Delivery O2 Flow Rate FiO2


 


4/16/21 08:00 36.7 86 16 133/76 (95) 99 Room Air  


 


4/14/21 17:36       1.00 





Capillary Refill :


I&O











Intake and Output 


 


 4/16/21





 00:00


 


Intake Total 2290 ml


 


Output Total 2200 ml


 


Balance 90 ml


 


 


 


Intake Oral 2290 ml


 


Output Urine Total 2200 ml


 


# Voids 1








General:  Alert, Oriented X3, Cooperative, No Acute Distress


HEENT:  PERRLA, Mucous Memb Moist/Pink


Neck:  Supple, No JVD, No Thyromegaly


Lungs:  Clear to Auscultation, Normal Air Movement


Heart:  Regular Rate, No Murmurs


Abdomen:  Normal Bowel Sounds, Soft, No Tenderness


Extremities:  Normal Pulses, No Tenderness/Swelling


Skin:  No Rashes, Other (stasis dermatitis R foot)


Neuro:  Normal Speech, Strength at 5/5 X4 Ext


Psych/Mental Status:  Mental Status NL, Mood NL





Results


Lab


Laboratory Tests


4/15/21 16:14: Glucometer 164H


4/15/21 20:24: Glucometer 184H


4/16/21 05:39: 


White Blood Count 6.8, Red Blood Count 3.11L, Hemoglobin 8.1L, Hematocrit 28L, 

Mean Corpuscular Volume 88, Mean Corpuscular Hemoglobin 26, Mean Corpuscular 

Hemoglobin Concent 30L, Red Cell Distribution Width 18.4H, Platelet Count 256, 

Mean Platelet Volume 9.3, Immature Granulocyte % (Auto) 0, Neutrophils (%) 

(Auto) 56, Lymphocytes (%) (Auto) 29, Monocytes (%) (Auto) 7, Eosinophils (%) 

(Auto) 7, Basophils (%) (Auto) 0, Neutrophils # (Auto) 3.8, Lymphocytes # (Auto)

2.0, Monocytes # (Auto) 0.5, Eosinophils # (Auto) 0.5H, Basophils # (Auto) 0.0, 

Immature Granulocyte # (Auto) 0.0, Sodium Level 141, Potassium Level 4.5, 

Chloride Level 105, Carbon Dioxide Level 23, Anion Gap 13, Blood Urea Nitrogen 

16, Creatinine 1.00, Estimat Glomerular Filtration Rate > 60, BUN/Creatinine 

Ratio 16, Glucose Level 166H, Calcium Level 8.6, Corrected Calcium 9.1, Total 

Bilirubin 0.3, Aspartate Amino Transf (AST/SGOT) 9, Alanine Aminotransferase 

(ALT/SGPT) 12, Alkaline Phosphatase 72, Total Protein 7.0, Albumin 3.4


4/16/21 11:03: Glucometer 153H





Assessment/Plan


Assessment/Plan


Assess & Plan/Chief Complaint


Assessment:


s/p Left BKA


Obesity


DM OOC


HTN





Plan:


Pain control


IV abx


Glucose control


Restart home meds


PT/OT


Final Diagnosis


s/p Left BKA





Diagnosis/Problems


Diagnosis/Problems





(1) Status post below-knee amputation of left lower extremity


Status:  Acute


(2) ANCA (acute kidney injury)


Status:  Resolved


Resolution Date/Time:  4/30/20 @ 10:28


(3) Diabetic ulcer of foot associated with diabetes mellitus due to underlying 

condition, with necrosis of muscle


Status:  Resolved


(4) left foot osteomyelitis and necrotizing fasciitis


Status:  Resolved


(5) Hyperglycemia


Status:  Chronic


(6) Hypertension


Status:  Chronic


(7) Peripheral neuropathy


Status:  Chronic





SIOBHAN VILA DO 4/17/21 1041:


Subjective


Subjective/Events-last exam


Hemoglobin trend noted


Monitor pain


Dressing change Saturday


Review of Systems


General:  Fatigue


Musculoskeletal:  leg pain





Objective


Exam


General:  Alert, Oriented X3, Cooperative, No Acute Distress


Lungs:  Clear to Auscultation


Heart:  Regular Rate


Abdomen:  Normal Bowel Sounds





Assessment/Plan


Assessment/Plan


Assess & Plan/Chief Complaint


Monitor hgb


Sugar management





Supervisory-Addendum Brief


Verification & Attestation


Participated in pt care:  history, MDM, physical


Personally performed:  exam, history, MDM, supervision of care


Care discussed with:  Medical Student


Procedures:  n/a


Results interpretation:  Verified all documentation


Verification and Attestation of Medical Student E/M Service





A medical student performed and documented this service in my presence. I 

reviewed and verified all information documented by the medical student and made

modifications to such information, when appropriate. I personally performed the 

physical exam and medical decision making. 





 Siobhan Vila, Apr 17, 2021,10:41











ELSY GOODWIN MED STUDENT  Apr 16, 2021 12:04


SIOBHAN VILA DO                Apr 17, 2021 10:41

## 2021-04-16 NOTE — PROGRESS NOTE
Subjective


Date Seen by a Provider:  Apr 16, 2021


Time Seen by a Provider:  11:00


Subjective/Events-last exam


doing ok. no complaints. pain controlled. OOB to chair with assistance.





Objective


Exam





Vital Signs








  Date Time  Temp Pulse Resp B/P (MAP) Pulse Ox O2 Delivery O2 Flow Rate FiO2


 


4/16/21 08:00 36.7 86 16 133/76 (95) 99 Room Air  


 


4/16/21 08:00      Room Air  


 


4/16/21 00:00 36.0 89 20 136/62 (86) 99 Room Air  


 


4/15/21 20:19 36.0 90 20 120/56 (77) 100 Room Air  


 


4/15/21 20:00      Room Air  


 


4/15/21 16:11 35.3 84 18 130/60 (83) 99 Room Air  


 


4/15/21 11:36 35.8 87 16 101/68 (79) 99 Room Air  














I & O 


 


 4/16/21





 07:00


 


Intake Total 1890 ml


 


Output Total 1100 ml


 


Balance 790 ml





Capillary Refill :


General Appearance:  No Apparent Distress


HEENT:  PERRL/EOMI


Neck:  Full Range of Motion


Respiratory:  Chest Non Tender, Lungs Clear, Normal Breath Sounds


Cardiovascular:  Regular Rate, Rhythm


Gastrointestinal:  normal bowel sounds, non tender, soft


Extremity:  Normal Capillary Refill, Other (lt stump dressing dressed/dry)


Neurologic/Psychiatric:  Alert, Oriented x3


Skin:  Normal Color


Lymphatic:  No Adenopathy





Results


Lab


Laboratory Tests


4/15/21 16:14: Glucometer 164H


4/15/21 20:24: Glucometer 184H


4/16/21 05:39: 


White Blood Count 6.8, Red Blood Count 3.11L, Hemoglobin 8.1L, Hematocrit 28L, 

Mean Corpuscular Volume 88, Mean Corpuscular Hemoglobin 26, Mean Corpuscular 

Hemoglobin Concent 30L, Red Cell Distribution Width 18.4H, Platelet Count 256, 

Mean Platelet Volume 9.3, Immature Granulocyte % (Auto) 0, Neutrophils (%) 

(Auto) 56, Lymphocytes (%) (Auto) 29, Monocytes (%) (Auto) 7, Eosinophils (%) 

(Auto) 7, Basophils (%) (Auto) 0, Neutrophils # (Auto) 3.8, Lymphocytes # (Auto)

2.0, Monocytes # (Auto) 0.5, Eosinophils # (Auto) 0.5H, Basophils # (Auto) 0.0, 

Immature Granulocyte # (Auto) 0.0, Sodium Level 141, Potassium Level 4.5, 

Chloride Level 105, Carbon Dioxide Level 23, Anion Gap 13, Blood Urea Nitrogen 

16, Creatinine 1.00, Estimat Glomerular Filtration Rate > 60, BUN/Creatinine 

Ratio 16, Glucose Level 166H, Calcium Level 8.6, Corrected Calcium 9.1, Total 

Bilirubin 0.3, Aspartate Amino Transf (AST/SGOT) 9, Alanine Aminotransferase 

(ALT/SGPT) 12, Alkaline Phosphatase 72, Total Protein 7.0, Albumin 3.4


4/16/21 11:03: Glucometer 153H





Assessment/Plan


Assessment/Plan


Assess & Plan/Chief Complaint


s/p left BKA.


continue OOB to chair. 


will check wound on saturday or sunday.











CLAUDY ALEJO MD                Apr 16, 2021 11:13

## 2021-04-17 VITALS — DIASTOLIC BLOOD PRESSURE: 84 MMHG | SYSTOLIC BLOOD PRESSURE: 124 MMHG

## 2021-04-17 VITALS — DIASTOLIC BLOOD PRESSURE: 87 MMHG | SYSTOLIC BLOOD PRESSURE: 138 MMHG

## 2021-04-17 VITALS — SYSTOLIC BLOOD PRESSURE: 157 MMHG | DIASTOLIC BLOOD PRESSURE: 91 MMHG

## 2021-04-17 VITALS — SYSTOLIC BLOOD PRESSURE: 109 MMHG | DIASTOLIC BLOOD PRESSURE: 73 MMHG

## 2021-04-17 VITALS — DIASTOLIC BLOOD PRESSURE: 87 MMHG | SYSTOLIC BLOOD PRESSURE: 144 MMHG

## 2021-04-17 VITALS — DIASTOLIC BLOOD PRESSURE: 82 MMHG | SYSTOLIC BLOOD PRESSURE: 143 MMHG

## 2021-04-17 LAB
ALBUMIN SERPL-MCNC: 3.2 GM/DL (ref 3.2–4.5)
ALP SERPL-CCNC: 63 U/L (ref 40–136)
ALT SERPL-CCNC: 9 U/L (ref 0–55)
BASOPHILS # BLD AUTO: 0 10^3/UL (ref 0–0.1)
BASOPHILS NFR BLD AUTO: 1 % (ref 0–10)
BILIRUB SERPL-MCNC: 0.3 MG/DL (ref 0.1–1)
BUN/CREAT SERPL: 16
CALCIUM SERPL-MCNC: 8.7 MG/DL (ref 8.5–10.1)
CHLORIDE SERPL-SCNC: 104 MMOL/L (ref 98–107)
CO2 SERPL-SCNC: 23 MMOL/L (ref 21–32)
CREAT SERPL-MCNC: 0.96 MG/DL (ref 0.6–1.3)
EOSINOPHIL # BLD AUTO: 0.3 10^3/UL (ref 0–0.3)
EOSINOPHIL NFR BLD AUTO: 5 % (ref 0–10)
GFR SERPLBLD BASED ON 1.73 SQ M-ARVRAT: > 60 ML/MIN
GLUCOSE SERPL-MCNC: 203 MG/DL (ref 70–105)
HCT VFR BLD CALC: 25 % (ref 40–54)
HGB BLD-MCNC: 7.5 G/DL (ref 13.3–17.7)
LYMPHOCYTES # BLD AUTO: 2.1 10^3/UL (ref 1–4)
LYMPHOCYTES NFR BLD AUTO: 34 % (ref 12–44)
MANUAL DIFFERENTIAL PERFORMED BLD QL: NO
MCH RBC QN AUTO: 26 PG (ref 25–34)
MCHC RBC AUTO-ENTMCNC: 30 G/DL (ref 32–36)
MCV RBC AUTO: 89 FL (ref 80–99)
MONOCYTES # BLD AUTO: 0.4 10^3/UL (ref 0–1)
MONOCYTES NFR BLD AUTO: 7 % (ref 0–12)
NEUTROPHILS # BLD AUTO: 3.3 10^3/UL (ref 1.8–7.8)
NEUTROPHILS NFR BLD AUTO: 53 % (ref 42–75)
PLATELET # BLD: 248 10^3/UL (ref 130–400)
PMV BLD AUTO: 9.4 FL (ref 9–12.2)
POTASSIUM SERPL-SCNC: 4.2 MMOL/L (ref 3.6–5)
PROT SERPL-MCNC: 6.8 GM/DL (ref 6.4–8.2)
SODIUM SERPL-SCNC: 139 MMOL/L (ref 135–145)
WBC # BLD AUTO: 6.3 10^3/UL (ref 4.3–11)

## 2021-04-17 RX ADMIN — PANTOPRAZOLE SODIUM SCH MG: 40 TABLET, DELAYED RELEASE ORAL at 08:21

## 2021-04-17 RX ADMIN — POLYETHYLENE GLYCOL (3350) SCH GM: 17 POWDER, FOR SOLUTION ORAL at 08:22

## 2021-04-17 RX ADMIN — DOCUSATE SODIUM AND SENNOSIDES SCH EA: 8.6; 5 TABLET, FILM COATED ORAL at 08:21

## 2021-04-17 RX ADMIN — HYDROCODONE BITARTRATE AND ACETAMINOPHEN PRN EA: 7.5; 325 TABLET ORAL at 18:44

## 2021-04-17 RX ADMIN — POLYETHYLENE GLYCOL (3350) SCH GM: 17 POWDER, FOR SOLUTION ORAL at 19:20

## 2021-04-17 RX ADMIN — INSULIN ASPART SCH UNIT: 100 INJECTION, SOLUTION INTRAVENOUS; SUBCUTANEOUS at 11:51

## 2021-04-17 RX ADMIN — INSULIN ASPART SCH UNIT: 100 INJECTION, SOLUTION INTRAVENOUS; SUBCUTANEOUS at 17:15

## 2021-04-17 RX ADMIN — ASPIRIN SCH MG: 81 TABLET ORAL at 08:21

## 2021-04-17 RX ADMIN — LISINOPRIL SCH MG: 20 TABLET ORAL at 08:21

## 2021-04-17 RX ADMIN — SODIUM CHLORIDE SCH MLS/HR: 900 INJECTION, SOLUTION INTRAVENOUS at 13:18

## 2021-04-17 RX ADMIN — SODIUM CHLORIDE SCH MLS/HR: 900 INJECTION, SOLUTION INTRAVENOUS at 02:58

## 2021-04-17 RX ADMIN — GLYBURIDE SCH MG: 2.5 TABLET ORAL at 05:39

## 2021-04-17 RX ADMIN — GLYBURIDE SCH MG: 2.5 TABLET ORAL at 17:15

## 2021-04-17 RX ADMIN — PRASUGREL SCH MG: 10 TABLET, FILM COATED ORAL at 08:21

## 2021-04-17 RX ADMIN — HYDROCODONE BITARTRATE AND ACETAMINOPHEN PRN EA: 7.5; 325 TABLET ORAL at 08:21

## 2021-04-17 RX ADMIN — SODIUM CHLORIDE SCH MLS/HR: 900 INJECTION, SOLUTION INTRAVENOUS at 18:44

## 2021-04-17 RX ADMIN — METFORMIN HYDROCHLORIDE SCH MG: 500 TABLET, EXTENDED RELEASE ORAL at 17:15

## 2021-04-17 RX ADMIN — METFORMIN HYDROCHLORIDE SCH MG: 500 TABLET, EXTENDED RELEASE ORAL at 08:21

## 2021-04-17 RX ADMIN — DOCUSATE SODIUM AND SENNOSIDES SCH EA: 8.6; 5 TABLET, FILM COATED ORAL at 19:21

## 2021-04-17 RX ADMIN — FENTANYL CITRATE PRN MCG: 50 INJECTION, SOLUTION INTRAMUSCULAR; INTRAVENOUS at 11:29

## 2021-04-17 RX ADMIN — MICONAZOLE NITRATE SCH APPLIC: 20 POWDER TOPICAL at 19:21

## 2021-04-17 RX ADMIN — METOPROLOL SUCCINATE SCH MG: 100 TABLET, EXTENDED RELEASE ORAL at 08:21

## 2021-04-17 RX ADMIN — HYDROCODONE BITARTRATE AND ACETAMINOPHEN PRN EA: 7.5; 325 TABLET ORAL at 12:21

## 2021-04-17 RX ADMIN — MICONAZOLE NITRATE SCH APPLIC: 20 POWDER TOPICAL at 08:22

## 2021-04-17 RX ADMIN — INSULIN ASPART SCH UNIT: 100 INJECTION, SOLUTION INTRAVENOUS; SUBCUTANEOUS at 06:56

## 2021-04-17 NOTE — PROGRESS NOTE
Subjective


Date Seen by a Provider:  Apr 17, 2021


Time Seen by a Provider:  10:45


Subjective/Events-last exam


Patient seen with Dr. Jiménez.  Patient reports doing well with no fever/chills. 

Tolerating diet.  He does report some left lower extremity stump pain.





Objective


Exam





Vital Signs








  Date Time  Temp Pulse Resp B/P (MAP) Pulse Ox O2 Delivery O2 Flow Rate FiO2


 


4/17/21 11:30 35.6  20 143/82 99   


 


4/17/21 11:10 36.1 89 18 144/87 97 Room Air  


 


4/17/21 08:00      Room Air  


 


4/17/21 07:44 36.4 89 18 138/87 (104) 97 Room Air  


 


4/16/21 23:46 36.6 89 18 121/64 (83) 96 Room Air  


 


4/16/21 21:29     99 Room Air  


 


4/16/21 19:20      Room Air  


 


4/16/21 15:50 36.2 85 16 131/74 (93) 100 Room Air  














I & O 


 


 4/17/21





 07:00


 


Intake Total 1980 ml


 


Output Total 0 ml


 


Balance 1980 ml





Capillary Refill :


General Appearance:  No Apparent Distress, WD/WN, Obese


Neck:  Full Range of Motion, Normal Inspection


Respiratory:  No Accessory Muscle Use, No Respiratory Distress


Cardiovascular:  Regular Rate, Rhythm, No Edema


Gastrointestinal:  normal bowel sounds, non tender, soft


Extremity:  Other (Left lower extremity BKA. Incision clean and Dry with no 

redness, erythema, or purulent drainage.  There is some mild red bloody oozing.)


Neurologic/Psychiatric:  Alert, Oriented x3


Skin:  Normal Color, Warm/Dry





Results


Lab


Laboratory Tests


4/16/21 15:59: Glucometer 157H


4/16/21 20:55: Glucometer 181H


4/17/21 05:45: 


White Blood Count 6.3, Red Blood Count 2.86L, Hemoglobin 7.5L, Hematocrit 25L, 

Mean Corpuscular Volume 89, Mean Corpuscular Hemoglobin 26, Mean Corpuscular 

Hemoglobin Concent 30L, Red Cell Distribution Width 18.3H, Platelet Count 248, 

Mean Platelet Volume 9.4, Immature Granulocyte % (Auto) 1, Neutrophils (%) 

(Auto) 53, Lymphocytes (%) (Auto) 34, Monocytes (%) (Auto) 7, Eosinophils (%) 

(Auto) 5, Basophils (%) (Auto) 1, Neutrophils # (Auto) 3.3, Lymphocytes # (Auto)

2.1, Monocytes # (Auto) 0.4, Eosinophils # (Auto) 0.3, Basophils # (Auto) 0.0, 

Immature Granulocyte # (Auto) 0.0, Sodium Level 139, Potassium Level 4.2, 

Chloride Level 104, Carbon Dioxide Level 23, Anion Gap 12, Blood Urea Nitrogen 

15, Creatinine 0.96, Estimat Glomerular Filtration Rate > 60, BUN/Creatinine Rat

io 16, Glucose Level 203H, Calcium Level 8.7, Corrected Calcium 9.3, Total 

Bilirubin 0.3, Aspartate Amino Transf (AST/SGOT) 7, Alanine Aminotransferase 

(ALT/SGPT) 9, Alkaline Phosphatase 63, Total Protein 6.8, Albumin 3.2


4/17/21 08:00: 


4/17/21 10:16: Glucometer 185H





Assessment/Plan


Assessment/Plan


Assess & Plan/Chief Complaint


A 40 year old male who is s/p left BKA.


VSS


continue OOB to chair. 


Dressing removed, incision cleansed with saline, and redressed.


Will remove and recheck wound in 3 days











NEGIN CHA          Apr 17, 2021 12:24

## 2021-04-17 NOTE — PROGRESS NOTE
Subjective


Date Seen by a Provider:  Apr 17, 2021


Time Seen by a Provider:  13:00


Subjective/Events-last exam


Patient doing pretty well


Changed dressing and he could not get himself to look at it


Hgb 7.5 giving 1 unit of blood


Iron level pending


Sugars are OK


No other issues


BM+


Review of Systems


Musculoskeletal:  leg pain





Objective


Exam


Last Set of Vital Signs





Vital Signs








  Date Time  Temp Pulse Resp B/P (MAP) Pulse Ox O2 Delivery O2 Flow Rate FiO2


 


4/17/21 13:16 36.5 88 20 157/91 98   


 


4/17/21 11:10      Room Air  


 


4/14/21 17:36       1.00 





Capillary Refill :


I&O











Intake and Output 


 


 4/17/21





 00:00


 


Intake Total 1760 ml


 


Balance 1760 ml


 


 


 


Intake Oral 1640 ml


 


IV Total 120 ml


 


# Voids 7


 


# Bowel Movements 2








General:  Alert, Oriented X3, Cooperative, No Acute Distress


Lungs:  Clear to Auscultation, Normal Air Movement


Heart:  Regular Rate, Normal S1, Normal S2, No Murmurs


Psych/Mental Status:  Mental Status NL, Mood NL





Results


Lab


Laboratory Tests


4/16/21 15:59: Glucometer 157H


4/16/21 20:55: Glucometer 181H


4/17/21 05:45: 


White Blood Count 6.3, Red Blood Count 2.86L, Hemoglobin 7.5L, Hematocrit 25L, 

Mean Corpuscular Volume 89, Mean Corpuscular Hemoglobin 26, Mean Corpuscular 

Hemoglobin Concent 30L, Red Cell Distribution Width 18.3H, Platelet Count 248, 

Mean Platelet Volume 9.4, Immature Granulocyte % (Auto) 1, Neutrophils (%) 

(Auto) 53, Lymphocytes (%) (Auto) 34, Monocytes (%) (Auto) 7, Eosinophils (%) 

(Auto) 5, Basophils (%) (Auto) 1, Neutrophils # (Auto) 3.3, Lymphocytes # (Auto)

2.1, Monocytes # (Auto) 0.4, Eosinophils # (Auto) 0.3, Basophils # (Auto) 0.0, 

Immature Granulocyte # (Auto) 0.0, Sodium Level 139, Potassium Level 4.2, 

Chloride Level 104, Carbon Dioxide Level 23, Anion Gap 12, Blood Urea Nitrogen 

15, Creatinine 0.96, Estimat Glomerular Filtration Rate > 60, BUN/Creatinine 

Ratio 16, Glucose Level 203H, Calcium Level 8.7, Corrected Calcium 9.3, Total 

Bilirubin 0.3, Aspartate Amino Transf (AST/SGOT) 7, Alanine Aminotransferase 

(ALT/SGPT) 9, Alkaline Phosphatase 63, Total Protein 6.8, Albumin 3.2


4/17/21 08:00: 


4/17/21 10:16: Glucometer 185H





Assessment/Plan


Assessment/Plan


Assess & Plan/Chief Complaint








Assessment:


s/p left BKA due to osteomyelitis with necrotizing fascitis


DM


HTN


Morbid obesity


Anemia post op requiring transfusion





Plan:


Transfuse


Insulin


Home meds


Pain control











NICOLE VILA DO                Apr 17, 2021 13:47

## 2021-04-17 NOTE — PHYSICAL THERAPY DAILY NOTE
PT Daily Note-Current


Subjective


Pt requests not to get out of bed at this time due to the brace on the (L) leg 

presses into the stump when he moves it.  Dr. Jiménez is supposed to remove the 

brace today.





Transfers


SCALE: Activities may be completed with or without assistive devices.





6-Indepedent-patient completes the activity by him/herself with no assistance 

from a helper.


5-Set-up or Clean-up Assistance-helper sets up or cleans up; patient completes 

activity. [place] assists only prior to or  


    following the activity.


4-Supervision or Touching Assistance-helper provides verbal cues and/or 

touching/steadying and/or contact guard assistance as patient completes 

activity. Assistance may be provided   


    throughout the activity or intermittently.


3-Partial/Moderate Assistance-helper does LESS THAN HALF the effort. [place] 

lifts, holds or supports trunk or limbs, but provides less than half the effort.


2-Substantial/Maximal Assistance-helper does MORE THAN HALF the effort. [place] 

lifts or holds trunk or limbs and provides more than half the effort.


9-Asyodgzrl-tsdecy does ALL the effort. Patient does none of the effort to 

complete the activity. Or, the assistance of 2 or more helpers is required for 

the patient to complete the  


    activity.


If activity was not attempted, code reason:


7-Patient Refused.


9-Not Applicable-not attempted and the patient did not perform the activity 

before the current illness, exacerbation or injury.


10-Not Attempted due to Environmental Limitations-(lack of equipment, weather 

restraints, etc.).


88-Not Attempted due to Medical Conditions or Safety Concerns.





Weight Bearing


Right Lower Extremity:  Right


Full Weight Bearing


Left Lower Extremity:  Left


Non Weight Bearing





Exercises


Supine Ex:  Ankle pumps, Quad Set, Glut sets, Heel Slides, Straight leg raise, 

Hip abd/add


Supine Reps:  10





Assessment


Reviewed education for QS and glute sets.  Pt encouraged to begin rolling and 

out of bed as soon as the brace is removed.





PT Long Term Goals


Long Term Goals


PT Long Term Goals Time Frame:  May 1, 2021


Roll Left & Right (QC):  6


Sit to Lying (QC):  6


Lying-Sitting on Side/Bed(QC):  6


Sit to Stand (QC):  6


Chair/Bed-to-Chair Xfer(QC):  6


Toilet Transfer (QC):  6


Walk 10 feet (QC):  4


Does the Pt use WC or Scooter?:  Yes


Wheel 50 feet with 2 turns (QC:  6


Type:  Manual


Wheel 150 feet:  6


Type:  Manual





PT Plan


Treatment/Plan


Treatment Plan:  Continue Plan of Care


Treatment Plan:  Bed Mobility, Education, Functional Activity Aung, Functional 

Strength, Gait, Safety, Therapeutic Exercise, Transfers


Treatment Duration:  May 1, 2021


Frequency:  6 times per week


Estimated Hrs Per Day:  .5 hour per day


Patient and/or Family Agrees t:  Yes





Time/GCodes


Time In:  815


Time Out:  830


Total Billed Treatment Time:  15


Total Billed Treatment


visit, ex 15min











LYDIA GOMEZ PT                Apr 17, 2021 10:31

## 2021-04-18 VITALS — SYSTOLIC BLOOD PRESSURE: 141 MMHG | DIASTOLIC BLOOD PRESSURE: 87 MMHG

## 2021-04-18 VITALS — SYSTOLIC BLOOD PRESSURE: 126 MMHG | DIASTOLIC BLOOD PRESSURE: 65 MMHG

## 2021-04-18 VITALS — DIASTOLIC BLOOD PRESSURE: 65 MMHG | SYSTOLIC BLOOD PRESSURE: 126 MMHG

## 2021-04-18 VITALS — SYSTOLIC BLOOD PRESSURE: 110 MMHG | DIASTOLIC BLOOD PRESSURE: 71 MMHG

## 2021-04-18 VITALS — SYSTOLIC BLOOD PRESSURE: 135 MMHG | DIASTOLIC BLOOD PRESSURE: 83 MMHG

## 2021-04-18 VITALS — SYSTOLIC BLOOD PRESSURE: 116 MMHG | DIASTOLIC BLOOD PRESSURE: 79 MMHG

## 2021-04-18 LAB
ALBUMIN SERPL-MCNC: 3.1 GM/DL (ref 3.2–4.5)
ALP SERPL-CCNC: 54 U/L (ref 40–136)
ALT SERPL-CCNC: 10 U/L (ref 0–55)
BASOPHILS # BLD AUTO: 0.1 10^3/UL (ref 0–0.1)
BASOPHILS NFR BLD AUTO: 1 % (ref 0–10)
BILIRUB SERPL-MCNC: 0.6 MG/DL (ref 0.1–1)
BUN/CREAT SERPL: 21
CALCIUM SERPL-MCNC: 8.8 MG/DL (ref 8.5–10.1)
CHLORIDE SERPL-SCNC: 104 MMOL/L (ref 98–107)
CO2 SERPL-SCNC: 24 MMOL/L (ref 21–32)
CREAT SERPL-MCNC: 0.86 MG/DL (ref 0.6–1.3)
EOSINOPHIL # BLD AUTO: 0.4 10^3/UL (ref 0–0.3)
EOSINOPHIL NFR BLD AUTO: 5 % (ref 0–10)
GFR SERPLBLD BASED ON 1.73 SQ M-ARVRAT: > 60 ML/MIN
GLUCOSE SERPL-MCNC: 111 MG/DL (ref 70–105)
HCT VFR BLD CALC: 24 % (ref 40–54)
HGB BLD-MCNC: 7.1 G/DL (ref 13.3–17.7)
LYMPHOCYTES # BLD AUTO: 2.5 10^3/UL (ref 1–4)
LYMPHOCYTES NFR BLD AUTO: 35 % (ref 12–44)
MANUAL DIFFERENTIAL PERFORMED BLD QL: NO
MCH RBC QN AUTO: 26 PG (ref 25–34)
MCHC RBC AUTO-ENTMCNC: 30 G/DL (ref 32–36)
MCV RBC AUTO: 88 FL (ref 80–99)
MONOCYTES # BLD AUTO: 0.5 10^3/UL (ref 0–1)
MONOCYTES NFR BLD AUTO: 7 % (ref 0–12)
NEUTROPHILS # BLD AUTO: 3.6 10^3/UL (ref 1.8–7.8)
NEUTROPHILS NFR BLD AUTO: 52 % (ref 42–75)
PLATELET # BLD: 261 10^3/UL (ref 130–400)
PMV BLD AUTO: 9.3 FL (ref 9–12.2)
POTASSIUM SERPL-SCNC: 4.2 MMOL/L (ref 3.6–5)
PROT SERPL-MCNC: 6.7 GM/DL (ref 6.4–8.2)
SODIUM SERPL-SCNC: 138 MMOL/L (ref 135–145)
WBC # BLD AUTO: 7.1 10^3/UL (ref 4.3–11)

## 2021-04-18 RX ADMIN — DOCUSATE SODIUM AND SENNOSIDES SCH EA: 8.6; 5 TABLET, FILM COATED ORAL at 19:35

## 2021-04-18 RX ADMIN — HYDROCODONE BITARTRATE AND ACETAMINOPHEN PRN EA: 7.5; 325 TABLET ORAL at 14:20

## 2021-04-18 RX ADMIN — PANTOPRAZOLE SODIUM SCH MG: 40 TABLET, DELAYED RELEASE ORAL at 09:43

## 2021-04-18 RX ADMIN — INSULIN ASPART SCH UNIT: 100 INJECTION, SOLUTION INTRAVENOUS; SUBCUTANEOUS at 12:33

## 2021-04-18 RX ADMIN — METOPROLOL SUCCINATE SCH MG: 100 TABLET, EXTENDED RELEASE ORAL at 09:43

## 2021-04-18 RX ADMIN — POLYETHYLENE GLYCOL (3350) SCH GM: 17 POWDER, FOR SOLUTION ORAL at 07:48

## 2021-04-18 RX ADMIN — METFORMIN HYDROCHLORIDE SCH MG: 500 TABLET, EXTENDED RELEASE ORAL at 09:42

## 2021-04-18 RX ADMIN — ASPIRIN SCH MG: 81 TABLET ORAL at 09:43

## 2021-04-18 RX ADMIN — DOCUSATE SODIUM AND SENNOSIDES SCH EA: 8.6; 5 TABLET, FILM COATED ORAL at 09:43

## 2021-04-18 RX ADMIN — POLYETHYLENE GLYCOL (3350) SCH GM: 17 POWDER, FOR SOLUTION ORAL at 19:35

## 2021-04-18 RX ADMIN — MICONAZOLE NITRATE SCH APPLIC: 20 POWDER TOPICAL at 09:46

## 2021-04-18 RX ADMIN — INSULIN ASPART SCH UNIT: 100 INJECTION, SOLUTION INTRAVENOUS; SUBCUTANEOUS at 06:58

## 2021-04-18 RX ADMIN — GLYBURIDE SCH MG: 2.5 TABLET ORAL at 06:19

## 2021-04-18 RX ADMIN — SODIUM CHLORIDE SCH MLS/HR: 900 INJECTION, SOLUTION INTRAVENOUS at 19:35

## 2021-04-18 RX ADMIN — PRASUGREL SCH MG: 10 TABLET, FILM COATED ORAL at 09:43

## 2021-04-18 RX ADMIN — FENTANYL CITRATE PRN MCG: 50 INJECTION, SOLUTION INTRAMUSCULAR; INTRAVENOUS at 06:54

## 2021-04-18 RX ADMIN — METFORMIN HYDROCHLORIDE SCH MG: 500 TABLET, EXTENDED RELEASE ORAL at 17:29

## 2021-04-18 RX ADMIN — SODIUM CHLORIDE SCH MLS/HR: 900 INJECTION, SOLUTION INTRAVENOUS at 12:33

## 2021-04-18 RX ADMIN — LISINOPRIL SCH MG: 20 TABLET ORAL at 09:43

## 2021-04-18 RX ADMIN — FENTANYL CITRATE PRN MCG: 50 INJECTION, SOLUTION INTRAMUSCULAR; INTRAVENOUS at 00:43

## 2021-04-18 RX ADMIN — HYDROCODONE BITARTRATE AND ACETAMINOPHEN PRN EA: 7.5; 325 TABLET ORAL at 06:54

## 2021-04-18 RX ADMIN — INSULIN ASPART SCH UNIT: 100 INJECTION, SOLUTION INTRAVENOUS; SUBCUTANEOUS at 17:29

## 2021-04-18 RX ADMIN — MICONAZOLE NITRATE SCH APPLIC: 20 POWDER TOPICAL at 19:35

## 2021-04-18 RX ADMIN — FENTANYL CITRATE PRN MCG: 50 INJECTION, SOLUTION INTRAMUSCULAR; INTRAVENOUS at 20:11

## 2021-04-18 RX ADMIN — SODIUM CHLORIDE SCH MLS/HR: 900 INJECTION, SOLUTION INTRAVENOUS at 04:00

## 2021-04-18 RX ADMIN — GLYBURIDE SCH MG: 2.5 TABLET ORAL at 17:29

## 2021-04-18 NOTE — PROGRESS NOTE
Subjective


Date Seen by a Provider:  Apr 18, 2021


Time Seen by a Provider:  09:55


Subjective/Events-last exam


Patient seen with Dr. Jiménez.  Patient reports doing well.  Does report some left 

leg BKA stump pain but tolerable with pain meds.  Tolerating diet.  Reports that

they had to change dressing last night because it was saturated with bloody 

drainage.





Objective


Exam





Vital Signs








  Date Time  Temp Pulse Resp B/P (MAP) Pulse Ox O2 Delivery O2 Flow Rate FiO2


 


4/18/21 09:59 35.2 81 16 116/79    


 


4/18/21 09:38 36.0 85 16 126/65 98 Room Air  


 


4/18/21 08:00 36.0 85 16 126/65 (85) 98 Room Air  


 


4/17/21 23:29 36.2 84 20 109/73 (85) 97 Room Air  


 


4/17/21 19:20      Room Air  


 


4/17/21 16:04 36.7 92 18 124/84 (97) 97 Room Air  


 


4/17/21 13:16 36.5 88 20 157/91 98   


 


4/17/21 11:30 35.6  20 143/82 99   


 


4/17/21 11:10 36.1 89 18 144/87 97 Room Air  














I & O 


 


 4/18/21





 07:00


 


Intake Total 1222 ml


 


Output Total 0 ml


 


Balance 1222 ml





Capillary Refill :


General Appearance:  No Apparent Distress, WD/WN, Obese


Neck:  Normal Inspection, Supple


Respiratory:  No Accessory Muscle Use, No Respiratory Distress


Cardiovascular:  Regular Rate, Rhythm, No Edema


Gastrointestinal:  normal bowel sounds, non tender, soft


Extremity:  Other (Left BKA stump with dressing in place C/D/I)


Neurologic/Psychiatric:  Alert, Oriented x3


Skin:  Normal Color, Warm/Dry





Results


Lab


Laboratory Tests


4/17/21 16:07: Glucometer 189H


4/17/21 20:35: Glucometer 181H


4/18/21 05:15: 


White Blood Count 7.1, Red Blood Count 2.73L, Hemoglobin 7.1L, Hematocrit 24L, 

Mean Corpuscular Volume 88, Mean Corpuscular Hemoglobin 26, Mean Corpuscular 

Hemoglobin Concent 30L, Red Cell Distribution Width 18.1H, Platelet Count 261, 

Mean Platelet Volume 9.3, Immature Granulocyte % (Auto) 0, Neutrophils (%) 

(Auto) 52, Lymphocytes (%) (Auto) 35, Monocytes (%) (Auto) 7, Eosinophils (%) 

(Auto) 5, Basophils (%) (Auto) 1, Neutrophils # (Auto) 3.6, Lymphocytes # (Auto)

2.5, Monocytes # (Auto) 0.5, Eosinophils # (Auto) 0.4H, Basophils # (Auto) 0.1, 

Immature Granulocyte # (Auto) 0.0, Sodium Level 138, Potassium Level 4.2, 

Chloride Level 104, Carbon Dioxide Level 24, Anion Gap 10, Blood Urea Nitrogen 

18, Creatinine 0.86, Estimat Glomerular Filtration Rate > 60, BUN/Creatinine 

Ratio 21, Glucose Level 111H, Calcium Level 8.8, Corrected Calcium 9.5, Total 

Bilirubin 0.6, Aspartate Amino Transf (AST/SGOT) 12, Alanine Aminotransferase 

(ALT/SGPT) 10, Alkaline Phosphatase 54, Total Protein 6.7, Albumin 3.1L





Assessment/Plan


Assessment/Plan


Assess & Plan/Chief Complaint


A 40 year old male who is s/p left BKA.


VSS


continue OOB to chair. 


Continue pain meds as needed.


Will remove and recheck wound in 3 days











NEGIN CHA APRN          Apr 18, 2021 10:37

## 2021-04-18 NOTE — PROGRESS NOTE
Subjective


Date Seen by a Provider:  Apr 18, 2021


Time Seen by a Provider:  12:30


Subjective/Events-last exam


Patient doing well


Hgb 7.1


Lost blood last night


Monitoring closely


BP ok


Sugars are ok


Pain an issue


Review of Systems


Musculoskeletal:  leg pain





Objective


Exam


Last Set of Vital Signs





Vital Signs








  Date Time  Temp Pulse Resp B/P (MAP) Pulse Ox O2 Delivery O2 Flow Rate FiO2


 


4/18/21 12:10 35.6 80  141/87    


 


4/18/21 09:59   16     


 


4/18/21 09:38     98 Room Air  


 


4/14/21 17:36       1.00 





Capillary Refill :


I&O











Intake and Output 


 


 4/18/21





 00:00


 


Intake Total 1442 ml


 


Output Total 0 ml


 


Balance 1442 ml


 


 


 


Intake Oral 1075 ml


 


IV Total 120 ml


 


Other 247 ml


 


Output Urine Total 0 ml


 


# Voids 1








General:  Alert, Oriented X3, Cooperative, No Acute Distress


Lungs:  Clear to Auscultation, Normal Air Movement


Heart:  Regular Rate, Normal S1, Normal S2, No Murmurs


Psych/Mental Status:  Mental Status NL, Mood NL





Results


Lab


Laboratory Tests


4/17/21 16:07: Glucometer 189H


4/17/21 20:35: Glucometer 181H


4/18/21 05:15: 


White Blood Count 7.1, Red Blood Count 2.73L, Hemoglobin 7.1L, Hematocrit 24L, 

Mean Corpuscular Volume 88, Mean Corpuscular Hemoglobin 26, Mean Corpuscular 

Hemoglobin Concent 30L, Red Cell Distribution Width 18.1H, Platelet Count 261, 

Mean Platelet Volume 9.3, Immature Granulocyte % (Auto) 0, Neutrophils (%) 

(Auto) 52, Lymphocytes (%) (Auto) 35, Monocytes (%) (Auto) 7, Eosinophils (%) 

(Auto) 5, Basophils (%) (Auto) 1, Neutrophils # (Auto) 3.6, Lymphocytes # (Auto)

2.5, Monocytes # (Auto) 0.5, Eosinophils # (Auto) 0.4H, Basophils # (Auto) 0.1, 

Immature Granulocyte # (Auto) 0.0, Sodium Level 138, Potassium Level 4.2, 

Chloride Level 104, Carbon Dioxide Level 24, Anion Gap 10, Blood Urea Nitrogen 

18, Creatinine 0.86, Estimat Glomerular Filtration Rate > 60, BUN/Creatinine 

Ratio 21, Glucose Level 111H, Calcium Level 8.8, Corrected Calcium 9.5, Total 

Bilirubin 0.6, Aspartate Amino Transf (AST/SGOT) 12, Alanine Aminotransferase 

(ALT/SGPT) 10, Alkaline Phosphatase 54, Total Protein 6.7, Albumin 3.1L


4/18/21 11:50: Glucometer 154H





Assessment/Plan


Assessment/Plan


Assess & Plan/Chief Complaint








Assessment:


s/p left BKA due to osteomyelitis with necrotizing fascitis


DM


HTN


Morbid obesity


Anemia post op requiring transfusion





Plan:


Transfuse


Insulin


Home meds


Pain control





4/18/21:


Transfuse 1 unit today


Pain control











NICOLE VILA DO                Apr 18, 2021 12:41

## 2021-04-19 VITALS — DIASTOLIC BLOOD PRESSURE: 66 MMHG | SYSTOLIC BLOOD PRESSURE: 136 MMHG

## 2021-04-19 VITALS — SYSTOLIC BLOOD PRESSURE: 117 MMHG | DIASTOLIC BLOOD PRESSURE: 76 MMHG

## 2021-04-19 VITALS — SYSTOLIC BLOOD PRESSURE: 110 MMHG | DIASTOLIC BLOOD PRESSURE: 73 MMHG

## 2021-04-19 VITALS — SYSTOLIC BLOOD PRESSURE: 92 MMHG | DIASTOLIC BLOOD PRESSURE: 60 MMHG

## 2021-04-19 VITALS — DIASTOLIC BLOOD PRESSURE: 84 MMHG | SYSTOLIC BLOOD PRESSURE: 138 MMHG

## 2021-04-19 LAB
ALBUMIN SERPL-MCNC: 3.2 GM/DL (ref 3.2–4.5)
ALP SERPL-CCNC: 55 U/L (ref 40–136)
ALT SERPL-CCNC: 10 U/L (ref 0–55)
BASOPHILS # BLD AUTO: 0 10^3/UL (ref 0–0.1)
BASOPHILS NFR BLD AUTO: 0 % (ref 0–10)
BILIRUB SERPL-MCNC: 0.4 MG/DL (ref 0.1–1)
BUN/CREAT SERPL: 19
CALCIUM SERPL-MCNC: 9 MG/DL (ref 8.5–10.1)
CHLORIDE SERPL-SCNC: 103 MMOL/L (ref 98–107)
CO2 SERPL-SCNC: 24 MMOL/L (ref 21–32)
CREAT SERPL-MCNC: 0.91 MG/DL (ref 0.6–1.3)
EOSINOPHIL # BLD AUTO: 0.4 10^3/UL (ref 0–0.3)
EOSINOPHIL NFR BLD AUTO: 6 % (ref 0–10)
GFR SERPLBLD BASED ON 1.73 SQ M-ARVRAT: > 60 ML/MIN
GLUCOSE SERPL-MCNC: 139 MG/DL (ref 70–105)
HCT VFR BLD CALC: 25 % (ref 40–54)
HGB BLD-MCNC: 7.5 G/DL (ref 13.3–17.7)
LYMPHOCYTES # BLD AUTO: 2.2 10^3/UL (ref 1–4)
LYMPHOCYTES NFR BLD AUTO: 32 % (ref 12–44)
MANUAL DIFFERENTIAL PERFORMED BLD QL: NO
MCH RBC QN AUTO: 27 PG (ref 25–34)
MCHC RBC AUTO-ENTMCNC: 30 G/DL (ref 32–36)
MCV RBC AUTO: 88 FL (ref 80–99)
MONOCYTES # BLD AUTO: 0.4 10^3/UL (ref 0–1)
MONOCYTES NFR BLD AUTO: 6 % (ref 0–12)
NEUTROPHILS # BLD AUTO: 3.7 10^3/UL (ref 1.8–7.8)
NEUTROPHILS NFR BLD AUTO: 55 % (ref 42–75)
PLATELET # BLD: 301 10^3/UL (ref 130–400)
PMV BLD AUTO: 9.2 FL (ref 9–12.2)
POTASSIUM SERPL-SCNC: 4.7 MMOL/L (ref 3.6–5)
PROT SERPL-MCNC: 6.8 GM/DL (ref 6.4–8.2)
SODIUM SERPL-SCNC: 138 MMOL/L (ref 135–145)
WBC # BLD AUTO: 6.8 10^3/UL (ref 4.3–11)

## 2021-04-19 RX ADMIN — HYDROCODONE BITARTRATE AND ACETAMINOPHEN PRN EA: 7.5; 325 TABLET ORAL at 21:04

## 2021-04-19 RX ADMIN — SODIUM CHLORIDE SCH MLS/HR: 900 INJECTION, SOLUTION INTRAVENOUS at 20:00

## 2021-04-19 RX ADMIN — FENTANYL CITRATE PRN MCG: 50 INJECTION, SOLUTION INTRAMUSCULAR; INTRAVENOUS at 21:12

## 2021-04-19 RX ADMIN — FENTANYL CITRATE PRN MCG: 50 INJECTION, SOLUTION INTRAMUSCULAR; INTRAVENOUS at 14:24

## 2021-04-19 RX ADMIN — GLYBURIDE SCH MG: 2.5 TABLET ORAL at 06:24

## 2021-04-19 RX ADMIN — PANTOPRAZOLE SODIUM SCH MG: 40 TABLET, DELAYED RELEASE ORAL at 08:57

## 2021-04-19 RX ADMIN — MICONAZOLE NITRATE SCH APPLIC: 20 POWDER TOPICAL at 08:58

## 2021-04-19 RX ADMIN — MICONAZOLE NITRATE SCH APPLIC: 20 POWDER TOPICAL at 21:04

## 2021-04-19 RX ADMIN — METOPROLOL SUCCINATE SCH MG: 100 TABLET, EXTENDED RELEASE ORAL at 08:57

## 2021-04-19 RX ADMIN — SODIUM CHLORIDE SCH MG: 900 INJECTION, SOLUTION INTRAVENOUS at 11:55

## 2021-04-19 RX ADMIN — DOCUSATE SODIUM AND SENNOSIDES SCH EA: 8.6; 5 TABLET, FILM COATED ORAL at 20:08

## 2021-04-19 RX ADMIN — HYDROCODONE BITARTRATE AND ACETAMINOPHEN PRN EA: 7.5; 325 TABLET ORAL at 08:56

## 2021-04-19 RX ADMIN — POLYETHYLENE GLYCOL (3350) SCH GM: 17 POWDER, FOR SOLUTION ORAL at 09:03

## 2021-04-19 RX ADMIN — METFORMIN HYDROCHLORIDE SCH MG: 500 TABLET, EXTENDED RELEASE ORAL at 16:23

## 2021-04-19 RX ADMIN — POLYETHYLENE GLYCOL (3350) SCH GM: 17 POWDER, FOR SOLUTION ORAL at 20:07

## 2021-04-19 RX ADMIN — LISINOPRIL SCH MG: 20 TABLET ORAL at 08:58

## 2021-04-19 RX ADMIN — DOCUSATE SODIUM AND SENNOSIDES SCH EA: 8.6; 5 TABLET, FILM COATED ORAL at 09:03

## 2021-04-19 RX ADMIN — GLYBURIDE SCH MG: 2.5 TABLET ORAL at 16:23

## 2021-04-19 RX ADMIN — INSULIN ASPART SCH UNIT: 100 INJECTION, SOLUTION INTRAVENOUS; SUBCUTANEOUS at 06:24

## 2021-04-19 RX ADMIN — SODIUM CHLORIDE SCH MLS/HR: 900 INJECTION, SOLUTION INTRAVENOUS at 11:55

## 2021-04-19 RX ADMIN — SODIUM CHLORIDE SCH MLS/HR: 900 INJECTION, SOLUTION INTRAVENOUS at 03:15

## 2021-04-19 RX ADMIN — INSULIN ASPART SCH UNIT: 100 INJECTION, SOLUTION INTRAVENOUS; SUBCUTANEOUS at 16:23

## 2021-04-19 RX ADMIN — FENTANYL CITRATE PRN MCG: 50 INJECTION, SOLUTION INTRAMUSCULAR; INTRAVENOUS at 08:57

## 2021-04-19 RX ADMIN — ASPIRIN SCH MG: 81 TABLET ORAL at 08:56

## 2021-04-19 RX ADMIN — INSULIN ASPART SCH UNIT: 100 INJECTION, SOLUTION INTRAVENOUS; SUBCUTANEOUS at 11:55

## 2021-04-19 RX ADMIN — METFORMIN HYDROCHLORIDE SCH MG: 500 TABLET, EXTENDED RELEASE ORAL at 08:56

## 2021-04-19 RX ADMIN — PRASUGREL SCH MG: 10 TABLET, FILM COATED ORAL at 08:57

## 2021-04-19 NOTE — PHYSICAL THERAPY DAILY NOTE
PT Daily Note-Current


Subjective


Patient in bed pre tx, agrees to PT, has 7/10 pain in left leg.  Patient has low

hemoglobin, has a lot of seeping in left residual limb, and pain due to leg 

splint when sitting.  Patient agrees to bed exercises.





Appearance


Patient in bed post tx with nurse call, phone, tray, all needs met, nurse comes 

in and is getting ready to hang blood.





Mental Status


Patient Orientation:  Person, Place, Situation, Normal For Age


Attachments:  IV





Transfers


SCALE: Activities may be completed with or without assistive devices.





6-Indepedent-patient completes the activity by him/herself with no assistance 

from a helper.


5-Set-up or Clean-up Assistance-helper sets up or cleans up; patient completes 

activity. [place] assists only prior to or  


    following the activity.


4-Supervision or Touching Assistance-helper provides verbal cues and/or 

touching/steadying and/or contact guard assistance as patient completes 

activity. Assistance may be provided   


    throughout the activity or intermittently.


3-Partial/Moderate Assistance-helper does LESS THAN HALF the effort. [place] 

lifts, holds or supports trunk or limbs, but provides less than half the effort.


2-Substantial/Maximal Assistance-helper does MORE THAN HALF the effort. [place] 

lifts or holds trunk or limbs and provides more than half the effort.


3-Cavvlnovs-qzuhsm does ALL the effort. Patient does none of the effort to 

complete the activity. Or, the assistance of 2 or more helpers is required for 

the patient to complete the  


    activity.


If activity was not attempted, code reason:


7-Patient Refused.


9-Not Applicable-not attempted and the patient did not perform the activity 

before the current illness, exacerbation or injury.


10-Not Attempted due to Environmental Limitations-(lack of equipment, weather 

restraints, etc.).


88-Not Attempted due to Medical Conditions or Safety Concerns.


Roll Left & Right (QC):  6





Weight Bearing


Right Lower Extremity:  Right


Full Weight Bearing


Left Lower Extremity:  Left


Non Weight Bearing





Exercises


Supine Ex:  Ankle pumps (RLE), Quad Set, Glut sets, Heel Slides (RLE), Straight 

leg raise, Hip abd/add


Supine Reps:  20


also performed sidelying hip abd and hip ext on the left side x20





Treatments


BLE exercises





Assessment


Current Status:  Fair Progress


patient has pain with movement in the left leg





PT Long Term Goals


Long Term Goals


PT Long Term Goals Time Frame:  May 1, 2021


Roll Left & Right (QC):  6


Sit to Lying (QC):  6


Lying-Sitting on Side/Bed(QC):  6


Sit to Stand (QC):  6


Chair/Bed-to-Chair Xfer(QC):  6


Toilet Transfer (QC):  6


Walk 10 feet (QC):  4


Does the Pt use WC or Scooter?:  Yes


Wheel 50 feet with 2 turns (QC:  6


Type:  Manual


Wheel 150 feet:  6


Type:  Manual





PT Plan


Problem List


Problem List:  Activity Tolerance, Functional Strength, Safety, Balance, Gait, 

Transfer, Bed Mobility, ROM





Treatment/Plan


Treatment Plan:  Continue Plan of Care


Treatment Plan:  Bed Mobility, Education, Functional Activity Aung, Functional 

Strength, Gait, Safety, Therapeutic Exercise, Transfers


Treatment Duration:  May 1, 2021


Frequency:  6 times per week


Estimated Hrs Per Day:  .5 hour per day


Patient and/or Family Agrees t:  Yes





Safety Risks/Education


Patient Education:  Correct Positioning, Safety Issues


Teaching Recipient:  Patient


Teaching Methods:  Demonstration, Discussion


Response to Teaching:  Reinforcement Needed





Time/GCodes


Time In:  1400


Time Out:  1415


Total Billed Treatment Time:  15


Total Billed Treatment


1 visit


EX 15'











CAMILLE HARRY PT                Apr 19, 2021 14:22

## 2021-04-19 NOTE — OCCUPATIONAL THER DAILY NOTE
OT Current Status-Daily Note


Subjective


Pt alert, lying in bed.  Pt states that it continues to be difficulty to sit on 

EOB due to device that protects L stump.  Pt agrees to therapy.  No c/o pain at 

this time.





Mental Status/Objective


Patient Orientation:  Person, Place, Time, Situation


Attachments:  IV





ADL-Treatment


Pt describes how rolling toward L side is easier than rolling toward R side.  Pt

continues to require assist to set up for bathing and completing lower body 

bathing with nrsg.  After gathering supplies, pt able to complete oral care 

sitting up in bed.  After session, pt sitting up in bed with call light/phone in

reach.  All needs met in room.


Therapy Code Descriptions/Definitions 





Functional [place] Measure:


0=Not Assessed/NA        4=Minimal Assistance


1=Total Assistance        5=Supervision or Setup


2=Maximal Assistance  6=Modified [place]


3=Moderate Assistance 7=Complete IndependenceSCALE: Activities may be completed 

with or without assistive devices.





6-Indepedent-patient completes the activity by him/herself with no assistance 

from a helper.


5-Set-up or Clean-up Assistance-helper sets up or cleans up; patient completes 

activity. [place] assists only prior to or  


    following the activity.


4-Supervision or Touching Assistance-helper provides verbal cues and/or 

touching/steadying and/or contact guard assistance as patient completes 

activity. Assistance may be provided   


    throughout the activity or intermittently.


3-Partial/Moderate Assistance-helper does LESS THAN HALF the effort. [place] 

lifts, holds or supports trunk or limbs, but provides less than half the effort.


2-Substantial/Maximal Assistance-helper does MORE THAN HALF the effort. [place] 

lifts or holds trunk or limbs and provides more than half the effort.


2-Oomydtnaf-ydgail does ALL the effort. Patient does none of the effort to 

complete the activity. Or, the assistance of 2 or more helpers is required for 

the patient to complete the  


    activity.


If activity was not attempted, code reason:


7-Patient Refused.


9-Not Applicable-not attempted and the patient did not perform the activity b

efore the current illness, exacerbation or injury.


10-Not Attempted due to Environmental Limitations-(lack of equipment, weather 

restraints, etc.).


88-Not Attempted due to Medical Conditions or Safety Concerns.


Oral Hygiene (QC):  5





Other Treatment


Pt able to complete B UE strengthening exercise independently throughout the 

day.  Heavier resistance therapy sponges and 6# wts are being used throughout 

the day to increase strength for daily functional tasks.





OT Short Term Goals


Short Term Goals


Time Frame:  Apr 15, 2021





OT Long Term Goals


Long Term Goals


Time Frame:  Apr 29, 2021


Eating (QC):  6


Oral Hygiene (QC):  6


Toileting Hygiene (QC):  6


Shower/Bathe Self (QC):  5


Upper Body Dressing (QC):  6


Lower Body Dressing (QC):  6


On/Off Footwear (QC):  6


Additional Goals:  1-Demonstrate ADL Tasks, 2-Verbalize Understanding, 

3-ImproveStrength/Aung


1=Demonstrate adherence to instructed precautions during ADL tasks.


2=Patient will verbalize/demonstrate understanding of assistive 

devices/modifications for ADL.


3=Patient will improve strength/tolerance for activity to enable patient to 

perform ADL's.





OT Education/Plan


Problem List/Assessment


Assessment:  Decreased UE Strength, Impaired Self-Care Skills





Discharge Recommendations


Plan/Recommendations:  Continue POC





Treatment Plan/Plan of Care


Patient would benefit from OT for education, treatment and training to promote 

independence in ADL's, mobility, safety and/or upper extremity function for 

ADL's.


Plan of Care:  ADL Retraining, Functional Mobility, UE Funct Exercise/Act


Treatment Duration:  Apr 29, 2021


Frequency:  5 times per week


Estimated Hrs Per Day:  .25 hour per day


Agreement:  Yes


Rehab Potential:  Good





Time/GCodes


Start Time:  08:55


Stop Time:  08:15


Total Time Billed (hr/min):  25


Billed Treatment Time


1 visit-FA 1 (17 min)  EX 1 (8 min)











GUDELIA GONZALEZ               Apr 19, 2021 10:09

## 2021-04-19 NOTE — PROGRESS NOTE
Subjective


Date Seen by a Provider:  Apr 19, 2021


Time Seen by a Provider:  17:00


Subjective/Events-last exam


doing well. no complaints. wound oozy, will change dressing tomorrow.





Objective


Exam





Vital Signs








  Date Time  Temp Pulse Resp B/P (MAP) Pulse Ox O2 Delivery O2 Flow Rate FiO2


 


4/19/21 16:00 35.5 81 18 136/66 (89) 98 Room Air  


 


4/19/21 15:30 35.9 85 20 110/73 97 Room Air  


 


4/19/21 15:16 35.9 85 20 92/60 98   


 


4/19/21 08:00 36.0 85 18 138/84 (102) 99 Room Air  


 


4/19/21 08:00      Room Air  


 


4/18/21 23:33     97 Room Air  


 


4/18/21 23:21 36.6 79 20 110/71 (84) 97 Room Air  


 


4/18/21 19:40      Room Air  














I & O 


 


 4/19/21





 07:00


 


Intake Total 2690 ml


 


Balance 2690 ml





Capillary Refill :


General Appearance:  No Apparent Distress


HEENT:  PERRL/EOMI


Neck:  Full Range of Motion


Respiratory:  Chest Non Tender, Decreased Breath Sounds


Cardiovascular:  Regular Rate, Rhythm


Gastrointestinal:  normal bowel sounds, non tender, soft


Extremity:  Normal Capillary Refill


Neurologic/Psychiatric:  Alert, Oriented x3


Skin:  Normal Color


Lymphatic:  No Adenopathy





Results


Lab


Laboratory Tests


4/18/21 20:11: Glucometer 228H


4/19/21 05:50: 


White Blood Count 6.8, Red Blood Count 2.81L, Hemoglobin 7.5L, Hematocrit 25L, 

Mean Corpuscular Volume 88, Mean Corpuscular Hemoglobin 27, Mean Corpuscular 

Hemoglobin Concent 30L, Red Cell Distribution Width 17.7H, Platelet Count 301, 

Mean Platelet Volume 9.2, Immature Granulocyte % (Auto) 1, Neutrophils (%) 

(Auto) 55, Lymphocytes (%) (Auto) 32, Monocytes (%) (Auto) 6, Eosinophils (%) 

(Auto) 6, Basophils (%) (Auto) 0, Neutrophils # (Auto) 3.7, Lymphocytes # (Auto)

2.2, Monocytes # (Auto) 0.4, Eosinophils # (Auto) 0.4H, Basophils # (Auto) 0.0, 

Immature Granulocyte # (Auto) 0.0, Sodium Level 138, Potassium Level 4.7, 

Chloride Level 103, Carbon Dioxide Level 24, Anion Gap 11, Blood Urea Nitrogen 

17, Creatinine 0.91, Estimat Glomerular Filtration Rate > 60, BUN/Creatinine 

Ratio 19, Glucose Level 139H, Calcium Level 9.0, Corrected Calcium 9.6, Total 

Bilirubin 0.4, Aspartate Amino Transf (AST/SGOT) 15, Alanine Aminotransferase 

(ALT/SGPT) 10, Alkaline Phosphatase 55, Total Protein 6.8, Albumin 3.2


4/19/21 11:16: Glucometer 134H


4/19/21 13:40: Lab Scanned Report Transfusion Reaction Form


4/19/21 15:43: Glucometer 139H





Assessment/Plan


Assessment/Plan


Assess & Plan/Chief Complaint


s/p left BKA.


continue OOB to chair. 


change dressing tomorrow.











CLAUDY ALEJO MD                Apr 19, 2021 17:28

## 2021-04-19 NOTE — PROGRESS NOTE
ELSY GOODWIN MED STUDENT 4/19/21 1138:


Subjective


Date Seen by a Provider:  Apr 19, 2021


Time Seen by a Provider:  08:30


Subjective/Events-last exam





4/19/21





Pt resting this morning; in good spirits


Glucose well-controlled


Pain relatively uncontrolled (7/10) although at this time he was due for next 

dose of pain meds


Anemic after transfusion yesterday. Hgb is 7.5 - will transfuse again today


Begin iron infusions today


IRF once stable





Objective


Exam


Last Set of Vital Signs





Vital Signs








  Date Time  Temp Pulse Resp B/P (MAP) Pulse Ox O2 Delivery O2 Flow Rate FiO2


 


4/19/21 08:00 36.0 85 18 138/84 (102) 99 Room Air  


 


4/14/21 17:36       1.00 





Capillary Refill :


I&O











Intake and Output 


 


 4/19/21





 00:00


 


Intake Total 2910 ml


 


Balance 2910 ml


 


 


 


Intake Oral 2550 ml


 


IV Total 360 ml


 


# Voids 5


 


# Bowel Movements 2








General:  Alert, Oriented X3, Cooperative, No Acute Distress


HEENT:  PERRLA, Mucous Memb Moist/Pink


Neck:  Supple, No JVD, No Thyromegaly


Lungs:  Clear to Auscultation, Normal Air Movement


Heart:  Regular Rate, No Murmurs


Abdomen:  Normal Bowel Sounds, Soft, No Tenderness


Extremities:  No Cyanosis, Other (L stump c/d/i)


Skin:  No Rashes, No Breakdown


Neuro:  Normal Speech, Strength at 5/5 X4 Ext


Psych/Mental Status:  Mental Status NL, Mood NL





Results


Lab


Laboratory Tests


4/18/21 11:50: Glucometer 154H


4/18/21 15:44: Glucometer 187H


4/18/21 20:11: Glucometer 228H


4/19/21 05:50: 


White Blood Count 6.8, Red Blood Count 2.81L, Hemoglobin 7.5L, Hematocrit 25L, 

Mean Corpuscular Volume 88, Mean Corpuscular Hemoglobin 27, Mean Corpuscular 

Hemoglobin Concent 30L, Red Cell Distribution Width 17.7H, Platelet Count 301, 

Mean Platelet Volume 9.2, Immature Granulocyte % (Auto) 1, Neutrophils (%) 

(Auto) 55, Lymphocytes (%) (Auto) 32, Monocytes (%) (Auto) 6, Eosinophils (%) 

(Auto) 6, Basophils (%) (Auto) 0, Neutrophils # (Auto) 3.7, Lymphocytes # (Auto)

2.2, Monocytes # (Auto) 0.4, Eosinophils # (Auto) 0.4H, Basophils # (Auto) 0.0, 

Immature Granulocyte # (Auto) 0.0, Sodium Level 138, Potassium Level 4.7, 

Chloride Level 103, Carbon Dioxide Level 24, Anion Gap 11, Blood Urea Nitrogen 

17, Creatinine 0.91, Estimat Glomerular Filtration Rate > 60, BUN/Creatinine 

Ratio 19, Glucose Level 139H, Calcium Level 9.0, Corrected Calcium 9.6, Total 

Bilirubin 0.4, Aspartate Amino Transf (AST/SGOT) 15, Alanine Aminotransferase 

(ALT/SGPT) 10, Alkaline Phosphatase 55, Total Protein 6.8, Albumin 3.2





Assessment/Plan


Assessment/Plan


Assess & Plan/Chief Complaint





Assessment:


s/p Left BKA


Obesity


DM OOC


HTN


HLD


Anemia


Iron deficiency








Plan:


Transfuse today 1 unit PRBC


Iron infusions


Pain control


IV abx


Glucose control


PT/OT


Move to IRF once stable





Diagnosis/Problems


Diagnosis/Problems





(1) Status post below-knee amputation of left lower extremity


Status:  Acute


(2) ANCA (acute kidney injury)


Status:  Resolved


Resolution Date/Time:  4/30/20 @ 10:28


(3) Diabetic ulcer of foot associated with diabetes mellitus due to underlying 

condition, with necrosis of muscle


Status:  Resolved


(4) left foot osteomyelitis and necrotizing fasciitis


Status:  Resolved


(5) Hyperglycemia


Status:  Chronic


(6) Hypertension


Status:  Chronic


(7) Peripheral neuropathy


Status:  Chronic


(8) Anemia


Status:  Acute


Qualifiers:  


   


(9) Iron deficiency


Status:  Acute





SIOBHAN VILA DO 4/20/21 0529:


Subjective


Subjective/Events-last exam


Pt doing a lot better


Iron level is 17 will give IV iron 


One unit of blood indicated due to Hgb of 7.5, this is his 3rd unit in 3 days


Overall doing much better


Review of Systems


General:  Fatigue


Musculoskeletal:  leg pain


Neurological:  Weakness





Objective


Exam


General:  Alert, Oriented X3, Cooperative, No Acute Distress


Lungs:  Clear to Auscultation, Normal Air Movement


Heart:  Regular Rate, Normal S1, Normal S2, No Murmurs


Psych/Mental Status:  Mental Status NL, Mood NL





Assessment/Plan


Assessment/Plan


Assess & Plan/Chief Complaint


Transfuse


Monitor closely





Supervisory-Addendum Brief


Verification & Attestation


Participated in pt care:  history, MDM, physical


Personally performed:  exam, history, MDM, supervision of care


Care discussed with:  Medical Student


Procedures:  n/a


Results interpretation:  Verified all documentation


Verification and Attestation of Medical Student E/M Service





A medical student performed and documented this service in my presence. I 

reviewed and verified all information documented by the medical student and made

modifications to such information, when appropriate. I personally performed the 

physical exam and medical decision making. 





 Siobhan Vila, Apr 20, 2021,05:28











ELSY GOODWIN MED STUDENT  Apr 19, 2021 11:38


SIOBHAN VILA DO                Apr 20, 2021 05:29

## 2021-04-20 VITALS — SYSTOLIC BLOOD PRESSURE: 137 MMHG | DIASTOLIC BLOOD PRESSURE: 68 MMHG

## 2021-04-20 VITALS — DIASTOLIC BLOOD PRESSURE: 78 MMHG | SYSTOLIC BLOOD PRESSURE: 116 MMHG

## 2021-04-20 LAB
ALBUMIN SERPL-MCNC: 3.1 GM/DL (ref 3.2–4.5)
ALP SERPL-CCNC: 60 U/L (ref 40–136)
ALT SERPL-CCNC: 12 U/L (ref 0–55)
BASOPHILS # BLD AUTO: 0 10^3/UL (ref 0–0.1)
BASOPHILS NFR BLD AUTO: 0 % (ref 0–10)
BILIRUB SERPL-MCNC: 0.3 MG/DL (ref 0.1–1)
BUN/CREAT SERPL: 19
CALCIUM SERPL-MCNC: 8.8 MG/DL (ref 8.5–10.1)
CHLORIDE SERPL-SCNC: 104 MMOL/L (ref 98–107)
CO2 SERPL-SCNC: 23 MMOL/L (ref 21–32)
CREAT SERPL-MCNC: 0.89 MG/DL (ref 0.6–1.3)
EOSINOPHIL # BLD AUTO: 0.3 10^3/UL (ref 0–0.3)
EOSINOPHIL NFR BLD AUTO: 5 % (ref 0–10)
GFR SERPLBLD BASED ON 1.73 SQ M-ARVRAT: > 60 ML/MIN
GLUCOSE SERPL-MCNC: 210 MG/DL (ref 70–105)
HCT VFR BLD CALC: 25 % (ref 40–54)
HGB BLD-MCNC: 7.5 G/DL (ref 13.3–17.7)
LYMPHOCYTES # BLD AUTO: 2.1 10^3/UL (ref 1–4)
LYMPHOCYTES NFR BLD AUTO: 33 % (ref 12–44)
MANUAL DIFFERENTIAL PERFORMED BLD QL: NO
MCH RBC QN AUTO: 27 PG (ref 25–34)
MCHC RBC AUTO-ENTMCNC: 30 G/DL (ref 32–36)
MCV RBC AUTO: 88 FL (ref 80–99)
MONOCYTES # BLD AUTO: 0.4 10^3/UL (ref 0–1)
MONOCYTES NFR BLD AUTO: 6 % (ref 0–12)
NEUTROPHILS # BLD AUTO: 3.6 10^3/UL (ref 1.8–7.8)
NEUTROPHILS NFR BLD AUTO: 56 % (ref 42–75)
PLATELET # BLD: 294 10^3/UL (ref 130–400)
PMV BLD AUTO: 9.1 FL (ref 9–12.2)
POTASSIUM SERPL-SCNC: 4.6 MMOL/L (ref 3.6–5)
PROT SERPL-MCNC: 6.5 GM/DL (ref 6.4–8.2)
SODIUM SERPL-SCNC: 139 MMOL/L (ref 135–145)
WBC # BLD AUTO: 6.4 10^3/UL (ref 4.3–11)

## 2021-04-20 RX ADMIN — SODIUM CHLORIDE SCH MLS/HR: 900 INJECTION, SOLUTION INTRAVENOUS at 03:26

## 2021-04-20 RX ADMIN — INSULIN ASPART SCH UNIT: 100 INJECTION, SOLUTION INTRAVENOUS; SUBCUTANEOUS at 06:17

## 2021-04-20 RX ADMIN — FENTANYL CITRATE PRN MCG: 50 INJECTION, SOLUTION INTRAMUSCULAR; INTRAVENOUS at 16:10

## 2021-04-20 RX ADMIN — GLYBURIDE SCH MG: 2.5 TABLET ORAL at 16:10

## 2021-04-20 RX ADMIN — METOPROLOL SUCCINATE SCH MG: 100 TABLET, EXTENDED RELEASE ORAL at 08:58

## 2021-04-20 RX ADMIN — HYDROCODONE BITARTRATE AND ACETAMINOPHEN PRN EA: 7.5; 325 TABLET ORAL at 09:10

## 2021-04-20 RX ADMIN — PANTOPRAZOLE SODIUM SCH MG: 40 TABLET, DELAYED RELEASE ORAL at 08:59

## 2021-04-20 RX ADMIN — PRASUGREL SCH MG: 10 TABLET, FILM COATED ORAL at 08:58

## 2021-04-20 RX ADMIN — MICONAZOLE NITRATE SCH APPLIC: 20 POWDER TOPICAL at 21:06

## 2021-04-20 RX ADMIN — INSULIN ASPART SCH UNIT: 100 INJECTION, SOLUTION INTRAVENOUS; SUBCUTANEOUS at 17:11

## 2021-04-20 RX ADMIN — SODIUM CHLORIDE SCH MLS/HR: 900 INJECTION, SOLUTION INTRAVENOUS at 17:15

## 2021-04-20 RX ADMIN — FENTANYL CITRATE PRN MCG: 50 INJECTION, SOLUTION INTRAMUSCULAR; INTRAVENOUS at 21:05

## 2021-04-20 RX ADMIN — HYDROCODONE BITARTRATE AND ACETAMINOPHEN PRN EA: 7.5; 325 TABLET ORAL at 16:10

## 2021-04-20 RX ADMIN — MICONAZOLE NITRATE SCH APPLIC: 20 POWDER TOPICAL at 08:59

## 2021-04-20 RX ADMIN — DOCUSATE SODIUM AND SENNOSIDES SCH EA: 8.6; 5 TABLET, FILM COATED ORAL at 21:05

## 2021-04-20 RX ADMIN — LISINOPRIL SCH MG: 20 TABLET ORAL at 08:59

## 2021-04-20 RX ADMIN — HYDROCODONE BITARTRATE AND ACETAMINOPHEN PRN EA: 7.5; 325 TABLET ORAL at 21:05

## 2021-04-20 RX ADMIN — SODIUM CHLORIDE SCH MLS/HR: 900 INJECTION, SOLUTION INTRAVENOUS at 11:21

## 2021-04-20 RX ADMIN — DOCUSATE SODIUM AND SENNOSIDES SCH EA: 8.6; 5 TABLET, FILM COATED ORAL at 09:00

## 2021-04-20 RX ADMIN — ASPIRIN SCH MG: 81 TABLET ORAL at 08:59

## 2021-04-20 RX ADMIN — METFORMIN HYDROCHLORIDE SCH MG: 500 TABLET, EXTENDED RELEASE ORAL at 08:58

## 2021-04-20 RX ADMIN — POLYETHYLENE GLYCOL (3350) SCH GM: 17 POWDER, FOR SOLUTION ORAL at 21:05

## 2021-04-20 RX ADMIN — INSULIN ASPART SCH UNIT: 100 INJECTION, SOLUTION INTRAVENOUS; SUBCUTANEOUS at 11:27

## 2021-04-20 RX ADMIN — GLYBURIDE SCH MG: 2.5 TABLET ORAL at 06:17

## 2021-04-20 RX ADMIN — POLYETHYLENE GLYCOL (3350) SCH GM: 17 POWDER, FOR SOLUTION ORAL at 09:00

## 2021-04-20 RX ADMIN — METFORMIN HYDROCHLORIDE SCH MG: 500 TABLET, EXTENDED RELEASE ORAL at 17:15

## 2021-04-20 NOTE — PROGRESS NOTE
ELSY GOODWIN MED STUDENT 4/20/21 1107:


Subjective


Date Seen by a Provider:  Apr 20, 2021


Time Seen by a Provider:  08:00


Subjective/Events-last exam





4/20/21





Pt reports pain well-controlled


Glucose well-controlled


Hgb 7.5 equivalent to yesterday even though he did receive 1 unit PRBC


Will hold off on additional transfusion for today - he is still receiving course

of Venofer infusions


Awaiting Dr. Jiménez for dressing change and to assess increased bleeding from L 

BKA site





Objective


Exam


Last Set of Vital Signs





Vital Signs








  Date Time  Temp Pulse Resp B/P (MAP) Pulse Ox O2 Delivery O2 Flow Rate FiO2


 


4/20/21 08:00     98 Room Air  


 


4/20/21 08:00 35.1 82 16 116/78 (91)    


 


4/14/21 17:36       1.00 





Capillary Refill :


I&O











Intake and Output 


 


 4/20/21





 00:00


 


Intake Total 1420 ml


 


Balance 1420 ml


 


 


 


Intake Oral 1420 ml


 


# Voids 5


 


# Bowel Movements 2








General:  Alert, Oriented X3, Cooperative, No Acute Distress


HEENT:  PERRLA, Mucous Memb Moist/Pink


Neck:  Supple, No JVD


Lungs:  Clear to Auscultation, Normal Air Movement


Heart:  Regular Rate, No Murmurs


Abdomen:  Normal Bowel Sounds, Soft, No Tenderness


Extremities:  Normal Pulses, Other (L stump bandaged, clean and dry)


Skin:  No Rashes, No Breakdown


Neuro:  Normal Speech, Strength at 5/5 X4 Ext


Psych/Mental Status:  Mental Status NL, Mood NL





Results


Lab


Laboratory Tests


4/19/21 11:16: Glucometer 134H


4/19/21 13:40: Lab Scanned Report Transfusion Reaction Form


4/19/21 15:43: Glucometer 139H


4/19/21 20:17: Glucometer 208H


4/20/21 03:30: 


White Blood Count 6.4, Red Blood Count 2.82L, Hemoglobin 7.5L, Hematocrit 25L, 

Mean Corpuscular Volume 88, Mean Corpuscular Hemoglobin 27, Mean Corpuscular 

Hemoglobin Concent 30L, Red Cell Distribution Width 17.3H, Platelet Count 294, 

Mean Platelet Volume 9.1, Immature Granulocyte % (Auto) 1, Neutrophils (%) 

(Auto) 56, Lymphocytes (%) (Auto) 33, Monocytes (%) (Auto) 6, Eosinophils (%) 

(Auto) 5, Basophils (%) (Auto) 0, Neutrophils # (Auto) 3.6, Lymphocytes # (Auto)

2.1, Monocytes # (Auto) 0.4, Eosinophils # (Auto) 0.3, Basophils # (Auto) 0.0, 

Immature Granulocyte # (Auto) 0.1, Sodium Level 139, Potassium Level 4.6, 

Chloride Level 104, Carbon Dioxide Level 23, Anion Gap 12, Blood Urea Nitrogen 

17, Creatinine 0.89, Estimat Glomerular Filtration Rate > 60, BUN/Creatinine 

Ratio 19, Glucose Level 210H, Calcium Level 8.8, Corrected Calcium 9.5, Total 

Bilirubin 0.3, Aspartate Amino Transf (AST/SGOT) 15, Alanine Aminotransferase 

(ALT/SGPT) 12, Alkaline Phosphatase 60, Total Protein 6.5, Albumin 3.1L


4/20/21 05:41: Glucometer 186H





Assessment/Plan


Assessment/Plan


Assess & Plan/Chief Complaint





Assessment:


s/p Left BKA


Obesity


DM OOC


HTN


HLD


Anemia


Iron deficiency








Plan:


Continue course of iron infusions


Pain control


IV abx


Glucose control


PT/OT


Move to IRF once stable





Diagnosis/Problems


Diagnosis/Problems





(1) Status post below-knee amputation of left lower extremity


Status:  Acute


(2) ANCA (acute kidney injury)


Status:  Resolved


Resolution Date/Time:  4/30/20 @ 10:28


(3) Diabetic ulcer of foot associated with diabetes mellitus due to underlying 

condition, with necrosis of muscle


Status:  Resolved


(4) left foot osteomyelitis and necrotizing fasciitis


Status:  Resolved


(5) Hyperglycemia


Status:  Chronic


(6) Hypertension


Status:  Chronic


(7) Peripheral neuropathy


Status:  Chronic


(8) Anemia


Status:  Acute


Qualifiers:  


   


(9) Iron deficiency


Status:  Acute





SIOBHAN VILA DO 4/21/21 0612:


Subjective


Subjective/Events-last exam


Pt doing okay


Hgb 7.5


Bleeding a lot


Dressing will be changed by Dr. Jiménez


Awaiting rehab placement


Review of Systems


General:  Fatigue


Musculoskeletal:  leg pain





Objective


Exam


General:  Alert, Oriented X3, Cooperative, No Acute Distress


Lungs:  Clear to Auscultation


Heart:  Regular Rate


Psych/Mental Status:  Mental Status NL, Mood NL





Assessment/Plan


Assessment/Plan


Assess & Plan/Chief Complaint


Monitor hgb


Pain control


IRF





Supervisory-Addendum Brief


Verification & Attestation


Participated in pt care:  history, MDM, physical


Personally performed:  exam, history, MDM, supervision of care


Care discussed with:  Medical Student


Procedures:  n/a


Results interpretation:  Verified all documentation


Verification and Attestation of Medical Student E/M Service





A medical student performed and documented this service in my presence. I revie

wed and verified all information documented by the medical student and made 

modifications to such information, when appropriate. I personally performed the 

physical exam and medical decision making. 





 Siobhan Vila, Apr 21, 2021,06:10











ELSY GOODWIN MED STUDENT  Apr 20, 2021 11:07


SIOBHAN VILA DO                Apr 21, 2021 06:12

## 2021-04-20 NOTE — OCCUPATIONAL THER DAILY NOTE
OT Current Status-Daily Note


Subjective


Pt alert, lying in bed.  Pt agrees to therapy.  No c/o pain.





Mental Status/Objective


Patient Orientation:  Person, Place, Time, Situation


Attachments:  IV





ADL-Treatment


Therapy Code Descriptions/Definitions 





Functional [place] Measure:


0=Not Assessed/NA        4=Minimal Assistance


1=Total Assistance        5=Supervision or Setup


2=Maximal Assistance  6=Modified [place]


3=Moderate Assistance 7=Complete IndependenceSCALE: Activities may be completed 

with or without assistive devices.





6-Indepedent-patient completes the activity by him/herself with no assistance 

from a helper.


5-Set-up or Clean-up Assistance-helper sets up or cleans up; patient completes 

activity. [place] assists only prior to or  


    following the activity.


4-Supervision or Touching Assistance-helper provides verbal cues and/or 

touching/steadying and/or contact guard assistance as patient completes 

activity. Assistance may be provided   


    throughout the activity or intermittently.


3-Partial/Moderate Assistance-helper does LESS THAN HALF the effort. [place] 

lifts, holds or supports trunk or limbs, but provides less than half the effort.


2-Substantial/Maximal Assistance-helper does MORE THAN HALF the effort. [place] 

lifts or holds trunk or limbs and provides more than half the effort.


6-Aiiwdcpmv-hyxips does ALL the effort. Patient does none of the effort to 

complete the activity. Or, the assistance of 2 or more helpers is required for 

the patient to complete the  


    activity.


If activity was not attempted, code reason:


7-Patient Refused.


9-Not Applicable-not attempted and the patient did not perform the activity 

before the current illness, exacerbation or injury.


10-Not Attempted due to Environmental Limitations-(lack of equipment, weather 

restraints, etc.).


88-Not Attempted due to Medical Conditions or Safety Concerns.


Eating (QC):  6 (Using clinical judgement, pt is able to complete set up and use

regular utenils to eat.)


Oral Hygiene (QC):  5 (Using clinical judgment, assist gathering supplies and 

clean up so pt is able to complete in bed, pt able to complete by self.)


Bathing Location:  L Arm, R Arm, L Upper Leg, R Upper Leg, L Lower Leg 

(including foot) (BKA), Chest, Abdomen, Perineal Area


Shower/Bathe Self (QC):  3 (Completed bed bath due to recent BKA and pt 

continues to grow stronger though unable to transfer out of bed at this time.  

Pt is able to bathe all areas except buttocks and R lower leg/foot.  Pt is able 

to roll side to side and hold position while assist to bath buttocks.)


Upper Body Dressing (QC):  5 (After set up, pt able to complete by self.)


Lower Body Dressing (QC):  2 (Per clinical judgement, pt requires assistance to 

thread B LE's through clothing then is able to roll side to side while assist to

hike clothing over hips.)


On/Off Footwear:  2 (Pt unable to reach foot at this time, requires max A to 

complete.)


Toileting Hygiene (QC):  2 (Per clinical judgement and pt report, pt is using 

bedpan at this time due to medical issues.  Pt is able to roll and hold position

while assist given to place bedpan and cleanse.)





OT Short Term Goals


Short Term Goals


Time Frame:  Apr 15, 2021





OT Long Term Goals


Long Term Goals


Time Frame:  Apr 29, 2021


Eating (QC):  6


Oral Hygiene (QC):  6


Toileting Hygiene (QC):  6


Shower/Bathe Self (QC):  5


Upper Body Dressing (QC):  6


Lower Body Dressing (QC):  6


On/Off Footwear (QC):  6


Additional Goals:  1-Demonstrate ADL Tasks, 2-Verbalize Understanding, 3-Improve

Strength/Aung


1=Demonstrate adherence to instructed precautions during ADL tasks.


2=Patient will verbalize/demonstrate understanding of assistive 

devices/modifications for ADL.


3=Patient will improve strength/tolerance for activity to enable patient to pe

rform ADL's.





OT Education/Plan


Problem List/Assessment


Assessment:  Decreased Activ Tolerance, Impaired Self-Care Skills





Discharge Recommendations


Plan/Recommendations:  Continue POC





Treatment Plan/Plan of Care


Patient would benefit from OT for education, treatment and training to promote 

independence in ADL's, mobility, safety and/or upper extremity function for 

ADL's.


Plan of Care:  ADL Retraining, Functional Mobility, UE Funct Exercise/Act


Treatment Duration:  Apr 29, 2021


Frequency:  5 times per week


Estimated Hrs Per Day:  .25 hour per day


Agreement:  Yes


Rehab Potential:  Good





Time/GCodes


Start Time:  14:35


Stop Time:  15:00


Total Time Billed (hr/min):  25


Billed Treatment Time


1visit-ADL 2 (25 min)











GUDELIA GONZALEZ               Apr 20, 2021 15:01

## 2021-04-20 NOTE — OCCUPATIONAL THER DAILY NOTE
OT Current Status-Daily Note


Subjective


Pt alert, lying in bed.  Pt agrees to therapy.  No c/o pain at this time.  

Discussing with pt w/c and BSC needs for hospital and home.





Mental Status/Objective


Patient Orientation:  Person, Place, Time, Situation


Attachments:  IV





ADL-Treatment


Therapy Code Descriptions/Definitions 





Functional [place] Measure:


0=Not Assessed/NA        4=Minimal Assistance


1=Total Assistance        5=Supervision or Setup


2=Maximal Assistance  6=Modified [place]


3=Moderate Assistance 7=Complete IndependenceSCALE: Activities may be completed 

with or without assistive devices.





6-Indepedent-patient completes the activity by him/herself with no assistance 

from a helper.


5-Set-up or Clean-up Assistance-helper sets up or cleans up; patient completes 

activity. [place] assists only prior to or  


    following the activity.


4-Supervision or Touching Assistance-helper provides verbal cues and/or 

touching/steadying and/or contact guard assistance as patient completes 

activity. Assistance may be provided   


    throughout the activity or intermittently.


3-Partial/Moderate Assistance-helper does LESS THAN HALF the effort. [place] 

lifts, holds or supports trunk or limbs, but provides less than half the effort.


2-Substantial/Maximal Assistance-helper does MORE THAN HALF the effort. [place] 

lifts or holds trunk or limbs and provides more than half the effort.


8-Ahweyjics-wqyghe does ALL the effort. Patient does none of the effort to 

complete the activity. Or, the assistance of 2 or more helpers is required for 

the patient to complete the  


    activity.


If activity was not attempted, code reason:


7-Patient Refused.


9-Not Applicable-not attempted and the patient did not perform the activity 

before the current illness, exacerbation or injury.


10-Not Attempted due to Environmental Limitations-(lack of equipment, weather 

restraints, etc.).


88-Not Attempted due to Medical Conditions or Safety Concerns.


Oral Hygiene (QC):  5 (After assist gathering supplies and cleaning up, pt able 

to complete independently.)





Other Treatment


Pt independent with B UE exercises to increase strength for mobility and daily 

functional tasks.  After session, pt sitting up in bed with call light/phone in 

reach.  All needs met in room.





OT Short Term Goals


Short Term Goals


Time Frame:  Apr 15, 2021





OT Long Term Goals


Long Term Goals


Time Frame:  Apr 29, 2021


Eating (QC):  6


Oral Hygiene (QC):  6


Toileting Hygiene (QC):  6


Shower/Bathe Self (QC):  5


Upper Body Dressing (QC):  6


Lower Body Dressing (QC):  6


On/Off Footwear (QC):  6


Additional Goals:  1-Demonstrate ADL Tasks, 2-Verbalize Understanding, 3-

ImproveStrength/Aung


1=Demonstrate adherence to instructed precautions during ADL tasks.


2=Patient will verbalize/demonstrate understanding of assistive 

devices/modifications for ADL.


3=Patient will improve strength/tolerance for activity to enable patient to 

perform ADL's.





OT Education/Plan


Problem List/Assessment


Assessment:  Decreased UE Strength, Impaired Self-Care Skills





Discharge Recommendations


Plan/Recommendations:  Continue POC





Treatment Plan/Plan of Care


Patient would benefit from OT for education, treatment and training to promote 

independence in ADL's, mobility, safety and/or upper extremity function for ADL'

s.


Plan of Care:  ADL Retraining, Functional Mobility, UE Funct Exercise/Act


Treatment Duration:  Apr 29, 2021


Frequency:  5 times per week


Estimated Hrs Per Day:  .25 hour per day


Agreement:  Yes


Rehab Potential:  Good





Time/GCodes


Start Time:  14:00


Stop Time:  14:15


Total Time Billed (hr/min):  15


Billed Treatment Time


1 visit-FA 1 (15 min)











GUDELIA GONZALEZ               Apr 20, 2021 14:28

## 2021-04-20 NOTE — PHYSICAL THERAPY DAILY NOTE
PT Daily Note-Current


Subjective


Patient agrees to PT.  Exercises only.





Mental Status


Patient Orientation:  Normal For Age





Transfers


SCALE: Activities may be completed with or without assistive devices.





6-Indepedent-patient completes the activity by him/herself with no assistance 

from a helper.


5-Set-up or Clean-up Assistance-helper sets up or cleans up; patient completes 

activity. [place] assists only prior to or  


    following the activity.


4-Supervision or Touching Assistance-helper provides verbal cues and/or 

touching/steadying and/or contact guard assistance as patient completes 

activity. Assistance may be provided   


    throughout the activity or intermittently.


3-Partial/Moderate Assistance-helper does LESS THAN HALF the effort. [place] 

lifts, holds or supports trunk or limbs, but provides less than half the effort.


2-Substantial/Maximal Assistance-helper does MORE THAN HALF the effort. [place] 

lifts or holds trunk or limbs and provides more than half the effort.


3-Jiiyzqzqm-dfmnag does ALL the effort. Patient does none of the effort to 

complete the activity. Or, the assistance of 2 or more helpers is required for 

the patient to complete the  


    activity.


If activity was not attempted, code reason:


7-Patient Refused.


9-Not Applicable-not attempted and the patient did not perform the activity 

before the current illness, exacerbation or injury.


10-Not Attempted due to Environmental Limitations-(lack of equipment, weather 

restraints, etc.).


88-Not Attempted due to Medical Conditions or Safety Concerns.





Weight Bearing


Right Lower Extremity:  Right


Full Weight Bearing


Left Lower Extremity:  Left


Non Weight Bearing





Exercises


Supine Ex:  Ankle pumps (right LE), Quad Set, Heel Slides (right LE), Straight 

leg raise, Hip abd/add





Assessment


Patient performs exercises independently.  Increase activity as patient 

tolerates.  Wound continues to drain excessively and patient will receive PRBC 

on this date per his report.





PT Long Term Goals


Long Term Goals


PT Long Term Goals Time Frame:  May 1, 2021


Roll Left & Right (QC):  6


Sit to Lying (QC):  6


Lying-Sitting on Side/Bed(QC):  6


Sit to Stand (QC):  6


Chair/Bed-to-Chair Xfer(QC):  6


Toilet Transfer (QC):  6


Walk 10 feet (QC):  4


Does the Pt use WC or Scooter?:  Yes


Wheel 50 feet with 2 turns (QC:  6


Type:  Manual


Wheel 150 feet:  6


Type:  Manual





PT Plan


Treatment/Plan


Treatment Plan:  Continue Plan of Care


Treatment Plan:  Bed Mobility, Education, Functional Activity Aung, Functional 

Strength, Gait, Safety, Therapeutic Exercise, Transfers


Treatment Duration:  May 1, 2021


Frequency:  6 times per week


Estimated Hrs Per Day:  .5 hour per day


Patient and/or Family Agrees t:  Yes





Time/GCodes


Time In:  916


Time Out:  932


Total Billed Treatment Time:  16


Total Billed Treatment


1 visit


EX 16 min











ADRIANNA BERNABE PT              Apr 20, 2021 09:49

## 2021-04-21 ENCOUNTER — HOSPITAL ENCOUNTER (INPATIENT)
Dept: HOSPITAL 75 - IRF | Age: 41
LOS: 28 days | Discharge: HOME HEALTH | DRG: 560 | End: 2021-05-19
Attending: INTERNAL MEDICINE | Admitting: INTERNAL MEDICINE
Payer: COMMERCIAL

## 2021-04-21 VITALS — DIASTOLIC BLOOD PRESSURE: 72 MMHG | SYSTOLIC BLOOD PRESSURE: 131 MMHG

## 2021-04-21 VITALS — DIASTOLIC BLOOD PRESSURE: 64 MMHG | SYSTOLIC BLOOD PRESSURE: 127 MMHG

## 2021-04-21 VITALS — DIASTOLIC BLOOD PRESSURE: 74 MMHG | SYSTOLIC BLOOD PRESSURE: 135 MMHG

## 2021-04-21 VITALS — DIASTOLIC BLOOD PRESSURE: 68 MMHG | SYSTOLIC BLOOD PRESSURE: 125 MMHG

## 2021-04-21 VITALS — HEIGHT: 72.01 IN | BODY MASS INDEX: 42.66 KG/M2 | WEIGHT: 315 LBS

## 2021-04-21 DIAGNOSIS — M21.371: ICD-10-CM
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
## 2021-04-21 DIAGNOSIS — Z83.3: ICD-10-CM

## 2021-04-21 DIAGNOSIS — E11.42: ICD-10-CM

## 2021-04-21 DIAGNOSIS — B35.3: ICD-10-CM

## 2021-04-21 DIAGNOSIS — S90.221A: ICD-10-CM

## 2021-04-21 DIAGNOSIS — D62: ICD-10-CM

## 2021-04-21 DIAGNOSIS — E78.00: ICD-10-CM

## 2021-04-21 DIAGNOSIS — Z88.1: ICD-10-CM

## 2021-04-21 DIAGNOSIS — I12.9: ICD-10-CM

## 2021-04-21 DIAGNOSIS — E11.621: ICD-10-CM

## 2021-04-21 DIAGNOSIS — N18.9: ICD-10-CM

## 2021-04-21 DIAGNOSIS — Z82.49: ICD-10-CM

## 2021-04-21 DIAGNOSIS — Z79.4: ICD-10-CM

## 2021-04-21 DIAGNOSIS — G47.33: ICD-10-CM

## 2021-04-21 DIAGNOSIS — E11.65: ICD-10-CM

## 2021-04-21 DIAGNOSIS — E11.22: ICD-10-CM

## 2021-04-21 DIAGNOSIS — Z87.891: ICD-10-CM

## 2021-04-21 DIAGNOSIS — E78.5: ICD-10-CM

## 2021-04-21 DIAGNOSIS — E66.9: ICD-10-CM

## 2021-04-21 DIAGNOSIS — Z79.82: ICD-10-CM

## 2021-04-21 DIAGNOSIS — Z95.5: ICD-10-CM

## 2021-04-21 DIAGNOSIS — Z47.81: Primary | ICD-10-CM
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
## 2021-04-21 DIAGNOSIS — I25.10: ICD-10-CM

## 2021-04-21 DIAGNOSIS — L97.529: ICD-10-CM

## 2021-04-21 DIAGNOSIS — B35.1: ICD-10-CM

## 2021-04-21 LAB
ALBUMIN SERPL-MCNC: 3.2 GM/DL (ref 3.2–4.5)
ALP SERPL-CCNC: 58 U/L (ref 40–136)
ALT SERPL-CCNC: 13 U/L (ref 0–55)
BASOPHILS # BLD AUTO: 0 10^3/UL (ref 0–0.1)
BASOPHILS NFR BLD AUTO: 0 % (ref 0–10)
BILIRUB SERPL-MCNC: 0.3 MG/DL (ref 0.1–1)
BUN/CREAT SERPL: 19
CALCIUM SERPL-MCNC: 9.1 MG/DL (ref 8.5–10.1)
CHLORIDE SERPL-SCNC: 104 MMOL/L (ref 98–107)
CO2 SERPL-SCNC: 24 MMOL/L (ref 21–32)
CREAT SERPL-MCNC: 0.89 MG/DL (ref 0.6–1.3)
EOSINOPHIL # BLD AUTO: 0.3 10^3/UL (ref 0–0.3)
EOSINOPHIL NFR BLD AUTO: 5 % (ref 0–10)
GFR SERPLBLD BASED ON 1.73 SQ M-ARVRAT: > 60 ML/MIN
GLUCOSE SERPL-MCNC: 108 MG/DL (ref 70–105)
HCT VFR BLD CALC: 25 % (ref 40–54)
HGB BLD-MCNC: 7.5 G/DL (ref 13.3–17.7)
LYMPHOCYTES # BLD AUTO: 2.5 10^3/UL (ref 1–4)
LYMPHOCYTES NFR BLD AUTO: 36 % (ref 12–44)
MANUAL DIFFERENTIAL PERFORMED BLD QL: NO
MCH RBC QN AUTO: 27 PG (ref 25–34)
MCHC RBC AUTO-ENTMCNC: 31 G/DL (ref 32–36)
MCV RBC AUTO: 88 FL (ref 80–99)
MONOCYTES # BLD AUTO: 0.4 10^3/UL (ref 0–1)
MONOCYTES NFR BLD AUTO: 6 % (ref 0–12)
NEUTROPHILS # BLD AUTO: 3.6 10^3/UL (ref 1.8–7.8)
NEUTROPHILS NFR BLD AUTO: 53 % (ref 42–75)
PLATELET # BLD: 299 10^3/UL (ref 130–400)
PMV BLD AUTO: 9.3 FL (ref 9–12.2)
POTASSIUM SERPL-SCNC: 4.6 MMOL/L (ref 3.6–5)
PROT SERPL-MCNC: 6.6 GM/DL (ref 6.4–8.2)
SODIUM SERPL-SCNC: 138 MMOL/L (ref 135–145)
WBC # BLD AUTO: 6.9 10^3/UL (ref 4.3–11)

## 2021-04-21 PROCEDURE — 85025 COMPLETE CBC W/AUTO DIFF WBC: CPT

## 2021-04-21 PROCEDURE — 82962 GLUCOSE BLOOD TEST: CPT

## 2021-04-21 PROCEDURE — 80053 COMPREHEN METABOLIC PANEL: CPT

## 2021-04-21 PROCEDURE — 82947 ASSAY GLUCOSE BLOOD QUANT: CPT

## 2021-04-21 PROCEDURE — 71045 X-RAY EXAM CHEST 1 VIEW: CPT

## 2021-04-21 PROCEDURE — 36415 COLL VENOUS BLD VENIPUNCTURE: CPT

## 2021-04-21 RX ADMIN — METOPROLOL SUCCINATE SCH MG: 100 TABLET, EXTENDED RELEASE ORAL at 07:42

## 2021-04-21 RX ADMIN — PANTOPRAZOLE SODIUM SCH MG: 40 TABLET, DELAYED RELEASE ORAL at 07:42

## 2021-04-21 RX ADMIN — GLYBURIDE SCH MG: 2.5 TABLET ORAL at 06:24

## 2021-04-21 RX ADMIN — PRASUGREL SCH MG: 10 TABLET, FILM COATED ORAL at 07:42

## 2021-04-21 RX ADMIN — ASPIRIN SCH MG: 81 TABLET ORAL at 07:41

## 2021-04-21 RX ADMIN — LISINOPRIL SCH MG: 20 TABLET ORAL at 07:42

## 2021-04-21 RX ADMIN — INSULIN ASPART SCH UNIT: 100 INJECTION, SOLUTION INTRAVENOUS; SUBCUTANEOUS at 13:14

## 2021-04-21 RX ADMIN — HYDROCODONE BITARTRATE AND ACETAMINOPHEN PRN EA: 7.5; 325 TABLET ORAL at 16:07

## 2021-04-21 RX ADMIN — DOCUSATE SODIUM AND SENNOSIDES SCH EA: 8.6; 5 TABLET, FILM COATED ORAL at 07:50

## 2021-04-21 RX ADMIN — METFORMIN HYDROCHLORIDE SCH MG: 500 TABLET, EXTENDED RELEASE ORAL at 07:42

## 2021-04-21 RX ADMIN — MICONAZOLE NITRATE SCH APPLIC: 20 POWDER TOPICAL at 07:42

## 2021-04-21 RX ADMIN — DOCUSATE SODIUM AND SENNOSIDES SCH EA: 8.6; 5 TABLET, FILM COATED ORAL at 07:42

## 2021-04-21 RX ADMIN — FENTANYL CITRATE PRN MCG: 50 INJECTION, SOLUTION INTRAMUSCULAR; INTRAVENOUS at 14:16

## 2021-04-21 RX ADMIN — Medication SCH ML: at 21:29

## 2021-04-21 RX ADMIN — SODIUM CHLORIDE SCH MLS/HR: 900 INJECTION, SOLUTION INTRAVENOUS at 21:29

## 2021-04-21 RX ADMIN — METFORMIN HYDROCHLORIDE SCH MG: 500 TABLET, EXTENDED RELEASE ORAL at 18:26

## 2021-04-21 RX ADMIN — GLYBURIDE SCH MG: 2.5 TABLET ORAL at 16:07

## 2021-04-21 RX ADMIN — DOCUSATE SODIUM AND SENNOSIDES SCH EA: 8.6; 5 TABLET, FILM COATED ORAL at 21:00

## 2021-04-21 RX ADMIN — Medication SCH ML: at 13:43

## 2021-04-21 RX ADMIN — FENTANYL CITRATE PRN MCG: 50 INJECTION, SOLUTION INTRAMUSCULAR; INTRAVENOUS at 10:49

## 2021-04-21 RX ADMIN — INSULIN ASPART SCH UNIT: 100 INJECTION, SOLUTION INTRAVENOUS; SUBCUTANEOUS at 07:39

## 2021-04-21 RX ADMIN — DOCUSATE SODIUM SCH MG: 100 CAPSULE ORAL at 21:00

## 2021-04-21 RX ADMIN — MICONAZOLE NITRATE SCH GM: 20 POWDER TOPICAL at 21:28

## 2021-04-21 RX ADMIN — POLYETHYLENE GLYCOL (3350) SCH GM: 17 POWDER, FOR SOLUTION ORAL at 21:00

## 2021-04-21 RX ADMIN — HYDROCODONE BITARTRATE AND ACETAMINOPHEN PRN EA: 7.5; 325 TABLET ORAL at 21:27

## 2021-04-21 RX ADMIN — INSULIN ASPART SCH UNIT: 100 INJECTION, SOLUTION INTRAVENOUS; SUBCUTANEOUS at 18:26

## 2021-04-21 RX ADMIN — POLYETHYLENE GLYCOL (3350) SCH GM: 17 POWDER, FOR SOLUTION ORAL at 07:29

## 2021-04-21 RX ADMIN — FENTANYL CITRATE PRN MCG: 50 INJECTION, SOLUTION INTRAMUSCULAR; INTRAVENOUS at 21:28

## 2021-04-21 RX ADMIN — SODIUM CHLORIDE SCH MG: 900 INJECTION, SOLUTION INTRAVENOUS at 07:40

## 2021-04-21 RX ADMIN — SODIUM CHLORIDE SCH MLS/HR: 900 INJECTION, SOLUTION INTRAVENOUS at 02:52

## 2021-04-21 RX ADMIN — SODIUM CHLORIDE SCH MLS/HR: 900 INJECTION, SOLUTION INTRAVENOUS at 13:42

## 2021-04-21 RX ADMIN — DOCUSATE SODIUM SCH MG: 100 CAPSULE ORAL at 13:34

## 2021-04-21 NOTE — PROGRESS NOTE
ELSY GOODWIN MED STUDENT 4/21/21 1154:


Progress Note





CC: Left BKA





HPI: Mr. Gary is a 40[facility]WM clinic pt of [facility] with a hx of uncontrolled DM, HTN, 

obesity, peripheral neuropathy, and CAD with stent placement who had a lengthy 

stay in [facility] rehab where he nursed a severely infected diabetic foot ulcer which 

ultimately required L BKA due to extensive osteomyelitis and muscle necrosis. He

underwent an uncomplicated left BKA on 4/14/21.  Patient received IV Zosyn and 

never developed fever or leukocytosis during his 1 week stay recovering on 4th 

floor.  Pain was well-controlled with Fentanyl as needed and blood sugar was 

properly maintained for optimal healing.  At its lowest, his hgb dropped to 7.1 

and patient required blood transfusion on 3 separate occasions. Patient's wound 

bled more than expected and continues to ooze. Mr. Gary has a history of iron 

deficiency requiring Venofer in the past so he was started on iron infusions 

again.  He is now stable with hgb maintained at 7.5. Patient worked well with 

PT/OT and achieved independence in all exercises.  He is discharged in good 

condition to IRF here at [facility] to continue recovery and acclimate to performing 

ADLs after BKA.  Surgery will continue to monitor wound progress as needed and 

all other comorbidities will be closely monitored by Dr. Barrera until he is 

ready to go home.





SIOBHAN BARRERA DO 4/22/21 0542:


Supervisory-Addendum Brief


Verification & Attestation


Participated in pt care:  history, MDM, physical


Personally performed:  exam, history, MDM, supervision of care


Care discussed with:  Medical Student


Procedures:  n/a


Results interpretation:  Verified all documentation


Verification and Attestation of Medical Student E/M Service





A medical student performed and documented this service in my presence. I 

reviewed and verified all information documented by the medical student and made

modifications to such information, when appropriate. I personally performed the 

physical exam and medical decision making. 





 Siobhan Barrera, Apr 22, 2021,05:42











ELSY GOODWIN MED STUDENT  Apr 21, 2021 11:54


SIOBHAN BARRERA DO                Apr 22, 2021 05:42

## 2021-04-21 NOTE — PM&R POST ADMISSION ASSESSMENT
PM&R HP


Date of Visit:  Apr 21, 2021


Time of Visit:  09:20


History of Present Illness


CC: Left below the knee amputation





HPI: This is a 40yoWM clinic Pt of [facility] who I have managed for the last four 

weeks prior to the left below the knee amputation due to osteomyelitis and 

necrotizing fasciitis after he failed two debridements in inpatient rehab stay, 

who presents to inpatient rehab in need of aggressive therapy in order to learn 

how to manage ambulation and ADLs with the new left below the knee amputation. 

His Hgb remains the same at 7.5 and we will maintain the iron infusions since he

did receive three units of blood on the med-surge unit. He does still have some 

drainage that is bloody of the left below the knee amputation and Dr. Jiménez is 

managing that.





Med-surg DC note:





CC: Left BKA





HPI: Mr. Gary is a 40yoWM clinic pt of [facility] with a hx of uncontrolled DM, HTN, 

obesity, peripheral neuropathy, and CAD with stent placement who had a lengthy 

stay in [facility] rehab where he nursed a severely infected diabetic foot ulcer which 

ultimately required L BKA due to extensive osteomyelitis and muscle necrosis. He

underwent an uncomplicated left BKA on 4/14/21.  Patient received IV Zosyn and 

never developed fever or leukocytosis during his 1 week stay recovering on 4th 

floor.  Pain was well-controlled with Fentanyl as needed and blood sugar was 

properly maintained for optimal healing.  At its lowest, his hgb dropped to 7.1 

and patient required blood transfusion on 3 separate occasions. Patient's wound 

bled more than expected and continues to ooze. Mr. Gary has a history of iron 

deficiency requiring Venofer in the past so he was started on iron infusions 

again.  He is now stable with hgb maintained at 7.5. Patient worked well with 

PT/OT and achieved independence in all exercises.  He is discharged in good 

condition to IRF here at [facility] to continue recovery and acclimate to performing 

ADLs after BKA.  Surgery will continue to monitor wound progress as needed and 

all other comorbidities will be closely monitored by Dr. Vila until he is 

ready to go home.











ELSY GOODWIN MED STUDENT





Past Medical-Social-Family Hx


Past Med/Social Hx:  Reviewed Nursing Past Med/Soc Hx, Reviewed and Corrections 

made


Patient Social History


Marrital Status:  


Employed/Student:  employed


Alcohol Use:  Denies Use


Smoking Status:  Former Smoker


Former Smoker, Quit:  Jan 1, 2003


Type Used:  Cigarettes, Electronic/Vapor


Recent Hopitalizations:  No





Immunizations Up To Date


Tetanus Booster (TDap):  More than 5yrs


Date of Pneumonia Vaccine:  Jan 1, 2007





Seasonal Allergies


Seasonal Allergies:  No





Past Medical History


Surgeries:  Orthopedic


Respiratory:  Sleep Apnea


Currently Using CPAP:  No


Currently Using BIPAP:  No


Cardiac:  High Cholesterol, Hypertension


Neurological:  Neuropathy


Reproductive:  No


Sexually Transmitted Disease:  No


HIV/AIDS:  No


Musculoskeletal:  Amputee (4/2021), Chronic Back Pain


Endocrine:  Diabetes, Insulin dep


Are Your Blood Sugars Over 250:  Yes


Loss of Vision:  Denies


Hearing Impairment:  Denies


Skin/Integumentary:  Recent Skin Changes


History of Blood Disorders:  No


Adverse Reaction to Blood Hernandez:  No





Family History





Completed stroke


  17 MOTHER


Diabetes mellitus


  19 MOTHER


Hypertension


  19 MOTHER


  G8 BROTHER


Myocardial infarction


  17 FATHER


CAD Under 55 Years Old, Diabetes, Hypertension, Other Conditions/Hx





Occupation:  Highschool teacher





PM&R Allergy/Meds/Data Review


Allergies


Coded Allergies:  


     levofloxacin (Verified  Allergy, Mild, 4/14/15)





Home Medications


Scheduled


Aspirin (Aspirin EC), 81 MG PO DAILY, (Reported)


Atorvastatin Calcium (Atorvastatin Calcium), 80 MG PO DAILY, (Reported)


Glyburide (Glyburide), 2.5 MG PO BID, (Reported)


Insulin Aspart (Novolog Flexpen), 30-40 UNITS SQ AC, (Reported)


Insulin Detemir (Levemir Flextouch), 45 UNIT SQ BID, (Reported)


Lisinopril/Hydrochlorothiazide (Lisinopril-Hctz 20-25 mg Tab), 1 EACH PO DAILY, 

(Reported)


Metformin HCl (Metformin HCl ER), 500 MG PO BID, (Reported)


Metoprolol Succinate (Metoprolol Succinate), 100 MG PO DAILY, (Reported)


Prasugrel HCl (Prasugrel HCl), 10 MG PO DAILY, (Reported)





Scheduled PRN


Calcium Carbonate (Tums Ultra), 400-800 MG PO PRN PRN for INDIGESTION, 

(Reported)





Current Medications


Current Medications


Reviewed





Review of Systems


Constitutional:  see HPI, malaise, weakness


EENTM:  no symptoms reported


Respiratory:  no symptoms reported


Cardiovascular:  no symptoms reported


Gastrointestinal:  no symptoms reported


Genitourinary:  no symptoms reported


Musculoskeletal:  back pain, joint pain


Skin:  no symptoms reported


Psychiatric/Neurological:  No Symptoms Reported


All Other Systems Reviewed


Negative Unless Noted:  Yes





Physical Exam


Physical Exam


Vital Signs


Capillary Refill :


Height, Weight, BMI


Height: 6'0.00"


Weight: 422lbs. 6.4oz. 191.713009mz; 60.47 BMI


Method:Stated


General Appearance:  No Apparent Distress, WD/WN, Chronically ill, Obese


Eyes:  Bilateral Eye Normal Inspection, Bilateral Eye PERRL


HEENT:  PERRL/EOMI, Normal ENT Inspection, Pharynx Normal


Neck:  Full Range of Motion, Normal Inspection, Non Tender, Supple, Carotid 

Bruit


Respiratory:  Chest Non Tender, Lungs Clear, Normal Breath Sounds, No Accessory 

Muscle Use, No Respiratory Distress


Cardiovascular:  Regular Rate, Rhythm, No Edema, No Gallop, No JVD, No Murmur, 

Normal Peripheral Pulses


Gastrointestinal:  Normal Bowel Sounds, No Organomegaly, No Pulsatile Mass, Non 

Tender, Soft


Back:  Normal Inspection, No CVA Tenderness, No Vertebral Tenderness


Extremity:  Normal Capillary Refill, Normal Inspection, Normal Range of Motion, 

Non Tender, No Calf Tenderness, No Pedal Edema


Neurologic/Psychiatric:  Alert, Oriented x3, No Motor/Sensory Deficits, Normal 

Mood/Affect, CNs II-XII Norm as Tested, Abnormal Gait, Motor Weakness (left leg 

amputation )


Skin:  Normal Color, Warm/Dry


Lymphatic:  No Adenopathy





PM&R Medical Assessment & Plan


REHAB/MEDICAL ASSESSMENT AND PLAN:





REHAB IMPAIRMENT GROUP: 


LBKA





ETIOLOGIC DIAGNOSIS: 


LBKA





The comorbidities that impact the patients function and/or functional outcome 

by: morbid obesity, DM insulin dependence, untreated TERRI





REHAB PLAN:


The patient is being admitted to our comprehensive inpatient rehabilitation 

facility and can tolerate the intensity of service consisting of at least:


      180 minutes of therapy a day, 5 out of 7 days a week 


    


Rehab treatment will consist of:  PT OT will focus on regaining independence 

with new LBKA in order to return to living independently and work to obtain 

prosthesis





The patient/family has a good understanding of our discharge process and will 

benefit from an interdisciplinary inpatient rehabilitation program. The patient 

has potential to make improvement and is in need of at least two of the 

following multidisciplinary therapies including but not limited to physical, 

occupational, speech, and prosthetics and orthotics.  Additionally the patient 

will need services from respiratory, nutritional services, wound care, 

psychology, etc. (Customize this to each patient). Given the patients complex 

condition and risk of further medical complications, rehabilitation services 

cannot be safely or effectively provided at a lower level of care such as a 

skilled nursing facility.





BARRIERS TO DISCHARGE:


Morbid obesity





ESTIMATED LOS:


14 days





DISPOSITION:


Home





RELEVANT CHANGES SINCE PREADMISSION SCREENING: 


I have compared the patients medical and functional status at the time of the 

preadmission screening and there are: 


      no changes 





PROGNOSIS:


Good





REHABILITATION GOALS:  


1. PT OT will focus on regaining independence with new LBKA in order to return 

to living independently and work to obtain prosthesis








All the above goals were reviewed with the patient and he/she is in agreement.


By signing this document, I acknowledge that I have personally performed a full 

physical examination on this patient within 24 hours of admission to this 

inpatient rehabilitation facility and have determined the patient to be able to 

tolerate the above course of treatment at an intensive level for a reasonable 

period of time. I will be completing a detailed individualized Plan of Care for 

this patient by day #4 of the patients stay based upon the Preadmission Screen,

the Post-Admission Evaluation, and the therapy evaluations.


Admission Dx/Comorbidities:  


(1) Status post below-knee amputation of left lower extremity


Status:  Acute


ICD Codes:  Z89.512 - Acquired absence of left leg below knee


(2) Hyperglycemia


Status:  Chronic


(3) Hypertension


Status:  Chronic


ICD Codes:  I10 - Essential (primary) hypertension


(4) Diabetes mellitus, type 2


Status:  Chronic


ICD Codes:  E11.9 - Type 2 diabetes mellitus without complications


(5) Anemia


Status:  Acute


ICD Codes:  D64.9 - Anemia, unspecified


(6) Iron deficiency


Status:  Acute


ICD Codes:  E61.1 - Iron deficiency


(7) Peripheral neuropathy


Status:  Chronic


ICD Codes:  G62.9 - Polyneuropathy, unspecified





Assessment/Plan


Assessment and Plan


Assess & Plan/Chief Complaint


Assessment:


s/p LBKA 


DM insulin dependence


HTN


HLP


CRI


Iron def anemia from acute blood loss


TERRI untreated





Plan:


Monitor pain


IRF protocol


Monitor hgb


Iron infusion











NICOLE VILA DO                Apr 21, 2021 09:21

## 2021-04-21 NOTE — PHYSICAL THERAPY EVALUATION
PT Evaluation-General


Medical Diagnosis


Admission Date


2021 at 08:35


Medical Diagnosis:  BKA


Onset Date:  2021





Therapy Diagnosis


Therapy Diagnosis:  weakness





Height/Weight


Height (Feet):  6


Height (Inches):  0.00


Weight (Pounds):  422


Weight (Ounces):  6.4





Precautions


Precautions/Isolations:  Fall Prevention, Standard Precautions





Weight Bear Status


Right Lower Extremity:  Right


Full Weight Bearing


Left Lower Extremity:  Left


Non Weight Bearing (BKA)





Referral


Physician:  Holly


Reason for Referral:  Evaluation/Treatment





Medical History


Pertinent Medical History:  DM, HTN, Neuropathy


Additional Medical History


morbid obesity


Current History


Mr. Gary is a 40yoWM clinic pt of [facility] with a hx of uncontrolled DM, HTN, 

obesity, peripheral neuropathy, and stent placement who remained in [facility] rehab 

for a severely infected diabetic foot ulcer which ultimately required L BKA on 

21 (transferred to [facility]) due to extensive osteomyelitis and muscle 

necrosis.  Pt has returned to ARU for continued medical managment as well as 

skilled therapy services.


Reviewed History:  Yes





Social History


Home:  Single Level


Current Living Status:  Spouse


Entry Into Home:  Ramp


Family is working to make the home handicap accessible.





Prior


Prior Level of Function


SCALE: Activities may be completed with or without assistive devices.





6-Indepedent-patient completes the activity by him/herself with no assistance 

from a helper.


5-Set-up or Clean-up Assistance-helper sets up or cleans up; patient completes 

activity. [place] assists only prior to or  


    following the activity.


4-Supervision or Touching Assistance-helper provides verbal cues and/or 

touching/steadying and/or contact guard assistance as patient completes acti

vity. Assistance may be provided   


    throughout the activity or intermittently.


3-Partial/Moderate Assistance-helper does LESS THAN HALF the effort. [place] 

lifts, holds or supports trunk or limbs, but provides less than half the effort.


2-Substantial/Maximal Assistance-helper does MORE THAN HALF the effort. [place] 

lifts or holds trunk or limbs and provides more than half the effort.


3-Psfvamxyg-xpmbaw does ALL the effort. Patient does none of the effort to 

complete the activity. Or, the assistance of 2 or more helpers is required for 

the patient to complete the  


    activity.


If activity was not attempted, code reason:


7-Patient Refused.


9-Not Applicable-not attempted and the patient did not perform the activity 

before the current illness, exacerbation or injury.


10-Not Attempted due to Environmental Limitations-(lack of equipment, weather 

restraints, etc.).


88-Not Attempted due to Medical Conditions or Safety Concerns.


Bed Mobility:  6


Transfers (B,C,W/C):  6


Gait:  6


Wheelchair Mobility:  6


Indoor Mobility (Ambulation):  Independent (cane)


Stairs:  Independent


Prior Devices Use:  Manual wheelchair


Pt used a cane in his home and a wheelchair for longer distances.





PT Evaluation-Current


Subjective


Pt agreeable to PT.  Reports he has not been out of bed since his surgery.  

Reports he is anxious to get back to moving.





Objective


Patient Orientation:  Person, Place, Time, Situation





ROM/Strength


ROM Lower Extremities


WFL


Strength Lower Extremities


Right LE strength grossly 3/5 throughout; left LE strength is grossly 3/5 for 

residual limb





Integumentary/Posture


Integumentary


Refer to nursing notes for full assessment.


Bowel Incontinence:  Yes


Bladder Incontinence:  No


Posture


symmetrical and upright.





Neuromuscular


(Tone, Coordination, Reflexes)


intact and without noted deficit





Sensory


Vision:  Wears Glasses


Hearing:  Functional


Hand Dominance:  Right


Sensation Right Lower Extremit:  Impaired


Sensation Left Lower Extremity:  Impaired


Sensation Lower Extremities


Peripheral neuropathy





Transfers


Roll Left & Right (QC):  4


Sit to Lying (QC):  3


Lying to Sitting/Side of Bed(Q:  3


Sit to Stand (QC):  1 (pt unable to come to a full stand)


Chair/Bed-to-Chair Xfer(QC):  2 (slide board transfer; pt does most of the work,

but assist to place and remove board, 2 people necessary for safety.  )


Toilet Transfer (QC):  88


Car Transfer (QC):  88





Gait


Does the Patient Walk?:  No and Walking Goal NOT indicated


Mode of Locomotion:  Wheelchair


Anticipated Mode of Locomotion:  Wheelchair


Walk 10 feet (QC):  88


Walk 50 ft with 2 Turns(QC):  88


Walk 150 ft (QC):  88


Walking 10ft/uneven surface-QC:  88


Comments/Gait Description


left BKA; unsafe to attempt hopping due to weight and functional weakness right 

LE





Wheelchair Training


Does the Pt Use a Wheelchair?:  Yes


Wheel 50 ft with 2 turns (QC):  4


Wheel 150 ft (QC):  4


Type of Wheelchair:  Manual





Stairs


1 Step (curb) (QC):  88


4 Steps (QC):  88


12 Steps (QC):  88





Balance


Sitting Static:  Normal


Sitting Dynamic:  Normal


Picking up an Object (QC):  88





Treatment


Co treat with OT partial treatment.  Skill of 2 clinicians indicated as pt is a 

new BKA and has not had the opportunity to be out of bed since the surgery.  Pt 

requires heavy cues and assist with all functional mobility to safely and 

effectively complete tasks.  Pt completed several slide board transfers, partial

sit to stand activity, bed mobility and showering with ADL tasks.  OT addressed 

use and placement of UE for all tasks as PT addressed gross motor tasks of the 

LE as well as provided cues and assist to complete all transfers.  PT also 

addressed functional LE strength, functional core strength/balance with transfer

as well as transition partial sit to stand.





Assessment/Needs


Pt is post lengthy hospital stay prior to this transfer due to non healing ulcer

left LE that has led to a left BKA.  He presents with gross LE weakness, and 

balance as well as diminished functional actviity tolerance that impairs bed 

mobility, transfers, wc mobility and ultimately ability to generally mobilize 

self, rendering him unable to return home until he is better able to move with 

decreased assist.  He requires assist with all bed mobility and transfers and is

unable to come to a full stand.  He is currently utilizing a slide board for 

transfers.  He will benefit from skilled PT services to promote LE strength, 

funcitonal balance, functional activity tolerance to allow him to return home at

a wc level mod indep.


Rehab Potential:  Good





PT Short Term Goals


Short Term Goals


Time Frame:  2021


Roll Left & Right:  6


Sit to lyin


Lying to sitting on side of be:  6


Sit to stand:  3


Chair/bed-to-chair transfer:  3


Wheel 50ft w/2 turns:  6


Wheel 150 feet:  6





PT Long Term Goals


Long Term Goals


PT Long Term Goals Time Frame:  May 19, 2021


Roll Left & Right (QC):  6


Sit to Lying (QC):  6


Lying-Sitting on Side/Bed(QC):  6


Sit to Stand (QC):  6


Chair/Bed-to-Chair Xfer(QC):  6 (SPT or slideboard, whichever is the safest 

method)


Toilet Transfer (QC):  6 (SPT or slide board)


Car Transfer (QC):  4


Does the Patient Walk:  No and Walking Goal NOT indicated


Walk 10 feet (QC):  88


Walk 50ft with 2 Turns (QC):  88


Walk 150 ft (QC):  88


Walking 10ft on Uneven Surface:  88


1 Step (curb) (QC):  88


4 Steps (QC):  88


12 Steps (QC):  88


Picking up an Object (QC):  88


Does the Pt use WC or Scooter?:  Yes


Wheel 50 feet with 2 turns (QC:  6


Type:  Manual


Wheel 150 feet:  6


Type:  Manual


Walking is not a goal until prosthesis is obtained once the stump is healed.





PT Plan


Problem List


Problem List:  Activity Tolerance, Functional Strength, Safety, Balance, 

Transfer, Bed Mobility, Other





Treatment/Plan


Treatment Plan:  Continue Plan of Care


Treatment Plan:  Bed Mobility, Education, Functional Activity Aung, Functional 

Strength, Group Therapy, Gait, Safety, Therapeutic Exercise, Transfers


Treatment Duration:  May 19, 2021


Frequency:  At least 5 of 7 days/Wk (IRF)


Estimated Hrs Per Day:  1.5 hours per day


Patient and/or Family Agrees t:  Yes





Safety Risks/Education


Patient Education:  Transfer Techniques, Safety Issues


Teaching Recipient:  Patient


Teaching Methods:  Discussion


Response to Teaching:  Reinforcement Needed





Time/GCodes


Time In:  845


Time Out:  945 (925-1030)


Total Billed Treatment Time:  95


Total Billed Treatment


visit x 2


EVM 15


FA 80











GUDELIA MARTINEZ PT             2021 10:48

## 2021-04-21 NOTE — ST COGNITIVE LINGUISTIC EVAL
Speech Evaluation-General


Medical Diagnosis


BKA


Onset Date:  Apr 21, 2021





Therapy Diagnosis


Therapy Diagnosis:  Cognitive-communication





Precautions


Precautions:  Fall


Precautions/Isolations:  Fall Prevention, Standard Precautions





Referral


Referring Physician:  Dr. Barrera





Medical History


Pertinent Medical History:  DM, HTN, Neuropathy


Reviewed History:  Yes





Social History


Home:  Single Level


Current Living Status:  Spouse





Speech PLF-Current Status


Prior Level of Function





Patient lives at home with his wife. Due to patient's medical status, he


has required assistance with his daily needs for some time.





Subjective


Patient was pleasant and cooperative with the cognitive assessment.





Language Eval: Auditory


Comprehends Simple Yes/No Ques:  Functional


Indent/Objects Multiple Fields:  Functional


Ident/Pics in Multiple Fields:  Functional


Follows 1-Step Commands:  Functional


Follows Complex Directions:  Functional


Follows General Conversations:  Functional





Language Eval: Verbal Language


Completes Spontaneous Greeting:  Functional


Produces Auto, Serial Info:  Functional


Imitates Simple Words/Phrases:  Functional


Word Finding:  Functional


Requests Basic Needs:  Functional


States Basic Personal Info:  Functional


Expresses Complex Ideas:  Functional





Language Evaluation: Reading


Comprehends Single Nouns:  Functional


Follows Simple Written Direct:  Functional


Comprehends Multiple Sentences:  Functional





Objective Cognitive Domain


Attention:  WNL


Memory:  WNL


Problem Solving:  Functional


Executive Functions:  WNL


Visuospatial Skills:  WNL


Composite Severity Rating:  WNL


Clock Drawing Severity Rating:  WNL





Objective


Formal/Standardized Tests


[facility] Mental Status Examination ([facility])


Results


30/30


Oral Motor/Speech Production


Within normal limits


Impression


Pt. is a pleasant 41 y/o male who was admitted to the ARU s/p BKA. Pt agreed to 

ST evaluation. Pt was administered the SLUMS to evaluate cognitive functioning. 

Pt scored within normal limits and further ST services are not warranted.





Speech Patient Assess


Expression of Ideas/Wants:  Expression (4)


Understanding Verbal Content:  Understands (4)


Brief Interview-Mental Status:  Yes


Repetition of Three Words:  Three (3)


Temporal Orientation: Year:  Correct (3)


Temporal Orientation: Month:  Accurate within 5 days(2)


Temporal Orientation: Day:  Correct (1)


Recall : Wear to say "Sock":  Yes, no cue required (2)


Recall : Color:  Yes, no cue required (2)


Recall : Bed:  Yes, no cue required (2)


Memory/Recall Ability:  Current season, Location of own room, Staff names and 

faces, That he or she is in a hsp/hsp unit





Speech-Plan


Patient/Family Goals


Patient/Family Goals:  


Pt plans to return to his home where he lives with his wife.





Treatment Plan


Speech Therapy Treatment Plan:  Discontinue ST


Treatment Duration:  Apr 21, 2021


Frequency:  1 time per week


Estimated Hrs Per Day:  .25 hour per day


Rehab Potential:  Fair


Barriers to Learning:  


Medical status


Pt/Family Agrees to Plan:  Yes





Safety Risks/Education


Teaching Recipient:  Patient


Teaching Methods:  Discussion


Response to Teaching:  Verbalize Understanding


Education Topics Provided:  


Safety and communication





Time


Speech Therapy Time In:  10:45


Speech Therapy Time Out:  11:00


Total Billed Time:  15


Billed Treatment Time


1, SPSNDCOMP


RONNI Dillard            Apr 21, 2021 10:15

## 2021-04-21 NOTE — DISCHARGE SUMMARY
Diagnosis/Chief Complaint


Date of Admission


Apr 14, 2021 at 09:11


Date of Discharge





Discharge Date:  Apr 21, 2021


Discharge Diagnosis


s/p Left BKA


Obesity


DM OOC


HTN


HLD


Anemia


Iron deficiency





Discharge Summary


Discharge Physical Examination


Allergies:  


Coded Allergies:  


     levofloxacin (Verified  Allergy, Mild, 4/14/15)


Vitals & I&Os





Vital Signs








  Date Time  Temp Pulse Resp B/P (MAP) Pulse Ox O2 Delivery O2 Flow Rate FiO2


 


4/21/21 08:03 35.2 82 17 131/72 (91) 100 Room Air  








General Appearance:  Alert, Oriented X3, Cooperative


Respiratory:  Clear to Auscultation


Cardiovascular:  Regular Rate


Psych/Mental Status:  Mental Status NL





Hospital Course


Was the Problem List Reviewed?:  Yes


Hospital course:


Pt had a lengthy hospital course for 8 days following a left below the knee 

amputation after he failed two debridements and the foot was no longer viable. 

Pt did receive three units of blood, Saturday, Sunday and Monday, prior to 

transfer down to inpatient rehab, he is still maintained on Zosyn and will 

monitor Pt closely along with Dr. Jiménez.


Labs (last 24 hrs)


Laboratory Tests


4/14/21 21:05: Glucometer 225H


4/15/21 05:15: 


White Blood Count 7.0, Red Blood Count 3.19L, Hemoglobin 8.4L, Hematocrit 28L, 

Mean Corpuscular Volume 88, Mean Corpuscular Hemoglobin 26, Mean Corpuscular 

Hemoglobin Concent 30L, Red Cell Distribution Width 18.3H, Platelet Count 255, 

Mean Platelet Volume 9.2, Immature Granulocyte % (Auto) 0, Neutrophils (%) 

(Auto) 61, Lymphocytes (%) (Auto) 28, Monocytes (%) (Auto) 6, Eosinophils (%) 

(Auto) 5, Basophils (%) (Auto) 0, Neutrophils # (Auto) 4.3, Lymphocytes # (Auto)

2.0, Monocytes # (Auto) 0.4, Eosinophils # (Auto) 0.3, Basophils # (Auto) 0.0, 

Immature Granulocyte # (Auto) 0.0, Sodium Level 138, Potassium Level 4.4, 

Chloride Level 104, Carbon Dioxide Level 22, Anion Gap 12, Blood Urea Nitrogen 

18, Creatinine 1.16, Estimat Glomerular Filtration Rate > 60, BUN/Creatinine 

Ratio 16, Glucose Level 190H, Calcium Level 8.4L, Corrected Calcium 9.0, Total 

Bilirubin 0.3, Aspartate Amino Transf (AST/SGOT) 15, Alanine Aminotransferase 

(ALT/SGPT) 13, Alkaline Phosphatase 72, Total Protein 6.7, Albumin 3.3


4/15/21 10:46: Glucometer 191H


4/15/21 16:14: Glucometer 164H


4/15/21 20:24: Glucometer 184H


4/16/21 05:39: 


White Blood Count 6.8, Red Blood Count 3.11L, Hemoglobin 8.1L, Hematocrit 28L, 

Mean Corpuscular Volume 88, Mean Corpuscular Hemoglobin 26, Mean Corpuscular 

Hemoglobin Concent 30L, Red Cell Distribution Width 18.4H, Platelet Count 256, 

Mean Platelet Volume 9.3, Immature Granulocyte % (Auto) 0, Neutrophils (%) 

(Auto) 56, Lymphocytes (%) (Auto) 29, Monocytes (%) (Auto) 7, Eosinophils (%) 

(Auto) 7, Basophils (%) (Auto) 0, Neutrophils # (Auto) 3.8, Lymphocytes # (Auto)

2.0, Monocytes # (Auto) 0.5, Eosinophils # (Auto) 0.5H, Basophils # (Auto) 0.0, 

Immature Granulocyte # (Auto) 0.0, Sodium Level 141, Potassium Level 4.5, 

Chloride Level 105, Carbon Dioxide Level 23, Anion Gap 13, Blood Urea Nitrogen 

16, Creatinine 1.00, Estimat Glomerular Filtration Rate > 60, BUN/Creatinine 

Ratio 16, Glucose Level 166H, Calcium Level 8.6, Corrected Calcium 9.1, Total 

Bilirubin 0.3, Aspartate Amino Transf (AST/SGOT) 9, Alanine Aminotransferase 

(ALT/SGPT) 12, Alkaline Phosphatase 72, Total Protein 7.0, Albumin 3.4


4/16/21 11:03: Glucometer 153H


4/16/21 15:59: Glucometer 157H


4/16/21 20:55: Glucometer 181H


4/17/21 05:45: 


White Blood Count 6.3, Red Blood Count 2.86L, Hemoglobin 7.5L, Hematocrit 25L, 

Mean Corpuscular Volume 89, Mean Corpuscular Hemoglobin 26, Mean Corpuscular 

Hemoglobin Concent 30L, Red Cell Distribution Width 18.3H, Platelet Count 248, 

Mean Platelet Volume 9.4, Immature Granulocyte % (Auto) 1, Neutrophils (%) 

(Auto) 53, Lymphocytes (%) (Auto) 34, Monocytes (%) (Auto) 7, Eosinophils (%) 

(Auto) 5, Basophils (%) (Auto) 1, Neutrophils # (Auto) 3.3, Lymphocytes # (Auto)

2.1, Monocytes # (Auto) 0.4, Eosinophils # (Auto) 0.3, Basophils # (Auto) 0.0, 

Immature Granulocyte # (Auto) 0.0, Sodium Level 139, Potassium Level 4.2, 

Chloride Level 104, Carbon Dioxide Level 23, Anion Gap 12, Blood Urea Nitrogen 

15, Creatinine 0.96, Estimat Glomerular Filtration Rate > 60, BUN/Creatinine 

Ratio 16, Glucose Level 203H, Calcium Level 8.7, Corrected Calcium 9.3, Total 

Bilirubin 0.3, Aspartate Amino Transf (AST/SGOT) 7, Alanine Aminotransferase 

(ALT/SGPT) 9, Alkaline Phosphatase 63, Total Protein 6.8, Albumin 3.2


4/17/21 08:00: Iron Level 17L


4/17/21 10:16: Glucometer 185H


4/17/21 16:07: Glucometer 189H


4/17/21 20:35: Glucometer 181H


4/18/21 05:15: 


White Blood Count 7.1, Red Blood Count 2.73L, Hemoglobin 7.1L, Hematocrit 24L, 

Mean Corpuscular Volume 88, Mean Corpuscular Hemoglobin 26, Mean Corpuscular 

Hemoglobin Concent 30L, Red Cell Distribution Width 18.1H, Platelet Count 261, 

Mean Platelet Volume 9.3, Immature Granulocyte % (Auto) 0, Neutrophils (%) 

(Auto) 52, Lymphocytes (%) (Auto) 35, Monocytes (%) (Auto) 7, Eosinophils (%) 

(Auto) 5, Basophils (%) (Auto) 1, Neutrophils # (Auto) 3.6, Lymphocytes # (Auto)

2.5, Monocytes # (Auto) 0.5, Eosinophils # (Auto) 0.4H, Basophils # (Auto) 0.1, 

Immature Granulocyte # (Auto) 0.0, Sodium Level 138, Potassium Level 4.2, 

Chloride Level 104, Carbon Dioxide Level 24, Anion Gap 10, Blood Urea Nitrogen 

18, Creatinine 0.86, Estimat Glomerular Filtration Rate > 60, BUN/Creatinine 

Ratio 21, Glucose Level 111H, Calcium Level 8.8, Corrected Calcium 9.5, Total 

Bilirubin 0.6, Aspartate Amino Transf (AST/SGOT) 12, Alanine Aminotransferase 

(ALT/SGPT) 10, Alkaline Phosphatase 54, Total Protein 6.7, Albumin 3.1L


4/18/21 11:50: Glucometer 154H


4/18/21 15:44: Glucometer 187H


4/18/21 20:11: Glucometer 228H


4/19/21 05:50: 


White Blood Count 6.8, Red Blood Count 2.81L, Hemoglobin 7.5L, Hematocrit 25L, 

Mean Corpuscular Volume 88, Mean Corpuscular Hemoglobin 27, Mean Corpuscular 

Hemoglobin Concent 30L, Red Cell Distribution Width 17.7H, Platelet Count 301, 

Mean Platelet Volume 9.2, Immature Granulocyte % (Auto) 1, Neutrophils (%) 

(Auto) 55, Lymphocytes (%) (Auto) 32, Monocytes (%) (Auto) 6, Eosinophils (%) 

(Auto) 6, Basophils (%) (Auto) 0, Neutrophils # (Auto) 3.7, Lymphocytes # (Auto)

2.2, Monocytes # (Auto) 0.4, Eosinophils # (Auto) 0.4H, Basophils # (Auto) 0.0, 

Immature Granulocyte # (Auto) 0.0, Sodium Level 138, Potassium Level 4.7, 

Chloride Level 103, Carbon Dioxide Level 24, Anion Gap 11, Blood Urea Nitrogen 1

7, Creatinine 0.91, Estimat Glomerular Filtration Rate > 60, BUN/Creatinine 

Ratio 19, Glucose Level 139H, Calcium Level 9.0, Corrected Calcium 9.6, Total 

Bilirubin 0.4, Aspartate Amino Transf (AST/SGOT) 15, Alanine Aminotransferase 

(ALT/SGPT) 10, Alkaline Phosphatase 55, Total Protein 6.8, Albumin 3.2


4/19/21 11:16: Glucometer 134H


4/19/21 13:40: Lab Scanned Report Transfusion Reaction Form


4/19/21 15:43: Glucometer 139H


4/19/21 20:17: Glucometer 208H


4/20/21 03:30: 


White Blood Count 6.4, Red Blood Count 2.82L, Hemoglobin 7.5L, Hematocrit 25L, 

Mean Corpuscular Volume 88, Mean Corpuscular Hemoglobin 27, Mean Corpuscular Hem

oglobin Concent 30L, Red Cell Distribution Width 17.3H, Platelet Count 294, Mean

Platelet Volume 9.1, Immature Granulocyte % (Auto) 1, Neutrophils (%) (Auto) 56,

Lymphocytes (%) (Auto) 33, Monocytes (%) (Auto) 6, Eosinophils (%) (Auto) 5, 

Basophils (%) (Auto) 0, Neutrophils # (Auto) 3.6, Lymphocytes # (Auto) 2.1, 

Monocytes # (Auto) 0.4, Eosinophils # (Auto) 0.3, Basophils # (Auto) 0.0, 

Immature Granulocyte # (Auto) 0.1, Sodium Level 139, Potassium Level 4.6, 

Chloride Level 104, Carbon Dioxide Level 23, Anion Gap 12, Blood Urea Nitrogen 

17, Creatinine 0.89, Estimat Glomerular Filtration Rate > 60, BUN/Creatinine 

Ratio 19, Glucose Level 210H, Calcium Level 8.8, Corrected Calcium 9.5, Total 

Bilirubin 0.3, Aspartate Amino Transf (AST/SGOT) 15, Alanine Aminotransferase 

(ALT/SGPT) 12, Alkaline Phosphatase 60, Total Protein 6.5, Albumin 3.1L


4/20/21 05:41: Glucometer 186H


4/20/21 11:16: Glucometer 151H


4/20/21 13:09: Lab Scanned Report Transfusion Reaction Form


4/20/21 16:26: Glucometer 123H


4/20/21 20:14: Glucometer 151H


4/21/21 05:33: Glucometer 95


4/21/21 06:23: 


White Blood Count 6.9, Red Blood Count 2.79L, Hemoglobin 7.5L, Hematocrit 25L, 

Mean Corpuscular Volume 88, Mean Corpuscular Hemoglobin 27, Mean Corpuscular 

Hemoglobin Concent 31L, Red Cell Distribution Width 17.7H, Platelet Count 299, 

Mean Platelet Volume 9.3, Immature Granulocyte % (Auto) 1, Neutrophils (%) 

(Auto) 53, Lymphocytes (%) (Auto) 36, Monocytes (%) (Auto) 6, Eosinophils (%) 

(Auto) 5, Basophils (%) (Auto) 0, Neutrophils # (Auto) 3.6, Lymphocytes # (Auto)

2.5, Monocytes # (Auto) 0.4, Eosinophils # (Auto) 0.3, Basophils # (Auto) 0.0, 

Immature Granulocyte # (Auto) 0.1, Sodium Level 138, Potassium Level 4.6, Ch

loride Level 104, Carbon Dioxide Level 24, Anion Gap 10, Blood Urea Nitrogen 17,

Creatinine 0.89, Estimat Glomerular Filtration Rate > 60, BUN/Creatinine Ratio 

19, Glucose Level 108H, Calcium Level 9.1, Corrected Calcium 9.7, Total Roger

irubin 0.3, Aspartate Amino Transf (AST/SGOT) 16, Alanine Aminotransferase 

(ALT/SGPT) 13, Alkaline Phosphatase 58, Total Protein 6.6, Albumin 3.2





Pending Labs


Laboratory Tests


4/14/21 21:05: Glucometer 225


4/15/21 05:15: 


White Blood Count 7.0, Red Blood Count 3.19, Hemoglobin 8.4, Hematocrit 28, Mean

Corpuscular Volume 88, Mean Corpuscular Hemoglobin 26, Mean Corpuscular 

Hemoglobin Concent 30, Red Cell Distribution Width 18.3, Platelet Count 255, 

Mean Platelet Volume 9.2, Immature Granulocyte % (Auto) 0, Neutrophils (%) 

(Auto) 61, Lymphocytes (%) (Auto) 28, Monocytes (%) (Auto) 6, Eosinophils (%) 

(Auto) 5, Basophils (%) (Auto) 0, Neutrophils # (Auto) 4.3, Lymphocytes # (Auto)

2.0, Monocytes # (Auto) 0.4, Eosinophils # (Auto) 0.3, Basophils # (Auto) 0.0, 

Immature Granulocyte # (Auto) 0.0, Sodium Level 138, Potassium Level 4.4, 

Chloride Level 104, Carbon Dioxide Level 22, Anion Gap 12, Blood Urea Nitrogen 

18, Creatinine 1.16, Estimat Glomerular Filtration Rate > 60, BUN/Creatinine 

Ratio 16, Glucose Level 190, Calcium Level 8.4, Corrected Calcium 9.0, Total 

Bilirubin 0.3, Aspartate Amino Transf (AST/SGOT) 15, Alanine Aminotransferase 

(ALT/SGPT) 13, Alkaline Phosphatase 72, Total Protein 6.7, Albumin 3.3


4/15/21 10:46: Glucometer 191


4/15/21 16:14: Glucometer 164


4/15/21 20:24: Glucometer 184


4/16/21 05:39: 


White Blood Count 6.8, Red Blood Count 3.11, Hemoglobin 8.1, Hematocrit 28, Mean

Corpuscular Volume 88, Mean Corpuscular Hemoglobin 26, Mean Corpuscular 

Hemoglobin Concent 30, Red Cell Distribution Width 18.4, Platelet Count 256, 

Mean Platelet Volume 9.3, Immature Granulocyte % (Auto) 0, Neutrophils (%) 

(Auto) 56, Lymphocytes (%) (Auto) 29, Monocytes (%) (Auto) 7, Eosinophils (%) 

(Auto) 7, Basophils (%) (Auto) 0, Neutrophils # (Auto) 3.8, Lymphocytes # (Auto)

2.0, Monocytes # (Auto) 0.5, Eosinophils # (Auto) 0.5, Basophils # (Auto) 0.0, 

Immature Granulocyte # (Auto) 0.0, Sodium Level 141, Potassium Level 4.5, 

Chloride Level 105, Carbon Dioxide Level 23, Anion Gap 13, Blood Urea Nitrogen 

16, Creatinine 1.00, Estimat Glomerular Filtration Rate > 60, BUN/Creatinine 

Ratio 16, Glucose Level 166, Calcium Level 8.6, Corrected Calcium 9.1, Total 

Bilirubin 0.3, Aspartate Amino Transf (AST/SGOT) 9, Alanine Aminotransferase 

(ALT/SGPT) 12, Alkaline Phosphatase 72, Total Protein 7.0, Albumin 3.4


4/16/21 11:03: Glucometer 153


4/16/21 15:59: Glucometer 157


4/16/21 20:55: Glucometer 181


4/17/21 05:45: 


White Blood Count 6.3, Red Blood Count 2.86, Hemoglobin 7.5, Hematocrit 25, Mean

Corpuscular Volume 89, Mean Corpuscular Hemoglobin 26, Mean Corpuscular 

Hemoglobin Concent 30, Red Cell Distribution Width 18.3, Platelet Count 248, 

Mean Platelet Volume 9.4, Immature Granulocyte % (Auto) 1, Neutrophils (%) 

(Auto) 53, Lymphocytes (%) (Auto) 34, Monocytes (%) (Auto) 7, Eosinophils (%) 

(Auto) 5, Basophils (%) (Auto) 1, Neutrophils # (Auto) 3.3, Lymphocytes # (Auto)

2.1, Monocytes # (Auto) 0.4, Eosinophils # (Auto) 0.3, Basophils # (Auto) 0.0, 

Immature Granulocyte # (Auto) 0.0, Sodium Level 139, Potassium Level 4.2, 

Chloride Level 104, Carbon Dioxide Level 23, Anion Gap 12, Blood Urea Nitrogen 

15, Creatinine 0.96, Estimat Glomerular Filtration Rate > 60, BUN/Creatinine 

Ratio 16, Glucose Level 203, Calcium Level 8.7, Corrected Calcium 9.3, Total 

Bilirubin 0.3, Aspartate Amino Transf (AST/SGOT) 7, Alanine Aminotransferase 

(ALT/SGPT) 9, Alkaline Phosphatase 63, Total Protein 6.8, Albumin 3.2


4/17/21 08:00: Iron Level 17


4/17/21 10:16: Glucometer 185


4/17/21 16:07: Glucometer 189


4/17/21 20:35: Glucometer 181


4/18/21 05:15: 


White Blood Count 7.1, Red Blood Count 2.73, Hemoglobin 7.1, Hematocrit 24, Mean

Corpuscular Volume 88, Mean Corpuscular Hemoglobin 26, Mean Corpuscular 

Hemoglobin Concent 30, Red Cell Distribution Width 18.1, Platelet Count 261, 

Mean Platelet Volume 9.3, Immature Granulocyte % (Auto) 0, Neutrophils (%) 

(Auto) 52, Lymphocytes (%) (Auto) 35, Monocytes (%) (Auto) 7, Eosinophils (%) 

(Auto) 5, Basophils (%) (Auto) 1, Neutrophils # (Auto) 3.6, Lymphocytes # (Auto)

2.5, Monocytes # (Auto) 0.5, Eosinophils # (Auto) 0.4, Basophils # (Auto) 0.1, 

Immature Granulocyte # (Auto) 0.0, Sodium Level 138, Potassium Level 4.2, 

Chloride Level 104, Carbon Dioxide Level 24, Anion Gap 10, Blood Urea Nitrogen 

18, Creatinine 0.86, Estimat Glomerular Filtration Rate > 60, BUN/Creatinine 

Ratio 21, Glucose Level 111, Calcium Level 8.8, Corrected Calcium 9.5, Total 

Bilirubin 0.6, Aspartate Amino Transf (AST/SGOT) 12, Alanine Aminotransferase 

(ALT/SGPT) 10, Alkaline Phosphatase 54, Total Protein 6.7, Albumin 3.1


4/18/21 11:50: Glucometer 154


4/18/21 15:44: Glucometer 187


4/18/21 20:11: Glucometer 228


4/19/21 05:50: 


White Blood Count 6.8, Red Blood Count 2.81, Hemoglobin 7.5, Hematocrit 25, Mean

Corpuscular Volume 88, Mean Corpuscular Hemoglobin 27, Mean Corpuscular Hemogl

obin Concent 30, Red Cell Distribution Width 17.7, Platelet Count 301, Mean 

Platelet Volume 9.2, Immature Granulocyte % (Auto) 1, Neutrophils (%) (Auto) 55,

Lymphocytes (%) (Auto) 32, Monocytes (%) (Auto) 6, Eosinophils (%) (Auto) 6, 

Basophils (%) (Auto) 0, Neutrophils # (Auto) 3.7, Lymphocytes # (Auto) 2.2, 

Monocytes # (Auto) 0.4, Eosinophils # (Auto) 0.4, Basophils # (Auto) 0.0, 

Immature Granulocyte # (Auto) 0.0, Sodium Level 138, Potassium Level 4.7, 

Chloride Level 103, Carbon Dioxide Level 24, Anion Gap 11, Blood Urea Nitrogen 

17, Creatinine 0.91, Estimat Glomerular Filtration Rate > 60, BUN/Creatinine 

Ratio 19, Glucose Level 139, Calcium Level 9.0, Corrected Calcium 9.6, Total 

Bilirubin 0.4, Aspartate Amino Transf (AST/SGOT) 15, Alanine Aminotransferase 

(ALT/SGPT) 10, Alkaline Phosphatase 55, Total Protein 6.8, Albumin 3.2


4/19/21 11:16: Glucometer 134


4/19/21 13:40: Lab Scanned Report Transfusion Reaction Form


4/19/21 15:43: Glucometer 139


4/19/21 20:17: Glucometer 208


4/20/21 03:30: 


White Blood Count 6.4, Red Blood Count 2.82, Hemoglobin 7.5, Hematocrit 25, Mean

Corpuscular Volume 88, Mean Corpuscular Hemoglobin 27, Mean Corpuscular 

Hemoglobin Concent 30, Red Cell Distribution Width 17.3, Platelet Count 294, 

Mean Platelet Volume 9.1, Immature Granulocyte % (Auto) 1, Neutrophils (%) 

(Auto) 56, Lymphocytes (%) (Auto) 33, Monocytes (%) (Auto) 6, Eosinophils (%) 

(Auto) 5, Basophils (%) (Auto) 0, Neutrophils # (Auto) 3.6, Lymphocytes # (Auto)

2.1, Monocytes # (Auto) 0.4, Eosinophils # (Auto) 0.3, Basophils # (Auto) 0.0, 

Immature Granulocyte # (Auto) 0.1, Sodium Level 139, Potassium Level 4.6, 

Chloride Level 104, Carbon Dioxide Level 23, Anion Gap 12, Blood Urea Nitrogen 

17, Creatinine 0.89, Estimat Glomerular Filtration Rate > 60, BUN/Creatinine 

Ratio 19, Glucose Level 210, Calcium Level 8.8, Corrected Calcium 9.5, Total 

Bilirubin 0.3, Aspartate Amino Transf (AST/SGOT) 15, Alanine Aminotransferase 

(ALT/SGPT) 12, Alkaline Phosphatase 60, Total Protein 6.5, Albumin 3.1


4/20/21 05:41: Glucometer 186


4/20/21 11:16: Glucometer 151


4/20/21 13:09: Lab Scanned Report Transfusion Reaction Form


4/20/21 16:26: Glucometer 123


4/20/21 20:14: Glucometer 151


4/21/21 05:33: Glucometer 95


4/21/21 06:23: 


White Blood Count 6.9, Red Blood Count 2.79, Hemoglobin 7.5, Hematocrit 25, Mean

Corpuscular Volume 88, Mean Corpuscular Hemoglobin 27, Mean Corpuscular 

Hemoglobin Concent 31, Red Cell Distribution Width 17.7, Platelet Count 299, 

Mean Platelet Volume 9.3, Immature Granulocyte % (Auto) 1, Neutrophils (%) 

(Auto) 53, Lymphocytes (%) (Auto) 36, Monocytes (%) (Auto) 6, Eosinophils (%) 

(Auto) 5, Basophils (%) (Auto) 0, Neutrophils # (Auto) 3.6, Lymphocytes # (Auto)

2.5, Monocytes # (Auto) 0.4, Eosinophils # (Auto) 0.3, Basophils # (Auto) 0.0, 

Immature Granulocyte # (Auto) 0.1, Sodium Level 138, Potassium Level 4.6, 

Chloride Level 104, Carbon Dioxide Level 24, Anion Gap 10, Blood Urea Nitrogen 

17, Creatinine 0.89, Estimat Glomerular Filtration Rate > 60, BUN/Creatinine 

Ratio 19, Glucose Level 108, Calcium Level 9.1, Corrected Calcium 9.7, Total 

Bilirubin 0.3, Aspartate Amino Transf (AST/SGOT) 16, Alanine Aminotransferase 

(ALT/SGPT) 13, Alkaline Phosphatase 58, Total Protein 6.6, Albumin 3.2








Discharge


Home Medications:





Active Scripts


Active


Reported


Metoprolol Succinate 100 Mg Tab.er.24h 100 Mg PO DAILY


Tums Ultra (Calcium Carbonate) 400 Mg Tab.chew 400-800 Mg PO PRN PRN


Atorvastatin Calcium 80 Mg Tablet 80 Mg PO DAILY


     LAST FILL DATE 01- #30


Prasugrel HCl 10 Mg Tablet 10 Mg PO DAILY


Aspirin EC (Aspirin) 81 Mg Tablet.dr 81 Mg PO DAILY


Lisinopril-Hctz 20-25 mg Tab (Lisinopril/Hydrochlorothiazide) 1 Each Tablet 1 

Each PO DAILY


     LAST FILLED 01- #60/60 DAY SUPPLY


Glyburide 2.5 Mg Tablet 2.5 Mg PO BID


Metformin HCl ER (Metformin HCl) 500 Mg Tab.er.24 500 Mg PO BID


Levemir Flextouch (Insulin Detemir) 100 Unit/1 Ml Insuln.pen 45 Unit SQ BID


Novolog Flexpen (Insulin Aspart) 300 Units/3 Ml Solution 30-40 Units SQ AC





Instructions to patient/family


Please see electronic discharge instructions given to patient.











NICOLE VILA DO                Apr 21, 2021 06:32

## 2021-04-21 NOTE — OCCUPATIONAL THERAPY EVAL
OT Evaluation-General/PLF


Medical Diagnosis


Admission Date


2021 at 08:35


Medical Diagnosis:  BKA


Onset Date:  2021





Therapy Diagnosis


Therapy Diagnosis:  Weakness, Decreased ADL skills





Height/Weight


Height (Feet):  6


Height (Inches):  0.00


Weight (Pounds):  422


Weight (Ounces):  6.4





Precautions


Precautions/Isolations:  Fall Prevention, Standard Precautions





Weight Bear Status


Location Restriction:  LT FOOT





Referral


Physician:  Holly


Referral Reason:  Activity Tolerance, Self Care, Evaluation/Treatment, 

Strengthening/ROM





Medical History


Pertinent Medical History:  DM, HTN, Neuropathy


Additional Medical History


Ketoacidosis, coma, bilateral "bad" knees from previous injuries.


Current History


Pt. had wound on left foot that was debrided several times.  Wound would not 

heel.  Pt. underwent BKA.


Reviewed History:  Yes





Social History


Home:  Single Level


Current Living Status:  Spouse


Entry Into Home:  [facility]





ADL-Prior Level of Function


SCALE: Activities may be completed with or without assistive devices.





6-Indepedent-patient completes the activity by him/herself with no assistance 

from a helper.


5-Set-up or Clean-up Assistance-helper sets up or cleans up; patient completes 

activity. [place] assists only prior to or  


    following the activity.


4-Supervision or Touching Assistance-helper provides verbal cues and/or 

touching/steadying and/or contact guard assistance as patient completes 

activity. Assistance may be provided   


    throughout the activity or intermittently.


3-Partial/Moderate Assistance-helper does LESS THAN HALF the effort. [place] 

lifts, holds or supports trunk or limbs, but provides less than half the effort.


2-Substantial/Maximal Assistance-helper does MORE THAN HALF the effort. [place] 

lifts or holds trunk or limbs and provides more than half the effort.


1-Ycnwylltu-wcedyl does ALL the effort. Patient does none of the effort to 

complete the activity. Or, the assistance of 2 or more helpers is required for 

the patient to complete the  


    activity.


If activity was not attempted, code reason:


7-Patient Refused.


9-Not Applicable-not attempted and the patient did not perform the activity bef

ore the current illness, exacerbation or injury.


10-Not Attempted due to Environmental Limitations-(lack of equipment, weather r

estraints, etc.).


88-Not Attempted due to Medical Conditions or Safety Concerns.


ADL PLOF Comments


Pt. was independent with daily skills.  He used a cane to ambulate.  Pt. is a 

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


Self Care:  Independent


Functional Cognition:  Independent


DME/Equipment:  Tub/Shower


DME/Equipment Comments


Pt. has wheelchair without removeable arms.  He does not have a walker, but does

have a cane.  He currently has a tub/shower, but states that his family is 

remodeling his bathroom to make a large walk in shower.


Occupation:  


Drive Self:  Yes





OT Current Status


Subjective


Pt. reports 8/10 pain in left LE.  Pt. request pain medication.  Nursing getting

this.





Mental Status/Objective


Patient Orientation:  Person, Place, Time, Situation


Attachments:  IV





Current


Hand Dominance:  Right


Upper Extremity ROM


WFL


Upper Extremity Strength


WFL





ADL-Treatment


Eating (QC):  6


Oral Hygiene (QC):  5 (per pt.)


Shower/Bathe Self (QC):  3 (Min assist to wash rear primo area after shower, to 

wash thoroughly.)


Upper Body Dressing (QC):  88


Lower Body Dressing (QC):  88


On/Off Footwear (QC):  1 (Dependent due to pain.)


Toileting Hygiene (QC):  2 (Pt. incontinent of bowel in bed after transfer back.

 Required max assist to cleanse thoroughly.)





Other Treatments


Pt. seen this date for co-treatment with PT/OT due to need of skilled assistance

x 2 for poor endurance, decreased mobility, and decreased ADL skills.  Pt. also 

has low hemoglobin at this time as well.  PT focused on transfer training and 

mobility while OT assessed ADL skills.  Pt. attempts to  parallel bars 

from wheelchair level on right foot, but does not have the strength in right 

foot or bilateral UE.  Attempts multiple times.  Pt. is able to self propel 

wheelchair and does agree to shower.  OT wraps left residual limb in wrap to 

keep dry.  Pt. requires max x 2 to utilize slide board into shower.  He is able 

to shower, but has great difficulty getting back out.  Utilized slide board 

again, and this time required max x 3 people.  Pt. taken to bed and required max

x 3-4 for slide board transfer back to bed.  Due to low hemoglobin and fatigue 

from shower, pt. required increased assistance overall.  All needs met in bed.





Education


OT Patient Education:  Correct positioning, Modified ADL techniques, Progress 

toward Goal/Update tx plan, Purpose of tx/functional activities, Reviewed 

precautions, Rehab process, Transfer techniques


Teaching Recipient:  Patient


Teaching Methods:  Demonstration, Discussion


Response to Teaching:  Verbalize Understanding, Return Demonstration





OT Short Term Goals


Short Term Goals


Time Frame:  May 5, 2021


Eatin


Oral hygiene:  6


Toileting hygiene:  4


Shower/bathe self:  4


Upper body dressin


Lower body dressing:  3


Putting on/taking off footwear:  3





OT Long Term Goals


Long Term Goals


Time Frame:  May 19, 2021


Eating (QC):  6


Oral Hygiene (QC):  6


Toileting Hygiene (QC):  6


Shower/Bathe Self (QC):  6


Upper Body Dressing (QC):  6


Lower Body Dressing (QC):  6


On/Off Footwear (QC):  6


Additional Goals:  1-Demonstrate ADL Tasks, 2-Verbalize Understanding, 3-

ImproveStrength/Aung


1=Demonstrate adherence to instructed precautions during ADL tasks.


2=Patient will verbalize/demonstrate understanding of assistive 

devices/modifications for ADL.


3=Patient will improve strength/tolerance for activity to enable patient to 

perform ADL's.





OT Education/Plan


Problem List/Assessment


Assessment:  Decreased Activ Tolerance, Dependent Transfers, Impaired Funct 

Balance, Impaired I ADL's, Impaired Self-Care Skills





Discharge Recommendations


Plan/Recommendations:  Continue POC


Therapy Discharge Recommendati:  Post Acute OT





Treatment Plan/Plan of Care


Treatment,Training & Education:  Yes


Patient would benefit from OT for education, treatment and training to promote 

independence in ADL's, mobility, safety and/or upper extremity function for 

ADL's.


Plan of Care:  ADL Retraining, Functional Mobility, Group Exercise/Act as Ind, 

UE Funct Exercise/Act


Treatment Duration:  May 19, 2021


Frequency:  At least 5 of 7 days/Wk (IRF)


Estimated Hrs Per Day:  1.5 hours per day


Agreement:  Yes


Rehab Potential:  Good





Time/GCodes


Start Time:  09:15


Stop Time:  10:30


Total Time Billed (hr/min):  75


Billed Treatment Time


3767-1234 1, EVH x 10minutes


[id number] ADL x 65minutes- Co-treatment with PT.  Please see above note for 

designated roles.











JAROCHO OBRIEN OT           2021 14:31

## 2021-04-22 VITALS — SYSTOLIC BLOOD PRESSURE: 148 MMHG | DIASTOLIC BLOOD PRESSURE: 63 MMHG

## 2021-04-22 VITALS — DIASTOLIC BLOOD PRESSURE: 67 MMHG | SYSTOLIC BLOOD PRESSURE: 140 MMHG

## 2021-04-22 LAB
ALBUMIN SERPL-MCNC: 3.3 GM/DL (ref 3.2–4.5)
ALP SERPL-CCNC: 68 U/L (ref 40–136)
ALT SERPL-CCNC: 14 U/L (ref 0–55)
BASOPHILS # BLD AUTO: 0 10^3/UL (ref 0–0.1)
BASOPHILS NFR BLD AUTO: 0 % (ref 0–10)
BILIRUB SERPL-MCNC: 0.3 MG/DL (ref 0.1–1)
BUN/CREAT SERPL: 18
CALCIUM SERPL-MCNC: 9.3 MG/DL (ref 8.5–10.1)
CHLORIDE SERPL-SCNC: 103 MMOL/L (ref 98–107)
CO2 SERPL-SCNC: 22 MMOL/L (ref 21–32)
CREAT SERPL-MCNC: 1.02 MG/DL (ref 0.6–1.3)
EOSINOPHIL # BLD AUTO: 0.3 10^3/UL (ref 0–0.3)
EOSINOPHIL NFR BLD AUTO: 4 % (ref 0–10)
GFR SERPLBLD BASED ON 1.73 SQ M-ARVRAT: > 60 ML/MIN
GLUCOSE SERPL-MCNC: 107 MG/DL (ref 70–105)
HCT VFR BLD CALC: 26 % (ref 40–54)
HGB BLD-MCNC: 7.7 G/DL (ref 13.3–17.7)
LYMPHOCYTES # BLD AUTO: 2.2 10^3/UL (ref 1–4)
LYMPHOCYTES NFR BLD AUTO: 32 % (ref 12–44)
MANUAL DIFFERENTIAL PERFORMED BLD QL: NO
MCH RBC QN AUTO: 26 PG (ref 25–34)
MCHC RBC AUTO-ENTMCNC: 30 G/DL (ref 32–36)
MCV RBC AUTO: 89 FL (ref 80–99)
MONOCYTES # BLD AUTO: 0.3 10^3/UL (ref 0–1)
MONOCYTES NFR BLD AUTO: 5 % (ref 0–12)
NEUTROPHILS # BLD AUTO: 4 10^3/UL (ref 1.8–7.8)
NEUTROPHILS NFR BLD AUTO: 59 % (ref 42–75)
PLATELET # BLD: 339 10^3/UL (ref 130–400)
PMV BLD AUTO: 9.1 FL (ref 9–12.2)
POTASSIUM SERPL-SCNC: 4.7 MMOL/L (ref 3.6–5)
PROT SERPL-MCNC: 6.8 GM/DL (ref 6.4–8.2)
SODIUM SERPL-SCNC: 136 MMOL/L (ref 135–145)
WBC # BLD AUTO: 6.9 10^3/UL (ref 4.3–11)

## 2021-04-22 RX ADMIN — PRASUGREL SCH MG: 10 TABLET, FILM COATED ORAL at 08:35

## 2021-04-22 RX ADMIN — Medication SCH ML: at 05:04

## 2021-04-22 RX ADMIN — SODIUM CHLORIDE SCH MLS/HR: 900 INJECTION, SOLUTION INTRAVENOUS at 20:44

## 2021-04-22 RX ADMIN — MICONAZOLE NITRATE SCH APPLIC: 20 POWDER TOPICAL at 08:37

## 2021-04-22 RX ADMIN — METFORMIN HYDROCHLORIDE SCH MG: 500 TABLET, EXTENDED RELEASE ORAL at 08:35

## 2021-04-22 RX ADMIN — DOCUSATE SODIUM SCH MG: 100 CAPSULE ORAL at 20:55

## 2021-04-22 RX ADMIN — SODIUM CHLORIDE SCH MLS/HR: 900 INJECTION, SOLUTION INTRAVENOUS at 13:17

## 2021-04-22 RX ADMIN — DOCUSATE SODIUM SCH MG: 100 CAPSULE ORAL at 08:35

## 2021-04-22 RX ADMIN — HYDROCODONE BITARTRATE AND ACETAMINOPHEN PRN EA: 7.5; 325 TABLET ORAL at 09:21

## 2021-04-22 RX ADMIN — INSULIN ASPART SCH UNIT: 100 INJECTION, SOLUTION INTRAVENOUS; SUBCUTANEOUS at 17:28

## 2021-04-22 RX ADMIN — FENTANYL CITRATE PRN MCG: 50 INJECTION, SOLUTION INTRAMUSCULAR; INTRAVENOUS at 15:44

## 2021-04-22 RX ADMIN — FENTANYL CITRATE PRN MCG: 50 INJECTION, SOLUTION INTRAMUSCULAR; INTRAVENOUS at 11:36

## 2021-04-22 RX ADMIN — HYDROCODONE BITARTRATE AND ACETAMINOPHEN PRN EA: 7.5; 325 TABLET ORAL at 05:04

## 2021-04-22 RX ADMIN — METFORMIN HYDROCHLORIDE SCH MG: 500 TABLET, EXTENDED RELEASE ORAL at 17:28

## 2021-04-22 RX ADMIN — FENTANYL CITRATE PRN MCG: 50 INJECTION, SOLUTION INTRAMUSCULAR; INTRAVENOUS at 20:44

## 2021-04-22 RX ADMIN — FENTANYL CITRATE PRN MCG: 50 INJECTION, SOLUTION INTRAMUSCULAR; INTRAVENOUS at 01:37

## 2021-04-22 RX ADMIN — Medication SCH ML: at 20:45

## 2021-04-22 RX ADMIN — INSULIN ASPART SCH UNIT: 100 INJECTION, SOLUTION INTRAVENOUS; SUBCUTANEOUS at 06:53

## 2021-04-22 RX ADMIN — GLYBURIDE SCH MG: 2.5 TABLET ORAL at 17:28

## 2021-04-22 RX ADMIN — POLYETHYLENE GLYCOL (3350) SCH GM: 17 POWDER, FOR SOLUTION ORAL at 20:55

## 2021-04-22 RX ADMIN — ASPIRIN SCH MG: 81 TABLET ORAL at 08:35

## 2021-04-22 RX ADMIN — GLYBURIDE SCH MG: 2.5 TABLET ORAL at 06:53

## 2021-04-22 RX ADMIN — HYDROCODONE BITARTRATE AND ACETAMINOPHEN PRN EA: 7.5; 325 TABLET ORAL at 13:45

## 2021-04-22 RX ADMIN — DOCUSATE SODIUM AND SENNOSIDES SCH EA: 8.6; 5 TABLET, FILM COATED ORAL at 08:35

## 2021-04-22 RX ADMIN — POLYETHYLENE GLYCOL (3350) SCH GM: 17 POWDER, FOR SOLUTION ORAL at 08:35

## 2021-04-22 RX ADMIN — PANTOPRAZOLE SODIUM SCH MG: 40 TABLET, DELAYED RELEASE ORAL at 08:35

## 2021-04-22 RX ADMIN — Medication SCH ML: at 13:17

## 2021-04-22 RX ADMIN — METOPROLOL SUCCINATE SCH MG: 100 TABLET, EXTENDED RELEASE ORAL at 08:35

## 2021-04-22 RX ADMIN — LISINOPRIL SCH MG: 20 TABLET ORAL at 08:35

## 2021-04-22 RX ADMIN — HYDROCODONE BITARTRATE AND ACETAMINOPHEN PRN EA: 7.5; 325 TABLET ORAL at 20:36

## 2021-04-22 RX ADMIN — INSULIN ASPART SCH UNIT: 100 INJECTION, SOLUTION INTRAVENOUS; SUBCUTANEOUS at 12:42

## 2021-04-22 RX ADMIN — DOCUSATE SODIUM AND SENNOSIDES SCH EA: 8.6; 5 TABLET, FILM COATED ORAL at 20:55

## 2021-04-22 RX ADMIN — MICONAZOLE NITRATE SCH APPLIC: 20 POWDER TOPICAL at 20:49

## 2021-04-22 RX ADMIN — SODIUM CHLORIDE SCH MLS/HR: 900 INJECTION, SOLUTION INTRAVENOUS at 05:04

## 2021-04-22 NOTE — INDIVIDUALIZED PLAN OF CARE
Individualized Plan of Care


Rehab Nursing IPOC Order


Admission Date


Apr 21, 2021 at 08:35


Current Orders





Orders


Admission Order(Inpt,Obs,Sdc) (4/21/21 06:32)


Vital Signs: Per Unit Policy ( 08,16,00 (4/21/21 06:32)


-Inpt Rehab Con (4/21/21 06:32)


Rehab Nursing Orders-Ipoc (4/21/21 06:32)


Physical Therapy Rehab Orders (4/21/21 06:32)


Occupational Therapy Rehab Ord (4/21/21 06:32)


Speech Therapy Rehab Orders (4/21/21 06:32)


Cbc With Automated Diff (4/22/21 06:00)


Comprehensive Metabolic Panel (4/22/21 06:00)


Precautions (Aru) (4/21/21 06:32)


Rehab-Intensity Of Therapy (4/21/21 06:32)


Initiate Admission Nursing Pro .admission (4/21/21 06:32)


Alprazolam Tablet (Xanax Tablet) (4/21/21 06:45)


Calcium Carbonate Chew Tablet (Antacid C (4/21/21 06:45)


Diphenhydramine Tablet (Benadryl Tablet) (4/21/21 06:45)


Docusate Sodium Capsule (Colace Capsule) (4/21/21 09:00)


Docusate Sodium Capsule (Colace Capsule) (4/21/21 06:45)


Bisacodyl Suppository (Dulcolax Supposit (4/21/21 06:45)


Lactulose Oral Solution (Enulose Oral So (4/21/21 06:45)


Na Phos/Na Biphos Enema (Fleet Enema Guille (4/21/21 06:45)


Guaifenesin/Codeine Syrup (Robitussin Ac (4/21/21 06:45)


Loperamide Tablet (Imodium Tablet) (4/21/21 06:45)


Melatonin  Tablet (Melatonin  Tablet) (4/21/21 06:45)


Polyethylene Glycol Powder Pkt (Miralax (4/21/21 09:00)


Ondansetron  Oral Dissolve Tab (Zofran (4/21/21 06:45)


Senna S Tablet (Senokot S Tablet) (4/21/21 09:00)


Initiate Admission Nursing Pro .admission (4/21/21 06:32)


Admission Arrival Bed Request (4/21/21 08:36)


Code/Resuscitation (4/21/21 09:19)


Accucheck Achs ACHS (4/21/21 09:19)


Vital Signs: Special (Order) (4/21/21 09:19)


Cho 60g/M 3snack ( [place]) (4/21/21 Lunch)


Alprazolam Tablet (Xanax Tablet) (4/21/21 09:30)


Aspirin Enteric Coated Tablet (Ecotrin T (4/22/21 09:00)


Atorvastatin Tablet (Lipitor Tablet) (4/21/21 21:00)


Bisacodyl Suppository (Dulcolax Supposit (4/21/21 09:30)


Calcium Carbonate Chew Tablet (Antacid C (4/21/21 09:30)


Docusate Sodium Capsule (Colace Capsule) (4/21/21 09:30)


Hydrocodone/Apap 7.5/325 Tab (Lortab 7. (4/21/21 09:30)


Hydrochlorothiazide Cap/Tablet (Hctz Cap (4/22/21 09:00)


Iron Sucrose Injection (Venofer Injectio (4/23/21 09:00)


Lactulose Oral Solution (Enulose Oral So (4/21/21 09:30)


Loperamide Tablet (Imodium Tablet) (4/21/21 09:30)


Melatonin  Tablet (Melatonin  Tablet) (4/21/21 09:30)


Miconazole 2% Powder (Desenex Af 2% Powd (4/21/21 21:00)


Na Phos/Na Biphos Enema (Fleet Enema Guille (4/21/21 09:30)


Ondansetron Injection (Zofran Injectio (4/21/21 09:30)


Ondansetron  Oral Dissolve Tab (Zofran (4/21/21 09:30)


Pantoprazole Tablet (Protonix Tablet) (4/22/21 09:00)


Piperacillin/Tazobactam (Bulk) (Zosyn In (4/21/21 13:00)


Prasugrel Tablet (Effient Tablet) (4/22/21 09:00)


Senna S Tablet (Senokot S Tablet) (4/21/21 21:00)


Diphenhydramine Tablet (Benadryl Tablet) (4/21/21 09:30)


Fentanyl  Inj (Sublimaze Injection) (4/21/21 09:30)


Glyburide Tablet (Micronase Tablet) (4/21/21 16:30)


Guaifenesin/Codeine Syrup (Robitussin Ac (4/21/21 09:30)


Insulin Aspart (Novolog) (Novolog (Charg (4/21/21 12:00)


Insulin Determir (Per Unit) (Levemir (Pe (4/21/21 21:00)


Lisinopril  Tablet (Zestril Tablet) (4/22/21 09:00)


Metoprolol Succinate (Xl) Tab (Toprol Xl (4/22/21 09:00)


Metformin Xr Tablet (Glucophage Xr Table (4/21/21 18:00)


Polyethylene Glycol Powder Pkt (Miralax (4/21/21 21:00)


Consult Physician (4/21/21 09:19)


Patient Visit (4/21/21 )


Speech Sound Lang Comp (4/21/21 )


Sodium Chloride Flush (Catheter Flush Sy (4/21/21 13:45)


Sodium Chloride Flush (Catheter Flush Sy (4/21/21 14:00)


Patient Visit (4/21/21 )


Pt Eval Moderate Complexity (4/21/21 )


Functional Activities, Ea 15 (4/21/21 )


Nursing Communication (Order) DAILY (4/22/21 21:00)


Chest 1 View, Ap/Pa Only (4/22/21 08:45)


Patient Visit (4/22/21 )


Functional Activities, Ea 15 (4/22/21 )


Exercise Therap, Ea 15 Min (4/22/21 )


Wheelchair Mgmt/Propulsn 15min (4/22/21 )





Rehab Nursing Orders:  Ongoing Assess. of Cognitive Status, Ongoing Assess. of 

Function Status, Bladder Management, Bladder Scan, Bladder Training, Bowel 

Management, Bowel Training, Disease Management & Educaiton, DVT Prophylaxis, 

Fall Prevention, Fluid/Electrolyte/Nutrition Mgmt, Infection Prevention, 

Medication Management & Education, Management of Risks & Complications, 

Management of Skin Intergrity, Nutrition Management, Pain Management, 

Patient/Family Support, Safety Management





Intensity of Therapy to be met


Patient to be seen:  Min.3h per day/5 of 7d





PT IPOC


Problem List:  Activity Tolerance, Functional Strength, Safety, Balance, 

Transfer, Bed Mobility, Other


Treatment Plan:  Continue Plan of Care


Bed Mobility, Education, Functional Activity Aung, Functional Strength, Group 

Therapy, Gait, Safety, Therapeutic Exercise, Transfers


Treatment Duration:  May 19, 2021


Frequency:  At least 5 of 7 days/Wk (IRF)


Estimated Hrs Per Day:  1.5 hours per day





OT IPOC


Problems:  Decreased Activ Tolerance, Dependent Transfers, Impaired Funct 

Balance, Impaired I ADL's, Impaired Self-Care Skills


OT Treatment, Training and Edu:  Yes


Plan of Care:  ADL Retraining, Functional Mobility, Group Exercise/Act as Ind, 

UE Funct Exercise/Act


Treatment Duration:  May 19, 2021


Frequency:  At least 5 of 7 days/Wk (IRF)


Estimated Hrs Per Day:  1.5 hours per day





ST IPOC


Speech Therapy Treatment Plan:  Discontinue ST


Treatment Duration:  Apr 21, 2021


Frequency:  1 time per week


Estimated Hrs Per Day:  .25 hour per day





/Case Mgmt


/Case Managemen:  Discharge Planning





Dietitian/Nutritionist


Dietitian/Nutritionist to monitor nutritional status and make changes and/or 

recommendations as needed and work with speech pathology on dietary upgrades as 

the occur.





Physician IPOC


Medical Issues being managed closely and that require the 24 hour availability 

of a physician:





Patient withe recent major surgery of left LLE amputation with acute blood loss 

anemia requiring transfusions will require close monitoring for decompensation


Medical Issues:  Bowel/Bladder Function, DVT Prophylaxis, Falls Precautions, 

Fluid/Electrolyte/Nutrition Balance, Infection Protection, Pain Management


Brief Synthesis of Preadmission Screen, Post-Admission Evaluation, and Therapy 

Evaluations:





PT OT will focus on transfers and increase ability to ambulate and increase 

ADL's in order to return to independent living


Medical Prognosis:  Good


Anticipated Length of Stay:  14 days











NICOLE VILA DO                Apr 22, 2021 10:43

## 2021-04-22 NOTE — OCCUPATIONAL THER DAILY NOTE
OT Current Status-Daily Note


Subjective


Pt. reports pain in residual limb at end of session.  Does not report pain 

level, but does request pain medication.  Nursing notified.





Mental Status/Objective


Patient Orientation:  Person, Place, Time, Situation





ADL-Treatment


Therapy Code Descriptions/Definitions 





Functional [place] Measure:


0=Not Assessed/NA        4=Minimal Assistance


1=Total Assistance        5=Supervision or Setup


2=Maximal Assistance  6=Modified [place]


3=Moderate Assistance 7=Complete IndependenceSCALE: Activities may be completed 

with or without assistive devices.





6-Indepedent-patient completes the activity by him/herself with no assistance 

from a helper.


5-Set-up or Clean-up Assistance-helper sets up or cleans up; patient completes 

activity. [place] assists only prior to or  


    following the activity.


4-Supervision or Touching Assistance-helper provides verbal cues and/or 

touching/steadying and/or contact guard assistance as patient completes activity

. Assistance may be provided   


    throughout the activity or intermittently.


3-Partial/Moderate Assistance-helper does LESS THAN HALF the effort. [place] 

lifts, holds or supports trunk or limbs, but provides less than half the effort.


2-Substantial/Maximal Assistance-helper does MORE THAN HALF the effort. [place] 

lifts or holds trunk or limbs and provides more than half the effort.


2-Jfouxhhfr-zeqkor does ALL the effort. Patient does none of the effort to 

complete the activity. Or, the assistance of 2 or more helpers is required for 

the patient to complete the  


    activity.


If activity was not attempted, code reason:


7-Patient Refused.


9-Not Applicable-not attempted and the patient did not perform the activity 

before the current illness, exacerbation or injury.


10-Not Attempted due to Environmental Limitations-(lack of equipment, weather 

restraints, etc.).


88-Not Attempted due to Medical Conditions or Safety Concerns.


Lower Body Dressing (QC):  4 (Per pt. and PT, pt. was able to don shorts while 

in bed, and don right slipper sock with SBA.)


On/Off Footwear:  4


OT/PT completed co-treatment with therapy due to poor endurance and need of 

skilled assistance x 2.  PT focused on transfer training and LE stretches while 

OT focused on equipment assessment for home set up/discussion, as well as core 

strengthening.  Pt. practiced transferring with slide board to/from mat/bed 

multiple times.  Please see PT note for assist needed.  Pt. educated in 

particular positioning techniques for better management of residual limb.  

Worked on core strengthening techniques for transferring in bed and talked about

bed at home.  Pt. does think that his father in law can make a set up to assist 

him that will mimic his bedrail here.  He has already discussed this with him.  

Pt. is able to stand at parallel bars with max x 2, and cues to position hands 

differently on bars.  Discussed in detail any needs for home, any concerns, and 

need for continued UE strengthening.  Pt. tolerated treatment well.  All needs 

met back in room.





Education


OT Patient Education:  Correct positioning, Exercise program, Modified ADL 

techniques, Progress toward Goal/Update tx plan, Purpose of tx/functional 

activities, Reviewed precautions, Rehab process, Transfer techniques


Teaching Recipient:  Patient


Teaching Methods:  Demonstration, Discussion


Response to Teaching:  Verbalize Understanding, Return Demonstration





OT Short Term Goals


Short Term Goals


Time Frame:  May 5, 2021


Eatin


Oral hygiene:  6


Toileting hygiene:  4


Shower/bathe self:  4


Upper body dressin


Lower body dressing:  3


Putting on/taking off footwear:  3





OT Long Term Goals


Long Term Goals


Time Frame:  May 19, 2021


Eating (QC):  6


Oral Hygiene (QC):  6


Toileting Hygiene (QC):  6


Shower/Bathe Self (QC):  6


Upper Body Dressing (QC):  6


Lower Body Dressing (QC):  6


On/Off Footwear (QC):  6


Additional Goals:  1-Demonstrate ADL Tasks, 2-Verbalize Understanding, 3-

ImproveStrength/Aung


1=Demonstrate adherence to instructed precautions during ADL tasks.


2=Patient will verbalize/demonstrate understanding of assistive 

devices/modifications for ADL.


3=Patient will improve strength/tolerance for activity to enable patient to 

perform ADL's.





OT Education/Plan


Problem List/Assessment


Assessment:  Decreased Activ Tolerance, Decreased UE Strength, Dependent 

Transfers, Impaired Bed Mobility, Impaired Funct Balance, Impaired I ADL's, 

Impaired Self-Care Skills





Discharge Recommendations


Plan/Recommendations:  Continue POC





Treatment Plan/Plan of Care


Treatment,Training & Education:  Yes


Patient would benefit from OT for education, treatment and training to promote 

independence in ADL's, mobility, safety and/or upper extremity function for 

ADL's.


Plan of Care:  ADL Retraining, Functional Mobility, Group Exercise/Act as Ind, 

UE Funct Exercise/Act


Treatment Duration:  May 19, 2021


Frequency:  At least 5 of 7 days/Wk (IRF)


Estimated Hrs Per Day:  1.5 hours per day


Agreement:  Yes


Rehab Potential:  Good





Time/GCodes


Start Time:  10:15


Stop Time:  10:25


Total Time Billed (hr/min):  70


Billed Treatment Time


1, FA x 70minutes











JAROCHO OBRIEN OT           2021 14:03

## 2021-04-22 NOTE — PHYSICAL THERAPY DAILY NOTE
PT Daily Note-Current


Subjective


Patient reports moderate, constant pain in L LE where incision is. Patient 

reports he has been consistently trying to maintain L knee extension to prevent 

flexion contracture. Patient consents to treatment. Patient needs to get shorts 

on at the beginning of tx and does so without assist.





Appearance


Patient supine in bed post tx, with call button and tray table nearby. Patient 

reported increased pain after therapy, notified nurse about request for pain 

medication.





Mental Status


Patient Orientation:  Person, Place, Time, Normal For Age





Transfers


SCALE: Activities may be completed with or without assistive devices.





6-Indepedent-patient completes the activity by him/herself with no assistance 

from a helper.


5-Set-up or Clean-up Assistance-helper sets up or cleans up; patient completes 

activity. [place] assists only prior to or  


    following the activity.


4-Supervision or Touching Assistance-helper provides verbal cues and/or 

touching/steadying and/or contact guard assistance as patient completes 

activity. Assistance may be provided   


    throughout the activity or intermittently.


3-Partial/Moderate Assistance-helper does LESS THAN HALF the effort. [place] 

lifts, holds or supports trunk or limbs, but provides less than half the effort.


2-Substantial/Maximal Assistance-helper does MORE THAN HALF the effort. [place] 

lifts or holds trunk or limbs and provides more than half the effort.


9-Qwfhyuzsd-mxxfvo does ALL the effort. Patient does none of the effort to 

complete the activity. Or, the assistance of 2 or more helpers is required for 

the patient to complete the  


    activity.


If activity was not attempted, code reason:


7-Patient Refused.


9-Not Applicable-not attempted and the patient did not perform the activity 

before the current illness, exacerbation or injury.


10-Not Attempted due to Environmental Limitations-(lack of equipment, weather 

restraints, etc.).


88-Not Attempted due to Medical Conditions or Safety Concerns.


Roll Left & Right (QC):  6


Sit to Lying (QC):  4


Lying to Sitting/Side of Bed(Q:  4


Sit to Stand (QC):  3 (mod A x1 with use of parallel bars)


Chair/Bed-to-Chair Xfer(QC):  3 (Mod A x2 with sliding board)


Patient is inconsistent with form on sliding board transfer, "still figuring it 

out". Patient can use UE support in parallel bars to assist with sit <-> stand, 

but requires assistance with first 50% of sit <-> stand motion for stability. 

Patient also reports he thinks his R LE will benefit from a knee brace for added

stability with transfers. Patient performed 4 sliding board transfer from both 

directions and did not report he felt one direction was easier than the other. 

Patient performed 3 attempts at sit <-> stands with 2 successful attempts.





Weight Bearing


Right Lower Extremity:  Right


Full Weight Bearing


Left Lower Extremity:  Left


Non Weight Bearing (BKA)





Wheelchair Training


Wheel 50 ft with 2 turns (QC):  6


Wheel 150 ft (QC):  6


Wheeled 200' x2





Exercises


Supine Ex:  Quad Set, Straight leg raise


Supine Reps:  20 (2 sets)


Worked on ROM of L LE, (knee extension stretch with 5# addition, supine for 5 

min ; hip extension stretch with prone prop on forearms for 5 min)





Treatments


Co-treated with OT secondary to patient decreased balance, functional mobility, 

and level of assistance required to complete transfers. PT focused on 

positioning with transfers, wheelchair mobility, and exercises targeting 

contracture mobility, while OT focused on education and discussion regarding UE 

functional needs with bed mobility, strengthening, and energy conservation post 

amputation.





Assessment


Current Status:  Fair Progress


Patient is motivated to complete all requested exercises and activities despite 

increased pain





PT Short Term Goals


Short Term Goals


Time Frame:  2021


Roll Left & Right:  6


Sit to lyin


Lying to sitting on side of be:  6


Sit to stand:  3


Chair/bed-to-chair transfer:  3


Wheel 50ft w/2 turns:  6


Wheel 150 feet:  6





PT Long Term Goals


Long Term Goals


PT Long Term Goals Time Frame:  May 19, 2021


Roll Left & Right (QC):  6


Sit to Lying (QC):  6


Lying-Sitting on Side/Bed(QC):  6


Sit to Stand (QC):  6


Chair/Bed-to-Chair Xfer(QC):  6 (SPT or slideboard, whichever is the safest 

method)


Toilet Transfer (QC):  6 (SPT or slide board)


Car Transfer (QC):  4


Does the Patient Walk:  No and Walking Goal NOT indicated


Walk 10 feet (QC):  88


Walk 50ft with 2 Turns (QC):  88


Walk 150 ft (QC):  88


Walking 10ft on Uneven Surface:  88


1 Step (curb) (QC):  88


4 Steps (QC):  88


12 Steps (QC):  88


Picking up an Object (QC):  88


Does the Pt use WC or Scooter?:  Yes


Wheel 50 feet with 2 turns (QC:  6


Type:  Manual


Wheel 150 feet:  6


Type:  Manual





PT Plan


Problem List


Problem List:  Activity Tolerance, Functional Strength, Safety, Balance, Gait, 

Transfer, Bed Mobility, ROM





Treatment/Plan


Treatment Plan:  Continue Plan of Care


Treatment Plan:  Bed Mobility, Education, Functional Activity Aung, Functional 

Strength, Group Therapy, Gait, Safety, Therapeutic Exercise, Transfers


Treatment Duration:  May 19, 2021


Frequency:  At least 5 of 7 days/Wk (IRF)


Estimated Hrs Per Day:  1.5 hours per day


Patient and/or Family Agrees t:  Yes





Safety Risks/Education


Patient Education:  Transfer Techniques, Correct Positioning, W/C Management, 

Safety Issues


Teaching Recipient:  Patient


Teaching Methods:  Demonstration, Discussion


Response to Teaching:  Verbalize Understanding, Return Demonstration





Time/GCodes


Time In:  1000


Time Out:  1130


Total Billed Treatment Time:  90


Total Billed Treatment


1 visit: 


FA x3: 45' 


EX x2: 30' 


WCH: 15' 





Co-treated with OT from [id number]











CAMILLE HARRY PT                2021 11:27

## 2021-04-22 NOTE — DIAGNOSTIC IMAGING REPORT
INDICATION: Evaluate PICC line placement



COMPARISON: 07/18/2020



FINDINGS: Single frontal view of the chest demonstrates mild

enlargement of the cardiac silhouette. Pulmonary vasculature,

however, is within normal limits. The lungs are well aerated and

clear. No large pleural effusion or pneumothorax is seen. The

visualized osseous structures show no acute abnormalities. New

left upper extremity PICC line is seen with tip in the SVC.



IMPRESSION: 

1. Left upper extremity PICC line tip in the SVC.

2. Mild enlargement of the cardiac silhouette. This may be

exaggerated by portable technique. There is otherwise no evidence

of overt failure.



Dictated by: 



  Dictated on workstation # [id number]

## 2021-04-22 NOTE — PROGRESS NOTE
Subjective


Date Seen by a Provider:  Apr 22, 2021


Time Seen by a Provider:  13:30


Subjective/Events-last exam


Patient seen with Dr. Jiménez.  Patient reports doing well.  Still having pain of 

the left BKA stump but is improving.  Reports bleeding has slowed down.  Denies 

any fever/chills.  Tolerating diet and rehab.





Objective


Exam





Vital Signs








  Date Time  Temp Pulse Resp B/P (MAP) Pulse Ox O2 Delivery O2 Flow Rate FiO2


 


4/22/21 08:51      Room Air  


 


4/22/21 08:34 36.0 84 16 140/67 (91) 97 Room Air  


 


4/21/21 21:00     98 Room Air  


 


4/21/21 20:00 36.2 84 17 127/64 (85) 98 Room Air  














I & O 


 


 4/22/21





 07:00


 


Intake Total 540 ml


 


Balance 540 ml





Capillary Refill :


General Appearance:  No Apparent Distress, WD/WN, Obese


Neck:  Normal Inspection, Supple


Respiratory:  No Accessory Muscle Use, No Respiratory Distress


Cardiovascular:  Regular Rate, Rhythm, No Edema


Gastrointestinal:  normal bowel sounds, non tender, soft


Extremity:  Normal Range of Motion, Other (Left BKA with dressing in place, 

C/D/I.)


Neurologic/Psychiatric:  Alert, Oriented x3


Skin:  Normal Color, Warm/Dry





Results


Lab


Laboratory Tests


4/21/21 16:09: Glucometer 112H


4/21/21 21:15: Glucometer 139H


4/22/21 05:34: 


White Blood Count 6.9, Red Blood Count 2.92L, Hemoglobin 7.7L, Hematocrit 26L, 

Mean Corpuscular Volume 89, Mean Corpuscular Hemoglobin 26, Mean Corpuscular 

Hemoglobin Concent 30L, Red Cell Distribution Width 17.8H, Platelet Count 339, 

Mean Platelet Volume 9.1, Immature Granulocyte % (Auto) 1, Neutrophils (%) 

(Auto) 59, Lymphocytes (%) (Auto) 32, Monocytes (%) (Auto) 5, Eosinophils (%) 

(Auto) 4, Basophils (%) (Auto) 0, Neutrophils # (Auto) 4.0, Lymphocytes # (Auto)

2.2, Monocytes # (Auto) 0.3, Eosinophils # (Auto) 0.3, Basophils # (Auto) 0.0, 

Immature Granulocyte # (Auto) 0.1, Sodium Level 136, Potassium Level 4.7, C

hloride Level 103, Carbon Dioxide Level 22, Anion Gap 11, Blood Urea Nitrogen 

18, Creatinine 1.02, Estimat Glomerular Filtration Rate > 60, BUN/Creatinine 

Ratio 18, Glucose Level 107H, Calcium Level 9.3, Corrected Calcium 9.9, Total Bi

lirubin 0.3, Aspartate Amino Transf (AST/SGOT) 21, Alanine Aminotransferase 

(ALT/SGPT) 14, Alkaline Phosphatase 68, Total Protein 6.8, Albumin 3.3


4/22/21 11:39: Glucometer 87





Assessment/Plan


Assessment/Plan


Assess & Plan/Chief Complaint


s/p left BKA.


VSS


continue OOB to chair and rehab


Pain meds as needed.


continue with dressing changes.











NEGIN CHA APRN         Apr 22, 2021 2:06 pm

## 2021-04-22 NOTE — OCCUPATIONAL THER DAILY NOTE
OT Current Status-Daily Note


Subjective


No pain reported.





Appearance


Pt. in bed.  Agrees to work with OT.





Mental Status/Objective


Patient Orientation:  Person, Place, Time, Situation





ADL-Treatment


Therapy Code Descriptions/Definitions 





Functional [place] Measure:


0=Not Assessed/NA        4=Minimal Assistance


1=Total Assistance        5=Supervision or Setup


2=Maximal Assistance  6=Modified [place]


3=Moderate Assistance 7=Complete IndependenceSCALE: Activities may be completed 

with or without assistive devices.





6-Indepedent-patient completes the activity by him/herself with no assistance 

from a helper.


5-Set-up or Clean-up Assistance-helper sets up or cleans up; patient completes 

activity. [place] assists only prior to or  


    following the activity.


4-Supervision or Touching Assistance-helper provides verbal cues and/or 

touching/steadying and/or contact guard assistance as patient completes 

activity. Assistance may be provided   


    throughout the activity or intermittently.


3-Partial/Moderate Assistance-helper does LESS THAN HALF the effort. [place] 

lifts, holds or supports trunk or limbs, but provides less than half the effort.


2-Substantial/Maximal Assistance-helper does MORE THAN HALF the effort. [place] 

lifts or holds trunk or limbs and provides more than half the effort.


6-Szeebarle-bbixax does ALL the effort. Patient does none of the effort to 

complete the activity. Or, the assistance of 2 or more helpers is required for 

the patient to complete the  


    activity.


If activity was not attempted, code reason:


7-Patient Refused.


9-Not Applicable-not attempted and the patient did not perform the activity 

before the current illness, exacerbation or injury.


10-Not Attempted due to Environmental Limitations-(lack of equipment, weather 

restraints, etc.).


88-Not Attempted due to Medical Conditions or Safety Concerns.





Other Treatment


Pt. completed UE exercises at bed level to work on overall UE strength and 

endurance.  Pt. completed 6 bilateral UE exercises x 15 reps each with red 

theraband, in all planes, as well as hand squeezes with green sponge  x 20 sq

ueezes.  Pt. demonstrates ability to complete with no difficulty.  All needs met

at bed level.





Education


OT Patient Education:  Correct positioning, Exercise program, Progress toward 

Goal/Update tx plan, Purpose of tx/functional activities, Reviewed precautions, 

Rehab process, Transfer techniques


Teaching Recipient:  Patient


Teaching Methods:  Demonstration, Discussion


Response to Teaching:  Verbalize Understanding, Return Demonstration





OT Short Term Goals


Short Term Goals


Time Frame:  May 5, 2021


Eatin


Oral hygiene:  6


Toileting hygiene:  4


Shower/bathe self:  4


Upper body dressin


Lower body dressing:  3


Putting on/taking off footwear:  3





OT Long Term Goals


Long Term Goals


Time Frame:  May 19, 2021


Eating (QC):  6


Oral Hygiene (QC):  6


Toileting Hygiene (QC):  6


Shower/Bathe Self (QC):  6


Upper Body Dressing (QC):  6


Lower Body Dressing (QC):  6


On/Off Footwear (QC):  6


Additional Goals:  1-Demonstrate ADL Tasks, 2-Verbalize Understanding, 3-

ImproveStrength/Aung


1=Demonstrate adherence to instructed precautions during ADL tasks.


2=Patient will verbalize/demonstrate understanding of assistive 

devices/modifications for ADL.


3=Patient will improve strength/tolerance for activity to enable patient to 

perform ADL's.





OT Education/Plan


Problem List/Assessment


Assessment:  Decreased Activ Tolerance





Discharge Recommendations


Plan/Recommendations:  Continue POC





Treatment Plan/Plan of Care


Patient would benefit from OT for education, treatment and training to promote 

independence in ADL's, mobility, safety and/or upper extremity function for 

ADL's.


Plan of Care:  ADL Retraining, Functional Mobility, Group Exercise/Act as Ind, 

UE Funct Exercise/Act


Treatment Duration:  May 19, 2021


Frequency:  At least 5 of 7 days/Wk (IRF)


Estimated Hrs Per Day:  1.5 hours per day


Agreement:  Yes


Rehab Potential:  Good





Time/GCodes


Start Time:  13:05


Stop Time:  13:25


Total Time Billed (hr/min):  20


Billed Treatment Time


1, Ex











JAROCHO OBRIEN OT           2021 14:49

## 2021-04-22 NOTE — PM&R PROGRESS NOTE
Subjective


HPI/CC On Admission


Date Seen by Provider:  Apr 22, 2021


Time Seen by Provider:  10:45


Subjective/Events-last exam


4/22/21:


Pt doing pretty well


Had a large BM today


Hgb 7.7 after three units of blood on med surg


Rough on the transfers but getting better


Dressing change will be per Dr. Morejon orders


Sugar is 107 this morning





Review of Systems


General:  Fatigue


Musculoskeletal:  leg pain





Objective


Exam


Vital Signs





Vital Signs








  Date Time  Temp Pulse Resp B/P (MAP) Pulse Ox O2 Delivery O2 Flow Rate FiO2


 


4/22/21 20:55      Room Air  


 


4/22/21 20:00 36.1 79 18 148/63 (91) 100   





Capillary Refill :


General Appearance:  No Apparent Distress, WD/WN, Chronically ill, Obese


HEENT:  PERRL/EOMI, Normal ENT Inspection, Pharynx Normal


Neck:  Full Range of Motion, Normal Inspection, Non Tender, Supple, Carotid 

Bruit


Respiratory:  Chest Non Tender, Lungs Clear, Normal Breath Sounds, No Accessory 

Muscle Use, No Respiratory Distress


Cardiovascular:  Regular Rate, Rhythm, No Edema, No Gallop, No JVD, No Murmur, 

Normal Peripheral Pulses


Gastrointestinal:  Normal Bowel Sounds, No Organomegaly, No Pulsatile Mass, Non 

Tender, Soft


Back:  Normal Inspection, No CVA Tenderness, No Vertebral Tenderness


Extremity:  Normal Capillary Refill, Normal Inspection, Normal Range of Motion, 

Non Tender, No Calf Tenderness, No Pedal Edema


Neurologic/Psychiatric:  Alert, Oriented x3, No Motor/Sensory Deficits, Normal 

Mood/Affect, CNs II-XII Norm as Tested, Abnormal Gait, Motor Weakness (left leg 

amputation )


Skin:  Normal Color, Warm/Dry


Lymphatic:  No Adenopathy





Results/Procedures


Lab


Laboratory Tests


4/22/21 05:34








Patient resulted labs reviewed.





FIM


Transfers


Therapy Code Descriptions/Definitions 





Functional [place] Measure:


0=Not Assessed/NA        4=Minimal Assistance


1=Total Assistance        5=Supervision or Setup


2=Maximal Assistance  6=Modified [place]


3=Moderate Assistance 7=Complete IndependenceSCALE: Activities may be completed 

with or without assistive devices.





6-Indepedent-patient completes the activity by him/herself with no assistance 

from a helper.


5-Set-up or Clean-up Assistance-helper sets up or cleans up; patient completes 

activity. [place] assists only prior to or  


    following the activity.


4-Supervision or Touching Assistance-helper provides verbal cues and/or 

touching/steadying and/or contact guard assistance as patient completes 

activity. Assistance may be provided   


    throughout the activity or intermittently.


3-Partial/Moderate Assistance-helper does LESS THAN HALF the effort. [place] 

lifts, holds or supports trunk or limbs, but provides less than half the effort.


2-Substantial/Maximal Assistance-helper does MORE THAN HALF the effort. [place] 

lifts or holds trunk or limbs and provides more than half the effort.


5-Lvlkvomto-weaeyf does ALL the effort. Patient does none of the effort to 

complete the activity. Or, the assistance of 2 or more helpers is required for 

the patient to complete the  


    activity.


If activity was not attempted, code reason:


7-Patient Refused.


9-Not Applicable-not attempted and the patient did not perform the activity 

before the current illness, exacerbation or injury.


10-Not Attempted due to Environmental Limitations-(lack of equipment, weather 

restraints, etc.).


88-Not Attempted due to Medical Conditions or Safety Concerns.


Roll Left to Right (QC):  4


Sit to Lying (QC):  3


Sit to Stand (QC):  1 (pt unable to come to a full stand)


Chair/Bed-to-Chair Xfer(QC):  2 (slide board transfer; pt does most of the work,

but assist to place and remove board, 2 people necessary for safety.  )


Car Transfer (QC):  88





Gait Training


Does the Patient Walk?:  No and Walking Goal NOT indicated


Walk 10 feet (QC):  88


Walk 50 ft with 2 Turns(QC):  88


Walk 150 ft (QC):  88


Walking 10ft/uneven surface-QC:  88





Wheelchair Training


Does the Pt Use a Wheelchair?:  Yes


Wheel 50 ft with 2 turns (QC):  4


Wheel 150 ft (QC):  4


Type of Wheelchair:  Manual





Stair Training


1 Step (curb) (QC):  88


4 Steps (QC):  88


12 Steps (QC):  88





Balance


Picking up an Object (QC):  88





ADL-Treatment


Eating (QC):  6


Oral Hygiene (QC):  5


Shower/Bathe Self (QC):  3


Upper Body Dressing (QC):  88


Lower Body Dressing (QC):  88


On/Off Footwear (QC):  1


Toileting Hygiene (QC):  2





Assessment/Plan


Assessment and Plan


Assess & Plan/Chief Complaint


Assessment:


s/p LBKA 


DM insulin dependence


HTN


HLP


CRI


Iron def anemia from acute blood loss


TERRI untreated





Plan:


Monitor pain


IRF protocol


Monitor hgb


Iron infusion





4/22/21:


Monitor sugar


Monitor hgb





(1) Status post below-knee amputation of left lower extremity


Status:  Acute


(2) Hyperglycemia


Status:  Chronic


(3) Hypertension


Status:  Chronic


(4) Diabetes mellitus, type 2


Status:  Chronic


(5) Anemia


Status:  Acute


(6) Iron deficiency


Status:  Acute


(7) Peripheral neuropathy


Status:  Chronic











NICOLE VILA DO                Apr 22, 2021 10:42

## 2021-04-23 VITALS — DIASTOLIC BLOOD PRESSURE: 81 MMHG | SYSTOLIC BLOOD PRESSURE: 128 MMHG

## 2021-04-23 VITALS — DIASTOLIC BLOOD PRESSURE: 57 MMHG | SYSTOLIC BLOOD PRESSURE: 113 MMHG

## 2021-04-23 RX ADMIN — METOPROLOL SUCCINATE SCH MG: 100 TABLET, EXTENDED RELEASE ORAL at 08:24

## 2021-04-23 RX ADMIN — GLYBURIDE SCH MG: 2.5 TABLET ORAL at 17:03

## 2021-04-23 RX ADMIN — INSULIN ASPART SCH UNIT: 100 INJECTION, SOLUTION INTRAVENOUS; SUBCUTANEOUS at 17:02

## 2021-04-23 RX ADMIN — OXYCODONE HYDROCHLORIDE SCH MG: 10 TABLET, FILM COATED, EXTENDED RELEASE ORAL at 20:05

## 2021-04-23 RX ADMIN — Medication SCH ML: at 20:06

## 2021-04-23 RX ADMIN — DOCUSATE SODIUM AND SENNOSIDES SCH EA: 8.6; 5 TABLET, FILM COATED ORAL at 09:06

## 2021-04-23 RX ADMIN — INSULIN ASPART SCH UNIT: 100 INJECTION, SOLUTION INTRAVENOUS; SUBCUTANEOUS at 11:49

## 2021-04-23 RX ADMIN — GLYBURIDE SCH MG: 2.5 TABLET ORAL at 06:53

## 2021-04-23 RX ADMIN — HYDROCODONE BITARTRATE AND ACETAMINOPHEN PRN EA: 7.5; 325 TABLET ORAL at 06:54

## 2021-04-23 RX ADMIN — SODIUM CHLORIDE SCH MG: 900 INJECTION, SOLUTION INTRAVENOUS at 08:24

## 2021-04-23 RX ADMIN — FENTANYL CITRATE PRN MCG: 50 INJECTION, SOLUTION INTRAMUSCULAR; INTRAVENOUS at 14:52

## 2021-04-23 RX ADMIN — Medication SCH ML: at 12:18

## 2021-04-23 RX ADMIN — PRASUGREL SCH MG: 10 TABLET, FILM COATED ORAL at 08:24

## 2021-04-23 RX ADMIN — FENTANYL CITRATE PRN MCG: 50 INJECTION, SOLUTION INTRAMUSCULAR; INTRAVENOUS at 09:00

## 2021-04-23 RX ADMIN — LISINOPRIL SCH MG: 20 TABLET ORAL at 08:24

## 2021-04-23 RX ADMIN — OXYCODONE HYDROCHLORIDE SCH MG: 10 TABLET, FILM COATED, EXTENDED RELEASE ORAL at 12:17

## 2021-04-23 RX ADMIN — POLYETHYLENE GLYCOL (3350) SCH GM: 17 POWDER, FOR SOLUTION ORAL at 19:42

## 2021-04-23 RX ADMIN — METFORMIN HYDROCHLORIDE SCH MG: 500 TABLET, EXTENDED RELEASE ORAL at 17:24

## 2021-04-23 RX ADMIN — POLYETHYLENE GLYCOL (3350) SCH GM: 17 POWDER, FOR SOLUTION ORAL at 09:06

## 2021-04-23 RX ADMIN — DOCUSATE SODIUM AND SENNOSIDES SCH EA: 8.6; 5 TABLET, FILM COATED ORAL at 19:42

## 2021-04-23 RX ADMIN — INSULIN ASPART SCH UNIT: 100 INJECTION, SOLUTION INTRAVENOUS; SUBCUTANEOUS at 06:53

## 2021-04-23 RX ADMIN — PANTOPRAZOLE SODIUM SCH MG: 40 TABLET, DELAYED RELEASE ORAL at 08:24

## 2021-04-23 RX ADMIN — DOCUSATE SODIUM SCH MG: 100 CAPSULE ORAL at 09:06

## 2021-04-23 RX ADMIN — Medication SCH ML: at 04:42

## 2021-04-23 RX ADMIN — METFORMIN HYDROCHLORIDE SCH MG: 500 TABLET, EXTENDED RELEASE ORAL at 08:24

## 2021-04-23 RX ADMIN — HYDROCODONE BITARTRATE AND ACETAMINOPHEN PRN EA: 7.5; 325 TABLET ORAL at 10:25

## 2021-04-23 RX ADMIN — MICONAZOLE NITRATE SCH APPLIC: 20 POWDER TOPICAL at 20:06

## 2021-04-23 RX ADMIN — DOCUSATE SODIUM SCH MG: 100 CAPSULE ORAL at 19:42

## 2021-04-23 RX ADMIN — SODIUM CHLORIDE SCH MLS/HR: 900 INJECTION, SOLUTION INTRAVENOUS at 04:42

## 2021-04-23 RX ADMIN — MICONAZOLE NITRATE SCH APPLIC: 20 POWDER TOPICAL at 08:26

## 2021-04-23 RX ADMIN — ASPIRIN SCH MG: 81 TABLET ORAL at 08:24

## 2021-04-23 NOTE — PHYSICAL THERAPY DAILY NOTE
PT Daily Note-Current


Subjective


Patient reports moderate unrated pain in L LE, but consents to therapy.





Appearance


Patient seated EOB completing exercises that are self-initiated, post tx. Call 

button within reach and all needs met post tx.





Mental Status


Patient Orientation:  Normal For Age





Transfers


SCALE: Activities may be completed with or without assistive devices.





6-Indepedent-patient completes the activity by him/herself with no assistance 

from a helper.


5-Set-up or Clean-up Assistance-helper sets up or cleans up; patient completes 

activity. [place] assists only prior to or  


    following the activity.


4-Supervision or Touching Assistance-helper provides verbal cues and/or 

touching/steadying and/or contact guard assistance as patient completes 

activity. Assistance may be provided   


    throughout the activity or intermittently.


3-Partial/Moderate Assistance-helper does LESS THAN HALF the effort. [place] 

lifts, holds or supports trunk or limbs, but provides less than half the effort.


2-Substantial/Maximal Assistance-helper does MORE THAN HALF the effort. [place] 

lifts or holds trunk or limbs and provides more than half the effort.


5-Uorxjpuld-quzwvb does ALL the effort. Patient does none of the effort to 

complete the activity. Or, the assistance of 2 or more helpers is required for 

the patient to complete the  


    activity.


If activity was not attempted, code reason:


7-Patient Refused.


9-Not Applicable-not attempted and the patient did not perform the activity 

before the current illness, exacerbation or injury.


10-Not Attempted due to Environmental Limitations-(lack of equipment, weather 

restraints, etc.).


88-Not Attempted due to Medical Conditions or Safety Concerns.


Roll Left & Right (QC):  6


Sit to Lying (QC):  6


Lying to Sitting/Side of Bed(Q:  6


Sit to Stand (QC):  4 (From elevated surface, CGA)


Chair/Bed-to-Chair Xfer(QC):  2 (Mod A x2)


Patient attempted bed to chair transfer without slide board, and was able to 

stand with CGA and transfer to chair by pivoting with CGA (but from an elevated 

surface). Patient wore tennis shoe. In parallel bars from w/c, patient was 

unable to perform full sit <-> stand transfer, despite several attempts and at 

different parallel bar angles. Patient then attempted sit to stand transfer 

pivot back to bed with max A x2, and was unable to complete transfer. Patient 

was able to then complete w/c to bed transfer using sliding board with mod A x2.





Weight Bearing


Right Lower Extremity:  Right


Full Weight Bearing


Left Lower Extremity:  Left


Non Weight Bearing (BKA)





Wheelchair Training


Does the Pt Use a Wheelchair?:  Yes


Wheel 50 ft with 2 turns (QC):  6


50' x2





Treatments


transfer training, bed mobility





Assessment


Good progress with single leg pivot to w/c without use of sliding board, patient

needs elevated surface to make this transfer without increased assistance





PT Short Term Goals


Short Term Goals


Time Frame:  2021


Roll Left & Right:  6


Sit to lyin


Lying to sitting on side of be:  6


Sit to stand:  3


Chair/bed-to-chair transfer:  3


Wheel 50ft w/2 turns:  6


Wheel 150 feet:  6





PT Long Term Goals


Long Term Goals


PT Long Term Goals Time Frame:  May 19, 2021


Roll Left & Right (QC):  6


Sit to Lying (QC):  6


Lying-Sitting on Side/Bed(QC):  6


Sit to Stand (QC):  6


Chair/Bed-to-Chair Xfer(QC):  6 (SPT or slideboard, whichever is the safest 

method)


Toilet Transfer (QC):  6 (SPT or slide board)


Car Transfer (QC):  4


Does the Patient Walk:  No and Walking Goal NOT indicated


Walk 10 feet (QC):  88


Walk 50ft with 2 Turns (QC):  88


Walk 150 ft (QC):  88


Walking 10ft on Uneven Surface:  88


1 Step (curb) (QC):  88


4 Steps (QC):  88


12 Steps (QC):  88


Picking up an Object (QC):  88


Does the Pt use WC or Scooter?:  Yes


Wheel 50 feet with 2 turns (QC:  6


Type:  Manual


Wheel 150 feet:  6


Type:  Manual





PT Plan


Problem List


Problem List:  Activity Tolerance, Functional Strength, Safety, Balance, Gait, 

Transfer, Bed Mobility, ROM





Treatment/Plan


Treatment Plan:  Continue Plan of Care


Treatment Plan:  Bed Mobility, Education, Functional Activity Aung, Functional 

Strength, Group Therapy, Gait, Safety, Therapeutic Exercise, Transfers


Treatment Duration:  May 19, 2021


Frequency:  At least 5 of 7 days/Wk (IRF)


Estimated Hrs Per Day:  1.5 hours per day


Patient and/or Family Agrees t:  Yes





Safety Risks/Education


Patient Education:  Transfer Techniques, Correct Positioning, W/C Management, 

Safety Issues


Teaching Recipient:  Patient


Teaching Methods:  Demonstration, Discussion


Response to Teaching:  Verbalize Understanding, Return Demonstration





Time/GCodes


Time In:  1330


Time Out:  1400


Total Billed Treatment Time:  30


Total Billed Treatment


1 visit: 


FA x2: 30'











CAMILLE HARRY PT                2021 14:19

## 2021-04-23 NOTE — PM&R PROGRESS NOTE
Subjective


HPI/CC On Admission


Date Seen by Provider:  Apr 23, 2021


Time Seen by Provider:  12:00


Subjective/Events-last exam


4/23/21:


Doing well


Stump  will start Monday per Dr Jiménez


BM+


Oxycontin and Oxycodone added for better pain control


DC Lortab








4/22/21:


Pt doing pretty well


Had a large BM today


Hgb 7.7 after three units of blood on med surg


Rough on the transfers but getting better


Dressing change will be per Dr. Morejon orders


Sugar is 107 this morning





Review of Systems


Musculoskeletal:  leg pain





Objective


Exam


Vital Signs





Vital Signs








  Date Time  Temp Pulse Resp B/P (MAP) Pulse Ox O2 Delivery O2 Flow Rate FiO2


 


4/23/21 20:00      Room Air  


 


4/23/21 19:26 36.0 87 16 128/81 (97) 99   





Capillary Refill :


General Appearance:  No Apparent Distress, WD/WN, Chronically ill, Obese


HEENT:  PERRL/EOMI, Normal ENT Inspection, Pharynx Normal


Neck:  Full Range of Motion, Normal Inspection, Non Tender, Supple, Carotid 

Bruit


Respiratory:  Chest Non Tender, Lungs Clear, Normal Breath Sounds, No Accessory 

Muscle Use, No Respiratory Distress


Cardiovascular:  Regular Rate, Rhythm, No Edema, No Gallop, No JVD, No Murmur, 

Normal Peripheral Pulses


Gastrointestinal:  Normal Bowel Sounds, No Organomegaly, No Pulsatile Mass, Non 

Tender, Soft


Back:  Normal Inspection, No CVA Tenderness, No Vertebral Tenderness


Extremity:  Normal Capillary Refill, Normal Inspection, Normal Range of Motion, 

Non Tender, No Calf Tenderness, No Pedal Edema


Neurologic/Psychiatric:  Alert, Oriented x3, No Motor/Sensory Deficits, Normal 

Mood/Affect, CNs II-XII Norm as Tested, Abnormal Gait, Motor Weakness (left leg 

amputation )


Skin:  Normal Color, Warm/Dry


Lymphatic:  No Adenopathy





Results/Procedures


Lab


Patient resulted labs reviewed.





FIM


Transfers


Therapy Code Descriptions/Definitions 





Functional [place] Measure:


0=Not Assessed/NA        4=Minimal Assistance


1=Total Assistance        5=Supervision or Setup


2=Maximal Assistance  6=Modified [place]


3=Moderate Assistance 7=Complete IndependenceSCALE: Activities may be completed 

with or without assistive devices.





6-Indepedent-patient completes the activity by him/herself with no assistance 

from a helper.


5-Set-up or Clean-up Assistance-helper sets up or cleans up; patient completes 

activity. [place] assists only prior to or  


    following the activity.


4-Supervision or Touching Assistance-helper provides verbal cues and/or 

touching/steadying and/or contact guard assistance as patient completes 

activity. Assistance may be provided   


    throughout the activity or intermittently.


3-Partial/Moderate Assistance-helper does LESS THAN HALF the effort. [place] 

lifts, holds or supports trunk or limbs, but provides less than half the effort.


2-Substantial/Maximal Assistance-helper does MORE THAN HALF the effort. [place] 

lifts or holds trunk or limbs and provides more than half the effort.


4-Rarqhystw-iclzgx does ALL the effort. Patient does none of the effort to 

complete the activity. Or, the assistance of 2 or more helpers is required for 

the patient to complete the  


    activity.


If activity was not attempted, code reason:


7-Patient Refused.


9-Not Applicable-not attempted and the patient did not perform the activity 

before the current illness, exacerbation or injury.


10-Not Attempted due to Environmental Limitations-(lack of equipment, weather 

restraints, etc.).


88-Not Attempted due to Medical Conditions or Safety Concerns.


Roll Left to Right (QC):  6


Sit to Lying (QC):  4


Sit to Stand (QC):  3 (mod A x1 with use of parallel bars)


Chair/Bed-to-Chair Xfer(QC):  3 (Mod A x2 with sliding board)


Car Transfer (QC):  88





Gait Training


Does the Patient Walk?:  No and Walking Goal NOT indicated


Walk 10 feet (QC):  88


Walk 50 ft with 2 Turns(QC):  88


Walk 150 ft (QC):  88


Walking 10ft/uneven surface-QC:  88





Wheelchair Training


Does the Pt Use a Wheelchair?:  Yes


Wheel 50 ft with 2 turns (QC):  6


Wheel 150 ft (QC):  6


Type of Wheelchair:  Manual





Stair Training


1 Step (curb) (QC):  88


4 Steps (QC):  88


12 Steps (QC):  88





Balance


Picking up an Object (QC):  88





ADL-Treatment


Eating (QC):  6


Oral Hygiene (QC):  5


Shower/Bathe Self (QC):  3


Upper Body Dressing (QC):  88


Lower Body Dressing (QC):  4 (Per pt. and PT, pt. was able to don shorts while 

in bed, and don right slipper sock with SBA.)


On/Off Footwear (QC):  4


Toileting Hygiene (QC):  2





Assessment/Plan


Assessment and Plan


Assess & Plan/Chief Complaint


Assessment:


s/p LBKA 


DM insulin dependence


HTN


HLP


CRI


Iron def anemia from acute blood loss


TERRI untreated





Plan:


Monitor pain


IRF protocol


Monitor hgb


Iron infusion





4/22/21:


Monitor sugar


Monitor hgb





4/23/21:


Add pain meds


Check labs in am





(1) Status post below-knee amputation of left lower extremity


Status:  Acute


(2) Hyperglycemia


Status:  Chronic


(3) Hypertension


Status:  Chronic


(4) Diabetes mellitus, type 2


Status:  Chronic


(5) Anemia


Status:  Acute


(6) Iron deficiency


Status:  Acute


(7) Peripheral neuropathy


Status:  Chronic











NICOLE VILA DO                Apr 23, 2021 10:33

## 2021-04-23 NOTE — OCCUPATIONAL THER DAILY NOTE
OT Current Status-Daily Note


Subjective


Pt alert, lying in bed.  Pt did have pain meds earlier this AM.  Pt agrees to 

therapy.  Stump is hurting throughout session, nrsg asked if pt wanted more pain

meds and pt declined at this time.





Mental Status/Objective


Patient Orientation:  Person, Place, Time, Situation


Attachments:  IV





ADL-Treatment


Pt requested urinal and bed pan prior to bathing.  Pt able to place urinal and 

use then assist to empty.  Pt rolled independently as RENE placed bedpan, assist

to cleanse buttocks after BM.  Pt sat EOB independently and completed 

upper/lower body bathing by self after set up.  Assist with cleansing buttocks 

while pt laying on side in bed.  Pt doffed socks independently and donned sock 

with sock aide.  Min A x2 for safety as pt used sliding board to transfer from 

bed to w/c.  Mod A to transfer back to bed due to going from lower surface to 

higher surface.  Pt sat at sink to complete oral care independently.


Therapy Code Descriptions/Definitions 





Functional [place] Measure:


0=Not Assessed/NA        4=Minimal Assistance


1=Total Assistance        5=Supervision or Setup


2=Maximal Assistance  6=Modified [place]


3=Moderate Assistance 7=Complete IndependenceSCALE: Activities may be completed 

with or without assistive devices.





6-Indepedent-patient completes the activity by him/herself with no assistance 

from a helper.


5-Set-up or Clean-up Assistance-helper sets up or cleans up; patient completes 

activity. [place] assists only prior to or  


    following the activity.


4-Supervision or Touching Assistance-helper provides verbal cues and/or 

touching/steadying and/or contact guard assistance as patient completes 

activity. Assistance may be provided   


    throughout the activity or intermittently.


3-Partial/Moderate Assistance-helper does LESS THAN HALF the effort. [place] 

lifts, holds or supports trunk or limbs, but provides less than half the effort.


2-Substantial/Maximal Assistance-helper does MORE THAN HALF the effort. [place] 

lifts or holds trunk or limbs and provides more than half the effort.


8-Ipardrrzl-miktgu does ALL the effort. Patient does none of the effort to 

complete the activity. Or, the assistance of 2 or more helpers is required for 

the patient to complete the  


    activity.


If activity was not attempted, code reason:


7-Patient Refused.


9-Not Applicable-not attempted and the patient did not perform the activity 

before the current illness, exacerbation or injury.


10-Not Attempted due to Environmental Limitations-(lack of equipment, weather 

restraints, etc.).


88-Not Attempted due to Medical Conditions or Safety Concerns.


Oral Hygiene (QC):  6


Shower/Bathe Self (QC):  3 (sponge bath on EOB)


On/Off Footwear:  5


Toileting Hygiene (QC):  2


Toilet Transfer (QC):  2





Other Treatment


Pt completed B UE and trunk strengthening exercises using therapy ball, 1 set 10

reps.  Only 1 set due to increased stump pain with increased movement.  After 

session, pt lying in bed with call light/phone in reach.  All needs met in room.





OT Short Term Goals


Short Term Goals


Time Frame:  May 5, 2021


Eatin


Oral hygiene:  6


Toileting hygiene:  4


Shower/bathe self:  4


Upper body dressin


Lower body dressing:  3


Putting on/taking off footwear:  3





OT Long Term Goals


Long Term Goals


Time Frame:  May 19, 2021


Eating (QC):  6


Oral Hygiene (QC):  6


Toileting Hygiene (QC):  6


Shower/Bathe Self (QC):  6


Upper Body Dressing (QC):  6


Lower Body Dressing (QC):  6


On/Off Footwear (QC):  6


Additional Goals:  1-Demonstrate ADL Tasks, 2-Verbalize Understanding, 3-

ImproveStrength/Aung


1=Demonstrate adherence to instructed precautions during ADL tasks.


2=Patient will verbalize/demonstrate understanding of assistive 

devices/modifications for ADL.


3=Patient will improve strength/tolerance for activity to enable patient to 

perform ADL's.





OT Education/Plan


Problem List/Assessment


Assessment:  Decreased UE Strength, Impaired Self-Care Skills





Discharge Recommendations


Plan/Recommendations:  Continue POC





Treatment Plan/Plan of Care


Patient would benefit from OT for education, treatment and training to promote 

independence in ADL's, mobility, safety and/or upper extremity function for ADL'

s.


Plan of Care:  ADL Retraining, Functional Mobility, Group Exercise/Act as Ind, 

UE Funct Exercise/Act


Treatment Duration:  May 19, 2021


Frequency:  At least 5 of 7 days/Wk (IRF)


Estimated Hrs Per Day:  1.5 hours per day


Agreement:  Yes


Rehab Potential:  Good





Time/GCodes


Start Time:  07:30


Stop Time:  09:00


Total Time Billed (hr/min):  90


Billed Treatment Time


1 visit-ADL 4 (60 min)  EX 2 (30 min)











GUDELIA GONZALEZ               2021 10:48

## 2021-04-23 NOTE — PHYSICAL THERAPY DAILY NOTE
PT Daily Note-Current


Subjective


Patient supine in bed pre tx, reported 5/10 throbbing pain in L LE. Patient 

consented to tx.





Appearance


Patient supine in bed post tx, all needs met and tray table and call button 

within reach. Patient agreed to w/c transfer and standing practice for afternoon

session.





Mental Status


Patient Orientation:  Person, Place, Time, Normal For Age





Transfers


SCALE: Activities may be completed with or without assistive devices.





6-Indepedent-patient completes the activity by him/herself with no assistance 

from a helper.


5-Set-up or Clean-up Assistance-helper sets up or cleans up; patient completes 

activity. [place] assists only prior to or  


    following the activity.


4-Supervision or Touching Assistance-helper provides verbal cues and/or nicholas

verna/steadying and/or contact guard assistance as patient completes activity. 

Assistance may be provided   


    throughout the activity or intermittently.


3-Partial/Moderate Assistance-helper does LESS THAN HALF the effort. [place] 

lifts, holds or supports trunk or limbs, but provides less than half the effort.


2-Substantial/Maximal Assistance-helper does MORE THAN HALF the effort. [place] 

lifts or holds trunk or limbs and provides more than half the effort.


5-Qcqnsaqeh-zmyvxw does ALL the effort. Patient does none of the effort to 

complete the activity. Or, the assistance of 2 or more helpers is required for 

the patient to complete the  


    activity.


If activity was not attempted, code reason:


7-Patient Refused.


9-Not Applicable-not attempted and the patient did not perform the activity 

before the current illness, exacerbation or injury.


10-Not Attempted due to Environmental Limitations-(lack of equipment, weather 

restraints, etc.).


88-Not Attempted due to Medical Conditions or Safety Concerns.


Roll Left & Right (QC):  6


Patient was able to completely roll onto stomach independently during tx 

session.





Weight Bearing


Right Lower Extremity:  Right


Full Weight Bearing


Left Lower Extremity:  Left


Non Weight Bearing (BKA)





Exercises


Supine Ex:  Ankle pumps, Quad Set, Glut sets, Heel Slides (R), Straight leg 

raise, Hip abd/add


Supine Reps:  20 (2 sets)





Treatments


Bed mobility, LE strengthening, ROM (knee extension stretch supine with 5# 

weights for 5 min, prone hip extension stretch for 5 min)





Assessment


Current Status:  Fair Progress


Physician entered room and checked incision site during treatment, so 

incorporated bed exercises, which patient was able to demonstrate good endurance

with bed exercise and bed mobility with rolling. Patient continues to 

demonstrate motivation to participate.





PT Short Term Goals


Short Term Goals


Time Frame:  2021


Roll Left & Right:  6


Sit to lyin


Lying to sitting on side of be:  6


Sit to stand:  3


Chair/bed-to-chair transfer:  3


Wheel 50ft w/2 turns:  6


Wheel 150 feet:  6





PT Long Term Goals


Long Term Goals


PT Long Term Goals Time Frame:  May 19, 2021


Roll Left & Right (QC):  6


Sit to Lying (QC):  6


Lying-Sitting on Side/Bed(QC):  6


Sit to Stand (QC):  6


Chair/Bed-to-Chair Xfer(QC):  6 (SPT or slideboard, whichever is the safest 

method)


Toilet Transfer (QC):  6 (SPT or slide board)


Car Transfer (QC):  4


Does the Patient Walk:  No and Walking Goal NOT indicated


Walk 10 feet (QC):  88


Walk 50ft with 2 Turns (QC):  88


Walk 150 ft (QC):  88


Walking 10ft on Uneven Surface:  88


1 Step (curb) (QC):  88


4 Steps (QC):  88


12 Steps (QC):  88


Picking up an Object (QC):  88


Does the Pt use WC or Scooter?:  Yes


Wheel 50 feet with 2 turns (QC:  6


Type:  Manual


Wheel 150 feet:  6


Type:  Manual





PT Plan


Problem List


Problem List:  Activity Tolerance, Functional Strength, Safety, Balance, Gait, 

Transfer, Bed Mobility, ROM





Treatment/Plan


Treatment Plan:  Continue Plan of Care


Treatment Plan:  Bed Mobility, Education, Functional Activity Aung, Functional 

Strength, Group Therapy, Gait, Safety, Therapeutic Exercise, Transfers


Treatment Duration:  May 19, 2021


Frequency:  At least 5 of 7 days/Wk (IRF)


Estimated Hrs Per Day:  1.5 hours per day


Patient and/or Family Agrees t:  Yes





Safety Risks/Education


Patient Education:  Correct Positioning, Safety Issues


Teaching Recipient:  Patient


Teaching Methods:  Demonstration, Discussion


Response to Teaching:  Verbalize Understanding, Return Demonstration





Time/GCodes


Time In:  1000


Time Out:  1100


Total Billed Treatment Time:  60


Total Billed Treatment


1 visit: 


EX x2: 35' 


FA x2: 25'











CAMILLE HARRY PT                2021 10:57

## 2021-04-23 NOTE — PROGRESS NOTE
Subjective


Date Seen by a Provider:  Apr 23, 2021


Time Seen by a Provider:  10:00


Subjective/Events-last exam


doing well. working well with rehab. ok to start stump  monday.





Objective


Exam





Vital Signs








  Date Time  Temp Pulse Resp B/P (MAP) Pulse Ox O2 Delivery O2 Flow Rate FiO2


 


4/23/21 09:00      Room Air  


 


4/23/21 08:00 36.2 86 18 113/57 (75) 99 Room Air  


 


4/22/21 20:55      Room Air  


 


4/22/21 20:00 36.1 79 18 148/63 (91) 100 Room Air  














I & O 


 


 4/23/21





 07:00


 


Intake Total 1200 ml


 


Balance 1200 ml





Capillary Refill :


General Appearance:  No Apparent Distress


HEENT:  PERRL/EOMI


Neck:  Full Range of Motion


Respiratory:  Chest Non Tender, Lungs Clear, Normal Breath Sounds


Cardiovascular:  Regular Rate, Rhythm


Gastrointestinal:  normal bowel sounds, non tender, soft


Extremity:  Normal Capillary Refill


Neurologic/Psychiatric:  Alert, Oriented x3


Skin:  Normal Color


Lymphatic:  No Adenopathy





Results


Lab


Laboratory Tests


4/22/21 11:39: Glucometer 87


4/22/21 15:43: Glucometer 99


4/22/21 20:53: Glucometer 99


4/23/21 06:13: Glucometer 76


4/23/21 10:46: Glucometer 103





Assessment/Plan


Assessment/Plan


Assess & Plan/Chief Complaint


s/p left BKA.


cont rehab.


add stump  monday.











CLAUDY ALEJO MD                Apr 23, 2021 10:57

## 2021-04-24 VITALS — SYSTOLIC BLOOD PRESSURE: 121 MMHG | DIASTOLIC BLOOD PRESSURE: 66 MMHG

## 2021-04-24 VITALS — DIASTOLIC BLOOD PRESSURE: 73 MMHG | SYSTOLIC BLOOD PRESSURE: 111 MMHG

## 2021-04-24 LAB
ALBUMIN SERPL-MCNC: 3.4 GM/DL (ref 3.2–4.5)
ALP SERPL-CCNC: 74 U/L (ref 40–136)
ALT SERPL-CCNC: 14 U/L (ref 0–55)
BASOPHILS # BLD AUTO: 0 10^3/UL (ref 0–0.1)
BASOPHILS NFR BLD AUTO: 1 % (ref 0–10)
BILIRUB SERPL-MCNC: 0.3 MG/DL (ref 0.1–1)
BUN/CREAT SERPL: 24
CALCIUM SERPL-MCNC: 9.4 MG/DL (ref 8.5–10.1)
CHLORIDE SERPL-SCNC: 104 MMOL/L (ref 98–107)
CO2 SERPL-SCNC: 23 MMOL/L (ref 21–32)
CREAT SERPL-MCNC: 0.84 MG/DL (ref 0.6–1.3)
EOSINOPHIL # BLD AUTO: 0.2 10^3/UL (ref 0–0.3)
EOSINOPHIL NFR BLD AUTO: 3 % (ref 0–10)
GFR SERPLBLD BASED ON 1.73 SQ M-ARVRAT: > 60 ML/MIN
GLUCOSE SERPL-MCNC: 102 MG/DL (ref 70–105)
HCT VFR BLD CALC: 27 % (ref 40–54)
HGB BLD-MCNC: 7.9 G/DL (ref 13.3–17.7)
LYMPHOCYTES # BLD AUTO: 2 10^3/UL (ref 1–4)
LYMPHOCYTES NFR BLD AUTO: 32 % (ref 12–44)
MANUAL DIFFERENTIAL PERFORMED BLD QL: NO
MCH RBC QN AUTO: 27 PG (ref 25–34)
MCHC RBC AUTO-ENTMCNC: 30 G/DL (ref 32–36)
MCV RBC AUTO: 90 FL (ref 80–99)
MONOCYTES # BLD AUTO: 0.4 10^3/UL (ref 0–1)
MONOCYTES NFR BLD AUTO: 6 % (ref 0–12)
NEUTROPHILS # BLD AUTO: 3.7 10^3/UL (ref 1.8–7.8)
NEUTROPHILS NFR BLD AUTO: 58 % (ref 42–75)
PLATELET # BLD: 373 10^3/UL (ref 130–400)
PMV BLD AUTO: 9.3 FL (ref 9–12.2)
POTASSIUM SERPL-SCNC: 4.7 MMOL/L (ref 3.6–5)
PROT SERPL-MCNC: 7 GM/DL (ref 6.4–8.2)
SODIUM SERPL-SCNC: 139 MMOL/L (ref 135–145)
WBC # BLD AUTO: 6.3 10^3/UL (ref 4.3–11)

## 2021-04-24 RX ADMIN — ASPIRIN SCH MG: 81 TABLET ORAL at 09:16

## 2021-04-24 RX ADMIN — POLYETHYLENE GLYCOL (3350) SCH GM: 17 POWDER, FOR SOLUTION ORAL at 20:40

## 2021-04-24 RX ADMIN — PRASUGREL SCH MG: 10 TABLET, FILM COATED ORAL at 09:16

## 2021-04-24 RX ADMIN — FENTANYL CITRATE PRN MCG: 50 INJECTION, SOLUTION INTRAMUSCULAR; INTRAVENOUS at 17:24

## 2021-04-24 RX ADMIN — Medication SCH ML: at 21:10

## 2021-04-24 RX ADMIN — PANTOPRAZOLE SODIUM SCH MG: 40 TABLET, DELAYED RELEASE ORAL at 09:16

## 2021-04-24 RX ADMIN — MICONAZOLE NITRATE SCH APPLIC: 20 POWDER TOPICAL at 09:17

## 2021-04-24 RX ADMIN — OXYCODONE HYDROCHLORIDE SCH MG: 10 TABLET, FILM COATED, EXTENDED RELEASE ORAL at 09:16

## 2021-04-24 RX ADMIN — INSULIN ASPART SCH UNIT: 100 INJECTION, SOLUTION INTRAVENOUS; SUBCUTANEOUS at 17:02

## 2021-04-24 RX ADMIN — Medication SCH ML: at 15:00

## 2021-04-24 RX ADMIN — OXYCODONE HYDROCHLORIDE SCH MG: 10 TABLET, FILM COATED, EXTENDED RELEASE ORAL at 21:10

## 2021-04-24 RX ADMIN — METFORMIN HYDROCHLORIDE SCH MG: 500 TABLET, EXTENDED RELEASE ORAL at 18:41

## 2021-04-24 RX ADMIN — INSULIN ASPART SCH UNIT: 100 INJECTION, SOLUTION INTRAVENOUS; SUBCUTANEOUS at 06:55

## 2021-04-24 RX ADMIN — POLYETHYLENE GLYCOL (3350) SCH GM: 17 POWDER, FOR SOLUTION ORAL at 09:20

## 2021-04-24 RX ADMIN — DOCUSATE SODIUM AND SENNOSIDES SCH EA: 8.6; 5 TABLET, FILM COATED ORAL at 09:20

## 2021-04-24 RX ADMIN — Medication SCH ML: at 06:56

## 2021-04-24 RX ADMIN — METOPROLOL SUCCINATE SCH MG: 100 TABLET, EXTENDED RELEASE ORAL at 09:16

## 2021-04-24 RX ADMIN — INSULIN ASPART SCH UNIT: 100 INJECTION, SOLUTION INTRAVENOUS; SUBCUTANEOUS at 11:57

## 2021-04-24 RX ADMIN — MICONAZOLE NITRATE SCH APPLIC: 20 POWDER TOPICAL at 21:10

## 2021-04-24 RX ADMIN — DOCUSATE SODIUM SCH MG: 100 CAPSULE ORAL at 09:20

## 2021-04-24 RX ADMIN — GLYBURIDE SCH MG: 2.5 TABLET ORAL at 06:55

## 2021-04-24 RX ADMIN — LISINOPRIL SCH MG: 20 TABLET ORAL at 09:16

## 2021-04-24 RX ADMIN — DOCUSATE SODIUM AND SENNOSIDES SCH EA: 8.6; 5 TABLET, FILM COATED ORAL at 20:40

## 2021-04-24 RX ADMIN — DOCUSATE SODIUM SCH MG: 100 CAPSULE ORAL at 20:40

## 2021-04-24 RX ADMIN — METFORMIN HYDROCHLORIDE SCH MG: 500 TABLET, EXTENDED RELEASE ORAL at 09:16

## 2021-04-24 RX ADMIN — GLYBURIDE SCH MG: 2.5 TABLET ORAL at 17:02

## 2021-04-24 NOTE — PHYSICAL THERAPY DAILY NOTE
PT Daily Note-Current


Subjective


Pt. in bed, states he has just completed some arm exercises and is about to 

continue with leg exercises.





Mental Status


Patient Orientation:  Person, Place, Time, Situation





Transfers


SCALE: Activities may be completed with or without assistive devices.





6-Indepedent-patient completes the activity by him/herself with no assistance 

from a helper.


5-Set-up or Clean-up Assistance-helper sets up or cleans up; patient completes 

activity. [place] assists only prior to or  


    following the activity.


4-Supervision or Touching Assistance-helper provides verbal cues and/or 

touching/steadying and/or contact guard assistance as patient completes 

activity. Assistance may be provided   


    throughout the activity or intermittently.


3-Partial/Moderate Assistance-helper does LESS THAN HALF the effort. [place] 

lifts, holds or supports trunk or limbs, but provides less than half the effort.


2-Substantial/Maximal Assistance-helper does MORE THAN HALF the effort. [place] 

lifts or holds trunk or limbs and provides more than half the effort.


0-Qllohbxgm-vjouxd does ALL the effort. Patient does none of the effort to 

complete the activity. Or, the assistance of 2 or more helpers is required for 

the patient to complete the  


    activity.


If activity was not attempted, code reason:


7-Patient Refused.


9-Not Applicable-not attempted and the patient did not perform the activity 

before the current illness, exacerbation or injury.


10-Not Attempted due to Environmental Limitations-(lack of equipment, weather 

restraints, etc.).


88-Not Attempted due to Medical Conditions or Safety Concerns.


Lying to Sitting/Side of Bed(Q:  6





Weight Bearing


Right Lower Extremity:  Right


Full Weight Bearing


Left Lower Extremity:  Left


Non Weight Bearing (BKA)





Exercises


Supine Ex:  Ankle pumps, Quad Set, Glut sets, Heel Slides, Straight leg raise, 

Hip abd/add


Supine Reps:  20


Seated Therapy Exercises:  Long arc quads


Seated Reps:  20


s/l hip abd x 20 (B)





Treatments


LE exercises, all planes





Assessment


Current Status:  Good Progress


Pt. does very well exercises, completes in supine, sidelying, and seated at EOB.

 Pt. is very motivated to improve mobility to return home.  Pt. seated at EOB 

post session with all needs met.





PT Short Term Goals


Short Term Goals


Time Frame:  2021


Roll Left & Right:  6


Sit to lyin


Lying to sitting on side of be:  6


Sit to stand:  3


Chair/bed-to-chair transfer:  3


Wheel 50ft w/2 turns:  6


Wheel 150 feet:  6





PT Long Term Goals


Long Term Goals


PT Long Term Goals Time Frame:  May 19, 2021


Roll Left & Right (QC):  6


Sit to Lying (QC):  6


Lying-Sitting on Side/Bed(QC):  6


Sit to Stand (QC):  6


Chair/Bed-to-Chair Xfer(QC):  6 (SPT or slideboard, whichever is the safest 

method)


Toilet Transfer (QC):  6 (SPT or slide board)


Car Transfer (QC):  4


Does the Patient Walk:  No and Walking Goal NOT indicated


Walk 10 feet (QC):  88


Walk 50ft with 2 Turns (QC):  88


Walk 150 ft (QC):  88


Walking 10ft on Uneven Surface:  88


1 Step (curb) (QC):  88


4 Steps (QC):  88


12 Steps (QC):  88


Picking up an Object (QC):  88


Does the Pt use WC or Scooter?:  Yes


Wheel 50 feet with 2 turns (QC:  6


Type:  Manual


Wheel 150 feet:  6


Type:  Manual





PT Plan


Treatment/Plan


Treatment Plan:  Continue Plan of Care


Treatment Plan:  Bed Mobility, Education, Functional Activity Aung, Functional 

Strength, Group Therapy, Gait, Safety, Therapeutic Exercise, Transfers


Treatment Duration:  May 19, 2021


Frequency:  At least 5 of 7 days/Wk (IRF)


Estimated Hrs Per Day:  1.5 hours per day


Patient and/or Family Agrees t:  Yes





Time/GCodes


Time In:  1117


Time Out:  1132


Total Billed Treatment Time:  15


Total Billed Treatment


1, Ex 15'











HÉCTOR MILLER PT            2021 11:24

## 2021-04-24 NOTE — PM&R PROGRESS NOTE
Subjective


HPI/CC On Admission


Date Seen by Provider:  Apr 24, 2021


Time Seen by Provider:  12:45


Subjective/Events-last exam


4/24/21:


Pain is better


No issues reported


Hgb 7.9


BM++


Using IS








4/23/21:


Doing well


Stump  will start Monday per Dr Jiménez


BM+


Oxycontin and Oxycodone added for better pain control


DC Lortab








4/22/21:


Pt doing pretty well


Had a large BM today


Hgb 7.7 after three units of blood on med surg


Rough on the transfers but getting better


Dressing change will be per Dr. Morejon orders


Sugar is 107 this morning





Review of Systems


General:  Fatigue


Musculoskeletal:  leg pain





Objective


Exam


Vital Signs





Vital Signs








  Date Time  Temp Pulse Resp B/P (MAP) Pulse Ox O2 Delivery O2 Flow Rate FiO2


 


4/24/21 21:05      Room Air  


 


4/24/21 19:36 35.8 75 16 111/73 (86) 98   





Capillary Refill :


General Appearance:  No Apparent Distress, WD/WN, Chronically ill, Obese


HEENT:  PERRL/EOMI, Normal ENT Inspection, Pharynx Normal


Neck:  Full Range of Motion, Normal Inspection, Non Tender, Supple, Carotid 

Bruit


Respiratory:  Chest Non Tender, Lungs Clear, Normal Breath Sounds, No Accessory 

Muscle Use, No Respiratory Distress


Cardiovascular:  Regular Rate, Rhythm, No Edema, No Gallop, No JVD, No Murmur, 

Normal Peripheral Pulses


Gastrointestinal:  Normal Bowel Sounds, No Organomegaly, No Pulsatile Mass, Non 

Tender, Soft


Back:  Normal Inspection, No CVA Tenderness, No Vertebral Tenderness


Extremity:  Normal Capillary Refill, Normal Inspection, Normal Range of Motion, 

Non Tender, No Calf Tenderness, No Pedal Edema


Neurologic/Psychiatric:  Alert, Oriented x3, No Motor/Sensory Deficits, Normal 

Mood/Affect, CNs II-XII Norm as Tested, Abnormal Gait, Motor Weakness (left leg 

amputation )


Skin:  Normal Color, Warm/Dry


Lymphatic:  No Adenopathy





Results/Procedures


Lab


Laboratory Tests


4/24/21 06:00








Patient resulted labs reviewed.





FIM


Transfers


Therapy Code Descriptions/Definitions 





Functional [place] Measure:


0=Not Assessed/NA        4=Minimal Assistance


1=Total Assistance        5=Supervision or Setup


2=Maximal Assistance  6=Modified [place]


3=Moderate Assistance 7=Complete IndependenceSCALE: Activities may be completed 

with or without assistive devices.





6-Indepedent-patient completes the activity by him/herself with no assistance 

from a helper.


5-Set-up or Clean-up Assistance-helper sets up or cleans up; patient completes 

activity. [place] assists only prior to or  


    following the activity.


4-Supervision or Touching Assistance-helper provides verbal cues and/or 

touching/steadying and/or contact guard assistance as patient completes 

activity. Assistance may be provided   


    throughout the activity or intermittently.


3-Partial/Moderate Assistance-helper does LESS THAN HALF the effort. [place] 

lifts, holds or supports trunk or limbs, but provides less than half the effort.


2-Substantial/Maximal Assistance-helper does MORE THAN HALF the effort. [place] 

lifts or holds trunk or limbs and provides more than half the effort.


0-Wqhtbeiqn-hrexna does ALL the effort. Patient does none of the effort to 

complete the activity. Or, the assistance of 2 or more helpers is required for 

the patient to complete the  


    activity.


If activity was not attempted, code reason:


7-Patient Refused.


9-Not Applicable-not attempted and the patient did not perform the activity 

before the current illness, exacerbation or injury.


10-Not Attempted due to Environmental Limitations-(lack of equipment, weather 

restraints, etc.).


88-Not Attempted due to Medical Conditions or Safety Concerns.


Roll Left to Right (QC):  6


Sit to Lying (QC):  6


Sit to Stand (QC):  4 (From elevated surface, CGA)


Chair/Bed-to-Chair Xfer(QC):  2 (Mod A x2)


Car Transfer (QC):  88





Gait Training


Does the Patient Walk?:  No and Walking Goal NOT indicated


Walk 10 feet (QC):  88


Walk 50 ft with 2 Turns(QC):  88


Walk 150 ft (QC):  88


Walking 10ft/uneven surface-QC:  88





Wheelchair Training


Does the Pt Use a Wheelchair?:  Yes


Wheel 50 ft with 2 turns (QC):  6


Wheel 150 ft (QC):  6


Type of Wheelchair:  Manual





Stair Training


1 Step (curb) (QC):  88


4 Steps (QC):  88


12 Steps (QC):  88





Balance


Picking up an Object (QC):  88





ADL-Treatment


Eating (QC):  6


Oral Hygiene (QC):  6


Shower/Bathe Self (QC):  3 (sponge bath on EOB)


Upper Body Dressing (QC):  88


Lower Body Dressing (QC):  4 (Per pt. and PT, pt. was able to don shorts while 

in bed, and don right slipper sock with SBA.)


On/Off Footwear (QC):  5


Toileting Hygiene (QC):  2


Toilet Transfer (QC):  2





Assessment/Plan


Assessment and Plan


Assess & Plan/Chief Complaint


Assessment:


s/p LBKA 


DM insulin dependence


HTN


HLP


CRI


Iron def anemia from acute blood loss


TERRI untreated





Plan:


Monitor pain


IRF protocol


Monitor hgb


Iron infusion





4/22/21:


Monitor sugar


Monitor hgb





4/23/21:


Add pain meds


Check labs in am





4/24/21:


Monitor sugar and decrease insulin


Hgb stable


Venofer





(1) Status post below-knee amputation of left lower extremity


Status:  Acute


(2) Hyperglycemia


Status:  Chronic


(3) Hypertension


Status:  Chronic


(4) Diabetes mellitus, type 2


Status:  Chronic


(5) Anemia


Status:  Acute


(6) Iron deficiency


Status:  Acute


(7) Peripheral neuropathy


Status:  Chronic











NICOLE VILA DO                Apr 24, 2021 12:52

## 2021-04-25 VITALS — SYSTOLIC BLOOD PRESSURE: 120 MMHG | DIASTOLIC BLOOD PRESSURE: 57 MMHG

## 2021-04-25 VITALS — DIASTOLIC BLOOD PRESSURE: 67 MMHG | SYSTOLIC BLOOD PRESSURE: 124 MMHG

## 2021-04-25 RX ADMIN — METFORMIN HYDROCHLORIDE SCH MG: 500 TABLET, EXTENDED RELEASE ORAL at 07:40

## 2021-04-25 RX ADMIN — DOCUSATE SODIUM SCH MG: 100 CAPSULE ORAL at 19:27

## 2021-04-25 RX ADMIN — METFORMIN HYDROCHLORIDE SCH MG: 500 TABLET, EXTENDED RELEASE ORAL at 17:51

## 2021-04-25 RX ADMIN — INSULIN ASPART SCH UNIT: 100 INJECTION, SOLUTION INTRAVENOUS; SUBCUTANEOUS at 07:02

## 2021-04-25 RX ADMIN — OXYCODONE HYDROCHLORIDE SCH MG: 10 TABLET, FILM COATED, EXTENDED RELEASE ORAL at 21:03

## 2021-04-25 RX ADMIN — POLYETHYLENE GLYCOL (3350) SCH GM: 17 POWDER, FOR SOLUTION ORAL at 10:09

## 2021-04-25 RX ADMIN — MICONAZOLE NITRATE SCH APPLIC: 20 POWDER TOPICAL at 21:03

## 2021-04-25 RX ADMIN — GLYBURIDE SCH MG: 2.5 TABLET ORAL at 07:01

## 2021-04-25 RX ADMIN — SODIUM CHLORIDE SCH MG: 900 INJECTION, SOLUTION INTRAVENOUS at 09:50

## 2021-04-25 RX ADMIN — OXYCODONE HYDROCHLORIDE SCH MG: 10 TABLET, FILM COATED, EXTENDED RELEASE ORAL at 07:40

## 2021-04-25 RX ADMIN — MICONAZOLE NITRATE SCH APPLIC: 20 POWDER TOPICAL at 09:51

## 2021-04-25 RX ADMIN — PRASUGREL SCH MG: 10 TABLET, FILM COATED ORAL at 09:50

## 2021-04-25 RX ADMIN — DOCUSATE SODIUM AND SENNOSIDES SCH EA: 8.6; 5 TABLET, FILM COATED ORAL at 19:27

## 2021-04-25 RX ADMIN — ASPIRIN SCH MG: 81 TABLET ORAL at 09:50

## 2021-04-25 RX ADMIN — Medication SCH ML: at 12:39

## 2021-04-25 RX ADMIN — GLYBURIDE SCH MG: 2.5 TABLET ORAL at 16:54

## 2021-04-25 RX ADMIN — DOCUSATE SODIUM SCH MG: 100 CAPSULE ORAL at 10:08

## 2021-04-25 RX ADMIN — FENTANYL CITRATE PRN MCG: 50 INJECTION, SOLUTION INTRAMUSCULAR; INTRAVENOUS at 16:59

## 2021-04-25 RX ADMIN — Medication SCH ML: at 21:04

## 2021-04-25 RX ADMIN — INSULIN ASPART SCH UNIT: 100 INJECTION, SOLUTION INTRAVENOUS; SUBCUTANEOUS at 12:38

## 2021-04-25 RX ADMIN — DOCUSATE SODIUM AND SENNOSIDES SCH EA: 8.6; 5 TABLET, FILM COATED ORAL at 10:09

## 2021-04-25 RX ADMIN — POLYETHYLENE GLYCOL (3350) SCH GM: 17 POWDER, FOR SOLUTION ORAL at 19:27

## 2021-04-25 RX ADMIN — LISINOPRIL SCH MG: 20 TABLET ORAL at 09:50

## 2021-04-25 RX ADMIN — INSULIN ASPART SCH UNIT: 100 INJECTION, SOLUTION INTRAVENOUS; SUBCUTANEOUS at 17:51

## 2021-04-25 RX ADMIN — METOPROLOL SUCCINATE SCH MG: 100 TABLET, EXTENDED RELEASE ORAL at 09:51

## 2021-04-25 RX ADMIN — Medication SCH ML: at 07:02

## 2021-04-25 RX ADMIN — PANTOPRAZOLE SODIUM SCH MG: 40 TABLET, DELAYED RELEASE ORAL at 09:50

## 2021-04-25 NOTE — PM&R PROGRESS NOTE
Subjective


HPI/CC On Admission


Date Seen by Provider:  Apr 25, 2021


Time Seen by Provider:  12:00


Subjective/Events-last exam


4/25/21:


Doing well


No pain when he doesn't move


Pain controlled better with Oxycontin


BM+


Mother and son visiting him today


Sugars reviewed








4/24/21:


Pain is better


No issues reported


Hgb 7.9


BM++


Using IS








4/23/21:


Doing well


Stump  will start Monday per Dr Jiménez


BM+


Oxycontin and Oxycodone added for better pain control


DC Lortab








4/22/21:


Pt doing pretty well


Had a large BM today


Hgb 7.7 after three units of blood on med surg


Rough on the transfers but getting better


Dressing change will be per Dr. Morejon orders


Sugar is 107 this morning





Review of Systems


General:  Fatigue


Musculoskeletal:  leg pain





Objective


Exam


Vital Signs





Vital Signs








  Date Time  Temp Pulse Resp B/P (MAP) Pulse Ox O2 Delivery O2 Flow Rate FiO2


 


4/25/21 09:00      Room Air  


 


4/25/21 07:05 36.2 78 20 120/57 (78) 97   





Capillary Refill :


General Appearance:  No Apparent Distress, WD/WN, Chronically ill, Obese


HEENT:  PERRL/EOMI, Normal ENT Inspection, Pharynx Normal


Neck:  Full Range of Motion, Normal Inspection, Non Tender, Supple, Carotid 

Bruit


Respiratory:  Chest Non Tender, Lungs Clear, Normal Breath Sounds, No Accessory 

Muscle Use, No Respiratory Distress


Cardiovascular:  Regular Rate, Rhythm, No Edema, No Gallop, No JVD, No Murmur, 

Normal Peripheral Pulses


Gastrointestinal:  Normal Bowel Sounds, No Organomegaly, No Pulsatile Mass, Non 

Tender, Soft


Back:  Normal Inspection, No CVA Tenderness, No Vertebral Tenderness


Extremity:  Normal Capillary Refill, Normal Inspection, Normal Range of Motion, 

Non Tender, No Calf Tenderness, No Pedal Edema


Neurologic/Psychiatric:  Alert, Oriented x3, No Motor/Sensory Deficits, Normal 

Mood/Affect, CNs II-XII Norm as Tested, Abnormal Gait, Motor Weakness (left leg 

amputation )


Skin:  Normal Color, Warm/Dry


Lymphatic:  No Adenopathy





Results/Procedures


Lab


Patient resulted labs reviewed.





FIM


Transfers


Therapy Code Descriptions/Definitions 





Functional [place] Measure:


0=Not Assessed/NA        4=Minimal Assistance


1=Total Assistance        5=Supervision or Setup


2=Maximal Assistance  6=Modified [place]


3=Moderate Assistance 7=Complete IndependenceSCALE: Activities may be completed 

with or without assistive devices.





6-Indepedent-patient completes the activity by him/herself with no assistance 

from a helper.


5-Set-up or Clean-up Assistance-helper sets up or cleans up; patient completes 

activity. [place] assists only prior to or  


    following the activity.


4-Supervision or Touching Assistance-helper provides verbal cues and/or nicholas

verna/steadying and/or contact guard assistance as patient completes activity. 

Assistance may be provided   


    throughout the activity or intermittently.


3-Partial/Moderate Assistance-helper does LESS THAN HALF the effort. [place] 

lifts, holds or supports trunk or limbs, but provides less than half the effort.


2-Substantial/Maximal Assistance-helper does MORE THAN HALF the effort. [place] 

lifts or holds trunk or limbs and provides more than half the effort.


0-Aolfbcbxo-bfuokl does ALL the effort. Patient does none of the effort to 

complete the activity. Or, the assistance of 2 or more helpers is required for 

the patient to complete the  


    activity.


If activity was not attempted, code reason:


7-Patient Refused.


9-Not Applicable-not attempted and the patient did not perform the activity 

before the current illness, exacerbation or injury.


10-Not Attempted due to Environmental Limitations-(lack of equipment, weather 

restraints, etc.).


88-Not Attempted due to Medical Conditions or Safety Concerns.


Roll Left to Right (QC):  6


Sit to Lying (QC):  6


Sit to Stand (QC):  4 (From elevated surface, CGA)


Chair/Bed-to-Chair Xfer(QC):  2 (Mod A x2)


Car Transfer (QC):  88





Gait Training


Does the Patient Walk?:  No and Walking Goal NOT indicated


Walk 10 feet (QC):  88


Walk 50 ft with 2 Turns(QC):  88


Walk 150 ft (QC):  88


Walking 10ft/uneven surface-QC:  88





Wheelchair Training


Does the Pt Use a Wheelchair?:  Yes


Wheel 50 ft with 2 turns (QC):  6


Wheel 150 ft (QC):  6


Type of Wheelchair:  Manual





Stair Training


1 Step (curb) (QC):  88


4 Steps (QC):  88


12 Steps (QC):  88





Balance


Picking up an Object (QC):  88





ADL-Treatment


Eating (QC):  6


Oral Hygiene (QC):  6


Shower/Bathe Self (QC):  3 (sponge bath on EOB)


Upper Body Dressing (QC):  88


Lower Body Dressing (QC):  4 (Per pt. and PT, pt. was able to don shorts while 

in bed, and don right slipper sock with SBA.)


On/Off Footwear (QC):  5


Toileting Hygiene (QC):  2


Toilet Transfer (QC):  2





Assessment/Plan


Assessment and Plan


Assess & Plan/Chief Complaint


Assessment:


s/p LBKA 


DM insulin dependence


HTN


HLP


CRI


Iron def anemia from acute blood loss


TERRI untreated





Plan:


Monitor pain


IRF protocol


Monitor hgb


Iron infusion





4/22/21:


Monitor sugar


Monitor hgb





4/23/21:


Add pain meds


Check labs in am





4/24/21:


Monitor sugar and decrease insulin


Hgb stable


Venofer





4/25/21:


Continue iron infusions


Sugar managed well


Pain controlled





(1) Status post below-knee amputation of left lower extremity


Status:  Acute


(2) Hyperglycemia


Status:  Chronic


(3) Hypertension


Status:  Chronic


(4) Diabetes mellitus, type 2


Status:  Chronic


(5) Anemia


Status:  Acute


(6) Iron deficiency


Status:  Acute


(7) Peripheral neuropathy


Status:  Chronic











NICOLE VILA DO                Apr 25, 2021 06:26

## 2021-04-26 VITALS — SYSTOLIC BLOOD PRESSURE: 137 MMHG | DIASTOLIC BLOOD PRESSURE: 70 MMHG

## 2021-04-26 VITALS — DIASTOLIC BLOOD PRESSURE: 75 MMHG | SYSTOLIC BLOOD PRESSURE: 130 MMHG

## 2021-04-26 LAB
ALBUMIN SERPL-MCNC: 3.4 GM/DL (ref 3.2–4.5)
ALP SERPL-CCNC: 79 U/L (ref 40–136)
ALT SERPL-CCNC: 14 U/L (ref 0–55)
BASOPHILS # BLD AUTO: 0 10^3/UL (ref 0–0.1)
BASOPHILS NFR BLD AUTO: 1 % (ref 0–10)
BILIRUB SERPL-MCNC: 0.2 MG/DL (ref 0.1–1)
BUN/CREAT SERPL: 22
CALCIUM SERPL-MCNC: 9.6 MG/DL (ref 8.5–10.1)
CHLORIDE SERPL-SCNC: 105 MMOL/L (ref 98–107)
CO2 SERPL-SCNC: 25 MMOL/L (ref 21–32)
CREAT SERPL-MCNC: 0.97 MG/DL (ref 0.6–1.3)
EOSINOPHIL # BLD AUTO: 0.2 10^3/UL (ref 0–0.3)
EOSINOPHIL NFR BLD AUTO: 3 % (ref 0–10)
GFR SERPLBLD BASED ON 1.73 SQ M-ARVRAT: > 60 ML/MIN
GLUCOSE SERPL-MCNC: 114 MG/DL (ref 70–105)
HCT VFR BLD CALC: 27 % (ref 40–54)
HGB BLD-MCNC: 7.9 G/DL (ref 13.3–17.7)
LYMPHOCYTES # BLD AUTO: 2.5 10^3/UL (ref 1–4)
LYMPHOCYTES NFR BLD AUTO: 38 % (ref 12–44)
MANUAL DIFFERENTIAL PERFORMED BLD QL: NO
MCH RBC QN AUTO: 27 PG (ref 25–34)
MCHC RBC AUTO-ENTMCNC: 30 G/DL (ref 32–36)
MCV RBC AUTO: 90 FL (ref 80–99)
MONOCYTES # BLD AUTO: 0.4 10^3/UL (ref 0–1)
MONOCYTES NFR BLD AUTO: 6 % (ref 0–12)
NEUTROPHILS # BLD AUTO: 3.3 10^3/UL (ref 1.8–7.8)
NEUTROPHILS NFR BLD AUTO: 51 % (ref 42–75)
PLATELET # BLD: 380 10^3/UL (ref 130–400)
PMV BLD AUTO: 9.1 FL (ref 9–12.2)
POTASSIUM SERPL-SCNC: 4.8 MMOL/L (ref 3.6–5)
PROT SERPL-MCNC: 6.9 GM/DL (ref 6.4–8.2)
SODIUM SERPL-SCNC: 142 MMOL/L (ref 135–145)
WBC # BLD AUTO: 6.5 10^3/UL (ref 4.3–11)

## 2021-04-26 RX ADMIN — OXYCODONE HYDROCHLORIDE SCH MG: 10 TABLET, FILM COATED, EXTENDED RELEASE ORAL at 21:32

## 2021-04-26 RX ADMIN — DOCUSATE SODIUM AND SENNOSIDES SCH EA: 8.6; 5 TABLET, FILM COATED ORAL at 08:04

## 2021-04-26 RX ADMIN — DOCUSATE SODIUM SCH MG: 100 CAPSULE ORAL at 08:04

## 2021-04-26 RX ADMIN — Medication SCH ML: at 06:56

## 2021-04-26 RX ADMIN — METFORMIN HYDROCHLORIDE SCH MG: 500 TABLET, EXTENDED RELEASE ORAL at 08:02

## 2021-04-26 RX ADMIN — INSULIN ASPART SCH UNIT: 100 INJECTION, SOLUTION INTRAVENOUS; SUBCUTANEOUS at 17:31

## 2021-04-26 RX ADMIN — POLYETHYLENE GLYCOL (3350) SCH GM: 17 POWDER, FOR SOLUTION ORAL at 08:04

## 2021-04-26 RX ADMIN — Medication SCH ML: at 14:51

## 2021-04-26 RX ADMIN — GLYBURIDE SCH MG: 2.5 TABLET ORAL at 06:55

## 2021-04-26 RX ADMIN — OXYCODONE HYDROCHLORIDE SCH MG: 10 TABLET, FILM COATED, EXTENDED RELEASE ORAL at 08:02

## 2021-04-26 RX ADMIN — DOCUSATE SODIUM AND SENNOSIDES SCH EA: 8.6; 5 TABLET, FILM COATED ORAL at 19:48

## 2021-04-26 RX ADMIN — POLYETHYLENE GLYCOL (3350) SCH GM: 17 POWDER, FOR SOLUTION ORAL at 19:47

## 2021-04-26 RX ADMIN — INSULIN ASPART SCH UNIT: 100 INJECTION, SOLUTION INTRAVENOUS; SUBCUTANEOUS at 12:08

## 2021-04-26 RX ADMIN — PANTOPRAZOLE SODIUM SCH MG: 40 TABLET, DELAYED RELEASE ORAL at 08:02

## 2021-04-26 RX ADMIN — MICONAZOLE NITRATE SCH APPLIC: 20 POWDER TOPICAL at 08:04

## 2021-04-26 RX ADMIN — PRASUGREL SCH MG: 10 TABLET, FILM COATED ORAL at 08:01

## 2021-04-26 RX ADMIN — METOPROLOL SUCCINATE SCH MG: 100 TABLET, EXTENDED RELEASE ORAL at 08:02

## 2021-04-26 RX ADMIN — INSULIN ASPART SCH UNIT: 100 INJECTION, SOLUTION INTRAVENOUS; SUBCUTANEOUS at 06:56

## 2021-04-26 RX ADMIN — ASPIRIN SCH MG: 81 TABLET ORAL at 08:02

## 2021-04-26 RX ADMIN — LISINOPRIL SCH MG: 20 TABLET ORAL at 08:03

## 2021-04-26 RX ADMIN — Medication SCH ML: at 21:31

## 2021-04-26 RX ADMIN — MICONAZOLE NITRATE SCH APPLIC: 20 POWDER TOPICAL at 21:31

## 2021-04-26 RX ADMIN — METFORMIN HYDROCHLORIDE SCH MG: 500 TABLET, EXTENDED RELEASE ORAL at 17:30

## 2021-04-26 RX ADMIN — FENTANYL CITRATE PRN MCG: 50 INJECTION, SOLUTION INTRAMUSCULAR; INTRAVENOUS at 18:23

## 2021-04-26 RX ADMIN — GLYBURIDE SCH MG: 2.5 TABLET ORAL at 17:30

## 2021-04-26 RX ADMIN — DOCUSATE SODIUM SCH MG: 100 CAPSULE ORAL at 19:46

## 2021-04-26 NOTE — OCCUPATIONAL THER DAILY NOTE
OT Current Status-Daily Note


Subjective


No pain reported.





Appearance


Pt. in bed.  Agrees to work with therapy.





Mental Status/Objective


Patient Orientation:  Person, Place, Time, Situation





ADL-Treatment


Therapy Code Descriptions/Definitions 





Functional [place] Measure:


0=Not Assessed/NA        4=Minimal Assistance


1=Total Assistance        5=Supervision or Setup


2=Maximal Assistance  6=Modified [place]


3=Moderate Assistance 7=Complete IndependenceSCALE: Activities may be completed 

with or without assistive devices.





6-Indepedent-patient completes the activity by him/herself with no assistance 

from a helper.


5-Set-up or Clean-up Assistance-helper sets up or cleans up; patient completes 

activity. [place] assists only prior to or  


    following the activity.


4-Supervision or Touching Assistance-helper provides verbal cues and/or 

touching/steadying and/or contact guard assistance as patient completes 

activity. Assistance may be provided   


    throughout the activity or intermittently.


3-Partial/Moderate Assistance-helper does LESS THAN HALF the effort. [place] 

lifts, holds or supports trunk or limbs, but provides less than half the effort.


2-Substantial/Maximal Assistance-helper does MORE THAN HALF the effort. [place] 

lifts or holds trunk or limbs and provides more than half the effort.


5-Xomutdvfi-iyoyzs does ALL the effort. Patient does none of the effort to 

complete the activity. Or, the assistance of 2 or more helpers is required for 

the patient to complete the  


    activity.


If activity was not attempted, code reason:


7-Patient Refused.


9-Not Applicable-not attempted and the patient did not perform the activity 

before the current illness, exacerbation or injury.


10-Not Attempted due to Environmental Limitations-(lack of equipment, weather 

restraints, etc.).


88-Not Attempted due to Medical Conditions or Safety Concerns.


Eating (QC):  6


Oral Hygiene (QC):  6 (From wheelchair level at sink.)


Shower/Bathe Self (QC):  4 (SBA seated on shower chair.)


Upper Body Dressing (QC):  4


Lower Body Dressing (QC):  3 (Pt. able to don brief and shorts over feet, and up

to thighs.  In stance, at grab bar in shower room, pt. able to assist OT to don 

over hips.)


On/Off Footwear:  2 (Max assist from shower level to doff/don slipper socks on 

right foot.)





Other Treatment


PT/OT co-treated due to low endurance and need of skilled assistance x 2.  Pt. 

agrees to shower.  PT facilitates pt's transfers from bed to shower chair.  Pt. 

is able to transfer supine-sit with SBA.  Pt. stands at walker with max x 2 for 

stability.  Pt. is able to "scoot" on right LE and pivot to chair.  Shower chair

taken to therapy shower room.  Pt. is able to shower with SBA seated on chair.  

OT facilitates all ADL skills.  Pt. stands at bars in shower room with max 

assist x 2 for sit-stand.  Shower chair is replaced with wheelchair.  Pt. is 

able to dress self with assist to don right sock and don shorts over hips in 

stance.  Pt. self propels to therapy gym and practices standing at parallel 

bars.  OT works on hand placement and energy conservation techniques while PT 

focuses on the transfer.  Pt. is able to stand approximately 3-5minutes at a 

time.  Pt. propels back to room and transfers back to bed with max x 2 for sit-

stand and pivot at walker to bed.  All needs met.





Education


OT Patient Education:  Correct positioning, Exercise program, Modified ADL 

techniques, Progress toward Goal/Update tx plan, Purpose of tx/functional 

activities, Reviewed precautions, Rehab process, Transfer techniques


Teaching Recipient:  Patient


Teaching Methods:  Demonstration, Discussion


Response to Teaching:  Verbalize Understanding, Return Demonstration





OT Short Term Goals


Short Term Goals


Time Frame:  May 5, 2021


Eatin


Oral hygiene:  6


Toileting hygiene:  4


Shower/bathe self:  4


Upper body dressin


Lower body dressing:  3


Putting on/taking off footwear:  3





OT Long Term Goals


Long Term Goals


Time Frame:  May 19, 2021


Eating (QC):  6


Oral Hygiene (QC):  6


Toileting Hygiene (QC):  6


Shower/Bathe Self (QC):  6


Upper Body Dressing (QC):  6


Lower Body Dressing (QC):  6


On/Off Footwear (QC):  6


Additional Goals:  1-Demonstrate ADL Tasks, 2-Verbalize Understanding, 3-

ImproveStrength/Aung


1=Demonstrate adherence to instructed precautions during ADL tasks.


2=Patient will verbalize/demonstrate understanding of assistive 

devices/modifications for ADL.


3=Patient will improve strength/tolerance for activity to enable patient to 

perform ADL's.





OT Education/Plan


Problem List/Assessment


Assessment:  Decreased Activ Tolerance, Impaired I ADL's, Impaired Self-Care 

Skills





Discharge Recommendations


Plan/Recommendations:  Continue POC


Therapy Discharge Recommendati:  Post Acute OT





Treatment Plan/Plan of Care


Treatment,Training & Education:  Yes


Patient would benefit from OT for education, treatment and training to promote 

independence in ADL's, mobility, safety and/or upper extremity function for 

ADL's.


Plan of Care:  ADL Retraining, Functional Mobility, Group Exercise/Act as Ind, 

UE Funct Exercise/Act


Treatment Duration:  May 19, 2021


Frequency:  At least 5 of 7 days/Wk (IRF)


Estimated Hrs Per Day:  1.5 hours per day


Agreement:  Yes


Rehab Potential:  Good





Time/GCodes


Start Time:  09:30


Stop Time:  11:00


Total Time Billed (hr/min):  90


Billed Treatment Time


1, ADL x 60minutes, FA x 30minutes











JAROCHO OBRIEN OT           2021 13:44

## 2021-04-26 NOTE — PHYSICAL THERAPY DAILY NOTE
PT Daily Note-Current


Subjective


Patient supine pre tx, notes moderate unrated pain, agrees to treatment. Patient

reports he would like to have a shower. Patient also reports he should be 

getting sized for a  at 4pm today.





Appearance


Patient supine in bed post tx, with call button within reach and tray table 

nearby.





Mental Status


Patient Orientation:  Person, Place, Time, Normal For Age





Transfers


SCALE: Activities may be completed with or without assistive devices.





6-Indepedent-patient completes the activity by him/herself with no assistance 

from a helper.


5-Set-up or Clean-up Assistance-helper sets up or cleans up; patient completes 

activity. [place] assists only prior to or  


    following the activity.


4-Supervision or Touching Assistance-helper provides verbal cues and/or 

touching/steadying and/or contact guard assistance as patient completes 

activity. Assistance may be provided   


    throughout the activity or intermittently.


3-Partial/Moderate Assistance-helper does LESS THAN HALF the effort. [place] l

ifts, holds or supports trunk or limbs, but provides less than half the effort.


2-Substantial/Maximal Assistance-helper does MORE THAN HALF the effort. [place] 

lifts or holds trunk or limbs and provides more than half the effort.


3-Coctglsws-qjonok does ALL the effort. Patient does none of the effort to 

complete the activity. Or, the assistance of 2 or more helpers is required for t

he patient to complete the  


    activity.


If activity was not attempted, code reason:


7-Patient Refused.


9-Not Applicable-not attempted and the patient did not perform the activity 

before the current illness, exacerbation or injury.


10-Not Attempted due to Environmental Limitations-(lack of equipment, weather 

restraints, etc.).


88-Not Attempted due to Medical Conditions or Safety Concerns.


Sit to Lying (QC):  6


Lying to Sitting/Side of Bed(Q:  6


Sit to Stand (QC):  3


Mod A x2 sit <-> stand transfers





Weight Bearing


Right Lower Extremity:  Right


Full Weight Bearing


Left Lower Extremity:  Left


Non Weight Bearing (BKA)





Wheelchair Training


Does the Pt Use a Wheelchair?:  Yes





Exercises


Seated Therapy Exercises:  Sit to stand


Seated Reps:  2


Patient able to complete 4 sit to stands, 2 at parallel bars,1 in shower room 

using grab bars, and 1 with walker to transfer back into bed. In parallel bars, 

patient was able to perform 5 total reps of hip abduction, flexion, and 

extension with affected LE. Patient demonstrated better UE strength and 

stability with parallel bar sit to stands, and required only Mod A x1. With sit 

<-> stand with walker, patient required Mod Ax2 with standing from lower surface

and pivoting to bed.





Treatments


Co-treated with OT secondary to patient overall level of assistance required 

with functional mobility. PT assisted with transfer and balance training during 

patient shower, while OT focused on ADL's and grooming. Following patient self-

care, patient wheeled himslelf to gym to perform sit <-> stands to improve 

strength and functional mobility.





Assessment


Current Status:  Good Progress





PT Short Term Goals


Short Term Goals


Time Frame:  2021


Roll Left & Right:  6


Sit to lyin


Lying to sitting on side of be:  6


Sit to stand:  3


Chair/bed-to-chair transfer:  3


Wheel 50ft w/2 turns:  6


Wheel 150 feet:  6





PT Long Term Goals


Long Term Goals


PT Long Term Goals Time Frame:  May 19, 2021


Roll Left & Right (QC):  6


Sit to Lying (QC):  6


Lying-Sitting on Side/Bed(QC):  6


Sit to Stand (QC):  6


Chair/Bed-to-Chair Xfer(QC):  6 (SPT or slideboard, whichever is the safest 

method)


Toilet Transfer (QC):  6 (SPT or slide board)


Car Transfer (QC):  4


Does the Patient Walk:  No and Walking Goal NOT indicated


Walk 10 feet (QC):  88


Walk 50ft with 2 Turns (QC):  88


Walk 150 ft (QC):  88


Walking 10ft on Uneven Surface:  88


1 Step (curb) (QC):  88


4 Steps (QC):  88


12 Steps (QC):  88


Picking up an Object (QC):  88


Does the Pt use WC or Scooter?:  Yes


Wheel 50 feet with 2 turns (QC:  6


Type:  Manual


Wheel 150 feet:  6


Type:  Manual





PT Plan


Problem List


Problem List:  Activity Tolerance, Functional Strength, Safety, Balance, Gait, 

Transfer, Bed Mobility, ROM





Treatment/Plan


Treatment Plan:  Continue Plan of Care


Treatment Plan:  Bed Mobility, Education, Functional Activity Aung, Functional 

Strength, Group Therapy, Gait, Safety, Therapeutic Exercise, Transfers


Treatment Duration:  May 19, 2021


Frequency:  At least 5 of 7 days/Wk (IRF)


Estimated Hrs Per Day:  1.5 hours per day


Patient and/or Family Agrees t:  Yes





Safety Risks/Education


Patient Education:  Transfer Techniques, Correct Positioning, W/C Management, 

Safety Issues


Teaching Recipient:  Patient


Teaching Methods:  Demonstration, Discussion


Response to Teaching:  Verbalize Understanding, Return Demonstration





Time/GCodes


Time In:  0930


Time Out:  1100


Total Billed Treatment Time:  90


Total Billed Treatment


1 visit: 


FA x6: 90'











CAMILLE HARRY PT                2021 11:51

## 2021-04-26 NOTE — PM&R PROGRESS NOTE
Subjective


HPI/CC On Admission


Date Seen by Provider:  Apr 26, 2021


Time Seen by Provider:  08:45


Subjective/Events-last exam


4/26/21:


Pt doing a lot better today


Pain is well controlled


Bowels moved yesterday


Stump  will be placed today on his left amputation site


Slept very well








4/25/21:


Doing well


No pain when he doesn't move


Pain controlled better with Oxycontin


BM+


Mother and son visiting him today


Sugars reviewed








4/24/21:


Pain is better


No issues reported


Hgb 7.9


BM++


Using IS








4/23/21:


Doing well


Stump  will start Monday per Dr Jiménez


BM+


Oxycontin and Oxycodone added for better pain control


DC Lortab








4/22/21:


Pt doing pretty well


Had a large BM today


Hgb 7.7 after three units of blood on med surg


Rough on the transfers but getting better


Dressing change will be per Dr. Morejon orders


Sugar is 107 this morning





Review of Systems


General:  Fatigue


Musculoskeletal:  leg pain





Objective


Exam


Vital Signs





Vital Signs








  Date Time  Temp Pulse Resp B/P (MAP) Pulse Ox O2 Delivery O2 Flow Rate FiO2


 


4/26/21 20:30 35.8 75 20 130/75 (93) 98 Room Air  





Capillary Refill :


General Appearance:  No Apparent Distress, WD/WN, Chronically ill, Obese


HEENT:  PERRL/EOMI, Normal ENT Inspection, Pharynx Normal


Neck:  Full Range of Motion, Normal Inspection, Non Tender, Supple, Carotid 

Bruit


Respiratory:  Chest Non Tender, Lungs Clear, Normal Breath Sounds, No Accessory 

Muscle Use, No Respiratory Distress


Cardiovascular:  Regular Rate, Rhythm, No Edema, No Gallop, No JVD, No Murmur, 

Normal Peripheral Pulses


Gastrointestinal:  Normal Bowel Sounds, No Organomegaly, No Pulsatile Mass, Non 

Tender, Soft


Back:  Normal Inspection, No CVA Tenderness, No Vertebral Tenderness


Extremity:  Normal Capillary Refill, Normal Inspection, Normal Range of Motion, 

Non Tender, No Calf Tenderness, No Pedal Edema


Neurologic/Psychiatric:  Alert, Oriented x3, No Motor/Sensory Deficits, Normal 

Mood/Affect, CNs II-XII Norm as Tested, Abnormal Gait, Motor Weakness (left leg 

amputation )


Skin:  Normal Color, Warm/Dry


Lymphatic:  No Adenopathy





Results/Procedures


Lab


Patient resulted labs reviewed.





FIM


Transfers


Therapy Code Descriptions/Definitions 





Functional [place] Measure:


0=Not Assessed/NA        4=Minimal Assistance


1=Total Assistance        5=Supervision or Setup


2=Maximal Assistance  6=Modified [place]


3=Moderate Assistance 7=Complete IndependenceSCALE: Activities may be completed 

with or without assistive devices.





6-Indepedent-patient completes the activity by him/herself with no assistance 

from a helper.


5-Set-up or Clean-up Assistance-helper sets up or cleans up; patient completes 

activity. [place] assists only prior to or  


    following the activity.


4-Supervision or Touching Assistance-helper provides verbal cues and/or 

touching/steadying and/or contact guard assistance as patient completes 

activity. Assistance may be provided   


    throughout the activity or intermittently.


3-Partial/Moderate Assistance-helper does LESS THAN HALF the effort. [place] 

lifts, holds or supports trunk or limbs, but provides less than half the effort.


2-Substantial/Maximal Assistance-helper does MORE THAN HALF the effort. [place] 

lifts or holds trunk or limbs and provides more than half the effort.


6-[id number]-[id number] does ALL the effort. Patient does none of the effort to 

complete the activity. Or, the assistance of 2 or more helpers is required for 

the patient to complete the  


    activity.


If activity was not attempted, code reason:


7-Patient Refused.


9-Not Applicable-not attempted and the patient did not perform the activity 

before the current illness, exacerbation or injury.


10-Not Attempted due to Environmental Limitations-(lack of equipment, weather 

restraints, etc.).


88-Not Attempted due to Medical Conditions or Safety Concerns.


Roll Left to Right (QC):  6


Sit to Lying (QC):  6


Sit to Stand (QC):  4 (From elevated surface, CGA)


Chair/Bed-to-Chair Xfer(QC):  2 (Mod A x2)


Car Transfer (QC):  88





Gait Training


Does the Patient Walk?:  No and Walking Goal NOT indicated


Walk 10 feet (QC):  88


Walk 50 ft with 2 Turns(QC):  88


Walk 150 ft (QC):  88


Walking 10ft/uneven surface-QC:  88





Wheelchair Training


Does the Pt Use a Wheelchair?:  Yes


Wheel 50 ft with 2 turns (QC):  6


Wheel 150 ft (QC):  6


Type of Wheelchair:  Manual





Stair Training


1 Step (curb) (QC):  88


4 Steps (QC):  88


12 Steps (QC):  88





Balance


Picking up an Object (QC):  88





ADL-Treatment


Eating (QC):  6


Oral Hygiene (QC):  6


Shower/Bathe Self (QC):  3 (sponge bath on EOB)


Upper Body Dressing (QC):  88


Lower Body Dressing (QC):  4 (Per pt. and PT, pt. was able to don shorts while 

in bed, and don right slipper sock with SBA.)


On/Off Footwear (QC):  5


Toileting Hygiene (QC):  2


Toilet Transfer (QC):  2





Assessment/Plan


Assessment and Plan


Assess & Plan/Chief Complaint


Assessment:


s/p LBKA 


DM insulin dependence


HTN


HLP


CRI


Iron def anemia from acute blood loss


TERRI untreated





Plan:


Monitor pain


IRF protocol


Monitor hgb


Iron infusion





4/22/21:


Monitor sugar


Monitor hgb





4/23/21:


Add pain meds


Check labs in am





4/24/21:


Monitor sugar and decrease insulin


Hgb stable


Venofer





4/25/21:


Continue iron infusions


Sugar managed well


Pain controlled





4/26/21:


Stump 


Monitor sugar


Monitor hgb





(1) Status post below-knee amputation of left lower extremity


Status:  Acute


(2) Hyperglycemia


Status:  Chronic


(3) Hypertension


Status:  Chronic


(4) Diabetes mellitus, type 2


Status:  Chronic


(5) Anemia


Status:  Acute


(6) Iron deficiency


Status:  Acute


(7) Peripheral neuropathy


Status:  Chronic











NICOLE VILA DO                Apr 26, 2021 08:44

## 2021-04-27 VITALS — SYSTOLIC BLOOD PRESSURE: 138 MMHG | DIASTOLIC BLOOD PRESSURE: 78 MMHG

## 2021-04-27 VITALS — SYSTOLIC BLOOD PRESSURE: 127 MMHG | DIASTOLIC BLOOD PRESSURE: 85 MMHG

## 2021-04-27 RX ADMIN — METFORMIN HYDROCHLORIDE SCH MG: 500 TABLET, EXTENDED RELEASE ORAL at 07:52

## 2021-04-27 RX ADMIN — PANTOPRAZOLE SODIUM SCH MG: 40 TABLET, DELAYED RELEASE ORAL at 07:50

## 2021-04-27 RX ADMIN — DOCUSATE SODIUM AND SENNOSIDES SCH EA: 8.6; 5 TABLET, FILM COATED ORAL at 19:17

## 2021-04-27 RX ADMIN — INSULIN ASPART SCH UNIT: 100 INJECTION, SOLUTION INTRAVENOUS; SUBCUTANEOUS at 11:43

## 2021-04-27 RX ADMIN — Medication SCH ML: at 14:31

## 2021-04-27 RX ADMIN — DOCUSATE SODIUM AND SENNOSIDES SCH EA: 8.6; 5 TABLET, FILM COATED ORAL at 07:53

## 2021-04-27 RX ADMIN — ASPIRIN SCH MG: 81 TABLET ORAL at 07:52

## 2021-04-27 RX ADMIN — DOCUSATE SODIUM SCH MG: 100 CAPSULE ORAL at 07:52

## 2021-04-27 RX ADMIN — MICONAZOLE NITRATE SCH APPLIC: 20 POWDER TOPICAL at 20:45

## 2021-04-27 RX ADMIN — Medication SCH ML: at 20:43

## 2021-04-27 RX ADMIN — INSULIN ASPART SCH UNIT: 100 INJECTION, SOLUTION INTRAVENOUS; SUBCUTANEOUS at 05:27

## 2021-04-27 RX ADMIN — DOCUSATE SODIUM SCH MG: 100 CAPSULE ORAL at 19:16

## 2021-04-27 RX ADMIN — FENTANYL CITRATE PRN MCG: 50 INJECTION, SOLUTION INTRAMUSCULAR; INTRAVENOUS at 20:43

## 2021-04-27 RX ADMIN — FENTANYL CITRATE PRN MCG: 50 INJECTION, SOLUTION INTRAMUSCULAR; INTRAVENOUS at 11:36

## 2021-04-27 RX ADMIN — METOPROLOL SUCCINATE SCH MG: 100 TABLET, EXTENDED RELEASE ORAL at 07:51

## 2021-04-27 RX ADMIN — LISINOPRIL SCH MG: 20 TABLET ORAL at 07:52

## 2021-04-27 RX ADMIN — MICONAZOLE NITRATE SCH APPLIC: 20 POWDER TOPICAL at 07:54

## 2021-04-27 RX ADMIN — OXYCODONE HYDROCHLORIDE SCH MG: 10 TABLET, FILM COATED, EXTENDED RELEASE ORAL at 20:44

## 2021-04-27 RX ADMIN — PRASUGREL SCH MG: 10 TABLET, FILM COATED ORAL at 07:51

## 2021-04-27 RX ADMIN — GLYBURIDE SCH MG: 2.5 TABLET ORAL at 06:51

## 2021-04-27 RX ADMIN — INSULIN ASPART SCH UNIT: 100 INJECTION, SOLUTION INTRAVENOUS; SUBCUTANEOUS at 16:34

## 2021-04-27 RX ADMIN — SODIUM CHLORIDE SCH MG: 900 INJECTION, SOLUTION INTRAVENOUS at 07:52

## 2021-04-27 RX ADMIN — METFORMIN HYDROCHLORIDE SCH MG: 500 TABLET, EXTENDED RELEASE ORAL at 17:59

## 2021-04-27 RX ADMIN — OXYCODONE HYDROCHLORIDE SCH MG: 10 TABLET, FILM COATED, EXTENDED RELEASE ORAL at 07:52

## 2021-04-27 RX ADMIN — INSULIN ASPART SCH UNIT: 100 INJECTION, SOLUTION INTRAVENOUS; SUBCUTANEOUS at 20:53

## 2021-04-27 RX ADMIN — POLYETHYLENE GLYCOL (3350) SCH GM: 17 POWDER, FOR SOLUTION ORAL at 19:17

## 2021-04-27 RX ADMIN — Medication SCH ML: at 06:51

## 2021-04-27 RX ADMIN — POLYETHYLENE GLYCOL (3350) SCH GM: 17 POWDER, FOR SOLUTION ORAL at 07:54

## 2021-04-27 RX ADMIN — GLYBURIDE SCH MG: 2.5 TABLET ORAL at 16:38

## 2021-04-27 NOTE — PM&R PROGRESS NOTE
Subjective


HPI/CC On Admission


Date Seen by Provider:  Apr 27, 2021


Time Seen by Provider:  08:15


Subjective/Events-last exam


4/27/21:


Pt doing really well


No issues 


Stump  is being tolerated for 23 hours of the day 


No pain 


Sugar is okay and I decreased insulin even further today 








4/26/21:


Pt doing a lot better today


Pain is well controlled


Bowels moved yesterday


Stump  will be placed today on his left amputation site


Slept very well








4/25/21:


Doing well


No pain when he doesn't move


Pain controlled better with Oxycontin


BM+


Mother and son visiting him today


Sugars reviewed








4/24/21:


Pain is better


No issues reported


Hgb 7.9


BM++


Using IS








4/23/21:


Doing well


Stump  will start Monday per Dr Jiménez


BM+


Oxycontin and Oxycodone added for better pain control


DC Lortab








4/22/21:


Pt doing pretty well


Had a large BM today


Hgb 7.7 after three units of blood on med surg


Rough on the transfers but getting better


Dressing change will be per Dr. Morejon orders


Sugar is 107 this morning





Review of Systems


General:  Fatigue, Malaise


Neurological:  Weakness





Objective


Exam


Vital Signs





Vital Signs








  Date Time  Temp Pulse Resp B/P (MAP) Pulse Ox O2 Delivery O2 Flow Rate FiO2


 


4/27/21 20:10      Room Air  


 


4/27/21 20:10 35.8 78 20 138/78 (98) 99   





Capillary Refill :


General Appearance:  No Apparent Distress, WD/WN, Chronically ill, Obese


HEENT:  PERRL/EOMI, Normal ENT Inspection, Pharynx Normal


Neck:  Full Range of Motion, Normal Inspection, Non Tender, Supple, Carotid 

Bruit


Respiratory:  Chest Non Tender, Lungs Clear, Normal Breath Sounds, No Accessory 

Muscle Use, No Respiratory Distress


Cardiovascular:  Regular Rate, Rhythm, No Edema, No Gallop, No JVD, No Murmur, 

Normal Peripheral Pulses


Gastrointestinal:  Normal Bowel Sounds, No Organomegaly, No Pulsatile Mass, Non 

Tender, Soft


Back:  Normal Inspection, No CVA Tenderness, No Vertebral Tenderness


Extremity:  Normal Capillary Refill, Normal Inspection, Normal Range of Motion, 

Non Tender, No Calf Tenderness, No Pedal Edema


Neurologic/Psychiatric:  Alert, Oriented x3, No Motor/Sensory Deficits, Normal 

Mood/Affect, CNs II-XII Norm as Tested, Abnormal Gait, Motor Weakness (left leg 

amputation )


Skin:  Normal Color, Warm/Dry


Lymphatic:  No Adenopathy





Results/Procedures


Lab


Patient resulted labs reviewed.





FIM


Transfers


Therapy Code Descriptions/Definitions 





Functional [place] Measure:


0=Not Assessed/NA        4=Minimal Assistance


1=Total Assistance        5=Supervision or Setup


2=Maximal Assistance  6=Modified [place]


3=Moderate Assistance 7=Complete IndependenceSCALE: Activities may be completed 

with or without assistive devices.





6-Indepedent-patient completes the activity by him/herself with no assistance 

from a helper.


5-Set-up or Clean-up Assistance-helper sets up or cleans up; patient completes 

activity. [place] assists only prior to or  


    following the activity.


4-Supervision or Touching Assistance-helper provides verbal cues and/or 

touching/steadying and/or contact guard assistance as patient completes 

activity. Assistance may be provided   


    throughout the activity or intermittently.


3-Partial/Moderate Assistance-helper does LESS THAN HALF the effort. [place] 

lifts, holds or supports trunk or limbs, but provides less than half the effort.


2-Substantial/Maximal Assistance-helper does MORE THAN HALF the effort. [place] 

lifts or holds trunk or limbs and provides more than half the effort.


7-Rhfjwhbyd-[id number] does ALL the effort. Patient does none of the effort to 

complete the activity. Or, the assistance of 2 or more helpers is required for 

the patient to complete the  


    activity.


If activity was not attempted, code reason:


7-Patient Refused.


9-Not Applicable-not attempted and the patient did not perform the activity 

before the current illness, exacerbation or injury.


10-Not Attempted due to Environmental Limitations-(lack of equipment, weather 

restraints, etc.).


88-Not Attempted due to Medical Conditions or Safety Concerns.


Roll Left to Right (QC):  6


Sit to Lying (QC):  6


Sit to Stand (QC):  3


Chair/Bed-to-Chair Xfer(QC):  2 (Mod A x2)


Car Transfer (QC):  88





Gait Training


Does the Patient Walk?:  No and Walking Goal NOT indicated


Walk 10 feet (QC):  88


Walk 50 ft with 2 Turns(QC):  88


Walk 150 ft (QC):  88


Walking 10ft/uneven surface-QC:  88





Wheelchair Training


Does the Pt Use a Wheelchair?:  Yes


Wheel 50 ft with 2 turns (QC):  6


Wheel 150 ft (QC):  6


Type of Wheelchair:  Manual





Stair Training


1 Step (curb) (QC):  88


4 Steps (QC):  88


12 Steps (QC):  88





Balance


Picking up an Object (QC):  88





ADL-Treatment


Eating (QC):  6


Oral Hygiene (QC):  6


Shower/Bathe Self (QC):  4


Upper Body Dressing (QC):  4


Lower Body Dressing (QC):  3


On/Off Footwear (QC):  2


Toileting Hygiene (QC):  2


Toilet Transfer (QC):  2





Assessment/Plan


Assessment and Plan


Assess & Plan/Chief Complaint


Assessment:


s/p LBKA 


DM insulin dependence


HTN


HLP


CRI


Iron def anemia from acute blood loss


TERRI untreated





Plan:


Monitor pain


IRF protocol


Monitor hgb


Iron infusion





4/22/21:


Monitor sugar


Monitor hgb





4/23/21:


Add pain meds


Check labs in am





4/24/21:


Monitor sugar and decrease insulin


Hgb stable


Venofer





4/25/21:


Continue iron infusions


Sugar managed well


Pain controlled





4/26/21:


Stump 


Monitor sugar


Monitor hgb





4/27/21:


Monitor sugar


Decrease insulin


Monitor BP





(1) Status post below-knee amputation of left lower extremity


Status:  Acute


(2) Hyperglycemia


Status:  Chronic


(3) Hypertension


Status:  Chronic


(4) Diabetes mellitus, type 2


Status:  Chronic


(5) Anemia


Status:  Acute


(6) Iron deficiency


Status:  Acute


(7) Peripheral neuropathy


Status:  Chronic











NICOLE VILA DO                Apr 27, 2021 08:10

## 2021-04-27 NOTE — PROGRESS NOTE
Subjective


Date Seen by a Provider:  Apr 27, 2021


Time Seen by a Provider:  12:00


Subjective/Events-last exam


doing well. tolerating therapy well. pain controlled. started stump  

this week.





Objective


Exam





Vital Signs








  Date Time  Temp Pulse Resp B/P (MAP) Pulse Ox O2 Delivery O2 Flow Rate FiO2


 


4/27/21 09:00      Room Air  


 


4/27/21 08:00 36.0 82 18 127/85 (99) 98 Room Air  


 


4/26/21 20:30 35.8 75 20 130/75 (93) 98 Room Air  


 


4/26/21 20:00      Room Air  














I & O 


 


 4/27/21





 07:00


 


Intake Total 1350 ml


 


Balance 1350 ml





Capillary Refill :


General Appearance:  No Apparent Distress


HEENT:  PERRL/EOMI


Neck:  Full Range of Motion


Respiratory:  Chest Non Tender, Normal Breath Sounds


Cardiovascular:  Regular Rate, Rhythm


Gastrointestinal:  normal bowel sounds, non tender, soft


Extremity:  Normal Capillary Refill


Neurologic/Psychiatric:  Alert, Oriented x3


Skin:  Normal Color


Lymphatic:  No Adenopathy





Results


Lab


Laboratory Tests


4/26/21 16:17: Glucometer 96


4/26/21 21:27: Glucometer 109


4/27/21 05:26: Glucometer 120H


4/27/21 11:42: Glucometer 114H





Assessment/Plan


Assessment/Plan


Assess & Plan/Chief Complaint


s/p left BKA.


cont rehab.


add stump  monday.


long term will want pt to proceed with medically supervised weight loss and once

at a healthier weight(BMI around 50) , patient states very much interested and 

planning on it.











CLAUDY ALEJO MD                Apr 27, 2021 12:52

## 2021-04-27 NOTE — PHYSICAL THERAPY DAILY NOTE
PT Daily Note-Current


Subjective


Patient supine in bed pre tx, consented to PT. Patient reported "tolerable" 

pain, but did not rate it.





Appearance


Patient upright in w/c post tx, with OT in gym.





Mental Status


Patient Orientation:  Person, Place, Time, Normal For Age





Transfers


SCALE: Activities may be completed with or without assistive devices.





6-Indepedent-patient completes the activity by him/herself with no assistance 

from a helper.


5-Set-up or Clean-up Assistance-helper sets up or cleans up; patient completes 

activity. [place] assists only prior to or  


    following the activity.


4-Supervision or Touching Assistance-helper provides verbal cues and/or 

touching/steadying and/or contact guard assistance as patient completes 

activity. Assistance may be provided   


    throughout the activity or intermittently.


3-Partial/Moderate Assistance-helper does LESS THAN HALF the effort. [place] lift

s, holds or supports trunk or limbs, but provides less than half the effort.


2-Substantial/Maximal Assistance-helper does MORE THAN HALF the effort. [place] 

lifts or holds trunk or limbs and provides more than half the effort.


8-Xvzsjgkpq-[id number] does ALL the effort. Patient does none of the effort to 

complete the activity. Or, the assistance of 2 or more helpers is required for 

the patient to complete the  


    activity.


If activity was not attempted, code reason:


7-Patient Refused.


9-Not Applicable-not attempted and the patient did not perform the activity 

before the current illness, exacerbation or injury.


10-Not Attempted due to Environmental Limitations-(lack of equipment, weather 

restraints, etc.).


88-Not Attempted due to Medical Conditions or Safety Concerns.


Roll Left & Right (QC):  6


Lying to Sitting/Side of Bed(Q:  6


Sit to Stand (QC):  3


Chair/Bed-to-Chair Xfer(QC):  3


Min A to CGA with sit <-> stand. Patient requires less assistance with elevated 

surfaces. Attempted several sit <-> stands from w/c height, but attempts were 

unsuccessful, even with assistance. Patient reports he can't get his hand 

positioning right. Elevated w/c surface with cushion and patient reported it was

much more comfortable, and he felt like it would help him with his overall 

transfers. Patient was able to easily complete 3 sit <-> stands in parallel bars

with elevated seat from w/c. Patient stayed upright in parallel bars for > 30 

seconds each attempt, and was able to take one hand off the parallel bar to 

balance. Patient demonstrates more confidence with each sit <-> stand from 

elevated surface and sliding board transfer, but becomes mildly discouraged when

he cannot complete sit <-> stand from lower elevation.





Weight Bearing


Right Lower Extremity:  Right


Full Weight Bearing


Left Lower Extremity:  Left


Non Weight Bearing (BKA)





Wheelchair Training


Does the Pt Use a Wheelchair?:  Yes


Wheel 50 ft with 2 turns (QC):  6


Wheel 150 ft (QC):  6


Type of Wheelchair:  Manual


200', faster pace today





Exercises


Supine Ex:  Quad Set, Short Arc Quads (blue bolster), Straight leg raise, Hip 

abd/add (sidelying R hip abd/ext)


Supine Reps:  25


All exercise completed with L LE, also completed 5' knee extension stretch 

stretch with 6# weights, and 5' hip extension stretch in prone





Treatments


PT focused on LE strengthening, w/c training, ROM, and improvement of functional

activity. Co-treated with OT secondary to patient level of assistance required 

and decreased balance with functional transfers. PT assisted with balance and 

assistance with sit <-> stand attempts and chair transfers, while OT focused on 

UE positioning and endurance with functional activity.





Assessment


Current Status:  Good Progress


Patient continues to show motivation to improve functional mobility, reports he 

would like to be able to figure out how to complete sit <-> stand more 

efficiently from walker, shows improvement in overall UE strength and endurance





PT Short Term Goals


Short Term Goals


Time Frame:  2021


Roll Left & Right:  6


Sit to lyin


Lying to sitting on side of be:  6


Sit to stand:  3


Chair/bed-to-chair transfer:  3


Wheel 50ft w/2 turns:  6


Wheel 150 feet:  6





PT Long Term Goals


Long Term Goals


PT Long Term Goals Time Frame:  May 19, 2021


Roll Left & Right (QC):  6


Sit to Lying (QC):  6


Lying-Sitting on Side/Bed(QC):  6


Sit to Stand (QC):  6


Chair/Bed-to-Chair Xfer(QC):  6 (SPT or slideboard, whichever is the safest 

method)


Toilet Transfer (QC):  6 (SPT or slide board)


Car Transfer (QC):  4


Does the Patient Walk:  No and Walking Goal NOT indicated


Walk 10 feet (QC):  88


Walk 50ft with 2 Turns (QC):  88


Walk 150 ft (QC):  88


Walking 10ft on Uneven Surface:  88


1 Step (curb) (QC):  88


4 Steps (QC):  88


12 Steps (QC):  88


Picking up an Object (QC):  88


Does the Pt use WC or Scooter?:  Yes


Wheel 50 feet with 2 turns (QC:  6


Type:  Manual


Wheel 150 feet:  6


Type:  Manual





PT Plan


Problem List


Problem List:  Activity Tolerance, Functional Strength, Safety, Balance, Gait, 

Transfer, Bed Mobility, ROM





Treatment/Plan


Treatment Plan:  Continue Plan of Care


Treatment Plan:  Bed Mobility, Education, Functional Activity [place], Functional 

Strength, Group Therapy, Gait, Safety, Therapeutic Exercise, Transfers


Treatment Duration:  May 19, 2021


Frequency:  At least 5 of 7 days/Wk (IRF)


Estimated Hrs Per Day:  1.5 hours per day


Patient and/or Family Agrees t:  Yes





Safety Risks/Education


Patient Education:  Transfer Techniques, Correct Positioning, W/C Management, 

Safety Issues


Teaching Recipient:  Patient


Teaching Methods:  Demonstration, Discussion


Response to Teaching:  Verbalize Understanding, Return Demonstration





Time/GCodes


Time In:  0900


Time Out:  1030


Total Billed Treatment Time:  90


Total Billed Treatment


1 visit: 


FA x3: 45'  


EX x2: 30' 


WCH: 15' 





PT treated: 09 - , Co-treated with OT 1000 - 1030











CAMILLE HARRY PT                2021 10:59

## 2021-04-27 NOTE — OCCUPATIONAL THER DAILY NOTE
OT Current Status-Daily Note


Subjective


No pain reported.





Mental Status/Objective


Patient Orientation:  Person, Place, Time, Situation





ADL-Treatment


Therapy Code Descriptions/Definitions 





Functional [place] Measure:


0=Not Assessed/NA        4=Minimal Assistance


1=Total Assistance        5=Supervision or Setup


2=Maximal Assistance  6=Modified [place]


3=Moderate Assistance 7=Complete IndependenceSCALE: Activities may be completed 

with or without assistive devices.





6-Indepedent-patient completes the activity by him/herself with no assistance 

from a helper.


5-Set-up or Clean-up Assistance-helper sets up or cleans up; patient completes 

activity. [place] assists only prior to or  


    following the activity.


4-Supervision or Touching Assistance-helper provides verbal cues and/or 

touching/steadying and/or contact guard assistance as patient completes 

activity. Assistance may be provided   


    throughout the activity or intermittently.


3-Partial/Moderate Assistance-helper does LESS THAN HALF the effort. [place] 

lifts, holds or supports trunk or limbs, but provides less than half the effort.


2-Substantial/Maximal Assistance-helper does MORE THAN HALF the effort. [place] 

lifts or holds trunk or limbs and provides more than half the effort.


3-Sfwxotahc-xeblgr does ALL the effort. Patient does none of the effort to 

complete the activity. Or, the assistance of 2 or more helpers is required for 

the patient to complete the  


    activity.


If activity was not attempted, code reason:


7-Patient Refused.


9-Not Applicable-not attempted and the patient did not perform the activity 

before the current illness, exacerbation or injury.


10-Not Attempted due to Environmental Limitations-(lack of equipment, weather 

restraints, etc.).


88-Not Attempted due to Medical Conditions or Safety Concerns.





Other Treatment


OT/PT co-treated at beginning of session due to poor endurance with standing for

dynamic ADL tasks.  PT focused on sit- parallel bars, and OT and pt. 

problem solved different scenerios that he would have to let go and reach for, 

such as cleansing in bathroom, or reaching at sink level.  Pt. stood from 

parallel bars x 3, with CGA, and was only able to let go with one hand at a 

time, and very briefly.  Able to stand approximately 2 minutes at a time.  OT 

took over session after PT left and completed UE exercises with pt.  Pt. donned 

4 lb. wrist weights on each arm and completed pulley system x 5 minutes, and 

also completed fine motor activity with recipricol movements back and fort for 

increased strength.  Pt. also completed 15 minutes on arm bike at heavy 

resistance.  Talked with pt. regarding home set up, and need for independence to

be able to stand from wheelchair and hold walker for transfers.  Pt. is able to 

stand from high surface, and is able to stand at parallel bars, but is still 

having difficulty at purely standing from wheelchair to walker.  Pt. transferred

with slide board to bed back in room due to fatigue.  Required min assist with 

this.  All needs met.





Education


OT Patient Education:  Correct positioning, Exercise program, Modified ADL 

techniques, Progress toward Goal/Update tx plan, Purpose of tx/functional 

activities, Reviewed precautions, Rehab process, Transfer techniques


Teaching Recipient:  Patient


Teaching Methods:  Demonstration, Discussion


Response to Teaching:  Verbalize Understanding, Return Demonstration





OT Short Term Goals


Short Term Goals


Time Frame:  May 5, 2021


Eatin


Oral hygiene:  6


Toileting hygiene:  4


Shower/bathe self:  4


Upper body dressin


Lower body dressing:  3


Putting on/taking off footwear:  3





OT Long Term Goals


Long Term Goals


Time Frame:  May 19, 2021


Eating (QC):  6


Oral Hygiene (QC):  6


Toileting Hygiene (QC):  6


Shower/Bathe Self (QC):  6


Upper Body Dressing (QC):  6


Lower Body Dressing (QC):  6


On/Off Footwear (QC):  6


Additional Goals:  1-Demonstrate ADL Tasks, 2-Verbalize Understanding, 

3-ImproveStrength/Aung


1=Demonstrate adherence to instructed precautions during ADL tasks.


2=Patient will verbalize/demonstrate understanding of assistive 

devices/modifications for ADL.


3=Patient will improve strength/tolerance for activity to enable patient to 

perform ADL's.





OT Education/Plan


Problem List/Assessment


Assessment:  Decreased Activ Tolerance, Impaired I ADL's, Impaired Self-Care 

Skills





Discharge Recommendations


Plan/Recommendations:  Continue POC


Therapy Discharge Recommendati:  Home & Family





Treatment Plan/Plan of Care


Treatment,Training & Education:  Yes


Patient would benefit from OT for education, treatment and training to promote 

independence in ADL's, mobility, safety and/or upper extremity function for 

ADL's.


Plan of Care:  ADL Retraining, Functional Mobility, Group Exercise/Act as Ind, 

UE Funct Exercise/Act


Treatment Duration:  May 19, 2021


Frequency:  At least 5 of 7 days/Wk (IRF)


Estimated Hrs Per Day:  1.5 hours per day


Agreement:  Yes


Rehab Potential:  Good





Time/GCodes


Start Time:  10:00


Stop Time:  11:30


Total Time Billed (hr/min):  90


Billed Treatment Time


9330-3229 1, FA x 30minutes- Co-treatment with PT


5949-5555 FA x 30minutes, Ex x 30minutes











JAROCHO OBRIEN OT           2021 12:07

## 2021-04-28 VITALS — DIASTOLIC BLOOD PRESSURE: 59 MMHG | SYSTOLIC BLOOD PRESSURE: 112 MMHG

## 2021-04-28 VITALS — SYSTOLIC BLOOD PRESSURE: 107 MMHG | DIASTOLIC BLOOD PRESSURE: 57 MMHG

## 2021-04-28 RX ADMIN — METOPROLOL SUCCINATE SCH MG: 100 TABLET, EXTENDED RELEASE ORAL at 08:19

## 2021-04-28 RX ADMIN — OXYCODONE HYDROCHLORIDE SCH MG: 10 TABLET, FILM COATED, EXTENDED RELEASE ORAL at 08:18

## 2021-04-28 RX ADMIN — DOCUSATE SODIUM SCH MG: 100 CAPSULE ORAL at 08:19

## 2021-04-28 RX ADMIN — PRASUGREL SCH MG: 10 TABLET, FILM COATED ORAL at 08:19

## 2021-04-28 RX ADMIN — POLYETHYLENE GLYCOL (3350) SCH GM: 17 POWDER, FOR SOLUTION ORAL at 19:02

## 2021-04-28 RX ADMIN — GLYBURIDE SCH MG: 2.5 TABLET ORAL at 06:23

## 2021-04-28 RX ADMIN — METFORMIN HYDROCHLORIDE SCH MG: 500 TABLET, EXTENDED RELEASE ORAL at 18:36

## 2021-04-28 RX ADMIN — Medication SCH ML: at 20:53

## 2021-04-28 RX ADMIN — POLYETHYLENE GLYCOL (3350) SCH GM: 17 POWDER, FOR SOLUTION ORAL at 08:19

## 2021-04-28 RX ADMIN — DOCUSATE SODIUM AND SENNOSIDES SCH EA: 8.6; 5 TABLET, FILM COATED ORAL at 08:19

## 2021-04-28 RX ADMIN — Medication SCH ML: at 14:28

## 2021-04-28 RX ADMIN — PANTOPRAZOLE SODIUM SCH MG: 40 TABLET, DELAYED RELEASE ORAL at 08:19

## 2021-04-28 RX ADMIN — MICONAZOLE NITRATE SCH APPLIC: 20 POWDER TOPICAL at 08:21

## 2021-04-28 RX ADMIN — GLYBURIDE SCH MG: 2.5 TABLET ORAL at 17:40

## 2021-04-28 RX ADMIN — OXYCODONE HYDROCHLORIDE SCH MG: 10 TABLET, FILM COATED, EXTENDED RELEASE ORAL at 20:53

## 2021-04-28 RX ADMIN — Medication SCH ML: at 06:23

## 2021-04-28 RX ADMIN — DOCUSATE SODIUM SCH MG: 100 CAPSULE ORAL at 19:02

## 2021-04-28 RX ADMIN — FENTANYL CITRATE PRN MCG: 50 INJECTION, SOLUTION INTRAMUSCULAR; INTRAVENOUS at 17:10

## 2021-04-28 RX ADMIN — MICONAZOLE NITRATE SCH APPLIC: 20 POWDER TOPICAL at 20:54

## 2021-04-28 RX ADMIN — INSULIN ASPART SCH UNIT: 100 INJECTION, SOLUTION INTRAVENOUS; SUBCUTANEOUS at 06:23

## 2021-04-28 RX ADMIN — ASPIRIN SCH MG: 81 TABLET ORAL at 08:19

## 2021-04-28 RX ADMIN — INSULIN ASPART SCH UNIT: 100 INJECTION, SOLUTION INTRAVENOUS; SUBCUTANEOUS at 11:26

## 2021-04-28 RX ADMIN — INSULIN ASPART SCH UNIT: 100 INJECTION, SOLUTION INTRAVENOUS; SUBCUTANEOUS at 16:50

## 2021-04-28 RX ADMIN — LISINOPRIL SCH MG: 20 TABLET ORAL at 08:18

## 2021-04-28 RX ADMIN — FENTANYL CITRATE PRN MCG: 50 INJECTION, SOLUTION INTRAMUSCULAR; INTRAVENOUS at 10:07

## 2021-04-28 RX ADMIN — DOCUSATE SODIUM AND SENNOSIDES SCH EA: 8.6; 5 TABLET, FILM COATED ORAL at 19:02

## 2021-04-28 RX ADMIN — METFORMIN HYDROCHLORIDE SCH MG: 500 TABLET, EXTENDED RELEASE ORAL at 08:19

## 2021-04-28 RX ADMIN — INSULIN ASPART SCH UNIT: 100 INJECTION, SOLUTION INTRAVENOUS; SUBCUTANEOUS at 20:56

## 2021-04-28 NOTE — PHYSICAL THERAPY DAILY NOTE
PT Daily Note-Current


Subjective


Pt sitting up in bed upon arrival.  Pt agrees to PT.  Pt reports L stump is a 

little sore so PTA tries to elevate with sliding board and blanket to comfort 

when in [facility].





Pain





   Location:  Incisional, Left


   Location Body Site:  Knee


   Pain Description:  Ache





Mental Status


Patient Orientation:  Person, Place, Time, Situation





Transfers


SCALE: Activities may be completed with or without assistive devices.





6-Indepedent-patient completes the activity by him/herself with no assistance 

from a helper.


5-Set-up or Clean-up Assistance-helper sets up or cleans up; patient completes 

activity. [place] assists only prior to or  


    following the activity.


4-Supervision or Touching Assistance-helper provides verbal cues and/or 

touching/steadying and/or contact guard assistance as patient completes 

activity. Assistance may be provided   


    throughout the activity or intermittently.


3-Partial/Moderate Assistance-helper does LESS THAN HALF the effort. [place] 

lifts, holds or supports trunk or limbs, but provides less than half the effort.


2-Substantial/Maximal Assistance-helper does MORE THAN HALF the effort. [place] 

lifts or holds trunk or limbs and provides more than half the effort.


7-Bdhnueemc-jifuum does ALL the effort. Patient does none of the effort to 

complete the activity. Or, the assistance of 2 or more helpers is required for 

the patient to complete the  


    activity.


If activity was not attempted, code reason:


7-Patient Refused.


9-Not Applicable-not attempted and the patient did not perform the activity 

before the current illness, exacerbation or injury.


10-Not Attempted due to Environmental Limitations-(lack of equipment, weather 

restraints, etc.).


88-Not Attempted due to Medical Conditions or Safety Concerns.


Sit to Lying (QC):  4


Lying to Sitting/Side of Bed(Q:  4


Sit to Stand (QC):  3


Chair/Bed-to-Chair Xfer(QC):  4





Weight Bearing


Right Lower Extremity:  Right


Full Weight Bearing


Left Lower Extremity:  Left


Non Weight Bearing (BKA)





Exercises


Seated Therapy Exercises:  Sit to stand





Treatments


Pt dons shirt and transfers from bed to [facility] for tx.  Pt is able to propel [facility] to

Therapy Gym.  Pt completes Sit to Stands for practice with standing.  Pt returns

to [facility] and returns to room at end of tx.  Pt transfers to bed via slide board.  

Pt laying Supine at end of tx with all needs met.





Assessment


Current Status:  Good Progress


Pt is gaining strength with sit to stand practice.  Pt wants to gain 

independence with transfers before transferring.





PT Short Term Goals


Short Term Goals


Time Frame:  2021


Roll Left & Right:  6


Sit to lyin


Lying to sitting on side of be:  6


Sit to stand:  3


Chair/bed-to-chair transfer:  3


Wheel 50ft w/2 turns:  6


Wheel 150 feet:  6





PT Long Term Goals


Long Term Goals


PT Long Term Goals Time Frame:  May 19, 2021


Roll Left & Right (QC):  6


Sit to Lying (QC):  6


Lying-Sitting on Side/Bed(QC):  6


Sit to Stand (QC):  6


Chair/Bed-to-Chair Xfer(QC):  6 (SPT or slideboard, whichever is the safest 

method)


Toilet Transfer (QC):  6 (SPT or slide board)


Car Transfer (QC):  4


Does the Patient Walk:  No and Walking Goal NOT indicated


Walk 10 feet (QC):  88


Walk 50ft with 2 Turns (QC):  88


Walk 150 ft (QC):  88


Walking 10ft on Uneven Surface:  88


1 Step (curb) (QC):  88


4 Steps (QC):  88


12 Steps (QC):  88


Picking up an Object (QC):  88


Does the Pt use WC or Scooter?:  Yes


Wheel 50 feet with 2 turns (QC:  6


Type:  Manual


Wheel 150 feet:  6


Type:  Manual





PT Plan


Problem List


Problem List:  Functional Strength, Transfer





Treatment/Plan


Treatment Plan:  Continue Plan of Care


Treatment Plan:  Bed Mobility, Education, Functional Activity Aung, Functional 

Strength, Group Therapy, Gait, Safety, Therapeutic Exercise, Transfers


Treatment Duration:  May 19, 2021


Frequency:  At least 5 of 7 days/Wk (IRF)


Estimated Hrs Per Day:  1.5 hours per day


Patient and/or Family Agrees t:  Yes





Safety Risks/Education


Patient Education:  Transfer Techniques, Correct Positioning


Teaching Recipient:  Patient


Teaching Methods:  Demonstration, Discussion


Response to Teaching:  Verbalize Understanding, Return Demonstration





Time/GCodes


Time In:  1300


Time Out:  1330


Total Billed Treatment Time:  30


Total Billed Treatment


1, FA (20m) & EX(10m)











KWAKU CASTELLON PTA              2021 13:38

## 2021-04-28 NOTE — OCCUPATIONAL THER DAILY NOTE
OT Current Status-Daily Note


Subjective


No pain reported now.  Pt. states that he had pain earlier during PT, and has 

had pain medication.





Mental Status/Objective


Patient Orientation:  Person, Place, Time, Situation





ADL-Treatment


Therapy Code Descriptions/Definitions 





Functional [place] Measure:


0=Not Assessed/NA        4=Minimal Assistance


1=Total Assistance        5=Supervision or Setup


2=Maximal Assistance  6=Modified [place]


3=Moderate Assistance 7=Complete IndependenceSCALE: Activities may be completed 

with or without assistive devices.





6-Indepedent-patient completes the activity by him/herself with no assistance 

from a helper.


5-Set-up or Clean-up Assistance-helper sets up or cleans up; patient completes 

activity. [place] assists only prior to or  


    following the activity.


4-Supervision or Touching Assistance-helper provides verbal cues and/or 

touching/steadying and/or contact guard assistance as patient completes 

activity. Assistance may be provided   


    throughout the activity or intermittently.


3-Partial/Moderate Assistance-helper does LESS THAN HALF the effort. [place] 

lifts, holds or supports trunk or limbs, but provides less than half the effort.


2-Substantial/Maximal Assistance-helper does MORE THAN HALF the effort. [place] 

lifts or holds trunk or limbs and provides more than half the effort.


9-Gyrpzncay-ftjral does ALL the effort. Patient does none of the effort to 

complete the activity. Or, the assistance of 2 or more helpers is required for 

the patient to complete the  


    activity.


If activity was not attempted, code reason:


7-Patient Refused.


9-Not Applicable-not attempted and the patient did not perform the activity 

before the current illness, exacerbation or injury.


10-Not Attempted due to Environmental Limitations-(lack of equipment, weather 

restraints, etc.).


88-Not Attempted due to Medical Conditions or Safety Concerns.





Other Treatment


Pt. in bed.  Alert and oriented.  Pt. states that he bumped his residual limb 

earlier during PT, and that he had significant pain.  Pt. had pain medication 

for it and is feeling better now.  Pt. states that he would rather not shower 

this date or transfer back to wheelchair.  He does agree to bilateral UE 

exercises however.  Pt. completes multiple UE exercises(approximately 10 

exercises) x 10 reps each in all planes.  Pt. also completed bilateral hand 

squeezes with resistive sponge, and transferred supine-sit with SBA and 

completed 15 minutes on arm bike at heavy resistance.  Pt. working on increased 

UE strength overall to assist himself with transfers.  Pt. and OT talked in 

depth regarding pt's home set up, and what goals are important before discharge.

 Pt. verbalizes that his ramp is built, but they are waiting until the ground is

dry to pour concrete.  Pt. also verbalizes that his family is in the midst of 

modifying his bathroom.  Pt. and OT talked about what equipment he needs, and 

what goals he has.  Pt's main goal is to be able to stand from wheelchair safely

and use walker to pivot to other surfaces.  Pt. is currently able to stand at 

walker if the surface he is on is signficantly elevated.  Pt. and OT processed 

toilet transfers, and pt's need for bariatric commode over toilet.  Pt. will 

also need a bariatric walker, and slide board.  Will also need 4 inch cushion.  

This was verbalized to SW.  All needs met.





Education


OT Patient Education:  Correct positioning, Exercise program, Modified ADL 

techniques, Progress toward Goal/Update tx plan, Purpose of tx/functional 

activities, Reviewed precautions, Rehab process, Transfer techniques





OT Short Term Goals


Short Term Goals


Time Frame:  May 5, 2021


Eatin


Oral hygiene:  6


Toileting hygiene:  4


Shower/bathe self:  4


Upper body dressin


Lower body dressing:  3


Putting on/taking off footwear:  3





OT Long Term Goals


Long Term Goals


Time Frame:  May 19, 2021


Eating (QC):  6


Oral Hygiene (QC):  6


Toileting Hygiene (QC):  6


Shower/Bathe Self (QC):  6


Upper Body Dressing (QC):  6


Lower Body Dressing (QC):  6


On/Off Footwear (QC):  6


Additional Goals:  1-Demonstrate ADL Tasks, 2-Verbalize Understanding, 

3-ImproveStrength/Aung


1=Demonstrate adherence to instructed precautions during ADL tasks.


2=Patient will verbalize/demonstrate understanding of assistive 

devices/modifications for ADL.


3=Patient will improve strength/tolerance for activity to enable patient to 

perform ADL's.





OT Education/Plan


Discharge Recommendations


Plan/Recommendations:  Continue POC





Treatment Plan/Plan of Care


Treatment,Training & Education:  Yes


Patient would benefit from OT for education, treatment and training to promote 

independence in ADL's, mobility, safety and/or upper extremity function for AD

L's.


Plan of Care:  ADL Retraining, Functional Mobility, Group Exercise/Act as Ind, 

UE Funct Exercise/Act


Treatment Duration:  May 19, 2021


Frequency:  At least 5 of 7 days/Wk (IRF)


Estimated Hrs Per Day:  1.5 hours per day


Agreement:  Yes


Rehab Potential:  Good





Time/GCodes


Start Time:  10:15


Stop Time:  11:00


Total Time Billed (hr/min):  45


Billed Treatment Time


1, Ex x 3











JAROCHO OBRIEN OT           2021 13:05

## 2021-04-28 NOTE — OCCUPATIONAL THER DAILY NOTE
OT Current Status-Daily Note


Subjective


Pt alert, sitting one EOB.  Took over care from OTR/L.  Pt's pain decreased 

after pain meds.  Pt agrees to therapy.





Mental Status/Objective


Patient Orientation:  Person, Place, Time, Situation


Attachments:  IV (miline)





ADL-Treatment


Therapy Code Descriptions/Definitions 





Functional [place] Measure:


0=Not Assessed/NA        4=Minimal Assistance


1=Total Assistance        5=Supervision or Setup


2=Maximal Assistance  6=Modified [place]


3=Moderate Assistance 7=Complete IndependenceSCALE: Activities may be completed 

with or without assistive devices.





6-Indepedent-patient completes the activity by him/herself with no assistance 

from a helper.


5-Set-up or Clean-up Assistance-helper sets up or cleans up; patient completes 

activity. [place] assists only prior to or  


    following the activity.


4-Supervision or Touching Assistance-helper provides verbal cues and/or 

touching/steadying and/or contact guard assistance as patient completes 

activity. Assistance may be provided   


    throughout the activity or intermittently.


3-Partial/Moderate Assistance-helper does LESS THAN HALF the effort. [place] 

lifts, holds or supports trunk or limbs, but provides less than half the effort.


2-Substantial/Maximal Assistance-helper does MORE THAN HALF the effort. [place] 

lifts or holds trunk or limbs and provides more than half the effort.


2-Cbxllnxpe-ttwcld does ALL the effort. Patient does none of the effort to 

complete the activity. Or, the assistance of 2 or more helpers is required for 

the patient to complete the  


    activity.


If activity was not attempted, code reason:


7-Patient Refused.


9-Not Applicable-not attempted and the patient did not perform the activity 

before the current illness, exacerbation or injury.


10-Not Attempted due to Environmental Limitations-(lack of equipment, weather 

restraints, etc.).


88-Not Attempted due to Medical Conditions or Safety Concerns.





Other Treatment


Pt completed B UE strengthening exercises to increase strength for daily 

functional tasks and mobility.  Completed 15 min with arm bike at 35 [address] resi

stance.  Independent bed mobility.  Dowel ashley exercises with 10# wt, 1 set 30 

reps each.  Chest press with wide  and narrow  while in supine.  Shldr 

flexion with tricep extension, bicep flexion.  After therapy, pt sitting up in 

bed with call light/phone in reach.  All needs met in room.





OT Short Term Goals


Short Term Goals


Time Frame:  May 5, 2021


Eatin


Oral hygiene:  6


Toileting hygiene:  4


Shower/bathe self:  4


Upper body dressin


Lower body dressing:  3


Putting on/taking off footwear:  3





OT Long Term Goals


Long Term Goals


Time Frame:  May 19, 2021


Eating (QC):  6


Oral Hygiene (QC):  6


Toileting Hygiene (QC):  6


Shower/Bathe Self (QC):  6


Upper Body Dressing (QC):  6


Lower Body Dressing (QC):  6


On/Off Footwear (QC):  6


Additional Goals:  1-Demonstrate ADL Tasks, 2-Verbalize Understanding, 3-

ImproveStrength/Aung


1=Demonstrate adherence to instructed precautions during ADL tasks.


2=Patient will verbalize/demonstrate understanding of assistive 

devices/modifications for ADL.


3=Patient will improve strength/tolerance for activity to enable patient to 

perform ADL's.





OT Education/Plan


Problem List/Assessment


Assessment:  Decreased UE Strength, Impaired Self-Care Skills





Discharge Recommendations


Plan/Recommendations:  Continue POC





Treatment Plan/Plan of Care


Patient would benefit from OT for education, treatment and training to promote 

independence in ADL's, mobility, safety and/or upper extremity function for 

ADL's.


Plan of Care:  ADL Retraining, Functional Mobility, Group Exercise/Act as Ind, 

UE Funct Exercise/Act


Treatment Duration:  May 19, 2021


Frequency:  At least 5 of 7 days/Wk (IRF)


Estimated Hrs Per Day:  1.5 hours per day


Agreement:  Yes


Rehab Potential:  Good





Time/GCodes


Start Time:  11:00


Stop Time:  11:45


Total Time Billed (hr/min):  45


Billed Treatment Time


1 visit-EX 3 (45 min)











GUDELIA GONZALEZ               2021 13:13

## 2021-04-28 NOTE — PM&R PROGRESS NOTE
Subjective


HPI/CC On Admission


Date Seen by Provider:  Apr 28, 2021


Time Seen by Provider:  09:00


Subjective/Events-last exam


4/28/21:


No major issues


BM yesterday will need to closely monitor that due to long acting narcs


Dr Jiménez will see him today regarding his staples


Talked about weight loss with Dr Jiménez








4/27/21:


Pt doing really well


No issues 


Stump  is being tolerated for 23 hours of the day 


No pain 


Sugar is okay and I decreased insulin even further today 








4/26/21:


Pt doing a lot better today


Pain is well controlled


Bowels moved yesterday


Stump  will be placed today on his left amputation site


Slept very well








4/25/21:


Doing well


No pain when he doesn't move


Pain controlled better with Oxycontin


BM+


Mother and son visiting him today


Sugars reviewed








4/24/21:


Pain is better


No issues reported


Hgb 7.9


BM++


Using IS








4/23/21:


Doing well


Stump  will start Monday per Dr Jiménez


BM+


Oxycontin and Oxycodone added for better pain control


DC Lortab








4/22/21:


Pt doing pretty well


Had a large BM today


Hgb 7.7 after three units of blood on med surg


Rough on the transfers but getting better


Dressing change will be per Dr. Morejon orders


Sugar is 107 this morning





Review of Systems


General:  Fatigue, Malaise


Musculoskeletal:  leg pain





Objective


Exam


Vital Signs





Vital Signs








  Date Time  Temp Pulse Resp B/P (MAP) Pulse Ox O2 Delivery O2 Flow Rate FiO2


 


4/28/21 20:02 36.2 77 18 107/57 (74) 100 Room Air  





Capillary Refill :


General Appearance:  No Apparent Distress, WD/WN, Chronically ill, Obese


HEENT:  PERRL/EOMI, Normal ENT Inspection, Pharynx Normal


Neck:  Full Range of Motion, Normal Inspection, Non Tender, Supple, Carotid 

Bruit


Respiratory:  Chest Non Tender, Lungs Clear, Normal Breath Sounds, No Accessory 

Muscle Use, No Respiratory Distress


Cardiovascular:  Regular Rate, Rhythm, No Edema, No Gallop, No JVD, No Murmur, 

Normal Peripheral Pulses


Gastrointestinal:  Normal Bowel Sounds, No Organomegaly, No Pulsatile Mass, Non 

Tender, Soft


Back:  Normal Inspection, No CVA Tenderness, No Vertebral Tenderness


Extremity:  Normal Capillary Refill, Normal Inspection, Normal Range of Motion, 

Non Tender, No Calf Tenderness, No Pedal Edema


Neurologic/Psychiatric:  Alert, Oriented x3, No Motor/Sensory Deficits, Normal 

Mood/Affect, CNs II-XII Norm as Tested, Abnormal Gait, Motor Weakness (left leg 

amputation )


Skin:  Normal Color, Warm/Dry


Lymphatic:  No Adenopathy





Results/Procedures


Lab


Patient resulted labs reviewed.





FIM


Transfers


Therapy Code Descriptions/Definitions 





Functional [place] Measure:


0=Not Assessed/NA        4=Minimal Assistance


1=Total Assistance        5=Supervision or Setup


2=Maximal Assistance  6=Modified [place]


3=Moderate Assistance 7=Complete IndependenceSCALE: Activities may be completed 

with or without assistive devices.





6-Indepedent-patient completes the activity by him/herself with no assistance 

from a helper.


5-Set-up or Clean-up Assistance-helper sets up or cleans up; patient completes 

activity. [place] assists only prior to or  


    following the activity.


4-Supervision or Touching Assistance-helper provides verbal cues and/or touching

/steadying and/or contact guard assistance as patient completes activity. 

Assistance may be provided   


    throughout the activity or intermittently.


3-Partial/Moderate Assistance-helper does LESS THAN HALF the effort. [place] 

lifts, holds or supports trunk or limbs, but provides less than half the effort.


2-Substantial/Maximal Assistance-helper does MORE THAN HALF the effort. [place] 

lifts or holds trunk or limbs and provides more than half the effort.


7-Dgsdiwwdf-dhsgcd does ALL the effort. Patient does none of the effort to 

complete the activity. Or, the assistance of 2 or more helpers is required for 

the patient to complete the  


    activity.


If activity was not attempted, code reason:


7-Patient Refused.


9-Not Applicable-not attempted and the patient did not perform the activity 

before the current illness, exacerbation or injury.


10-Not Attempted due to Environmental Limitations-(lack of equipment, weather 

restraints, etc.).


88-Not Attempted due to Medical Conditions or Safety Concerns.


Roll Left to Right (QC):  6


Sit to Lying (QC):  6


Sit to Stand (QC):  3


Chair/Bed-to-Chair Xfer(QC):  3


Car Transfer (QC):  88





Gait Training


Does the Patient Walk?:  No and Walking Goal NOT indicated


Walk 10 feet (QC):  88


Walk 50 ft with 2 Turns(QC):  88


Walk 150 ft (QC):  88


Walking 10ft/uneven surface-QC:  88





Wheelchair Training


Does the Pt Use a Wheelchair?:  Yes


Wheel 50 ft with 2 turns (QC):  6


Wheel 150 ft (QC):  6


Type of Wheelchair:  Manual





Stair Training


1 Step (curb) (QC):  88


4 Steps (QC):  88


12 Steps (QC):  88





Balance


Picking up an Object (QC):  88





ADL-Treatment


Eating (QC):  6


Oral Hygiene (QC):  6


Shower/Bathe Self (QC):  4


Upper Body Dressing (QC):  4


Lower Body Dressing (QC):  3


On/Off Footwear (QC):  2


Toileting Hygiene (QC):  2


Toilet Transfer (QC):  2





Assessment/Plan


Assessment and Plan


Assess & Plan/Chief Complaint


Assessment:


s/p LBKA 


DM insulin dependence


HTN


HLP


CRI


Iron def anemia from acute blood loss


TERRI untreated





Plan:


Monitor pain


IRF protocol


Monitor hgb


Iron infusion





4/22/21:


Monitor sugar


Monitor hgb





4/23/21:


Add pain meds


Check labs in am





4/24/21:


Monitor sugar and decrease insulin


Hgb stable


Venofer





4/25/21:


Continue iron infusions


Sugar managed well


Pain controlled





4/26/21:


Stump 


Monitor sugar


Monitor hgb





4/27/21:


Monitor sugar


Decrease insulin


Monitor BP





4/28/21:


Monitor pain


Decreased insulin





(1) Status post below-knee amputation of left lower extremity


Status:  Acute


(2) Hyperglycemia


Status:  Chronic


(3) Hypertension


Status:  Chronic


(4) Diabetes mellitus, type 2


Status:  Chronic


(5) Anemia


Status:  Acute


(6) Iron deficiency


Status:  Acute


(7) Peripheral neuropathy


Status:  Chronic











NICOLE VILA DO                Apr 28, 2021 05:56

## 2021-04-28 NOTE — PHYSICAL THERAPY DAILY NOTE
PT Daily Note-Current


Subjective


Pt in bed upon arrival and agrees to tx. Reports he would like to be independent

in toilet TFs and be able to perform stand pivot TF from WC to next surface and 

wished that his incision would stop bleeding.





Pain





   Location:  Incisional, Left


   Location Body Site:  Knee


   Pain Description:  Ache, Tightness


   Comment:  Pain reported but not rated.





Mental Status


Patient Orientation:  Person, Place, Time, Situation


Attachments:  Other-See Comments (mask while out of room)





Transfers


SCALE: Activities may be completed with or without assistive devices.





6-Indepedent-patient completes the activity by him/herself with no assistance 

from a helper.


5-Set-up or Clean-up Assistance-helper sets up or cleans up; patient completes 

activity. [place] assists only prior to or  


    following the activity.


4-Supervision or Touching Assistance-helper provides verbal cues and/or 

touching/steadying and/or contact guard assistance as patient completes 

activity. Assistance may be provided   


    throughout the activity or intermittently.


3-Partial/Moderate Assistance-helper does LESS THAN HALF the effort. [place] 

lifts, holds or supports trunk or limbs, but provides less than half the effort.


2-Substantial/Maximal Assistance-helper does MORE THAN HALF the effort. [place] 

lifts or holds trunk or limbs and provides more than half the effort.


1-Ekbonazjw-lzddpi does ALL the effort. Patient does none of the effort to 

complete the activity. Or, the assistance of 2 or more helpers is required for 

the patient to complete the  


    activity.


If activity was not attempted, code reason:


7-Patient Refused.


9-Not Applicable-not attempted and the patient did not perform the activity 

before the current illness, exacerbation or injury.


10-Not Attempted due to Environmental Limitations-(lack of equipment, weather 

restraints, etc.).


88-Not Attempted due to Medical Conditions or Safety Concerns.


Roll Left & Right (QC):  5


Sit to Lying (QC):  5


Lying to Sitting/Side of Bed(Q:  5


Sit to Stand (QC):  4


Chair/Bed-to-Chair Xfer(QC):  4





Weight Bearing


Right Lower Extremity:  Right


Full Weight Bearing


Left Lower Extremity:  Left


Non Weight Bearing (BKA)





Gait Training


Does the Patient Walk?:  Yes


Distance:  5' x 2


Gait Assistive Device:  FWW


Only amb for SPT





Wheelchair Training


Does the Pt Use a Wheelchair?:  Yes


Wheel 50 ft with 2 turns (QC):  5


Wheel 150 ft (QC):  5


Type of Wheelchair:  Manual


Pt uses WC going forwards and retropulsively and comments retropulsive is better

on his knee





Exercises


Supine Ex:  Quad Set, Short Arc Quads, Scooting


Supine Reps:  13





Treatments


Pt in bed upon arrival and dons clothes independently pt then performs SPT to 

WC. Pt wheels self 180' to windowsill and takes rest break. Pt then wheels self 

to therapy gym and performs slide board TF to mat and accidentally pushes off 

BKA and pt need minute to cope w/ pain. Then performs SAQ, weighted quad set w/ 

6# for 5min, prone hip ext stretch for 6min, and QS. Pt rolls back over and SPT 

back to WC and wheels self back to room. Slide board TF back to bed call light 

nearby all needs met therapy departs.





Assessment


Current Status:  Good Progress


Pt seems very motivated to continue therapy and to become independent. Pt 

continues to get stronger and improve. Pt gets fatigued from slide board TF but 

SPT is improving but pt comments that his balance is very bad but with FWW pt 

does well. Pt seemed to fatigue when using WC but resolved after short rest 

breaks.





PT Short Term Goals


Short Term Goals


Time Frame:  2021


Roll Left & Right:  6


Sit to lyin


Lying to sitting on side of be:  6


Sit to stand:  3


Chair/bed-to-chair transfer:  3


Wheel 50ft w/2 turns:  6


Wheel 150 feet:  6





PT Long Term Goals


Long Term Goals


PT Long Term Goals Time Frame:  May 19, 2021


Roll Left & Right (QC):  6


Sit to Lying (QC):  6


Lying-Sitting on Side/Bed(QC):  6


Sit to Stand (QC):  6


Chair/Bed-to-Chair Xfer(QC):  6 (SPT or slideboard, whichever is the safest 

method)


Toilet Transfer (QC):  6 (SPT or slide board)


Car Transfer (QC):  4


Does the Patient Walk:  No and Walking Goal NOT indicated


Walk 10 feet (QC):  88


Walk 50ft with 2 Turns (QC):  88


Walk 150 ft (QC):  88


Walking 10ft on Uneven Surface:  88


1 Step (curb) (QC):  88


4 Steps (QC):  88


12 Steps (QC):  88


Picking up an Object (QC):  88


Does the Pt use WC or Scooter?:  Yes


Wheel 50 feet with 2 turns (QC:  6


Type:  Manual


Wheel 150 feet:  6


Type:  Manual





PT Plan


Problem List


Problem List:  Activity Tolerance, Functional Strength, Transfer





Treatment/Plan


Treatment Plan:  Continue Plan of Care


Treatment Plan:  Bed Mobility, Education, Functional Activity [place], Functional 

Strength, Group Therapy, Gait, Safety, Therapeutic Exercise, Transfers


Treatment Duration:  May 19, 2021


Frequency:  At least 5 of 7 days/Wk (IRF)


Estimated Hrs Per Day:  1.5 hours per day


Patient and/or Family Agrees t:  Yes





Safety Risks/Education


Patient Education:  Transfer Techniques, Correct Positioning, Safety Issues


Teaching Recipient:  Patient


Teaching Methods:  Demonstration, Discussion


Response to Teaching:  Verbalize Understanding, Return Demonstration





Time/GCodes


Time In:  900


Time Out:  1000


Total Billed Treatment Time:  60


Total Billed Treatment


1, FA x 2 30m, WC 10m, EX 20m











KWAKU CASTELLON PTA              2021 10:04

## 2021-04-29 VITALS — DIASTOLIC BLOOD PRESSURE: 63 MMHG | SYSTOLIC BLOOD PRESSURE: 130 MMHG

## 2021-04-29 VITALS — DIASTOLIC BLOOD PRESSURE: 76 MMHG | SYSTOLIC BLOOD PRESSURE: 124 MMHG

## 2021-04-29 RX ADMIN — INSULIN ASPART SCH UNIT: 100 INJECTION, SOLUTION INTRAVENOUS; SUBCUTANEOUS at 06:24

## 2021-04-29 RX ADMIN — ASPIRIN SCH MG: 81 TABLET ORAL at 08:20

## 2021-04-29 RX ADMIN — OXYCODONE HYDROCHLORIDE SCH MG: 10 TABLET, FILM COATED, EXTENDED RELEASE ORAL at 20:11

## 2021-04-29 RX ADMIN — PRASUGREL SCH MG: 10 TABLET, FILM COATED ORAL at 08:20

## 2021-04-29 RX ADMIN — Medication SCH ML: at 20:12

## 2021-04-29 RX ADMIN — DOCUSATE SODIUM SCH MG: 100 CAPSULE ORAL at 19:40

## 2021-04-29 RX ADMIN — MICONAZOLE NITRATE SCH APPLIC: 20 POWDER TOPICAL at 20:12

## 2021-04-29 RX ADMIN — GLYBURIDE SCH MG: 2.5 TABLET ORAL at 06:24

## 2021-04-29 RX ADMIN — GLYBURIDE SCH MG: 2.5 TABLET ORAL at 17:46

## 2021-04-29 RX ADMIN — INSULIN ASPART SCH UNIT: 100 INJECTION, SOLUTION INTRAVENOUS; SUBCUTANEOUS at 21:04

## 2021-04-29 RX ADMIN — DOCUSATE SODIUM SCH MG: 100 CAPSULE ORAL at 08:24

## 2021-04-29 RX ADMIN — METFORMIN HYDROCHLORIDE SCH MG: 500 TABLET, EXTENDED RELEASE ORAL at 08:20

## 2021-04-29 RX ADMIN — POLYETHYLENE GLYCOL (3350) SCH GM: 17 POWDER, FOR SOLUTION ORAL at 19:40

## 2021-04-29 RX ADMIN — Medication SCH ML: at 13:45

## 2021-04-29 RX ADMIN — DOCUSATE SODIUM AND SENNOSIDES SCH EA: 8.6; 5 TABLET, FILM COATED ORAL at 19:40

## 2021-04-29 RX ADMIN — INSULIN ASPART SCH UNIT: 100 INJECTION, SOLUTION INTRAVENOUS; SUBCUTANEOUS at 11:16

## 2021-04-29 RX ADMIN — PANTOPRAZOLE SODIUM SCH MG: 40 TABLET, DELAYED RELEASE ORAL at 08:20

## 2021-04-29 RX ADMIN — Medication SCH ML: at 06:24

## 2021-04-29 RX ADMIN — METFORMIN HYDROCHLORIDE SCH MG: 500 TABLET, EXTENDED RELEASE ORAL at 17:46

## 2021-04-29 RX ADMIN — MICONAZOLE NITRATE SCH APPLIC: 20 POWDER TOPICAL at 08:25

## 2021-04-29 RX ADMIN — INSULIN ASPART SCH UNIT: 100 INJECTION, SOLUTION INTRAVENOUS; SUBCUTANEOUS at 15:42

## 2021-04-29 RX ADMIN — OXYCODONE HYDROCHLORIDE SCH MG: 10 TABLET, FILM COATED, EXTENDED RELEASE ORAL at 08:20

## 2021-04-29 RX ADMIN — METOPROLOL SUCCINATE SCH MG: 100 TABLET, EXTENDED RELEASE ORAL at 08:20

## 2021-04-29 RX ADMIN — LISINOPRIL SCH MG: 20 TABLET ORAL at 08:20

## 2021-04-29 RX ADMIN — POLYETHYLENE GLYCOL (3350) SCH GM: 17 POWDER, FOR SOLUTION ORAL at 08:24

## 2021-04-29 RX ADMIN — DOCUSATE SODIUM AND SENNOSIDES SCH EA: 8.6; 5 TABLET, FILM COATED ORAL at 08:24

## 2021-04-29 NOTE — OCCUPATIONAL THER DAILY NOTE
OT Current Status-Daily Note


Subjective


Pt. reports pain in residual limb after shower.  Does not report pain level, but

nursing is asked for a pain pill.  Nursing will give to pt.





Mental Status/Objective


Patient Orientation:  Person, Place, Time, Situation





ADL-Treatment


Therapy Code Descriptions/Definitions 





Functional [place] Measure:


0=Not Assessed/NA        4=Minimal Assistance


1=Total Assistance        5=Supervision or Setup


2=Maximal Assistance  6=Modified [place]


3=Moderate Assistance 7=Complete IndependenceSCALE: Activities may be completed 

with or without assistive devices.





6-Indepedent-patient completes the activity by him/herself with no assistance 

from a helper.


5-Set-up or Clean-up Assistance-helper sets up or cleans up; patient completes 

activity. [place] assists only prior to or  


    following the activity.


4-Supervision or Touching Assistance-helper provides verbal cues and/or 

touching/steadying and/or contact guard assistance as patient completes ac

tivity. Assistance may be provided   


    throughout the activity or intermittently.


3-Partial/Moderate Assistance-helper does LESS THAN HALF the effort. [place] 

lifts, holds or supports trunk or limbs, but provides less than half the effort.


2-Substantial/Maximal Assistance-helper does MORE THAN HALF the effort. [place] 

lifts or holds trunk or limbs and provides more than half the effort.


9-Ysuppabwe-qxhqqa does ALL the effort. Patient does none of the effort to 

complete the activity. Or, the assistance of 2 or more helpers is required for 

the patient to complete the  


    activity.


If activity was not attempted, code reason:


7-Patient Refused.


9-Not Applicable-not attempted and the patient did not perform the activity 

before the current illness, exacerbation or injury.


10-Not Attempted due to Environmental Limitations-(lack of equipment, weather 

restraints, etc.).


88-Not Attempted due to Medical Conditions or Safety Concerns.


Eating (QC):  6


Oral Hygiene (QC):  7


Shower/Bathe Self (QC):  5


Upper Body Dressing (QC):  5


Lower Body Dressing (QC):  7


On/Off Footwear:  2


Toileting Hygiene (QC):  4 (Pt. able to cleanse rear primo area after 

transferring back to bed.  )


Pt. in bed.  Agrees to work with OT.  Transfers supine-sit with Mod I.  Pt. elev

ates bed, and is able to stand with CGA at walker, and pivot to shower chair.  

Pt. is taken into shower and is able to shower self.  After drying off, Pt. dons

fresh gown, and OT dons slipper sock.  Pt's shower chair is taken back to 

bedside, and pt. attempts to stand again at walker.  He is unable to fully get 

up due to the angle, and therefore, slide board is placed under pt.  Pt. is able

to transfer back to bed via slide board with min assist.  Pt. indicates pain in 

residual limb, and nursing notified.  Pt. back in bed, and PT comes in to 

alternate exercises with UE/LE with OT.  Co-treatment performed due to pt's pain

and fatigue level.  Pt. completes UE exercises with OT, with 6 lb. dumbbells, x 

15 reps each, x 6 exercises in all planes.  Alternates with PT performing LE 

exercises.  Pt. tolerates treatment well.  All needs met.





Education


OT Patient Education:  Correct positioning, Exercise program, Modified ADL 

techniques, Progress toward Goal/Update tx plan, Purpose of tx/functional 

activities, Reviewed precautions, Rehab process, Transfer techniques


Teaching Recipient:  Patient


Teaching Methods:  Demonstration, Discussion


Response to Teaching:  Verbalize Understanding, Return Demonstration





OT Short Term Goals


Short Term Goals


Time Frame:  May 5, 2021


Eatin


Oral hygiene:  6


Toileting hygiene:  4


Shower/bathe self:  4


Upper body dressin


Lower body dressing:  3


Putting on/taking off footwear:  3





OT Long Term Goals


Long Term Goals


Time Frame:  May 19, 2021


Eating (QC):  6


Oral Hygiene (QC):  6


Toileting Hygiene (QC):  6


Shower/Bathe Self (QC):  6


Upper Body Dressing (QC):  6


Lower Body Dressing (QC):  6


On/Off Footwear (QC):  6


Additional Goals:  1-Demonstrate ADL Tasks, 2-Verbalize Understanding, 3-

ImproveStrength/Aung


1=Demonstrate adherence to instructed precautions during ADL tasks.


2=Patient will verbalize/demonstrate understanding of assistive 

devices/modifications for ADL.


3=Patient will improve strength/tolerance for activity to enable patient to 

perform ADL's.





OT Education/Plan


Problem List/Assessment


Assessment:  Decreased Activ Tolerance, Dependent Transfers, Impaired I ADL's, 

Impaired Self-Care Skills





Discharge Recommendations


Plan/Recommendations:  Continue POC


Therapy Discharge Recommendati:  Home & Family





Treatment Plan/Plan of Care


Treatment,Training & Education:  Yes


Patient would benefit from OT for education, treatment and training to promote 

independence in ADL's, mobility, safety and/or upper extremity function for 

ADL's.


Plan of Care:  ADL Retraining, Functional Mobility, Group Exercise/Act as Ind, 

UE Funct Exercise/Act


Treatment Duration:  May 19, 2021


Frequency:  At least 5 of 7 days/Wk (IRF)


Estimated Hrs Per Day:  1.5 hours per day


Agreement:  Yes


Rehab Potential:  Good





Time/GCodes


Start Time:  10:15


Stop Time:  11:45


Total Time Billed (hr/min):  90


Billed Treatment Time


4324-9325 1, ADL x 60minutes, Ex x 15minutes


5991-1021 Ex x 15minutes- Co-treatment with PT











JAROCHO OBRIEN OT           2021 13:41

## 2021-04-29 NOTE — PM&R PROGRESS NOTE
Subjective


HPI/CC On Admission


Date Seen by Provider:  Apr 29, 2021


Time Seen by Provider:  10:30


Subjective/Events-last exam


4/29/21:


No major issues


Incision is bleeding in 1 specific area


Scheduled Oxycontin is helping and minimsl IR used


BM+








4/28/21:


No major issues


BM yesterday will need to closely monitor that due to long acting narcs


Dr Jiménez will see him today regarding his staples


Talked about weight loss with Dr Jiménez








4/27/21:


Pt doing really well


No issues 


Stump  is being tolerated for 23 hours of the day 


No pain 


Sugar is okay and I decreased insulin even further today 








4/26/21:


Pt doing a lot better today


Pain is well controlled


Bowels moved yesterday


Stump  will be placed today on his left amputation site


Slept very well








4/25/21:


Doing well


No pain when he doesn't move


Pain controlled better with Oxycontin


BM+


Mother and son visiting him today


Sugars reviewed








4/24/21:


Pain is better


No issues reported


Hgb 7.9


BM++


Using IS








4/23/21:


Doing well


Stump  will start Monday per Dr Jiménez


BM+


Oxycontin and Oxycodone added for better pain control


DC Lortab








4/22/21:


Pt doing pretty well


Had a large BM today


Hgb 7.7 after three units of blood on med surg


Rough on the transfers but getting better


Dressing change will be per Dr. Morejon orders


Sugar is 107 this morning





Review of Systems


Musculoskeletal:  leg pain





Objective


Exam


Vital Signs





Vital Signs








  Date Time  Temp Pulse Resp B/P (MAP) Pulse Ox O2 Delivery O2 Flow Rate FiO2


 


4/29/21 20:15      Room Air  


 


4/29/21 20:00 36.4 76 16 124/76 (92) 100   





Capillary Refill :


General Appearance:  No Apparent Distress, WD/WN, Chronically ill, Obese


HEENT:  PERRL/EOMI, Normal ENT Inspection, Pharynx Normal


Neck:  Full Range of Motion, Normal Inspection, Non Tender, Supple, Carotid 

Bruit


Respiratory:  Chest Non Tender, Lungs Clear, Normal Breath Sounds, No Accessory 

Muscle Use, No Respiratory Distress


Cardiovascular:  Regular Rate, Rhythm, No Edema, No Gallop, No JVD, No Murmur, 

Normal Peripheral Pulses


Gastrointestinal:  Normal Bowel Sounds, No Organomegaly, No Pulsatile Mass, Non 

Tender, Soft


Back:  Normal Inspection, No CVA Tenderness, No Vertebral Tenderness


Extremity:  Normal Capillary Refill, Normal Inspection, Normal Range of Motion, 

Non Tender, No Calf Tenderness, No Pedal Edema


Neurologic/Psychiatric:  Alert, Oriented x3, No Motor/Sensory Deficits, Normal 

Mood/Affect, CNs II-XII Norm as Tested, Abnormal Gait, Motor Weakness (left leg 

amputation )


Skin:  Normal Color, Warm/Dry


Lymphatic:  No Adenopathy





Results/Procedures


Lab


Patient resulted labs reviewed.





FIM


Transfers


Therapy Code Descriptions/Definitions 





Functional [place] Measure:


0=Not Assessed/NA        4=Minimal Assistance


1=Total Assistance        5=Supervision or Setup


2=Maximal Assistance  6=Modified [place]


3=Moderate Assistance 7=Complete IndependenceSCALE: Activities may be completed 

with or without assistive devices.





6-Indepedent-patient completes the activity by him/herself with no assistance 

from a helper.


5-Set-up or Clean-up Assistance-helper sets up or cleans up; patient completes 

activity. [place] assists only prior to or  


    following the activity.


4-Supervision or Touching Assistance-helper provides verbal cues and/or 

touching/steadying and/or contact guard assistance as patient completes 

activity. Assistance may be provided   


    throughout the activity or intermittently.


3-Partial/Moderate Assistance-helper does LESS THAN HALF the effort. [place] 

lifts, holds or supports trunk or limbs, but provides less than half the effort.


2-Substantial/Maximal Assistance-helper does MORE THAN HALF the effort. [place] 

lifts or holds trunk or limbs and provides more than half the effort.


2-Gkogjteww-lcjshn does ALL the effort. Patient does none of the effort to 

complete the activity. Or, the assistance of 2 or more helpers is required for 

the patient to complete the  


    activity.


If activity was not attempted, code reason:


7-Patient Refused.


9-Not Applicable-not attempted and the patient did not perform the activity befo

re the current illness, exacerbation or injury.


10-Not Attempted due to Environmental Limitations-(lack of equipment, weather re

straints, etc.).


88-Not Attempted due to Medical Conditions or Safety Concerns.


Roll Left to Right (QC):  5


Sit to Lying (QC):  4


Sit to Stand (QC):  3


Chair/Bed-to-Chair Xfer(QC):  4


Car Transfer (QC):  88





Gait Training


Does the Patient Walk?:  Yes


Distance:  5' x 2


Walk 10 feet (QC):  88


Walk 50 ft with 2 Turns(QC):  88


Walk 150 ft (QC):  88


Walking 10ft/uneven surface-QC:  88


Gait Assistive Device:  FWW





Wheelchair Training


Does the Pt Use a Wheelchair?:  Yes


Wheel 50 ft with 2 turns (QC):  5


Wheel 150 ft (QC):  5


Type of Wheelchair:  Manual





Stair Training


1 Step (curb) (QC):  88


4 Steps (QC):  88


12 Steps (QC):  88





Balance


Picking up an Object (QC):  88





ADL-Treatment


Eating (QC):  6


Oral Hygiene (QC):  6


Shower/Bathe Self (QC):  4


Upper Body Dressing (QC):  4


Lower Body Dressing (QC):  3


On/Off Footwear (QC):  2


Toileting Hygiene (QC):  2


Toilet Transfer (QC):  2





Assessment/Plan


Assessment and Plan


Assess & Plan/Chief Complaint


Assessment:


s/p LBKA 


DM insulin dependence


HTN


HLP


CRI


Iron def anemia from acute blood loss


TERRI untreated





Plan:


Monitor pain


IRF protocol


Monitor hgb


Iron infusion





4/22/21:


Monitor sugar


Monitor hgb





4/23/21:


Add pain meds


Check labs in am





4/24/21:


Monitor sugar and decrease insulin


Hgb stable


Venofer





4/25/21:


Continue iron infusions


Sugar managed well


Pain controlled





4/26/21:


Stump 


Monitor sugar


Monitor hgb





4/27/21:


Monitor sugar


Decrease insulin


Monitor BP





4/28/21:


Monitor pain


Decreased insulin





4/29/21:


Monitor pain


BM regimen to prevent narcotic bowel





(1) Status post below-knee amputation of left lower extremity


Status:  Acute


(2) Hyperglycemia


Status:  Chronic


(3) Hypertension


Status:  Chronic


(4) Diabetes mellitus, type 2


Status:  Chronic


(5) Anemia


Status:  Acute


(6) Iron deficiency


Status:  Acute


(7) Peripheral neuropathy


Status:  Chronic











NICOLE VILA DO                Apr 29, 2021 10:28

## 2021-04-29 NOTE — PHYSICAL THERAPY DAILY NOTE
PT Daily Note-Current


Subjective


Patient supine pre tx, reported mild unrated pain in L LE. Patient consented to 

treatment. Patient states it has been a long time since he has been outside, and

he would like to go outside if possible tomorrow.





Appearance


Patient seated in bed, with call button within reach and tray table nearby, all 

needs met.





Mental Status


Patient Orientation:  Person, Place, Time, Normal For Age





Transfers


SCALE: Activities may be completed with or without assistive devices.





6-Indepedent-patient completes the activity by him/herself with no assistance 

from a helper.


5-Set-up or Clean-up Assistance-helper sets up or cleans up; patient completes 

activity. [place] assists only prior to or  


    following the activity.


4-Supervision or Touching Assistance-helper provides verbal cues and/or 

touching/steadying and/or contact guard assistance as patient completes 

activity. Assistance may be provided   


    throughout the activity or intermittently.


3-Partial/Moderate Assistance-helper does LESS THAN HALF the effort. [place] 

lifts, holds or supports trunk or limbs, but provides less than half the effort.


2-Substantial/Maximal Assistance-helper does MORE THAN HALF the effort. [place] 

lifts or holds trunk or limbs and provides more than half the effort.


3-Adrdlbdck-mjhsgb does ALL the effort. Patient does none of the effort to 

complete the activity. Or, the assistance of 2 or more helpers is required for 

the patient to complete the  


    activity.


If activity was not attempted, code reason:


7-Patient Refused.


9-Not Applicable-not attempted and the patient did not perform the activity 

before the current illness, exacerbation or injury.


10-Not Attempted due to Environmental Limitations-(lack of equipment, weather 

restraints, etc.).


88-Not Attempted due to Medical Conditions or Safety Concerns.


Roll Left & Right (QC):  6


Sit to Lying (QC):  6


Lying to Sitting/Side of Bed(Q:  6


Sit to Stand (QC):  4 (In parallel bars, CGA. Patient was unable to perform sit 

<-> stand from w/c with mod assist using walker for support.)


Chair/Bed-to-Chair Xfer(QC):  3 (Min assist using slideboard from w/c to bed. 

Patient able to perform pivot transfer with min A from elevated bed to w/c.)





Weight Bearing


Right Lower Extremity:  Right


Full Weight Bearing


Left Lower Extremity:  Left


Non Weight Bearing (BKA)





Gait Training


Does the Patient Walk?:  No and Walking Goal IS indicated





Wheelchair Training


Does the Pt Use a Wheelchair?:  Yes


Wheel 50 ft with 2 turns (QC):  6


Wheel 150 ft (QC):  6


Type of Wheelchair:  Manual


400', one rest break





Exercises


Seated Therapy Exercises:  Biceps, Ankle pumps, Shoulder Flex, Long arc quads, 

Shoulder Abd, Hip flexion, Hip abd/add (Adduction with big blue ball), Glut set,

Tricep


Seated Reps:  20 (2 sets)


Standing:  3 way Ex=Flex, Abd, Ext (L LE only)


Standing Reps:  10





Treatments


LE/UE strengthening, transfer training, endurance





Assessment


Current Status:  Fair Progress


Patient is able to easily stand up from w/c using parallel bars for UE support 

with sit <-> stands. Demonstrates confidence with movement, has improved with 

utilizing forward weight shift witout cueing to complete transfer.





PT Short Term Goals


Short Term Goals


Time Frame:  2021


Roll Left & Right:  6


Sit to lyin


Lying to sitting on side of be:  6


Sit to stand:  3


Chair/bed-to-chair transfer:  3


Wheel 50ft w/2 turns:  6


Wheel 150 feet:  6





PT Long Term Goals


Long Term Goals


PT Long Term Goals Time Frame:  May 19, 2021


Roll Left & Right (QC):  6


Sit to Lying (QC):  6


Lying-Sitting on Side/Bed(QC):  6


Sit to Stand (QC):  6


Chair/Bed-to-Chair Xfer(QC):  6 (SPT or slideboard, whichever is the safest 

method)


Toilet Transfer (QC):  6 (SPT or slide board)


Car Transfer (QC):  4


Does the Patient Walk:  No and Walking Goal NOT indicated


Walk 10 feet (QC):  88


Walk 50ft with 2 Turns (QC):  88


Walk 150 ft (QC):  88


Walking 10ft on Uneven Surface:  88


1 Step (curb) (QC):  88


4 Steps (QC):  88


12 Steps (QC):  88


Picking up an Object (QC):  88


Does the Pt use WC or Scooter?:  Yes


Wheel 50 feet with 2 turns (QC:  6


Type:  Manual


Wheel 150 feet:  6


Type:  Manual





PT Plan


Problem List


Problem List:  Activity Tolerance, Functional Strength, Safety, Balance, Gait, 

Transfer, Bed Mobility, ROM





Treatment/Plan


Treatment Plan:  Continue Plan of Care


Treatment Plan:  Bed Mobility, Education, Functional Activity Aung, Functional 

Strength, Group Therapy, Gait, Safety, Therapeutic Exercise, Transfers


Treatment Duration:  May 19, 2021


Frequency:  At least 5 of 7 days/Wk (IRF)


Estimated Hrs Per Day:  1.5 hours per day


Patient and/or Family Agrees t:  Yes





Safety Risks/Education


Patient Education:  Transfer Techniques, Correct Positioning, W/C Management, 

Safety Issues


Teaching Recipient:  Patient


Teaching Methods:  Demonstration, Discussion


Response to Teaching:  Verbalize Understanding, Return Demonstration





Time/GCodes


Time In:  0900


Time Out:  1000


Total Billed Treatment Time:  60


Total Billed Treatment


1 visit: 


EX x2: 30' 


FA: 15' 


WCH: 15'











CAMILLE HARRY PT                2021 10:36

## 2021-04-29 NOTE — PROGRESS NOTE
Subjective


Date Seen by a Provider:  Apr 29, 2021


Time Seen by a Provider:  12:00


Subjective/Events-last exam


doing well. tolerating therapy. pain controlled.





Objective


Exam





Vital Signs








  Date Time  Temp Pulse Resp B/P (MAP) Pulse Ox O2 Delivery O2 Flow Rate FiO2


 


4/29/21 08:00 36.0 75 16 130/63 (85) 97 Room Air  


 


4/28/21 20:02 36.2 77 18 107/57 (74) 100 Room Air  


 


4/28/21 20:00      Room Air  














I & O 


 


 4/29/21





 07:00


 


Intake Total 2918 ml


 


Balance 2918 ml





Capillary Refill :


General Appearance:  No Apparent Distress


HEENT:  PERRL/EOMI


Neck:  Full Range of Motion


Respiratory:  Chest Non Tender, Lungs Clear, Normal Breath Sounds


Cardiovascular:  Regular Rate, Rhythm


Gastrointestinal:  normal bowel sounds, non tender, soft


Extremity:  Normal Capillary Refill


Neurologic/Psychiatric:  Alert, Oriented x3


Skin:  Normal Color


Lymphatic:  No Adenopathy





Results


Lab


Laboratory Tests


4/28/21 16:28: Glucometer 131H


4/28/21 20:26: Glucometer 135H


4/29/21 06:23: Glucometer 99


4/29/21 11:08: Glucometer 140H





Assessment/Plan


Assessment/Plan


Assess & Plan/Chief Complaint


s/p left BKA.


cont rehab.


add stump  monday.


long term will want pt to proceed with medically supervised weight loss and once

at a healthier weight(BMI around 50) , patient states very much interested and 

planning on it.











CLAUDY ALEJO MD                Apr 29, 2021 13:29

## 2021-04-29 NOTE — PHYSICAL THERAPY DAILY NOTE
PT Daily Note-Current


Subjective


Patient was supine in bed with OT pre tx, consented to therapy, reported 

intense, unrated pain in L LE secondary to activity earlier that morning and 

transfer post-shower.





Appearance


Patient supine in bed post tx, with call button within reach and tray table 

nearby. Patient stated he would continue to implement exercises throughout the 

rest if the day, following pain control.





Mental Status


Patient Orientation:  Person, Place, Time, Normal For Age





Transfers


SCALE: Activities may be completed with or without assistive devices.





6-Indepedent-patient completes the activity by him/herself with no assistance 

from a helper.


5-Set-up or Clean-up Assistance-helper sets up or cleans up; patient completes 

activity. [place] assists only prior to or  


    following the activity.


4-Supervision or Touching Assistance-helper provides verbal cues and/or 

touching/steadying and/or contact guard assistance as patient completes 

activity. Assistance may be provided   


    throughout the activity or intermittently.


3-Partial/Moderate Assistance-helper does LESS THAN HALF the effort. [place] 

lifts, holds or supports trunk or limbs, but provides less than half the effort.


2-Substantial/Maximal Assistance-helper does MORE THAN HALF the effort. [place] 

lifts or holds trunk or limbs and provides more than half the effort.


5-Bxheyfqut-sntauw does ALL the effort. Patient does none of the effort to 

complete the activity. Or, the assistance of 2 or more helpers is required for 

the patient to complete the  


    activity.


If activity was not attempted, code reason:


7-Patient Refused.


9-Not Applicable-not attempted and the patient did not perform the activity 

before the current illness, exacerbation or injury.


10-Not Attempted due to Environmental Limitations-(lack of equipment, weather 

restraints, etc.).


88-Not Attempted due to Medical Conditions or Safety Concerns.





Weight Bearing


Right Lower Extremity:  Right


Full Weight Bearing


Left Lower Extremity:  Left


Non Weight Bearing (BKA)





Exercises


Supine Ex:  Ankle pumps (R only), Quad Set (Bilateral), Glut sets, Straight leg 

raise (Bilateral), Hip abd/add (R LE)


Supine Reps:  20 (2 sets)


Sitting upright in bed, patient was able to perform 2x 20 reps of UE exercises 

including shoulder press, tricep kickbacks, bicep curls with RTB, shoulder 

flexion, scap retractions with RTB)





Treatments


Co-treated with OT secondary to patient overall intensity of pain; OT/PT 

alternated LE/UE ther ex to allow some pain relief from LE strengthening by 

incorporating UE strengthening exercises to maintain endurance and strength 

required to improve functional mobility. Skill of 2 clinicians indicated due to 

need for cues with safety and use of UE as well as for LE care.





Assessment


Current Status:  Good Progress


Patient continues to demonstrate motivation to get better, will push through 

intense pain to participate in therapy





PT Short Term Goals


Short Term Goals


Time Frame:  2021


Roll Left & Right:  6


Sit to lyin


Lying to sitting on side of be:  6


Sit to stand:  3


Chair/bed-to-chair transfer:  3


Wheel 50ft w/2 turns:  6


Wheel 150 feet:  6





PT Long Term Goals


Long Term Goals


PT Long Term Goals Time Frame:  May 19, 2021


Roll Left & Right (QC):  6


Sit to Lying (QC):  6


Lying-Sitting on Side/Bed(QC):  6


Sit to Stand (QC):  6


Chair/Bed-to-Chair Xfer(QC):  6 (SPT or slideboard, whichever is the safest 

method)


Toilet Transfer (QC):  6 (SPT or slide board)


Car Transfer (QC):  4


Does the Patient Walk:  No and Walking Goal NOT indicated


Walk 10 feet (QC):  88


Walk 50ft with 2 Turns (QC):  88


Walk 150 ft (QC):  88


Walking 10ft on Uneven Surface:  88


1 Step (curb) (QC):  88


4 Steps (QC):  88


12 Steps (QC):  88


Picking up an Object (QC):  88


Does the Pt use WC or Scooter?:  Yes


Wheel 50 feet with 2 turns (QC:  6


Type:  Manual


Wheel 150 feet:  6


Type:  Manual





PT Plan


Problem List


Problem List:  Activity Tolerance, Functional Strength, Safety, Balance, Gait, 

Transfer, Bed Mobility, ROM





Treatment/Plan


Treatment Plan:  Continue Plan of Care


Treatment Plan:  Bed Mobility, Education, Functional Activity Aung, Functional 

Strength, Group Therapy, Gait, Safety, Therapeutic Exercise, Transfers


Treatment Duration:  May 19, 2021


Frequency:  At least 5 of 7 days/Wk (IRF)


Estimated Hrs Per Day:  1.5 hours per day


Patient and/or Family Agrees t:  Yes





Safety Risks/Education


Patient Education:  Correct Positioning


Teaching Recipient:  Patient


Teaching Methods:  Demonstration, Discussion


Response to Teaching:  Verbalize Understanding, Return Demonstration





Time/GCodes


Time In:  1120


Time Out:  1150


Total Billed Treatment Time:  30


Total Billed Treatment


1 visit: 


EX x2: 30' 





Co-treated with OT: [id number] PT treated [id number]











GUDELIA MARTINEZ PT             2021 14:14

## 2021-04-30 VITALS — DIASTOLIC BLOOD PRESSURE: 58 MMHG | SYSTOLIC BLOOD PRESSURE: 126 MMHG

## 2021-04-30 VITALS — SYSTOLIC BLOOD PRESSURE: 143 MMHG | DIASTOLIC BLOOD PRESSURE: 69 MMHG

## 2021-04-30 RX ADMIN — METFORMIN HYDROCHLORIDE SCH MG: 500 TABLET, EXTENDED RELEASE ORAL at 17:27

## 2021-04-30 RX ADMIN — DOCUSATE SODIUM SCH MG: 100 CAPSULE ORAL at 21:00

## 2021-04-30 RX ADMIN — PANTOPRAZOLE SODIUM SCH MG: 40 TABLET, DELAYED RELEASE ORAL at 08:34

## 2021-04-30 RX ADMIN — PRASUGREL SCH MG: 10 TABLET, FILM COATED ORAL at 08:34

## 2021-04-30 RX ADMIN — INSULIN ASPART SCH UNIT: 100 INJECTION, SOLUTION INTRAVENOUS; SUBCUTANEOUS at 06:13

## 2021-04-30 RX ADMIN — METFORMIN HYDROCHLORIDE SCH MG: 500 TABLET, EXTENDED RELEASE ORAL at 08:33

## 2021-04-30 RX ADMIN — OXYCODONE HYDROCHLORIDE SCH MG: 10 TABLET, FILM COATED, EXTENDED RELEASE ORAL at 08:33

## 2021-04-30 RX ADMIN — POLYETHYLENE GLYCOL (3350) SCH GM: 17 POWDER, FOR SOLUTION ORAL at 08:35

## 2021-04-30 RX ADMIN — FENTANYL CITRATE PRN MCG: 50 INJECTION, SOLUTION INTRAMUSCULAR; INTRAVENOUS at 10:28

## 2021-04-30 RX ADMIN — Medication SCH ML: at 06:12

## 2021-04-30 RX ADMIN — FENTANYL CITRATE PRN MCG: 50 INJECTION, SOLUTION INTRAMUSCULAR; INTRAVENOUS at 20:59

## 2021-04-30 RX ADMIN — DOCUSATE SODIUM AND SENNOSIDES SCH EA: 8.6; 5 TABLET, FILM COATED ORAL at 08:35

## 2021-04-30 RX ADMIN — INSULIN ASPART SCH UNIT: 100 INJECTION, SOLUTION INTRAVENOUS; SUBCUTANEOUS at 12:29

## 2021-04-30 RX ADMIN — Medication SCH ML: at 12:30

## 2021-04-30 RX ADMIN — Medication SCH ML: at 20:59

## 2021-04-30 RX ADMIN — POLYETHYLENE GLYCOL (3350) SCH GM: 17 POWDER, FOR SOLUTION ORAL at 21:00

## 2021-04-30 RX ADMIN — OXYCODONE HYDROCHLORIDE SCH MG: 10 TABLET, FILM COATED, EXTENDED RELEASE ORAL at 20:58

## 2021-04-30 RX ADMIN — LISINOPRIL SCH MG: 20 TABLET ORAL at 08:34

## 2021-04-30 RX ADMIN — MICONAZOLE NITRATE SCH APPLIC: 20 POWDER TOPICAL at 21:00

## 2021-04-30 RX ADMIN — ASPIRIN SCH MG: 81 TABLET ORAL at 08:34

## 2021-04-30 RX ADMIN — GLYBURIDE SCH MG: 2.5 TABLET ORAL at 06:12

## 2021-04-30 RX ADMIN — DOCUSATE SODIUM AND SENNOSIDES SCH EA: 8.6; 5 TABLET, FILM COATED ORAL at 21:00

## 2021-04-30 RX ADMIN — GLYBURIDE SCH MG: 2.5 TABLET ORAL at 17:25

## 2021-04-30 RX ADMIN — METOPROLOL SUCCINATE SCH MG: 100 TABLET, EXTENDED RELEASE ORAL at 08:34

## 2021-04-30 RX ADMIN — INSULIN ASPART SCH UNIT: 100 INJECTION, SOLUTION INTRAVENOUS; SUBCUTANEOUS at 21:00

## 2021-04-30 RX ADMIN — INSULIN ASPART SCH UNIT: 100 INJECTION, SOLUTION INTRAVENOUS; SUBCUTANEOUS at 16:36

## 2021-04-30 RX ADMIN — DOCUSATE SODIUM SCH MG: 100 CAPSULE ORAL at 08:35

## 2021-04-30 RX ADMIN — MICONAZOLE NITRATE SCH APPLIC: 20 POWDER TOPICAL at 08:35

## 2021-04-30 NOTE — PROGRESS NOTE
Subjective


Date Seen by a Provider:  Apr 30, 2021


Time Seen by a Provider:  09:00


Subjective/Events-last exam


doing well. tolerating therapy well.





Objective


Exam





Vital Signs








  Date Time  Temp Pulse Resp B/P (MAP) Pulse Ox O2 Delivery O2 Flow Rate FiO2


 


4/30/21 08:00 36.0 86 18 126/58 (80) 99 Room Air  


 


4/29/21 20:15      Room Air  


 


4/29/21 20:00 36.4 76 16 124/76 (92) 100   














I & O 


 


 4/30/21





 07:00


 


Intake Total 850 ml


 


Balance 850 ml





Capillary Refill :


General Appearance:  No Apparent Distress


HEENT:  PERRL/EOMI


Neck:  Full Range of Motion


Respiratory:  Chest Non Tender, Lungs Clear, Normal Breath Sounds


Cardiovascular:  Regular Rate, Rhythm


Gastrointestinal:  normal bowel sounds, non tender, soft


Extremity:  Normal Capillary Refill


Neurologic/Psychiatric:  Alert, Oriented x3


Skin:  Normal Color


Lymphatic:  No Adenopathy





Results


Lab


Laboratory Tests


4/29/21 11:08: Glucometer 140H


4/29/21 15:19: Glucometer 142H


4/29/21 20:34: Glucometer 162H


4/30/21 06:13: Glucometer 111H


4/30/21 10:49: Glucometer 183H





Assessment/Plan


Assessment/Plan


Assess & Plan/Chief Complaint


s/p left BKA.


cont rehab.


add stump  monday.


long term will want pt to proceed with medically supervised weight loss and once

at a healthier weight(BMI around 50) , patient states very much interested and 

planning on it.


will remove half skin staples and external sutures











CLAUDY ALEJO MD                Apr 30, 2021 11:08

## 2021-04-30 NOTE — OCCUPATIONAL THER DAILY NOTE
OT Current Status-Daily Note


Subjective


Pt alert, lying in bed.  Pt agrees to therapy.  No c/o pain.  Nrsg reported that

pt had staples removed and has had to have 3 bandage changes since due to 

saturation of bandages.  Pt to stay in bed with L stump elevated to decrease 

seepage during therapy.





Mental Status/Objective


Patient Orientation:  Person, Place, Time, Situation


Attachments:  IV (midline)





ADL-Treatment


Therapy Code Descriptions/Definitions 





Functional [place] Measure:


0=Not Assessed/NA        4=Minimal Assistance


1=Total Assistance        5=Supervision or Setup


2=Maximal Assistance  6=Modified [place]


3=Moderate Assistance 7=Complete IndependenceSCALE: Activities may be completed 

with or without assistive devices.





6-Indepedent-patient completes the activity by him/herself with no assistance 

from a helper.


5-Set-up or Clean-up Assistance-helper sets up or cleans up; patient completes 

activity. [place] assists only prior to or  


    following the activity.


4-Supervision or Touching Assistance-helper provides verbal cues and/or 

touching/steadying and/or contact guard assistance as patient completes 

activity. Assistance may be provided   


    throughout the activity or intermittently.


3-Partial/Moderate Assistance-helper does LESS THAN HALF the effort. [place] 

lifts, holds or supports trunk or limbs, but provides less than half the effort.


2-Substantial/Maximal Assistance-helper does MORE THAN HALF the effort. [place] 

lifts or holds trunk or limbs and provides more than half the effort.


7-Pkvhzghmt-usdiop does ALL the effort. Patient does none of the effort to 

complete the activity. Or, the assistance of 2 or more helpers is required for 

the patient to complete the  


    activity.


If activity was not attempted, code reason:


7-Patient Refused.


9-Not Applicable-not attempted and the patient did not perform the activity 

before the current illness, exacerbation or injury.


10-Not Attempted due to Environmental Limitations-(lack of equipment, weather 

restraints, etc.).


88-Not Attempted due to Medical Conditions or Safety Concerns.





Other Treatment


Co-treat with PT ([id number]) for skilled instruction and care due to decreased 

mobility, increased need to elevate L stump and decrease seepage of wound.  PT 

focusing on strengthening R LE, core and OT focusing on strengthening B UE, core

for functional mobility and daily functional tasks.  Pt working with medium 

resistance theraband for resistance training to increase strength in B UE and co

re.  Pt was able to maintain theraband in full stretch for ~30 sec at a time 

while also completing hand eye coordination tasks that involves core 

strengthening.  Pt then working on B UE exercise and core by lifting therapy 

ball with B UE using 3# wrist wts attached to each wrist, reaching/twisting L 

then R over head, 20 reps.  After session, pt sitting up in bed with call 

light/phone in reach.  All needs met in room.





OT Short Term Goals


Short Term Goals


Time Frame:  May 5, 2021


Eatin


Oral hygiene:  6


Toileting hygiene:  4


Shower/bathe self:  4


Upper body dressin


Lower body dressing:  3


Putting on/taking off footwear:  3





OT Long Term Goals


Long Term Goals


Time Frame:  May 19, 2021


Eating (QC):  6


Oral Hygiene (QC):  6


Toileting Hygiene (QC):  6


Shower/Bathe Self (QC):  6


Upper Body Dressing (QC):  6


Lower Body Dressing (QC):  6


On/Off Footwear (QC):  6


Additional Goals:  1-Demonstrate ADL Tasks, 2-Verbalize Understanding, 3-

ImproveStrength/Aung


1=Demonstrate adherence to instructed precautions during ADL tasks.


2=Patient will verbalize/demonstrate understanding of assistive 

devices/modifications for ADL.


3=Patient will improve strength/tolerance for activity to enable patient to 

perform ADL's.





OT Education/Plan


Problem List/Assessment


Assessment:  Decreased UE Strength





Discharge Recommendations


Plan/Recommendations:  Continue POC





Treatment Plan/Plan of Care


Patient would benefit from OT for education, treatment and training to promote 

independence in ADL's, mobility, safety and/or upper extremity function for 

ADL's.


Plan of Care:  ADL Retraining, Functional Mobility, Group Exercise/Act as Ind, 

UE Funct Exercise/Act


Treatment Duration:  May 19, 2021


Frequency:  At least 5 of 7 days/Wk (IRF)


Estimated Hrs Per Day:  1.5 hours per day


Agreement:  Yes


Rehab Potential:  Good





Time/GCodes


Start Time:  13:00


Stop Time:  13:30


Total Time Billed (hr/min):  30


Billed Treatment Time


1 visit-EX 2 (30 min)











GUDELIA GONZALEZ               2021 13:48

## 2021-04-30 NOTE — PHYSICAL THERAPY DAILY NOTE
PT Daily Note-Current


Subjective


Patient supine with L LE elevated pre tx. Patient reported high unrated pain, 

and that he had just been given pain medication. Patient reported that physician

had just been in recently to remove staples from amputation site. His incision 

had begun to weep, so he was informed to keep L LE propped up in bed and to keep

his residual limb still. Patient consented to therapy in bed with R LE and UE 

strengthening.





Appearance


Patient supine in bed post tx, nurse in room, call button and tray table within 

reach, all needs met.





Mental Status


Patient Orientation:  Person, Place, Time, Normal For Age





Transfers


SCALE: Activities may be completed with or without assistive devices.





6-Indepedent-patient completes the activity by him/herself with no assistance 

from a helper.


5-Set-up or Clean-up Assistance-helper sets up or cleans up; patient completes 

activity. [place] assists only prior to or  


    following the activity.


4-Supervision or Touching Assistance-helper provides verbal cues and/or touc

holli/steadying and/or contact guard assistance as patient completes activity. 

Assistance may be provided   


    throughout the activity or intermittently.


3-Partial/Moderate Assistance-helper does LESS THAN HALF the effort. [place] 

lifts, holds or supports trunk or limbs, but provides less than half the effort.


2-Substantial/Maximal Assistance-helper does MORE THAN HALF the effort. [place] 

lifts or holds trunk or limbs and provides more than half the effort.


5-Nybmdxciy-eerody does ALL the effort. Patient does none of the effort to 

complete the activity. Or, the assistance of 2 or more helpers is required for 

the patient to complete the  


    activity.


If activity was not attempted, code reason:


7-Patient Refused.


9-Not Applicable-not attempted and the patient did not perform the activity 

before the current illness, exacerbation or injury.


10-Not Attempted due to Environmental Limitations-(lack of equipment, weather 

restraints, etc.).


88-Not Attempted due to Medical Conditions or Safety Concerns.





Weight Bearing


Right Lower Extremity:  Right


Full Weight Bearing


Left Lower Extremity:  Left


Non Weight Bearing (BKA)





Exercises


Supine Ex:  Ankle pumps, Quad Set (R only), Glut sets, Heel Slides (R only), 

Straight leg raise (5 reps with R SLR (caused pain in L LE)), Hip abd/add (R 

only)


Supine Reps:  20 (1 set secondary to L LE weeping from incision site)


Seated Therapy Exercises:  Biceps, Shoulder Flex, Chair press-ups, Shoulder Abd,

Reaching activity


Seated Reps:  20 (2 sets of UE exercises, done in bed with head raised)


UE shoulder press 20 reps, discussed with patient and PT demonstrated alternate 

ways to activate triceps while supine/seated to help with patient UE tricep 

strength required to perform sit <-> stand from w/c.





Treatments


UE/LE strengthening, ROM





Assessment


Current Status:  Fair Progress


Patient is eager to find ways to improve strength, disappointed he had to stay 

in bed during session, but still was motivated to participate.





PT Short Term Goals


Short Term Goals


Time Frame:  2021


Roll Left & Right:  6


Sit to lyin


Lying to sitting on side of be:  6


Sit to stand:  3


Chair/bed-to-chair transfer:  3


Wheel 50ft w/2 turns:  6


Wheel 150 feet:  6





PT Long Term Goals


Long Term Goals


PT Long Term Goals Time Frame:  May 19, 2021


Roll Left & Right (QC):  6


Sit to Lying (QC):  6


Lying-Sitting on Side/Bed(QC):  6


Sit to Stand (QC):  6


Chair/Bed-to-Chair Xfer(QC):  6 (SPT or slideboard, whichever is the safest 

method)


Toilet Transfer (QC):  6 (SPT or slide board)


Car Transfer (QC):  4


Does the Patient Walk:  No and Walking Goal NOT indicated


Walk 10 feet (QC):  88


Walk 50ft with 2 Turns (QC):  88


Walk 150 ft (QC):  88


Walking 10ft on Uneven Surface:  88


1 Step (curb) (QC):  88


4 Steps (QC):  88


12 Steps (QC):  88


Picking up an Object (QC):  88


Does the Pt use WC or Scooter?:  Yes


Wheel 50 feet with 2 turns (QC:  6


Type:  Manual


Wheel 150 feet:  6


Type:  Manual





PT Plan


Problem List


Problem List:  Activity Tolerance, Functional Strength, Safety, Balance, Gait, 

Transfer, Bed Mobility, ROM





Treatment/Plan


Treatment Plan:  Continue Plan of Care


Treatment Plan:  Bed Mobility, Education, Functional Activity Aung, Functional 

Strength, Group Therapy, Gait, Safety, Therapeutic Exercise, Transfers


Treatment Duration:  May 19, 2021


Frequency:  At least 5 of 7 days/Wk (IRF)


Estimated Hrs Per Day:  1.5 hours per day


Patient and/or Family Agrees t:  Yes





Safety Risks/Education


Patient Education:  Correct Positioning, Safety Issues


Teaching Recipient:  Patient


Teaching Methods:  Demonstration, Discussion


Response to Teaching:  Verbalize Understanding, Return Demonstration





Time/GCodes


Time In:  1100


Time Out:  1135


Total Billed Treatment Time:  35


Total Billed Treatment


1 visit: 


EX x2: 35'











CAMILLE HARRY PT                2021 12:03

## 2021-04-30 NOTE — OCCUPATIONAL THER DAILY NOTE
OT Current Status-Daily Note


Subjective


Pt alert, lying in bed.  Pt agrees to therapy.  No c/o pain at this time.





Mental Status/Objective


Patient Orientation:  Person, Place, Time, Situation





ADL-Treatment


Pt agrees to shower.  Pt independent with bed mobility.  CGA and assist to 

stabilize shower chair for pt to complete SPT from bed (high/low surface) 

to/from rolling shower chair with cutout.  Using shower chair with cutout, hand 

held shower, grabbars and LH sponge pt able to complete shower by self.  Assist 

given to wrap stump and IV site.  Sitting at sink, pt able to complete oral care

independently.  SPT back to bed for pt to complete dressing.  Assist to don/doff

sock, though pt has demonstrated ability to use AE for this.  Pt donned/doffed 

shorts independently sitting EOB then to hike over hips lying in bed and rolling

side to side.  Physician came into room to change stump bandages.  Call 

light/phone in reach.  All needs met in room.


Therapy Code Descriptions/Definitions 





Functional [place] Measure:


0=Not Assessed/NA        4=Minimal Assistance


1=Total Assistance        5=Supervision or Setup


2=Maximal Assistance  6=Modified [place]


3=Moderate Assistance 7=Complete IndependenceSCALE: Activities may be completed 

with or without assistive devices.





6-Indepedent-patient completes the activity by him/herself with no assistance 

from a helper.


5-Set-up or Clean-up Assistance-helper sets up or cleans up; patient completes 

activity. [place] assists only prior to or  


    following the activity.


4-Supervision or Touching Assistance-helper provides verbal cues and/or 

touching/steadying and/or contact guard assistance as patient completes 

activity. Assistance may be provided   


    throughout the activity or intermittently.


3-Partial/Moderate Assistance-helper does LESS THAN HALF the effort. [place] 

lifts, holds or supports trunk or limbs, but provides less than half the effort.


2-Substantial/Maximal Assistance-helper does MORE THAN HALF the effort. [place] 

lifts or holds trunk or limbs and provides more than half the effort.


2-Mhijgctci-icgxhj does ALL the effort. Patient does none of the effort to 

complete the activity. Or, the assistance of 2 or more helpers is required for 

the patient to complete the  


    activity.


If activity was not attempted, code reason:


7-Patient Refused.


9-Not Applicable-not attempted and the patient did not perform the activity 

before the current illness, exacerbation or injury.


10-Not Attempted due to Environmental Limitations-(lack of equipment, weather 

restraints, etc.).


88-Not Attempted due to Medical Conditions or Safety Concerns.


Oral Hygiene (QC):  6


Shower/Bathe Self (QC):  5


Upper Body Dressing (QC):  5


Lower Body Dressing (QC):  5





OT Short Term Goals


Short Term Goals


Time Frame:  May 5, 2021


Eatin


Oral hygiene:  6


Toileting hygiene:  4


Shower/bathe self:  4


Upper body dressin


Lower body dressing:  3


Putting on/taking off footwear:  3





OT Long Term Goals


Long Term Goals


Time Frame:  May 19, 2021


Eating (QC):  6


Oral Hygiene (QC):  6


Toileting Hygiene (QC):  6


Shower/Bathe Self (QC):  6


Upper Body Dressing (QC):  6


Lower Body Dressing (QC):  6


On/Off Footwear (QC):  6


Additional Goals:  1-Demonstrate ADL Tasks, 2-Verbalize Understanding, 3-

ImproveStrength/Aung


1=Demonstrate adherence to instructed precautions during ADL tasks.


2=Patient will verbalize/demonstrate understanding of assistive 

devices/modifications for ADL.


3=Patient will improve strength/tolerance for activity to enable patient to 

perform ADL's.





OT Education/Plan


Problem List/Assessment


Assessment:  Decreased UE Strength, Impaired Self-Care Skills





Discharge Recommendations


Plan/Recommendations:  Continue POC





Treatment Plan/Plan of Care


Patient would benefit from OT for education, treatment and training to promote 

independence in ADL's, mobility, safety and/or upper extremity function for 

ADL's.


Plan of Care:  ADL Retraining, Functional Mobility, Group Exercise/Act as Ind, 

UE Funct Exercise/Act


Treatment Duration:  May 19, 2021


Frequency:  At least 5 of 7 days/Wk (IRF)


Estimated Hrs Per Day:  1.5 hours per day


Agreement:  Yes


Rehab Potential:  Good





Time/GCodes


Start Time:  09:00


Stop Time:  10:00


Total Time Billed (hr/min):  60


Billed Treatment Time


1 visit-ADL 4 (60 min)











GUDELIA GONZALEZ               2021 09:50

## 2021-04-30 NOTE — PM&R PROGRESS NOTE
Subjective


HPI/CC On Admission


Date Seen by Provider:  Apr 30, 2021


Time Seen by Provider:  10:15


Subjective/Events-last exam


4/30/21:


Patient doing well


Lost 11# since admit


BM yesterday


Sugars are good








4/29/21:


No major issues


Incision is bleeding in 1 specific area


Scheduled Oxycontin is helping and minimsl IR used


BM+








4/28/21:


No major issues


BM yesterday will need to closely monitor that due to long acting narcs


Dr Jiménez will see him today regarding his staples


Talked about weight loss with Dr Jiménez








4/27/21:


Pt doing really well


No issues 


Stump  is being tolerated for 23 hours of the day 


No pain 


Sugar is okay and I decreased insulin even further today 








4/26/21:


Pt doing a lot better today


Pain is well controlled


Bowels moved yesterday


Stump  will be placed today on his left amputation site


Slept very well








4/25/21:


Doing well


No pain when he doesn't move


Pain controlled better with Oxycontin


BM+


Mother and son visiting him today


Sugars reviewed








4/24/21:


Pain is better


No issues reported


Hgb 7.9


BM++


Using IS








4/23/21:


Doing well


Stump  will start Monday per Dr Jiménez


BM+


Oxycontin and Oxycodone added for better pain control


DC Lortab








4/22/21:


Pt doing pretty well


Had a large BM today


Hgb 7.7 after three units of blood on med surg


Rough on the transfers but getting better


Dressing change will be per Dr. Morejon orders


Sugar is 107 this morning





Review of Systems


Musculoskeletal:  leg pain





Objective


Exam


Vital Signs





Vital Signs








  Date Time  Temp Pulse Resp B/P (MAP) Pulse Ox O2 Delivery O2 Flow Rate FiO2


 


4/30/21 09:00      Room Air  


 


4/30/21 08:00 36.0 86 18 126/58 (80) 99   





Capillary Refill :


General Appearance:  No Apparent Distress, WD/WN, Chronically ill, Obese


HEENT:  PERRL/EOMI, Normal ENT Inspection, Pharynx Normal


Neck:  Full Range of Motion, Normal Inspection, Non Tender, Supple, Carotid 

Bruit


Respiratory:  Chest Non Tender, Lungs Clear, Normal Breath Sounds, No Accessory 

Muscle Use, No Respiratory Distress


Cardiovascular:  Regular Rate, Rhythm, No Edema, No Gallop, No JVD, No Murmur, 

Normal Peripheral Pulses


Gastrointestinal:  Normal Bowel Sounds, No Organomegaly, No Pulsatile Mass, Non 

Tender, Soft


Back:  Normal Inspection, No CVA Tenderness, No Vertebral Tenderness


Extremity:  Normal Capillary Refill, Normal Inspection, Normal Range of Motion, 

Non Tender, No Calf Tenderness, No Pedal Edema


Neurologic/Psychiatric:  Alert, Oriented x3, No Motor/Sensory Deficits, Normal 

Mood/Affect, CNs II-XII Norm as Tested, Abnormal Gait, Motor Weakness (left leg 

amputation )


Skin:  Normal Color, Warm/Dry


Lymphatic:  No Adenopathy





Results/Procedures


Lab


Patient resulted labs reviewed.





FIM


Transfers


Therapy Code Descriptions/Definitions 





Functional [place] Measure:


0=Not Assessed/NA        4=Minimal Assistance


1=Total Assistance        5=Supervision or Setup


2=Maximal Assistance  6=Modified [place]


3=Moderate Assistance 7=Complete IndependenceSCALE: Activities may be completed 

with or without assistive devices.





6-Indepedent-patient completes the activity by him/herself with no assistance 

from a helper.


5-Set-up or Clean-up Assistance-helper sets up or cleans up; patient completes 

activity. [place] assists only prior to or  


    following the activity.


4-Supervision or Touching Assistance-helper provides verbal cues and/or 

touching/steadying and/or contact guard assistance as patient completes 

activity. Assistance may be provided   


    throughout the activity or intermittently.


3-Partial/Moderate Assistance-helper does LESS THAN HALF the effort. [place] 

lifts, holds or supports trunk or limbs, but provides less than half the effort.


2-Substantial/Maximal Assistance-helper does MORE THAN HALF the effort. [place] 

lifts or holds trunk or limbs and provides more than half the effort.


9-Iscsmzpih-lukhdz does ALL the effort. Patient does none of the effort to 

complete the activity. Or, the assistance of 2 or more helpers is required for 

the patient to complete the  


    activity.


If activity was not attempted, code reason:


7-Patient Refused.


9-Not Applicable-not attempted and the patient did not perform the activity 

before the current illness, exacerbation or injury.


10-Not Attempted due to Environmental Limitations-(lack of equipment, weather 

restraints, etc.).


88-Not Attempted due to Medical Conditions or Safety Concerns.


Roll Left to Right (QC):  6


Sit to Lying (QC):  6


Sit to Stand (QC):  4 (In parallel bars, CGA. Patient was unable to perform sit 

<-> stand from w/c with mod assist using walker for support.)


Chair/Bed-to-Chair Xfer(QC):  3 (Min assist using slideboard from w/c to bed. 

Patient able to perform pivot transfer with min A from elevated bed to w/c.)


Car Transfer (QC):  88





Gait Training


Does the Patient Walk?:  No and Walking Goal IS indicated


Distance:  5' x 2


Walk 10 feet (QC):  88


Walk 50 ft with 2 Turns(QC):  88


Walk 150 ft (QC):  88


Walking 10ft/uneven surface-QC:  88


Gait Assistive Device:  FWW





Wheelchair Training


Does the Pt Use a Wheelchair?:  Yes


Wheel 50 ft with 2 turns (QC):  6


Wheel 150 ft (QC):  6


Type of Wheelchair:  Manual





Stair Training


1 Step (curb) (QC):  88


4 Steps (QC):  88


12 Steps (QC):  88





Balance


Picking up an Object (QC):  88





ADL-Treatment


Eating (QC):  6


Oral Hygiene (QC):  7


Shower/Bathe Self (QC):  5


Upper Body Dressing (QC):  5


Lower Body Dressing (QC):  7


On/Off Footwear (QC):  2


Toileting Hygiene (QC):  4 (Pt. able to cleanse rear primo area after 

transferring back to bed.  )


Toilet Transfer (QC):  2





Assessment/Plan


Assessment and Plan


Assess & Plan/Chief Complaint


Assessment:


s/p LBKA 


DM insulin dependence


HTN


HLP


CRI


Iron def anemia from acute blood loss


TERRI untreated





Plan:


Monitor pain


IRF protocol


Monitor hgb


Iron infusion





4/22/21:


Monitor sugar


Monitor hgb





4/23/21:


Add pain meds


Check labs in am





4/24/21:


Monitor sugar and decrease insulin


Hgb stable


Venofer





4/25/21:


Continue iron infusions


Sugar managed well


Pain controlled





4/26/21:


Stump 


Monitor sugar


Monitor hgb





4/27/21:


Monitor sugar


Decrease insulin


Monitor BP





4/28/21:


Monitor pain


Decreased insulin





4/29/21:


Monitor pain


BM regimen to prevent narcotic bowel





4/30/21:


Monitor closely


Monitor sugar





(1) Status post below-knee amputation of left lower extremity


Status:  Acute


(2) Hyperglycemia


Status:  Chronic


(3) Hypertension


Status:  Chronic


(4) Diabetes mellitus, type 2


Status:  Chronic


(5) Anemia


Status:  Acute


(6) Iron deficiency


Status:  Acute


(7) Peripheral neuropathy


Status:  Chronic











NICOLE VILA DO                Apr 30, 2021 10:06

## 2021-05-01 VITALS — DIASTOLIC BLOOD PRESSURE: 67 MMHG | SYSTOLIC BLOOD PRESSURE: 136 MMHG

## 2021-05-01 VITALS — SYSTOLIC BLOOD PRESSURE: 123 MMHG | DIASTOLIC BLOOD PRESSURE: 65 MMHG

## 2021-05-01 RX ADMIN — Medication SCH ML: at 21:19

## 2021-05-01 RX ADMIN — Medication SCH ML: at 05:46

## 2021-05-01 RX ADMIN — FENTANYL CITRATE PRN MCG: 50 INJECTION, SOLUTION INTRAMUSCULAR; INTRAVENOUS at 08:19

## 2021-05-01 RX ADMIN — GLYBURIDE SCH MG: 2.5 TABLET ORAL at 17:08

## 2021-05-01 RX ADMIN — DOCUSATE SODIUM AND SENNOSIDES SCH EA: 8.6; 5 TABLET, FILM COATED ORAL at 10:25

## 2021-05-01 RX ADMIN — OXYCODONE HYDROCHLORIDE SCH MG: 10 TABLET, FILM COATED, EXTENDED RELEASE ORAL at 08:17

## 2021-05-01 RX ADMIN — INSULIN ASPART SCH UNIT: 100 INJECTION, SOLUTION INTRAVENOUS; SUBCUTANEOUS at 17:06

## 2021-05-01 RX ADMIN — DOCUSATE SODIUM SCH MG: 100 CAPSULE ORAL at 21:00

## 2021-05-01 RX ADMIN — INSULIN ASPART SCH UNIT: 100 INJECTION, SOLUTION INTRAVENOUS; SUBCUTANEOUS at 21:00

## 2021-05-01 RX ADMIN — METFORMIN HYDROCHLORIDE SCH MG: 500 TABLET, EXTENDED RELEASE ORAL at 08:16

## 2021-05-01 RX ADMIN — METFORMIN HYDROCHLORIDE SCH MG: 500 TABLET, EXTENDED RELEASE ORAL at 17:09

## 2021-05-01 RX ADMIN — PANTOPRAZOLE SODIUM SCH MG: 40 TABLET, DELAYED RELEASE ORAL at 08:16

## 2021-05-01 RX ADMIN — INSULIN ASPART SCH UNIT: 100 INJECTION, SOLUTION INTRAVENOUS; SUBCUTANEOUS at 05:46

## 2021-05-01 RX ADMIN — PRASUGREL SCH MG: 10 TABLET, FILM COATED ORAL at 08:17

## 2021-05-01 RX ADMIN — Medication SCH ML: at 11:26

## 2021-05-01 RX ADMIN — ASPIRIN SCH MG: 81 TABLET ORAL at 08:16

## 2021-05-01 RX ADMIN — INSULIN ASPART SCH UNIT: 100 INJECTION, SOLUTION INTRAVENOUS; SUBCUTANEOUS at 11:21

## 2021-05-01 RX ADMIN — DOCUSATE SODIUM AND SENNOSIDES SCH EA: 8.6; 5 TABLET, FILM COATED ORAL at 21:00

## 2021-05-01 RX ADMIN — LISINOPRIL SCH MG: 20 TABLET ORAL at 08:17

## 2021-05-01 RX ADMIN — POLYETHYLENE GLYCOL (3350) SCH GM: 17 POWDER, FOR SOLUTION ORAL at 10:25

## 2021-05-01 RX ADMIN — METOPROLOL SUCCINATE SCH MG: 100 TABLET, EXTENDED RELEASE ORAL at 08:16

## 2021-05-01 RX ADMIN — MICONAZOLE NITRATE SCH APPLIC: 20 POWDER TOPICAL at 11:22

## 2021-05-01 RX ADMIN — GLYBURIDE SCH MG: 2.5 TABLET ORAL at 05:47

## 2021-05-01 RX ADMIN — OXYCODONE HYDROCHLORIDE SCH MG: 10 TABLET, FILM COATED, EXTENDED RELEASE ORAL at 21:17

## 2021-05-01 RX ADMIN — MICONAZOLE NITRATE SCH APPLIC: 20 POWDER TOPICAL at 21:17

## 2021-05-01 RX ADMIN — DOCUSATE SODIUM SCH MG: 100 CAPSULE ORAL at 10:25

## 2021-05-01 RX ADMIN — POLYETHYLENE GLYCOL (3350) SCH GM: 17 POWDER, FOR SOLUTION ORAL at 21:00

## 2021-05-01 RX ADMIN — FENTANYL CITRATE PRN MCG: 50 INJECTION, SOLUTION INTRAMUSCULAR; INTRAVENOUS at 18:42

## 2021-05-01 NOTE — PM&R PROGRESS NOTE
Subjective


HPI/CC On Admission


Date Seen by Provider:  May 1, 2021


Time Seen by Provider:  10:45


Subjective/Events-last exam


5/1/21:


Patient doing well


Oxycontin 10mg PO BID


Change dressing as instructed


Incision looks good


Fentanyl infusion before dressing changes








4/30/21:


Patient doing well


Lost 11# since admit


BM yesterday


Sugars are good








4/29/21:


No major issues


Incision is bleeding in 1 specific area


Scheduled Oxycontin is helping and minimsl IR used


BM+








4/28/21:


No major issues


BM yesterday will need to closely monitor that due to long acting narcs


Dr Jiménez will see him today regarding his staples


Talked about weight loss with Dr Jiménez








4/27/21:


Pt doing really well


No issues 


Stump  is being tolerated for 23 hours of the day 


No pain 


Sugar is okay and I decreased insulin even further today 








4/26/21:


Pt doing a lot better today


Pain is well controlled


Bowels moved yesterday


Stump  will be placed today on his left amputation site


Slept very well








4/25/21:


Doing well


No pain when he doesn't move


Pain controlled better with Oxycontin


BM+


Mother and son visiting him today


Sugars reviewed








4/24/21:


Pain is better


No issues reported


Hgb 7.9


BM++


Using IS








4/23/21:


Doing well


Stump  will start Monday per Dr Jiménez


BM+


Oxycontin and Oxycodone added for better pain control


DC Lortab








4/22/21:


Pt doing pretty well


Had a large BM today


Hgb 7.7 after three units of blood on med surg


Rough on the transfers but getting better


Dressing change will be per Dr. Morejon orders


Sugar is 107 this morning





Review of Systems


General:  Fatigue, Malaise


Musculoskeletal:  leg pain


Neurological:  Weakness





Objective


Exam


Vital Signs





Vital Signs








  Date Time  Temp Pulse Resp B/P (MAP) Pulse Ox O2 Delivery O2 Flow Rate FiO2


 


5/1/21 08:47      Room Air  


 


5/1/21 08:15 36.0 71 20 136/67 (90) 100   





Capillary Refill :


General Appearance:  No Apparent Distress, WD/WN, Chronically ill, Obese


HEENT:  PERRL/EOMI, Normal ENT Inspection, Pharynx Normal


Neck:  Full Range of Motion, Normal Inspection, Non Tender, Supple, Carotid 

Bruit


Respiratory:  Chest Non Tender, Lungs Clear, Normal Breath Sounds, No Accessory 

Muscle Use, No Respiratory Distress


Cardiovascular:  Regular Rate, Rhythm, No Edema, No Gallop, No JVD, No Murmur, 

Normal Peripheral Pulses


Gastrointestinal:  Normal Bowel Sounds, No Organomegaly, No Pulsatile Mass, Non 

Tender, Soft


Back:  Normal Inspection, No CVA Tenderness, No Vertebral Tenderness


Extremity:  Normal Capillary Refill, Normal Inspection, Normal Range of Motion, 

Non Tender, No Calf Tenderness, No Pedal Edema


Neurologic/Psychiatric:  Alert, Oriented x3, No Motor/Sensory Deficits, Normal 

Mood/Affect, CNs II-XII Norm as Tested, Abnormal Gait, Motor Weakness (left leg 

amputation )


Skin:  Normal Color, Warm/Dry


Lymphatic:  No Adenopathy





Results/Procedures


Lab


Patient resulted labs reviewed.





FIM


Transfers


Therapy Code Descriptions/Definitions 





Functional [place] Measure:


0=Not Assessed/NA        4=Minimal Assistance


1=Total Assistance        5=Supervision or Setup


2=Maximal Assistance  6=Modified [place]


3=Moderate Assistance 7=Complete IndependenceSCALE: Activities may be completed 

with or without assistive devices.





6-Indepedent-patient completes the activity by him/herself with no assistance 

from a helper.


5-Set-up or Clean-up Assistance-helper sets up or cleans up; patient completes 

activity. [place] assists only prior to or  


    following the activity.


4-Supervision or Touching Assistance-helper provides verbal cues and/or 

touching/steadying and/or contact guard assistance as patient completes 

activity. Assistance may be provided   


    throughout the activity or intermittently.


3-Partial/Moderate Assistance-helper does LESS THAN HALF the effort. [place] li

fts, holds or supports trunk or limbs, but provides less than half the effort.


2-Substantial/Maximal Assistance-helper does MORE THAN HALF the effort. [place] 

lifts or holds trunk or limbs and provides more than half the effort.


3-Yostzkfto-nbaalp does ALL the effort. Patient does none of the effort to 

complete the activity. Or, the assistance of 2 or more helpers is required for 

the patient to complete the  


    activity.


If activity was not attempted, code reason:


7-Patient Refused.


9-Not Applicable-not attempted and the patient did not perform the activity 

before the current illness, exacerbation or injury.


10-Not Attempted due to Environmental Limitations-(lack of equipment, weather 

restraints, etc.).


88-Not Attempted due to Medical Conditions or Safety Concerns.


Roll Left to Right (QC):  6


Sit to Lying (QC):  6


Sit to Stand (QC):  4 (In parallel bars, CGA. Patient was unable to perform sit 

<-> stand from w/c with mod assist using walker for support.)


Chair/Bed-to-Chair Xfer(QC):  3 (Min assist using slideboard from w/c to bed. 

Patient able to perform pivot transfer with min A from elevated bed to w/c.)


Car Transfer (QC):  88





Gait Training


Does the Patient Walk?:  No and Walking Goal IS indicated


Distance:  5' x 2


Walk 10 feet (QC):  88


Walk 50 ft with 2 Turns(QC):  88


Walk 150 ft (QC):  88


Walking 10ft/uneven surface-QC:  88


Gait Assistive Device:  FWW





Wheelchair Training


Does the Pt Use a Wheelchair?:  Yes


Wheel 50 ft with 2 turns (QC):  6


Wheel 150 ft (QC):  6


Type of Wheelchair:  Manual





Stair Training


1 Step (curb) (QC):  88


4 Steps (QC):  88


12 Steps (QC):  88





Balance


Picking up an Object (QC):  88





ADL-Treatment


Eating (QC):  6


Oral Hygiene (QC):  6


Shower/Bathe Self (QC):  5


Upper Body Dressing (QC):  5


Lower Body Dressing (QC):  5


On/Off Footwear (QC):  2


Toileting Hygiene (QC):  4 (Pt. able to cleanse rear primo area after 

transferring back to bed.  )


Toilet Transfer (QC):  2





Assessment/Plan


Assessment and Plan


Assess & Plan/Chief Complaint


Assessment:


s/p LBKA 


DM insulin dependence


HTN


HLP


CRI


Iron def anemia from acute blood loss


TERRI untreated





Plan:


Monitor pain


IRF protocol


Monitor hgb


Iron infusion





4/22/21:


Monitor sugar


Monitor hgb





4/23/21:


Add pain meds


Check labs in am





4/24/21:


Monitor sugar and decrease insulin


Hgb stable


Venofer





4/25/21:


Continue iron infusions


Sugar managed well


Pain controlled





4/26/21:


Stump 


Monitor sugar


Monitor hgb





4/27/21:


Monitor sugar


Decrease insulin


Monitor BP





4/28/21:


Monitor pain


Decreased insulin





4/29/21:


Monitor pain


BM regimen to prevent narcotic bowel





4/30/21:


Monitor closely


Monitor sugar





5/1/21:


Monitor closely


Dressing changes





(1) Status post below-knee amputation of left lower extremity


Status:  Acute


(2) Hyperglycemia


Status:  Chronic


(3) Hypertension


Status:  Chronic


(4) Diabetes mellitus, type 2


Status:  Chronic


(5) Anemia


Status:  Acute


(6) Iron deficiency


Status:  Acute


(7) Peripheral neuropathy


Status:  Chronic











NICOLE VILA DO                 May 1, 2021 10:38

## 2021-05-01 NOTE — PHYSICAL THERAPY DAILY NOTE
PT Daily Note-Current


Subjective


Patient in bed pre tx, agrees to PT, has minor pain in left residual limb.  

Patient states he is still having quite a bit of seeping from the incision on 

his residual limb and he has a couple more small openings on the incision, this 

therapist cannot see because of the wrapping.  Patient will stay in bed again at

this time due to the fragileness of the incision.





Appearance


Patient in bed post tx with nurse call, phone, tray, all needs met, left 

residual limb slightly elevated on pillow.





Mental Status


Patient Orientation:  Normal For Age





Transfers


SCALE: Activities may be completed with or without assistive devices.





6-Indepedent-patient completes the activity by him/herself with no assistance 

from a helper.


5-Set-up or Clean-up Assistance-helper sets up or cleans up; patient completes 

activity. [place] assists only prior to or  


    following the activity.


4-Supervision or Touching Assistance-helper provides verbal cues and/or 

touching/steadying and/or contact guard assistance as patient completes 

activity. Assistance may be provided   


    throughout the activity or intermittently.


3-Partial/Moderate Assistance-helper does LESS THAN HALF the effort. [place] 

lifts, holds or supports trunk or limbs, but provides less than half the effort.


2-Substantial/Maximal Assistance-helper does MORE THAN HALF the effort. [place] 

lifts or holds trunk or limbs and provides more than half the effort.


3-Gsslttusj-zzqnck does ALL the effort. Patient does none of the effort to 

complete the activity. Or, the assistance of 2 or more helpers is required for 

the patient to complete the  


    activity.


If activity was not attempted, code reason:


7-Patient Refused.


9-Not Applicable-not attempted and the patient did not perform the activity 

before the current illness, exacerbation or injury.


10-Not Attempted due to Environmental Limitations-(lack of equipment, weather 

restraints, etc.).


88-Not Attempted due to Medical Conditions or Safety Concerns.





Weight Bearing


Right Lower Extremity:  Right


Full Weight Bearing


Left Lower Extremity:  Left


Non Weight Bearing (BKA)





Exercises


Supine Ex:  Ankle pumps, Quad Set, Glut sets, Heel Slides, Straight leg raise, 

Hip abd/add


Supine Reps:  20 (LLE only performed quad and glut sets, only performed 10 SLR 

on the right side because it causes his left residual limb to press into the 

bed, possibly putting unwanted stress on the incision.)





Treatments


LE exercise





Assessment


Current Status:  Poor Progress


Patient has not been able to get out of bed for the last couple of days due to 

his seeping and fragile incision of the left residual limb.





PT Short Term Goals


Short Term Goals


Time Frame:  2021


Roll Left & Right:  6


Sit to lyin


Lying to sitting on side of be:  6


Sit to stand:  3


Chair/bed-to-chair transfer:  3


Wheel 50ft w/2 turns:  6


Wheel 150 feet:  6





PT Long Term Goals


Long Term Goals


PT Long Term Goals Time Frame:  May 19, 2021


Roll Left & Right (QC):  6


Sit to Lying (QC):  6


Lying-Sitting on Side/Bed(QC):  6


Sit to Stand (QC):  6


Chair/Bed-to-Chair Xfer(QC):  6 (SPT or slideboard, whichever is the safest 

method)


Toilet Transfer (QC):  6 (SPT or slide board)


Car Transfer (QC):  4


Does the Patient Walk:  No and Walking Goal NOT indicated


Walk 10 feet (QC):  88


Walk 50ft with 2 Turns (QC):  88


Walk 150 ft (QC):  88


Walking 10ft on Uneven Surface:  88


1 Step (curb) (QC):  88


4 Steps (QC):  88


12 Steps (QC):  88


Picking up an Object (QC):  88


Does the Pt use WC or Scooter?:  Yes


Wheel 50 feet with 2 turns (QC:  6


Type:  Manual


Wheel 150 feet:  6


Type:  Manual





PT Plan


Problem List


Problem List:  Activity Tolerance, Functional Strength, Safety, Balance, Gait, 

Transfer, Bed Mobility, ROM





Treatment/Plan


Treatment Plan:  Continue Plan of Care


Treatment Plan:  Bed Mobility, Education, Functional Activity Aung, Functional 

Strength, Group Therapy, Gait, Safety, Therapeutic Exercise, Transfers


Treatment Duration:  May 19, 2021


Frequency:  At least 5 of 7 days/Wk (IRF)


Estimated Hrs Per Day:  1.5 hours per day


Patient and/or Family Agrees t:  Yes





Safety Risks/Education


Patient Education:  Correct Positioning, Safety Issues


Teaching Recipient:  Patient


Teaching Methods:  Demonstration, Discussion


Response to Teaching:  Reinforcement Needed





Time/GCodes


Time In:  0936


Time Out:  1001


Total Billed Treatment Time:  25


Total Billed Treatment


1 visit


EX 25'











CAMLILE HARRY PT                 May 1, 2021 10:00

## 2021-05-02 VITALS — SYSTOLIC BLOOD PRESSURE: 124 MMHG | DIASTOLIC BLOOD PRESSURE: 80 MMHG

## 2021-05-02 VITALS — SYSTOLIC BLOOD PRESSURE: 125 MMHG | DIASTOLIC BLOOD PRESSURE: 60 MMHG

## 2021-05-02 RX ADMIN — INSULIN ASPART SCH UNIT: 100 INJECTION, SOLUTION INTRAVENOUS; SUBCUTANEOUS at 06:06

## 2021-05-02 RX ADMIN — OXYCODONE HYDROCHLORIDE SCH MG: 10 TABLET, FILM COATED, EXTENDED RELEASE ORAL at 22:06

## 2021-05-02 RX ADMIN — GLYBURIDE SCH MG: 2.5 TABLET ORAL at 17:41

## 2021-05-02 RX ADMIN — PANTOPRAZOLE SODIUM SCH MG: 40 TABLET, DELAYED RELEASE ORAL at 08:28

## 2021-05-02 RX ADMIN — MICONAZOLE NITRATE SCH APPLIC: 20 POWDER TOPICAL at 22:18

## 2021-05-02 RX ADMIN — POLYETHYLENE GLYCOL (3350) SCH GM: 17 POWDER, FOR SOLUTION ORAL at 08:31

## 2021-05-02 RX ADMIN — Medication SCH ML: at 09:50

## 2021-05-02 RX ADMIN — DOCUSATE SODIUM AND SENNOSIDES SCH EA: 8.6; 5 TABLET, FILM COATED ORAL at 08:31

## 2021-05-02 RX ADMIN — PRASUGREL SCH MG: 10 TABLET, FILM COATED ORAL at 08:27

## 2021-05-02 RX ADMIN — DOCUSATE SODIUM SCH MG: 100 CAPSULE ORAL at 20:33

## 2021-05-02 RX ADMIN — OXYCODONE HYDROCHLORIDE SCH MG: 10 TABLET, FILM COATED, EXTENDED RELEASE ORAL at 08:28

## 2021-05-02 RX ADMIN — METOPROLOL SUCCINATE SCH MG: 100 TABLET, EXTENDED RELEASE ORAL at 08:28

## 2021-05-02 RX ADMIN — METFORMIN HYDROCHLORIDE SCH MG: 500 TABLET, EXTENDED RELEASE ORAL at 17:41

## 2021-05-02 RX ADMIN — INSULIN ASPART SCH UNIT: 100 INJECTION, SOLUTION INTRAVENOUS; SUBCUTANEOUS at 16:39

## 2021-05-02 RX ADMIN — INSULIN ASPART SCH UNIT: 100 INJECTION, SOLUTION INTRAVENOUS; SUBCUTANEOUS at 22:05

## 2021-05-02 RX ADMIN — DOCUSATE SODIUM SCH MG: 100 CAPSULE ORAL at 08:31

## 2021-05-02 RX ADMIN — GLYBURIDE SCH MG: 2.5 TABLET ORAL at 06:06

## 2021-05-02 RX ADMIN — Medication SCH ML: at 22:09

## 2021-05-02 RX ADMIN — ASPIRIN SCH MG: 81 TABLET ORAL at 08:27

## 2021-05-02 RX ADMIN — METFORMIN HYDROCHLORIDE SCH MG: 500 TABLET, EXTENDED RELEASE ORAL at 08:27

## 2021-05-02 RX ADMIN — POLYETHYLENE GLYCOL (3350) SCH GM: 17 POWDER, FOR SOLUTION ORAL at 20:33

## 2021-05-02 RX ADMIN — LISINOPRIL SCH MG: 20 TABLET ORAL at 08:28

## 2021-05-02 RX ADMIN — DOCUSATE SODIUM AND SENNOSIDES SCH EA: 8.6; 5 TABLET, FILM COATED ORAL at 20:33

## 2021-05-02 RX ADMIN — Medication SCH ML: at 06:06

## 2021-05-02 RX ADMIN — FENTANYL CITRATE PRN MCG: 50 INJECTION, SOLUTION INTRAMUSCULAR; INTRAVENOUS at 18:08

## 2021-05-02 RX ADMIN — MICONAZOLE NITRATE SCH APPLIC: 20 POWDER TOPICAL at 08:29

## 2021-05-02 RX ADMIN — INSULIN ASPART SCH UNIT: 100 INJECTION, SOLUTION INTRAVENOUS; SUBCUTANEOUS at 10:58

## 2021-05-02 NOTE — PM&R PROGRESS NOTE
Subjective


HPI/CC On Admission


Date Seen by Provider:  May 2, 2021


Time Seen by Provider:  09:30


Subjective/Events-last exam


5/2/21:


Patient doing well


No pain reported except during dressing changes


Long acting Oxycontin working well for the patient


Stump  today or tomorrow








5/1/21:


Patient doing well


Oxycontin 10mg PO BID


Change dressing as instructed


Incision looks good


Fentanyl infusion before dressing changes








4/30/21:


Patient doing well


Lost 11# since admit


BM yesterday


Sugars are good








4/29/21:


No major issues


Incision is bleeding in 1 specific area


Scheduled Oxycontin is helping and minimsl IR used


BM+








4/28/21:


No major issues


BM yesterday will need to closely monitor that due to long acting narcs


Dr Jiménez will see him today regarding his staples


Talked about weight loss with Dr Jiménez








4/27/21:


Pt doing really well


No issues 


Stump  is being tolerated for 23 hours of the day 


No pain 


Sugar is okay and I decreased insulin even further today 








4/26/21:


Pt doing a lot better today


Pain is well controlled


Bowels moved yesterday


Stump  will be placed today on his left amputation site


Slept very well








4/25/21:


Doing well


No pain when he doesn't move


Pain controlled better with Oxycontin


BM+


Mother and son visiting him today


Sugars reviewed








4/24/21:


Pain is better


No issues reported


Hgb 7.9


BM++


Using IS








4/23/21:


Doing well


Stump  will start Monday per Dr Jiménez


BM+


Oxycontin and Oxycodone added for better pain control


DC Lortab








4/22/21:


Pt doing pretty well


Had a large BM today


Hgb 7.7 after three units of blood on med surg


Rough on the transfers but getting better


Dressing change will be per Dr. Morejon orders


Sugar is 107 this morning





Review of Systems


General:  Fatigue, Malaise


Musculoskeletal:  leg pain





Objective


Exam


Vital Signs





Vital Signs








  Date Time  Temp Pulse Resp B/P (MAP) Pulse Ox O2 Delivery O2 Flow Rate FiO2


 


5/2/21 20:30     98 Room Air  


 


5/2/21 19:54 36.0 71 18 125/60 (81)    





Capillary Refill :


General Appearance:  No Apparent Distress, WD/WN, Chronically ill, Obese


HEENT:  PERRL/EOMI, Normal ENT Inspection, Pharynx Normal


Neck:  Full Range of Motion, Normal Inspection, Non Tender, Supple, Carotid 

Bruit


Respiratory:  Chest Non Tender, Lungs Clear, Normal Breath Sounds, No Accessory 

Muscle Use, No Respiratory Distress


Cardiovascular:  Regular Rate, Rhythm, No Edema, No Gallop, No JVD, No Murmur, 

Normal Peripheral Pulses


Gastrointestinal:  Normal Bowel Sounds, No Organomegaly, No Pulsatile Mass, Non 

Tender, Soft


Back:  Normal Inspection, No CVA Tenderness, No Vertebral Tenderness


Extremity:  Normal Capillary Refill, Normal Inspection, Normal Range of Motion, 

Non Tender, No Calf Tenderness, No Pedal Edema


Neurologic/Psychiatric:  Alert, Oriented x3, No Motor/Sensory Deficits, Normal 

Mood/Affect, CNs II-XII Norm as Tested, Abnormal Gait, Motor Weakness (left leg 

amputation )


Skin:  Normal Color, Warm/Dry


Lymphatic:  No Adenopathy





Results/Procedures


Lab


Patient resulted labs reviewed.





FIM


Transfers


Therapy Code Descriptions/Definitions 





Functional [place] Measure:


0=Not Assessed/NA        4=Minimal Assistance


1=Total Assistance        5=Supervision or Setup


2=Maximal Assistance  6=Modified [place]


3=Moderate Assistance 7=Complete IndependenceSCALE: Activities may be completed 

with or without assistive devices.





6-Indepedent-patient completes the activity by him/herself with no assistance 

from a helper.


5-Set-up or Clean-up Assistance-helper sets up or cleans up; patient completes 

activity. [place] assists only prior to or  


    following the activity.


4-Supervision or Touching Assistance-helper provides verbal cues and/or 

touching/steadying and/or contact guard assistance as patient completes 

activity. Assistance may be provided   


    throughout the activity or intermittently.


3-Partial/Moderate Assistance-helper does LESS THAN HALF the effort. [place] 

lifts, holds or supports trunk or limbs, but provides less than half the effort.


2-Substantial/Maximal Assistance-helper does MORE THAN HALF the effort. [place] 

lifts or holds trunk or limbs and provides more than half the effort.


8-Tiilwhsvw-anvdvv does ALL the effort. Patient does none of the effort to 

complete the activity. Or, the assistance of 2 or more helpers is required for 

the patient to complete the  


    activity.


If activity was not attempted, code reason:


7-Patient Refused.


9-Not Applicable-not attempted and the patient did not perform the activity 

before the current illness, exacerbation or injury.


10-Not Attempted due to Environmental Limitations-(lack of equipment, weather 

restraints, etc.).


88-Not Attempted due to Medical Conditions or Safety Concerns.


Roll Left to Right (QC):  6


Sit to Lying (QC):  6


Sit to Stand (QC):  4 (In parallel bars, CGA. Patient was unable to perform sit 

<-> stand from w/c with mod assist using walker for support.)


Chair/Bed-to-Chair Xfer(QC):  3 (Min assist using slideboard from w/c to bed. 

Patient able to perform pivot transfer with min A from elevated bed to w/c.)


Car Transfer (QC):  88





Gait Training


Does the Patient Walk?:  No and Walking Goal IS indicated


Distance:  5' x 2


Walk 10 feet (QC):  88


Walk 50 ft with 2 Turns(QC):  88


Walk 150 ft (QC):  88


Walking 10ft/uneven surface-QC:  88


Gait Assistive Device:  FWW





Wheelchair Training


Does the Pt Use a Wheelchair?:  Yes


Wheel 50 ft with 2 turns (QC):  6


Wheel 150 ft (QC):  6


Type of Wheelchair:  Manual





Stair Training


1 Step (curb) (QC):  88


4 Steps (QC):  88


12 Steps (QC):  88





Balance


Picking up an Object (QC):  88





ADL-Treatment


Eating (QC):  6


Oral Hygiene (QC):  6


Shower/Bathe Self (QC):  5


Upper Body Dressing (QC):  5


Lower Body Dressing (QC):  5


On/Off Footwear (QC):  2


Toileting Hygiene (QC):  4 (Pt. able to cleanse rear primo area after 

transferring back to bed.  )


Toilet Transfer (QC):  2





Assessment/Plan


Assessment and Plan


Assess & Plan/Chief Complaint


Assessment:


s/p LBKA 


DM insulin dependence


HTN


HLP


CRI


Iron def anemia from acute blood loss


TERRI untreated





Plan:


Monitor pain


IRF protocol


Monitor hgb


Iron infusion





4/22/21:


Monitor sugar


Monitor hgb





4/23/21:


Add pain meds


Check labs in am





4/24/21:


Monitor sugar and decrease insulin


Hgb stable


Venofer





4/25/21:


Continue iron infusions


Sugar managed well


Pain controlled





4/26/21:


Stump 


Monitor sugar


Monitor hgb





4/27/21:


Monitor sugar


Decrease insulin


Monitor BP





4/28/21:


Monitor pain


Decreased insulin





4/29/21:


Monitor pain


BM regimen to prevent narcotic bowel





4/30/21:


Monitor closely


Monitor sugar





5/1/21:


Monitor closely


Dressing changes





5/2/21:


Monitor pain


Increase insulin





(1) Status post below-knee amputation of left lower extremity


Status:  Acute


(2) Hyperglycemia


Status:  Chronic


(3) Hypertension


Status:  Chronic


(4) Diabetes mellitus, type 2


Status:  Chronic


(5) Anemia


Status:  Acute


(6) Iron deficiency


Status:  Acute


(7) Peripheral neuropathy


Status:  Chronic











NICOLE VILA DO                 May 2, 2021 09:19

## 2021-05-03 VITALS — DIASTOLIC BLOOD PRESSURE: 65 MMHG | SYSTOLIC BLOOD PRESSURE: 144 MMHG

## 2021-05-03 VITALS — DIASTOLIC BLOOD PRESSURE: 82 MMHG | SYSTOLIC BLOOD PRESSURE: 128 MMHG

## 2021-05-03 LAB
ALBUMIN SERPL-MCNC: 3.5 GM/DL (ref 3.2–4.5)
ALP SERPL-CCNC: 80 U/L (ref 40–136)
ALT SERPL-CCNC: 18 U/L (ref 0–55)
BASOPHILS # BLD AUTO: 0 10^3/UL (ref 0–0.1)
BASOPHILS NFR BLD AUTO: 1 % (ref 0–10)
BILIRUB SERPL-MCNC: 0.3 MG/DL (ref 0.1–1)
BUN/CREAT SERPL: 27
CALCIUM SERPL-MCNC: 9.2 MG/DL (ref 8.5–10.1)
CHLORIDE SERPL-SCNC: 104 MMOL/L (ref 98–107)
CO2 SERPL-SCNC: 27 MMOL/L (ref 21–32)
CREAT SERPL-MCNC: 0.89 MG/DL (ref 0.6–1.3)
EOSINOPHIL # BLD AUTO: 0.2 10^3/UL (ref 0–0.3)
EOSINOPHIL NFR BLD AUTO: 3 % (ref 0–10)
GFR SERPLBLD BASED ON 1.73 SQ M-ARVRAT: > 60 ML/MIN
GLUCOSE SERPL-MCNC: 114 MG/DL (ref 70–105)
HCT VFR BLD CALC: 31 % (ref 40–54)
HGB BLD-MCNC: 9.5 G/DL (ref 13.3–17.7)
LYMPHOCYTES # BLD AUTO: 2 10^3/UL (ref 1–4)
LYMPHOCYTES NFR BLD AUTO: 33 % (ref 12–44)
MANUAL DIFFERENTIAL PERFORMED BLD QL: NO
MCH RBC QN AUTO: 28 PG (ref 25–34)
MCHC RBC AUTO-ENTMCNC: 31 G/DL (ref 32–36)
MCV RBC AUTO: 91 FL (ref 80–99)
MONOCYTES # BLD AUTO: 0.3 10^3/UL (ref 0–1)
MONOCYTES NFR BLD AUTO: 5 % (ref 0–12)
NEUTROPHILS # BLD AUTO: 3.4 10^3/UL (ref 1.8–7.8)
NEUTROPHILS NFR BLD AUTO: 58 % (ref 42–75)
PLATELET # BLD: 382 10^3/UL (ref 130–400)
PMV BLD AUTO: 9.3 FL (ref 9–12.2)
POTASSIUM SERPL-SCNC: 4.5 MMOL/L (ref 3.6–5)
PROT SERPL-MCNC: 6.8 GM/DL (ref 6.4–8.2)
SODIUM SERPL-SCNC: 140 MMOL/L (ref 135–145)
WBC # BLD AUTO: 5.9 10^3/UL (ref 4.3–11)

## 2021-05-03 RX ADMIN — MICONAZOLE NITRATE SCH APPLIC: 20 POWDER TOPICAL at 09:13

## 2021-05-03 RX ADMIN — GLYBURIDE SCH MG: 2.5 TABLET ORAL at 06:06

## 2021-05-03 RX ADMIN — DOCUSATE SODIUM SCH MG: 100 CAPSULE ORAL at 09:12

## 2021-05-03 RX ADMIN — POLYETHYLENE GLYCOL (3350) SCH GM: 17 POWDER, FOR SOLUTION ORAL at 09:13

## 2021-05-03 RX ADMIN — OXYCODONE HYDROCHLORIDE SCH MG: 10 TABLET, FILM COATED, EXTENDED RELEASE ORAL at 21:02

## 2021-05-03 RX ADMIN — ASPIRIN SCH MG: 81 TABLET ORAL at 09:11

## 2021-05-03 RX ADMIN — MICONAZOLE NITRATE SCH APPLIC: 20 POWDER TOPICAL at 21:02

## 2021-05-03 RX ADMIN — PANTOPRAZOLE SODIUM SCH MG: 40 TABLET, DELAYED RELEASE ORAL at 09:10

## 2021-05-03 RX ADMIN — LISINOPRIL SCH MG: 20 TABLET ORAL at 09:10

## 2021-05-03 RX ADMIN — Medication SCH ML: at 06:07

## 2021-05-03 RX ADMIN — INSULIN ASPART SCH UNIT: 100 INJECTION, SOLUTION INTRAVENOUS; SUBCUTANEOUS at 10:41

## 2021-05-03 RX ADMIN — PRASUGREL SCH MG: 10 TABLET, FILM COATED ORAL at 09:10

## 2021-05-03 RX ADMIN — INSULIN ASPART SCH UNIT: 100 INJECTION, SOLUTION INTRAVENOUS; SUBCUTANEOUS at 06:06

## 2021-05-03 RX ADMIN — OXYCODONE HYDROCHLORIDE SCH MG: 10 TABLET, FILM COATED, EXTENDED RELEASE ORAL at 09:10

## 2021-05-03 RX ADMIN — INSULIN ASPART SCH UNIT: 100 INJECTION, SOLUTION INTRAVENOUS; SUBCUTANEOUS at 20:41

## 2021-05-03 RX ADMIN — GLYBURIDE SCH MG: 2.5 TABLET ORAL at 16:43

## 2021-05-03 RX ADMIN — DOCUSATE SODIUM AND SENNOSIDES SCH EA: 8.6; 5 TABLET, FILM COATED ORAL at 20:22

## 2021-05-03 RX ADMIN — DOCUSATE SODIUM AND SENNOSIDES SCH EA: 8.6; 5 TABLET, FILM COATED ORAL at 09:13

## 2021-05-03 RX ADMIN — METFORMIN HYDROCHLORIDE SCH MG: 500 TABLET, EXTENDED RELEASE ORAL at 18:16

## 2021-05-03 RX ADMIN — METFORMIN HYDROCHLORIDE SCH MG: 500 TABLET, EXTENDED RELEASE ORAL at 09:10

## 2021-05-03 RX ADMIN — METOPROLOL SUCCINATE SCH MG: 100 TABLET, EXTENDED RELEASE ORAL at 09:10

## 2021-05-03 RX ADMIN — Medication SCH ML: at 21:03

## 2021-05-03 RX ADMIN — Medication SCH ML: at 16:43

## 2021-05-03 RX ADMIN — INSULIN ASPART SCH UNIT: 100 INJECTION, SOLUTION INTRAVENOUS; SUBCUTANEOUS at 16:30

## 2021-05-03 RX ADMIN — DOCUSATE SODIUM SCH MG: 100 CAPSULE ORAL at 20:22

## 2021-05-03 RX ADMIN — POLYETHYLENE GLYCOL (3350) SCH GM: 17 POWDER, FOR SOLUTION ORAL at 20:22

## 2021-05-03 NOTE — PM&R PROGRESS NOTE
Subjective


HPI/CC On Admission


Date Seen by Provider:  May 3, 2021


Time Seen by Provider:  08:30


Subjective/Events-last exam


5/3/21:


Pt doing much better


Hgb 9.5


Bowels moved yesterday


Sugar was 100 this morning


Drainage from incision continues so stump- is being held








5/2/21:


Patient doing well


No pain reported except during dressing changes


Long acting Oxycontin working well for the patient


Stump  today or tomorrow








5/1/21:


Patient doing well


Oxycontin 10mg PO BID


Change dressing as instructed


Incision looks good


Fentanyl infusion before dressing changes








4/30/21:


Patient doing well


Lost 11# since admit


BM yesterday


Sugars are good








4/29/21:


No major issues


Incision is bleeding in 1 specific area


Scheduled Oxycontin is helping and minimsl IR used


BM+








4/28/21:


No major issues


BM yesterday will need to closely monitor that due to long acting narcs


Dr Jiménez will see him today regarding his staples


Talked about weight loss with Dr Jiménez








4/27/21:


Pt doing really well


No issues 


Stump  is being tolerated for 23 hours of the day 


No pain 


Sugar is okay and I decreased insulin even further today 








4/26/21:


Pt doing a lot better today


Pain is well controlled


Bowels moved yesterday


Stump  will be placed today on his left amputation site


Slept very well








4/25/21:


Doing well


No pain when he doesn't move


Pain controlled better with Oxycontin


BM+


Mother and son visiting him today


Sugars reviewed








4/24/21:


Pain is better


No issues reported


Hgb 7.9


BM++


Using IS








4/23/21:


Doing well


Stump  will start Monday per Dr Jiménez


BM+


Oxycontin and Oxycodone added for better pain control


DC Lortab








4/22/21:


Pt doing pretty well


Had a large BM today


Hgb 7.7 after three units of blood on med surg


Rough on the transfers but getting better


Dressing change will be per Dr. Morejon orders


Sugar is 107 this morning





Review of Systems


General:  Fatigue, Malaise


Musculoskeletal:  leg pain


Neurological:  Weakness





Objective


Exam


Vital Signs





Vital Signs








  Date Time  Temp Pulse Resp B/P (MAP) Pulse Ox O2 Delivery O2 Flow Rate FiO2


 


5/3/21 21:07      Room Air  


 


5/3/21 20:00 36.1 73 16 128/82 (78) 96   





Capillary Refill :


General Appearance:  No Apparent Distress, WD/WN, Chronically ill, Obese


HEENT:  PERRL/EOMI, Normal ENT Inspection, Pharynx Normal


Neck:  Full Range of Motion, Normal Inspection, Non Tender, Supple, Carotid 

Bruit


Respiratory:  Chest Non Tender, Lungs Clear, Normal Breath Sounds, No Accessory 

Muscle Use, No Respiratory Distress


Cardiovascular:  Regular Rate, Rhythm, No Edema, No Gallop, No JVD, No Murmur, 

Normal Peripheral Pulses


Gastrointestinal:  Normal Bowel Sounds, No Organomegaly, No Pulsatile Mass, Non 

Tender, Soft


Back:  Normal Inspection, No CVA Tenderness, No Vertebral Tenderness


Extremity:  Normal Capillary Refill, Normal Inspection, Normal Range of Motion, 

Non Tender, No Calf Tenderness, No Pedal Edema


Neurologic/Psychiatric:  Alert, Oriented x3, No Motor/Sensory Deficits, Normal 

Mood/Affect, CNs II-XII Norm as Tested, Abnormal Gait, Motor Weakness (left leg 

amputation )


Skin:  Normal Color, Warm/Dry


Lymphatic:  No Adenopathy





Results/Procedures


Lab


Laboratory Tests


5/3/21 06:00








5/3/21 06:50








Patient resulted labs reviewed.





FIM


Transfers


Therapy Code Descriptions/Definitions 





Functional [place] Measure:


0=Not Assessed/NA        4=Minimal Assistance


1=Total Assistance        5=Supervision or Setup


2=Maximal Assistance  6=Modified [place]


3=Moderate Assistance 7=Complete IndependenceSCALE: Activities may be completed 

with or without assistive devices.





6-Indepedent-patient completes the activity by him/herself with no assistance 

from a helper.


5-Set-up or Clean-up Assistance-helper sets up or cleans up; patient completes 

activity. [place] assists only prior to or  


    following the activity.


4-Supervision or Touching Assistance-helper provides verbal cues and/or 

touching/steadying and/or contact guard assistance as patient completes 

activity. Assistance may be provided   


    throughout the activity or intermittently.


3-Partial/Moderate Assistance-helper does LESS THAN HALF the effort. [place] 

lifts, holds or supports trunk or limbs, but provides less than half the effort.


2-Substantial/Maximal Assistance-helper does MORE THAN HALF the effort. [place] 

lifts or holds trunk or limbs and provides more than half the effort.


1-Crnnwoawh-wtfknw does ALL the effort. Patient does none of the effort to 

complete the activity. Or, the assistance of 2 or more helpers is required for 

the patient to complete the  


    activity.


If activity was not attempted, code reason:


7-Patient Refused.


9-Not Applicable-not attempted and the patient did not perform the activity 

before the current illness, exacerbation or injury.


10-Not Attempted due to Environmental Limitations-(lack of equipment, weather 

restraints, etc.).


88-Not Attempted due to Medical Conditions or Safety Concerns.


Roll Left to Right (QC):  6


Sit to Lying (QC):  6


Sit to Stand (QC):  4 (In parallel bars, CGA. Patient was unable to perform sit 

<-> stand from w/c with mod assist using walker for support.)


Chair/Bed-to-Chair Xfer(QC):  3 (Min assist using slideboard from w/c to bed. 

Patient able to perform pivot transfer with min A from elevated bed to w/c.)


Car Transfer (QC):  88





Gait Training


Does the Patient Walk?:  No and Walking Goal IS indicated


Distance:  5' x 2


Walk 10 feet (QC):  88


Walk 50 ft with 2 Turns(QC):  88


Walk 150 ft (QC):  88


Walking 10ft/uneven surface-QC:  88


Gait Assistive Device:  FWW





Wheelchair Training


Does the Pt Use a Wheelchair?:  Yes


Wheel 50 ft with 2 turns (QC):  6


Wheel 150 ft (QC):  6


Type of Wheelchair:  Manual





Stair Training


1 Step (curb) (QC):  88


4 Steps (QC):  88


12 Steps (QC):  88





Balance


Picking up an Object (QC):  88





ADL-Treatment


Eating (QC):  6


Oral Hygiene (QC):  6


Shower/Bathe Self (QC):  5


Upper Body Dressing (QC):  5


Lower Body Dressing (QC):  5


On/Off Footwear (QC):  2


Toileting Hygiene (QC):  4 (Pt. able to cleanse rear primo area after 

transferring back to bed.  )


Toilet Transfer (QC):  2





Assessment/Plan


Assessment and Plan


Assess & Plan/Chief Complaint


Assessment:


s/p LBKA 


DM insulin dependence


HTN


HLP


CRI


Iron def anemia from acute blood loss


TERRI untreated





Plan:


Monitor pain


IRF protocol


Monitor hgb


Iron infusion





4/22/21:


Monitor sugar


Monitor hgb





4/23/21:


Add pain meds


Check labs in am





4/24/21:


Monitor sugar and decrease insulin


Hgb stable


Venofer





4/25/21:


Continue iron infusions


Sugar managed well


Pain controlled





4/26/21:


Stump 


Monitor sugar


Monitor hgb





4/27/21:


Monitor sugar


Decrease insulin


Monitor BP





4/28/21:


Monitor pain


Decreased insulin





4/29/21:


Monitor pain


BM regimen to prevent narcotic bowel





4/30/21:


Monitor closely


Monitor sugar





5/1/21:


Monitor closely


Dressing changes





5/2/21:


Monitor pain


Increase insulin





5/3/21:


Monitor closely


Monitor sugar levels and adjust insulin as needed





(1) Status post below-knee amputation of left lower extremity


Status:  Acute


(2) Hyperglycemia


Status:  Chronic


(3) Hypertension


Status:  Chronic


(4) Diabetes mellitus, type 2


Status:  Chronic


(5) Anemia


Status:  Acute


(6) Iron deficiency


Status:  Acute


(7) Peripheral neuropathy


Status:  Chronic











NICOLE VILA DO                 May 3, 2021 05:22

## 2021-05-03 NOTE — PHYSICAL THERAPY DAILY NOTE
PT Daily Note-Current


Subjective


Patient supine in bed pre tx, patient consents and requests to therapy outside. 

Patient reports 5/10 pain in L LE.





Appearance


Patient seated upright EOB post tx. Call button and tray table within reach. All

needs met.





Mental Status


Patient Orientation:  Normal For Age





Transfers


SCALE: Activities may be completed with or without assistive devices.





6-Indepedent-patient completes the activity by him/herself with no assistance 

from a helper.


5-Set-up or Clean-up Assistance-helper sets up or cleans up; patient completes 

activity. [place] assists only prior to or  


    following the activity.


4-Supervision or Touching Assistance-helper provides verbal cues and/or 

touching/steadying and/or contact guard assistance as patient completes acti

vity. Assistance may be provided   


    throughout the activity or intermittently.


3-Partial/Moderate Assistance-helper does LESS THAN HALF the effort. [place] 

lifts, holds or supports trunk or limbs, but provides less than half the effort.


2-Substantial/Maximal Assistance-helper does MORE THAN HALF the effort. [place] 

lifts or holds trunk or limbs and provides more than half the effort.


2-[id number]-[id number] does ALL the effort. Patient does none of the effort to 

complete the activity. Or, the assistance of 2 or more helpers is required for 

the patient to complete the  


    activity.


If activity was not attempted, code reason:


7-Patient Refused.


9-Not Applicable-not attempted and the patient did not perform the activity 

before the current illness, exacerbation or injury.


10-Not Attempted due to Environmental Limitations-(lack of equipment, weather 

restraints, etc.).


88-Not Attempted due to Medical Conditions or Safety Concerns.


Roll Left & Right (QC):  6


Lying to Sitting/Side of Bed(Q:  6


Sit to Stand (QC):  4 (CGA with stand pivot from elevated bed to w/c.)


Chair/Bed-to-Chair Xfer(QC):  3 (Min A with sliding board from w/c to bed, 

patient utilized step stool to elevate leg to assist with transfer.)


Patient unable to perform sit <-> stand using walker from w/c.





Weight Bearing


Right Lower Extremity:  Right


Full Weight Bearing


Left Lower Extremity:  Left


Non Weight Bearing (BKA)





Wheelchair Training


Does the Pt Use a Wheelchair?:  Yes


Wheel 50 ft with 2 turns (QC):  6


Wheel 150 ft (QC):  6


Type of Wheelchair:  Manual


500' x2





Treatments


Wheelchair training, transfers, bed mobility, endurance





Assessment


Current Status:  Fair Progress


Patient continues to have difficulty performing sit <-> stand from w/c surface 

using walker. Patient performed stable stand pivot transfer from elevated bed 

surface. Patient stated he was not as comfortable in his w/c he has brought from

home, and would like to try out different cushions in his w/c at later session.





PT Short Term Goals


Short Term Goals


Time Frame:  2021


Roll Left & Right:  6


Sit to lyin


Lying to sitting on side of be:  6


Sit to stand:  3


Chair/bed-to-chair transfer:  3


Wheel 50ft w/2 turns:  6


Wheel 150 feet:  6





PT Long Term Goals


Long Term Goals


PT Long Term Goals Time Frame:  May 19, 2021


Roll Left & Right (QC):  6


Sit to Lying (QC):  6


Lying-Sitting on Side/Bed(QC):  6


Sit to Stand (QC):  6


Chair/Bed-to-Chair Xfer(QC):  6 (SPT or slideboard, whichever is the safest 

method)


Toilet Transfer (QC):  6 (SPT or slide board)


Car Transfer (QC):  4


Does the Patient Walk:  No and Walking Goal NOT indicated


Walk 10 feet (QC):  88


Walk 50ft with 2 Turns (QC):  88


Walk 150 ft (QC):  88


Walking 10ft on Uneven Surface:  88


1 Step (curb) (QC):  88


4 Steps (QC):  88


12 Steps (QC):  88


Picking up an Object (QC):  88


Does the Pt use WC or Scooter?:  Yes


Wheel 50 feet with 2 turns (QC:  6


Type:  Manual


Wheel 150 feet:  6


Type:  Manual





PT Plan


Problem List


Problem List:  Activity Tolerance, Functional Strength, Safety, Balance, Gait, 

Transfer, Bed Mobility, ROM





Treatment/Plan


Treatment Plan:  Continue Plan of Care


Treatment Plan:  Bed Mobility, Education, Functional Activity [place]ung, Functional 

Strength, Group Therapy, Gait, Safety, Therapeutic Exercise, Transfers


Treatment Duration:  May 19, 2021


Frequency:  At least 5 of 7 days/Wk (IRF)


Estimated Hrs Per Day:  1.5 hours per day


Patient and/or Family Agrees t:  Yes





Safety Risks/Education


Patient Education:  Transfer Techniques, W/C Management, Safety Issues


Teaching Recipient:  Patient


Teaching Methods:  Demonstration


Response to Teaching:  Verbalize Understanding, Return Demonstration





Time/GCodes


Time In:  1100


Time Out:  1200


Total Billed Treatment Time:  60


Total Billed Treatment


1 visit: 


FA x2: 30' 


[facility] x2: 30'











CAMILLE HARRY PT                 May 3, 2021 12:02

## 2021-05-03 NOTE — OCCUPATIONAL THER DAILY NOTE
OT Current Status-Daily Note


Subjective


Pt. reports pain in residual limb with transfer, but does not state pain level. 

However, nursing gives pt. pain medication.





Mental Status/Objective


Patient Orientation:  Person, Place, Time, Situation





ADL-Treatment


Therapy Code Descriptions/Definitions 





Functional [place] Measure:


0=Not Assessed/NA        4=Minimal Assistance


1=Total Assistance        5=Supervision or Setup


2=Maximal Assistance  6=Modified [place]


3=Moderate Assistance 7=Complete IndependenceSCALE: Activities may be completed 

with or without assistive devices.





6-Indepedent-patient completes the activity by him/herself with no assistance 

from a helper.


5-Set-up or Clean-up Assistance-helper sets up or cleans up; patient completes 

activity. [place] assists only prior to or  


    following the activity.


4-Supervision or Touching Assistance-helper provides verbal cues and/or 

touching/steadying and/or contact guard assistance as patient completes 

activity. Assistance may be provided   


    throughout the activity or intermittently.


3-Partial/Moderate Assistance-helper does LESS THAN HALF the effort. [place] 

lifts, holds or supports trunk or limbs, but provides less than half the effort.


2-Substantial/Maximal Assistance-helper does MORE THAN HALF the effort. [place] 

lifts or holds trunk or limbs and provides more than half the effort.


6-Cypzsjrdr-yrbxjr does ALL the effort. Patient does none of the effort to 

complete the activity. Or, the assistance of 2 or more helpers is required for 

the patient to complete the  


    activity.


If activity was not attempted, code reason:


7-Patient Refused.


9-Not Applicable-not attempted and the patient did not perform the activity 

before the current illness, exacerbation or injury.


10-Not Attempted due to Environmental Limitations-(lack of equipment, weather 

restraints, etc.).


88-Not Attempted due to Medical Conditions or Safety Concerns.


Eating (QC):  6


Shower/Bathe Self (QC):  5


Upper Body Dressing (QC):  5


On/Off Footwear:  2


Pt. agrees to shower.  Pt. transfers supine-sit onto side of bed with SBA.  Pt. 

is able to stand at walker with bed elevated, with min assist.  Pt. is able to 

transfer to shower chair by pivoting on right LE.  Once on shower chair, OT 

takes chair into shower.  Pt. is able to shower self.  After shower, pt. 

transfers back to bed via slide board.  Pt. transfers to right side, but had 

difficulty with keeping board in place.  Noted that it would be better to 

transfer next time to left side.  Once pt. in bed he is able to position self.  

Pt. completes bilateral UE exercises x 15 reps, x 6 exercises x 6lb. dumbbells 

in all planes.  Tolerates this well.  All needs met.





Education


OT Patient Education:  Correct positioning, Exercise program, Modified ADL 

techniques, Progress toward Goal/Update tx plan, Purpose of tx/functional 

activities, Reviewed precautions, Rehab process, Transfer techniques


Teaching Recipient:  Patient


Teaching Methods:  Demonstration, Discussion


Response to Teaching:  Verbalize Understanding, Return Demonstration





OT Short Term Goals


Short Term Goals


Time Frame:  May 5, 2021


Eatin


Oral hygiene:  6


Toileting hygiene:  4


Shower/bathe self:  4


Upper body dressin


Lower body dressing:  3


Putting on/taking off footwear:  3





OT Long Term Goals


Long Term Goals


Time Frame:  May 19, 2021


Eating (QC):  6


Oral Hygiene (QC):  6


Toileting Hygiene (QC):  6


Shower/Bathe Self (QC):  6


Upper Body Dressing (QC):  6


Lower Body Dressing (QC):  6


On/Off Footwear (QC):  6


Additional Goals:  1-Demonstrate ADL Tasks, 2-Verbalize Understanding, 3-

ImproveStrength/Aung


1=Demonstrate adherence to instructed precautions during ADL tasks.


2=Patient will verbalize/demonstrate understanding of assistive devic

es/modifications for ADL.


3=Patient will improve strength/tolerance for activity to enable patient to 

perform ADL's.





OT Education/Plan


Problem List/Assessment


Assessment:  Decreased Activ Tolerance, Impaired I ADL's, Impaired Self-Care 

Skills





Discharge Recommendations


Plan/Recommendations:  Continue POC


Therapy Discharge Recommendati:  Post Acute OT





Treatment Plan/Plan of Care


Treatment,Training & Education:  Yes


Patient would benefit from OT for education, treatment and training to promote 

independence in ADL's, mobility, safety and/or upper extremity function for 

ADL's.


Plan of Care:  ADL Retraining, Functional Mobility, Group Exercise/Act as Ind, 

UE Funct Exercise/Act


Treatment Duration:  May 19, 2021


Frequency:  At least 5 of 7 days/Wk (IRF)


Estimated Hrs Per Day:  1.5 hours per day


Agreement:  Yes


Rehab Potential:  Good





Time/GCodes


Start Time:  08:15


Stop Time:  09:45


Total Time Billed (hr/min):  90


Billed Treatment Time


1, ADL x 60minutes, Ex x 30minutes











JAROCHO OBRIEN OT            May 3, 2021 14:47

## 2021-05-03 NOTE — PHYSICAL THERAPY DAILY NOTE
PT Daily Note-Current


Subjective


Patient reports moderate pain pre tx, is supine in bed, and agrees to therapy.





Appearance


Patient supine in bed post tx, with call button within reach and tray table ne

nimocarol.





Mental Status


Patient Orientation:  Person, Place, Time, Normal For Age





Transfers


SCALE: Activities may be completed with or without assistive devices.





6-Indepedent-patient completes the activity by him/herself with no assistance 

from a helper.


5-Set-up or Clean-up Assistance-helper sets up or cleans up; patient completes 

activity. [place] assists only prior to or  


    following the activity.


4-Supervision or Touching Assistance-helper provides verbal cues and/or 

touching/steadying and/or contact guard assistance as patient completes 

activity. Assistance may be provided   


    throughout the activity or intermittently.


3-Partial/Moderate Assistance-helper does LESS THAN HALF the effort. [place] 

lifts, holds or supports trunk or limbs, but provides less than half the effort.


2-Substantial/Maximal Assistance-helper does MORE THAN HALF the effort. [place] 

lifts or holds trunk or limbs and provides more than half the effort.


6-Csinbzkhl-yhgsfw does ALL the effort. Patient does none of the effort to 

complete the activity. Or, the assistance of 2 or more helpers is required for 

the patient to complete the  


    activity.


If activity was not attempted, code reason:


7-Patient Refused.


9-Not Applicable-not attempted and the patient did not perform the activity 

before the current illness, exacerbation or injury.


10-Not Attempted due to Environmental Limitations-(lack of equipment, weather 

restraints, etc.).


88-Not Attempted due to Medical Conditions or Safety Concerns.


Roll Left & Right (QC):  6


Sit to Lying (QC):  6


Lying to Sitting/Side of Bed(Q:  6


Sit to Stand (QC):  4 (CGA)


Patient performed 2 sit to stands EOB with bed elevated FDC up, to build 

tolerance to standing up from w/c height.





Weight Bearing


Right Lower Extremity:  Right


Full Weight Bearing


Left Lower Extremity:  Left


Non Weight Bearing (BKA)





Exercises


Supine Ex:  Ankle pumps, Quad Set, Glut sets, Straight leg raise, Hip abd/add


Supine Reps:  20


Standing:  3 way Ex=Flex, Abd, Ext (no extension)


Standing Reps:  10


Added supine knee extension stretch for 5' with 5# weight, and prone hip 

extension stretch for 5', and  R sidelying hip extension and abduction with L LE

20 reps





Treatments


LE strengthening, bed mobility, transfers





Assessment


Current Status:  Fair Progress


Patient is showing better muscular endurance with ther ex, still will benefit 

from practice with sit to stand transfers from lower elevations for optimal 

functional mobility for safe return home





PT Short Term Goals


Short Term Goals


Time Frame:  2021


Roll Left & Right:  6


Sit to lyin


Lying to sitting on side of be:  6


Sit to stand:  3


Chair/bed-to-chair transfer:  3


Wheel 50ft w/2 turns:  6


Wheel 150 feet:  6





PT Long Term Goals


Long Term Goals


PT Long Term Goals Time Frame:  May 19, 2021


Roll Left & Right (QC):  6


Sit to Lying (QC):  6


Lying-Sitting on Side/Bed(QC):  6


Sit to Stand (QC):  6


Chair/Bed-to-Chair Xfer(QC):  6 (SPT or slideboard, whichever is the safest 

method)


Toilet Transfer (QC):  6 (SPT or slide board)


Car Transfer (QC):  4


Does the Patient Walk:  No and Walking Goal NOT indicated


Walk 10 feet (QC):  88


Walk 50ft with 2 Turns (QC):  88


Walk 150 ft (QC):  88


Walking 10ft on Uneven Surface:  88


1 Step (curb) (QC):  88


4 Steps (QC):  88


12 Steps (QC):  88


Picking up an Object (QC):  88


Does the Pt use WC or Scooter?:  Yes


Wheel 50 feet with 2 turns (QC:  6


Type:  Manual


Wheel 150 feet:  6


Type:  Manual





PT Plan


Problem List


Problem List:  Activity Tolerance, Functional Strength, Safety, Balance, Gait, 

Transfer, Bed Mobility, ROM





Treatment/Plan


Treatment Plan:  Continue Plan of Care


Treatment Plan:  Bed Mobility, Education, Functional Activity Aung, Functional 

Strength, Group Therapy, Gait, Safety, Therapeutic Exercise, Transfers


Treatment Duration:  May 19, 2021


Frequency:  At least 5 of 7 days/Wk (IRF)


Estimated Hrs Per Day:  1.5 hours per day


Patient and/or Family Agrees t:  Yes





Safety Risks/Education


Patient Education:  Transfer Techniques, Correct Positioning, Safety Issues


Teaching Recipient:  Patient


Teaching Methods:  Demonstration, Discussion


Response to Teaching:  Verbalize Understanding, Return Demonstration





Time/GCodes


Time In:  1300


Time Out:  1330


Total Billed Treatment Time:  30


Total Billed Treatment


1 visit: 


EX x2: 30'











CAMILLE HARRY PT                 May 3, 2021 13:46

## 2021-05-04 VITALS — SYSTOLIC BLOOD PRESSURE: 137 MMHG | DIASTOLIC BLOOD PRESSURE: 73 MMHG

## 2021-05-04 VITALS — SYSTOLIC BLOOD PRESSURE: 144 MMHG | DIASTOLIC BLOOD PRESSURE: 65 MMHG

## 2021-05-04 RX ADMIN — POLYETHYLENE GLYCOL (3350) SCH GM: 17 POWDER, FOR SOLUTION ORAL at 07:50

## 2021-05-04 RX ADMIN — MICONAZOLE NITRATE SCH APPLIC: 20 POWDER TOPICAL at 07:53

## 2021-05-04 RX ADMIN — DOCUSATE SODIUM SCH MG: 100 CAPSULE ORAL at 19:44

## 2021-05-04 RX ADMIN — POLYETHYLENE GLYCOL (3350) SCH GM: 17 POWDER, FOR SOLUTION ORAL at 19:44

## 2021-05-04 RX ADMIN — DOCUSATE SODIUM AND SENNOSIDES SCH EA: 8.6; 5 TABLET, FILM COATED ORAL at 07:50

## 2021-05-04 RX ADMIN — FENTANYL CITRATE PRN MCG: 50 INJECTION, SOLUTION INTRAMUSCULAR; INTRAVENOUS at 21:46

## 2021-05-04 RX ADMIN — GLYBURIDE SCH MG: 2.5 TABLET ORAL at 16:53

## 2021-05-04 RX ADMIN — Medication SCH ML: at 08:30

## 2021-05-04 RX ADMIN — PANTOPRAZOLE SODIUM SCH MG: 40 TABLET, DELAYED RELEASE ORAL at 07:47

## 2021-05-04 RX ADMIN — DOCUSATE SODIUM SCH MG: 100 CAPSULE ORAL at 07:50

## 2021-05-04 RX ADMIN — FENTANYL CITRATE PRN MCG: 50 INJECTION, SOLUTION INTRAMUSCULAR; INTRAVENOUS at 08:27

## 2021-05-04 RX ADMIN — MICONAZOLE NITRATE SCH APPLIC: 20 POWDER TOPICAL at 21:42

## 2021-05-04 RX ADMIN — DOCUSATE SODIUM AND SENNOSIDES SCH EA: 8.6; 5 TABLET, FILM COATED ORAL at 19:44

## 2021-05-04 RX ADMIN — OXYCODONE HYDROCHLORIDE SCH MG: 10 TABLET, FILM COATED, EXTENDED RELEASE ORAL at 21:42

## 2021-05-04 RX ADMIN — PRASUGREL SCH MG: 10 TABLET, FILM COATED ORAL at 07:49

## 2021-05-04 RX ADMIN — INSULIN ASPART SCH UNIT: 100 INJECTION, SOLUTION INTRAVENOUS; SUBCUTANEOUS at 20:46

## 2021-05-04 RX ADMIN — METOPROLOL SUCCINATE SCH MG: 100 TABLET, EXTENDED RELEASE ORAL at 07:48

## 2021-05-04 RX ADMIN — METFORMIN HYDROCHLORIDE SCH MG: 500 TABLET, EXTENDED RELEASE ORAL at 07:48

## 2021-05-04 RX ADMIN — GLYBURIDE SCH MG: 2.5 TABLET ORAL at 06:00

## 2021-05-04 RX ADMIN — Medication SCH ML: at 21:42

## 2021-05-04 RX ADMIN — ASPIRIN SCH MG: 81 TABLET ORAL at 07:48

## 2021-05-04 RX ADMIN — METFORMIN HYDROCHLORIDE SCH MG: 500 TABLET, EXTENDED RELEASE ORAL at 16:53

## 2021-05-04 RX ADMIN — INSULIN ASPART SCH UNIT: 100 INJECTION, SOLUTION INTRAVENOUS; SUBCUTANEOUS at 05:41

## 2021-05-04 RX ADMIN — INSULIN ASPART SCH UNIT: 100 INJECTION, SOLUTION INTRAVENOUS; SUBCUTANEOUS at 13:14

## 2021-05-04 RX ADMIN — LISINOPRIL SCH MG: 20 TABLET ORAL at 07:48

## 2021-05-04 RX ADMIN — OXYCODONE HYDROCHLORIDE SCH MG: 10 TABLET, FILM COATED, EXTENDED RELEASE ORAL at 07:49

## 2021-05-04 RX ADMIN — INSULIN ASPART SCH UNIT: 100 INJECTION, SOLUTION INTRAVENOUS; SUBCUTANEOUS at 16:14

## 2021-05-04 RX ADMIN — Medication SCH ML: at 06:00

## 2021-05-04 NOTE — OCCUPATIONAL THER DAILY NOTE
OT Current Status-Daily Note


Subjective


Pt. receives pain medication right before stump  is put on.  Pt. reports

pain during this, but does not report pain level.





Mental Status/Objective


Patient Orientation:  Person, Place, Time, Situation





ADL-Treatment


Therapy Code Descriptions/Definitions 





Functional [place] Measure:


0=Not Assessed/NA        4=Minimal Assistance


1=Total Assistance        5=Supervision or Setup


2=Maximal Assistance  6=Modified [place]


3=Moderate Assistance 7=Complete IndependenceSCALE: Activities may be completed 

with or without assistive devices.





6-Indepedent-patient completes the activity by him/herself with no assistance 

from a helper.


5-Set-up or Clean-up Assistance-helper sets up or cleans up; patient completes 

activity. [place] assists only prior to or  


    following the activity.


4-Supervision or Touching Assistance-helper provides verbal cues and/or 

touching/steadying and/or contact guard assistance as patient completes 

activity. Assistance may be provided   


    throughout the activity or intermittently.


3-Partial/Moderate Assistance-helper does LESS THAN HALF the effort. [place] 

lifts, holds or supports trunk or limbs, but provides less than half the effort.


2-Substantial/Maximal Assistance-helper does MORE THAN HALF the effort. [place] 

lifts or holds trunk or limbs and provides more than half the effort.


9-Mzxhsfqrc-ctcoaw does ALL the effort. Patient does none of the effort to 

complete the activity. Or, the assistance of 2 or more helpers is required for 

the patient to complete the  


    activity.


If activity was not attempted, code reason:


7-Patient Refused.


9-Not Applicable-not attempted and the patient did not perform the activity 

before the current illness, exacerbation or injury.


10-Not Attempted due to Environmental Limitations-(lack of equipment, weather 

restraints, etc.).


88-Not Attempted due to Medical Conditions or Safety Concerns.


Eating (QC):  6


Upper Body Dressing (QC):  5


Lower Body Dressing (QC):  4 (Pt. able to don shorts with SBA from bed level.)


On/Off Footwear:  3 (Mod assist overall.  Pt. able to doff right sock sitting on

side of bed, but is unable to don right sock and shoe.)





Other Treatment


Pt. agrees to work with OT.  Pt. is able to don clothing at bed level.  

Transfers supine-sit with SBA.  Pt. is able to stand from elevated bed level 

with walker with min assist.  Pt. is able to pivot to wheelchair with min 

assist.  OT brings up wheelchair to back of legs for pt. to sit.  Pt. and OT 

problem solve how pt. is going to transfer at home.  Pt. verbalizes that at 

home, his shower chair will be relatively high, as well as his toilet that will 

be put in to his bathroom.  OT measures pt's shower chair at hospital, and it is

23 inches.  Pt's commode in room is 22 inches high, and his wheelchair is 19 

inches high.  Pt. practices standing in gym from wheelchair by pushing up on 

arms.  He was unable to do this.  Attempted transfers as well by pulling up on 

walker, and by pulling forward on grab bar.  He was unable to functionally do 

these transfers.  Pt. had 4 inches gel cushion in wheelchair but wanted it 

removed.  He felt that the front was squished and he was sitting on a wedge.  A 

one inch cushion, as well as one inch wooden platform were placed in wheelchair.

 He was able to stand better with mod assist using walker, but wheelchair was 

not functional due to being unable to reach wheels because of his height.  Pt. 

practiced transferring using slide board to/from mat in gym.  With shorts, pt. 

is able to slide easily and transfers with min assist.  Lift chair placed in 

room, and pt. was able to stand from elevated surface on wheelchair to transfer 

with walker.  All needs met.





Education


OT Patient Education:  Correct positioning, Modified ADL techniques, Progress 

toward Goal/Update tx plan, Purpose of tx/functional activities, Reviewed 

precautions, Rehab process, Transfer techniques


Teaching Recipient:  Patient


Teaching Methods:  Demonstration, Discussion


Response to Teaching:  Verbalize Understanding, Return Demonstration





OT Short Term Goals


Short Term Goals


Time Frame:  May 5, 2021


Eatin


Oral hygiene:  6


Toileting hygiene:  4


Shower/bathe self:  4


Upper body dressin


Lower body dressing:  3


Putting on/taking off footwear:  3





OT Long Term Goals


Long Term Goals


Time Frame:  May 19, 2021


Eating (QC):  6


Oral Hygiene (QC):  6


Toileting Hygiene (QC):  6


Shower/Bathe Self (QC):  6


Upper Body Dressing (QC):  6


Lower Body Dressing (QC):  6


On/Off Footwear (QC):  6


Additional Goals:  1-Demonstrate ADL Tasks, 2-Verbalize Understanding, 3-

ImproveStrength/Aung


1=Demonstrate adherence to instructed precautions during ADL tasks.


2=Patient will verbalize/demonstrate understanding of assistive 

devices/modifications for ADL.


3=Patient will improve strength/tolerance for activity to enable patient to 

perform ADL's.





OT Education/Plan


Problem List/Assessment


Assessment:  Decreased Activ Tolerance, Dependent Transfers, Impaired Funct 

Balance, Impaired I ADL's, Impaired Self-Care Skills





Discharge Recommendations


Plan/Recommendations:  Continue POC


Therapy Discharge Recommendati:  Post Acute OT





Treatment Plan/Plan of Care


Treatment,Training & Education:  Yes


Patient would benefit from OT for education, treatment and training to promote 

independence in ADL's, mobility, safety and/or upper extremity function for 

ADL's.


Plan of Care:  ADL Retraining, Functional Mobility, Group Exercise/Act as Ind, 

UE Funct Exercise/Act


Treatment Duration:  May 19, 2021


Frequency:  At least 5 of 7 days/Wk (IRF)


Estimated Hrs Per Day:  1.5 hours per day


Agreement:  Yes


Rehab Potential:  Good





Time/GCodes


Start Time:  08:15


Stop Time:  09:55


Total Time Billed (hr/min):  100


Billed Treatment Time


1, ADL x 40minutes, FA x 60minutes











JAROCHO OBRIEN OT            May 4, 2021 14:39

## 2021-05-04 NOTE — PHYSICAL THERAPY DAILY NOTE
PT Daily Note-Current


Subjective


Patient upright in chair pre tx, consents to therapy.





Appearance


Patient supine in bed post tx, call button and tray table within reach, all 

needs met.





Mental Status


Patient Orientation:  Person, Place, Time, Normal For Age





Transfers


SCALE: Activities may be completed with or without assistive devices.





6-Indepedent-patient completes the activity by him/herself with no assistance 

from a helper.


5-Set-up or Clean-up Assistance-helper sets up or cleans up; patient completes 

activity. [place] assists only prior to or  


    following the activity.


4-Supervision or Touching Assistance-helper provides verbal cues and/or 

touching/steadying and/or contact guard assistance as patient completes 

activity. Assistance may be provided   


    throughout the activity or intermittently.


3-Partial/Moderate Assistance-helper does LESS THAN HALF the effort. [place] 

lifts, holds or supports trunk or limbs, but provides less than half the effort.


2-Substantial/Maximal Assistance-helper does MORE THAN HALF the effort. [place] 

lifts or holds trunk or limbs and provides more than half the effort.


5-Hfvqvkfxf-vidtii does ALL the effort. Patient does none of the effort to 

complete the activity. Or, the assistance of 2 or more helpers is required for 

the patient to complete the  


    activity.


If activity was not attempted, code reason:


7-Patient Refused.


9-Not Applicable-not attempted and the patient did not perform the activity 

before the current illness, exacerbation or injury.


10-Not Attempted due to Environmental Limitations-(lack of equipment, weather 

restraints, etc.).


88-Not Attempted due to Medical Conditions or Safety Concerns.


Chair/Bed-to-Chair Xfer(QC):  3


Min A with sliding board transfer, from reclining chair to bed.





Weight Bearing


Right Lower Extremity:  Right


Full Weight Bearing


Left Lower Extremity:  Left


Non Weight Bearing (BKA)





Exercises


Supine Ex:  Ankle pumps, Quad Set, Glut sets, Heel Slides, Straight leg raise, 

Hip abd/add


Supine Reps:  20


Sidelying on R, L LE abductions, 20 reps





Treatments


LE strengthening, transfer





Assessment


Current Status:  Fair Progress


Patient demonstrates good ROM with bilateral staight legs and hip abductions, 

able to fully extend L knee





PT Short Term Goals


Short Term Goals


Time Frame:  2021


Roll Left & Right:  6


Sit to lyin


Lying to sitting on side of be:  6


Sit to stand:  3


Chair/bed-to-chair transfer:  3


Wheel 50ft w/2 turns:  6


Wheel 150 feet:  6





PT Long Term Goals


Long Term Goals


PT Long Term Goals Time Frame:  May 19, 2021


Roll Left & Right (QC):  6


Sit to Lying (QC):  6


Lying-Sitting on Side/Bed(QC):  6


Sit to Stand (QC):  6


Chair/Bed-to-Chair Xfer(QC):  6 (SPT or slideboard, whichever is the safest 

method)


Toilet Transfer (QC):  6 (SPT or slide board)


Car Transfer (QC):  4


Does the Patient Walk:  No and Walking Goal NOT indicated


Walk 10 feet (QC):  88


Walk 50ft with 2 Turns (QC):  88


Walk 150 ft (QC):  88


Walking 10ft on Uneven Surface:  88


1 Step (curb) (QC):  88


4 Steps (QC):  88


12 Steps (QC):  88


Picking up an Object (QC):  88


Does the Pt use WC or Scooter?:  Yes


Wheel 50 feet with 2 turns (QC:  6


Type:  Manual


Wheel 150 feet:  6


Type:  Manual





PT Plan


Problem List


Problem List:  Activity Tolerance, Functional Strength, Safety, Balance, Gait, 

Transfer, Bed Mobility, ROM





Treatment/Plan


Treatment Plan:  Continue Plan of Care


Treatment Plan:  Bed Mobility, Education, Functional Activity Aung, Functional 

Strength, Group Therapy, Gait, Safety, Therapeutic Exercise, Transfers


Treatment Duration:  May 19, 2021


Frequency:  At least 5 of 7 days/Wk (IRF)


Estimated Hrs Per Day:  1.5 hours per day


Patient and/or Family Agrees t:  Yes





Safety Risks/Education


Patient Education:  Transfer Techniques, Correct Positioning, Safety Issues


Teaching Recipient:  Patient


Teaching Methods:  Demonstration, Discussion


Response to Teaching:  Verbalize Understanding, Return Demonstration





Time/GCodes


Time In:  1300


Time Out:  1320


Total Billed Treatment Time:  20


Total Billed Treatment


1 visit: 


EX: CAMILLE CORNEJO PT                 May 4, 2021 13:30

## 2021-05-04 NOTE — PM&R PROGRESS NOTE
Subjective


HPI/CC On Admission


Date Seen by Provider:  May 4, 2021


Time Seen by Provider:  08:20


Subjective/Events-last exam


5/4/21:


Pt doing pretty well


Had a large BM last night


Dr. Guzmán thinks that the wound looks really good


Stump  will be started today


Sugars are okay








5/3/21:


Pt doing much better


Hgb 9.5


Bowels moved yesterday


Sugar was 100 this morning


Drainage from incision continues so stump- is being held








5/2/21:


Patient doing well


No pain reported except during dressing changes


Long acting Oxycontin working well for the patient


Stump  today or tomorrow








5/1/21:


Patient doing well


Oxycontin 10mg PO BID


Change dressing as instructed


Incision looks good


Fentanyl infusion before dressing changes








4/30/21:


Patient doing well


Lost 11# since admit


BM yesterday


Sugars are good








4/29/21:


No major issues


Incision is bleeding in 1 specific area


Scheduled Oxycontin is helping and minimsl IR used


BM+








4/28/21:


No major issues


BM yesterday will need to closely monitor that due to long acting narcs


Dr Jiménez will see him today regarding his staples


Talked about weight loss with Dr Jiménez








4/27/21:


Pt doing really well


No issues 


Stump  is being tolerated for 23 hours of the day 


No pain 


Sugar is okay and I decreased insulin even further today 








4/26/21:


Pt doing a lot better today


Pain is well controlled


Bowels moved yesterday


Stump  will be placed today on his left amputation site


Slept very well








4/25/21:


Doing well


No pain when he doesn't move


Pain controlled better with Oxycontin


BM+


Mother and son visiting him today


Sugars reviewed








4/24/21:


Pain is better


No issues reported


Hgb 7.9


BM++


Using IS








4/23/21:


Doing well


Stump  will start Monday per Dr Jiménez


BM+


Oxycontin and Oxycodone added for better pain control


DC Lortab








4/22/21:


Pt doing pretty well


Had a large BM today


Hgb 7.7 after three units of blood on med surg


Rough on the transfers but getting better


Dressing change will be per Dr. Morejon orders


Sugar is 107 this morning





Review of Systems


General:  Fatigue, Malaise


Neurological:  Weakness





Objective


Exam


Vital Signs





Vital Signs








  Date Time  Temp Pulse Resp B/P (MAP) Pulse Ox O2 Delivery O2 Flow Rate FiO2


 


5/4/21 21:36      Room Air  


 


5/4/21 20:00 36.2 71 16 137/73 (94) 95   





Capillary Refill :


General Appearance:  No Apparent Distress, WD/WN, Chronically ill, Obese


HEENT:  PERRL/EOMI, Normal ENT Inspection, Pharynx Normal


Neck:  Full Range of Motion, Normal Inspection, Non Tender, Supple, Carotid 

Bruit


Respiratory:  Chest Non Tender, Lungs Clear, Normal Breath Sounds, No Accessory 

Muscle Use, No Respiratory Distress


Cardiovascular:  Regular Rate, Rhythm, No Edema, No Gallop, No JVD, No Murmur, 

Normal Peripheral Pulses


Gastrointestinal:  Normal Bowel Sounds, No Organomegaly, No Pulsatile Mass, Non 

Tender, Soft


Back:  Normal Inspection, No CVA Tenderness, No Vertebral Tenderness


Extremity:  Normal Capillary Refill, Normal Inspection, Normal Range of Motion, 

Non Tender, No Calf Tenderness, No Pedal Edema


Neurologic/Psychiatric:  Alert, Oriented x3, No Motor/Sensory Deficits, Normal 

Mood/Affect, CNs II-XII Norm as Tested, Abnormal Gait, Motor Weakness (left leg 

amputation )


Skin:  Normal Color, Warm/Dry


Lymphatic:  No Adenopathy





Results/Procedures


Lab


Patient resulted labs reviewed.





FIM


Transfers


Therapy Code Descriptions/Definitions 





Functional [place] Measure:


0=Not Assessed/NA        4=Minimal Assistance


1=Total Assistance        5=Supervision or Setup


2=Maximal Assistance  6=Modified [place]


3=Moderate Assistance 7=Complete IndependenceSCALE: Activities may be completed 

with or without assistive devices.





6-Indepedent-patient completes the activity by him/herself with no assistance 

from a helper.


5-Set-up or Clean-up Assistance-helper sets up or cleans up; patient completes 

activity. [place] assists only prior to or  


    following the activity.


4-Supervision or Touching Assistance-helper provides verbal cues and/or 

touching/steadying and/or contact guard assistance as patient completes 

activity. Assistance may be provided   


    throughout the activity or intermittently.


3-Partial/Moderate Assistance-helper does LESS THAN HALF the effort. [place] 

lifts, holds or supports trunk or limbs, but provides less than half the effort.


2-Substantial/Maximal Assistance-helper does MORE THAN HALF the effort. [place] 

lifts or holds trunk or limbs and provides more than half the effort.


6-Isyuayuno-ynxvpd does ALL the effort. Patient does none of the effort to 

complete the activity. Or, the assistance of 2 or more helpers is required for 

the patient to complete the  


    activity.


If activity was not attempted, code reason:


7-Patient Refused.


9-Not Applicable-not attempted and the patient did not perform the activity 

before the current illness, exacerbation or injury.


10-Not Attempted due to Environmental Limitations-(lack of equipment, weather 

restraints, etc.).


88-Not Attempted due to Medical Conditions or Safety Concerns.


Roll Left to Right (QC):  6


Sit to Lying (QC):  6


Sit to Stand (QC):  4 (CGA)


Chair/Bed-to-Chair Xfer(QC):  3 (Min A with sliding board from w/c to bed, 

patient utilized step stool to elevate leg to assist with transfer.)


Car Transfer (QC):  88





Gait Training


Does the Patient Walk?:  No and Walking Goal IS indicated


Distance:  5' x 2


Walk 10 feet (QC):  88


Walk 50 ft with 2 Turns(QC):  88


Walk 150 ft (QC):  88


Walking 10ft/uneven surface-QC:  88


Gait Assistive Device:  FWW





Wheelchair Training


Does the Pt Use a Wheelchair?:  Yes


Wheel 50 ft with 2 turns (QC):  6


Wheel 150 ft (QC):  6


Type of Wheelchair:  Manual





Stair Training


1 Step (curb) (QC):  88


4 Steps (QC):  88


12 Steps (QC):  88





Balance


Picking up an Object (QC):  88





ADL-Treatment


Eating (QC):  6


Oral Hygiene (QC):  6


Shower/Bathe Self (QC):  5


Upper Body Dressing (QC):  5


Lower Body Dressing (QC):  5


On/Off Footwear (QC):  2


Toileting Hygiene (QC):  4 (Pt. able to cleanse rear primo area after 

transferring back to bed.  )


Toilet Transfer (QC):  2





Assessment/Plan


Assessment and Plan


Assess & Plan/Chief Complaint


Assessment:


s/p LBKA 


DM insulin dependence


HTN


HLP


CRI


Iron def anemia from acute blood loss


TERRI untreated





Plan:


Monitor pain


IRF protocol


Monitor hgb


Iron infusion





4/22/21:


Monitor sugar


Monitor hgb





4/23/21:


Add pain meds


Check labs in am





4/24/21:


Monitor sugar and decrease insulin


Hgb stable


Venofer





4/25/21:


Continue iron infusions


Sugar managed well


Pain controlled





4/26/21:


Stump 


Monitor sugar


Monitor hgb





4/27/21:


Monitor sugar


Decrease insulin


Monitor BP





4/28/21:


Monitor pain


Decreased insulin





4/29/21:


Monitor pain


BM regimen to prevent narcotic bowel





4/30/21:


Monitor closely


Monitor sugar





5/1/21:


Monitor closely


Dressing changes





5/2/21:


Monitor pain


Increase insulin





5/3/21:


Monitor closely


Monitor sugar levels and adjust insulin as needed





5/4/21:


Stump  maintained


Monitor closely





(1) Status post below-knee amputation of left lower extremity


Status:  Acute


(2) Hyperglycemia


Status:  Chronic


(3) Hypertension


Status:  Chronic


(4) Diabetes mellitus, type 2


Status:  Chronic


(5) Anemia


Status:  Acute


(6) Iron deficiency


Status:  Acute


(7) Peripheral neuropathy


Status:  Chronic











NICOLE VILA DO                 May 4, 2021 08:06

## 2021-05-04 NOTE — PHYSICAL THERAPY DAILY NOTE
PT Daily Note-Current


Subjective


Patient upright in chair pre tx, does not c/o pain, consents to therapy.





Appearance


Patient upright in chair with call button within reach and tray table nearby, 

all needs met.





Mental Status


Patient Orientation:  Person, Place, Time, Normal For Age





Transfers


SCALE: Activities may be completed with or without assistive devices.





6-Indepedent-patient completes the activity by him/herself with no assistance 

from a helper.


5-Set-up or Clean-up Assistance-helper sets up or cleans up; patient completes 

activity. [place] assists only prior to or  


    following the activity.


4-Supervision or Touching Assistance-helper provides verbal cues and/or 

touching/steadying and/or contact guard assistance as patient completes 

activity. Assistance may be provided   


    throughout the activity or intermittently.


3-Partial/Moderate Assistance-helper does LESS THAN HALF the effort. [place] 

lifts, holds or supports trunk or limbs, but provides less than half the effort.


2-Substantial/Maximal Assistance-helper does MORE THAN HALF the effort. [place] 

lifts or holds trunk or limbs and provides more than half the effort.


6-Mlkepzjll-xqnqmv does ALL the effort. Patient does none of the effort to 

complete the activity. Or, the assistance of 2 or more helpers is required for 

the patient to complete the  


    activity.


If activity was not attempted, code reason:


7-Patient Refused.


9-Not Applicable-not attempted and the patient did not perform the activity 

before the current illness, exacerbation or injury.


10-Not Attempted due to Environmental Limitations-(lack of equipment, weather 

restraints, etc.).


88-Not Attempted due to Medical Conditions or Safety Concerns.


Sit to Stand (QC):  4 (CGA with elevated surface)


Chair/Bed-to-Chair Xfer(QC):  3


Mod to Min assist with sliding board, Mod assist to move w/c with stand pivot 

transfer. Attempted several sit <-> stand from elevated lift reclining chair 

that patient was unable to fully complete. Patient able to complete 4 sit <-> 

stands with CGA from elevated mat in therapy gym.





Weight Bearing


Right Lower Extremity:  Right


Full Weight Bearing


Left Lower Extremity:  Left


Non Weight Bearing (BKA)





Wheelchair Training


Does the Pt Use a Wheelchair?:  Yes


Wheel 50 ft with 2 turns (QC):  6


Wheel 150 ft (QC):  6


Type of Wheelchair:  Manual


300'





Exercises


Seated Therapy Exercises:  Ankle pumps, Sit to stand (4), Long arc quads, Hip 

flexion, Glut set


Seated Reps:  20


2# weight on R LE with seated exercises





Treatments


LE strengthening, wheelchair, endurance, functional mobility





Assessment


Current Status:  Fair Progress


Patient continues to struggle with sit <-> stands from lower surfaces, is 

inconsistent with amount of assistance required on sliding board transfers.





PT Short Term Goals


Short Term Goals


Time Frame:  2021


Roll Left & Right:  6


Sit to lyin


Lying to sitting on side of be:  6


Sit to stand:  3


Chair/bed-to-chair transfer:  3


Wheel 50ft w/2 turns:  6


Wheel 150 feet:  6





PT Long Term Goals


Long Term Goals


PT Long Term Goals Time Frame:  May 19, 2021


Roll Left & Right (QC):  6


Sit to Lying (QC):  6


Lying-Sitting on Side/Bed(QC):  6


Sit to Stand (QC):  6


Chair/Bed-to-Chair Xfer(QC):  6 (SPT or slideboard, whichever is the safest 

method)


Toilet Transfer (QC):  6 (SPT or slide board)


Car Transfer (QC):  4


Does the Patient Walk:  No and Walking Goal NOT indicated


Walk 10 feet (QC):  88


Walk 50ft with 2 Turns (QC):  88


Walk 150 ft (QC):  88


Walking 10ft on Uneven Surface:  88


1 Step (curb) (QC):  88


4 Steps (QC):  88


12 Steps (QC):  88


Picking up an Object (QC):  88


Does the Pt use WC or Scooter?:  Yes


Wheel 50 feet with 2 turns (QC:  6


Type:  Manual


Wheel 150 feet:  6


Type:  Manual





PT Plan


Problem List


Problem List:  Activity Tolerance, Functional Strength, Safety, Balance, Gait, 

Transfer, Bed Mobility, ROM





Treatment/Plan


Treatment Plan:  Continue Plan of Care


Treatment Plan:  Bed Mobility, Education, Functional Activity Aung, Functional 

Strength, Group Therapy, Gait, Safety, Therapeutic Exercise, Transfers


Treatment Duration:  May 19, 2021


Frequency:  At least 5 of 7 days/Wk (IRF)


Estimated Hrs Per Day:  1.5 hours per day


Patient and/or Family Agrees t:  Yes





Safety Risks/Education


Patient Education:  Transfer Techniques, Correct Positioning, W/C Management, 

Safety Issues


Teaching Recipient:  Patient


Teaching Methods:  Demonstration, Discussion


Response to Teaching:  Verbalize Understanding, Return Demonstration





Time/GCodes


Time In:  1100


Time Out:  1200


Total Billed Treatment Time:  60


Total Billed Treatment


1 visit: 


FA x2: 30' 


EX: 15' 


WCH: 15'











CAMILLE HARRY PT                 May 4, 2021 12:01

## 2021-05-04 NOTE — OCCUPATIONAL THER DAILY NOTE
OT Current Status-Daily Note


Subjective


Pt. has pain medication right before stump  is applied.  Reports pain 

during this but does not report pain level.





Appearance


Pt. in bed.  Agrees to work with OT.





Mental Status/Objective


Patient Orientation:  Person, Place, Time, Situation





ADL-Treatment


Therapy Code Descriptions/Definitions 





Functional [place] Measure:


0=Not Assessed/NA        4=Minimal Assistance


1=Total Assistance        5=Supervision or Setup


2=Maximal Assistance  6=Modified [place]


3=Moderate Assistance 7=Complete IndependenceSCALE: Activities may be completed 

with or without assistive devices.





6-Indepedent-patient completes the activity by him/herself with no assistance 

from a helper.


5-Set-up or Clean-up Assistance-helper sets up or cleans up; patient completes 

activity. [place] assists only prior to or  


    following the activity.


4-Supervision or Touching Assistance-helper provides verbal cues and/or 

touching/steadying and/or contact guard assistance as patient completes 

activity. Assistance may be provided   


    throughout the activity or intermittently.


3-Partial/Moderate Assistance-helper does LESS THAN HALF the effort. [place] 

lifts, holds or supports trunk or limbs, but provides less than half the effort.


2-Substantial/Maximal Assistance-helper does MORE THAN HALF the effort. [place] 

lifts or holds trunk or limbs and provides more than half the effort.


7-Kbrsoxfqv-ewcxzn does ALL the effort. Patient does none of the effort to 

complete the activity. Or, the assistance of 2 or more helpers is required for 

the patient to complete the  


    activity.


If activity was not attempted, code reason:


7-Patient Refused.


9-Not Applicable-not attempted and the patient did not perform the activity 

before the current illness, exacerbation or injury.


10-Not Attempted due to Environmental Limitations-(lack of equipment, weather 

restraints, etc.).


88-Not Attempted due to Medical Conditions or Safety Concerns.


Eating (QC):  6


Upper Body Dressing (QC):  5


Lower Body Dressing (QC):  4 (SBA in supine position to don shorts.)


On/Off Footwear:  3 (Mod assist overall.  Pt. able to doff right sock by sitting

on side of bed, but unable to don sock or shoe.)





Other Treatment


OT assists nursing with donning left stump  at bed level.  Pt. is then 

able to don clothing at bed level. Transfers supine-sit with SBA.  Pt. is able 

to stand from bed with bed elevated.  He uses walker and requires min assist.  

Pt. is able to pivot to wheelchair, but chair has to be brought up behind him.  

Once in chair, pt. is able to self propel to therapy gym.  Pt. and OT problem 

solve difference methods to increase overall independence with all transfers.  

Pt. has great difficulty from lower surface with standing.  Depending on the 

situation, such as right after a shower or toileting, slide board transfers can 

be difficult as well.  OT measures height of shower chair and BSC in room.  BSC 

is 22 inches and shower chair is 23.  Wheelchair that is his is 19 inches.  Pt. 

reports that he has a lift chair at home now, and that he is having a shower 

chair built that will be rather tall.  He also states that they are putting in 

tall toilets.  Pt. and OT attempted multiple stands from wheelchair.  Pt. unable

to stand at walker by pushing up from arms, or by pulling on walker.  He is 

unable to stand by pulling from front on parallel bar.  A one inch cushion, as 

well as one inch platform board.  Pt. was able to increase height in wh





OT Short Term Goals


Short Term Goals


Time Frame:  May 5, 2021


Eatin


Oral hygiene:  6


Toileting hygiene:  4


Shower/bathe self:  4


Upper body dressin


Lower body dressing:  3


Putting on/taking off footwear:  3





OT Long Term Goals


Long Term Goals


Time Frame:  May 19, 2021


Eating (QC):  6


Oral Hygiene (QC):  6


Toileting Hygiene (QC):  6


Shower/Bathe Self (QC):  6


Upper Body Dressing (QC):  6


Lower Body Dressing (QC):  6


On/Off Footwear (QC):  6


Additional Goals:  1-Demonstrate ADL Tasks, 2-Verbalize Understanding, 3-

ImproveStrength/Aung


1=Demonstrate adherence to instructed precautions during ADL tasks.


2=Patient will verbalize/demonstrate understanding of assistive devices/modific

ations for ADL.


3=Patient will improve strength/tolerance for activity to enable patient to 

perform ADL's.





OT Education/Plan


Treatment Plan/Plan of Care


Patient would benefit from OT for education, treatment and training to promote 

independence in ADL's, mobility, safety and/or upper extremity function for 

ADL's.


Plan of Care:  ADL Retraining, Functional Mobility, Group Exercise/Act as Ind, 

UE Funct Exercise/Act


Treatment Duration:  May 19, 2021


Frequency:  At least 5 of 7 days/Wk (IRF)


Estimated Hrs Per Day:  1.5 hours per day


Agreement:  Yes


Rehab Potential:  Good











NACCARATO,JAROCHO OT            May 4, 2021 14:15

## 2021-05-04 NOTE — PHYSICAL THERAPY DAILY NOTE
PT Daily Note-Current


Subjective


Patient upright in chair pre tx, consents to PT.





Transfers


SCALE: Activities may be completed with or without assistive devices.





6-Indepedent-patient completes the activity by him/herself with no assistance 

from a helper.


5-Set-up or Clean-up Assistance-helper sets up or cleans up; patient completes 

activity. [place] assists only prior to or  


    following the activity.


4-Supervision or Touching Assistance-helper provides verbal cues and/or 

touching/steadying and/or contact guard assistance as patient completes 

activity. Assistance may be provided   


    throughout the activity or intermittently.


3-Partial/Moderate Assistance-helper does LESS THAN HALF the effort. [place] 

lifts, holds or supports trunk or limbs, but provides less than half the effort.


2-Substantial/Maximal Assistance-helper does MORE THAN HALF the effort. [place] 

lifts or holds trunk or limbs and provides more than half the effort.


4-Zptnzaccp-zwohyc does ALL the effort. Patient does none of the effort to 

complete the activity. Or, the assistance of 2 or more helpers is required for 

the patient to complete the  


    activity.


If activity was not attempted, code reason:


7-Patient Refused.


9-Not Applicable-not attempted and the patient did not perform the activity 

before the current illness, exacerbation or injury.


10-Not Attempted due to Environmental Limitations-(lack of equipment, weather 

restraints, etc.).


88-Not Attempted due to Medical Conditions or Safety Concerns.





Weight Bearing


Right Lower Extremity:  Right


Full Weight Bearing


Left Lower Extremity:  Left


Non Weight Bearing (BKA)





Assessment


Current Status:  Fair Progress





PT Short Term Goals


Short Term Goals


Time Frame:  2021


Roll Left & Right:  6


Sit to lyin


Lying to sitting on side of be:  6


Sit to stand:  3


Chair/bed-to-chair transfer:  3


Wheel 50ft w/2 turns:  6


Wheel 150 feet:  6





PT Long Term Goals


Long Term Goals


PT Long Term Goals Time Frame:  May 19, 2021


Roll Left & Right (QC):  6


Sit to Lying (QC):  6


Lying-Sitting on Side/Bed(QC):  6


Sit to Stand (QC):  6


Chair/Bed-to-Chair Xfer(QC):  6 (SPT or slideboard, whichever is the safest 

method)


Toilet Transfer (QC):  6 (SPT or slide board)


Car Transfer (QC):  4


Does the Patient Walk:  No and Walking Goal NOT indicated


Walk 10 feet (QC):  88


Walk 50ft with 2 Turns (QC):  88


Walk 150 ft (QC):  88


Walking 10ft on Uneven Surface:  88


1 Step (curb) (QC):  88


4 Steps (QC):  88


12 Steps (QC):  88


Picking up an Object (QC):  88


Does the Pt use WC or Scooter?:  Yes


Wheel 50 feet with 2 turns (QC:  6


Type:  Manual


Wheel 150 feet:  6


Type:  Manual





PT Plan


Problem List


Problem List:  Activity Tolerance, Functional Strength, Safety, Balance, Gait, 

Transfer, Bed Mobility, ROM





Treatment/Plan


Treatment Plan:  Continue Plan of Care


Treatment Plan:  Bed Mobility, Education, Functional Activity Aung, Functional 

Strength, Group Therapy, Gait, Safety, Therapeutic Exercise, Transfers


Treatment Duration:  May 19, 2021


Frequency:  At least 5 of 7 days/Wk (IRF)


Estimated Hrs Per Day:  1.5 hours per day


Patient and/or Family Agrees t:  Yes





Safety Risks/Education


Patient Education:  Transfer Techniques, Correct Positioning, W/C Management, 

Safety Issues


Teaching Recipient:  Patient


Teaching Methods:  Demonstration, Discussion


Response to Teaching:  Verbalize Understanding, Return Demonstration





Time/GCodes


Time In:  1100


Time Out:  1200


Total Billed Treatment Time:  60


Total Billed Treatment


1 visit: 


FA: 


EX:











CAMILLE HARRY PT                 May 4, 2021 10:50

## 2021-05-05 VITALS — SYSTOLIC BLOOD PRESSURE: 117 MMHG | DIASTOLIC BLOOD PRESSURE: 67 MMHG

## 2021-05-05 VITALS — SYSTOLIC BLOOD PRESSURE: 135 MMHG | DIASTOLIC BLOOD PRESSURE: 68 MMHG

## 2021-05-05 RX ADMIN — OXYCODONE HYDROCHLORIDE SCH MG: 10 TABLET, FILM COATED, EXTENDED RELEASE ORAL at 08:11

## 2021-05-05 RX ADMIN — METFORMIN HYDROCHLORIDE SCH MG: 500 TABLET, EXTENDED RELEASE ORAL at 17:26

## 2021-05-05 RX ADMIN — INSULIN ASPART SCH UNIT: 100 INJECTION, SOLUTION INTRAVENOUS; SUBCUTANEOUS at 11:02

## 2021-05-05 RX ADMIN — OXYCODONE HYDROCHLORIDE SCH MG: 10 TABLET, FILM COATED, EXTENDED RELEASE ORAL at 21:30

## 2021-05-05 RX ADMIN — DOCUSATE SODIUM AND SENNOSIDES SCH EA: 8.6; 5 TABLET, FILM COATED ORAL at 09:37

## 2021-05-05 RX ADMIN — METOPROLOL SUCCINATE SCH MG: 100 TABLET, EXTENDED RELEASE ORAL at 08:10

## 2021-05-05 RX ADMIN — Medication SCH ML: at 21:30

## 2021-05-05 RX ADMIN — INSULIN ASPART SCH UNIT: 100 INJECTION, SOLUTION INTRAVENOUS; SUBCUTANEOUS at 16:48

## 2021-05-05 RX ADMIN — LISINOPRIL SCH MG: 20 TABLET ORAL at 08:10

## 2021-05-05 RX ADMIN — DOCUSATE SODIUM SCH MG: 100 CAPSULE ORAL at 09:37

## 2021-05-05 RX ADMIN — PANTOPRAZOLE SODIUM SCH MG: 40 TABLET, DELAYED RELEASE ORAL at 08:10

## 2021-05-05 RX ADMIN — PRASUGREL SCH MG: 10 TABLET, FILM COATED ORAL at 08:10

## 2021-05-05 RX ADMIN — Medication SCH ML: at 06:05

## 2021-05-05 RX ADMIN — INSULIN ASPART SCH UNIT: 100 INJECTION, SOLUTION INTRAVENOUS; SUBCUTANEOUS at 06:03

## 2021-05-05 RX ADMIN — ASPIRIN SCH MG: 81 TABLET ORAL at 08:10

## 2021-05-05 RX ADMIN — FENTANYL CITRATE PRN MCG: 50 INJECTION, SOLUTION INTRAMUSCULAR; INTRAVENOUS at 18:16

## 2021-05-05 RX ADMIN — POLYETHYLENE GLYCOL (3350) SCH GM: 17 POWDER, FOR SOLUTION ORAL at 21:30

## 2021-05-05 RX ADMIN — POLYETHYLENE GLYCOL (3350) SCH GM: 17 POWDER, FOR SOLUTION ORAL at 09:37

## 2021-05-05 RX ADMIN — GLYBURIDE SCH MG: 2.5 TABLET ORAL at 17:26

## 2021-05-05 RX ADMIN — METFORMIN HYDROCHLORIDE SCH MG: 500 TABLET, EXTENDED RELEASE ORAL at 08:10

## 2021-05-05 RX ADMIN — Medication SCH ML: at 17:25

## 2021-05-05 RX ADMIN — INSULIN ASPART SCH UNIT: 100 INJECTION, SOLUTION INTRAVENOUS; SUBCUTANEOUS at 21:00

## 2021-05-05 RX ADMIN — DOCUSATE SODIUM AND SENNOSIDES SCH EA: 8.6; 5 TABLET, FILM COATED ORAL at 21:30

## 2021-05-05 RX ADMIN — GLYBURIDE SCH MG: 2.5 TABLET ORAL at 06:05

## 2021-05-05 RX ADMIN — MICONAZOLE NITRATE SCH APPLIC: 20 POWDER TOPICAL at 08:11

## 2021-05-05 RX ADMIN — DOCUSATE SODIUM SCH MG: 100 CAPSULE ORAL at 21:30

## 2021-05-05 RX ADMIN — MICONAZOLE NITRATE SCH APPLIC: 20 POWDER TOPICAL at 21:31

## 2021-05-05 NOTE — OCCUPATIONAL THER DAILY NOTE
OT Current Status-Daily Note


Subjective


No pain reported.





Mental Status/Objective


Patient Orientation:  Person, Place, Time, Situation





ADL-Treatment


Therapy Code Descriptions/Definitions 





Functional [place] Measure:


0=Not Assessed/NA        4=Minimal Assistance


1=Total Assistance        5=Supervision or Setup


2=Maximal Assistance  6=Modified [place]


3=Moderate Assistance 7=Complete IndependenceSCALE: Activities may be completed 

with or without assistive devices.





6-Indepedent-patient completes the activity by him/herself with no assistance 

from a helper.


5-Set-up or Clean-up Assistance-helper sets up or cleans up; patient completes 

activity. [place] assists only prior to or  


    following the activity.


4-Supervision or Touching Assistance-helper provides verbal cues and/or 

touching/steadying and/or contact guard assistance as patient completes 

activity. Assistance may be provided   


    throughout the activity or intermittently.


3-Partial/Moderate Assistance-helper does LESS THAN HALF the effort. [place] 

lifts, holds or supports trunk or limbs, but provides less than half the effort.


2-Substantial/Maximal Assistance-helper does MORE THAN HALF the effort. [place] 

lifts or holds trunk or limbs and provides more than half the effort.


5-Rqcpkjzpr-zhrips does ALL the effort. Patient does none of the effort to 

complete the activity. Or, the assistance of 2 or more helpers is required for 

the patient to complete the  


    activity.


If activity was not attempted, code reason:


7-Patient Refused.


9-Not Applicable-not attempted and the patient did not perform the activity 

before the current illness, exacerbation or injury.


10-Not Attempted due to Environmental Limitations-(lack of equipment, weather 

restraints, etc.).


88-Not Attempted due to Medical Conditions or Safety Concerns.


On/Off Footwear:  4





Other Treatment


Pt. is up in lift chair.  Pt. has already dressed, as he has already been up 

with PT.  Pt. puts chair to highest level, but is unable to stand from chair at 

walker.  Pt. utilizes slide board, and slides from lift chair to wheelchair.  OT

close and provides min assist, as pt. has to slide around arm of lift chair.  OT

and pt. talk in depth regarding pt's home set up, and bathroom situation.  Pt. 

is currently having new bathroom remodeled.  Pt. is able to draw schematics of 

bathroom for OT.  He will have a zero entry shower, and ADA sink.  The main 

concern is that pt's toilet, while high, will be in between sink and walker.  

This will give pt. no room from wheelchair, to pull up along side, or even 

perpendicular to toilet, to use slide board.  Pt. will have to pull up directly 

in front of toilet, but will be unable to use slide board.  Therefore, he needs 

to be able to stand from wheelchair at walker.  This is not feasible at this 

time due to wheelchair not being high enough.  Pt. transferred back to bed with 

SBA, by placing slide board on his own.  He is able to remove his own shoe, and 

transfer sit-supine.  All needs met.  Will coordinate with social work and DME 

regarding wheelchair.





Education


OT Patient Education:  Correct positioning, Modified ADL techniques, Progress 

toward Goal/Update tx plan, Purpose of tx/functional activities, Reviewed 

precautions, Rehab process, Transfer techniques


Teaching Recipient:  Patient


Teaching Methods:  Demonstration, Discussion


Response to Teaching:  Verbalize Understanding, Return Demonstration





OT Short Term Goals


Short Term Goals


Time Frame:  May 5, 2021


Eatin


Oral hygiene:  6


Toileting hygiene:  4


Shower/bathe self:  4


Upper body dressin


Lower body dressing:  3


Putting on/taking off footwear:  3





OT Long Term Goals


Long Term Goals


Time Frame:  May 19, 2021


Eating (QC):  6


Oral Hygiene (QC):  6


Toileting Hygiene (QC):  6


Shower/Bathe Self (QC):  6


Upper Body Dressing (QC):  6


Lower Body Dressing (QC):  6


On/Off Footwear (QC):  6


Additional Goals:  1-Demonstrate ADL Tasks, 2-Verbalize Understanding, 3-

ImproveStrength/Aung


1=Demonstrate adherence to instructed precautions during ADL tasks.


2=Patient will verbalize/demonstrate understanding of assistive 

devices/modifications for ADL.


3=Patient will improve strength/tolerance for activity to enable patient to 

perform ADL's.





OT Education/Plan


Problem List/Assessment


Assessment:  Decreased Activ Tolerance, Dependent Transfers, Impaired Funct 

Balance, Impaired I ADL's, Impaired Self-Care Skills





Discharge Recommendations


Plan/Recommendations:  Continue POC





Treatment Plan/Plan of Care


Treatment,Training & Education:  Yes


Patient would benefit from OT for education, treatment and training to promote 

independence in ADL's, mobility, safety and/or upper extremity function for 

ADL's.


Plan of Care:  ADL Retraining, Functional Mobility, Group Exercise/Act as Ind, 

UE Funct Exercise/Act


Treatment Duration:  May 19, 2021


Frequency:  At least 5 of 7 days/Wk (IRF)


Estimated Hrs Per Day:  1.5 hours per day


Agreement:  Yes


Rehab Potential:  Good





Time/GCodes


Start Time:  10:30


Stop Time:  11:30


Total Time Billed (hr/min):  60


Billed Treatment Time


1, FA x 60minutes











JAROCHO OBRIEN OT            May 5, 2021 15:05

## 2021-05-05 NOTE — PHYSICAL THERAPY DAILY NOTE
PT Daily Note-Current


Subjective


Patient reports 7/10 pain pre tx, secondary to recent application of . 

Patient consents to therapy.





Appearance


Patient upright in recliner post tx, with call button within reach and tray 

table nearby, all needs met.





Mental Status


Patient Orientation:  Person, Place, Time, Normal For Age





Transfers


SCALE: Activities may be completed with or without assistive devices.





6-Indepedent-patient completes the activity by him/herself with no assistance 

from a helper.


5-Set-up or Clean-up Assistance-helper sets up or cleans up; patient completes 

activity. [place] assists only prior to or  


    following the activity.


4-Supervision or Touching Assistance-helper provides verbal cues and/or 

touching/steadying and/or contact guard assistance as patient completes 

activity. Assistance may be provided   


    throughout the activity or intermittently.


3-Partial/Moderate Assistance-helper does LESS THAN HALF the effort. [place] 

lifts, holds or supports trunk or limbs, but provides less than half the effort.


2-Substantial/Maximal Assistance-helper does MORE THAN HALF the effort. [place] 

lifts or holds trunk or limbs and provides more than half the effort.


0-Deohgnnth-qjyaag does ALL the effort. Patient does none of the effort to 

complete the activity. Or, the assistance of 2 or more helpers is required for 

the patient to complete the  


    activity.


If activity was not attempted, code reason:


7-Patient Refused.


9-Not Applicable-not attempted and the patient did not perform the activity 

before the current illness, exacerbation or injury.


10-Not Attempted due to Environmental Limitations-(lack of equipment, weather 

restraints, etc.).


88-Not Attempted due to Medical Conditions or Safety Concerns.


Roll Left & Right (QC):  6


Sit to Lying (QC):  6


Lying to Sitting/Side of Bed(Q:  6


Sit to Stand (QC):  4 (SBA when patient stands from elevated surface (high/low 

mat), patient cannot complete sit <-> stand from w/c height)


Chair/Bed-to-Chair Xfer(QC):  3 (Min A with sliding board transfer, patient 

requires help with consistent and safe board placement. Patient is able to stand

pivot (from elevated surface) to w/c with mod A (requires PT to move w/c closer 

to body before descending))


Patient demonstrated more control and stability with stand pivot transfer and 

"hopping" on unaffected leg. Patient needs further practice with self applying 

sliding board for chair to bed transfers, safely.





Weight Bearing


Right Lower Extremity:  Right


Full Weight Bearing


Left Lower Extremity:  Left


Non Weight Bearing (BKA)





Wheelchair Training


Does the Pt Use a Wheelchair?:  Yes


Wheel 50 ft with 2 turns (QC):  6


60' x2





Exercises


Supine Ex:  Quad Set, Straight leg raise


Supine Reps:  20


Standing:  3 way Ex=Flex, Abd, Ext, Sit to Stand (4 repetitions throughout 

session)


Standing Reps:  10


Patient demonstrating increased grimacing/wincing with standing activities, 

secondary to R knee pain. Patient also completed knee extension stretch with 6# 

weights for 3 min in supine.





Treatments


LE strengthening, ROM, transfers, functional mobility





Assessment


Current Status:  Fair Progress


Patient has made progress in stand pivot transfers





PT Short Term Goals


Short Term Goals


Time Frame:  2021


Roll Left & Right:  6


Sit to lyin


Lying to sitting on side of be:  6


Sit to stand:  3


Chair/bed-to-chair transfer:  3


Wheel 50ft w/2 turns:  6


Wheel 150 feet:  6





PT Long Term Goals


Long Term Goals


PT Long Term Goals Time Frame:  May 19, 2021


Roll Left & Right (QC):  6


Sit to Lying (QC):  6


Lying-Sitting on Side/Bed(QC):  6


Sit to Stand (QC):  6


Chair/Bed-to-Chair Xfer(QC):  6 (SPT or slideboard, whichever is the safest 

method)


Toilet Transfer (QC):  6 (SPT or slide board)


Car Transfer (QC):  4


Does the Patient Walk:  No and Walking Goal NOT indicated


Walk 10 feet (QC):  88


Walk 50ft with 2 Turns (QC):  88


Walk 150 ft (QC):  88


Walking 10ft on Uneven Surface:  88


1 Step (curb) (QC):  88


4 Steps (QC):  88


12 Steps (QC):  88


Picking up an Object (QC):  88


Does the Pt use WC or Scooter?:  Yes


Wheel 50 feet with 2 turns (QC:  6


Type:  Manual


Wheel 150 feet:  6


Type:  Manual





PT Plan


Problem List


Problem List:  Activity Tolerance, Functional Strength, Safety, Balance, Gait, 

Transfer, Bed Mobility, ROM





Treatment/Plan


Treatment Plan:  Continue Plan of Care


Treatment Plan:  Bed Mobility, Education, Functional Activity Aung, Functional 

Strength, Group Therapy, Gait, Safety, Therapeutic Exercise, Transfers


Treatment Duration:  May 19, 2021


Frequency:  At least 5 of 7 days/Wk (IRF)


Estimated Hrs Per Day:  1.5 hours per day


Patient and/or Family Agrees t:  Yes





Safety Risks/Education


Patient Education:  Transfer Techniques, Correct Positioning, Safety Issues


Teaching Recipient:  Patient


Teaching Methods:  Demonstration, Discussion


Response to Teaching:  Verbalize Understanding, Return Demonstration





Time/GCodes


Time In:  0800


Time Out:  0900


Total Billed Treatment Time:  60


Total Billed Treatment


1 visit: 


FA x3: 40' 


EX: 20'











CAMILLE HARRY PT                 May 5, 2021 09:01

## 2021-05-05 NOTE — OCCUPATIONAL THER DAILY NOTE
OT Current Status-Daily Note


Subjective


No pain reported.





Mental Status/Objective


Patient Orientation:  Person, Place, Time, Situation





ADL-Treatment


Therapy Code Descriptions/Definitions 





Functional [place] Measure:


0=Not Assessed/NA        4=Minimal Assistance


1=Total Assistance        5=Supervision or Setup


2=Maximal Assistance  6=Modified [place]


3=Moderate Assistance 7=Complete IndependenceSCALE: Activities may be completed 

with or without assistive devices.





6-Indepedent-patient completes the activity by him/herself with no assistance 

from a helper.


5-Set-up or Clean-up Assistance-helper sets up or cleans up; patient completes 

activity. [place] assists only prior to or  


    following the activity.


4-Supervision or Touching Assistance-helper provides verbal cues and/or 

touching/steadying and/or contact guard assistance as patient completes 

activity. Assistance may be provided   


    throughout the activity or intermittently.


3-Partial/Moderate Assistance-helper does LESS THAN HALF the effort. [place] 

lifts, holds or supports trunk or limbs, but provides less than half the effort.


2-Substantial/Maximal Assistance-helper does MORE THAN HALF the effort. [place] 

lifts or holds trunk or limbs and provides more than half the effort.


7-Xwacjeuwl-zcfall does ALL the effort. Patient does none of the effort to 

complete the activity. Or, the assistance of 2 or more helpers is required for 

the patient to complete the  


    activity.


If activity was not attempted, code reason:


7-Patient Refused.


9-Not Applicable-not attempted and the patient did not perform the activity 

before the current illness, exacerbation or injury.


10-Not Attempted due to Environmental Limitations-(lack of equipment, weather 

restraints, etc.).


88-Not Attempted due to Medical Conditions or Safety Concerns.





Other Treatment


Pt. in room in bed.  Alert.  OT called DME director while pt. present.  With 

pt's permission, spoke with DME director regarding pt's wheelchair, and needs 

for higher wheelchair.  Director walked OT through assessing pt's current 

wheelchair.  Current wheelchair is already at max height.  In order to have 

higher wheelchair with specialized wheels, Director refers this OT to another 

facility, that specializes in higher end wheelchairs and seating.  OT called 

this facility, ([facility]), and left message for call back.  

Notified  and PT that this facility was called.  Pt's current 

wheelchair is 22" wide, 20" deep, and 19" from floor to seat.  His wheels are 

24" in diameter.  Pt. is notified of all information.  All needs met.





Education


OT Patient Education:  Correct positioning, Modified ADL techniques, Progress 

toward Goal/Update tx plan, Purpose of tx/functional activities, Reviewed 

precautions, Rehab process, Transfer techniques


Teaching Recipient:  Patient


Teaching Methods:  Demonstration, Discussion


Response to Teaching:  Verbalize Understanding, Return Demonstration





OT Short Term Goals


Short Term Goals


Time Frame:  May 5, 2021


Eatin


Oral hygiene:  6


Toileting hygiene:  4


Shower/bathe self:  4


Upper body dressin


Lower body dressing:  3


Putting on/taking off footwear:  3





OT Long Term Goals


Long Term Goals


Time Frame:  May 19, 2021


Eating (QC):  6


Oral Hygiene (QC):  6


Toileting Hygiene (QC):  6


Shower/Bathe Self (QC):  6


Upper Body Dressing (QC):  6


Lower Body Dressing (QC):  6


On/Off Footwear (QC):  6


Additional Goals:  1-Demonstrate ADL Tasks, 2-Verbalize Understanding, 3-

ImproveStrength/Aung


1=Demonstrate adherence to instructed precautions during ADL tasks.


2=Patient will verbalize/demonstrate understanding of assistive 

devices/modifications for ADL.


3=Patient will improve strength/tolerance for activity to enable patient to 

perform ADL's.





OT Education/Plan


Problem List/Assessment


Assessment:  Decreased Activ Tolerance, Impaired I ADL's, Impaired Self-Care 

Skills





Discharge Recommendations


Plan/Recommendations:  Continue POC


Therapy Discharge Recommendati:  Post Acute OT





Treatment Plan/Plan of Care


Treatment,Training & Education:  Yes


Patient would benefit from OT for education, treatment and training to promote 

independence in ADL's, mobility, safety and/or upper extremity function for 

ADL's.


Plan of Care:  ADL Retraining, Functional Mobility, Group Exercise/Act as Ind, 

UE Funct Exercise/Act


Treatment Duration:  May 19, 2021


Frequency:  At least 5 of 7 days/Wk (IRF)


Estimated Hrs Per Day:  1.5 hours per day


Agreement:  Yes


Rehab Potential:  Good





Time/GCodes


Start Time:  13:00


Stop Time:  13:30


Total Time Billed (hr/min):  30


Billed Treatment Time


1, FA x 2











JAROCHO OBRIEN OT            May 5, 2021 15:12

## 2021-05-05 NOTE — PM&R PROGRESS NOTE
Subjective


HPI/CC On Admission


Date Seen by Provider:  May 5, 2021


Time Seen by Provider:  08:30


Subjective/Events-last exam


5/5/21:


Pt doing pretty well


Stump  was uncomfortable so that was taken off last night but put on 

today and doing pretty well


Bowels moved yesterday


Discharge is planned for first of next week








5/4/21:


Pt doing pretty well


Had a large BM last night


Dr. Guzmán thinks that the wound looks really good


Stump  will be started today


Sugars are okay








5/3/21:


Pt doing much better


Hgb 9.5


Bowels moved yesterday


Sugar was 100 this morning


Drainage from incision continues so stump- is being held








5/2/21:


Patient doing well


No pain reported except during dressing changes


Long acting Oxycontin working well for the patient


Stump  today or tomorrow








5/1/21:


Patient doing well


Oxycontin 10mg PO BID


Change dressing as instructed


Incision looks good


Fentanyl infusion before dressing changes








4/30/21:


Patient doing well


Lost 11# since admit


BM yesterday


Sugars are good








4/29/21:


No major issues


Incision is bleeding in 1 specific area


Scheduled Oxycontin is helping and minimsl IR used


BM+








4/28/21:


No major issues


BM yesterday will need to closely monitor that due to long acting narcs


Dr Jiménez will see him today regarding his staples


Talked about weight loss with Dr Jiménez








4/27/21:


Pt doing really well


No issues 


Stump  is being tolerated for 23 hours of the day 


No pain 


Sugar is okay and I decreased insulin even further today 








4/26/21:


Pt doing a lot better today


Pain is well controlled


Bowels moved yesterday


Stump  will be placed today on his left amputation site


Slept very well








4/25/21:


Doing well


No pain when he doesn't move


Pain controlled better with Oxycontin


BM+


Mother and son visiting him today


Sugars reviewed








4/24/21:


Pain is better


No issues reported


Hgb 7.9


BM++


Using IS








4/23/21:


Doing well


Stump  will start Monday per Dr Jiménez


BM+


Oxycontin and Oxycodone added for better pain control


DC Lortab








4/22/21:


Pt doing pretty well


Had a large BM today


Hgb 7.7 after three units of blood on med surg


Rough on the transfers but getting better


Dressing change will be per Dr. Morejon orders


Sugar is 107 this morning





Review of Systems


Musculoskeletal:  leg pain





Objective


Exam


Vital Signs





Vital Signs








  Date Time  Temp Pulse Resp B/P (MAP) Pulse Ox O2 Delivery O2 Flow Rate FiO2


 


5/5/21 09:00      Room Air  


 


5/5/21 08:00 36.6 79 16 135/68 (90) 99   





Capillary Refill :


General Appearance:  No Apparent Distress, WD/WN, Chronically ill, Obese


HEENT:  PERRL/EOMI, Normal ENT Inspection, Pharynx Normal


Neck:  Full Range of Motion, Normal Inspection, Non Tender, Supple, Carotid 

Bruit


Respiratory:  Chest Non Tender, Lungs Clear, Normal Breath Sounds, No Accessory 

Muscle Use, No Respiratory Distress


Cardiovascular:  Regular Rate, Rhythm, No Edema, No Gallop, No JVD, No Murmur, 

Normal Peripheral Pulses


Gastrointestinal:  Normal Bowel Sounds, No Organomegaly, No Pulsatile Mass, Non 

Tender, Soft


Back:  Normal Inspection, No CVA Tenderness, No Vertebral Tenderness


Extremity:  Normal Capillary Refill, Normal Inspection, Normal Range of Motion, 

Non Tender, No Calf Tenderness, No Pedal Edema


Neurologic/Psychiatric:  Alert, Oriented x3, No Motor/Sensory Deficits, Normal 

Mood/Affect, CNs II-XII Norm as Tested, Abnormal Gait, Motor Weakness (left leg 

amputation )


Skin:  Normal Color, Warm/Dry


Lymphatic:  No Adenopathy





Results/Procedures


Lab


Patient resulted labs reviewed.





FIM


Transfers


Therapy Code Descriptions/Definitions 





Functional [place] Measure:


0=Not Assessed/NA        4=Minimal Assistance


1=Total Assistance        5=Supervision or Setup


2=Maximal Assistance  6=Modified [place]


3=Moderate Assistance 7=Complete IndependenceSCALE: Activities may be completed 

with or without assistive devices.





6-Indepedent-patient completes the activity by him/herself with no assistance 

from a helper.


5-Set-up or Clean-up Assistance-helper sets up or cleans up; patient completes 

activity. [place] assists only prior to or  


    following the activity.


4-Supervision or Touching Assistance-helper provides verbal cues and/or 

touching/steadying and/or contact guard assistance as patient completes 

activity. Assistance may be provided   


    throughout the activity or intermittently.


3-Partial/Moderate Assistance-helper does LESS THAN HALF the effort. [place] 

lifts, holds or supports trunk or limbs, but provides less than half the effort.


2-Substantial/Maximal Assistance-helper does MORE THAN HALF the effort. [place] 

lifts or holds trunk or limbs and provides more than half the effort.


2-Kklkyifir-vdxvzj does ALL the effort. Patient does none of the effort to 

complete the activity. Or, the assistance of 2 or more helpers is required for 

the patient to complete the  


    activity.


If activity was not attempted, code reason:


7-Patient Refused.


9-Not Applicable-not attempted and the patient did not perform the activity bef

ore the current illness, exacerbation or injury.


10-Not Attempted due to Environmental Limitations-(lack of equipment, weather r

estraints, etc.).


88-Not Attempted due to Medical Conditions or Safety Concerns.


Roll Left to Right (QC):  6


Sit to Lying (QC):  6


Sit to Stand (QC):  4 (CGA with elevated surface)


Chair/Bed-to-Chair Xfer(QC):  3


Car Transfer (QC):  88





Gait Training


Does the Patient Walk?:  No and Walking Goal IS indicated


Distance:  5' x 2


Walk 10 feet (QC):  88


Walk 50 ft with 2 Turns(QC):  88


Walk 150 ft (QC):  88


Walking 10ft/uneven surface-QC:  88


Gait Assistive Device:  FWW





Wheelchair Training


Does the Pt Use a Wheelchair?:  Yes


Wheel 50 ft with 2 turns (QC):  6


Wheel 150 ft (QC):  6


Type of Wheelchair:  Manual





Stair Training


1 Step (curb) (QC):  88


4 Steps (QC):  88


12 Steps (QC):  88





Balance


Picking up an Object (QC):  88





ADL-Treatment


Eating (QC):  6


Oral Hygiene (QC):  6


Shower/Bathe Self (QC):  5


Upper Body Dressing (QC):  5


Lower Body Dressing (QC):  4 (Pt. able to don shorts with SBA from bed level.)


On/Off Footwear (QC):  3 (Mod assist overall.  Pt. able to doff right sock 

sitting on side of bed, but is unable to don right sock and shoe.)


Toileting Hygiene (QC):  4 (Pt. able to cleanse rear primo area after 

transferring back to bed.  )


Toilet Transfer (QC):  2





Assessment/Plan


Assessment and Plan


Assess & Plan/Chief Complaint


Assessment:


s/p LBKA 


DM insulin dependence


HTN


HLP


CRI


Iron def anemia from acute blood loss


TERRI untreated





Plan:


Monitor pain


IRF protocol


Monitor hgb


Iron infusion





4/22/21:


Monitor sugar


Monitor hgb





4/23/21:


Add pain meds


Check labs in am





4/24/21:


Monitor sugar and decrease insulin


Hgb stable


Venofer





4/25/21:


Continue iron infusions


Sugar managed well


Pain controlled





4/26/21:


Stump 


Monitor sugar


Monitor hgb





4/27/21:


Monitor sugar


Decrease insulin


Monitor BP





4/28/21:


Monitor pain


Decreased insulin





4/29/21:


Monitor pain


BM regimen to prevent narcotic bowel





4/30/21:


Monitor closely


Monitor sugar





5/1/21:


Monitor closely


Dressing changes





5/2/21:


Monitor pain


Increase insulin





5/3/21:


Monitor closely


Monitor sugar levels and adjust insulin as needed





5/4/21:


Stump  maintained


Monitor closely





5/5/21:


Monitor pain


Stump 





(1) Status post below-knee amputation of left lower extremity


Status:  Acute


(2) Hyperglycemia


Status:  Chronic


(3) Hypertension


Status:  Chronic


(4) Diabetes mellitus, type 2


Status:  Chronic


(5) Anemia


Status:  Acute


(6) Iron deficiency


Status:  Acute


(7) Peripheral neuropathy


Status:  Chronic











NICOLE VILA DO                 May 5, 2021 06:09

## 2021-05-06 VITALS — SYSTOLIC BLOOD PRESSURE: 128 MMHG | DIASTOLIC BLOOD PRESSURE: 75 MMHG

## 2021-05-06 VITALS — DIASTOLIC BLOOD PRESSURE: 64 MMHG | SYSTOLIC BLOOD PRESSURE: 131 MMHG

## 2021-05-06 RX ADMIN — DOCUSATE SODIUM AND SENNOSIDES SCH EA: 8.6; 5 TABLET, FILM COATED ORAL at 08:24

## 2021-05-06 RX ADMIN — PRASUGREL SCH MG: 10 TABLET, FILM COATED ORAL at 08:23

## 2021-05-06 RX ADMIN — Medication SCH ML: at 20:06

## 2021-05-06 RX ADMIN — Medication SCH ML: at 06:37

## 2021-05-06 RX ADMIN — Medication SCH ML: at 12:31

## 2021-05-06 RX ADMIN — FENTANYL CITRATE PRN MCG: 50 INJECTION, SOLUTION INTRAMUSCULAR; INTRAVENOUS at 16:44

## 2021-05-06 RX ADMIN — LISINOPRIL SCH MG: 20 TABLET ORAL at 08:23

## 2021-05-06 RX ADMIN — METFORMIN HYDROCHLORIDE SCH MG: 500 TABLET, EXTENDED RELEASE ORAL at 08:22

## 2021-05-06 RX ADMIN — POLYETHYLENE GLYCOL (3350) SCH GM: 17 POWDER, FOR SOLUTION ORAL at 19:29

## 2021-05-06 RX ADMIN — INSULIN ASPART SCH UNIT: 100 INJECTION, SOLUTION INTRAVENOUS; SUBCUTANEOUS at 05:56

## 2021-05-06 RX ADMIN — GLYBURIDE SCH MG: 2.5 TABLET ORAL at 17:30

## 2021-05-06 RX ADMIN — METFORMIN HYDROCHLORIDE SCH MG: 500 TABLET, EXTENDED RELEASE ORAL at 17:30

## 2021-05-06 RX ADMIN — INSULIN ASPART SCH UNIT: 100 INJECTION, SOLUTION INTRAVENOUS; SUBCUTANEOUS at 20:11

## 2021-05-06 RX ADMIN — METOPROLOL SUCCINATE SCH MG: 100 TABLET, EXTENDED RELEASE ORAL at 08:23

## 2021-05-06 RX ADMIN — OXYCODONE HYDROCHLORIDE SCH MG: 10 TABLET, FILM COATED, EXTENDED RELEASE ORAL at 08:23

## 2021-05-06 RX ADMIN — MICONAZOLE NITRATE SCH APPLIC: 20 POWDER TOPICAL at 20:04

## 2021-05-06 RX ADMIN — DOCUSATE SODIUM SCH MG: 100 CAPSULE ORAL at 08:24

## 2021-05-06 RX ADMIN — INSULIN ASPART SCH UNIT: 100 INJECTION, SOLUTION INTRAVENOUS; SUBCUTANEOUS at 16:44

## 2021-05-06 RX ADMIN — INSULIN ASPART SCH UNIT: 100 INJECTION, SOLUTION INTRAVENOUS; SUBCUTANEOUS at 12:23

## 2021-05-06 RX ADMIN — PANTOPRAZOLE SODIUM SCH MG: 40 TABLET, DELAYED RELEASE ORAL at 08:23

## 2021-05-06 RX ADMIN — ASPIRIN SCH MG: 81 TABLET ORAL at 08:23

## 2021-05-06 RX ADMIN — GLYBURIDE SCH MG: 2.5 TABLET ORAL at 06:37

## 2021-05-06 RX ADMIN — DOCUSATE SODIUM AND SENNOSIDES SCH EA: 8.6; 5 TABLET, FILM COATED ORAL at 19:29

## 2021-05-06 RX ADMIN — MICONAZOLE NITRATE SCH APPLIC: 20 POWDER TOPICAL at 08:24

## 2021-05-06 RX ADMIN — OXYCODONE HYDROCHLORIDE SCH MG: 10 TABLET, FILM COATED, EXTENDED RELEASE ORAL at 20:03

## 2021-05-06 RX ADMIN — DOCUSATE SODIUM SCH MG: 100 CAPSULE ORAL at 19:29

## 2021-05-06 RX ADMIN — POLYETHYLENE GLYCOL (3350) SCH GM: 17 POWDER, FOR SOLUTION ORAL at 08:24

## 2021-05-06 NOTE — PHYSICAL THERAPY DAILY NOTE
PT Daily Note-Current


Subjective


Patient supine in bed pre tx, consents to therapy. Reports moderate, unrated 

pain, but notes he wants to hold off on pain medication until his next  

change.





Appearance


Patient upright in bed post tx, with call button within reach and tray table 

nearby, all needs met.





Mental Status


Patient Orientation:  Person, Place, Time, Normal For Age





Transfers


SCALE: Activities may be completed with or without assistive devices.





6-Indepedent-patient completes the activity by him/herself with no assistance 

from a helper.


5-Set-up or Clean-up Assistance-helper sets up or cleans up; patient completes 

activity. [place] assists only prior to or  


    following the activity.


4-Supervision or Touching Assistance-helper provides verbal cues and/or 

touching/steadying and/or contact guard assistance as patient completes 

activity. Assistance may be provided   


    throughout the activity or intermittently.


3-Partial/Moderate Assistance-helper does LESS THAN HALF the effort. [place] 

lifts, holds or supports trunk or limbs, but provides less than half the effort.


2-Substantial/Maximal Assistance-helper does MORE THAN HALF the effort. [place] 

lifts or holds trunk or limbs and provides more than half the effort.


7-Airbyypzv-dzyfte does ALL the effort. Patient does none of the effort to 

complete the activity. Or, the assistance of 2 or more helpers is required for 

the patient to complete the  


    activity.


If activity was not attempted, code reason:


7-Patient Refused.


9-Not Applicable-not attempted and the patient did not perform the activity 

before the current illness, exacerbation or injury.


10-Not Attempted due to Environmental Limitations-(lack of equipment, weather 

restraints, etc.).


88-Not Attempted due to Medical Conditions or Safety Concerns.


Roll Left & Right (QC):  6


Lying to Sitting/Side of Bed(Q:  6


Sit to Stand (QC):  4 (SBA with sit to stand from elevated bed with FWW)


Chair/Bed-to-Chair Xfer(QC):  3 (Mod A with stand pivot transfer from elevated 

bed to w/c, requi assistance moving w/c closer to patient before descent. Slide 

board transfer mod A with positioning of board and holding down w/c)


2 slide boards completed, 2 chair to bed transfers completed





Weight Bearing


Right Lower Extremity:  Right


Full Weight Bearing


Left Lower Extremity:  Left


Non Weight Bearing (BKA)





Wheelchair Training


Does the Pt Use a Wheelchair?:  Yes


Wheel 50 ft with 2 turns (QC):  6


Type of Wheelchair:  Manual


50' x2





Exercises


NuStep Minutes:  10


 NuStep Workload:  5





Treatments


LE strengthening, transfers, cardiovascular endurance





Assessment


Current Status:  Fair Progress


Patient 1st time on NuStep post amputation, became slightly SOB with machine, 

good effort given to build cardiovascular endurance





PT Short Term Goals


Short Term Goals


Time Frame:  2021


Roll Left & Right:  6


Sit to lyin


Lying to sitting on side of be:  6


Sit to stand:  3


Chair/bed-to-chair transfer:  3


Wheel 50ft w/2 turns:  6


Wheel 150 feet:  6





PT Long Term Goals


Long Term Goals


PT Long Term Goals Time Frame:  May 19, 2021


Roll Left & Right (QC):  6


Sit to Lying (QC):  6


Lying-Sitting on Side/Bed(QC):  6


Sit to Stand (QC):  6


Chair/Bed-to-Chair Xfer(QC):  6 (SPT or slideboard, whichever is the safest 

method)


Toilet Transfer (QC):  6 (SPT or slide board)


Car Transfer (QC):  4


Does the Patient Walk:  No and Walking Goal NOT indicated


Walk 10 feet (QC):  88


Walk 50ft with 2 Turns (QC):  88


Walk 150 ft (QC):  88


Walking 10ft on Uneven Surface:  88


1 Step (curb) (QC):  88


4 Steps (QC):  88


12 Steps (QC):  88


Picking up an Object (QC):  88


Does the Pt use WC or Scooter?:  Yes


Wheel 50 feet with 2 turns (QC:  6


Type:  Manual


Wheel 150 feet:  6


Type:  Manual





PT Plan


Problem List


Problem List:  Activity Tolerance, Functional Strength, Safety, Balance, Gait, 

Transfer, Bed Mobility, ROM





Treatment/Plan


Treatment Plan:  Continue Plan of Care


Treatment Plan:  Bed Mobility, Education, Functional Activity Aung, Functional 

Strength, Group Therapy, Gait, Safety, Therapeutic Exercise, Transfers


Treatment Duration:  May 19, 2021


Frequency:  At least 5 of 7 days/Wk (IRF)


Estimated Hrs Per Day:  1.5 hours per day


Patient and/or Family Agrees t:  Yes





Safety Risks/Education


Patient Education:  Transfer Techniques, Correct Positioning, W/C Management, 

Safety Issues


Teaching Recipient:  Patient


Teaching Methods:  Demonstration, Discussion


Response to Teaching:  Verbalize Understanding, Return Demonstration





Time/GCodes


Time In:  1300


Time Out:  1330


Total Billed Treatment Time:  30


Total Billed Treatment


1 visit: 


EX: 10' 


FA: 20'











CAMILLE HARRY PT                 May 6, 2021 13:18

## 2021-05-06 NOTE — PHYSICAL THERAPY DAILY NOTE
PT Daily Note-Current


Subjective


Patient supine pre tx, consents to therapy, rates pain 5/10 in incisional site.





Appearance


Patient seated EOB with OT post tx, all needs met.





Mental Status


Patient Orientation:  Person, Place, Time, Normal For Age





Transfers


SCALE: Activities may be completed with or without assistive devices.





6-Indepedent-patient completes the activity by him/herself with no assistance 

from a helper.


5-Set-up or Clean-up Assistance-helper sets up or cleans up; patient completes 

activity. [place] assists only prior to or  


    following the activity.


4-Supervision or Touching Assistance-helper provides verbal cues and/or nicholas

verna/steadying and/or contact guard assistance as patient completes activity. 

Assistance may be provided   


    throughout the activity or intermittently.


3-Partial/Moderate Assistance-helper does LESS THAN HALF the effort. [place] 

lifts, holds or supports trunk or limbs, but provides less than half the effort.


2-Substantial/Maximal Assistance-helper does MORE THAN HALF the effort. [place] 

lifts or holds trunk or limbs and provides more than half the effort.


1-Vyehcwkqs-npwscn does ALL the effort. Patient does none of the effort to 

complete the activity. Or, the assistance of 2 or more helpers is required for 

the patient to complete the  


    activity.


If activity was not attempted, code reason:


7-Patient Refused.


9-Not Applicable-not attempted and the patient did not perform the activity 

before the current illness, exacerbation or injury.


10-Not Attempted due to Environmental Limitations-(lack of equipment, weather 

restraints, etc.).


88-Not Attempted due to Medical Conditions or Safety Concerns.


Roll Left & Right (QC):  6


Lying to Sitting/Side of Bed(Q:  6


Sit to Stand (QC):  3 (From elevated bed in room, from w/c in parallel bars SBA)


Chair/Bed-to-Chair Xfer(QC):  3 (Mod A with sliding board transfer, Mod A with 

stand pivot transfer with moving w/c)


Patient demonstrated more independence with positioning sliding board, but 

transfer was not smooth and patient did not perform transfer comfortably. PT 

assisted with guarding knee to ensure patient did not slide off board. Bed to 

chair transfer in room with stand pivot has improved, but PT still has to move 

w/c forward to the patient once the patient pivots. Patient performed 6 sit to 

stands, 5 in parallel bars. Attempted sit to stand with lift, but was not 

successful.





Weight Bearing


Right Lower Extremity:  Right


Full Weight Bearing


Left Lower Extremity:  Left


Non Weight Bearing (BKA)





Neuromuscular


Worked with patient on balance in parallel bars by alternating UE support from 

parallel bars to walker, one at a time and bilaterally. Patient performed 5 reps

per each sit to stand.





Treatments


Co-treated with OT secondary to patient overall impairments, decreased 

functional mobility, and fall risk. OT/PT focused on ways to optimize functional

mobility with self-care and home activities, including using sit to stand lift 

and grab bars in shower to facilitate safer transfers.





Assessment


Current Status:  Fair Progress


Balance activities with UE transition from parallel bars to walker challenged 

patient balance, and simulates the transition of weight shift with UE movement 

from w/c to walker once patient is able to complete sit <-> stand transfer 

successfully.





PT Short Term Goals


Short Term Goals


Time Frame:  2021


Roll Left & Right:  6


Sit to lyin


Lying to sitting on side of be:  6


Sit to stand:  3


Chair/bed-to-chair transfer:  3


Wheel 50ft w/2 turns:  6


Wheel 150 feet:  6





PT Long Term Goals


Long Term Goals


PT Long Term Goals Time Frame:  May 19, 2021


Roll Left & Right (QC):  6


Sit to Lying (QC):  6


Lying-Sitting on Side/Bed(QC):  6


Sit to Stand (QC):  6


Chair/Bed-to-Chair Xfer(QC):  6 (SPT or slideboard, whichever is the safest 

method)


Toilet Transfer (QC):  6 (SPT or slide board)


Car Transfer (QC):  4


Does the Patient Walk:  No and Walking Goal NOT indicated


Walk 10 feet (QC):  88


Walk 50ft with 2 Turns (QC):  88


Walk 150 ft (QC):  88


Walking 10ft on Uneven Surface:  88


1 Step (curb) (QC):  88


4 Steps (QC):  88


12 Steps (QC):  88


Picking up an Object (QC):  88


Does the Pt use WC or Scooter?:  Yes


Wheel 50 feet with 2 turns (QC:  6


Type:  Manual


Wheel 150 feet:  6


Type:  Manual





PT Plan


Problem List


Problem List:  Activity Tolerance, Functional Strength, Safety, Balance, Gait, 

Transfer, Bed Mobility, ROM





Treatment/Plan


Treatment Plan:  Continue Plan of Care


Treatment Plan:  Bed Mobility, Education, Functional Activity Aung, Functional 

Strength, Group Therapy, Gait, Safety, Therapeutic Exercise, Transfers


Treatment Duration:  May 19, 2021


Frequency:  At least 5 of 7 days/Wk (IRF)


Estimated Hrs Per Day:  1.5 hours per day


Patient and/or Family Agrees t:  Yes





Safety Risks/Education


Patient Education:  Transfer Techniques, Correct Positioning, W/C Management, 

Safety Issues


Teaching Recipient:  Patient


Teaching Methods:  Demonstration, Discussion


Response to Teaching:  Verbalize Understanding, Return Demonstration





Time/GCodes


Time In:  900


Time Out:  


Total Billed Treatment Time:  60


Total Billed Treatment


1 visit: 


FA x4: 60' 





PT tx: 900 - 930, Co-treated with OT: 930 - 











CAMILLE HARRY PT                 May 6, 2021 10:00

## 2021-05-06 NOTE — PM&R PROGRESS NOTE
Subjective


HPI/CC On Admission


Date Seen by Provider:  May 6, 2021


Time Seen by Provider:  09:50


Subjective/Events-last exam


5/6/21:


Pt doing very well


Stump  is on


Blood sugars are good


Bowels moved yesterday


Staples will be removed per Dr. Jiménez's order








5/5/21:


Pt doing pretty well


Stump  was uncomfortable so that was taken off last night but put on 

today and doing pretty well


Bowels moved yesterday


Discharge is planned for first of next week








5/4/21:


Pt doing pretty well


Had a large BM last night


Dr. Guzmán thinks that the wound looks really good


Stump  will be started today


Sugars are okay








5/3/21:


Pt doing much better


Hgb 9.5


Bowels moved yesterday


Sugar was 100 this morning


Drainage from incision continues so stump- is being held








5/2/21:


Patient doing well


No pain reported except during dressing changes


Long acting Oxycontin working well for the patient


Stump  today or tomorrow








5/1/21:


Patient doing well


Oxycontin 10mg PO BID


Change dressing as instructed


Incision looks good


Fentanyl infusion before dressing changes








4/30/21:


Patient doing well


Lost 11# since admit


BM yesterday


Sugars are good








4/29/21:


No major issues


Incision is bleeding in 1 specific area


Scheduled Oxycontin is helping and minimsl IR used


BM+








4/28/21:


No major issues


BM yesterday will need to closely monitor that due to long acting narcs


Dr Jiménez will see him today regarding his staples


Talked about weight loss with Dr Jiménez








4/27/21:


Pt doing really well


No issues 


Stump  is being tolerated for 23 hours of the day 


No pain 


Sugar is okay and I decreased insulin even further today 








4/26/21:


Pt doing a lot better today


Pain is well controlled


Bowels moved yesterday


Stump  will be placed today on his left amputation site


Slept very well








4/25/21:


Doing well


No pain when he doesn't move


Pain controlled better with Oxycontin


BM+


Mother and son visiting him today


Sugars reviewed








4/24/21:


Pain is better


No issues reported


Hgb 7.9


BM++


Using IS








4/23/21:


Doing well


Stump  will start Monday per Dr Jiménez


BM+


Oxycontin and Oxycodone added for better pain control


DC Lortab








4/22/21:


Pt doing pretty well


Had a large BM today


Hgb 7.7 after three units of blood on med surg


Rough on the transfers but getting better


Dressing change will be per Dr. Morejon orders


Sugar is 107 this morning





Review of Systems


General:  Fatigue, Malaise


Neurological:  Weakness, Incoordination





Objective


Exam


Vital Signs





Vital Signs








  Date Time  Temp Pulse Resp B/P (MAP) Pulse Ox O2 Delivery O2 Flow Rate FiO2


 


5/6/21 20:00      Room Air  


 


5/6/21 19:27 36.0 69 18 128/75 (92) 99   





Capillary Refill :


General Appearance:  No Apparent Distress, WD/WN, Chronically ill, Obese


HEENT:  PERRL/EOMI, Normal ENT Inspection, Pharynx Normal


Neck:  Full Range of Motion, Normal Inspection, Non Tender, Supple, Carotid 

Bruit


Respiratory:  Chest Non Tender, Lungs Clear, Normal Breath Sounds, No Accessory 

Muscle Use, No Respiratory Distress


Cardiovascular:  Regular Rate, Rhythm, No Edema, No Gallop, No JVD, No Murmur, 

Normal Peripheral Pulses


Gastrointestinal:  Normal Bowel Sounds, No Organomegaly, No Pulsatile Mass, Non 

Tender, Soft


Back:  Normal Inspection, No CVA Tenderness, No Vertebral Tenderness


Extremity:  Normal Capillary Refill, Normal Inspection, Normal Range of Motion, 

Non Tender, No Calf Tenderness, No Pedal Edema


Neurologic/Psychiatric:  Alert, Oriented x3, No Motor/Sensory Deficits, Normal 

Mood/Affect, CNs II-XII Norm as Tested, Abnormal Gait, Motor Weakness (left leg 

amputation )


Skin:  Normal Color, Warm/Dry


Lymphatic:  No Adenopathy





Results/Procedures


Lab


Patient resulted labs reviewed.





FIM


Transfers


Therapy Code Descriptions/Definitions 





Functional [place] Measure:


0=Not Assessed/NA        4=Minimal Assistance


1=Total Assistance        5=Supervision or Setup


2=Maximal Assistance  6=Modified [place]


3=Moderate Assistance 7=Complete IndependenceSCALE: Activities may be completed 

with or without assistive devices.





6-Indepedent-patient completes the activity by him/herself with no assistance 

from a helper.


5-Set-up or Clean-up Assistance-helper sets up or cleans up; patient completes 

activity. [place] assists only prior to or  


    following the activity.


4-Supervision or Touching Assistance-helper provides verbal cues and/or 

touching/steadying and/or contact guard assistance as patient completes 

activity. Assistance may be provided   


    throughout the activity or intermittently.


3-Partial/Moderate Assistance-helper does LESS THAN HALF the effort. [place] 

lifts, holds or supports trunk or limbs, but provides less than half the effort.


2-Substantial/Maximal Assistance-helper does MORE THAN HALF the effort. [place] 

lifts or holds trunk or limbs and provides more than half the effort.


3-Izzyfqxih-vzsqho does ALL the effort. Patient does none of the effort to 

complete the activity. Or, the assistance of 2 or more helpers is required for 

the patient to complete the  


    activity.


If activity was not attempted, code reason:


7-Patient Refused.


9-Not Applicable-not attempted and the patient did not perform the activity 

before the current illness, exacerbation or injury.


10-Not Attempted due to Environmental Limitations-(lack of equipment, weather 

restraints, etc.).


88-Not Attempted due to Medical Conditions or Safety Concerns.


Roll Left to Right (QC):  6


Sit to Lying (QC):  6


Sit to Stand (QC):  4 (SBA when patient stands from elevated surface (high/low 

mat), patient cannot complete sit <-> stand from w/c height)


Chair/Bed-to-Chair Xfer(QC):  3 (Min A with sliding board transfer, patient 

requires help with consistent and safe board placement. Patient is able to stand

pivot (from elevated surface) to w/c with mod A (requires PT to move w/c closer 

to body before descending))


Car Transfer (QC):  88





Gait Training


Does the Patient Walk?:  No and Walking Goal IS indicated


Distance:  5' x 2


Walk 10 feet (QC):  88


Walk 50 ft with 2 Turns(QC):  88


Walk 150 ft (QC):  88


Walking 10ft/uneven surface-QC:  88


Gait Assistive Device:  FWW





Wheelchair Training


Does the Pt Use a Wheelchair?:  Yes


Wheel 50 ft with 2 turns (QC):  6


Wheel 150 ft (QC):  6


Type of Wheelchair:  Manual





Stair Training


1 Step (curb) (QC):  88


4 Steps (QC):  88


12 Steps (QC):  88





Balance


Picking up an Object (QC):  88





ADL-Treatment


Eating (QC):  6


Oral Hygiene (QC):  6


Shower/Bathe Self (QC):  5


Upper Body Dressing (QC):  5


Lower Body Dressing (QC):  4 (Pt. able to don shorts with SBA from bed level.)


On/Off Footwear (QC):  4


Toileting Hygiene (QC):  4 (Pt. able to cleanse rear primo area after 

transferring back to bed.  )


Toilet Transfer (QC):  2





Assessment/Plan


Assessment and Plan


Assess & Plan/Chief Complaint


Assessment:


s/p LBKA 


DM insulin dependence


HTN


HLP


CRI


Iron def anemia from acute blood loss


TERRI untreated





Plan:


Monitor pain


IRF protocol


Monitor hgb


Iron infusion





4/22/21:


Monitor sugar


Monitor hgb





4/23/21:


Add pain meds


Check labs in am





4/24/21:


Monitor sugar and decrease insulin


Hgb stable


Venofer





4/25/21:


Continue iron infusions


Sugar managed well


Pain controlled





4/26/21:


Stump 


Monitor sugar


Monitor hgb





4/27/21:


Monitor sugar


Decrease insulin


Monitor BP





4/28/21:


Monitor pain


Decreased insulin





4/29/21:


Monitor pain


BM regimen to prevent narcotic bowel





4/30/21:


Monitor closely


Monitor sugar





5/1/21:


Monitor closely


Dressing changes





5/2/21:


Monitor pain


Increase insulin





5/3/21:


Monitor closely


Monitor sugar levels and adjust insulin as needed





5/4/21:


Stump  maintained


Monitor closely





5/5/21:


Monitor pain


Stump 





5/6/21:


Monitor closely


Monitor staples





(1) Status post below-knee amputation of left lower extremity


Status:  Acute


(2) Hyperglycemia


Status:  Chronic


(3) Hypertension


Status:  Chronic


(4) Diabetes mellitus, type 2


Status:  Chronic


(5) Anemia


Status:  Acute


(6) Iron deficiency


Status:  Acute


(7) Peripheral neuropathy


Status:  Chronic











NICOLE VILA DO                 May 6, 2021 09:40

## 2021-05-06 NOTE — PROGRESS NOTE
Subjective


Date Seen by a Provider:  May 5, 2021


Time Seen by a Provider:  12:00


Subjective/Events-last exam


doing well. tolerating PT/OT. still does not have enough transfer strength right

LE.





Objective


Exam





Vital Signs








  Date Time  Temp Pulse Resp B/P (MAP) Pulse Ox O2 Delivery O2 Flow Rate FiO2


 


5/6/21 08:21 36.1 73 18 131/64 (86) 100 Room Air  


 


5/5/21 21:10     98 Room Air  


 


5/5/21 20:15 36.1 73 20 117/67 (84) 98 Room Air  





Capillary Refill :


General Appearance:  No Apparent Distress


HEENT:  PERRL/EOMI


Neck:  Full Range of Motion


Respiratory:  Chest Non Tender, Lungs Clear, Normal Breath Sounds


Cardiovascular:  Regular Rate, Rhythm


Gastrointestinal:  normal bowel sounds, non tender, soft


Extremity:  Normal Capillary Refill


Neurologic/Psychiatric:  Alert, Oriented x3


Skin:  Normal Color


Lymphatic:  No Adenopathy





Results


Lab


Laboratory Tests


5/5/21 10:54: Glucometer 170H


5/5/21 15:21: Glucometer 157H


5/5/21 21:10: Glucometer 147H


5/6/21 05:53: Glucometer 132H





Assessment/Plan


Assessment/Plan


Assess & Plan/Chief Complaint


s/p left BKA.


cont rehab.


add stump  monday.


long term will want pt to proceed with medically supervised weight loss and once

at a healthier weight(BMI around 50) , patient states very much interested and 

planning on it.


will remove half skin staples and external sutures


will remove other half before d/c











CLAUDY ALEJO MD                 May 6, 2021 10:52

## 2021-05-06 NOTE — OCCUPATIONAL THER DAILY NOTE
OT Current Status-Daily Note


Subjective


No pain reported.





Mental Status/Objective


Patient Orientation:  Person, Place, Time, Situation





ADL-Treatment


Therapy Code Descriptions/Definitions 





Functional [place] Measure:


0=Not Assessed/NA        4=Minimal Assistance


1=Total Assistance        5=Supervision or Setup


2=Maximal Assistance  6=Modified [place]


3=Moderate Assistance 7=Complete IndependenceSCALE: Activities may be completed 

with or without assistive devices.





6-Indepedent-patient completes the activity by him/herself with no assistance 

from a helper.


5-Set-up or Clean-up Assistance-helper sets up or cleans up; patient completes 

activity. [place] assists only prior to or  


    following the activity.


4-Supervision or Touching Assistance-helper provides verbal cues and/or 

touching/steadying and/or contact guard assistance as patient completes 

activity. Assistance may be provided   


    throughout the activity or intermittently.


3-Partial/Moderate Assistance-helper does LESS THAN HALF the effort. [place] 

lifts, holds or supports trunk or limbs, but provides less than half the effort.


2-Substantial/Maximal Assistance-helper does MORE THAN HALF the effort. Outing 

lifts or holds trunk or limbs and provides more than half the effort.


7-Tjduqrmut-dncwvr does ALL the effort. Patient does none of the effort to 

complete the activity. Or, the assistance of 2 or more helpers is required for 

the patient to complete the  


    activity.


If activity was not attempted, code reason:


7-Patient Refused.


9-Not Applicable-not attempted and the patient did not perform the activity 

before the current illness, exacerbation or injury.


10-Not Attempted due to Environmental Limitations-(lack of equipment, weather 

restraints, etc.).


88-Not Attempted due to Medical Conditions or Safety Concerns.


Eating (QC):  6


Shower/Bathe Self (QC):  5 (Set up seated on side of bed to lean side to side 

and complete sponge bath.)


Upper Body Dressing (QC):  5


Lower Body Dressing (QC):  5 (Pt. is able to doff/don shorts on side of bed by 

leaning side to side, and pulling up.)





Other Treatment


Began treatment with co-treat with PT.  PT/OT problem solved with pt. most 

functional way to transfer safely and effectively.  Attempted sit-stand lift.  

OT addressed UE while PT focused on LE.  This did not work and as it started to 

bring pt. to stand, he started to signficantly lean to left side.  Pt. lowered 

back to chair.  Attempted sit-stand by simulating higher arm rests on 

wheelchair, and pt. pushing off arm rests.  This did not work as well.  Pt. 

agrees to shower but bariatric shower chair being used by someone else.  Pt. 

uses slide board to transfer to bed, and practices bathing on EOB.  Leans side 

to side and cleanses primo area with toilet tongs, simulating how he will have to

do on bariatric commode.  Made several phone calls to [facility] 

for wheelchair specialist.  Received phone call from someone named Leandra, 

[phone].  Attempted to call back but she leaves after 1 p.m.  Worked on 

standing from bed, at lowest point that is possible with walker.  This measured 

to be 27 inches high.  Pt. would benefit from large wheel that will be higher 

for pt. to reach while in chair.  Possibly 32 inches in diameter.  Pt. 

transferred sit-supine with no difficulty.  All needs met.





Education


OT Patient Education:  Correct positioning, Modified ADL techniques, Progress 

toward Goal/Update tx plan, Purpose of tx/functional activities, Reviewed 

precautions, Rehab process, Transfer techniques


Teaching Recipient:  Patient


Teaching Methods:  Demonstration, Discussion


Response to Teaching:  Verbalize Understanding, Return Demonstration





OT Short Term Goals


Short Term Goals


Time Frame:  May 5, 2021


Eatin


Oral hygiene:  6


Toileting hygiene:  4


Shower/bathe self:  4


Upper body dressin


Lower body dressing:  3


Putting on/taking off footwear:  3





OT Long Term Goals


Long Term Goals


Time Frame:  May 19, 2021


Eating (QC):  6


Oral Hygiene (QC):  6


Toileting Hygiene (QC):  6


Shower/Bathe Self (QC):  6


Upper Body Dressing (QC):  6


Lower Body Dressing (QC):  6


On/Off Footwear (QC):  6


Additional Goals:  1-Demonstrate ADL Tasks, 2-Verbalize Understanding, 3-

ImproveStrength/Aung


1=Demonstrate adherence to instructed precautions during ADL tasks.


2=Patient will verbalize/demonstrate understanding of assistive devices/niraj

fications for ADL.


3=Patient will improve strength/tolerance for activity to enable patient to 

perform ADL's.





OT Education/Plan


Problem List/Assessment


Assessment:  Decreased Activ Tolerance, Impaired I ADL's, Impaired Self-Care 

Skills





Discharge Recommendations


Plan/Recommendations:  Continue POC





Treatment Plan/Plan of Care


Treatment,Training & Education:  Yes


Patient would benefit from OT for education, treatment and training to promote 

independence in ADL's, mobility, safety and/or upper extremity function for 

ADL's.


Plan of Care:  ADL Retraining, Functional Mobility, Group Exercise/Act as Ind, 

UE Funct Exercise/Act


Treatment Duration:  May 19, 2021


Frequency:  At least 5 of 7 days/Wk (IRF)


Estimated Hrs Per Day:  1.5 hours per day


Agreement:  Yes


Rehab Potential:  Good





Time/GCodes


Start Time:  09:30


Stop Time:  11:00


Total Time Billed (hr/min):  90


Billed Treatment Time


5845-9373 1, FA x 30minutes- Co-treat with PT


8232-4625 ADL x 60minutes











JAROCHO OBRIEN OT            May 6, 2021 15:12

## 2021-05-07 VITALS — DIASTOLIC BLOOD PRESSURE: 59 MMHG | SYSTOLIC BLOOD PRESSURE: 116 MMHG

## 2021-05-07 VITALS — DIASTOLIC BLOOD PRESSURE: 58 MMHG | SYSTOLIC BLOOD PRESSURE: 126 MMHG

## 2021-05-07 RX ADMIN — OXYCODONE HYDROCHLORIDE SCH MG: 10 TABLET, FILM COATED, EXTENDED RELEASE ORAL at 22:00

## 2021-05-07 RX ADMIN — DOCUSATE SODIUM SCH MG: 100 CAPSULE ORAL at 19:34

## 2021-05-07 RX ADMIN — POLYETHYLENE GLYCOL (3350) SCH GM: 17 POWDER, FOR SOLUTION ORAL at 19:34

## 2021-05-07 RX ADMIN — INSULIN ASPART SCH UNIT: 100 INJECTION, SOLUTION INTRAVENOUS; SUBCUTANEOUS at 15:54

## 2021-05-07 RX ADMIN — FENTANYL CITRATE PRN MCG: 50 INJECTION, SOLUTION INTRAMUSCULAR; INTRAVENOUS at 16:49

## 2021-05-07 RX ADMIN — PRASUGREL SCH MG: 10 TABLET, FILM COATED ORAL at 09:28

## 2021-05-07 RX ADMIN — GLYBURIDE SCH MG: 2.5 TABLET ORAL at 05:59

## 2021-05-07 RX ADMIN — MICONAZOLE NITRATE SCH APPLIC: 20 POWDER TOPICAL at 22:01

## 2021-05-07 RX ADMIN — METOPROLOL SUCCINATE SCH MG: 100 TABLET, EXTENDED RELEASE ORAL at 09:28

## 2021-05-07 RX ADMIN — LISINOPRIL SCH MG: 20 TABLET ORAL at 09:28

## 2021-05-07 RX ADMIN — DOCUSATE SODIUM SCH MG: 100 CAPSULE ORAL at 07:51

## 2021-05-07 RX ADMIN — FENTANYL CITRATE PRN MCG: 50 INJECTION, SOLUTION INTRAMUSCULAR; INTRAVENOUS at 23:48

## 2021-05-07 RX ADMIN — Medication SCH ML: at 22:01

## 2021-05-07 RX ADMIN — Medication SCH ML: at 14:33

## 2021-05-07 RX ADMIN — ASPIRIN SCH MG: 81 TABLET ORAL at 09:28

## 2021-05-07 RX ADMIN — INSULIN ASPART SCH UNIT: 100 INJECTION, SOLUTION INTRAVENOUS; SUBCUTANEOUS at 12:02

## 2021-05-07 RX ADMIN — OXYCODONE HYDROCHLORIDE SCH MG: 10 TABLET, FILM COATED, EXTENDED RELEASE ORAL at 09:28

## 2021-05-07 RX ADMIN — POLYETHYLENE GLYCOL (3350) SCH GM: 17 POWDER, FOR SOLUTION ORAL at 07:52

## 2021-05-07 RX ADMIN — METFORMIN HYDROCHLORIDE SCH MG: 500 TABLET, EXTENDED RELEASE ORAL at 09:28

## 2021-05-07 RX ADMIN — PANTOPRAZOLE SODIUM SCH MG: 40 TABLET, DELAYED RELEASE ORAL at 09:28

## 2021-05-07 RX ADMIN — GLYBURIDE SCH MG: 2.5 TABLET ORAL at 16:48

## 2021-05-07 RX ADMIN — INSULIN ASPART SCH UNIT: 100 INJECTION, SOLUTION INTRAVENOUS; SUBCUTANEOUS at 05:59

## 2021-05-07 RX ADMIN — DOCUSATE SODIUM AND SENNOSIDES SCH EA: 8.6; 5 TABLET, FILM COATED ORAL at 19:34

## 2021-05-07 RX ADMIN — Medication SCH ML: at 05:59

## 2021-05-07 RX ADMIN — DOCUSATE SODIUM AND SENNOSIDES SCH EA: 8.6; 5 TABLET, FILM COATED ORAL at 07:52

## 2021-05-07 RX ADMIN — METFORMIN HYDROCHLORIDE SCH MG: 500 TABLET, EXTENDED RELEASE ORAL at 16:48

## 2021-05-07 RX ADMIN — MICONAZOLE NITRATE SCH APPLIC: 20 POWDER TOPICAL at 09:29

## 2021-05-07 RX ADMIN — INSULIN ASPART SCH UNIT: 100 INJECTION, SOLUTION INTRAVENOUS; SUBCUTANEOUS at 21:40

## 2021-05-07 NOTE — PROGRESS NOTE
Subjective


Date Seen by a Provider:  May 7, 2021


Time Seen by a Provider:  12:00


Subjective/Events-last exam


doing well. tolerating diet. tolerating PT/OT well. pain controlled. stump 

healing well.





Objective


Exam





Vital Signs








  Date Time  Temp Pulse Resp B/P (MAP) Pulse Ox O2 Delivery O2 Flow Rate FiO2


 


5/7/21 09:00      Room Air  


 


5/7/21 08:00 36.0 73 18 126/58 (80) 99 Room Air  


 


5/6/21 20:00      Room Air  


 


5/6/21 19:27 36.0 69 18 128/75 (92) 99 Room Air  





Capillary Refill :


General Appearance:  No Apparent Distress


HEENT:  PERRL/EOMI


Neck:  Full Range of Motion


Respiratory:  Chest Non Tender, Lungs Clear, Normal Breath Sounds


Cardiovascular:  Regular Rate, Rhythm


Gastrointestinal:  normal bowel sounds, non tender, soft


Extremity:  Normal Capillary Refill


Neurologic/Psychiatric:  Alert, Oriented x3


Skin:  Normal Color


Lymphatic:  No Adenopathy





Results


Lab


Laboratory Tests


5/6/21 16:42: Glucometer 165H


5/6/21 20:02: Glucometer 164H


5/7/21 05:55: Glucometer 79


5/7/21 11:13: Glucometer 134H





Assessment/Plan


Assessment/Plan


Assess & Plan/Chief Complaint


s/p left BKA.


cont rehab.


add stump  monday.


long term will want pt to proceed with medically supervised weight loss and once

at a healthier weight(BMI around 50) , patient states very much interested and 

planning on it.


will remove half skin staples and external sutures


will remove other half before d/c











CLAUDY ALEJO MD                 May 7, 2021 12:08

## 2021-05-07 NOTE — PM&R PROGRESS NOTE
Subjective


HPI/CC On Admission


Date Seen by Provider:  May 7, 2021


Time Seen by Provider:  12:30


Subjective/Events-last exam


5/7/2021:


Patient doing very well


Dr. JIMÉNEZ evaluated the leg


Sugars are good


Pain is well managed with pain medication


Blood pressure good 126/58








5/6/21:


Pt doing very well


Stump  is on


Blood sugars are good


Bowels moved yesterday


Staples will be removed per Dr. Jiménez's order








5/5/21:


Pt doing pretty well


Stump  was uncomfortable so that was taken off last night but put on 

today and doing pretty well


Bowels moved yesterday


Discharge is planned for first of next week








5/4/21:


Pt doing pretty well


Had a large BM last night


Dr. Guzmán thinks that the wound looks really good


Stump  will be started today


Sugars are okay








5/3/21:


Pt doing much better


Hgb 9.5


Bowels moved yesterday


Sugar was 100 this morning


Drainage from incision continues so stump- is being held








5/2/21:


Patient doing well


No pain reported except during dressing changes


Long acting Oxycontin working well for the patient


Stump  today or tomorrow








5/1/21:


Patient doing well


Oxycontin 10mg PO BID


Change dressing as instructed


Incision looks good


Fentanyl infusion before dressing changes








4/30/21:


Patient doing well


Lost 11# since admit


BM yesterday


Sugars are good








4/29/21:


No major issues


Incision is bleeding in 1 specific area


Scheduled Oxycontin is helping and minimsl IR used


BM+








4/28/21:


No major issues


BM yesterday will need to closely monitor that due to long acting narcs


Dr Jiménez will see him today regarding his staples


Talked about weight loss with Dr Jiménez








4/27/21:


Pt doing really well


No issues 


Stump  is being tolerated for 23 hours of the day 


No pain 


Sugar is okay and I decreased insulin even further today 








4/26/21:


Pt doing a lot better today


Pain is well controlled


Bowels moved yesterday


Stump  will be placed today on his left amputation site


Slept very well








4/25/21:


Doing well


No pain when he doesn't move


Pain controlled better with Oxycontin


BM+


Mother and son visiting him today


Sugars reviewed








4/24/21:


Pain is better


No issues reported


Hgb 7.9


BM++


Using IS








4/23/21:


Doing well


Stump  will start Monday per Dr Jiménez


BM+


Oxycontin and Oxycodone added for better pain control


DC Lortab








4/22/21:


Pt doing pretty well


Had a large BM today


Hgb 7.7 after three units of blood on med surg


Rough on the transfers but getting better


Dressing change will be per Dr. Morejon orders


Sugar is 107 this morning





Review of Systems


General:  Fatigue, Malaise


Musculoskeletal:  leg pain





Objective


Exam


Vital Signs





Vital Signs








  Date Time  Temp Pulse Resp B/P (MAP) Pulse Ox O2 Delivery O2 Flow Rate FiO2


 


5/7/21 09:00      Room Air  


 


5/7/21 08:00 36.0 73 18 126/58 (80) 99   





Capillary Refill :


General Appearance:  No Apparent Distress, WD/WN, Chronically ill, Obese


HEENT:  PERRL/EOMI, Normal ENT Inspection, Pharynx Normal


Neck:  Full Range of Motion, Normal Inspection, Non Tender, Supple, Carotid 

Bruit


Respiratory:  Chest Non Tender, Lungs Clear, Normal Breath Sounds, No Accessory 

Muscle Use, No Respiratory Distress


Cardiovascular:  Regular Rate, Rhythm, No Edema, No Gallop, No JVD, No Murmur, 

Normal Peripheral Pulses


Gastrointestinal:  Normal Bowel Sounds, No Organomegaly, No Pulsatile Mass, Non 

Tender, Soft


Back:  Normal Inspection, No CVA Tenderness, No Vertebral Tenderness


Extremity:  Normal Capillary Refill, Normal Inspection, Normal Range of Motion, 

Non Tender, No Calf Tenderness, No Pedal Edema


Neurologic/Psychiatric:  Alert, Oriented x3, No Motor/Sensory Deficits, Normal 

Mood/Affect, CNs II-XII Norm as Tested, Abnormal Gait, Motor Weakness (left leg 

amputation )


Skin:  Normal Color, Warm/Dry


Lymphatic:  No Adenopathy





Results/Procedures


Lab


Patient resulted labs reviewed.





FIM


Transfers


Therapy Code Descriptions/Definitions 





Functional [place] Measure:


0=Not Assessed/NA        4=Minimal Assistance


1=Total Assistance        5=Supervision or Setup


2=Maximal Assistance  6=Modified [place]


3=Moderate Assistance 7=Complete IndependenceSCALE: Activities may be completed 

with or without assistive devices.





6-Indepedent-patient completes the activity by him/herself with no assistance 

from a helper.


5-Set-up or Clean-up Assistance-helper sets up or cleans up; patient completes 

activity. [place] assists only prior to or  


    following the activity.


4-Supervision or Touching Assistance-helper provides verbal cues and/or 

touching/steadying and/or contact guard assistance as patient completes 

activity. Assistance may be provided   


    throughout the activity or intermittently.


3-Partial/Moderate Assistance-helper does LESS THAN HALF the effort. [place] 

lifts, holds or supports trunk or limbs, but provides less than half the effort.


2-Substantial/Maximal Assistance-helper does MORE THAN HALF the effort. [place] 

lifts or holds trunk or limbs and provides more than half the effort.


3-Uinvvcgfo-btageo does ALL the effort. Patient does none of the effort to 

complete the activity. Or, the assistance of 2 or more helpers is required for 

the patient to complete the  


    activity.


If activity was not attempted, code reason:


7-Patient Refused.


9-Not Applicable-not attempted and the patient did not perform the activity 

before the current illness, exacerbation or injury.


10-Not Attempted due to Environmental Limitations-(lack of equipment, weather 

restraints, etc.).


88-Not Attempted due to Medical Conditions or Safety Concerns.


Roll Left to Right (QC):  6


Sit to Lying (QC):  6


Sit to Stand (QC):  4 (SBA with sit to stand from elevated bed with FWW)


Chair/Bed-to-Chair Xfer(QC):  3 (Mod A with stand pivot transfer from elevated 

bed to w/c, requi assistance moving w/c closer to patient before descent. Slide 

board transfer mod A with positioning of board and holding down w/c)


Car Transfer (QC):  88





Gait Training


Does the Patient Walk?:  No and Walking Goal IS indicated


Distance:  5' x 2


Walk 10 feet (QC):  88


Walk 50 ft with 2 Turns(QC):  88


Walk 150 ft (QC):  88


Walking 10ft/uneven surface-QC:  88


Gait Assistive Device:  FWW





Wheelchair Training


Does the Pt Use a Wheelchair?:  Yes


Wheel 50 ft with 2 turns (QC):  6


Wheel 150 ft (QC):  6


Type of Wheelchair:  Manual





Stair Training


1 Step (curb) (QC):  88


4 Steps (QC):  88


12 Steps (QC):  88





Balance


Picking up an Object (QC):  88





ADL-Treatment


Eating (QC):  6


Oral Hygiene (QC):  6


Shower/Bathe Self (QC):  5 (Set up seated on side of bed to lean side to side 

and complete sponge bath.)


Upper Body Dressing (QC):  5


Lower Body Dressing (QC):  5 (Pt. is able to doff/don shorts on side of bed by 

leaning side to side, and pulling up.)


On/Off Footwear (QC):  4


Toileting Hygiene (QC):  4 (Pt. able to cleanse rear primo area after t

ransferring back to bed.  )


Toilet Transfer (QC):  2





Assessment/Plan


Assessment and Plan


Assess & Plan/Chief Complaint


Assessment:


s/p LBKA 


DM insulin dependence


HTN


HLP


CRI


Iron def anemia from acute blood loss


TERRI untreated





Plan:


Monitor pain


IRF protocol


Monitor hgb


Iron infusion





4/22/21:


Monitor sugar


Monitor hgb





4/23/21:


Add pain meds


Check labs in am





4/24/21:


Monitor sugar and decrease insulin


Hgb stable


Venofer





4/25/21:


Continue iron infusions


Sugar managed well


Pain controlled





4/26/21:


Stump 


Monitor sugar


Monitor hgb





4/27/21:


Monitor sugar


Decrease insulin


Monitor BP





4/28/21:


Monitor pain


Decreased insulin





4/29/21:


Monitor pain


BM regimen to prevent narcotic bowel





4/30/21:


Monitor closely


Monitor sugar





5/1/21:


Monitor closely


Dressing changes





5/2/21:


Monitor pain


Increase insulin





5/3/21:


Monitor closely


Monitor sugar levels and adjust insulin as needed





5/4/21:


Stump  maintained


Monitor closely





5/5/21:


Monitor pain


Stump 





5/6/21:


Monitor closely


Monitor staples





5/7/2021:


Continue weight loss regimen


Stump 


Monitor ganesh per Dr. JIMÉNEZ





(1) Status post below-knee amputation of left lower extremity


Status:  Acute


(2) Hyperglycemia


Status:  Chronic


(3) Hypertension


Status:  Chronic


(4) Diabetes mellitus, type 2


Status:  Chronic


(5) Anemia


Status:  Acute


(6) Iron deficiency


Status:  Acute


(7) Peripheral neuropathy


Status:  Chronic











NICOLE VILA DO                 May 7, 2021 06:16

## 2021-05-07 NOTE — OCCUPATIONAL THER DAILY NOTE
OT Current Status-Daily Note


Subjective


Pt alert, lying in bed.  Pt agrees to therapy.  No c/o pain at this time.  Pt 

did mention that he had pain meds at 0900.





Mental Status/Objective


Patient Orientation:  Person, Place, Time


Attachments:  IV (midline)





ADL-Treatment


Pt agrees to shower.  Pt is independent with bed mobility.  Pt completes SPT 

using FWW and elevated bed to transfer to shower chair.  Transported pt to [facility] 

shower room using rolling shower chair.  Pt completed shower using shower chair 

with cutout, HH shower, LH sponge and grabbars by self.  Assist to dry buttocks 

in standing.  Assist x2 to stand with pt using grabbars in corner of large 

shower room to stabilize self and hike pants over hips.  Switched out shower 

chair for w/c.  Pt has demonstrated ability to complete footwear and thread 

pants, due to wet environment of shower floor, assist given to complete this.  

Pt propelled w/c backwards to room and manipulated around objects/doorways to 

sit at sink to complete oral care and grooming independently.


Therapy Code Descriptions/Definitions 





Functional [place] Measure:


0=Not Assessed/NA        4=Minimal Assistance


1=Total Assistance        5=Supervision or Setup


2=Maximal Assistance  6=Modified [place]


3=Moderate Assistance 7=Complete IndependenceSCALE: Activities may be completed 

with or without assistive devices.





6-Indepedent-patient completes the activity by him/herself with no assistance 

from a helper.


5-Set-up or Clean-up Assistance-helper sets up or cleans up; patient completes 

activity. [place] assists only prior to or  


    following the activity.


4-Supervision or Touching Assistance-helper provides verbal cues and/or 

touching/steadying and/or contact guard assistance as patient completes 

activity. Assistance may be provided   


    throughout the activity or intermittently.


3-Partial/Moderate Assistance-helper does LESS THAN HALF the effort. [place] 

lifts, holds or supports trunk or limbs, but provides less than half the effort.


2-Substantial/Maximal Assistance-helper does MORE THAN HALF the effort. [place] 

lifts or holds trunk or limbs and provides more than half the effort.


2-Xcznrbfvb-nyryhw does ALL the effort. Patient does none of the effort to 

complete the activity. Or, the assistance of 2 or more helpers is required for 

the patient to complete the  


    activity.


If activity was not attempted, code reason:


7-Patient Refused.


9-Not Applicable-not attempted and the patient did not perform the activity 

before the current illness, exacerbation or injury.


10-Not Attempted due to Environmental Limitations-(lack of equipment, weather 

restraints, etc.).


88-Not Attempted due to Medical Conditions or Safety Concerns.


Eating (QC):  6


Oral Hygiene (QC):  6


Shower/Bathe Self (QC):  5


Upper Body Dressing (QC):  5





Other Treatment


Co-treat with PT([id number]), skills of 2 clinicians required due to increased 

fall risk and decreased mobility/transfers.  PT working on sit/stand and 

mobility while OT focusing on functional mobility, placement of AE and AD.  See 

PT notes for progress on pt's standing from varying surface heights.  After 

therapy, pt left in care of PT.  All needs met.





OT Short Term Goals


Short Term Goals


Time Frame:  May 5, 2021


Eatin


Oral hygiene:  6


Toileting hygiene:  4


Shower/bathe self:  4


Upper body dressin


Lower body dressing:  3


Putting on/taking off footwear:  3





OT Long Term Goals


Long Term Goals


Time Frame:  May 19, 2021


Eating (QC):  6


Oral Hygiene (QC):  6


Toileting Hygiene (QC):  6


Shower/Bathe Self (QC):  6


Upper Body Dressing (QC):  6


Lower Body Dressing (QC):  6


On/Off Footwear (QC):  6


Additional Goals:  1-Demonstrate ADL Tasks, 2-Verbalize Understanding, 3-

ImproveStrength/Aung


1=Demonstrate adherence to instructed precautions during ADL tasks.


2=Patient will verbalize/demonstrate understanding of assistive 

devices/modifications for ADL.


3=Patient will improve strength/tolerance for activity to enable patient to 

perform ADL's.





OT Education/Plan


Problem List/Assessment


Assessment:  Impaired Funct Balance, Impaired Self-Care Skills





Discharge Recommendations


Plan/Recommendations:  Continue POC





Treatment Plan/Plan of Care


Patient would benefit from OT for education, treatment and training to promote 

independence in ADL's, mobility, safety and/or upper extremity function for 

ADL's.


Plan of Care:  ADL Retraining, Functional Mobility, Group Exercise/Act as Ind, 

UE Funct Exercise/Act


Treatment Duration:  May 19, 2021


Frequency:  At least 5 of 7 days/Wk (IRF)


Estimated Hrs Per Day:  1.5 hours per day


Agreement:  Yes


Rehab Potential:  Good





Time/GCodes


Start Time:  09:00


Stop Time:  10:30


Total Time Billed (hr/min):  90


Billed Treatment Time


1 visit-ADL 4 (60 min)  FA 2 (30 min)  co-treat with PT [id number], individual 

[id number]











GUDELIA GONZALEZ                May 7, 2021 10:34

## 2021-05-07 NOTE — PHYSICAL THERAPY DAILY NOTE
PT Daily Note-Current


Subjective


Patient upright in shower chair with OT, pre tx. Patient consents to tx. Patient

reports 5-6/10 pain in residual limb.





Appearance


Patient supine in bed post tx, with call button and tray table within reach. All

needs met.





Mental Status


Patient Orientation:  Person, Place, Time, Normal For Age





Transfers


SCALE: Activities may be completed with or without assistive devices.





6-Indepedent-patient completes the activity by him/herself with no assistance 

from a helper.


5-Set-up or Clean-up Assistance-helper sets up or cleans up; patient completes 

activity. [place] assists only prior to or  


    following the activity.


4-Supervision or Touching Assistance-helper provides verbal cues and/or 

touching/steadying and/or contact guard assistance as patient completes 

activity. Assistance may be provided   


    throughout the activity or intermittently.


3-Partial/Moderate Assistance-helper does LESS THAN HALF the effort. [place] 

lifts, holds or supports trunk or limbs, but provides less than half the effort.


2-Substantial/Maximal Assistance-helper does MORE THAN HALF the effort. [place] 

lifts or holds trunk or limbs and provides more than half the effort.


3-Qdoydxyex-achacu does ALL the effort. Patient does none of the effort to 

complete the activity. Or, the assistance of 2 or more helpers is required for 

the patient to complete the  


    activity.


If activity was not attempted, code reason:


7-Patient Refused.


9-Not Applicable-not attempted and the patient did not perform the activity 

before the current illness, exacerbation or injury.


10-Not Attempted due to Environmental Limitations-(lack of equipment, weather 

restraints, etc.).


88-Not Attempted due to Medical Conditions or Safety Concerns.


Roll Left & Right (QC):  6


Sit to Lying (QC):  6


Lying to Sitting/Side of Bed(Q:  6


Sit to Stand (QC):  4 (CGA with FWW from elevated therapy mat)


Chair/Bed-to-Chair Xfer(QC):  3 (Mod to Min A with sliding board)


Patient was able to complete 15 sit <-> stands from therapy table, started at an

elevated distance and went all the way to the bottom, 20" from ground. Patient 

was able to complete 3 sit <-> stands 20" from ground. Patient relies on knees 

pushing against table and walker directly under UE's to complete transfer.





Weight Bearing


Right Lower Extremity:  Right


Full Weight Bearing


Left Lower Extremity:  Left


Non Weight Bearing (BKA)





Wheelchair Training


Does the Pt Use a Wheelchair?:  Yes


Wheel 50 ft with 2 turns (QC):  6


Wheel 150 ft (QC):  6


Type of Wheelchair:  Manual


400' x2. Patient navigated wheelchair ramp, going both up and down, for the 

first time since amputation. Was able to complete without difficulty.





Exercises


Supine Ex:  Ankle pumps (RLE), Quad Set, Glut sets, Heel Slides (RLE), Straight 

leg raise, Hip abd/add


Supine Reps:  20 (3# weight on R LE)


Seated Therapy Exercises:  Biceps, Ankle pumps (RLE), Shoulder Flex, Long arc 

quads, Shoulder Abd, Hip flexion, Reaching activity, Glut set, Tricep


Seated Reps:  20 (6# weights with UE exercises)





Treatments


Co-treated with OT secondary to patient fall risk, impairments, and level of 

assistance required with functional mobility. PT/OT focused on sit <-> stands 

from lower elevations to optimize functional mobility with return home. OT 

focused on UE positioning and PT focused on LE positioning with movements.





Assessment


Current Status:  Good Progress, Fair Progress


Patient demonstrated improved ability to self-position sliding board with chair 

to bed transfer





PT Short Term Goals


Short Term Goals


Time Frame:  2021


Roll Left & Right:  6


Sit to lyin


Lying to sitting on side of be:  6


Sit to stand:  3


Chair/bed-to-chair transfer:  3


Wheel 50ft w/2 turns:  6


Wheel 150 feet:  6





PT Long Term Goals


Long Term Goals


PT Long Term Goals Time Frame:  May 19, 2021


Roll Left & Right (QC):  6


Sit to Lying (QC):  6


Lying-Sitting on Side/Bed(QC):  6


Sit to Stand (QC):  6


Chair/Bed-to-Chair Xfer(QC):  6 (SPT or slideboard, whichever is the safest 

method)


Toilet Transfer (QC):  6 (SPT or slide board)


Car Transfer (QC):  4


Does the Patient Walk:  No and Walking Goal NOT indicated


Walk 10 feet (QC):  88


Walk 50ft with 2 Turns (QC):  88


Walk 150 ft (QC):  88


Walking 10ft on Uneven Surface:  88


1 Step (curb) (QC):  88


4 Steps (QC):  88


12 Steps (QC):  88


Picking up an Object (QC):  88


Does the Pt use WC or Scooter?:  Yes


Wheel 50 feet with 2 turns (QC:  6


Type:  Manual


Wheel 150 feet:  6


Type:  Manual





PT Plan


Problem List


Problem List:  Activity Tolerance, Functional Strength, Safety, Balance, Gait, 

Transfer, Bed Mobility, ROM





Treatment/Plan


Treatment Plan:  Continue Plan of Care


Treatment Plan:  Bed Mobility, Education, Functional Activity Aung, Functional 

Strength, Group Therapy, Gait, Safety, Therapeutic Exercise, Transfers


Treatment Duration:  May 19, 2021


Frequency:  At least 5 of 7 days/Wk (IRF)


Estimated Hrs Per Day:  1.5 hours per day


Patient and/or Family Agrees t:  Yes





Safety Risks/Education


Patient Education:  Transfer Techniques, Correct Positioning, W/C Management, 

Safety Issues


Teaching Recipient:  Patient


Teaching Methods:  Demonstration, Discussion


Response to Teaching:  Verbalize Understanding, Return Demonstration





Time/GCodes


Time In:  1000


Time Out:  1130


Total Billed Treatment Time:  90


Total Billed Treatment


1 visit: 


[facility] x2: 30'


EX x3: 40' 


FA: 20'











CAMILLE HRARY PT                 May 7, 2021 10:41

## 2021-05-08 VITALS — SYSTOLIC BLOOD PRESSURE: 133 MMHG | DIASTOLIC BLOOD PRESSURE: 88 MMHG

## 2021-05-08 VITALS — DIASTOLIC BLOOD PRESSURE: 62 MMHG | SYSTOLIC BLOOD PRESSURE: 112 MMHG

## 2021-05-08 RX ADMIN — INSULIN ASPART SCH UNIT: 100 INJECTION, SOLUTION INTRAVENOUS; SUBCUTANEOUS at 06:08

## 2021-05-08 RX ADMIN — DOCUSATE SODIUM SCH MG: 100 CAPSULE ORAL at 08:15

## 2021-05-08 RX ADMIN — METFORMIN HYDROCHLORIDE SCH MG: 500 TABLET, EXTENDED RELEASE ORAL at 09:44

## 2021-05-08 RX ADMIN — OXYCODONE HYDROCHLORIDE SCH MG: 10 TABLET, FILM COATED, EXTENDED RELEASE ORAL at 21:56

## 2021-05-08 RX ADMIN — INSULIN ASPART SCH UNIT: 100 INJECTION, SOLUTION INTRAVENOUS; SUBCUTANEOUS at 15:28

## 2021-05-08 RX ADMIN — POLYETHYLENE GLYCOL (3350) SCH GM: 17 POWDER, FOR SOLUTION ORAL at 20:10

## 2021-05-08 RX ADMIN — INSULIN ASPART SCH UNIT: 100 INJECTION, SOLUTION INTRAVENOUS; SUBCUTANEOUS at 12:09

## 2021-05-08 RX ADMIN — OXYCODONE HYDROCHLORIDE SCH MG: 10 TABLET, FILM COATED, EXTENDED RELEASE ORAL at 09:44

## 2021-05-08 RX ADMIN — INSULIN ASPART SCH UNIT: 100 INJECTION, SOLUTION INTRAVENOUS; SUBCUTANEOUS at 21:56

## 2021-05-08 RX ADMIN — MICONAZOLE NITRATE SCH APPLIC: 20 POWDER TOPICAL at 09:44

## 2021-05-08 RX ADMIN — DOCUSATE SODIUM AND SENNOSIDES SCH EA: 8.6; 5 TABLET, FILM COATED ORAL at 08:16

## 2021-05-08 RX ADMIN — GLYBURIDE SCH MG: 2.5 TABLET ORAL at 17:04

## 2021-05-08 RX ADMIN — METOPROLOL SUCCINATE SCH MG: 100 TABLET, EXTENDED RELEASE ORAL at 09:44

## 2021-05-08 RX ADMIN — DOCUSATE SODIUM AND SENNOSIDES SCH EA: 8.6; 5 TABLET, FILM COATED ORAL at 20:10

## 2021-05-08 RX ADMIN — Medication SCH ML: at 21:56

## 2021-05-08 RX ADMIN — FENTANYL CITRATE PRN MCG: 50 INJECTION, SOLUTION INTRAMUSCULAR; INTRAVENOUS at 19:05

## 2021-05-08 RX ADMIN — Medication SCH ML: at 06:04

## 2021-05-08 RX ADMIN — POLYETHYLENE GLYCOL (3350) SCH GM: 17 POWDER, FOR SOLUTION ORAL at 08:15

## 2021-05-08 RX ADMIN — GLYBURIDE SCH MG: 2.5 TABLET ORAL at 06:04

## 2021-05-08 RX ADMIN — MICONAZOLE NITRATE SCH APPLIC: 20 POWDER TOPICAL at 21:56

## 2021-05-08 RX ADMIN — PANTOPRAZOLE SODIUM SCH MG: 40 TABLET, DELAYED RELEASE ORAL at 09:44

## 2021-05-08 RX ADMIN — ASPIRIN SCH MG: 81 TABLET ORAL at 09:44

## 2021-05-08 RX ADMIN — METFORMIN HYDROCHLORIDE SCH MG: 500 TABLET, EXTENDED RELEASE ORAL at 17:04

## 2021-05-08 RX ADMIN — PRASUGREL SCH MG: 10 TABLET, FILM COATED ORAL at 09:44

## 2021-05-08 RX ADMIN — LISINOPRIL SCH MG: 20 TABLET ORAL at 09:44

## 2021-05-08 RX ADMIN — Medication SCH ML: at 14:04

## 2021-05-08 RX ADMIN — DOCUSATE SODIUM SCH MG: 100 CAPSULE ORAL at 20:10

## 2021-05-08 NOTE — PHYSICAL THERAPY DAILY NOTE
PT Daily Note-Current


Subjective


Pt laying Supine in bed upon arrival.  Pt agrees to PT.





Pain





   Numeric Pain Scale:  5-Moderate Pain


   Location:  Incisional, Left


   Pain Description:  Ache


   Comment:  Pain/discomfort at incisional site w/movement.





Mental Status


Patient Orientation:  Person, Place, Time, Situation





Transfers


SCALE: Activities may be completed with or without assistive devices.





6-Indepedent-patient completes the activity by him/herself with no assistance 

from a helper.


5-Set-up or Clean-up Assistance-helper sets up or cleans up; patient completes 

activity. [place] assists only prior to or  


    following the activity.


4-Supervision or Touching Assistance-helper provides verbal cues and/or 

touching/steadying and/or contact guard assistance as patient completes 

activity. Assistance may be provided   


    throughout the activity or intermittently.


3-Partial/Moderate Assistance-helper does LESS THAN HALF the effort. [place] 

lifts, holds or supports trunk or limbs, but provides less than half the effort.


2-Substantial/Maximal Assistance-helper does MORE THAN HALF the effort. [place] 

lifts or holds trunk or limbs and provides more than half the effort.


6-Yeeihllck-yjupfv does ALL the effort. Patient does none of the effort to 

complete the activity. Or, the assistance of 2 or more helpers is required for 

the patient to complete the  


    activity.


If activity was not attempted, code reason:


7-Patient Refused.


9-Not Applicable-not attempted and the patient did not perform the activity 

before the current illness, exacerbation or injury.


10-Not Attempted due to Environmental Limitations-(lack of equipment, weather 

restraints, etc.).


88-Not Attempted due to Medical Conditions or Safety Concerns.


Roll Left & Right (QC):  5


Lying to Sitting/Side of Bed(Q:  5





Weight Bearing


Right Lower Extremity:  Right


Full Weight Bearing


Left Lower Extremity:  Left


Non Weight Bearing (BKA)





Exercises


Supine Ex:  Quad Set, Glut sets, Short Arc Quads, Straight leg raise, Hip 

abd/add


Supine Reps:  15


Seated Therapy Exercises:  Long arc quads


Seated Reps:  15





Treatments


Pt completes EX in bed as well as dresses at EOB.  Pt is SBA.  Pt resting in bed

at end of tx with all needs met, call light in hand.





Assessment


Current Status:  Good Progress


Pt durga. tx well with some pain/discomfort that is addressed by Nurse.





PT Short Term Goals


Short Term Goals


Time Frame:  2021


Roll Left & Right:  6


Sit to lyin


Lying to sitting on side of be:  6


Sit to stand:  3


Chair/bed-to-chair transfer:  3


Wheel 50ft w/2 turns:  6


Wheel 150 feet:  6





PT Long Term Goals


Long Term Goals


PT Long Term Goals Time Frame:  May 19, 2021


Roll Left & Right (QC):  6


Sit to Lying (QC):  6


Lying-Sitting on Side/Bed(QC):  6


Sit to Stand (QC):  6


Chair/Bed-to-Chair Xfer(QC):  6 (SPT or slideboard, whichever is the safest 

method)


Toilet Transfer (QC):  6 (SPT or slide board)


Car Transfer (QC):  4


Does the Patient Walk:  No and Walking Goal NOT indicated


Walk 10 feet (QC):  88


Walk 50ft with 2 Turns (QC):  88


Walk 150 ft (QC):  88


Walking 10ft on Uneven Surface:  88


1 Step (curb) (QC):  88


4 Steps (QC):  88


12 Steps (QC):  88


Picking up an Object (QC):  88


Does the Pt use WC or Scooter?:  Yes


Wheel 50 feet with 2 turns (QC:  6


Type:  Manual


Wheel 150 feet:  6


Type:  Manual





PT Plan


Treatment/Plan


Treatment Plan:  Continue Plan of Care


Treatment Plan:  Bed Mobility, Education, Functional Activity Aung, Functional 

Strength, Group Therapy, Gait, Safety, Therapeutic Exercise, Transfers


Treatment Duration:  May 19, 2021


Frequency:  At least 5 of 7 days/Wk (IRF)


Estimated Hrs Per Day:  1.5 hours per day


Patient and/or Family Agrees t:  Yes





Safety Risks/Education


Patient Education:  Correct Positioning


Teaching Recipient:  Patient


Teaching Methods:  Discussion


Response to Teaching:  Verbalize Understanding





Time/GCodes


Time In:  905


Time Out:  935


Total Billed Treatment Time:  30


Total Billed Treatment


1, EX (15m) & FA (15m)











KWAKU CASTELLON PTA               May 8, 2021 09:55

## 2021-05-08 NOTE — PM&R PROGRESS NOTE
Subjective


HPI/CC On Admission


Date Seen by Provider:  May 8, 2021


Time Seen by Provider:  11:45


Subjective/Events-last exam


5/8/21:


No major issues


Pain is controlled


Reviewed meds


Sugars and BP ok








5/7/2021:


Patient doing very well


Dr. JIMÉNEZ evaluated the leg


Sugars are good


Pain is well managed with pain medication


Blood pressure good 126/58








5/6/21:


Pt doing very well


Stump  is on


Blood sugars are good


Bowels moved yesterday


Staples will be removed per Dr. Jiménez's order








5/5/21:


Pt doing pretty well


Stump  was uncomfortable so that was taken off last night but put on 

today and doing pretty well


Bowels moved yesterday


Discharge is planned for first of next week








5/4/21:


Pt doing pretty well


Had a large BM last night


Dr. Guzmán thinks that the wound looks really good


Stump  will be started today


Sugars are okay








5/3/21:


Pt doing much better


Hgb 9.5


Bowels moved yesterday


Sugar was 100 this morning


Drainage from incision continues so stump- is being held








5/2/21:


Patient doing well


No pain reported except during dressing changes


Long acting Oxycontin working well for the patient


Stump  today or tomorrow








5/1/21:


Patient doing well


Oxycontin 10mg PO BID


Change dressing as instructed


Incision looks good


Fentanyl infusion before dressing changes








4/30/21:


Patient doing well


Lost 11# since admit


BM yesterday


Sugars are good








4/29/21:


No major issues


Incision is bleeding in 1 specific area


Scheduled Oxycontin is helping and minimsl IR used


BM+








4/28/21:


No major issues


BM yesterday will need to closely monitor that due to long acting narcs


Dr Jiménez will see him today regarding his staples


Talked about weight loss with Dr Jiménez








4/27/21:


Pt doing really well


No issues 


Stump  is being tolerated for 23 hours of the day 


No pain 


Sugar is okay and I decreased insulin even further today 








4/26/21:


Pt doing a lot better today


Pain is well controlled


Bowels moved yesterday


Stump  will be placed today on his left amputation site


Slept very well








4/25/21:


Doing well


No pain when he doesn't move


Pain controlled better with Oxycontin


BM+


Mother and son visiting him today


Sugars reviewed








4/24/21:


Pain is better


No issues reported


Hgb 7.9


BM++


Using IS








4/23/21:


Doing well


Stump  will start Monday per Dr Jiménez


BM+


Oxycontin and Oxycodone added for better pain control


DC Lortab








4/22/21:


Pt doing pretty well


Had a large BM today


Hgb 7.7 after three units of blood on med surg


Rough on the transfers but getting better


Dressing change will be per Dr. Morejon orders


Sugar is 107 this morning





Review of Systems


General:  Fatigue, Malaise


Musculoskeletal:  leg pain





Objective


Exam


Vital Signs





Vital Signs








  Date Time  Temp Pulse Resp B/P (MAP) Pulse Ox O2 Delivery O2 Flow Rate FiO2


 


5/8/21 09:00      Room Air  


 


5/8/21 09:00 36.0 81 18 133/88 (103) 96   





Capillary Refill :


General Appearance:  No Apparent Distress, WD/WN, Chronically ill, Obese


HEENT:  PERRL/EOMI, Normal ENT Inspection, Pharynx Normal


Neck:  Full Range of Motion, Normal Inspection, Non Tender, Supple, Carotid 

Bruit


Respiratory:  Chest Non Tender, Lungs Clear, Normal Breath Sounds, No Accessory 

Muscle Use, No Respiratory Distress


Cardiovascular:  Regular Rate, Rhythm, No Edema, No Gallop, No JVD, No Murmur, 

Normal Peripheral Pulses


Gastrointestinal:  Normal Bowel Sounds, No Organomegaly, No Pulsatile Mass, Non 

Tender, Soft


Back:  Normal Inspection, No CVA Tenderness, No Vertebral Tenderness


Extremity:  Normal Capillary Refill, Normal Inspection, Normal Range of Motion, 

Non Tender, No Calf Tenderness, No Pedal Edema


Neurologic/Psychiatric:  Alert, Oriented x3, No Motor/Sensory Deficits, Normal 

Mood/Affect, CNs II-XII Norm as Tested, Abnormal Gait, Motor Weakness (left leg 

amputation )


Skin:  Normal Color, Warm/Dry


Lymphatic:  No Adenopathy





Results/Procedures


Lab


Patient resulted labs reviewed.





FIM


Transfers


Therapy Code Descriptions/Definitions 





Functional [place] Measure:


0=Not Assessed/NA        4=Minimal Assistance


1=Total Assistance        5=Supervision or Setup


2=Maximal Assistance  6=Modified [place]


3=Moderate Assistance 7=Complete IndependenceSCALE: Activities may be completed 

with or without assistive devices.





6-Indepedent-patient completes the activity by him/herself with no assistance 

from a helper.


5-Set-up or Clean-up Assistance-helper sets up or cleans up; patient completes 

activity. [place] assists only prior to or  


    following the activity.


4-Supervision or Touching Assistance-helper provides verbal cues and/or touching

/steadying and/or contact guard assistance as patient completes activity. 

Assistance may be provided   


    throughout the activity or intermittently.


3-Partial/Moderate Assistance-helper does LESS THAN HALF the effort. [place] 

lifts, holds or supports trunk or limbs, but provides less than half the effort.


2-Substantial/Maximal Assistance-helper does MORE THAN HALF the effort. [place] 

lifts or holds trunk or limbs and provides more than half the effort.


7-Aujnyqksz-bjeinv does ALL the effort. Patient does none of the effort to 

complete the activity. Or, the assistance of 2 or more helpers is required for 

the patient to complete the  


    activity.


If activity was not attempted, code reason:


7-Patient Refused.


9-Not Applicable-not attempted and the patient did not perform the activity 

before the current illness, exacerbation or injury.


10-Not Attempted due to Environmental Limitations-(lack of equipment, weather 

restraints, etc.).


88-Not Attempted due to Medical Conditions or Safety Concerns.


Roll Left to Right (QC):  6


Sit to Lying (QC):  6


Sit to Stand (QC):  4 (CGA with FWW from elevated therapy mat)


Chair/Bed-to-Chair Xfer(QC):  3 (Mod to Min A with sliding board)


Car Transfer (QC):  88





Gait Training


Does the Patient Walk?:  No and Walking Goal IS indicated


Distance:  5' x 2


Walk 10 feet (QC):  88


Walk 50 ft with 2 Turns(QC):  88


Walk 150 ft (QC):  88


Walking 10ft/uneven surface-QC:  88


Gait Assistive Device:  FWW





Wheelchair Training


Does the Pt Use a Wheelchair?:  Yes


Wheel 50 ft with 2 turns (QC):  6


Wheel 150 ft (QC):  6


Type of Wheelchair:  Manual





Stair Training


1 Step (curb) (QC):  88


4 Steps (QC):  88


12 Steps (QC):  88





Balance


Picking up an Object (QC):  88





ADL-Treatment


Eating (QC):  6


Oral Hygiene (QC):  6


Shower/Bathe Self (QC):  5


Upper Body Dressing (QC):  5


Lower Body Dressing (QC):  5 (Pt. is able to doff/don shorts on side of bed by 

leaning side to side, and pulling up.)


On/Off Footwear (QC):  4


Toileting Hygiene (QC):  4 (Pt. able to cleanse rear primo area after 

transferring back to bed.  )


Toilet Transfer (QC):  2





Assessment/Plan


Assessment and Plan


Assess & Plan/Chief Complaint


Assessment:


s/p LBKA 


DM insulin dependence


HTN


HLP


CRI


Iron def anemia from acute blood loss


TERRI untreated





Plan:


Monitor pain


IRF protocol


Monitor hgb


Iron infusion





4/22/21:


Monitor sugar


Monitor hgb





4/23/21:


Add pain meds


Check labs in am





4/24/21:


Monitor sugar and decrease insulin


Hgb stable


Venofer





4/25/21:


Continue iron infusions


Sugar managed well


Pain controlled





4/26/21:


Stump 


Monitor sugar


Monitor hgb





4/27/21:


Monitor sugar


Decrease insulin


Monitor BP





4/28/21:


Monitor pain


Decreased insulin





4/29/21:


Monitor pain


BM regimen to prevent narcotic bowel





4/30/21:


Monitor closely


Monitor sugar





5/1/21:


Monitor closely


Dressing changes





5/2/21:


Monitor pain


Increase insulin





5/3/21:


Monitor closely


Monitor sugar levels and adjust insulin as needed





5/4/21:


Stump  maintained


Monitor closely





5/5/21:


Monitor pain


Stump 





5/6/21:


Monitor closely


Monitor staples





5/7/2021:


Continue weight loss regimen


Stump 


Monitor ganesh per Dr. JIMÉNEZ





5/8/21:


Continue weight loss


Monitor sugar





(1) Status post below-knee amputation of left lower extremity


Status:  Acute


(2) Hyperglycemia


Status:  Chronic


(3) Hypertension


Status:  Chronic


(4) Diabetes mellitus, type 2


Status:  Chronic


(5) Anemia


Status:  Acute


(6) Iron deficiency


Status:  Acute


(7) Peripheral neuropathy


Status:  Chronic











NICOLE VILA DO                 May 8, 2021 07:44

## 2021-05-09 VITALS — DIASTOLIC BLOOD PRESSURE: 58 MMHG | SYSTOLIC BLOOD PRESSURE: 112 MMHG

## 2021-05-09 VITALS — SYSTOLIC BLOOD PRESSURE: 121 MMHG | DIASTOLIC BLOOD PRESSURE: 55 MMHG

## 2021-05-09 RX ADMIN — INSULIN ASPART SCH UNIT: 100 INJECTION, SOLUTION INTRAVENOUS; SUBCUTANEOUS at 06:12

## 2021-05-09 RX ADMIN — DOCUSATE SODIUM SCH MG: 100 CAPSULE ORAL at 19:36

## 2021-05-09 RX ADMIN — POLYETHYLENE GLYCOL (3350) SCH GM: 17 POWDER, FOR SOLUTION ORAL at 08:25

## 2021-05-09 RX ADMIN — Medication SCH ML: at 06:53

## 2021-05-09 RX ADMIN — DOCUSATE SODIUM SCH MG: 100 CAPSULE ORAL at 08:25

## 2021-05-09 RX ADMIN — GLYBURIDE SCH MG: 2.5 TABLET ORAL at 18:01

## 2021-05-09 RX ADMIN — OXYCODONE HYDROCHLORIDE SCH MG: 10 TABLET, FILM COATED, EXTENDED RELEASE ORAL at 08:25

## 2021-05-09 RX ADMIN — METFORMIN HYDROCHLORIDE SCH MG: 500 TABLET, EXTENDED RELEASE ORAL at 18:01

## 2021-05-09 RX ADMIN — Medication SCH ML: at 14:20

## 2021-05-09 RX ADMIN — PRASUGREL SCH MG: 10 TABLET, FILM COATED ORAL at 08:25

## 2021-05-09 RX ADMIN — LISINOPRIL SCH MG: 20 TABLET ORAL at 08:25

## 2021-05-09 RX ADMIN — FENTANYL CITRATE PRN MCG: 50 INJECTION, SOLUTION INTRAMUSCULAR; INTRAVENOUS at 10:39

## 2021-05-09 RX ADMIN — METFORMIN HYDROCHLORIDE SCH MG: 500 TABLET, EXTENDED RELEASE ORAL at 08:24

## 2021-05-09 RX ADMIN — POLYETHYLENE GLYCOL (3350) SCH GM: 17 POWDER, FOR SOLUTION ORAL at 19:36

## 2021-05-09 RX ADMIN — Medication SCH ML: at 21:25

## 2021-05-09 RX ADMIN — FENTANYL CITRATE PRN MCG: 50 INJECTION, SOLUTION INTRAMUSCULAR; INTRAVENOUS at 01:35

## 2021-05-09 RX ADMIN — METOPROLOL SUCCINATE SCH MG: 100 TABLET, EXTENDED RELEASE ORAL at 08:25

## 2021-05-09 RX ADMIN — ASPIRIN SCH MG: 81 TABLET ORAL at 08:24

## 2021-05-09 RX ADMIN — GLYBURIDE SCH MG: 2.5 TABLET ORAL at 06:53

## 2021-05-09 RX ADMIN — DOCUSATE SODIUM AND SENNOSIDES SCH EA: 8.6; 5 TABLET, FILM COATED ORAL at 08:26

## 2021-05-09 RX ADMIN — PANTOPRAZOLE SODIUM SCH MG: 40 TABLET, DELAYED RELEASE ORAL at 08:24

## 2021-05-09 RX ADMIN — MICONAZOLE NITRATE SCH APPLIC: 20 POWDER TOPICAL at 08:26

## 2021-05-09 RX ADMIN — INSULIN ASPART SCH UNIT: 100 INJECTION, SOLUTION INTRAVENOUS; SUBCUTANEOUS at 11:31

## 2021-05-09 RX ADMIN — INSULIN ASPART SCH UNIT: 100 INJECTION, SOLUTION INTRAVENOUS; SUBCUTANEOUS at 16:44

## 2021-05-09 RX ADMIN — MICONAZOLE NITRATE SCH APPLIC: 20 POWDER TOPICAL at 21:24

## 2021-05-09 RX ADMIN — INSULIN ASPART SCH UNIT: 100 INJECTION, SOLUTION INTRAVENOUS; SUBCUTANEOUS at 21:24

## 2021-05-09 RX ADMIN — OXYCODONE HYDROCHLORIDE SCH MG: 10 TABLET, FILM COATED, EXTENDED RELEASE ORAL at 21:25

## 2021-05-09 RX ADMIN — DOCUSATE SODIUM AND SENNOSIDES SCH EA: 8.6; 5 TABLET, FILM COATED ORAL at 19:36

## 2021-05-09 NOTE — PROGRESS NOTE
Subjective


Date Seen by a Provider:  May 9, 2021


Time Seen by a Provider:  10:45


Subjective/Events-last exam


Patient seen with Dr. Jiménez.  Patient reports doing well.  Tolerating PT/OT.  

Pain controlled.  No complaints at this time.





Objective


Exam





Vital Signs








  Date Time  Temp Pulse Resp B/P (MAP) Pulse Ox O2 Delivery O2 Flow Rate FiO2


 


5/9/21 09:01      Room Air  


 


5/9/21 07:30 35.9 80 20 121/55 (77) 95 Room Air  


 


5/8/21 20:00 36.5 73 16 112/62 (79) 97   


 


5/8/21 20:00      Room Air  





Capillary Refill :


General Appearance:  No Apparent Distress, WD/WN, Obese


Neck:  Normal Inspection, Supple


Respiratory:  No Accessory Muscle Use, No Respiratory Distress


Cardiovascular:  Regular Rate, Rhythm, No Edema


Gastrointestinal:  normal bowel sounds, non tender, soft


Extremity:  Other (Left BKA incision healing well with no signs of infection.  

Some skin staples still in place.)


Neurologic/Psychiatric:  Alert, Oriented x3


Skin:  Normal Color, Warm/Dry





Results


Lab


Laboratory Tests


5/8/21 12:07: Glucometer 143H


5/8/21 15:23: Glucometer 178H


5/8/21 20:06: Glucometer 190H


5/9/21 06:11: Glucometer 87





Assessment/Plan


Assessment/Plan


Assess & Plan/Chief Complaint


s/p left BKA.


cont rehab.


Continue stump 


long term will want pt to proceed with medically supervised weight loss and once

at a healthier weight(BMI around 50) , patient states very much interested and 

planning on it.


will remove half of remaining skin staples, then the rest in 5-7 days











NEGIN CHA           May 9, 2021 11:02

## 2021-05-09 NOTE — PM&R PROGRESS NOTE
Subjective


HPI/CC On Admission


Date Seen by Provider:  May 9, 2021


Time Seen by Provider:  12:00


Subjective/Events-last exam


5/9/21:


Patient doing well


Labs due tomorrow


Some staples removed today


Dr Jiménez appreciated








5/8/21:


No major issues


Pain is controlled


Reviewed meds


Sugars and BP ok








5/7/2021:


Patient doing very well


Dr. JIMÉNEZ evaluated the leg


Sugars are good


Pain is well managed with pain medication


Blood pressure good 126/58








5/6/21:


Pt doing very well


Stump  is on


Blood sugars are good


Bowels moved yesterday


Staples will be removed per Dr. Jiménez's order








5/5/21:


Pt doing pretty well


Stump  was uncomfortable so that was taken off last night but put on 

today and doing pretty well


Bowels moved yesterday


Discharge is planned for first of next week








5/4/21:


Pt doing pretty well


Had a large BM last night


Dr. Guzmán thinks that the wound looks really good


Stump  will be started today


Sugars are okay








5/3/21:


Pt doing much better


Hgb 9.5


Bowels moved yesterday


Sugar was 100 this morning


Drainage from incision continues so stump- is being held








5/2/21:


Patient doing well


No pain reported except during dressing changes


Long acting Oxycontin working well for the patient


Stump  today or tomorrow








5/1/21:


Patient doing well


Oxycontin 10mg PO BID


Change dressing as instructed


Incision looks good


Fentanyl infusion before dressing changes








4/30/21:


Patient doing well


Lost 11# since admit


BM yesterday


Sugars are good








4/29/21:


No major issues


Incision is bleeding in 1 specific area


Scheduled Oxycontin is helping and minimsl IR used


BM+








4/28/21:


No major issues


BM yesterday will need to closely monitor that due to long acting narcs


Dr Jiménez will see him today regarding his staples


Talked about weight loss with Dr Jiménez








4/27/21:


Pt doing really well


No issues 


Stump  is being tolerated for 23 hours of the day 


No pain 


Sugar is okay and I decreased insulin even further today 








4/26/21:


Pt doing a lot better today


Pain is well controlled


Bowels moved yesterday


Stump  will be placed today on his left amputation site


Slept very well








4/25/21:


Doing well


No pain when he doesn't move


Pain controlled better with Oxycontin


BM+


Mother and son visiting him today


Sugars reviewed








4/24/21:


Pain is better


No issues reported


Hgb 7.9


BM++


Using IS








4/23/21:


Doing well


Stump  will start Monday per Dr Jiménez


BM+


Oxycontin and Oxycodone added for better pain control


DC Lortab








4/22/21:


Pt doing pretty well


Had a large BM today


Hgb 7.7 after three units of blood on med surg


Rough on the transfers but getting better


Dressing change will be per Dr. Morejon orders


Sugar is 107 this morning





Review of Systems


General:  Fatigue, Malaise


Musculoskeletal:  leg pain





Objective


Exam


Vital Signs





Vital Signs








  Date Time  Temp Pulse Resp B/P (MAP) Pulse Ox O2 Delivery O2 Flow Rate FiO2


 


5/9/21 09:01      Room Air  


 


5/9/21 07:30 35.9 80 20 121/55 [phone]   





Capillary Refill :


General Appearance:  No Apparent Distress, WD/WN, Chronically ill, Obese


HEENT:  PERRL/EOMI, Normal ENT Inspection, Pharynx Normal


Neck:  Full Range of Motion, Normal Inspection, Non Tender, Supple, Carotid 

Bruit


Respiratory:  Chest Non Tender, Lungs Clear, Normal Breath Sounds, No Accessory 

Muscle Use, No Respiratory Distress


Cardiovascular:  Regular Rate, Rhythm, No Edema, No Gallop, No JVD, No Murmur, 

Normal Peripheral Pulses


Gastrointestinal:  Normal Bowel Sounds, No Organomegaly, No Pulsatile Mass, Non 

Tender, Soft


Back:  Normal Inspection, No CVA Tenderness, No Vertebral Tenderness


Extremity:  Normal Capillary Refill, Normal Inspection, Normal Range of Motion, 

Non Tender, No Calf Tenderness, No Pedal Edema


Neurologic/Psychiatric:  Alert, Oriented x3, No Motor/Sensory Deficits, Normal 

Mood/Affect, CNs II-XII Norm as Tested, Abnormal Gait, Motor Weakness (left leg 

amputation )


Skin:  Normal Color, Warm/Dry


Lymphatic:  No Adenopathy





Results/Procedures


Lab


Patient resulted labs reviewed.





FIM


Transfers


Therapy Code Descriptions/Definitions 





Functional [place] Measure:


0=Not Assessed/NA        4=Minimal Assistance


1=Total Assistance        5=Supervision or Setup


2=Maximal Assistance  6=Modified [place]


3=Moderate Assistance 7=Complete IndependenceSCALE: Activities may be completed 

with or without assistive devices.





6-Indepedent-patient completes the activity by him/herself with no assistance 

from a helper.


5-Set-up or Clean-up Assistance-helper sets up or cleans up; patient completes 

activity. [place] assists only prior to or  


    following the activity.


4-Supervision or Touching Assistance-helper provides verbal cues and/or 

touching/steadying and/or contact guard assistance as patient completes 

activity. Assistance may be provided   


    throughout the activity or intermittently.


3-Partial/Moderate Assistance-helper does LESS THAN HALF the effort. [place] 

lifts, holds or supports trunk or limbs, but provides less than half the effort.


2-Substantial/Maximal Assistance-helper does MORE THAN HALF the effort. [place] 

lifts or holds trunk or limbs and provides more than half the effort.


9-Ifwbjucsd-xgcger does ALL the effort. Patient does none of the effort to 

complete the activity. Or, the assistance of 2 or more helpers is required for 

the patient to complete the  


    activity.


If activity was not attempted, code reason:


7-Patient Refused.


9-Not Applicable-not attempted and the patient did not perform the activity 

before the current illness, exacerbation or injury.


10-Not Attempted due to Environmental Limitations-(lack of equipment, weather 

restraints, etc.).


88-Not Attempted due to Medical Conditions or Safety Concerns.


Roll Left to Right (QC):  5


Sit to Lying (QC):  6


Sit to Stand (QC):  4 (CGA with FWW from elevated therapy mat)


Chair/Bed-to-Chair Xfer(QC):  3 (Mod to Min A with sliding board)


Car Transfer (QC):  88





Gait Training


Does the Patient Walk?:  No and Walking Goal IS indicated


Distance:  5' x 2


Walk 10 feet (QC):  88


Walk 50 ft with 2 Turns(QC):  88


Walk 150 ft (QC):  88


Walking 10ft/uneven surface-QC:  88


Gait Assistive Device:  FWW





Wheelchair Training


Does the Pt Use a Wheelchair?:  Yes


Wheel 50 ft with 2 turns (QC):  6


Wheel 150 ft (QC):  6


Type of Wheelchair:  Manual





Stair Training


1 Step (curb) (QC):  88


4 Steps (QC):  88


12 Steps (QC):  88





Balance


Picking up an Object (QC):  88





ADL-Treatment


Eating (QC):  6


Oral Hygiene (QC):  6


Shower/Bathe Self (QC):  5


Upper Body Dressing (QC):  5


Lower Body Dressing (QC):  5 (Pt. is able to doff/don shorts on side of bed by 

leaning side to side, and pulling up.)


On/Off Footwear (QC):  4


Toileting Hygiene (QC):  4 (Pt. able to cleanse rear primo area after 

transferring back to bed.  )


Toilet Transfer (QC):  2





Assessment/Plan


Assessment and Plan


Assess & Plan/Chief Complaint


Assessment:


s/p LBKA 


DM insulin dependence


HTN


HLP


CRI


Iron def anemia from acute blood loss


TERRI untreated





Plan:


Monitor pain


IRF protocol


Monitor hgb


Iron infusion





4/22/21:


Monitor sugar


Monitor hgb





4/23/21:


Add pain meds


Check labs in am





4/24/21:


Monitor sugar and decrease insulin


Hgb stable


Venofer





4/25/21:


Continue iron infusions


Sugar managed well


Pain controlled





4/26/21:


Stump 


Monitor sugar


Monitor hgb





4/27/21:


Monitor sugar


Decrease insulin


Monitor BP





4/28/21:


Monitor pain


Decreased insulin





4/29/21:


Monitor pain


BM regimen to prevent narcotic bowel





4/30/21:


Monitor closely


Monitor sugar





5/1/21:


Monitor closely


Dressing changes





5/2/21:


Monitor pain


Increase insulin





5/3/21:


Monitor closely


Monitor sugar levels and adjust insulin as needed





5/4/21:


Stump  maintained


Monitor closely





5/5/21:


Monitor pain


Stump 





5/6/21:


Monitor closely


Monitor staples





5/7/2021:


Continue weight loss regimen


Stump 


Monitor ganesh per Dr. JIMÉNEZ





5/8/21:


Continue weight loss


Monitor sugar





5/9/21:


Monitor closely


Labs tomorrow





(1) Status post below-knee amputation of left lower extremity


Status:  Acute


(2) Hyperglycemia


Status:  Chronic


(3) Hypertension


Status:  Chronic


(4) Diabetes mellitus, type 2


Status:  Chronic


(5) Anemia


Status:  Acute


(6) Iron deficiency


Status:  Acute


(7) Peripheral neuropathy


Status:  Chronic











NICOLE VILA DO                 May 9, 2021 07:09

## 2021-05-10 VITALS — SYSTOLIC BLOOD PRESSURE: 116 MMHG | DIASTOLIC BLOOD PRESSURE: 56 MMHG

## 2021-05-10 VITALS — DIASTOLIC BLOOD PRESSURE: 72 MMHG | SYSTOLIC BLOOD PRESSURE: 110 MMHG

## 2021-05-10 LAB
ALBUMIN SERPL-MCNC: 3.5 GM/DL (ref 3.2–4.5)
ALP SERPL-CCNC: 87 U/L (ref 40–136)
ALT SERPL-CCNC: 24 U/L (ref 0–55)
BASOPHILS # BLD AUTO: 0 10^3/UL (ref 0–0.1)
BASOPHILS NFR BLD AUTO: 1 % (ref 0–10)
BILIRUB SERPL-MCNC: 0.3 MG/DL (ref 0.1–1)
BUN/CREAT SERPL: 28
CALCIUM SERPL-MCNC: 9.1 MG/DL (ref 8.5–10.1)
CHLORIDE SERPL-SCNC: 105 MMOL/L (ref 98–107)
CO2 SERPL-SCNC: 24 MMOL/L (ref 21–32)
CREAT SERPL-MCNC: 1.07 MG/DL (ref 0.6–1.3)
EOSINOPHIL # BLD AUTO: 0.2 10^3/UL (ref 0–0.3)
EOSINOPHIL NFR BLD AUTO: 4 % (ref 0–10)
GFR SERPLBLD BASED ON 1.73 SQ M-ARVRAT: > 60 ML/MIN
GLUCOSE SERPL-MCNC: 128 MG/DL (ref 70–105)
HCT VFR BLD CALC: 30 % (ref 40–54)
HGB BLD-MCNC: 9.3 G/DL (ref 13.3–17.7)
LYMPHOCYTES # BLD AUTO: 2 10^3/UL (ref 1–4)
LYMPHOCYTES NFR BLD AUTO: 33 % (ref 12–44)
MANUAL DIFFERENTIAL PERFORMED BLD QL: NO
MCH RBC QN AUTO: 27 PG (ref 25–34)
MCHC RBC AUTO-ENTMCNC: 31 G/DL (ref 32–36)
MCV RBC AUTO: 88 FL (ref 80–99)
MONOCYTES # BLD AUTO: 0.4 10^3/UL (ref 0–1)
MONOCYTES NFR BLD AUTO: 6 % (ref 0–12)
NEUTROPHILS # BLD AUTO: 3.4 10^3/UL (ref 1.8–7.8)
NEUTROPHILS NFR BLD AUTO: 56 % (ref 42–75)
PLATELET # BLD: 249 10^3/UL (ref 130–400)
PMV BLD AUTO: 9.8 FL (ref 9–12.2)
POTASSIUM SERPL-SCNC: 4.6 MMOL/L (ref 3.6–5)
PROT SERPL-MCNC: 6.6 GM/DL (ref 6.4–8.2)
SODIUM SERPL-SCNC: 140 MMOL/L (ref 135–145)
WBC # BLD AUTO: 6.1 10^3/UL (ref 4.3–11)

## 2021-05-10 RX ADMIN — METOPROLOL SUCCINATE SCH MG: 100 TABLET, EXTENDED RELEASE ORAL at 07:59

## 2021-05-10 RX ADMIN — GLYBURIDE SCH MG: 2.5 TABLET ORAL at 06:49

## 2021-05-10 RX ADMIN — ASPIRIN SCH MG: 81 TABLET ORAL at 07:53

## 2021-05-10 RX ADMIN — Medication SCH ML: at 06:49

## 2021-05-10 RX ADMIN — INSULIN ASPART SCH UNIT: 100 INJECTION, SOLUTION INTRAVENOUS; SUBCUTANEOUS at 17:02

## 2021-05-10 RX ADMIN — DOCUSATE SODIUM SCH MG: 100 CAPSULE ORAL at 08:51

## 2021-05-10 RX ADMIN — Medication SCH ML: at 10:54

## 2021-05-10 RX ADMIN — DOCUSATE SODIUM AND SENNOSIDES SCH EA: 8.6; 5 TABLET, FILM COATED ORAL at 20:10

## 2021-05-10 RX ADMIN — POLYETHYLENE GLYCOL (3350) SCH GM: 17 POWDER, FOR SOLUTION ORAL at 20:10

## 2021-05-10 RX ADMIN — METFORMIN HYDROCHLORIDE SCH MG: 500 TABLET, EXTENDED RELEASE ORAL at 07:53

## 2021-05-10 RX ADMIN — OXYCODONE HYDROCHLORIDE SCH MG: 10 TABLET, FILM COATED, EXTENDED RELEASE ORAL at 07:53

## 2021-05-10 RX ADMIN — INSULIN ASPART SCH UNIT: 100 INJECTION, SOLUTION INTRAVENOUS; SUBCUTANEOUS at 11:37

## 2021-05-10 RX ADMIN — FENTANYL CITRATE PRN MCG: 50 INJECTION, SOLUTION INTRAMUSCULAR; INTRAVENOUS at 10:54

## 2021-05-10 RX ADMIN — METFORMIN HYDROCHLORIDE SCH MG: 500 TABLET, EXTENDED RELEASE ORAL at 17:08

## 2021-05-10 RX ADMIN — POLYETHYLENE GLYCOL (3350) SCH GM: 17 POWDER, FOR SOLUTION ORAL at 08:51

## 2021-05-10 RX ADMIN — MICONAZOLE NITRATE SCH APPLIC: 20 POWDER TOPICAL at 07:55

## 2021-05-10 RX ADMIN — PRASUGREL SCH MG: 10 TABLET, FILM COATED ORAL at 07:52

## 2021-05-10 RX ADMIN — DOCUSATE SODIUM SCH MG: 100 CAPSULE ORAL at 20:10

## 2021-05-10 RX ADMIN — INSULIN ASPART SCH UNIT: 100 INJECTION, SOLUTION INTRAVENOUS; SUBCUTANEOUS at 20:30

## 2021-05-10 RX ADMIN — LISINOPRIL SCH MG: 20 TABLET ORAL at 07:54

## 2021-05-10 RX ADMIN — MICONAZOLE NITRATE SCH APPLIC: 20 POWDER TOPICAL at 20:58

## 2021-05-10 RX ADMIN — DOCUSATE SODIUM AND SENNOSIDES SCH EA: 8.6; 5 TABLET, FILM COATED ORAL at 08:52

## 2021-05-10 RX ADMIN — GLYBURIDE SCH MG: 2.5 TABLET ORAL at 17:08

## 2021-05-10 RX ADMIN — OXYCODONE HYDROCHLORIDE SCH MG: 10 TABLET, FILM COATED, EXTENDED RELEASE ORAL at 20:58

## 2021-05-10 RX ADMIN — PANTOPRAZOLE SODIUM SCH MG: 40 TABLET, DELAYED RELEASE ORAL at 07:53

## 2021-05-10 RX ADMIN — Medication SCH ML: at 20:59

## 2021-05-10 RX ADMIN — INSULIN ASPART SCH UNIT: 100 INJECTION, SOLUTION INTRAVENOUS; SUBCUTANEOUS at 06:24

## 2021-05-10 NOTE — PM&R PROGRESS NOTE
Subjective


HPI/CC On Admission


Date Seen by Provider:  May 10, 2021


Time Seen by Provider:  09:15


Subjective/Events-last exam


5/10/21:


Patient doing well today


No pain is reported


Just took a shower


Dressing is changed daily


Blood sugar 128


Hemoglobin 9.3








5/9/21:


Patient doing well


Labs due tomorrow


Some staples removed today


Dr Jiménez appreciated








5/8/21:


No major issues


Pain is controlled


Reviewed meds


Sugars and BP ok








5/7/2021:


Patient doing very well


Dr. JIMÉNEZ evaluated the leg


Sugars are good


Pain is well managed with pain medication


Blood pressure good 126/58








5/6/21:


Pt doing very well


Stump  is on


Blood sugars are good


Bowels moved yesterday


Staples will be removed per Dr. Jiménez's order








5/5/21:


Pt doing pretty well


Stump  was uncomfortable so that was taken off last night but put on 

today and doing pretty well


Bowels moved yesterday


Discharge is planned for first of next week








5/4/21:


Pt doing pretty well


Had a large BM last night


Dr. Guzmán thinks that the wound looks really good


Stump  will be started today


Sugars are okay








5/3/21:


Pt doing much better


Hgb 9.5


Bowels moved yesterday


Sugar was 100 this morning


Drainage from incision continues so stump- is being held








5/2/21:


Patient doing well


No pain reported except during dressing changes


Long acting Oxycontin working well for the patient


Stump  today or tomorrow








5/1/21:


Patient doing well


Oxycontin 10mg PO BID


Change dressing as instructed


Incision looks good


Fentanyl infusion before dressing changes








4/30/21:


Patient doing well


Lost 11# since admit


BM yesterday


Sugars are good








4/29/21:


No major issues


Incision is bleeding in 1 specific area


Scheduled Oxycontin is helping and minimsl IR used


BM+








4/28/21:


No major issues


BM yesterday will need to closely monitor that due to long acting narcs


Dr Jiménez will see him today regarding his staples


Talked about weight loss with Dr Jiménez








4/27/21:


Pt doing really well


No issues 


Stump  is being tolerated for 23 hours of the day 


No pain 


Sugar is okay and I decreased insulin even further today 








4/26/21:


Pt doing a lot better today


Pain is well controlled


Bowels moved yesterday


Stump  will be placed today on his left amputation site


Slept very well








4/25/21:


Doing well


No pain when he doesn't move


Pain controlled better with Oxycontin


BM+


Mother and son visiting him today


Sugars reviewed








4/24/21:


Pain is better


No issues reported


Hgb 7.9


BM++


Using IS








4/23/21:


Doing well


Stump  will start Monday per Dr Jiménez


BM+


Oxycontin and Oxycodone added for better pain control


DC Lortab








4/22/21:


Pt doing pretty well


Had a large BM today


Hgb 7.7 after three units of blood on med surg


Rough on the transfers but getting better


Dressing change will be per Dr. Morejon orders


Sugar is 107 this morning





Review of Systems


Musculoskeletal:  leg pain





Objective


Exam


Vital Signs





Vital Signs








  Date Time  Temp Pulse Resp B/P (MAP) Pulse Ox O2 Delivery O2 Flow Rate FiO2


 


5/10/21 08:34      Room Air  


 


5/10/21 08:00 36.2 71 18 116/56 [phone]   





Capillary Refill :


General Appearance:  No Apparent Distress, WD/WN, Chronically ill, Obese


HEENT:  PERRL/EOMI, Normal ENT Inspection, Pharynx Normal


Neck:  Full Range of Motion, Normal Inspection, Non Tender, Supple, Carotid 

Bruit


Respiratory:  Chest Non Tender, Lungs Clear, Normal Breath Sounds, No Accessory 

Muscle Use, No Respiratory Distress


Cardiovascular:  Regular Rate, Rhythm, No Edema, No Gallop, No JVD, No Murmur, 

Normal Peripheral Pulses


Gastrointestinal:  Normal Bowel Sounds, No Organomegaly, No Pulsatile Mass, Non 

Tender, Soft


Back:  Normal Inspection, No CVA Tenderness, No Vertebral Tenderness


Extremity:  Normal Capillary Refill, Normal Inspection, Normal Range of Motion, 

Non Tender, No Calf Tenderness, No Pedal Edema


Neurologic/Psychiatric:  Alert, Oriented x3, No Motor/Sensory Deficits, Normal 

Mood/Affect, CNs II-XII Norm as Tested, Abnormal Gait, Motor Weakness (left leg 

amputation )


Skin:  Normal Color, Warm/Dry


Lymphatic:  No Adenopathy





Results/Procedures


Lab


Laboratory Tests


5/10/21 05:55








Patient resulted labs reviewed.





FIM


Transfers


Therapy Code Descriptions/Definitions 





Functional [place] Measure:


0=Not Assessed/NA        4=Minimal Assistance


1=Total Assistance        5=Supervision or Setup


2=Maximal Assistance  6=Modified [place]


3=Moderate Assistance 7=Complete IndependenceSCALE: Activities may be completed 

with or without assistive devices.





6-Indepedent-patient completes the activity by him/herself with no assistance 

from a helper.


5-Set-up or Clean-up Assistance-helper sets up or cleans up; patient completes 

activity. [place] assists only prior to or  


    following the activity.


4-Supervision or Touching Assistance-helper provides verbal cues and/or 

touching/steadying and/or contact guard assistance as patient completes act

ivity. Assistance may be provided   


    throughout the activity or intermittently.


3-Partial/Moderate Assistance-helper does LESS THAN HALF the effort. [place] 

lifts, holds or supports trunk or limbs, but provides less than half the effort.


2-Substantial/Maximal Assistance-helper does MORE THAN HALF the effort. [place] 

lifts or holds trunk or limbs and provides more than half the effort.


5-Tcvvwxazw-lztamz does ALL the effort. Patient does none of the effort to 

complete the activity. Or, the assistance of 2 or more helpers is required for 

the patient to complete the  


    activity.


If activity was not attempted, code reason:


7-Patient Refused.


9-Not Applicable-not attempted and the patient did not perform the activity 

before the current illness, exacerbation or injury.


10-Not Attempted due to Environmental Limitations-(lack of equipment, weather 

restraints, etc.).


88-Not Attempted due to Medical Conditions or Safety Concerns.


Roll Left to Right (QC):  6


Sit to Lying (QC):  6


Sit to Stand (QC):  4 (CGA with FWW from elevated therapy mat)


Chair/Bed-to-Chair Xfer(QC):  3 (Mod to Min A with sliding board)


Car Transfer (QC):  88





Gait Training


Does the Patient Walk?:  No and Walking Goal IS indicated


Distance:  5' x 2


Walk 10 feet (QC):  88


Walk 50 ft with 2 Turns(QC):  88


Walk 150 ft (QC):  88


Walking 10ft/uneven surface-QC:  88


Gait Assistive Device:  FWW





Wheelchair Training


Does the Pt Use a Wheelchair?:  Yes


Wheel 50 ft with 2 turns (QC):  6


Wheel 150 ft (QC):  6


Type of Wheelchair:  Manual





Stair Training


1 Step (curb) (QC):  88


4 Steps (QC):  88


12 Steps (QC):  88





Balance


Picking up an Object (QC):  88





ADL-Treatment


Eating (QC):  6


Oral Hygiene (QC):  6


Shower/Bathe Self (QC):  5


Upper Body Dressing (QC):  5


Lower Body Dressing (QC):  5 (Pt. is able to doff/don shorts on side of bed by 

leaning side to side, and pulling up.)


On/Off Footwear (QC):  4


Toileting Hygiene (QC):  4 (Pt. able to cleanse rear primo area after 

transferring back to bed.  )


Toilet Transfer (QC):  2





Assessment/Plan


Assessment and Plan


Assess & Plan/Chief Complaint


Assessment:


s/p LBKA 


DM insulin dependence


HTN


HLP


CRI


Iron def anemia from acute blood loss


TERRI untreated





Plan:


Monitor pain


IRF protocol


Monitor hgb


Iron infusion





4/22/21:


Monitor sugar


Monitor hgb





4/23/21:


Add pain meds


Check labs in am





4/24/21:


Monitor sugar and decrease insulin


Hgb stable


Venofer





4/25/21:


Continue iron infusions


Sugar managed well


Pain controlled





4/26/21:


Stump 


Monitor sugar


Monitor hgb





4/27/21:


Monitor sugar


Decrease insulin


Monitor BP





4/28/21:


Monitor pain


Decreased insulin





4/29/21:


Monitor pain


BM regimen to prevent narcotic bowel





4/30/21:


Monitor closely


Monitor sugar





5/1/21:


Monitor closely


Dressing changes





5/2/21:


Monitor pain


Increase insulin





5/3/21:


Monitor closely


Monitor sugar levels and adjust insulin as needed





5/4/21:


Stump  maintained


Monitor closely





5/5/21:


Monitor pain


Stump 





5/6/21:


Monitor closely


Monitor staples





5/7/2021:


Continue weight loss regimen


Stump 


Monitor ganesh per Dr. JIMÉNEZ





5/8/21:


Continue weight loss


Monitor sugar





5/9/21:


Monitor closely


Labs tomorrow





5/10/21:


Control pain


Monitor hemoglobin


Monitor blood sugar





(1) Status post below-knee amputation of left lower extremity


Status:  Acute


(2) Hyperglycemia


Status:  Chronic


(3) Hypertension


Status:  Chronic


(4) Diabetes mellitus, type 2


Status:  Chronic


(5) Anemia


Status:  Acute


(6) Iron deficiency


Status:  Acute


(7) Peripheral neuropathy


Status:  Chronic











NICOLE VILA DO                May 10, 2021 09:42

## 2021-05-10 NOTE — OCCUPATIONAL THER DAILY NOTE
OT Current Status-Daily Note


Subjective


No pain reported.





Appearance


Pt. in bed.  Agrees to shower.





Mental Status/Objective


Patient Orientation:  Person, Place, Time, Situation





ADL-Treatment


Therapy Code Descriptions/Definitions 





Functional [place] Measure:


0=Not Assessed/NA        4=Minimal Assistance


1=Total Assistance        5=Supervision or Setup


2=Maximal Assistance  6=Modified [place]


3=Moderate Assistance 7=Complete IndependenceSCALE: Activities may be completed 

with or without assistive devices.





6-Indepedent-patient completes the activity by him/herself with no assistance 

from a helper.


5-Set-up or Clean-up Assistance-helper sets up or cleans up; patient completes 

activity. [place] assists only prior to or  


    following the activity.


4-Supervision or Touching Assistance-helper provides verbal cues and/or touch

ing/steadying and/or contact guard assistance as patient completes activity. 

Assistance may be provided   


    throughout the activity or intermittently.


3-Partial/Moderate Assistance-helper does LESS THAN HALF the effort. [place] 

lifts, holds or supports trunk or limbs, but provides less than half the effort.


2-Substantial/Maximal Assistance-helper does MORE THAN HALF the effort. [place] 

lifts or holds trunk or limbs and provides more than half the effort.


7-Zrvctmswl-hdgdfr does ALL the effort. Patient does none of the effort to 

complete the activity. Or, the assistance of 2 or more helpers is required for 

the patient to complete the  


    activity.


If activity was not attempted, code reason:


7-Patient Refused.


9-Not Applicable-not attempted and the patient did not perform the activity 

before the current illness, exacerbation or injury.


10-Not Attempted due to Environmental Limitations-(lack of equipment, weather 

restraints, etc.).


88-Not Attempted due to Medical Conditions or Safety Concerns.


Eating (QC):  6


Oral Hygiene (QC):  6 (From wheelchair level.)


Shower/Bathe Self (QC):  4


Upper Body Dressing (QC):  5


Lower Body Dressing (QC):  3 (Pt. able to stand at bars in large shower room.  

He has been able to don shorts over right foot and left leg.  Requires max 

assist to don over shorts.)


On/Off Footwear:  2 (Pt. has demonstrated ability to don shoes and socks from 

bed level.  However, requires assistance due to his set up while up in shower 

chair.)





Other Treatment


Pt. is able to stand from elevated bed surface with walker, with SBA.  This is 

after he transfers with Mod I to side of bed.  Pt. is able to stand and pivot to

large shower chair with SBA.  Pt. taken to large shower room.  Able to wash all 

parts seated on chair.  Pt. is able to don shirt, and then don shorts over legs.

 Pt. able to stand at corner cross bars in shower room while OT dons shorts over

hips.  Wheelchair is switched out for shower chair with assist of another 

person.  Once pt. is up in wheelchair, he is able to take self to bathroom in 

his own room, and complete all grooming tasks.  After this, pt. self propels to 

therapy gym, and participates in co-treatment with PT.  PT assesses standing 

balance while OT assesses endurance and hand placement on parallel bars, and 

then walker.  Pt. stands approximately 4 times at parallel bars from wheelchair 

with SBA, and then is able to transfer to walker.  Pt. unable to hold onto 

walker for extended time, and will transfer hands back and forth between walker 

and bars.  All needs met up in chair with PT when OT exited gym.





Education


OT Patient Education:  Correct positioning, Modified ADL techniques, Progress 

toward Goal/Update tx plan, Purpose of tx/functional activities, Reviewed 

precautions, Rehab process, Transfer techniques


Teaching Recipient:  Patient


Teaching Methods:  Demonstration, Discussion


Response to Teaching:  Verbalize Understanding, Return Demonstration





OT Short Term Goals


Short Term Goals


Time Frame:  May 5, 2021


Eatin


Oral hygiene:  6


Toileting hygiene:  4


Shower/bathe self:  4


Upper body dressin


Lower body dressing:  3


Putting on/taking off footwear:  3





OT Long Term Goals


Long Term Goals


Time Frame:  May 19, 2021


Eating (QC):  6


Oral Hygiene (QC):  6


Toileting Hygiene (QC):  6


Shower/Bathe Self (QC):  6


Upper Body Dressing (QC):  6


Lower Body Dressing (QC):  6


On/Off Footwear (QC):  6


Additional Goals:  1-Demonstrate ADL Tasks, 2-Verbalize Understanding, 3-

ImproveStrength/Aung


1=Demonstrate adherence to instructed precautions during ADL tasks.


2=Patient will verbalize/demonstrate understanding of assistive 

devices/modifications for ADL.


3=Patient will improve strength/tolerance for activity to enable patient to 

perform ADL's.





OT Education/Plan


Problem List/Assessment


Assessment:  Decreased Activ Tolerance, Impaired I ADL's, Impaired Self-Care 

Skills





Discharge Recommendations


Plan/Recommendations:  Continue POC





Treatment Plan/Plan of Care


Treatment,Training & Education:  Yes


Patient would benefit from OT for education, treatment and training to promote 

independence in ADL's, mobility, safety and/or upper extremity function for 

ADL's.


Plan of Care:  ADL Retraining, Functional Mobility, Group Exercise/Act as Ind, 

UE Funct Exercise/Act


Treatment Duration:  May 19, 2021


Frequency:  At least 5 of 7 days/Wk (IRF)


Estimated Hrs Per Day:  1.5 hours per day


Agreement:  Yes


Rehab Potential:  Good





Time/GCodes


Start Time:  09:00


Stop Time:  10:30


Total Time Billed (hr/min):  90


Billed Treatment Time


1, ADL x 60minutes, FA x 30minutes











JAROCHO OBRIEN OT           May 10, 2021 13:33

## 2021-05-10 NOTE — PHYSICAL THERAPY DAILY NOTE
PT Daily Note-Current


Subjective


Patient supine in bed pre tx, consents to therapy. Patient does not c/o pain.





Appearance


Patient seated EOB post tx, with call button within reach and tray table nearby.

All needs met.





Mental Status


Patient Orientation:  Person, Place, Time, Normal For Age





Transfers


SCALE: Activities may be completed with or without assistive devices.





6-Indepedent-patient completes the activity by him/herself with no assistance 

from a helper.


5-Set-up or Clean-up Assistance-helper sets up or cleans up; patient completes 

activity. [place] assists only prior to or  


    following the activity.


4-Supervision or Touching Assistance-helper provides verbal cues and/or 

touching/steadying and/or contact guard assistance as patient completes 

activity. Assistance may be provided   


    throughout the activity or intermittently.


3-Partial/Moderate Assistance-helper does LESS THAN HALF the effort. [place] 

lifts, holds or supports trunk or limbs, but provides less than half the effort.


2-Substantial/Maximal Assistance-helper does MORE THAN HALF the effort. [place] 

lifts or holds trunk or limbs and provides more than half the effort.


2-Kpqmixcoz-pdqcyd does ALL the effort. Patient does none of the effort to 

complete the activity. Or, the assistance of 2 or more helpers is required for 

the patient to complete the  


    activity.


If activity was not attempted, code reason:


7-Patient Refused.


9-Not Applicable-not attempted and the patient did not perform the activity 

before the current illness, exacerbation or injury.


10-Not Attempted due to Environmental Limitations-(lack of equipment, weather 

restraints, etc.).


88-Not Attempted due to Medical Conditions or Safety Concerns.


Roll Left & Right (QC):  6


Sit to Lying (QC):  6


Lying to Sitting/Side of Bed(Q:  6


Sit to Stand (QC):  4 (SBA with elevated bed and reclining chair)


Chair/Bed-to-Chair Xfer(QC):  4 (SBA with stand pivot transfer to w/c and to 

lift chair, SBA with slide board to bed from w/c)


Patient performed 5 successful sit to stands using lift chair, as patient has 

lift chair at home and wanted to practice standing with lift chair in IRF. 

Patient attempted sit to stand several times, second-handedly working on hip and

knee extension, and tricep strength as patient tried to position and reposition 

body in chair for successful attempts.





Weight Bearing


Right Lower Extremity:  Right


Full Weight Bearing


Left Lower Extremity:  Left


Non Weight Bearing (BKA)





Treatments


LE strengthening, functional mobility, transfers





Assessment


Current Status:  Fair Progress


Patient was able to position body to successfuly complete sit to stand transfer 

from lift chair. Patient struggles with positioning body once he is standing, as

he cannot take steps at this time, but is able to complete pivoting with FWW to 

seat.





PT Short Term Goals


Short Term Goals


Time Frame:  2021


Roll Left & Right:  6


Sit to lyin


Lying to sitting on side of be:  6


Sit to stand:  3


Chair/bed-to-chair transfer:  3


Wheel 50ft w/2 turns:  6


Wheel 150 feet:  6





PT Long Term Goals


Long Term Goals


PT Long Term Goals Time Frame:  May 19, 2021


Roll Left & Right (QC):  6


Sit to Lying (QC):  6


Lying-Sitting on Side/Bed(QC):  6


Sit to Stand (QC):  6


Chair/Bed-to-Chair Xfer(QC):  6 (SPT or slideboard, whichever is the safest me

thod)


Toilet Transfer (QC):  6 (SPT or slide board)


Car Transfer (QC):  4


Does the Patient Walk:  No and Walking Goal NOT indicated


Walk 10 feet (QC):  88


Walk 50ft with 2 Turns (QC):  88


Walk 150 ft (QC):  88


Walking 10ft on Uneven Surface:  88


1 Step (curb) (QC):  88


4 Steps (QC):  88


12 Steps (QC):  88


Picking up an Object (QC):  88


Does the Pt use WC or Scooter?:  Yes


Wheel 50 feet with 2 turns (QC:  6


Type:  Manual


Wheel 150 feet:  6


Type:  Manual





PT Plan


Problem List


Problem List:  Activity Tolerance, Functional Strength, Safety, Balance, Gait, 

Transfer, Bed Mobility, ROM





Treatment/Plan


Treatment Plan:  Continue Plan of Care


Treatment Plan:  Bed Mobility, Education, Functional Activity Aung, Functional 

Strength, Group Therapy, Gait, Safety, Therapeutic Exercise, Transfers


Treatment Duration:  May 19, 2021


Frequency:  At least 5 of 7 days/Wk (IRF)


Estimated Hrs Per Day:  1.5 hours per day


Patient and/or Family Agrees t:  Yes





Safety Risks/Education


Patient Education:  Transfer Techniques, Correct Positioning, W/C Management, 

Safety Issues


Teaching Recipient:  Patient


Teaching Methods:  Demonstration, Discussion


Response to Teaching:  Verbalize Understanding, Return Demonstration





Time/GCodes


Time In:  1300


Time Out:  1330


Total Billed Treatment Time:  30


Total Billed Treatment


1 visit: 


FA x2: 30'











CAMILLE HARRY PT                May 10, 2021 13:47

## 2021-05-10 NOTE — PHYSICAL THERAPY DAILY NOTE
PT Daily Note-Current


Subjective


Patient seated upright in gym with OT pre tx. Patient consents to therapy, and 

does not c/o pain.





Appearance


Patient upright EOB with nursing in room post tx.





Mental Status


Patient Orientation:  Person, Place, Time, Normal For Age





Transfers


SCALE: Activities may be completed with or without assistive devices.





6-Indepedent-patient completes the activity by him/herself with no assistance 

from a helper.


5-Set-up or Clean-up Assistance-helper sets up or cleans up; patient completes 

activity. [place] assists only prior to or  


    following the activity.


4-Supervision or Touching Assistance-helper provides verbal cues and/or 

touching/steadying and/or contact guard assistance as patient completes 

activity. Assistance may be provided   


    throughout the activity or intermittently.


3-Partial/Moderate Assistance-helper does LESS THAN HALF the effort. [place] 

lifts, holds or supports trunk or limbs, but provides less than half the effort.


2-Substantial/Maximal Assistance-helper does MORE THAN HALF the effort. [place] 

lifts or holds trunk or limbs and provides more than half the effort.


2-Ygmgdjalg-jkliyh does ALL the effort. Patient does none of the effort to 

complete the activity. Or, the assistance of 2 or more helpers is required for 

the patient to complete the  


    activity.


If activity was not attempted, code reason:


7-Patient Refused.


9-Not Applicable-not attempted and the patient did not perform the activity 

before the current illness, exacerbation or injury.


10-Not Attempted due to Environmental Limitations-(lack of equipment, weather 

restraints, etc.).


88-Not Attempted due to Medical Conditions or Safety Concerns.


Sit to Stand (QC):  4 (SBA in parallel bars)


Chair/Bed-to-Chair Xfer(QC):  3 (SBA from chair to bed with patient 

independently placing sliding board)


Toilet Transfer (QC):  3 (Min A with sliding board to commode)


Patient attempted sliding board transfer to commode for first time, used Min A 

for cues with board placement, patient completed transfer well after stating he 

was nervous for transfer.





Weight Bearing


Right Lower Extremity:  Right


Full Weight Bearing


Left Lower Extremity:  Left


Non Weight Bearing (BKA)





Wheelchair Training


Does the Pt Use a Wheelchair?:  Yes


Wheel 50 ft with 2 turns (QC):  6


Wheel 150 ft (QC):  6


Type of Wheelchair:  Manual


200'





Exercises


Standing:  3 way Ex=Flex, Abd, Ext


Standing Reps:  10





Neuromuscular


Patient performed 5 sit <-> stand transfers, with weight shifts using UE's from 

parallel bars to walker, working on controlled balance and positioning with 

weight shifting. Patient completed 10 sets of UE shifts per sit to stand.





Treatments


Co-treated with OT secondary to patient fall risk, amount of assistance needed, 

and overall decreased functional mobility. OT/PT assisted with sit to stands in 

the parallel bars, with focus on maintaining balance with UE weight shifts while

unsupported. OT also intervened with 5 minutes of cycle ergonometry for improved

UE endurance.





Assessment


Current Status:  Fair Progress


Patient performed improved independence and stable transfer with w/c to bed 

transfers using sliding board with patient placing board independently. Plan to 

focus on sit to stand with lift reclining chair at next session.





PT Short Term Goals


Short Term Goals


Time Frame:  2021


Roll Left & Right:  6


Sit to lyin


Lying to sitting on side of be:  6


Sit to stand:  3


Chair/bed-to-chair transfer:  3


Wheel 50ft w/2 turns:  6


Wheel 150 feet:  6





PT Long Term Goals


Long Term Goals


PT Long Term Goals Time Frame:  May 19, 2021


Roll Left & Right (QC):  6


Sit to Lying (QC):  6


Lying-Sitting on Side/Bed(QC):  6


Sit to Stand (QC):  6


Chair/Bed-to-Chair Xfer(QC):  6 (SPT or slideboard, whichever is the safest 

method)


Toilet Transfer (QC):  6 (SPT or slide board)


Car Transfer (QC):  4


Does the Patient Walk:  No and Walking Goal NOT indicated


Walk 10 feet (QC):  88


Walk 50ft with 2 Turns (QC):  88


Walk 150 ft (QC):  88


Walking 10ft on Uneven Surface:  88


1 Step (curb) (QC):  88


4 Steps (QC):  88


12 Steps (QC):  88


Picking up an Object (QC):  88


Does the Pt use WC or Scooter?:  Yes


Wheel 50 feet with 2 turns (QC:  6


Type:  Manual


Wheel 150 feet:  6


Type:  Manual





PT Plan


Problem List


Problem List:  Activity Tolerance, Functional Strength, Safety, Balance, Gait, 

Transfer, Bed Mobility, ROM





Treatment/Plan


Treatment Plan:  Continue Plan of Care


Treatment Plan:  Bed Mobility, Education, Functional Activity Aung, Functional 

Strength, Group Therapy, Gait, Safety, Therapeutic Exercise, Transfers


Treatment Duration:  May 19, 2021


Frequency:  At least 5 of 7 days/Wk (IRF)


Estimated Hrs Per Day:  1.5 hours per day


Patient and/or Family Agrees t:  Yes





Safety Risks/Education


Patient Education:  Transfer Techniques, Correct Positioning, W/C Management, 

Safety Issues


Teaching Recipient:  Patient


Teaching Methods:  Demonstration, Discussion


Response to Teaching:  Verbalize Understanding, Return Demonstration





Time/GCodes


Time In:  0900


Time Out:  1000


Total Billed Treatment Time:  60


Total Billed Treatment


1 visit: 


FA x2: 30'


EX x2: 30'











CAMILLE HARRY PT                May 10, 2021 10:58

## 2021-05-10 NOTE — PHYSICAL THERAPY DAILY NOTE
PT Daily Note-Current


Subjective


Pt. agreeable to Rx. In bed discussing DC troubleshooting with OT . Agrees to in

bed bed mob skills and therex





Pain





   Location:  No Pain Reported





Mental Status


Patient Orientation:  Normal For Age





Transfers


SCALE: Activities may be completed with or without assistive devices.





6-Indepedent-patient completes the activity by him/herself with no assistance 

from a helper.


5-Set-up or Clean-up Assistance-helper sets up or cleans up; patient completes 

activity. [place] assists only prior to or  


    following the activity.


4-Supervision or Touching Assistance-helper provides verbal cues and/or 

touching/steadying and/or contact guard assistance as patient completes 

activity. Assistance may be provided   


    throughout the activity or intermittently.


3-Partial/Moderate Assistance-helper does LESS THAN HALF the effort. [place] 

lifts, holds or supports trunk or limbs, but provides less than half the effort.


2-Substantial/Maximal Assistance-helper does MORE THAN HALF the effort. [place] 

lifts or holds trunk or limbs and provides more than half the effort.


9-Rilswfmng-fsvlvd does ALL the effort. Patient does none of the effort to 

complete the activity. Or, the assistance of 2 or more helpers is required for 

the patient to complete the  


    activity.


If activity was not attempted, code reason:


7-Patient Refused.


9-Not Applicable-not attempted and the patient did not perform the activity 

before the current illness, exacerbation or injury.


10-Not Attempted due to Environmental Limitations-(lack of equipment, weather 

restraints, etc.).


88-Not Attempted due to Medical Conditions or Safety Concerns.


Roll Left & Right (QC):  6


pushes self up in bed as well as sup to sot and sit to sup all mod I





Weight Bearing


Right Lower Extremity:  Right


Full Weight Bearing


Left Lower Extremity:  Left


Non Weight Bearing (BKA)





Exercises


Supine Ex:  Ankle pumps, Pelvic tilt, Quad Set, Rolling, Glut sets, Heel Slides,

Short Arc Quads, Scooting, Straight leg raise, Hip abd/add


Supine Reps:  20





Treatments


some core setting with education etc





Assessment


Current Status:  Good Progress





PT Short Term Goals


Short Term Goals


Time Frame:  2021


Roll Left & Right:  6


Sit to lyin


Lying to sitting on side of be:  6


Sit to stand:  3


Chair/bed-to-chair transfer:  3


Wheel 50ft w/2 turns:  6


Wheel 150 feet:  6





PT Long Term Goals


Long Term Goals


PT Long Term Goals Time Frame:  May 19, 2021


Roll Left & Right (QC):  6


Sit to Lying (QC):  6


Lying-Sitting on Side/Bed(QC):  6


Sit to Stand (QC):  6


Chair/Bed-to-Chair Xfer(QC):  6 (SPT or slideboard, whichever is the safest 

method)


Toilet Transfer (QC):  6 (SPT or slide board)


Car Transfer (QC):  4


Does the Patient Walk:  No and Walking Goal NOT indicated


Walk 10 feet (QC):  88


Walk 50ft with 2 Turns (QC):  88


Walk 150 ft (QC):  88


Walking 10ft on Uneven Surface:  88


1 Step (curb) (QC):  88


4 Steps (QC):  88


12 Steps (QC):  88


Picking up an Object (QC):  88


Does the Pt use WC or Scooter?:  Yes


Wheel 50 feet with 2 turns (QC:  6


Type:  Manual


Wheel 150 feet:  6


Type:  Manual





PT Plan


Treatment/Plan


Treatment Plan:  Continue Plan of Care


Treatment Plan:  Bed Mobility, Education, Functional Activity Aung, Functional 

Strength, Group Therapy, Gait, Safety, Therapeutic Exercise, Transfers


Treatment Duration:  May 19, 2021


Frequency:  At least 5 of 7 days/Wk (IRF)


Estimated Hrs Per Day:  1.5 hours per day


Patient and/or Family Agrees t:  Yes





Safety Risks/Education


Patient Education:  Correct Positioning


Response to Teaching:  Reinforcement Needed





Time/GCodes


Time In:  1420


Time Out:  1450


Total Billed Treatment Time:  30


Total Billed Treatment


1,EX30m    this note and charge are for 2021   late note entry











JUAN NOONAN PTA             May 10, 2021 07:51

## 2021-05-11 VITALS — DIASTOLIC BLOOD PRESSURE: 59 MMHG | SYSTOLIC BLOOD PRESSURE: 115 MMHG

## 2021-05-11 VITALS — SYSTOLIC BLOOD PRESSURE: 111 MMHG | DIASTOLIC BLOOD PRESSURE: 58 MMHG

## 2021-05-11 RX ADMIN — METOPROLOL SUCCINATE SCH MG: 100 TABLET, EXTENDED RELEASE ORAL at 09:13

## 2021-05-11 RX ADMIN — METFORMIN HYDROCHLORIDE SCH MG: 500 TABLET, EXTENDED RELEASE ORAL at 18:23

## 2021-05-11 RX ADMIN — DOCUSATE SODIUM SCH MG: 100 CAPSULE ORAL at 21:35

## 2021-05-11 RX ADMIN — MICONAZOLE NITRATE SCH APPLIC: 20 POWDER TOPICAL at 09:16

## 2021-05-11 RX ADMIN — Medication SCH ML: at 21:35

## 2021-05-11 RX ADMIN — DOCUSATE SODIUM AND SENNOSIDES SCH EA: 8.6; 5 TABLET, FILM COATED ORAL at 21:36

## 2021-05-11 RX ADMIN — POLYETHYLENE GLYCOL (3350) SCH GM: 17 POWDER, FOR SOLUTION ORAL at 09:16

## 2021-05-11 RX ADMIN — OXYCODONE HYDROCHLORIDE SCH MG: 10 TABLET, FILM COATED, EXTENDED RELEASE ORAL at 21:34

## 2021-05-11 RX ADMIN — PANTOPRAZOLE SODIUM SCH MG: 40 TABLET, DELAYED RELEASE ORAL at 09:14

## 2021-05-11 RX ADMIN — INSULIN ASPART SCH UNIT: 100 INJECTION, SOLUTION INTRAVENOUS; SUBCUTANEOUS at 21:34

## 2021-05-11 RX ADMIN — INSULIN ASPART SCH UNIT: 100 INJECTION, SOLUTION INTRAVENOUS; SUBCUTANEOUS at 16:50

## 2021-05-11 RX ADMIN — MICONAZOLE NITRATE SCH APPLIC: 20 POWDER TOPICAL at 21:35

## 2021-05-11 RX ADMIN — Medication SCH ML: at 13:49

## 2021-05-11 RX ADMIN — ASPIRIN SCH MG: 81 TABLET ORAL at 09:13

## 2021-05-11 RX ADMIN — INSULIN ASPART SCH UNIT: 100 INJECTION, SOLUTION INTRAVENOUS; SUBCUTANEOUS at 06:13

## 2021-05-11 RX ADMIN — GLYBURIDE SCH MG: 2.5 TABLET ORAL at 16:52

## 2021-05-11 RX ADMIN — PRASUGREL SCH MG: 10 TABLET, FILM COATED ORAL at 09:14

## 2021-05-11 RX ADMIN — INSULIN ASPART SCH UNIT: 100 INJECTION, SOLUTION INTRAVENOUS; SUBCUTANEOUS at 11:39

## 2021-05-11 RX ADMIN — POLYETHYLENE GLYCOL (3350) SCH GM: 17 POWDER, FOR SOLUTION ORAL at 21:35

## 2021-05-11 RX ADMIN — Medication SCH ML: at 06:10

## 2021-05-11 RX ADMIN — GABAPENTIN SCH MG: 300 CAPSULE ORAL at 21:34

## 2021-05-11 RX ADMIN — LISINOPRIL SCH MG: 20 TABLET ORAL at 09:14

## 2021-05-11 RX ADMIN — DOCUSATE SODIUM SCH MG: 100 CAPSULE ORAL at 09:16

## 2021-05-11 RX ADMIN — OXYCODONE HYDROCHLORIDE SCH MG: 10 TABLET, FILM COATED, EXTENDED RELEASE ORAL at 09:12

## 2021-05-11 RX ADMIN — METFORMIN HYDROCHLORIDE SCH MG: 500 TABLET, EXTENDED RELEASE ORAL at 09:14

## 2021-05-11 RX ADMIN — GLYBURIDE SCH MG: 2.5 TABLET ORAL at 06:20

## 2021-05-11 RX ADMIN — DOCUSATE SODIUM AND SENNOSIDES SCH EA: 8.6; 5 TABLET, FILM COATED ORAL at 09:16

## 2021-05-11 NOTE — OCCUPATIONAL THER DAILY NOTE
OT Current Status-Daily Note


Subjective


No pain reported.





Mental Status/Objective


Patient Orientation:  Person, Place, Time, Situation





ADL-Treatment


Therapy Code Descriptions/Definitions 





Functional [place] Measure:


0=Not Assessed/NA        4=Minimal Assistance


1=Total Assistance        5=Supervision or Setup


2=Maximal Assistance  6=Modified [place]


3=Moderate Assistance 7=Complete IndependenceSCALE: Activities may be completed 

with or without assistive devices.





6-Indepedent-patient completes the activity by him/herself with no assistance 

from a helper.


5-Set-up or Clean-up Assistance-helper sets up or cleans up; patient completes 

activity. [place] assists only prior to or  


    following the activity.


4-Supervision or Touching Assistance-helper provides verbal cues and/or 

touching/steadying and/or contact guard assistance as patient completes 

activity. Assistance may be provided   


    throughout the activity or intermittently.


3-Partial/Moderate Assistance-helper does LESS THAN HALF the effort. [place] 

lifts, holds or supports trunk or limbs, but provides less than half the effort.


2-Substantial/Maximal Assistance-helper does MORE THAN HALF the effort. [place] 

lifts or holds trunk or limbs and provides more than half the effort.


2-Kpgnuxfby-irhzbg does ALL the effort. Patient does none of the effort to 

complete the activity. Or, the assistance of 2 or more helpers is required for 

the patient to complete the  


    activity.


If activity was not attempted, code reason:


7-Patient Refused.


9-Not Applicable-not attempted and the patient did not perform the activity 

before the current illness, exacerbation or injury.


10-Not Attempted due to Environmental Limitations-(lack of equipment, weather 

restraints, etc.).


88-Not Attempted due to Medical Conditions or Safety Concerns.


Eating (QC):  6


Oral Hygiene (QC):  6


Upper Body Dressing (QC):  5


Lower Body Dressing (QC):  5


On/Off Footwear:  5


Toileting Hygiene (QC):  4


Toilet Transfer (QC):  3 (Min assist at times to transfer with slide board over 

bariatric commode using dycem to keep board in place.)





Other Treatment


Pt. seen twice this a.m.  First session, OT handed pt his clothing at bed level,

and he was able to don all pieces after set up.  Pt. transferred supine-sit with

Mod I, and elevated bed.  Stood at walker with SBA, and pivoted on right LE 

around to wheelchair.  OT did have to bring chair up for pt. to sit in.  Pt. 

self propelled independently to therapy gym.  Talked with pt. regarding bathroom

set up again.  Pt does not have functional toilet yet at home, and will be using

bariatric commode.  OT and pt. propelled back to room, and pt. practiced 

transferring to/from heavy commode that is placed over his toilet.  Pt. able to 

place board with dycem under to transfer over to bariatric commode.  Once he was

on commode, he is able to rock side to side and doff pants over hips.  Pt. is 

able to simulate cleansing self, as he would when toileting.  Pt. able to don 

shorts while seated on commode with no difficulty.  Pt. requires assistance to 

place board for transfer back to wheelchair.  He is able to transfer with min 

assist needed at times. 





At second treatment, pt. up in wheelchair and self propels to therapy gym.  Pt. 

dons 2 lb. wrist weights and completes UE activity with balloon bat, back and 

fort and in all planes, for continued strengthening.  Pt. demonstrates good 

activity tolerance.  Self propels back to room, and lunch has arrived.  All 

needs met and pt. eating.





Education


OT Patient Education:  Correct positioning, Exercise program, Modified ADL 

techniques, Progress toward Goal/Update tx plan, Purpose of tx/functional 

activities, Reviewed precautions, Rehab process, Transfer techniques


Teaching Recipient:  Patient


Teaching Methods:  Demonstration, Discussion


Response to Teaching:  Verbalize Understanding, Return Demonstration





OT Short Term Goals


Short Term Goals


Time Frame:  May 5, 2021


Eatin


Oral hygiene:  6


Toileting hygiene:  4


Shower/bathe self:  4


Upper body dressin


Lower body dressing:  3


Putting on/taking off footwear:  3





OT Long Term Goals


Long Term Goals


Time Frame:  May 19, 2021


Eating (QC):  6


Oral Hygiene (QC):  6


Toileting Hygiene (QC):  6


Shower/Bathe Self (QC):  6


Upper Body Dressing (QC):  6


Lower Body Dressing (QC):  6


On/Off Footwear (QC):  6


Additional Goals:  1-Demonstrate ADL Tasks, 2-Verbalize Understanding, 3-

ImproveStrength/Aung


1=Demonstrate adherence to instructed precautions during ADL tasks.


2=Patient will verbalize/demonstrate understanding of assistive device

s/modifications for ADL.


3=Patient will improve strength/tolerance for activity to enable patient to 

perform ADL's.





OT Education/Plan


Problem List/Assessment


Assessment:  Decreased Activ Tolerance, Impaired I ADL's, Impaired Self-Care 

Skills





Discharge Recommendations


Plan/Recommendations:  Continue POC


Therapy Discharge Recommendati:  Home & Family





Treatment Plan/Plan of Care


Treatment,Training & Education:  Yes


Patient would benefit from OT for education, treatment and training to promote 

independence in ADL's, mobility, safety and/or upper extremity function for 

ADL's.


Plan of Care:  ADL Retraining, Functional Mobility, Group Exercise/Act as Ind, 

UE Funct Exercise/Act


Treatment Duration:  May 19, 2021


Frequency:  At least 5 of 7 days/Wk (IRF)


Estimated Hrs Per Day:  1.5 hours per day


Agreement:  Yes


Rehab Potential:  Good





Time/GCodes


Start Time:  08:15


Stop Time:  12:00


Total Time Billed (hr/min):  90


Billed Treatment Time


0506-7829 1, ADL x 45minutes


5282-8603 1, FA x 15minutes, Ex x 30minutes











JAROCHO OBRIEN OT           May 11, 2021 13:48

## 2021-05-11 NOTE — PHYSICAL THERAPY DAILY NOTE
PT Daily Note-Current


Subjective


Patient upright in w/c pre tx, consents to therapy. Notes he wants to continue 

to focus on functional transfers from the lift chair that will simulate the 

chair he has at home.





Appearance


Patient seated EOB post tx, with call button and tray table nearby. All needs 

met.





Mental Status


Patient Orientation:  Person, Place, Non-Verbal/Aphasic, Normal For Age





Transfers


SCALE: Activities may be completed with or without assistive devices.





6-Indepedent-patient completes the activity by him/herself with no assistance 

from a helper.


5-Set-up or Clean-up Assistance-helper sets up or cleans up; patient completes 

activity. [place] assists only prior to or  


    following the activity.


4-Supervision or Touching Assistance-helper provides verbal cues and/or 

touching/steadying and/or contact guard assistance as patient completes 

activity. Assistance may be provided   


    throughout the activity or intermittently.


3-Partial/Moderate Assistance-helper does LESS THAN HALF the effort. [place] 

lifts, holds or supports trunk or limbs, but provides less than half the effort.


2-Substantial/Maximal Assistance-helper does MORE THAN HALF the effort. [place] 

lifts or holds trunk or limbs and provides more than half the effort.


5-Dtrogivrf-qhjqlo does ALL the effort. Patient does none of the effort to 

complete the activity. Or, the assistance of 2 or more helpers is required for 

the patient to complete the  


    activity.


If activity was not attempted, code reason:


7-Patient Refused.


9-Not Applicable-not attempted and the patient did not perform the activity 

before the current illness, exacerbation or injury.


10-Not Attempted due to Environmental Limitations-(lack of equipment, weather 

restraints, etc.).


88-Not Attempted due to Medical Conditions or Safety Concerns.


Sit to Stand (QC):  4


Chair/Bed-to-Chair Xfer(QC):  5 (Slide board from w/c to bed)


SBA with sit to stand from lift chair.





Weight Bearing


Right Lower Extremity:  Right


Full Weight Bearing


Left Lower Extremity:  Left


Non Weight Bearing (BKA)





Wheelchair Training


Does the Pt Use a Wheelchair?:  Yes


Wheel 50 ft with 2 turns (QC):  6


Type of Wheelchair:  Manual


80' x2





Exercises


Standing:  Sit to Stand


Standing Reps:  3


from lift chair





Treatments


LE strengthening, education, functional activity, wheel chair training;  PT and 

patient had concerns about directional pivoting into chair. Patient wheeled w/c 

into gym and discussed foot positioning techniques with sit to stand to minimize

space between w/c and patient knees following pivot to sit in w/c.





Assessment


Current Status:  Fair Progress


Patient improved with balance with standing from elevated chair, patient notes 

the movement agitates his knees, but he is willing to do it if it gives him more

functional independence





PT Short Term Goals


Short Term Goals


Time Frame:  2021


Roll Left & Right:  6


Sit to lyin


Lying to sitting on side of be:  6


Sit to stand:  3


Chair/bed-to-chair transfer:  3


Wheel 50ft w/2 turns:  6


Wheel 150 feet:  6





PT Long Term Goals


Long Term Goals


PT Long Term Goals Time Frame:  May 19, 2021


Roll Left & Right (QC):  6


Sit to Lying (QC):  6


Lying-Sitting on Side/Bed(QC):  6


Sit to Stand (QC):  6


Chair/Bed-to-Chair Xfer(QC):  6 (SPT or slideboard, whichever is the safest 

method)


Toilet Transfer (QC):  6 (SPT or slide board)


Car Transfer (QC):  4


Does the Patient Walk:  No and Walking Goal NOT indicated


Walk 10 feet (QC):  88


Walk 50ft with 2 Turns (QC):  88


Walk 150 ft (QC):  88


Walking 10ft on Uneven Surface:  88


1 Step (curb) (QC):  88


4 Steps (QC):  88


12 Steps (QC):  88


Picking up an Object (QC):  88


Does the Pt use WC or Scooter?:  Yes


Wheel 50 feet with 2 turns (QC:  6


Type:  Manual


Wheel 150 feet:  6


Type:  Manual





PT Plan


Problem List


Problem List:  Activity Tolerance, Functional Strength, Safety, Balance, Gait, 

Transfer, Bed Mobility, ROM





Treatment/Plan


Treatment Plan:  Continue Plan of Care


Treatment Plan:  Bed Mobility, Education, Functional Activity Aung, Functional 

Strength, Group Therapy, Gait, Safety, Therapeutic Exercise, Transfers


Treatment Duration:  May 19, 2021


Frequency:  At least 5 of 7 days/Wk (IRF)


Estimated Hrs Per Day:  1.5 hours per day


Patient and/or Family Agrees t:  Yes





Safety Risks/Education


Patient Education:  Transfer Techniques, Correct Positioning, W/C Management, 

Safety Issues


Teaching Recipient:  Patient


Teaching Methods:  Demonstration, Discussion


Response to Teaching:  Verbalize Understanding, Return Demonstration





Time/GCodes


Time In:  1300


Time Out:  1330


Total Billed Treatment Time:  30


Total Billed Treatment


1 visit: 


FA x2: 30'











CAMILLE HARRY PT                May 11, 2021 13:58

## 2021-05-11 NOTE — PHYSICAL THERAPY DAILY NOTE
PT Daily Note-Current


Subjective


Patient upright in room pre tx, complains of 7/10 pain and requests pain 

medication from nurse before beginning PT.





Appearance


Patient seated upright post tx, with call button within reach and tray table 

nearby. All needs met.





Mental Status


Patient Orientation:  Person, Place, Time, Normal For Age





Transfers


SCALE: Activities may be completed with or without assistive devices.





6-Indepedent-patient completes the activity by him/herself with no assistance 

from a helper.


5-Set-up or Clean-up Assistance-helper sets up or cleans up; patient completes 

activity. [place] assists only prior to or  


    following the activity.


4-Supervision or Touching Assistance-helper provides verbal cues and/or 

touching/steadying and/or contact guard assistance as patient completes 

activity. Assistance may be provided   


    throughout the activity or intermittently.


3-Partial/Moderate Assistance-helper does LESS THAN HALF the effort. [place] 

lifts, holds or supports trunk or limbs, but provides less than half the effort.


2-Substantial/Maximal Assistance-helper does MORE THAN HALF the effort. [place] 

lifts or holds trunk or limbs and provides more than half the effort.


4-Ruqzpkobh-vxhrnk does ALL the effort. Patient does none of the effort to 

complete the activity. Or, the assistance of 2 or more helpers is required for 

the patient to complete the  


    activity.


If activity was not attempted, code reason:


7-Patient Refused.


9-Not Applicable-not attempted and the patient did not perform the activity 

before the current illness, exacerbation or injury.


10-Not Attempted due to Environmental Limitations-(lack of equipment, weather 

restraints, etc.).


88-Not Attempted due to Medical Conditions or Safety Concerns.


Sit to Stand (QC):  4


SBA with sit to stands in parallel bars





Weight Bearing


Right Lower Extremity:  Right


Full Weight Bearing


Left Lower Extremity:  Left


Non Weight Bearing (BKA)





Wheelchair Training


Does the Pt Use a Wheelchair?:  Yes


Wheel 50 ft with 2 turns (QC):  6


Wheel 150 ft (QC):  6


Type of Wheelchair:  Manual


150' x2





Exercises


Standing:  3 way Ex=Flex, Abd, Ext (10 reps), Sit to Stand


Standing Reps:  5


Patient performed balance work in parallel bars with sit to stands. Focused on 

raising both hands up at the same time. Patient had difficulty with movement, 

had tendency to lose balance toward left residual limb.





Treatments


LE strengthening, functional activity, balance, transfers





Assessment


Current Status:  Fair Progress


Patient is motivated to improve functional mobility for safe return home





PT Short Term Goals


Short Term Goals


Time Frame:  2021


Roll Left & Right:  6


Sit to lyin


Lying to sitting on side of be:  6


Sit to stand:  3


Chair/bed-to-chair transfer:  3


Wheel 50ft w/2 turns:  6


Wheel 150 feet:  6





PT Long Term Goals


Long Term Goals


PT Long Term Goals Time Frame:  May 19, 2021


Roll Left & Right (QC):  6


Sit to Lying (QC):  6


Lying-Sitting on Side/Bed(QC):  6


Sit to Stand (QC):  6


Chair/Bed-to-Chair Xfer(QC):  6 (SPT or slideboard, whichever is the safest 

method)


Toilet Transfer (QC):  6 (SPT or slide board)


Car Transfer (QC):  4


Does the Patient Walk:  No and Walking Goal NOT indicated


Walk 10 feet (QC):  88


Walk 50ft with 2 Turns (QC):  88


Walk 150 ft (QC):  88


Walking 10ft on Uneven Surface:  88


1 Step (curb) (QC):  88


4 Steps (QC):  88


12 Steps (QC):  88


Picking up an Object (QC):  88


Does the Pt use WC or Scooter?:  Yes


Wheel 50 feet with 2 turns (QC:  6


Type:  Manual


Wheel 150 feet:  6


Type:  Manual





PT Plan


Problem List


Problem List:  Activity Tolerance, Functional Strength, Safety, Balance, Gait, 

Transfer, Bed Mobility, ROM





Treatment/Plan


Treatment Plan:  Continue Plan of Care


Treatment Plan:  Bed Mobility, Education, Functional Activity Aung, Functional 

Strength, Group Therapy, Gait, Safety, Therapeutic Exercise, Transfers


Treatment Duration:  May 19, 2021


Frequency:  At least 5 of 7 days/Wk (IRF)


Estimated Hrs Per Day:  1.5 hours per day


Patient and/or Family Agrees t:  Yes





Safety Risks/Education


Patient Education:  Transfer Techniques, Correct Positioning, W/C Management, 

Safety Issues


Teaching Recipient:  Patient


Teaching Methods:  Demonstration, Discussion


Response to Teaching:  Verbalize Understanding, Return Demonstration





Time/GCodes


Time In:  0900


Time Out:  1000


Total Billed Treatment Time:  60


Total Billed Treatment


1 visit: 


FA x3: 45' 


EX: 15'











CAMILLE HARRY PT                May 11, 2021 09:57

## 2021-05-11 NOTE — PM&R PROGRESS NOTE
Subjective


HPI/CC On Admission


Date Seen by Provider:  May 11, 2021


Time Seen by Provider:  09:00


Subjective/Events-last exam


5/11/21:


Pt doing pretty well 


Blood sugar 99 today 


Bowels moved yesterday 


Will DC Fentanyl IV and take Oxycodone about an hour before dressing changes


Overall doing pretty well otherwise 








5/10/21:


Patient doing well today


No pain is reported


Just took a shower


Dressing is changed daily


Blood sugar 128


Hemoglobin 9.3








5/9/21:


Patient doing well


Labs due tomorrow


Some staples removed today


Dr Jiménez appreciated








5/8/21:


No major issues


Pain is controlled


Reviewed meds


Sugars and BP ok








5/7/2021:


Patient doing very well


Dr. JIMÉNEZ evaluated the leg


Sugars are good


Pain is well managed with pain medication


Blood pressure good 126/58








5/6/21:


Pt doing very well


Stump  is on


Blood sugars are good


Bowels moved yesterday


Staples will be removed per Dr. Jiménez's order








5/5/21:


Pt doing pretty well


Stump  was uncomfortable so that was taken off last night but put on 

today and doing pretty well


Bowels moved yesterday


Discharge is planned for first of next week








5/4/21:


Pt doing pretty well


Had a large BM last night


Dr. Guzmán thinks that the wound looks really good


Stump  will be started today


Sugars are okay








5/3/21:


Pt doing much better


Hgb 9.5


Bowels moved yesterday


Sugar was 100 this morning


Drainage from incision continues so stump- is being held








5/2/21:


Patient doing well


No pain reported except during dressing changes


Long acting Oxycontin working well for the patient


Stump  today or tomorrow








5/1/21:


Patient doing well


Oxycontin 10mg PO BID


Change dressing as instructed


Incision looks good


Fentanyl infusion before dressing changes








4/30/21:


Patient doing well


Lost 11# since admit


BM yesterday


Sugars are good








4/29/21:


No major issues


Incision is bleeding in 1 specific area


Scheduled Oxycontin is helping and minimsl IR used


BM+








4/28/21:


No major issues


BM yesterday will need to closely monitor that due to long acting narcs


Dr Jiménez will see him today regarding his staples


Talked about weight loss with Dr Jiménez








4/27/21:


Pt doing really well


No issues 


Stump  is being tolerated for 23 hours of the day 


No pain 


Sugar is okay and I decreased insulin even further today 








4/26/21:


Pt doing a lot better today


Pain is well controlled


Bowels moved yesterday


Stump  will be placed today on his left amputation site


Slept very well








4/25/21:


Doing well


No pain when he doesn't move


Pain controlled better with Oxycontin


BM+


Mother and son visiting him today


Sugars reviewed








4/24/21:


Pain is better


No issues reported


Hgb 7.9


BM++


Using IS








4/23/21:


Doing well


Stump  will start Monday per Dr Jiménez


BM+


Oxycontin and Oxycodone added for better pain control


DC Lortab








4/22/21:


Pt doing pretty well


Had a large BM today


Hgb 7.7 after three units of blood on med surg


Rough on the transfers but getting better


Dressing change will be per Dr. Morejon orders


Sugar is 107 this morning





Review of Systems


General:  Fatigue, Malaise


Musculoskeletal:  leg pain





Objective


Exam


Vital Signs





Vital Signs








  Date Time  Temp Pulse Resp B/P (MAP) Pulse Ox O2 Delivery O2 Flow Rate FiO2


 


5/11/21 19:26 36.2 75 18 111/58 (75) 100 Room Air  





Capillary Refill :


General Appearance:  No Apparent Distress, WD/WN, Chronically ill, Obese


HEENT:  PERRL/EOMI, Normal ENT Inspection, Pharynx Normal


Neck:  Full Range of Motion, Normal Inspection, Non Tender, Supple, Carotid 

Bruit


Respiratory:  Chest Non Tender, Lungs Clear, Normal Breath Sounds, No Accessory 

Muscle Use, No Respiratory Distress


Cardiovascular:  Regular Rate, Rhythm, No Edema, No Gallop, No JVD, No Murmur, 

Normal Peripheral Pulses


Gastrointestinal:  Normal Bowel Sounds, No Organomegaly, No Pulsatile Mass, Non 

Tender, Soft


Back:  Normal Inspection, No CVA Tenderness, No Vertebral Tenderness


Extremity:  Normal Capillary Refill, Normal Inspection, Normal Range of Motion, 

Non Tender, No Calf Tenderness, No Pedal Edema


Neurologic/Psychiatric:  Alert, Oriented x3, No Motor/Sensory Deficits, Normal 

Mood/Affect, CNs II-XII Norm as Tested, Abnormal Gait, Motor Weakness (left leg 

amputation )


Skin:  Normal Color, Warm/Dry


Lymphatic:  No Adenopathy





Results/Procedures


Lab


Patient resulted labs reviewed.





FIM


Transfers


Therapy Code Descriptions/Definitions 





Functional [place] Measure:


0=Not Assessed/NA        4=Minimal Assistance


1=Total Assistance        5=Supervision or Setup


2=Maximal Assistance  6=Modified [place]


3=Moderate Assistance 7=Complete IndependenceSCALE: Activities may be completed 

with or without assistive devices.





6-Indepedent-patient completes the activity by him/herself with no assistance 

from a helper.


5-Set-up or Clean-up Assistance-helper sets up or cleans up; patient completes 

activity. [place] assists only prior to or  


    following the activity.


4-Supervision or Touching Assistance-helper provides verbal cues and/or 

touching/steadying and/or contact guard assistance as patient completes 

activity. Assistance may be provided   


    throughout the activity or intermittently.


3-Partial/Moderate Assistance-helper does LESS THAN HALF the effort. [place] 

lifts, holds or supports trunk or limbs, but provides less than half the effort.


2-Substantial/Maximal Assistance-helper does MORE THAN HALF the effort. [place] 

lifts or holds trunk or limbs and provides more than half the effort.


9-Qjzhwjpbl-xqmamv does ALL the effort. Patient does none of the effort to 

complete the activity. Or, the assistance of 2 or more helpers is required for 

the patient to complete the  


    activity.


If activity was not attempted, code reason:


7-Patient Refused.


9-Not Applicable-not attempted and the patient did not perform the activity 

before the current illness, exacerbation or injury.


10-Not Attempted due to Environmental Limitations-(lack of equipment, weather 

restraints, etc.).


88-Not Attempted due to Medical Conditions or Safety Concerns.


Roll Left to Right (QC):  6


Sit to Lying (QC):  6


Sit to Stand (QC):  4 (SBA with elevated bed and reclining chair)


Chair/Bed-to-Chair Xfer(QC):  4 (SBA with stand pivot transfer to w/c and to 

lift chair, SBA with slide board to bed from w/c)


Car Transfer (QC):  88





Gait Training


Does the Patient Walk?:  No and Walking Goal IS indicated


Distance:  5' x 2


Walk 10 feet (QC):  88


Walk 50 ft with 2 Turns(QC):  88


Walk 150 ft (QC):  88


Walking 10ft/uneven surface-QC:  88


Gait Assistive Device:  FWW





Wheelchair Training


Does the Pt Use a Wheelchair?:  Yes


Wheel 50 ft with 2 turns (QC):  6


Wheel 150 ft (QC):  6


Type of Wheelchair:  Manual





Stair Training


1 Step (curb) (QC):  88


4 Steps (QC):  88


12 Steps (QC):  88





Balance


Picking up an Object (QC):  88





ADL-Treatment


Eating (QC):  6


Oral Hygiene (QC):  6 (From wheelchair level.)


Shower/Bathe Self (QC):  4


Upper Body Dressing (QC):  5


Lower Body Dressing (QC):  3 (Pt. able to stand at bars in large shower room.  

He has been able to don shorts over right foot and left leg.  Requires max 

assist to don over shorts.)


On/Off Footwear (QC):  2 (Pt. has demonstrated ability to don shoes and socks 

from bed level.  However, requires assistance due to his set up while up in 

shower chair.)


Toileting Hygiene (QC):  4 (Pt. able to cleanse rear primo area after 

transferring back to bed.  )


Toilet Transfer (QC):  2





Assessment/Plan


Assessment and Plan


Assess & Plan/Chief Complaint


Assessment:


s/p LBKA 


DM insulin dependence


HTN


HLP


CRI


Iron def anemia from acute blood loss


TERRI untreated





Plan:


Monitor pain


IRF protocol


Monitor hgb


Iron infusion





4/22/21:


Monitor sugar


Monitor hgb





4/23/21:


Add pain meds


Check labs in am





4/24/21:


Monitor sugar and decrease insulin


Hgb stable


Venofer





4/25/21:


Continue iron infusions


Sugar managed well


Pain controlled





4/26/21:


Stump 


Monitor sugar


Monitor hgb





4/27/21:


Monitor sugar


Decrease insulin


Monitor BP





4/28/21:


Monitor pain


Decreased insulin





4/29/21:


Monitor pain


BM regimen to prevent narcotic bowel





4/30/21:


Monitor closely


Monitor sugar





5/1/21:


Monitor closely


Dressing changes





5/2/21:


Monitor pain


Increase insulin





5/3/21:


Monitor closely


Monitor sugar levels and adjust insulin as needed





5/4/21:


Stump  maintained


Monitor closely





5/5/21:


Monitor pain


Stump 





5/6/21:


Monitor closely


Monitor staples





5/7/2021:


Continue weight loss regimen


Stump 


Monitor ganesh per Dr. JIMÉNEZ





5/8/21:


Continue weight loss


Monitor sugar





5/9/21:


Monitor closely


Labs tomorrow





5/10/21:


Control pain


Monitor hemoglobin


Monitor blood sugar





5/11/21:


DC Fentanyl IV


Monitor closely





(1) Status post below-knee amputation of left lower extremity


Status:  Acute


(2) Hyperglycemia


Status:  Chronic


(3) Hypertension


Status:  Chronic


(4) Diabetes mellitus, type 2


Status:  Chronic


(5) Anemia


Status:  Acute


(6) Iron deficiency


Status:  Acute


(7) Peripheral neuropathy


Status:  Chronic











NICOLE VILA DO                May 11, 2021 05:00

## 2021-05-12 VITALS — SYSTOLIC BLOOD PRESSURE: 133 MMHG | DIASTOLIC BLOOD PRESSURE: 82 MMHG

## 2021-05-12 VITALS — SYSTOLIC BLOOD PRESSURE: 108 MMHG | DIASTOLIC BLOOD PRESSURE: 59 MMHG

## 2021-05-12 RX ADMIN — OXYCODONE HYDROCHLORIDE SCH MG: 10 TABLET, FILM COATED, EXTENDED RELEASE ORAL at 07:55

## 2021-05-12 RX ADMIN — MICONAZOLE NITRATE SCH APPLIC: 20 POWDER TOPICAL at 22:05

## 2021-05-12 RX ADMIN — MICONAZOLE NITRATE SCH APPLIC: 20 POWDER TOPICAL at 07:58

## 2021-05-12 RX ADMIN — INSULIN ASPART SCH UNIT: 100 INJECTION, SOLUTION INTRAVENOUS; SUBCUTANEOUS at 16:47

## 2021-05-12 RX ADMIN — Medication SCH ML: at 22:06

## 2021-05-12 RX ADMIN — DOCUSATE SODIUM AND SENNOSIDES SCH EA: 8.6; 5 TABLET, FILM COATED ORAL at 21:00

## 2021-05-12 RX ADMIN — POLYETHYLENE GLYCOL (3350) SCH GM: 17 POWDER, FOR SOLUTION ORAL at 21:00

## 2021-05-12 RX ADMIN — PRASUGREL SCH MG: 10 TABLET, FILM COATED ORAL at 07:54

## 2021-05-12 RX ADMIN — INSULIN ASPART SCH UNIT: 100 INJECTION, SOLUTION INTRAVENOUS; SUBCUTANEOUS at 05:51

## 2021-05-12 RX ADMIN — GLYBURIDE SCH MG: 2.5 TABLET ORAL at 17:17

## 2021-05-12 RX ADMIN — GABAPENTIN SCH MG: 300 CAPSULE ORAL at 13:42

## 2021-05-12 RX ADMIN — OXYCODONE HYDROCHLORIDE SCH MG: 10 TABLET, FILM COATED, EXTENDED RELEASE ORAL at 22:04

## 2021-05-12 RX ADMIN — INSULIN ASPART SCH UNIT: 100 INJECTION, SOLUTION INTRAVENOUS; SUBCUTANEOUS at 11:17

## 2021-05-12 RX ADMIN — Medication SCH ML: at 18:40

## 2021-05-12 RX ADMIN — LISINOPRIL SCH MG: 20 TABLET ORAL at 07:56

## 2021-05-12 RX ADMIN — INSULIN ASPART SCH UNIT: 100 INJECTION, SOLUTION INTRAVENOUS; SUBCUTANEOUS at 21:00

## 2021-05-12 RX ADMIN — PANTOPRAZOLE SODIUM SCH MG: 40 TABLET, DELAYED RELEASE ORAL at 07:55

## 2021-05-12 RX ADMIN — ASPIRIN SCH MG: 81 TABLET ORAL at 07:54

## 2021-05-12 RX ADMIN — GABAPENTIN SCH MG: 300 CAPSULE ORAL at 22:03

## 2021-05-12 RX ADMIN — DOCUSATE SODIUM SCH MG: 100 CAPSULE ORAL at 07:57

## 2021-05-12 RX ADMIN — METFORMIN HYDROCHLORIDE SCH MG: 500 TABLET, EXTENDED RELEASE ORAL at 07:53

## 2021-05-12 RX ADMIN — METOPROLOL SUCCINATE SCH MG: 100 TABLET, EXTENDED RELEASE ORAL at 07:55

## 2021-05-12 RX ADMIN — DOCUSATE SODIUM AND SENNOSIDES SCH EA: 8.6; 5 TABLET, FILM COATED ORAL at 07:57

## 2021-05-12 RX ADMIN — GLYBURIDE SCH MG: 2.5 TABLET ORAL at 06:27

## 2021-05-12 RX ADMIN — Medication SCH ML: at 06:27

## 2021-05-12 RX ADMIN — DOCUSATE SODIUM SCH MG: 100 CAPSULE ORAL at 21:00

## 2021-05-12 RX ADMIN — METFORMIN HYDROCHLORIDE SCH MG: 500 TABLET, EXTENDED RELEASE ORAL at 17:17

## 2021-05-12 RX ADMIN — POLYETHYLENE GLYCOL (3350) SCH GM: 17 POWDER, FOR SOLUTION ORAL at 07:57

## 2021-05-12 RX ADMIN — GABAPENTIN SCH MG: 300 CAPSULE ORAL at 07:54

## 2021-05-12 NOTE — OCCUPATIONAL THER DAILY NOTE
OT Current Status-Daily Note


Subjective


No pain reported.





Mental Status/Objective


Patient Orientation:  Person, Place, Time, Situation





ADL-Treatment


Therapy Code Descriptions/Definitions 





Functional [place] Measure:


0=Not Assessed/NA        4=Minimal Assistance


1=Total Assistance        5=Supervision or Setup


2=Maximal Assistance  6=Modified [place]


3=Moderate Assistance 7=Complete IndependenceSCALE: Activities may be completed 

with or without assistive devices.





6-Indepedent-patient completes the activity by him/herself with no assistance 

from a helper.


5-Set-up or Clean-up Assistance-helper sets up or cleans up; patient completes 

activity. [place] assists only prior to or  


    following the activity.


4-Supervision or Touching Assistance-helper provides verbal cues and/or 

touching/steadying and/or contact guard assistance as patient completes 

activity. Assistance may be provided   


    throughout the activity or intermittently.


3-Partial/Moderate Assistance-helper does LESS THAN HALF the effort. [place] 

lifts, holds or supports trunk or limbs, but provides less than half the effort.


2-Substantial/Maximal Assistance-helper does MORE THAN HALF the effort. [place] 

lifts or holds trunk or limbs and provides more than half the effort.


0-Pszzrvapd-yknwhf does ALL the effort. Patient does none of the effort to 

complete the activity. Or, the assistance of 2 or more helpers is required for 

the patient to complete the  


    activity.


If activity was not attempted, code reason:


7-Patient Refused.


9-Not Applicable-not attempted and the patient did not perform the activity 

before the current illness, exacerbation or injury.


10-Not Attempted due to Environmental Limitations-(lack of equipment, weather 

restraints, etc.).


88-Not Attempted due to Medical Conditions or Safety Concerns.





Other Treatment


Pt. up in chair.  Completes bilateral UE exercises, x 6lb. dumbbells, x 20 reps,

x 6 exercises, in all planes.  Tolerated this well to increase overall strength 

and independence.  Pt. up in chair at end of session.





Education


OT Patient Education:  Correct positioning, Exercise program, Purpose of 

tx/functional activities, Reviewed precautions, Rehab process


Teaching Recipient:  Patient


Teaching Methods:  Demonstration, Discussion


Response to Teaching:  Verbalize Understanding, Return Demonstration





OT Short Term Goals


Short Term Goals


Time Frame:  May 5, 2021


Eatin


Oral hygiene:  6


Toileting hygiene:  4


Shower/bathe self:  4


Upper body dressin


Lower body dressing:  3


Putting on/taking off footwear:  3





OT Long Term Goals


Long Term Goals


Time Frame:  May 19, 2021


Eating (QC):  6


Oral Hygiene (QC):  6


Toileting Hygiene (QC):  6


Shower/Bathe Self (QC):  6


Upper Body Dressing (QC):  6


Lower Body Dressing (QC):  6


On/Off Footwear (QC):  6


Additional Goals:  1-Demonstrate ADL Tasks, 2-Verbalize Understanding, 3-

ImproveStrength/Aung


1=Demonstrate adherence to instructed precautions during ADL tasks.


2=Patient will verbalize/demonstrate understanding of assistive 

devices/modifications for ADL.


3=Patient will improve strength/tolerance for activity to enable patient to 

perform ADL's.





OT Education/Plan


Problem List/Assessment


Assessment:  Decreased Activ Tolerance, Impaired I ADL's





Discharge Recommendations


Plan/Recommendations:  Continue POC





Treatment Plan/Plan of Care


Treatment,Training & Education:  Yes


Patient would benefit from OT for education, treatment and training to promote 

independence in ADL's, mobility, safety and/or upper extremity function for 

ADL's.


Plan of Care:  ADL Retraining, Functional Mobility, Group Exercise/Act as Ind, 

UE Funct Exercise/Act


Treatment Duration:  May 19, 2021


Frequency:  At least 5 of 7 days/Wk (IRF)


Estimated Hrs Per Day:  1.5 hours per day


Agreement:  Yes


Rehab Potential:  Good





Time/GCodes


Start Time:  13:30


Stop Time:  13:55


Total Time Billed (hr/min):  25


Billed Treatment Time


1, Ex x 2











JAROCHO OBRIEN OT           May 12, 2021 14:22

## 2021-05-12 NOTE — PHYSICAL THERAPY DAILY NOTE
PT Daily Note-Current


Subjective


Pt. up in recliner.  Protective shoe arrives during Rx as pt. has had issue with

bumping vulnerable toes while exposed .  Discussed the ins and outs of open vs 

closed toe shoes





Pain





   Location:  No Pain Reported





Mental Status


Patient Orientation:  Normal For Age





Transfers


SCALE: Activities may be completed with or without assistive devices.





6-Indepedent-patient completes the activity by him/herself with no assistance 

from a helper.


5-Set-up or Clean-up Assistance-helper sets up or cleans up; patient completes 

activity. [place] assists only prior to or  


    following the activity.


4-Supervision or Touching Assistance-helper provides verbal cues and/or 

touching/steadying and/or contact guard assistance as patient completes 

activity. Assistance may be provided   


    throughout the activity or intermittently.


3-Partial/Moderate Assistance-helper does LESS THAN HALF the effort. [place] 

lifts, holds or supports trunk or limbs, but provides less than half the effort.


2-Substantial/Maximal Assistance-helper does MORE THAN HALF the effort. [place] 

lifts or holds trunk or limbs and provides more than half the effort.


8-Kykjwydlm-wkquov does ALL the effort. Patient does none of the effort to 

complete the activity. Or, the assistance of 2 or more helpers is required for 

the patient to complete the  


    activity.


If activity was not attempted, code reason:


7-Patient Refused.


9-Not Applicable-not attempted and the patient did not perform the activity 

before the current illness, exacerbation or injury.


10-Not Attempted due to Environmental Limitations-(lack of equipment, weather 

restraints, etc.).


88-Not Attempted due to Medical Conditions or Safety Concerns.


pt. rolls and pushes self up in recliner indep





Weight Bearing


Right Lower Extremity:  Right


Full Weight Bearing


Left Lower Extremity:  Left


Non Weight Bearing (BKA)





Exercises


Supine Ex:  Ankle pumps, Quad Set, Glut sets, Heel Slides, Short Arc Quads, 

Scooting, Straight leg raise, Hip abd/add


Supine Reps:  20





Treatments


pt.was assisted to eloisa sock, protective shoe was explained and demonstrated , 

then max assist to eloisa, pt. may end up cutting open toe in one of his own shoes

to meet this need as this shoe is narrow comared to his foot and may not provide

the stability he needs while in stance





Assessment


Current Status:  Good Progress





PT Short Term Goals


Short Term Goals


Time Frame:  2021


Roll Left & Right:  6


Sit to lyin


Lying to sitting on side of be:  6


Sit to stand:  3


Chair/bed-to-chair transfer:  3


Wheel 50ft w/2 turns:  6


Wheel 150 feet:  6





PT Long Term Goals


Long Term Goals


PT Long Term Goals Time Frame:  May 19, 2021


Roll Left & Right (QC):  6


Sit to Lying (QC):  6


Lying-Sitting on Side/Bed(QC):  6


Sit to Stand (QC):  6


Chair/Bed-to-Chair Xfer(QC):  6 (SPT or slideboard, whichever is the safest 

method)


Toilet Transfer (QC):  6 (SPT or slide board)


Car Transfer (QC):  4


Does the Patient Walk:  No and Walking Goal NOT indicated


Walk 10 feet (QC):  88


Walk 50ft with 2 Turns (QC):  88


Walk 150 ft (QC):  88


Walking 10ft on Uneven Surface:  88


1 Step (curb) (QC):  88


4 Steps (QC):  88


12 Steps (QC):  88


Picking up an Object (QC):  88


Does the Pt use WC or Scooter?:  Yes


Wheel 50 feet with 2 turns (QC:  6


Type:  Manual


Wheel 150 feet:  6


Type:  Manual





PT Plan


Treatment/Plan


Treatment Plan:  Continue Plan of Care


Treatment Plan:  Bed Mobility, Education, Functional Activity Aung, Functional 

Strength, Group Therapy, Gait, Safety, Therapeutic Exercise, Transfers


Treatment Duration:  May 19, 2021


Frequency:  At least 5 of 7 days/Wk (IRF)


Estimated Hrs Per Day:  1.5 hours per day


Patient and/or Family Agrees t:  Yes





Safety Risks/Education


Patient Education:  Correct Positioning





Time/GCodes


Time In:  1300


Time Out:  1330


Total Billed Treatment Time:  30


Total Billed Treatment


1,FA15m,EX15m











JUAN NOONAN PTA             May 12, 2021 13:35

## 2021-05-12 NOTE — PROGRESS NOTE
Subjective


Date Seen by a Provider:  May 12, 2021


Time Seen by a Provider:  08:30


Subjective/Events-last exam


5/12/21:


Patient is doing well


DC on Wed for home after fitted for prosthesis


BS 99


DC Fentanyl IV is a challenge


Neurontin 300mg PO TID started


Shoes are too small


Dr Neumann consulted








5/11/21:


Pt doing pretty well 


Blood sugar 99 today 


Bowels moved yesterday 


Will DC Fentanyl IV and take Oxycodone about an hour before dressing changes


Overall doing pretty well otherwise 








5/10/21:


Patient doing well today


No pain is reported


Just took a shower


Dressing is changed daily


Blood sugar 128


Hemoglobin 9.3








5/9/21:


Patient doing well


Labs due tomorrow


Some staples removed today


Dr Jiménez appreciated








5/8/21:


No major issues


Pain is controlled


Reviewed meds


Sugars and BP ok








5/7/2021:


Patient doing very well


Dr. JIMÉNEZ evaluated the leg


Sugars are good


Pain is well managed with pain medication


Blood pressure good 126/58








5/6/21:


Pt doing very well


Stump  is on


Blood sugars are good


Bowels moved yesterday


Staples will be removed per Dr. Jiménez's order








5/5/21:


Pt doing pretty well


Stump  was uncomfortable so that was taken off last night but put on 

today and doing pretty well


Bowels moved yesterday


Discharge is planned for first of next week








5/4/21:


Pt doing pretty well


Had a large BM last night


Dr. Guzmán thinks that the wound looks really good


Stump  will be started today


Sugars are okay








5/3/21:


Pt doing much better


Hgb 9.5


Bowels moved yesterday


Sugar was 100 this morning


Drainage from incision continues so stump- is being held








5/2/21:


Patient doing well


No pain reported except during dressing changes


Long acting Oxycontin working well for the patient


Stump  today or tomorrow








5/1/21:


Patient doing well


Oxycontin 10mg PO BID


Change dressing as instructed


Incision looks good


Fentanyl infusion before dressing changes








4/30/21:


Patient doing well


Lost 11# since admit


BM yesterday


Sugars are good








4/29/21:


No major issues


Incision is bleeding in 1 specific area


Scheduled Oxycontin is helping and minimsl IR used


BM+








4/28/21:


No major issues


BM yesterday will need to closely monitor that due to long acting narcs


Dr Jiménez will see him today regarding his staples


Talked about weight loss with Dr Jiménez








4/27/21:


Pt doing really well


No issues 


Stump  is being tolerated for 23 hours of the day 


No pain 


Sugar is okay and I decreased insulin even further today 








4/26/21:


Pt doing a lot better today


Pain is well controlled


Bowels moved yesterday


Stump  will be placed today on his left amputation site


Slept very well








4/25/21:


Doing well


No pain when he doesn't move


Pain controlled better with Oxycontin


BM+


Mother and son visiting him today


Sugars reviewed








4/24/21:


Pain is better


No issues reported


Hgb 7.9


BM++


Using IS








4/23/21:


Doing well


Stump  will start Monday per Dr Jiménez


BM+


Oxycontin and Oxycodone added for better pain control


DC Lortab








4/22/21:


Pt doing pretty well


Had a large BM today


Hgb 7.7 after three units of blood on med surg


Rough on the transfers but getting better


Dressing change will be per Dr. Morejon orders


Sugar is 107 this morning


Review of Systems


General:  Fatigue, Malaise


Musculoskeletal:  foot pain





Objective


Exam


Last Set of Vital Signs





Vital Signs








  Date Time  Temp Pulse Resp B/P (MAP) Pulse Ox O2 Delivery O2 Flow Rate FiO2


 


5/11/21 21:36      Room Air  


 


5/11/21 19:26 36.2 75 18 111/58 (75) 100   





Capillary Refill :


General:  Alert, Oriented X3, Cooperative


Lungs:  Clear to Auscultation


Heart:  Regular Rate


Psych/Mental Status:  Mental Status NL





Results


Lab


Laboratory Tests


5/11/21 06:07: Glucometer 99


5/11/21 10:49: Glucometer 185H


5/11/21 15:16: Glucometer 133H


5/11/21 20:02: Glucometer 116H


5/12/21 05:40: Glucometer 99





Assessment/Plan


Assessment/Plan


Assess & Plan/Chief Complaint


Assessment:


s/p LBKA 


DM insulin dependence


HTN


HLP


CRI


Iron def anemia from acute blood loss


TERRI untreated





Plan:


Monitor pain


IRF protocol


Monitor hgb


Iron infusion





4/22/21:


Monitor sugar


Monitor hgb





4/23/21:


Add pain meds


Check labs in am





4/24/21:


Monitor sugar and decrease insulin


Hgb stable


Venofer





4/25/21:


Continue iron infusions


Sugar managed well


Pain controlled





4/26/21:


Stump 


Monitor sugar


Monitor hgb





4/27/21:


Monitor sugar


Decrease insulin


Monitor BP





4/28/21:


Monitor pain


Decreased insulin





4/29/21:


Monitor pain


BM regimen to prevent narcotic bowel





4/30/21:


Monitor closely


Monitor sugar





5/1/21:


Monitor closely


Dressing changes





5/2/21:


Monitor pain


Increase insulin





5/3/21:


Monitor closely


Monitor sugar levels and adjust insulin as needed





5/4/21:


Stump  maintained


Monitor closely





5/5/21:


Monitor pain


Stump 





5/6/21:


Monitor closely


Monitor staples





5/7/2021:


Continue weight loss regimen


Stump 


Monitor ganesh per Dr. JIMÉNEZ





5/8/21:


Continue weight loss


Monitor sugar





5/9/21:


Monitor closely


Labs tomorrow





5/10/21:


Control pain


Monitor hemoglobin


Monitor blood sugar





5/11/21:


DC Fentanyl IV


Monitor closely





5/12/21:


Monitor pain


Prosthesis fitting next Wed then DC








(1) Status post below-knee amputation of left lower extremity


Status:  Acute


(2) Hyperglycemia


Status:  Chronic


(3) Hypertension


Status:  Chronic


(4) Diabetes mellitus, type 2


Status:  Chronic


(5) Anemia


Status:  Acute


(6) Iron deficiency


Status:  Acute


(7) Peripheral neuropathy


Status:  Chronic





Diagnosis/Problems


Diagnosis/Problems





(1) Status post below-knee amputation of left lower extremity


Status:  Acute


(2) Hyperglycemia


Status:  Chronic


(3) Hypertension


Status:  Chronic


(4) Diabetes mellitus, type 2


Status:  Chronic


(5) Anemia


Status:  Acute


(6) Iron deficiency


Status:  Acute


(7) Peripheral neuropathy


Status:  Chronic











NICOLE VILA DO                May 12, 2021 06:00

## 2021-05-12 NOTE — CONSULTATION REPORT
DATE OF SERVICE:  05/12/2021



REASON FOR CONSULTATION:

Diabetic foot care.



HISTORY OF PRESENT ILLNESS:

This 40-year-old was admitted to second floor for rehabilitation status post

below-the-knee amputation on the left lower extremity.  He is a diabetic and he

is dealing with the transition to activities of daily living.  Unfortunately, he

had a fall this morning and traumatize the right second toe losing some of the

toenail and causing some bleeding.  He reports no residual pain at this time.



The patient was originally admitted secondary to uncontrolled diabetes,

hypertension, obesity, peripheral neuropathy and coronary artery disease with a

cellulitic left lower extremity that is progressively getting worse.  He was

subsequently treated with below-the-knee amputation by Dr. Jiménez.



PAST MEDICAL HISTORY:

Also includes sleep apnea, hypercholesterolemia, hypertension, neuropathy,

chronic back pain, insulin-dependent diabetes.



SOCIAL HISTORY:

The patient is .  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

previously.



ALLERGIES:

HE HAS AN ALLERGY TO LEVOFLOXACIN.



CURRENT MEDICATIONS:

Listed on the patient's chart.



PHYSICAL EXAMINATION:

LOWER EXTREMITY:  The patient has 2/4 dorsalis pedis pulse, 0/4 posterior tibial

pulse on the right.  Cap refill time is less than 3 seconds.  Some edema noted

to the right lower extremity neuropathy.

NEUROLOGIC:  The patient has diminished protective sensation with 10 gram

monofilament wire examination right, also diminished vibratory sensation on the

right.

DERMATOLOGIC:  The patient has a thick yellow dystrophic toenail with subungual

debris R1, 2, 3, 4 and 5.  On the right second digit, the distal and more

superficial portion of the nail apparently has been removed with micro bleeding

noted to the medial and lateral aspect, but it is not active bleeding, it is

evidence of previous trauma.  There is no pain with passive range of motion of

the right second digit at this time.  There is a dry eschar to the distal medial

aspect of the right hallux that measures approximately 1 x 0.5 cm stable.  There

is no erythema.  There is no exudate noted.  There is a smaller eschar noted to

the lateral aspect of the previous mentioned eschar that it is much smaller and

stable, it has no exudate, no gross signs of bacterial infection.

MUSCULOSKELETAL FINDINGS:  The patient has no pain with passive range of motion

of the ankle joint, subtalar joint, midtarsal or tarsometatarsal joint, right. 

There is 4/5 muscle strength for inversion and plantar flexion on the right. 

There is 3/5 muscle strength for eversion and dorsiflexion on the right.



There is a below-the-knee amputation on the left.



ASSESSMENT:

1.  Diabetic neuropathy.

2.  Contusion of right second toe.

3.  Onychomycosis.

4.  Foot drop, right; below-the-knee amputation, left.



PLAN:

Various options were discussed with the patient today.  We discussed diabetic

foot care in general.  I highly recommend the patient be fitted with appropriate

diabetic shoe and insole as it is extremely important to preserve the right

lower extremity as best we can at this juncture.  We stressed the importance of

glucose control and overall health.



The patient's toenails were debrided R1, 2, 3, 4 and 5.  Today, I cleansed

thoroughly after which Betadine was applied to all digits except for the right

second, which Neosporin was applied with light dressing to the nail bed area. 

This will continue for at least the next week with topical antibiotic and light

dressing.



In regard to the eschars, which appeared to be previous hematogenous bulla, I

recommend Betadine applied then protected with light dressing or a clean sock. 

We will see the patient back in the office upon discharge.





Job ID: [id number]

DocumentID: [id number]

Dictated Date:  05/12/2021 17:24:22

Transcription Date: 05/12/2021 21:47:53

Dictated By: JESSICA BASURTO

## 2021-05-12 NOTE — PHYSICAL THERAPY DAILY NOTE
PT Daily Note-Current


Subjective


pt. agrees to Rx, feeling more positive about his DC plans and his ability to 

perform safe TRFs etc in home environment.





Pain





   Location:  No Pain Reported





Mental Status


Patient Orientation:  Normal For Age





Transfers


SCALE: Activities may be completed with or without assistive devices.





6-Indepedent-patient completes the activity by him/herself with no assistance 

from a helper.


5-Set-up or Clean-up Assistance-helper sets up or cleans up; patient completes 

activity. [place] assists only prior to or  


    following the activity.


4-Supervision or Touching Assistance-helper provides verbal cues and/or 

touching/steadying and/or contact guard assistance as patient completes 

activity. Assistance may be provided   


    throughout the activity or intermittently.


3-Partial/Moderate Assistance-helper does LESS THAN HALF the effort. [place] 

lifts, holds or supports trunk or limbs, but provides less than half the effort.


2-Substantial/Maximal Assistance-helper does MORE THAN HALF the effort. [place] 

lifts or holds trunk or limbs and provides more than half the effort.


5-Pyqkkzphu-mywzow does ALL the effort. Patient does none of the effort to 

complete the activity. Or, the assistance of 2 or more helpers is required for 

the patient to complete the  


    activity.


If activity was not attempted, code reason:


7-Patient Refused.


9-Not Applicable-not attempted and the patient did not perform the activity 

before the current illness, exacerbation or injury.


10-Not Attempted due to Environmental Limitations-(lack of equipment, weather 

restraints, etc.).


88-Not Attempted due to Medical Conditions or Safety Concerns.


Roll Left & Right (QC):  6


Sit to Lying (QC):  6


Lying to Sitting/Side of Bed(Q:  6


Sit to Stand (QC):  3


Chair/Bed-to-Chair Xfer(QC):  5


PT OT co Rx for shower TRF bench to w/c, then PT without OT for remaining Rx of 

slide brd TRFs w/c to bed, bed to w/c and w/c to recliner with pt. managing sld 

brd indep and negotiating the set up indep with no incidents and good critical 

thinking on pts part





Weight Bearing


Right Lower Extremity:  Right


Full Weight Bearing


Left Lower Extremity:  Left


Non Weight Bearing (BKA)





Gait Training


Does the Patient Walk?:  No and Walking Goal NOT indicated





Wheelchair Training


Does the Pt Use a Wheelchair?:  Yes


Wheel 50 ft with 2 turns (QC):  6


Wheel 150 ft (QC):  6


Type of Wheelchair:  Manual


pt. requested to do w/c mobility on carpet as he has carpet at home, pt. 

negotiated w/c indep inreverse approx 150 ft then in small space in waiting rm 

with low carpet and tight area indep with no issues





Treatments


pt. in bed completed [id number] himself indep as well as donning a shoe and sock.

pt. in recliner after Rx with SBA , call bell at hand, pt in reclined position 

to decrease wt bearing on buttocks





Assessment


Current Status:  Good Progress





PT Short Term Goals


Short Term Goals


Time Frame:  2021


Roll Left & Right:  6


Sit to lyin


Lying to sitting on side of be:  6


Sit to stand:  3


Chair/bed-to-chair transfer:  3


Wheel 50ft w/2 turns:  6


Wheel 150 feet:  6





PT Long Term Goals


Long Term Goals


PT Long Term Goals Time Frame:  May 19, 2021


Roll Left & Right (QC):  6


Sit to Lying (QC):  6


Lying-Sitting on Side/Bed(QC):  6


Sit to Stand (QC):  6


Chair/Bed-to-Chair Xfer(QC):  6 (SPT or slideboard, whichever is the safest 

method)


Toilet Transfer (QC):  6 (SPT or slide board)


Car Transfer (QC):  4


Does the Patient Walk:  No and Walking Goal NOT indicated


Walk 10 feet (QC):  88


Walk 50ft with 2 Turns (QC):  88


Walk 150 ft (QC):  88


Walking 10ft on Uneven Surface:  88


1 Step (curb) (QC):  88


4 Steps (QC):  88


12 Steps (QC):  88


Picking up an Object (QC):  88


Does the Pt use WC or Scooter?:  Yes


Wheel 50 feet with 2 turns (QC:  6


Type:  Manual


Wheel 150 feet:  6


Type:  Manual





PT Plan


Treatment/Plan


Treatment Plan:  Continue Plan of Care


Treatment Plan:  Bed Mobility, Education, Functional Activity Aung, Functional 

Strength, Group Therapy, Gait, Safety, Therapeutic Exercise, Transfers


Treatment Duration:  May 19, 2021


Frequency:  At least 5 of 7 days/Wk (IRF)


Estimated Hrs Per Day:  1.5 hours per day


Patient and/or Family Agrees t:  Yes





Safety Risks/Education


Patient Education:  Transfer Techniques, Correct Positioning, W/C Management, 

Disease Process, Safety Issues


Teaching Recipient:  Health Care Proxy


Teaching Methods:  Discussion





Time/GCodes


Time In:  900


Time Out:  1000


Total Billed Treatment Time:  60


Total Billed Treatment


1,FA45m,WC15m  (PT OT co Rx 15m)











JUAN NOONAN PTA             May 12, 2021 10:04

## 2021-05-12 NOTE — PODIATRY PROGRESS NOTE
Standard Progress Note


Progress Notes/Assess & Plan


Date Seen by a Provider:  May 12, 2021


Time Seen by a Provider:  17:13


Progress/Assessment & Plan


Consultation dictated.  Foot care given.  Recommend diabetic insole and shoe.


Final Diagnosis


Diabetic neuropathy, Contusion right 2nd digit,  BKA left, Onychomycosis











JOHN FITZGERALD DPARIAN              May 12, 2021 17:15

## 2021-05-12 NOTE — OCCUPATIONAL THER DAILY NOTE
OT Current Status-Daily Note


Subjective


No pain reported.





Mental Status/Objective


Patient Orientation:  Person, Place, Time, Situation





ADL-Treatment


Therapy Code Descriptions/Definitions 





Functional [place] Measure:


0=Not Assessed/NA        4=Minimal Assistance


1=Total Assistance        5=Supervision or Setup


2=Maximal Assistance  6=Modified [place]


3=Moderate Assistance 7=Complete IndependenceSCALE: Activities may be completed 

with or without assistive devices.





6-Indepedent-patient completes the activity by him/herself with no assistance 

from a helper.


5-Set-up or Clean-up Assistance-helper sets up or cleans up; patient completes 

activity. [place] assists only prior to or  


    following the activity.


4-Supervision or Touching Assistance-helper provides verbal cues and/or 

touching/steadying and/or contact guard assistance as patient completes 

activity. Assistance may be provided   


    throughout the activity or intermittently.


3-Partial/Moderate Assistance-helper does LESS THAN HALF the effort. [place] 

lifts, holds or supports trunk or limbs, but provides less than half the effort.


2-Substantial/Maximal Assistance-helper does MORE THAN HALF the effort. [place] 

lifts or holds trunk or limbs and provides more than half the effort.


4-Xveajzfrc-mdzhse does ALL the effort. Patient does none of the effort to 

complete the activity. Or, the assistance of 2 or more helpers is required for 

the patient to complete the  


    activity.


If activity was not attempted, code reason:


7-Patient Refused.


9-Not Applicable-not attempted and the patient did not perform the activity 

before the current illness, exacerbation or injury.


10-Not Attempted due to Environmental Limitations-(lack of equipment, weather 

restraints, etc.).


88-Not Attempted due to Medical Conditions or Safety Concerns.


Eating (QC):  6


Shower/Bathe Self (QC):  5


Upper Body Dressing (QC):  5


Lower Body Dressing (QC):  4


On/Off Footwear:  5





Other Treatment


Pt. agrees to treatment.  Utilizes slide board onto shower chair that is built 

into wall.  Pt. requires assist for placement with dycem underneath after he 

pulls up wheelchair appropriately.  Slides with SBA.  Pt. able to doff shorts by

leaning side to side and doffing while seated.  Pt. showers independently after 

set up.  Pt. able to dry self and don shorts with increased time overall.  

Practices placing slide board again underneath, but unable to do on his own.  PT

comes in to assess and assists with placement.  OT holds chair and places dycem.

 Pt. transfers back to wheelchair with increased time.  PT took over session 

once pt. on wheelchair.





Education


OT Patient Education:  Correct positioning, Modified ADL techniques, Progress 

toward Goal/Update tx plan, Purpose of tx/functional activities, Reviewed 

precautions, Rehab process, Transfer techniques


Teaching Recipient:  Patient


Teaching Methods:  Demonstration, Discussion


Response to Teaching:  Verbalize Understanding, Return Demonstration





OT Short Term Goals


Short Term Goals


Time Frame:  May 5, 2021


Eatin


Oral hygiene:  6


Toileting hygiene:  4


Shower/bathe self:  4


Upper body dressin


Lower body dressing:  3


Putting on/taking off footwear:  3





OT Long Term Goals


Long Term Goals


Time Frame:  May 19, 2021


Eating (QC):  6


Oral Hygiene (QC):  6


Toileting Hygiene (QC):  6


Shower/Bathe Self (QC):  6


Upper Body Dressing (QC):  6


Lower Body Dressing (QC):  6


On/Off Footwear (QC):  6


Additional Goals:  1-Demonstrate ADL Tasks, 2-Verbalize Understanding, 3-

ImproveStrength/Aung


1=Demonstrate adherence to instructed precautions during ADL tasks.


2=Patient will verbalize/demonstrate understanding of assistive 

devices/modifications for ADL.


3=Patient will improve strength/tolerance for activity to enable patient to 

perform ADL's.





OT Education/Plan


Problem List/Assessment


Assessment:  Decreased Activ Tolerance, Impaired I ADL's





Discharge Recommendations


Plan/Recommendations:  Continue POC


Therapy Discharge Recommendati:  Home & Family, Post Acute OT





Treatment Plan/Plan of Care


Treatment,Training & Education:  Yes


Patient would benefit from OT for education, treatment and training to promote 

independence in ADL's, mobility, safety and/or upper extremity function for A

DL's.


Plan of Care:  ADL Retraining, Functional Mobility, Group Exercise/Act as Ind, 

UE Funct Exercise/Act


Treatment Duration:  May 19, 2021


Frequency:  At least 5 of 7 days/Wk (IRF)


Estimated Hrs Per Day:  1.5 hours per day


Agreement:  Yes


Rehab Potential:  Good





Time/GCodes


Start Time:  08:15


Stop Time:  09:20


Total Time Billed (hr/min):  65


Billed Treatment Time


7306-7052 1, ADL x 45minutes


4513-8698 ADL x 20minutes-Co-treat with PT











JAROCHO OBRIEN OT           May 12, 2021 11:30

## 2021-05-13 VITALS — DIASTOLIC BLOOD PRESSURE: 56 MMHG | SYSTOLIC BLOOD PRESSURE: 120 MMHG

## 2021-05-13 VITALS — DIASTOLIC BLOOD PRESSURE: 70 MMHG | SYSTOLIC BLOOD PRESSURE: 140 MMHG

## 2021-05-13 RX ADMIN — DOCUSATE SODIUM SCH MG: 100 CAPSULE ORAL at 19:14

## 2021-05-13 RX ADMIN — PRASUGREL SCH MG: 10 TABLET, FILM COATED ORAL at 07:15

## 2021-05-13 RX ADMIN — OXYCODONE HYDROCHLORIDE SCH MG: 10 TABLET, FILM COATED, EXTENDED RELEASE ORAL at 21:09

## 2021-05-13 RX ADMIN — Medication SCH ML: at 15:17

## 2021-05-13 RX ADMIN — Medication SCH ML: at 06:46

## 2021-05-13 RX ADMIN — LISINOPRIL SCH MG: 20 TABLET ORAL at 07:15

## 2021-05-13 RX ADMIN — GABAPENTIN SCH MG: 300 CAPSULE ORAL at 21:09

## 2021-05-13 RX ADMIN — Medication SCH ML: at 21:10

## 2021-05-13 RX ADMIN — METFORMIN HYDROCHLORIDE SCH MG: 500 TABLET, EXTENDED RELEASE ORAL at 17:37

## 2021-05-13 RX ADMIN — MICONAZOLE NITRATE SCH APPLIC: 20 POWDER TOPICAL at 07:16

## 2021-05-13 RX ADMIN — GABAPENTIN SCH MG: 300 CAPSULE ORAL at 13:15

## 2021-05-13 RX ADMIN — INSULIN ASPART SCH UNIT: 100 INJECTION, SOLUTION INTRAVENOUS; SUBCUTANEOUS at 16:50

## 2021-05-13 RX ADMIN — METFORMIN HYDROCHLORIDE SCH MG: 500 TABLET, EXTENDED RELEASE ORAL at 07:15

## 2021-05-13 RX ADMIN — METOPROLOL SUCCINATE SCH MG: 100 TABLET, EXTENDED RELEASE ORAL at 07:15

## 2021-05-13 RX ADMIN — GLYBURIDE SCH MG: 2.5 TABLET ORAL at 06:45

## 2021-05-13 RX ADMIN — INSULIN ASPART SCH UNIT: 100 INJECTION, SOLUTION INTRAVENOUS; SUBCUTANEOUS at 20:51

## 2021-05-13 RX ADMIN — DOCUSATE SODIUM SCH MG: 100 CAPSULE ORAL at 08:59

## 2021-05-13 RX ADMIN — OXYCODONE HYDROCHLORIDE SCH MG: 10 TABLET, FILM COATED, EXTENDED RELEASE ORAL at 07:15

## 2021-05-13 RX ADMIN — INSULIN ASPART SCH UNIT: 100 INJECTION, SOLUTION INTRAVENOUS; SUBCUTANEOUS at 11:13

## 2021-05-13 RX ADMIN — POLYETHYLENE GLYCOL (3350) SCH GM: 17 POWDER, FOR SOLUTION ORAL at 08:59

## 2021-05-13 RX ADMIN — GLYBURIDE SCH MG: 2.5 TABLET ORAL at 17:37

## 2021-05-13 RX ADMIN — MICONAZOLE NITRATE SCH APPLIC: 20 POWDER TOPICAL at 21:10

## 2021-05-13 RX ADMIN — INSULIN ASPART SCH UNIT: 100 INJECTION, SOLUTION INTRAVENOUS; SUBCUTANEOUS at 06:05

## 2021-05-13 RX ADMIN — GABAPENTIN SCH MG: 300 CAPSULE ORAL at 07:15

## 2021-05-13 RX ADMIN — POLYETHYLENE GLYCOL (3350) SCH GM: 17 POWDER, FOR SOLUTION ORAL at 19:14

## 2021-05-13 RX ADMIN — PANTOPRAZOLE SODIUM SCH MG: 40 TABLET, DELAYED RELEASE ORAL at 07:16

## 2021-05-13 RX ADMIN — DOCUSATE SODIUM AND SENNOSIDES SCH EA: 8.6; 5 TABLET, FILM COATED ORAL at 19:14

## 2021-05-13 RX ADMIN — DOCUSATE SODIUM AND SENNOSIDES SCH EA: 8.6; 5 TABLET, FILM COATED ORAL at 08:59

## 2021-05-13 RX ADMIN — ASPIRIN SCH MG: 81 TABLET ORAL at 07:15

## 2021-05-13 NOTE — OCCUPATIONAL THER DAILY NOTE
OT Current Status-Daily Note


Subjective


Pt. indicates pain in residual limb when he bumps it during transfer.  Reports 

9/10 pain.  Nursing gives pain medication.





Mental Status/Objective


Patient Orientation:  Person, Place, Time, Situation





ADL-Treatment


Therapy Code Descriptions/Definitions 





Functional [place] Measure:


0=Not Assessed/NA        4=Minimal Assistance


1=Total Assistance        5=Supervision or Setup


2=Maximal Assistance  6=Modified [place]


3=Moderate Assistance 7=Complete IndependenceSCALE: Activities may be completed 

with or without assistive devices.





6-Indepedent-patient completes the activity by him/herself with no assistance 

from a helper.


5-Set-up or Clean-up Assistance-helper sets up or cleans up; patient completes 

activity. [place] assists only prior to or  


    following the activity.


4-Supervision or Touching Assistance-helper provides verbal cues and/or 

touching/steadying and/or contact guard assistance as patient completes 

activity. Assistance may be provided   


    throughout the activity or intermittently.


3-Partial/Moderate Assistance-helper does LESS THAN HALF the effort. [place] 

lifts, holds or supports trunk or limbs, but provides less than half the effort.


2-Substantial/Maximal Assistance-helper does MORE THAN HALF the effort. [place] 

lifts or holds trunk or limbs and provides more than half the effort.


1-Hortplpyf-ivhfit does ALL the effort. Patient does none of the effort to 

complete the activity. Or, the assistance of 2 or more helpers is required for 

the patient to complete the  


    activity.


If activity was not attempted, code reason:


7-Patient Refused.


9-Not Applicable-not attempted and the patient did not perform the activity 

before the current illness, exacerbation or injury.


10-Not Attempted due to Environmental Limitations-(lack of equipment, weather 

restraints, etc.).


88-Not Attempted due to Medical Conditions or Safety Concerns.


Toilet Transfer (QC):  4 (CGA to use slide board and transfer onto BS.)





Other Treatment


Pt. seen for partial co-treatment with PT due to need of skilled assessment x 2.

 Pt. practiced car transfer.  Attempted to simulate transfer with slide board, 

with car seat elevated to height of his car at home.  This was too high and pt. 

would not have been able to complete.  Seat put at 23 inches, to simulate sedan,

and pt. practiced with slide board.  Required assistance to hold wheelchair, car

simulator, and slide board for safe transfer.  Pt. able to slide back into 

wheelchair.  Both PT/OT made suggestions for foot and UE placement when getting 

into car.  Pt. and OT went to bathroom and transferred onto commode with SBA.  

All needs met.





Education


OT Patient Education:  Correct positioning, Modified ADL techniques, Progress 

toward Goal/Update tx plan, Purpose of tx/functional activities, Reviewed 

precautions, Rehab process, Transfer techniques


Teaching Recipient:  Patient


Teaching Methods:  Demonstration, Discussion


Response to Teaching:  Verbalize Understanding, Return Demonstration





OT Short Term Goals


Short Term Goals


Time Frame:  May 5, 2021


Eatin


Oral hygiene:  6


Toileting hygiene:  4


Shower/bathe self:  4


Upper body dressin


Lower body dressing:  3


Putting on/taking off footwear:  3





OT Long Term Goals


Long Term Goals


Time Frame:  May 19, 2021


Eating (QC):  6


Oral Hygiene (QC):  6


Toileting Hygiene (QC):  6


Shower/Bathe Self (QC):  6


Upper Body Dressing (QC):  6


Lower Body Dressing (QC):  6


On/Off Footwear (QC):  6


Additional Goals:  1-Demonstrate ADL Tasks, 2-Verbalize Understanding, 3-

ImproveStrength/Aung


1=Demonstrate adherence to instructed precautions during ADL tasks.


2=Patient will verbalize/demonstrate understanding of assistive 

devices/modifications for ADL.


3=Patient will improve strength/tolerance for activity to enable patient to 

perform ADL's.





OT Education/Plan


Problem List/Assessment


Assessment:  Decreased Activ Tolerance, Impaired I ADL's





Discharge Recommendations


Plan/Recommendations:  Continue POC


Therapy Discharge Recommendati:  Post Acute OT





Treatment Plan/Plan of Care


Treatment,Training & Education:  Yes


Patient would benefit from OT for education, treatment and training to promote 

independence in ADL's, mobility, safety and/or upper extremity function for 

ADL's.


Plan of Care:  ADL Retraining, Functional Mobility, Group Exercise/Act as Ind, 

UE Funct Exercise/Act


Treatment Duration:  May 19, 2021


Frequency:  At least 5 of 7 days/Wk (IRF)


Estimated Hrs Per Day:  1.5 hours per day


Agreement:  Yes


Rehab Potential:  Good





Time/GCodes


Start Time:  13:00


Stop Time:  13:45


Total Time Billed (hr/min):  45


Billed Treatment Time


5914-8770 1, FA x 30minutes-Co-treatment with PT


[id number] FA x 15minutes











JAROCHO OBRIEN OT           May 13, 2021 14:58

## 2021-05-13 NOTE — PHYSICAL THERAPY DAILY NOTE
PT Daily Note-Current


Subjective


Patient upright in chair pre tx, consents to PT/OT co-treat to assist with 

simulated car transfer. Patient knocked residual limb during sit to stand from 

lift reclining chair and reported 9/10 pain that lingered through treatment 

session.





Appearance


Patient upright in w/c with OT post treatment, with all needs met.





Mental Status


Patient Orientation:  Person, Place, Time, Normal For Age





Transfers


SCALE: Activities may be completed with or without assistive devices.





6-Indepedent-patient completes the activity by him/herself with no assistance 

from a helper.


5-Set-up or Clean-up Assistance-helper sets up or cleans up; patient completes 

activity. [place] assists only prior to or  


    following the activity.


4-Supervision or Touching Assistance-helper provides verbal cues and/or 

touching/steadying and/or contact guard assistance as patient completes 

activity. Assistance may be provided   


    throughout the activity or intermittently.


3-Partial/Moderate Assistance-helper does LESS THAN HALF the effort. [place] 

lifts, holds or supports trunk or limbs, but provides less than half the effort.


2-Substantial/Maximal Assistance-helper does MORE THAN HALF the effort. [place] 

lifts or holds trunk or limbs and provides more than half the effort.


4-Ikvdtqslr-aaknmo does ALL the effort. Patient does none of the effort to 

complete the activity. Or, the assistance of 2 or more helpers is required for 

the patient to complete the  


    activity.


If activity was not attempted, code reason:


7-Patient Refused.


9-Not Applicable-not attempted and the patient did not perform the activity 

before the current illness, exacerbation or injury.


10-Not Attempted due to Environmental Limitations-(lack of equipment, weather 

restraints, etc.).


88-Not Attempted due to Medical Conditions or Safety Concerns.


Sit to Stand (QC):  3


Chair/Bed-to-Chair Xfer(QC):  3 (Sliding board transfer, Mod A for board 

placement, cues for hand hold, removing board once transfer complete)


Car Transfer (QC):  3


CGA with sit to stand from lift chair, patient utilized several attempts to 

complete sit to stand, struggled with  on shoe to keep leg positioned, 

relied on PT foot blocking. Patient also hit his residual limb on chair during 

transfer, PT required to manipulate w/c to allow full chair transfer (Mod A).





Weight Bearing


Right Lower Extremity:  Right


Full Weight Bearing


Left Lower Extremity:  Left


Non Weight Bearing (BKA)





Treatments


Co-treated with OT secondary to patient decreased functional mobility and fall 

risk with activities needed for safe return home. Assisted with car transfer 

using sliding board with simulation of patient car height to assess ability to 

get in and out of car. Patient was able to complete car transfer, but relied on 

cues for sliding board placement, and heavily utilized grab bars (unsure of grab

bar placement in patients actual car). Transfer was uncomfortable and painful 

for patient, but patient was able to get in and back out into w/c.





Assessment


Current Status:  Fair Progress


Patient continues to be motivated to complete functional activities needed for 

safety with return home. Patient is pushing himself to complete transfers and 

mobility without assistance, to simulate his environment when he returns home 

without help during the day.





PT Short Term Goals


Short Term Goals


Time Frame:  2021


Roll Left & Right:  6


Sit to lyin


Lying to sitting on side of be:  6


Sit to stand:  3


Chair/bed-to-chair transfer:  3


Wheel 50ft w/2 turns:  6


Wheel 150 feet:  6





PT Long Term Goals


Long Term Goals


PT Long Term Goals Time Frame:  May 19, 2021


Roll Left & Right (QC):  6


Sit to Lying (QC):  6


Lying-Sitting on Side/Bed(QC):  6


Sit to Stand (QC):  6


Chair/Bed-to-Chair Xfer(QC):  6 (SPT or slideboard, whichever is the safest 

method)


Toilet Transfer (QC):  6 (SPT or slide board)


Car Transfer (QC):  4


Does the Patient Walk:  No and Walking Goal NOT indicated


Walk 10 feet (QC):  88


Walk 50ft with 2 Turns (QC):  88


Walk 150 ft (QC):  88


Walking 10ft on Uneven Surface:  88


1 Step (curb) (QC):  88


4 Steps (QC):  88


12 Steps (QC):  88


Picking up an Object (QC):  88


Does the Pt use WC or Scooter?:  Yes


Wheel 50 feet with 2 turns (QC:  6


Type:  Manual


Wheel 150 feet:  6


Type:  Manual





PT Plan


Problem List


Problem List:  Activity Tolerance, Functional Strength, Safety, Balance, Gait, 

Transfer, Bed Mobility, ROM





Treatment/Plan


Treatment Plan:  Continue Plan of Care


Treatment Plan:  Bed Mobility, Education, Functional Activity Aung, Functional 

Strength, Group Therapy, Gait, Safety, Therapeutic Exercise, Transfers


Treatment Duration:  May 19, 2021


Frequency:  At least 5 of 7 days/Wk (IRF)


Estimated Hrs Per Day:  1.5 hours per day


Patient and/or Family Agrees t:  Yes





Safety Risks/Education


Patient Education:  Transfer Techniques, Correct Positioning, W/C Management, 

Safety Issues


Teaching Recipient:  Patient


Teaching Methods:  Demonstration, Discussion


Response to Teaching:  Verbalize Understanding, Return Demonstration





Time/GCodes


Time In:  1300


Time Out:  1330


Total Billed Treatment Time:  30


Total Billed Treatment


1 visit: 


FA x2: 30'





PT/OT Co-Treat: 1300 - 1330











CAMILLE HARRY PT                May 13, 2021 14:00

## 2021-05-13 NOTE — OCCUPATIONAL THER DAILY NOTE
OT Current Status-Daily Note


Subjective


No pain reported.





Appearance


Pt. in bed.  Agrees to work with OT.





Mental Status/Objective


Patient Orientation:  Person, Place, Time, Situation





ADL-Treatment


Therapy Code Descriptions/Definitions 





Functional [place] Measure:


0=Not Assessed/NA        4=Minimal Assistance


1=Total Assistance        5=Supervision or Setup


2=Maximal Assistance  6=Modified [place]


3=Moderate Assistance 7=Complete IndependenceSCALE: Activities may be completed 

with or without assistive devices.





6-Indepedent-patient completes the activity by him/herself with no assistance 

from a helper.


5-Set-up or Clean-up Assistance-helper sets up or cleans up; patient completes 

activity. [place] assists only prior to or  


    following the activity.


4-Supervision or Touching Assistance-helper provides verbal cues and/or 

touching/steadying and/or contact guard assistance as patient completes 

activity. Assistance may be provided   


    throughout the activity or intermittently.


3-Partial/Moderate Assistance-helper does LESS THAN HALF the effort. [place] 

lifts, holds or supports trunk or limbs, but provides less than half the effort.


2-Substantial/Maximal Assistance-helper does MORE THAN HALF the effort. [place] 

lifts or holds trunk or limbs and provides more than half the effort.


9-Hnscmyxxw-nzewls does ALL the effort. Patient does none of the effort to 

complete the activity. Or, the assistance of 2 or more helpers is required for 

the patient to complete the  


    activity.


If activity was not attempted, code reason:


7-Patient Refused.


9-Not Applicable-not attempted and the patient did not perform the activity 

before the current illness, exacerbation or injury.


10-Not Attempted due to Environmental Limitations-(lack of equipment, weather 

restraints, etc.).


88-Not Attempted due to Medical Conditions or Safety Concerns.


Eating (QC):  6


Upper Body Dressing (QC):  6


Pt. transfers supine-sit with Mod I.  Stands at walker with bed elevated and is 

able to transfer to wheelchair with Mod I.  Pt. propels wheelchair to therapy 

gym independently.  Dons 4 lb. wrist weights on bilateral UE.  Completes UE 

exercise with pulley system, back and forth with reciprocal movements to 

increase overall strength.  Pt. tolerated this approximately 20 minutes.  All 

needs met up in chair.  Pt. to receive PT in gym.





Education


OT Patient Education:  Correct positioning, Exercise program, Modified ADL 

techniques, Progress toward Goal/Update tx plan, Purpose of tx/functional 

activities, Reviewed precautions, Rehab process, Transfer techniques


Teaching Recipient:  Patient


Teaching Methods:  Demonstration, Discussion


Response to Teaching:  Verbalize Understanding, Return Demonstration





OT Short Term Goals


Short Term Goals


Time Frame:  May 5, 2021


Eatin


Oral hygiene:  6


Toileting hygiene:  4


Shower/bathe self:  4


Upper body dressin


Lower body dressing:  3


Putting on/taking off footwear:  3





OT Long Term Goals


Long Term Goals


Time Frame:  May 19, 2021


Eating (QC):  6


Oral Hygiene (QC):  6


Toileting Hygiene (QC):  6


Shower/Bathe Self (QC):  6


Upper Body Dressing (QC):  6


Lower Body Dressing (QC):  6


On/Off Footwear (QC):  6


Additional Goals:  1-Demonstrate ADL Tasks, 2-Verbalize Understanding, 

3-ImproveStrength/Aung


1=Demonstrate adherence to instructed precautions during ADL tasks.


2=Patient will verbalize/demonstrate understanding of assistive 

devices/modifications for ADL.


3=Patient will improve strength/tolerance for activity to enable patient to perf

orm ADL's.





OT Education/Plan


Problem List/Assessment


Assessment:  Decreased Activ Tolerance





Discharge Recommendations


Plan/Recommendations:  Continue POC


Therapy Discharge Recommendati:  Home & Family, Post Acute OT





Treatment Plan/Plan of Care


Treatment,Training & Education:  Yes


Patient would benefit from OT for education, treatment and training to promote 

independence in ADL's, mobility, safety and/or upper extremity function for 

ADL's.


Plan of Care:  ADL Retraining, Functional Mobility, Group Exercise/Act as Ind, 

UE Funct Exercise/Act


Treatment Duration:  May 19, 2021


Frequency:  At least 5 of 7 days/Wk (IRF)


Estimated Hrs Per Day:  1.5 hours per day


Agreement:  Yes


Rehab Potential:  Good





Time/GCodes


Start Time:  08:15


Stop Time:  09:00


Total Time Billed (hr/min):  45


Billed Treatment Time


1, FA x 45minutes











JAROCHO OBRIEN OT           May 13, 2021 11:49

## 2021-05-13 NOTE — PHYSICAL THERAPY DAILY NOTE
PT Daily Note-Current


Subjective


Patient upright in gym pre tx, consents to therapy, does not c/o pain.





Appearance


Patient upright in recliner post tx, with call button and tray table within 

reach. All needs met.





Mental Status


Patient Orientation:  Person, Place, Time, Normal For Age





Transfers


SCALE: Activities may be completed with or without assistive devices.





6-Indepedent-patient completes the activity by him/herself with no assistance 

from a helper.


5-Set-up or Clean-up Assistance-helper sets up or cleans up; patient completes 

activity. [place] assists only prior to or  


    following the activity.


4-Supervision or Touching Assistance-helper provides verbal cues and/or 

touching/steadying and/or contact guard assistance as patient completes 

activity. Assistance may be provided   


    throughout the activity or intermittently.


3-Partial/Moderate Assistance-helper does LESS THAN HALF the effort. [place] 

lifts, holds or supports trunk or limbs, but provides less than half the effort.


2-Substantial/Maximal Assistance-helper does MORE THAN HALF the effort. [place] 

lifts or holds trunk or limbs and provides more than half the effort.


8-Uysbsulcx-cptyqf does ALL the effort. Patient does none of the effort to 

complete the activity. Or, the assistance of 2 or more helpers is required for 

the patient to complete the  


    activity.


If activity was not attempted, code reason:


7-Patient Refused.


9-Not Applicable-not attempted and the patient did not perform the activity 

before the current illness, exacerbation or injury.


10-Not Attempted due to Environmental Limitations-(lack of equipment, weather 

restraints, etc.).


88-Not Attempted due to Medical Conditions or Safety Concerns.


Sit to Stand (QC):  4


Chair/Bed-to-Chair Xfer(QC):  3 (Min to Mod A with sliding board; needed help 

stabalizing foot secondary to decreased  from socks, and moving sliding 

board out from under patient once transfer accomplished)


SBA with sit to  parallel bars and elevated therapy mat, but patient 

requires assistance with w/c manipulation to bring to patients legs before 

sitting. Patient gave several attempts to perform sit to stand from w/c, but was

not successful.





Weight Bearing


Right Lower Extremity:  Right


Full Weight Bearing


Left Lower Extremity:  Left


Non Weight Bearing (BKA)





Wheelchair Training


Does the Pt Use a Wheelchair?:  Yes


Wheel 50 ft with 2 turns (QC):  6


Type of Wheelchair:  Manual


80'





Exercises


Supine Ex:  Quad Set


Supine Reps:  20 (3 sets)


Seated Therapy Exercises:  Long arc quads


Seated Reps:  20 (3 sets, 3# weight)


Knee extension stretch (7#, 3'), Prone hip flexion stretch (3', added 2x 10 push

up with stretch)





Treatments


LE strengthening, transfers, ROM, endurance





Assessment


Current Status:  Fair Progress


Patient continues to be unable to perform sit to stand from w/c, but is able to 

demonstrate improvement in tricep strength with transfer initiation, needs shoe 

with traction to perform transfers with stability





PT Short Term Goals


Short Term Goals


Time Frame:  2021


Roll Left & Right:  6


Sit to lyin


Lying to sitting on side of be:  6


Sit to stand:  3


Chair/bed-to-chair transfer:  3


Wheel 50ft w/2 turns:  6


Wheel 150 feet:  6





PT Long Term Goals


Long Term Goals


PT Long Term Goals Time Frame:  May 19, 2021


Roll Left & Right (QC):  6


Sit to Lying (QC):  6


Lying-Sitting on Side/Bed(QC):  6


Sit to Stand (QC):  6


Chair/Bed-to-Chair Xfer(QC):  6 (SPT or slideboard, whichever is the safest 

method)


Toilet Transfer (QC):  6 (SPT or slide board)


Car Transfer (QC):  4


Does the Patient Walk:  No and Walking Goal NOT indicated


Walk 10 feet (QC):  88


Walk 50ft with 2 Turns (QC):  88


Walk 150 ft (QC):  88


Walking 10ft on Uneven Surface:  88


1 Step (curb) (QC):  88


4 Steps (QC):  88


12 Steps (QC):  88


Picking up an Object (QC):  88


Does the Pt use WC or Scooter?:  Yes


Wheel 50 feet with 2 turns (QC:  6


Type:  Manual


Wheel 150 feet:  6


Type:  Manual





PT Plan


Problem List


Problem List:  Activity Tolerance, Functional Strength, Safety, Balance, Gait, 

Transfer, Bed Mobility, ROM





Treatment/Plan


Treatment Plan:  Continue Plan of Care


Treatment Plan:  Bed Mobility, Education, Functional Activity Aung, Functional 

Strength, Group Therapy, Gait, Safety, Therapeutic Exercise, Transfers


Treatment Duration:  May 19, 2021


Frequency:  At least 5 of 7 days/Wk (IRF)


Estimated Hrs Per Day:  1.5 hours per day


Patient and/or Family Agrees t:  Yes





Safety Risks/Education


Patient Education:  Transfer Techniques, Correct Positioning, W/C Management, 

Safety Issues


Teaching Recipient:  Patient


Teaching Methods:  Demonstration, Discussion


Response to Teaching:  Verbalize Understanding, Return Demonstration





Time/GCodes


Time In:  0900


Time Out:  1000


Total Billed Treatment Time:  60


Total Billed Treatment


1 visit: 


FA x2: 35' 


EX x2: 25'











CAMILLE HARRY PT                May 13, 2021 10:00

## 2021-05-13 NOTE — PM&R PROGRESS NOTE
Subjective


HPI/CC On Admission


Date Seen by Provider:  May 13, 2021


Time Seen by Provider:  11:15


Subjective/Events-last exam


5/13/21:


Doing well


Sugar 144


BM 5/11


Pain good until dressing changes


Very difficulty coping at times 








5/11/21:


Pt doing pretty well 


Blood sugar 99 today 


Bowels moved yesterday 


Will DC Fentanyl IV and take Oxycodone about an hour before dressing changes


Overall doing pretty well otherwise 








5/10/21:


Patient doing well today


No pain is reported


Just took a shower


Dressing is changed daily


Blood sugar 128


Hemoglobin 9.3








5/9/21:


Patient doing well


Labs due tomorrow


Some staples removed today


Dr Jiménez appreciated








5/8/21:


No major issues


Pain is controlled


Reviewed meds


Sugars and BP ok








5/7/2021:


Patient doing very well


Dr. JIMÉNEZ evaluated the leg


Sugars are good


Pain is well managed with pain medication


Blood pressure good 126/58








5/6/21:


Pt doing very well


Stump  is on


Blood sugars are good


Bowels moved yesterday


Staples will be removed per Dr. Jiménez's order








5/5/21:


Pt doing pretty well


Stump  was uncomfortable so that was taken off last night but put on 

today and doing pretty well


Bowels moved yesterday


Discharge is planned for first of next week








5/4/21:


Pt doing pretty well


Had a large BM last night


Dr. Guzmán thinks that the wound looks really good


Stump  will be started today


Sugars are okay








5/3/21:


Pt doing much better


Hgb 9.5


Bowels moved yesterday


Sugar was 100 this morning


Drainage from incision continues so stump- is being held








5/2/21:


Patient doing well


No pain reported except during dressing changes


Long acting Oxycontin working well for the patient


Stump  today or tomorrow








5/1/21:


Patient doing well


Oxycontin 10mg PO BID


Change dressing as instructed


Incision looks good


Fentanyl infusion before dressing changes








4/30/21:


Patient doing well


Lost 11# since admit


BM yesterday


Sugars are good








4/29/21:


No major issues


Incision is bleeding in 1 specific area


Scheduled Oxycontin is helping and minimsl IR used


BM+








4/28/21:


No major issues


BM yesterday will need to closely monitor that due to long acting narcs


Dr Jiménez will see him today regarding his staples


Talked about weight loss with Dr Jiménez








4/27/21:


Pt doing really well


No issues 


Stump  is being tolerated for 23 hours of the day 


No pain 


Sugar is okay and I decreased insulin even further today 








4/26/21:


Pt doing a lot better today


Pain is well controlled


Bowels moved yesterday


Stump  will be placed today on his left amputation site


Slept very well








4/25/21:


Doing well


No pain when he doesn't move


Pain controlled better with Oxycontin


BM+


Mother and son visiting him today


Sugars reviewed








4/24/21:


Pain is better


No issues reported


Hgb 7.9


BM++


Using IS








4/23/21:


Doing well


Stump  will start Monday per Dr Jiménez


BM+


Oxycontin and Oxycodone added for better pain control


DC Lortab








4/22/21:


Pt doing pretty well


Had a large BM today


Hgb 7.7 after three units of blood on med surg


Rough on the transfers but getting better


Dressing change will be per Dr. Morejon orders


Sugar is 107 this morning





Review of Systems


General:  Fatigue, Malaise


Musculoskeletal:  leg pain





Objective


Exam


Vital Signs





Vital Signs








  Date Time  Temp Pulse Resp B/P (MAP) Pulse Ox O2 Delivery O2 Flow Rate FiO2


 


5/13/21 20:50      Room Air  


 


5/13/21 20:27 36.4 74 18 120/56 (77) 99   





Capillary Refill :


General Appearance:  No Apparent Distress, WD/WN, Chronically ill, Obese


HEENT:  PERRL/EOMI, Normal ENT Inspection, Pharynx Normal


Neck:  Full Range of Motion, Normal Inspection, Non Tender, Supple, Carotid 

Bruit


Respiratory:  Chest Non Tender, Lungs Clear, Normal Breath Sounds, No Accessory 

Muscle Use, No Respiratory Distress


Cardiovascular:  Regular Rate, Rhythm, No Edema, No Gallop, No JVD, No Murmur, 

Normal Peripheral Pulses


Gastrointestinal:  Normal Bowel Sounds, No Organomegaly, No Pulsatile Mass, Non 

Tender, Soft


Back:  Normal Inspection, No CVA Tenderness, No Vertebral Tenderness


Extremity:  Normal Capillary Refill, Normal Inspection, Normal Range of Motion, 

Non Tender, No Calf Tenderness, No Pedal Edema


Neurologic/Psychiatric:  Alert, Oriented x3, No Motor/Sensory Deficits, Normal 

Mood/Affect, CNs II-XII Norm as Tested, Abnormal Gait, Motor Weakness (left leg 

amputation )


Skin:  Normal Color, Warm/Dry


Lymphatic:  No Adenopathy





Results/Procedures


Lab


Patient resulted labs reviewed.





FIM


Transfers


Therapy Code Descriptions/Definitions 





Functional [place] Measure:


0=Not Assessed/NA        4=Minimal Assistance


1=Total Assistance        5=Supervision or Setup


2=Maximal Assistance  6=Modified [place]


3=Moderate Assistance 7=Complete IndependenceSCALE: Activities may be completed 

with or without assistive devices.





6-Indepedent-patient completes the activity by him/herself with no assistance 

from a helper.


5-Set-up or Clean-up Assistance-helper sets up or cleans up; patient completes 

activity. [place] assists only prior to or  


    following the activity.


4-Supervision or Touching Assistance-helper provides verbal cues and/or 

touching/steadying and/or contact guard assistance as patient completes 

activity. Assistance may be provided   


    throughout the activity or intermittently.


3-Partial/Moderate Assistance-helper does LESS THAN HALF the effort. [place] 

lifts, holds or supports trunk or limbs, but provides less than half the effort.


2-Substantial/Maximal Assistance-helper does MORE THAN HALF the effort. [place] 

lifts or holds trunk or limbs and provides more than half the effort.


7-Taqjqluqw-zngjpm does ALL the effort. Patient does none of the effort to 

complete the activity. Or, the assistance of 2 or more helpers is required for 

the patient to complete the  


    activity.


If activity was not attempted, code reason:


7-Patient Refused.


9-Not Applicable-not attempted and the patient did not perform the activity 

before the current illness, exacerbation or injury.


10-Not Attempted due to Environmental Limitations-(lack of equipment, weather 

restraints, etc.).


88-Not Attempted due to Medical Conditions or Safety Concerns.


Roll Left to Right (QC):  6


Sit to Lying (QC):  6


Sit to Stand (QC):  4


Chair/Bed-to-Chair Xfer(QC):  3 (Min to Mod A with sliding board; needed help 

stabalizing foot secondary to decreased  from socks, and moving sliding 

board out from under patient once transfer accomplished)


Car Transfer (QC):  88





Gait Training


Does the Patient Walk?:  No and Walking Goal NOT indicated


Distance:  5' x 2


Walk 10 feet (QC):  88


Walk 50 ft with 2 Turns(QC):  88


Walk 150 ft (QC):  88


Walking 10ft/uneven surface-QC:  88


Gait Assistive Device:  FWW





Wheelchair Training


Does the Pt Use a Wheelchair?:  Yes


Wheel 50 ft with 2 turns (QC):  6


Wheel 150 ft (QC):  6


Type of Wheelchair:  Manual





Stair Training


1 Step (curb) (QC):  88


4 Steps (QC):  88


12 Steps (QC):  88





Balance


Picking up an Object (QC):  88





ADL-Treatment


Eating (QC):  6


Oral Hygiene (QC):  6


Shower/Bathe Self (QC):  5


Upper Body Dressing (QC):  5


Lower Body Dressing (QC):  4


On/Off Footwear (QC):  5


Toileting Hygiene (QC):  4


Toilet Transfer (QC):  3 (Min assist at times to transfer with slide board over 

bariatric commode using dycem to keep board in place.)





Assessment/Plan


Assessment and Plan


Assess & Plan/Chief Complaint


Assessment:


s/p LBKA 


DM insulin dependence


HTN


HLP


CRI


Iron def anemia from acute blood loss


TERRI untreated





Plan:


Monitor pain


IRF protocol


Monitor hgb


Iron infusion





4/22/21:


Monitor sugar


Monitor hgb





4/23/21:


Add pain meds


Check labs in am





4/24/21:


Monitor sugar and decrease insulin


Hgb stable


Venofer





4/25/21:


Continue iron infusions


Sugar managed well


Pain controlled





4/26/21:


Stump 


Monitor sugar


Monitor hgb





4/27/21:


Monitor sugar


Decrease insulin


Monitor BP





4/28/21:


Monitor pain


Decreased insulin





4/29/21:


Monitor pain


BM regimen to prevent narcotic bowel





4/30/21:


Monitor closely


Monitor sugar





5/1/21:


Monitor closely


Dressing changes





5/2/21:


Monitor pain


Increase insulin





5/3/21:


Monitor closely


Monitor sugar levels and adjust insulin as needed





5/4/21:


Stump  maintained


Monitor closely





5/5/21:


Monitor pain


Stump 





5/6/21:


Monitor closely


Monitor staples





5/7/2021:


Continue weight loss regimen


Stump 


Monitor ganesh per Dr. JIMÉNEZ





5/8/21:


Continue weight loss


Monitor sugar





5/9/21:


Monitor closely


Labs tomorrow





5/10/21:


Control pain


Monitor hemoglobin


Monitor blood sugar





5/11/21:


DC Fentanyl IV


Monitor closely





5/13/21:


DC Wed


Monitor closely





(1) Status post below-knee amputation of left lower extremity


Status:  Acute


(2) Hyperglycemia


Status:  Chronic


(3) Hypertension


Status:  Chronic


(4) Diabetes mellitus, type 2


Status:  Chronic


(5) Anemia


Status:  Acute


(6) Iron deficiency


Status:  Acute


(7) Peripheral neuropathy


Status:  Chronic











NICOLE VILA DO                May 13, 2021 11:05

## 2021-05-14 VITALS — SYSTOLIC BLOOD PRESSURE: 146 MMHG | DIASTOLIC BLOOD PRESSURE: 65 MMHG

## 2021-05-14 VITALS — DIASTOLIC BLOOD PRESSURE: 51 MMHG | SYSTOLIC BLOOD PRESSURE: 113 MMHG

## 2021-05-14 RX ADMIN — OXYCODONE HYDROCHLORIDE SCH MG: 10 TABLET, FILM COATED, EXTENDED RELEASE ORAL at 20:37

## 2021-05-14 RX ADMIN — GLYBURIDE SCH MG: 2.5 TABLET ORAL at 06:07

## 2021-05-14 RX ADMIN — INSULIN ASPART SCH UNIT: 100 INJECTION, SOLUTION INTRAVENOUS; SUBCUTANEOUS at 16:32

## 2021-05-14 RX ADMIN — DOCUSATE SODIUM AND SENNOSIDES SCH EA: 8.6; 5 TABLET, FILM COATED ORAL at 08:13

## 2021-05-14 RX ADMIN — Medication SCH ML: at 13:35

## 2021-05-14 RX ADMIN — INSULIN ASPART SCH UNIT: 100 INJECTION, SOLUTION INTRAVENOUS; SUBCUTANEOUS at 06:10

## 2021-05-14 RX ADMIN — GABAPENTIN SCH MG: 300 CAPSULE ORAL at 20:37

## 2021-05-14 RX ADMIN — PANTOPRAZOLE SODIUM SCH MG: 40 TABLET, DELAYED RELEASE ORAL at 07:20

## 2021-05-14 RX ADMIN — GABAPENTIN SCH MG: 300 CAPSULE ORAL at 07:20

## 2021-05-14 RX ADMIN — OXYCODONE HYDROCHLORIDE SCH MG: 10 TABLET, FILM COATED, EXTENDED RELEASE ORAL at 07:20

## 2021-05-14 RX ADMIN — BACITRACIN ZINC, NEOMYCIN, POLYMYXIN B SCH GM: 400; 3.5; 5 OINTMENT TOPICAL at 07:21

## 2021-05-14 RX ADMIN — ASPIRIN SCH MG: 81 TABLET ORAL at 07:20

## 2021-05-14 RX ADMIN — MICONAZOLE NITRATE SCH APPLIC: 20 POWDER TOPICAL at 07:21

## 2021-05-14 RX ADMIN — Medication SCH ML: at 20:37

## 2021-05-14 RX ADMIN — MICONAZOLE NITRATE SCH APPLIC: 20 POWDER TOPICAL at 20:38

## 2021-05-14 RX ADMIN — LISINOPRIL SCH MG: 20 TABLET ORAL at 07:20

## 2021-05-14 RX ADMIN — POLYETHYLENE GLYCOL (3350) SCH GM: 17 POWDER, FOR SOLUTION ORAL at 18:52

## 2021-05-14 RX ADMIN — INSULIN ASPART SCH UNIT: 100 INJECTION, SOLUTION INTRAVENOUS; SUBCUTANEOUS at 11:37

## 2021-05-14 RX ADMIN — PRASUGREL SCH MG: 10 TABLET, FILM COATED ORAL at 07:20

## 2021-05-14 RX ADMIN — METFORMIN HYDROCHLORIDE SCH MG: 500 TABLET, EXTENDED RELEASE ORAL at 17:15

## 2021-05-14 RX ADMIN — DOCUSATE SODIUM SCH MG: 100 CAPSULE ORAL at 18:52

## 2021-05-14 RX ADMIN — DOCUSATE SODIUM AND SENNOSIDES SCH EA: 8.6; 5 TABLET, FILM COATED ORAL at 18:52

## 2021-05-14 RX ADMIN — DOCUSATE SODIUM SCH MG: 100 CAPSULE ORAL at 08:13

## 2021-05-14 RX ADMIN — METFORMIN HYDROCHLORIDE SCH MG: 500 TABLET, EXTENDED RELEASE ORAL at 07:20

## 2021-05-14 RX ADMIN — GLYBURIDE SCH MG: 2.5 TABLET ORAL at 17:15

## 2021-05-14 RX ADMIN — INSULIN ASPART SCH UNIT: 100 INJECTION, SOLUTION INTRAVENOUS; SUBCUTANEOUS at 20:38

## 2021-05-14 RX ADMIN — METOPROLOL SUCCINATE SCH MG: 100 TABLET, EXTENDED RELEASE ORAL at 07:20

## 2021-05-14 RX ADMIN — POLYETHYLENE GLYCOL (3350) SCH GM: 17 POWDER, FOR SOLUTION ORAL at 08:13

## 2021-05-14 RX ADMIN — Medication SCH ML: at 06:08

## 2021-05-14 RX ADMIN — GABAPENTIN SCH MG: 300 CAPSULE ORAL at 13:35

## 2021-05-14 NOTE — OCCUPATIONAL THER DAILY NOTE
OT Current Status-Daily Note


Subjective


Pt alert, lying in bed.  Pt agrees to therapy.  No c/o pain.





Mental Status/Objective


Patient Orientation:  Person, Place, Time, Situation





ADL-Treatment


Pt agrees to shower.  Supine <--> sit EOB, independent.  From elevated surface, 

pt completes SPT using FWW with SBA and moving chair into place to sit without 

pt attempting to scoot backwards.  Utilizes slide board onto shower chair that 

is built into wall.  Pt. requires assist for placement with dycem underneath 

after he pulls up wheelchair appropriately.  Slides with SBA.  Pt. able to doff 

shorts by leaning side to side and doffing while seated.  Pt. showers 

independently after set up.  Pt. able to dry self and don shorts with increased 

time overall.  Practices placing slide board again underneath, but unable to do 

on his own.  PT comes in to assess and assists with placement.  OT holds chair 

and places dycem.  Pt. transfers back to wheelchair with increased time.  Slide 

board transfer to bed.  After session, pt lying in bed with call light/phone in 

reach.  All needs met in room.


Therapy Code Descriptions/Definitions 





Functional [place] Measure:


0=Not Assessed/NA        4=Minimal Assistance


1=Total Assistance        5=Supervision or Setup


2=Maximal Assistance  6=Modified [place]


3=Moderate Assistance 7=Complete IndependenceSCALE: Activities may be completed 

with or without assistive devices.





6-Indepedent-patient completes the activity by him/herself with no assistance 

from a helper.


5-Set-up or Clean-up Assistance-helper sets up or cleans up; patient completes 

activity. [place] assists only prior to or  


    following the activity.


4-Supervision or Touching Assistance-helper provides verbal cues and/or 

touching/steadying and/or contact guard assistance as patient completes 

activity. Assistance may be provided   


    throughout the activity or intermittently.


3-Partial/Moderate Assistance-helper does LESS THAN HALF the effort. [place] 

lifts, holds or supports trunk or limbs, but provides less than half the effort.


2-Substantial/Maximal Assistance-helper does MORE THAN HALF the effort. [place] 

lifts or holds trunk or limbs and provides more than half the effort.


4-Firtrugfc-wzdsib does ALL the effort. Patient does none of the effort to 

complete the activity. Or, the assistance of 2 or more helpers is required for 

the patient to complete the  


    activity.


If activity was not attempted, code reason:


7-Patient Refused.


9-Not Applicable-not attempted and the patient did not perform the activity 

before the current illness, exacerbation or injury.


10-Not Attempted due to Environmental Limitations-(lack of equipment, weather 

restraints, etc.).


88-Not Attempted due to Medical Conditions or Safety Concerns.


Eating (QC):  6


Oral Hygiene (QC):  6


Shower/Bathe Self (QC):  6


Upper Body Dressing (QC):  5


Lower Body Dressing (QC):  5


On/Off Footwear:  5





OT Short Term Goals


Short Term Goals


Time Frame:  May 5, 2021


Eatin


Oral hygiene:  6


Toileting hygiene:  4


Shower/bathe self:  4


Upper body dressin


Lower body dressing:  3


Putting on/taking off footwear:  3





OT Long Term Goals


Long Term Goals


Time Frame:  May 19, 2021


Eating (QC):  6


Oral Hygiene (QC):  6


Toileting Hygiene (QC):  6


Shower/Bathe Self (QC):  6


Upper Body Dressing (QC):  6


Lower Body Dressing (QC):  6


On/Off Footwear (QC):  6


Additional Goals:  1-Demonstrate ADL Tasks, 2-Verbalize Understanding, 3-

ImproveStrength/Aung


1=Demonstrate adherence to instructed precautions during ADL tasks.


2=Patient will verbalize/demonstrate understanding of assistive 

devices/modifications for ADL.


3=Patient will improve strength/tolerance for activity to enable patient to 

perform ADL's.





OT Education/Plan


Problem List/Assessment


Assessment:  Decreased Activ Tolerance, Decreased Safety Aware, Impaired Self-

Care Skills





Discharge Recommendations


Plan/Recommendations:  Continue POC





Treatment Plan/Plan of Care


Patient would benefit from OT for education, treatment and training to promote 

independence in ADL's, mobility, safety and/or upper extremity function for 

ADL's.


Plan of Care:  ADL Retraining, Functional Mobility, Group Exercise/Act as Ind, 

UE Funct Exercise/Act


Treatment Duration:  May 19, 2021


Frequency:  At least 5 of 7 days/Wk (IRF)


Estimated Hrs Per Day:  1.5 hours per day


Agreement:  Yes


Rehab Potential:  Good





Time/GCodes


Start Time:  07:15


Stop Time:  08:45


Total Time Billed (hr/min):  90


Billed Treatment Time


1 visit-ADL 6 (90 min)











GUDELIA GONZALEZ               May 14, 2021 09:00

## 2021-05-14 NOTE — PHYSICAL THERAPY DAILY NOTE
PT Daily Note-Current


Subjective


Patient consents to tx, supine in bed pre tx, does not give pain reports.





Appearance


Patient upright in reclining chair post tx, with call button within reach and 

tray table nearby. All needs met.





Mental Status


Patient Orientation:  Person, Place, Time, Normal For Age





Transfers


SCALE: Activities may be completed with or without assistive devices.





6-Indepedent-patient completes the activity by him/herself with no assistance 

from a helper.


5-Set-up or Clean-up Assistance-helper sets up or cleans up; patient completes 

activity. [place] assists only prior to or  


    following the activity.


4-Supervision or Touching Assistance-helper provides verbal cues and/or 

touching/steadying and/or contact guard assistance as patient completes acti

vity. Assistance may be provided   


    throughout the activity or intermittently.


3-Partial/Moderate Assistance-helper does LESS THAN HALF the effort. [place] 

lifts, holds or supports trunk or limbs, but provides less than half the effort.


2-Substantial/Maximal Assistance-helper does MORE THAN HALF the effort. [place] 

lifts or holds trunk or limbs and provides more than half the effort.


6-Nlwzmvfqj-vxabhu does ALL the effort. Patient does none of the effort to 

complete the activity. Or, the assistance of 2 or more helpers is required for 

the patient to complete the  


    activity.


If activity was not attempted, code reason:


7-Patient Refused.


9-Not Applicable-not attempted and the patient did not perform the activity 

before the current illness, exacerbation or injury.


10-Not Attempted due to Environmental Limitations-(lack of equipment, weather 

restraints, etc.).


88-Not Attempted due to Medical Conditions or Safety Concerns.


Roll Left & Right (QC):  6


Lying to Sitting/Side of Bed(Q:  6


Sit to Stand (QC):  4 (SBA with transfer in parallel bars and from elevated bed)


Chair/Bed-to-Chair Xfer(QC):  3 (Min A with stading pivot transfer, SBA with 

sliding board transfer)





Weight Bearing


Right Lower Extremity:  Right


Full Weight Bearing


Left Lower Extremity:  Left


Non Weight Bearing (BKA)





Wheelchair Training


Does the Pt Use a Wheelchair?:  Yes


Wheel 50 ft with 2 turns (QC):  6


Wheel 150 ft (QC):  6


Type of Wheelchair:  Manual


400' x2. Patinent practiced going up/down ramps 6x, using a forward positioning 

going up the ramp (he noted was more difficult than the way he usually does it),

but he wants to be prepared to navigate a ramp forward. Patient noted it was 

more full body work on his arm/leg strength.





Exercises


Standing:  3 way Ex=Flex, Abd, Ext, Mini squats (4 reps in parallel bars), Sit 

to Stand (3 reps in parallel bars)


Standing Reps:  15





Treatments


LE strengthening, balance, wheelchair mobility, transfers





Assessment


Current Status:  Fair Progress


Patient demonstrated good endurance with wheelchair mobility and ramp 

navigation, was self initiating movement to practice working on ramps, was able 

to perform better functionally with new footwear





PT Short Term Goals


Short Term Goals


Time Frame:  2021


Roll Left & Right:  6


Sit to lyin


Lying to sitting on side of be:  6


Sit to stand:  3


Chair/bed-to-chair transfer:  3


Wheel 50ft w/2 turns:  6


Wheel 150 feet:  6





PT Long Term Goals


Long Term Goals


PT Long Term Goals Time Frame:  May 19, 2021


Roll Left & Right (QC):  6


Sit to Lying (QC):  6


Lying-Sitting on Side/Bed(QC):  6


Sit to Stand (QC):  6


Chair/Bed-to-Chair Xfer(QC):  6 (SPT or slideboard, whichever is the safest 

method)


Toilet Transfer (QC):  6 (SPT or slide board)


Car Transfer (QC):  4


Does the Patient Walk:  No and Walking Goal NOT indicated


Walk 10 feet (QC):  88


Walk 50ft with 2 Turns (QC):  88


Walk 150 ft (QC):  88


Walking 10ft on Uneven Surface:  88


1 Step (curb) (QC):  88


4 Steps (QC):  88


12 Steps (QC):  88


Picking up an Object (QC):  88


Does the Pt use WC or Scooter?:  Yes


Wheel 50 feet with 2 turns (QC:  6


Type:  Manual


Wheel 150 feet:  6


Type:  Manual





PT Plan


Problem List


Problem List:  Activity Tolerance, Functional Strength, Safety, Balance, Gait, 

Transfer, Bed Mobility, ROM





Treatment/Plan


Treatment Plan:  Continue Plan of Care


Treatment Plan:  Bed Mobility, Education, Functional Activity Aung, Functional 

Strength, Group Therapy, Gait, Safety, Therapeutic Exercise, Transfers


Treatment Duration:  May 19, 2021


Frequency:  At least 5 of 7 days/Wk (IRF)


Estimated Hrs Per Day:  1.5 hours per day


Patient and/or Family Agrees t:  Yes





Safety Risks/Education


Patient Education:  Transfer Techniques, W/C Management


Teaching Recipient:  Patient


Teaching Methods:  Demonstration, Handout, Discussion


Response to Teaching:  Verbalize Understanding, Return Demonstration


Discussed/demonstrated to patient information regarding fall risk: fall risk 

prevention, how to get up safely from a fall, and what to do after a fall 

regarding residual limb care. Patient noted he understood, and discussed modifie

d ways to be able to help himself safely back up from a possible fall.





Time/GCodes


Time In:  1030


Time Out:  1200


Total Billed Treatment Time:  90


Total Billed Treatment


1 visit: 


FA x3: 45' 


[facility] x2: 30' 


EX: 15'











CAMILLE HARRY PT                May 14, 2021 12:03

## 2021-05-14 NOTE — PM&R PROGRESS NOTE
Subjective


HPI/CC On Admission


Date Seen by Provider:  May 14, 2021


Time Seen by Provider:  11:00


Subjective/Events-last exam


5/14/21:


Patient doing well


Increasing Oxycodone to 10mg before dressing changes


Wound care evaluating right foot due to early signs of wounds there


BM 5/13


Sugar 133








5/13/21:


Doing well


Sugar 144


BM 5/11


Pain good until dressing changes


Very difficulty coping at times 








5/11/21:


Pt doing pretty well 


Blood sugar 99 today 


Bowels moved yesterday 


Will DC Fentanyl IV and take Oxycodone about an hour before dressing changes


Overall doing pretty well otherwise 








5/10/21:


Patient doing well today


No pain is reported


Just took a shower


Dressing is changed daily


Blood sugar 128


Hemoglobin 9.3








5/9/21:


Patient doing well


Labs due tomorrow


Some staples removed today


Dr Jiménez appreciated








5/8/21:


No major issues


Pain is controlled


Reviewed meds


Sugars and BP ok








5/7/2021:


Patient doing very well


Dr. JIMÉNEZ evaluated the leg


Sugars are good


Pain is well managed with pain medication


Blood pressure good 126/58








5/6/21:


Pt doing very well


Stump  is on


Blood sugars are good


Bowels moved yesterday


Staples will be removed per Dr. Jiménez's order








5/5/21:


Pt doing pretty well


Stump  was uncomfortable so that was taken off last night but put on 

today and doing pretty well


Bowels moved yesterday


Discharge is planned for first of next week








5/4/21:


Pt doing pretty well


Had a large BM last night


Dr. Guzmán thinks that the wound looks really good


Stump  will be started today


Sugars are okay








5/3/21:


Pt doing much better


Hgb 9.5


Bowels moved yesterday


Sugar was 100 this morning


Drainage from incision continues so stump- is being held








5/2/21:


Patient doing well


No pain reported except during dressing changes


Long acting Oxycontin working well for the patient


Stump  today or tomorrow








5/1/21:


Patient doing well


Oxycontin 10mg PO BID


Change dressing as instructed


Incision looks good


Fentanyl infusion before dressing changes








4/30/21:


Patient doing well


Lost 11# since admit


BM yesterday


Sugars are good








4/29/21:


No major issues


Incision is bleeding in 1 specific area


Scheduled Oxycontin is helping and minimsl IR used


BM+








4/28/21:


No major issues


BM yesterday will need to closely monitor that due to long acting narcs


Dr Jiménez will see him today regarding his staples


Talked about weight loss with Dr Jiménez








4/27/21:


Pt doing really well


No issues 


Stump  is being tolerated for 23 hours of the day 


No pain 


Sugar is okay and I decreased insulin even further today 








4/26/21:


Pt doing a lot better today


Pain is well controlled


Bowels moved yesterday


Stump  will be placed today on his left amputation site


Slept very well








4/25/21:


Doing well


No pain when he doesn't move


Pain controlled better with Oxycontin


BM+


Mother and son visiting him today


Sugars reviewed








4/24/21:


Pain is better


No issues reported


Hgb 7.9


BM++


Using IS








4/23/21:


Doing well


Stump  will start Monday per Dr Jiménez


BM+


Oxycontin and Oxycodone added for better pain control


DC Lortab








4/22/21:


Pt doing pretty well


Had a large BM today


Hgb 7.7 after three units of blood on med surg


Rough on the transfers but getting better


Dressing change will be per Dr. Morejon orders


Sugar is 107 this morning





Review of Systems


Musculoskeletal:  leg pain, foot pain





Objective


Exam


Vital Signs





Vital Signs








  Date Time  Temp Pulse Resp B/P (MAP) Pulse Ox O2 Delivery O2 Flow Rate FiO2


 


5/14/21 20:06      Room Air  


 


5/14/21 20:06 35.8 74 18 113/51 (71) 95   





Capillary Refill :


General Appearance:  No Apparent Distress, WD/WN, Chronically ill, Obese


HEENT:  PERRL/EOMI, Normal ENT Inspection, Pharynx Normal


Neck:  Full Range of Motion, Normal Inspection, Non Tender, Supple, Carotid 

Bruit


Respiratory:  Chest Non Tender, Lungs Clear, Normal Breath Sounds, No Accessory 

Muscle Use, No Respiratory Distress


Cardiovascular:  Regular Rate, Rhythm, No Edema, No Gallop, No JVD, No Murmur, 

Normal Peripheral Pulses


Gastrointestinal:  Normal Bowel Sounds, No Organomegaly, No Pulsatile Mass, Non 

Tender, Soft


Back:  Normal Inspection, No CVA Tenderness, No Vertebral Tenderness


Extremity:  Normal Capillary Refill, Normal Inspection, Normal Range of Motion, 

Non Tender, No Calf Tenderness, No Pedal Edema


Neurologic/Psychiatric:  Alert, Oriented x3, No Motor/Sensory Deficits, Normal 

Mood/Affect, CNs II-XII Norm as Tested, Abnormal Gait, Motor Weakness (left leg 

amputation )


Skin:  Normal Color, Warm/Dry


Lymphatic:  No Adenopathy





Results/Procedures


Lab


Patient resulted labs reviewed.





FIM


Transfers


Therapy Code Descriptions/Definitions 





Functional [place] Measure:


0=Not Assessed/NA        4=Minimal Assistance


1=Total Assistance        5=Supervision or Setup


2=Maximal Assistance  6=Modified [place]


3=Moderate Assistance 7=Complete IndependenceSCALE: Activities may be completed 

with or without assistive devices.





6-Indepedent-patient completes the activity by him/herself with no assistance 

from a helper.


5-Set-up or Clean-up Assistance-helper sets up or cleans up; patient completes 

activity. [place] assists only prior to or  


    following the activity.


4-Supervision or Touching Assistance-helper provides verbal cues and/or 

touching/steadying and/or contact guard assistance as patient completes 

activity. Assistance may be provided   


    throughout the activity or intermittently.


3-Partial/Moderate Assistance-helper does LESS THAN HALF the effort. [place] 

lifts, holds or supports trunk or limbs, but provides less than half the effort.


2-Substantial/Maximal Assistance-helper does MORE THAN HALF the effort. [place] 

lifts or holds trunk or limbs and provides more than half the effort.


8-Awlpawcrb-qfkste does ALL the effort. Patient does none of the effort to 

complete the activity. Or, the assistance of 2 or more helpers is required for 

the patient to complete the  


    activity.


If activity was not attempted, code reason:


7-Patient Refused.


9-Not Applicable-not attempted and the patient did not perform the activity 

before the current illness, exacerbation or injury.


10-Not Attempted due to Environmental Limitations-(lack of equipment, weather 

restraints, etc.).


88-Not Attempted due to Medical Conditions or Safety Concerns.


Roll Left to Right (QC):  6


Sit to Lying (QC):  6


Sit to Stand (QC):  3


Chair/Bed-to-Chair Xfer(QC):  3 (Sliding board transfer, Mod A for board 

placement, cues for hand hold, removing board once transfer complete)


Car Transfer (QC):  3





Gait Training


Does the Patient Walk?:  No and Walking Goal NOT indicated


Distance:  5' x 2


Walk 10 feet (QC):  88


Walk 50 ft with 2 Turns(QC):  88


Walk 150 ft (QC):  88


Walking 10ft/uneven surface-QC:  88


Gait Assistive Device:  FWW





Wheelchair Training


Does the Pt Use a Wheelchair?:  Yes


Wheel 50 ft with 2 turns (QC):  6


Wheel 150 ft (QC):  6


Type of Wheelchair:  Manual





Stair Training


1 Step (curb) (QC):  88


4 Steps (QC):  88


12 Steps (QC):  88





Balance


Picking up an Object (QC):  88





ADL-Treatment


Eating (QC):  6


Oral Hygiene (QC):  6


Shower/Bathe Self (QC):  5


Upper Body Dressing (QC):  6


Lower Body Dressing (QC):  4


On/Off Footwear (QC):  5


Toileting Hygiene (QC):  4


Toilet Transfer (QC):  4 (CGA to use slide board and transfer onto BSC.)





Assessment/Plan


Assessment and Plan


Assess & Plan/Chief Complaint


Assessment:


s/p LBKA 


DM insulin dependence


HTN


HLP


CRI


Iron def anemia from acute blood loss


TERRI untreated





Plan:


Monitor pain


IRF protocol


Monitor hgb


Iron infusion





4/22/21:


Monitor sugar


Monitor hgb





4/23/21:


Add pain meds


Check labs in am





4/24/21:


Monitor sugar and decrease insulin


Hgb stable


Venofer





4/25/21:


Continue iron infusions


Sugar managed well


Pain controlled





4/26/21:


Stump 


Monitor sugar


Monitor hgb





4/27/21:


Monitor sugar


Decrease insulin


Monitor BP





4/28/21:


Monitor pain


Decreased insulin





4/29/21:


Monitor pain


BM regimen to prevent narcotic bowel





4/30/21:


Monitor closely


Monitor sugar





5/1/21:


Monitor closely


Dressing changes





5/2/21:


Monitor pain


Increase insulin





5/3/21:


Monitor closely


Monitor sugar levels and adjust insulin as needed





5/4/21:


Stump  maintained


Monitor closely





5/5/21:


Monitor pain


Stump 





5/6/21:


Monitor closely


Monitor staples





5/7/2021:


Continue weight loss regimen


Stump 


Monitor ganesh per Dr. JIMÉNEZ





5/8/21:


Continue weight loss


Monitor sugar





5/9/21:


Monitor closely


Labs tomorrow





5/10/21:


Control pain


Monitor hemoglobin


Monitor blood sugar





5/11/21:


DC Fentanyl IV


Monitor closely





5/13/21:


DC Wed


Monitor closely





5/14/21:


Increase pain med dose before dressing changes





(1) Status post below-knee amputation of left lower extremity


Status:  Acute


(2) Hyperglycemia


Status:  Chronic


(3) Hypertension


Status:  Chronic


(4) Diabetes mellitus, type 2


Status:  Chronic


(5) Anemia


Status:  Acute


(6) Iron deficiency


Status:  Acute


(7) Peripheral neuropathy


Status:  Chronic











NICOLE VILA DO                May 14, 2021 10:37

## 2021-05-14 NOTE — PROGRESS NOTE
Subjective


Date Seen by a Provider:  May 14, 2021


Time Seen by a Provider:  12:00


Subjective/Events-last exam


doing well. pain controlled. tolerating therapy. ok now to proceed with 

prosthetic care.





Objective


Exam





Vital Signs








  Date Time  Temp Pulse Resp B/P (MAP) Pulse Ox O2 Delivery O2 Flow Rate FiO2


 


5/14/21 08:26      Room Air  


 


5/14/21 08:00 35.6 81 18 146/65 (92) 97 Room Air  


 


5/13/21 20:50      Room Air  


 


5/13/21 20:27 36.4 74 18 120/56 (77) 99 Room Air  





Capillary Refill :


General Appearance:  No Apparent Distress


HEENT:  PERRL/EOMI


Neck:  Full Range of Motion


Respiratory:  Chest Non Tender, Lungs Clear, Normal Breath Sounds


Cardiovascular:  Regular Rate, Rhythm


Gastrointestinal:  normal bowel sounds, non tender


Extremity:  Normal Capillary Refill


Neurologic/Psychiatric:  Alert, Oriented x3


Skin:  Normal Color


Lymphatic:  No Adenopathy





Results


Lab


Laboratory Tests


5/13/21 15:48: Glucometer 132H


5/13/21 20:44: Glucometer 159H


5/14/21 06:06: Glucometer 133H


5/14/21 10:44: Glucometer 170H





Assessment/Plan


Assessment/Plan


Assess & Plan/Chief Complaint


s/p left BKA.


cont rehab.


add stump  monday.


long term will want pt to proceed with medically supervised weight loss and once

at a healthier weight(BMI around 50) , patient states very much interested and 

planning on it.


will remove half skin staples and external sutures


will remove other half before d/c


start prosthetic care.











CLAUDY ALEJO MD                May 14, 2021 12:39

## 2021-05-15 VITALS — DIASTOLIC BLOOD PRESSURE: 59 MMHG | SYSTOLIC BLOOD PRESSURE: 116 MMHG

## 2021-05-15 VITALS — DIASTOLIC BLOOD PRESSURE: 55 MMHG | SYSTOLIC BLOOD PRESSURE: 102 MMHG

## 2021-05-15 RX ADMIN — GLYBURIDE SCH MG: 2.5 TABLET ORAL at 16:31

## 2021-05-15 RX ADMIN — GABAPENTIN SCH MG: 300 CAPSULE ORAL at 20:28

## 2021-05-15 RX ADMIN — DOCUSATE SODIUM AND SENNOSIDES SCH EA: 8.6; 5 TABLET, FILM COATED ORAL at 20:16

## 2021-05-15 RX ADMIN — PRASUGREL SCH MG: 10 TABLET, FILM COATED ORAL at 08:01

## 2021-05-15 RX ADMIN — MICONAZOLE NITRATE SCH APPLIC: 20 POWDER TOPICAL at 20:28

## 2021-05-15 RX ADMIN — POLYETHYLENE GLYCOL (3350) SCH GM: 17 POWDER, FOR SOLUTION ORAL at 08:03

## 2021-05-15 RX ADMIN — METFORMIN HYDROCHLORIDE SCH MG: 500 TABLET, EXTENDED RELEASE ORAL at 08:00

## 2021-05-15 RX ADMIN — BACITRACIN ZINC, NEOMYCIN, POLYMYXIN B SCH GM: 400; 3.5; 5 OINTMENT TOPICAL at 11:29

## 2021-05-15 RX ADMIN — Medication SCH ML: at 13:12

## 2021-05-15 RX ADMIN — INSULIN ASPART SCH UNIT: 100 INJECTION, SOLUTION INTRAVENOUS; SUBCUTANEOUS at 20:16

## 2021-05-15 RX ADMIN — MICONAZOLE NITRATE SCH APPLIC: 20 POWDER TOPICAL at 11:29

## 2021-05-15 RX ADMIN — DOCUSATE SODIUM SCH MG: 100 CAPSULE ORAL at 08:03

## 2021-05-15 RX ADMIN — METFORMIN HYDROCHLORIDE SCH MG: 500 TABLET, EXTENDED RELEASE ORAL at 17:34

## 2021-05-15 RX ADMIN — INSULIN ASPART SCH UNIT: 100 INJECTION, SOLUTION INTRAVENOUS; SUBCUTANEOUS at 16:05

## 2021-05-15 RX ADMIN — OXYCODONE HYDROCHLORIDE SCH MG: 10 TABLET, FILM COATED, EXTENDED RELEASE ORAL at 08:01

## 2021-05-15 RX ADMIN — GABAPENTIN SCH MG: 300 CAPSULE ORAL at 08:00

## 2021-05-15 RX ADMIN — INSULIN ASPART SCH UNIT: 100 INJECTION, SOLUTION INTRAVENOUS; SUBCUTANEOUS at 11:28

## 2021-05-15 RX ADMIN — PANTOPRAZOLE SODIUM SCH MG: 40 TABLET, DELAYED RELEASE ORAL at 08:00

## 2021-05-15 RX ADMIN — ASPIRIN SCH MG: 81 TABLET ORAL at 08:00

## 2021-05-15 RX ADMIN — POLYETHYLENE GLYCOL (3350) SCH GM: 17 POWDER, FOR SOLUTION ORAL at 20:16

## 2021-05-15 RX ADMIN — INSULIN ASPART SCH UNIT: 100 INJECTION, SOLUTION INTRAVENOUS; SUBCUTANEOUS at 05:40

## 2021-05-15 RX ADMIN — DOCUSATE SODIUM SCH MG: 100 CAPSULE ORAL at 20:16

## 2021-05-15 RX ADMIN — OXYCODONE HYDROCHLORIDE SCH MG: 10 TABLET, FILM COATED, EXTENDED RELEASE ORAL at 20:28

## 2021-05-15 RX ADMIN — LISINOPRIL SCH MG: 20 TABLET ORAL at 08:53

## 2021-05-15 RX ADMIN — METOPROLOL SUCCINATE SCH MG: 100 TABLET, EXTENDED RELEASE ORAL at 08:53

## 2021-05-15 RX ADMIN — Medication SCH ML: at 05:39

## 2021-05-15 RX ADMIN — GABAPENTIN SCH MG: 300 CAPSULE ORAL at 12:22

## 2021-05-15 RX ADMIN — GLYBURIDE SCH MG: 2.5 TABLET ORAL at 05:36

## 2021-05-15 RX ADMIN — DOCUSATE SODIUM AND SENNOSIDES SCH EA: 8.6; 5 TABLET, FILM COATED ORAL at 08:03

## 2021-05-15 RX ADMIN — Medication SCH ML: at 20:25

## 2021-05-15 NOTE — PHYSICAL THERAPY DAILY NOTE
PT Daily Note-Current


Subjective


States that he is doing okay.





Transfers


SCALE: Activities may be completed with or without assistive devices.





6-Indepedent-patient completes the activity by him/herself with no assistance 

from a helper.


5-Set-up or Clean-up Assistance-helper sets up or cleans up; patient completes 

activity. [place] assists only prior to or  


    following the activity.


4-Supervision or Touching Assistance-helper provides verbal cues and/or 

touching/steadying and/or contact guard assistance as patient completes 

activity. Assistance may be provided   


    throughout the activity or intermittently.


3-Partial/Moderate Assistance-helper does LESS THAN HALF the effort. [place] 

lifts, holds or supports trunk or limbs, but provides less than half the effort.


2-Substantial/Maximal Assistance-helper does MORE THAN HALF the effort. [place] 

lifts or holds trunk or limbs and provides more than half the effort.


5-Gpexblyms-jcixjk does ALL the effort. Patient does none of the effort to 

complete the activity. Or, the assistance of 2 or more helpers is required for 

the patient to complete the  


    activity.


If activity was not attempted, code reason:


7-Patient Refused.


9-Not Applicable-not attempted and the patient did not perform the activity 

before the current illness, exacerbation or injury.


10-Not Attempted due to Environmental Limitations-(lack of equipment, weather 

restraints, etc.).


88-Not Attempted due to Medical Conditions or Safety Concerns.


Sit to Stand (QC):  5


Toilet Transfer (QC):  5


Car Transfer (QC):  5


Family training co-treat with OT for transfer training safely before discharge 

home.  Pt. required skilled instruction x 2 for safety, positioning, and problem

solving due to lengthy hospitalization and change in medical status during stay.

 OT facilitated UE positioning, energy conservation techniques, and ADL 

instruction to spouse while PT facilitate safe transfer with slide board 

placement and wheelchair mobility.  Pt. practiced car, toilet, and bed transfers

with set up.  Spouse present and verbalizes understanding of all transfers 

before discharge home.





Weight Bearing


Right Lower Extremity:  Right


Full Weight Bearing


Left Lower Extremity:  Left


Non Weight Bearing (BKA)





Wheelchair Training


Does the Pt Use a Wheelchair?:  Yes


Wheel 50 ft with 2 turns (QC):  6


Wheel 150 ft (QC):  6


Type of Wheelchair:  Manual





Assessment


Current Status:  Excellent Progress


Patient did well with all transfers.





PT Short Term Goals


Short Term Goals


Time Frame:  2021


Roll Left & Right:  6


Sit to lyin


Lying to sitting on side of be:  6


Sit to stand:  3


Chair/bed-to-chair transfer:  3


Wheel 50ft w/2 turns:  6


Wheel 150 feet:  6





PT Long Term Goals


Long Term Goals


PT Long Term Goals Time Frame:  May 19, 2021


Roll Left & Right (QC):  6


Sit to Lying (QC):  6


Lying-Sitting on Side/Bed(QC):  6


Sit to Stand (QC):  6


Chair/Bed-to-Chair Xfer(QC):  6 (SPT or slideboard, whichever is the safest 

method)


Toilet Transfer (QC):  6 (SPT or slide board)


Car Transfer (QC):  4


Does the Patient Walk:  No and Walking Goal NOT indicated


Walk 10 feet (QC):  88


Walk 50ft with 2 Turns (QC):  88


Walk 150 ft (QC):  88


Walking 10ft on Uneven Surface:  88


1 Step (curb) (QC):  88


4 Steps (QC):  88


12 Steps (QC):  88


Picking up an Object (QC):  88


Does the Pt use WC or Scooter?:  Yes


Wheel 50 feet with 2 turns (QC:  6


Type:  Manual


Wheel 150 feet:  6


Type:  Manual





PT Plan


Treatment/Plan


Treatment Plan:  Continue Plan of Care


Treatment Plan:  Bed Mobility, Education, Functional Activity Aung, Functional 

Strength, Group Therapy, Gait, Safety, Therapeutic Exercise, Transfers


Treatment Duration:  May 19, 2021


Frequency:  At least 5 of 7 days/Wk (IRF)


Estimated Hrs Per Day:  1.5 hours per day


Patient and/or Family Agrees t:  Yes





Time/GCodes


Time In:  900


Time Out:  950


Total Billed Treatment Time:  50


Total Billed Treatment


1, FA x 50'











TL BELL PT            May 15, 2021 09:57

## 2021-05-15 NOTE — OCCUPATIONAL THER DAILY NOTE
OT Current Status-Daily Note


Subjective


No pain reported.





Mental Status/Objective


Patient Orientation:  Person, Place, Time, Situation





ADL-Treatment


Therapy Code Descriptions/Definitions 





Functional [place] Measure:


0=Not Assessed/NA        4=Minimal Assistance


1=Total Assistance        5=Supervision or Setup


2=Maximal Assistance  6=Modified [place]


3=Moderate Assistance 7=Complete IndependenceSCALE: Activities may be completed 

with or without assistive devices.





6-Indepedent-patient completes the activity by him/herself with no assistance 

from a helper.


5-Set-up or Clean-up Assistance-helper sets up or cleans up; patient completes 

activity. [place] assists only prior to or  


    following the activity.


4-Supervision or Touching Assistance-helper provides verbal cues and/or 

touching/steadying and/or contact guard assistance as patient completes 

activity. Assistance may be provided   


    throughout the activity or intermittently.


3-Partial/Moderate Assistance-helper does LESS THAN HALF the effort. [place] 

lifts, holds or supports trunk or limbs, but provides less than half the effort.


2-Substantial/Maximal Assistance-helper does MORE THAN HALF the effort. [place] 

lifts or holds trunk or limbs and provides more than half the effort.


1-Jmfmkntqt-whtzrf does ALL the effort. Patient does none of the effort to 

complete the activity. Or, the assistance of 2 or more helpers is required for 

the patient to complete the  


    activity.


If activity was not attempted, code reason:


7-Patient Refused.


9-Not Applicable-not attempted and the patient did not perform the activity 

before the current illness, exacerbation or injury.


10-Not Attempted due to Environmental Limitations-(lack of equipment, weather 

restraints, etc.).


88-Not Attempted due to Medical Conditions or Safety Concerns.


Toilet Transfer (QC):  5





Other Treatment


Pt. seen for co-treatment with PT due to skilled need of two clinicians for 

safety during family training.  PT focused on mobility, use of slide board, and 

overall set up during transfers while OT focused on problem solving of home 

situations with equipment, ADL education, and UE placement during transfers.  

Pt's spouse present and pt. practiced car transfers into his van, toilet 

transfers, and bed transfers.  Pt. is able to don new shoe which is helpful to 

him.  Utilizes slide board with dycem.  Pt. able to complete all transfers with 

set up.  All needs met back in pt's room.





Education


OT Patient Education:  Correct positioning, Modified ADL techniques, Progress 

toward Goal/Update tx plan, Purpose of tx/functional activities, Reviewed 

precautions, Rehab process, Transfer techniques


Teaching Recipient:  Patient


Teaching Methods:  Demonstration, Discussion


Response to Teaching:  Verbalize Understanding, Return Demonstration





OT Short Term Goals


Short Term Goals


Time Frame:  May 5, 2021


Eatin


Oral hygiene:  6


Toileting hygiene:  4


Shower/bathe self:  4


Upper body dressin


Lower body dressing:  3


Putting on/taking off footwear:  3





OT Long Term Goals


Long Term Goals


Time Frame:  May 19, 2021


Eating (QC):  6


Oral Hygiene (QC):  6


Toileting Hygiene (QC):  6


Shower/Bathe Self (QC):  6


Upper Body Dressing (QC):  6


Lower Body Dressing (QC):  6


On/Off Footwear (QC):  6


Additional Goals:  1-Demonstrate ADL Tasks, 2-Verbalize Understanding, 3-

ImproveStrength/Aung


1=Demonstrate adherence to instructed precautions during ADL tasks.


2=Patient will verbalize/demonstrate understanding of assistive 

devices/modifications for ADL.


3=Patient will improve strength/tolerance for activity to enable patient to 

perform ADL's.





OT Education/Plan


Problem List/Assessment


Assessment:  Decreased Activ Tolerance, Impaired I ADL's





Discharge Recommendations


Plan/Recommendations:  Continue POC


Therapy Discharge Recommendati:  Home & Family, Post Acute OT





Treatment Plan/Plan of Care


Treatment,Training & Education:  Yes


Patient would benefit from OT for education, treatment and training to promote 

independence in ADL's, mobility, safety and/or upper extremity function for 

ADL's.


Plan of Care:  ADL Retraining, Functional Mobility, Group Exercise/Act as Ind, 

UE Funct Exercise/Act


Treatment Duration:  May 19, 2021


Frequency:  At least 5 of 7 days/Wk (IRF)


Estimated Hrs Per Day:  1.5 hours per day


Agreement:  Yes


Rehab Potential:  Good





Time/GCodes


Start Time:  09:00


Stop Time:  10:00


Total Time Billed (hr/min):  60


Billed Treatment Time


1, FA x 4











JAROCHO OBRIEN OT           May 15, 2021 10:36

## 2021-05-15 NOTE — PM&R PROGRESS NOTE
Subjective


HPI/CC On Admission


Date Seen by Provider:  May 15, 2021


Time Seen by Provider:  12:45


Subjective/Events-last exam


5/15/21:


Family education today and it went well


All staples are now out


BM yesterday


Doing well


DC Wed








5/14/21:


Patient doing well


Increasing Oxycodone to 10mg before dressing changes


Wound care evaluating right foot due to early signs of wounds there


BM 5/13


Sugar 133








5/13/21:


Doing well


Sugar 144


BM 5/11


Pain good until dressing changes


Very difficulty coping at times 








5/11/21:


Pt doing pretty well 


Blood sugar 99 today 


Bowels moved yesterday 


Will DC Fentanyl IV and take Oxycodone about an hour before dressing changes


Overall doing pretty well otherwise 








5/10/21:


Patient doing well today


No pain is reported


Just took a shower


Dressing is changed daily


Blood sugar 128


Hemoglobin 9.3








5/9/21:


Patient doing well


Labs due tomorrow


Some staples removed today


Dr Jiménez appreciated








5/8/21:


No major issues


Pain is controlled


Reviewed meds


Sugars and BP ok








5/7/2021:


Patient doing very well


Dr. JIMÉNEZ evaluated the leg


Sugars are good


Pain is well managed with pain medication


Blood pressure good 126/58








5/6/21:


Pt doing very well


Stump  is on


Blood sugars are good


Bowels moved yesterday


Staples will be removed per Dr. Jiménez's order








5/5/21:


Pt doing pretty well


Stump  was uncomfortable so that was taken off last night but put on 

today and doing pretty well


Bowels moved yesterday


Discharge is planned for first of next week








5/4/21:


Pt doing pretty well


Had a large BM last night


Dr. Guzmán thinks that the wound looks really good


Stump  will be started today


Sugars are okay








5/3/21:


Pt doing much better


Hgb 9.5


Bowels moved yesterday


Sugar was 100 this morning


Drainage from incision continues so stump- is being held








5/2/21:


Patient doing well


No pain reported except during dressing changes


Long acting Oxycontin working well for the patient


Stump  today or tomorrow








5/1/21:


Patient doing well


Oxycontin 10mg PO BID


Change dressing as instructed


Incision looks good


Fentanyl infusion before dressing changes








4/30/21:


Patient doing well


Lost 11# since admit


BM yesterday


Sugars are good








4/29/21:


No major issues


Incision is bleeding in 1 specific area


Scheduled Oxycontin is helping and minimsl IR used


BM+








4/28/21:


No major issues


BM yesterday will need to closely monitor that due to long acting narcs


Dr Jiménez will see him today regarding his staples


Talked about weight loss with Dr Jiménez








4/27/21:


Pt doing really well


No issues 


Stump  is being tolerated for 23 hours of the day 


No pain 


Sugar is okay and I decreased insulin even further today 








4/26/21:


Pt doing a lot better today


Pain is well controlled


Bowels moved yesterday


Stump  will be placed today on his left amputation site


Slept very well








4/25/21:


Doing well


No pain when he doesn't move


Pain controlled better with Oxycontin


BM+


Mother and son visiting him today


Sugars reviewed








4/24/21:


Pain is better


No issues reported


Hgb 7.9


BM++


Using IS








4/23/21:


Doing well


Stump  will start Monday per Dr Jiménez


BM+


Oxycontin and Oxycodone added for better pain control


DC Lortab








4/22/21:


Pt doing pretty well


Had a large BM today


Hgb 7.7 after three units of blood on med surg


Rough on the transfers but getting better


Dressing change will be per Dr. Morejon orders


Sugar is 107 this morning





Review of Systems


Musculoskeletal:  leg pain





Objective


Exam


Vital Signs





Vital Signs








  Date Time  Temp Pulse Resp B/P (MAP) Pulse Ox O2 Delivery O2 Flow Rate FiO2


 


5/15/21 09:00      Room Air  


 


5/15/21 07:04 36.1 71 20 102/55 (71) 97   





Capillary Refill :


General Appearance:  No Apparent Distress, WD/WN, Chronically ill, Obese


HEENT:  PERRL/EOMI, Normal ENT Inspection, Pharynx Normal


Neck:  Full Range of Motion, Normal Inspection, Non Tender, Supple, Carotid 

Bruit


Respiratory:  Chest Non Tender, Lungs Clear, Normal Breath Sounds, No Accessory 

Muscle Use, No Respiratory Distress


Cardiovascular:  Regular Rate, Rhythm, No Edema, No Gallop, No JVD, No Murmur, 

Normal Peripheral Pulses


Gastrointestinal:  Normal Bowel Sounds, No Organomegaly, No Pulsatile Mass, Non 

Tender, Soft


Back:  Normal Inspection, No CVA Tenderness, No Vertebral Tenderness


Extremity:  Normal Capillary Refill, Normal Inspection, Normal Range of Motion, 

Non Tender, No Calf Tenderness, No Pedal Edema


Neurologic/Psychiatric:  Alert, Oriented x3, No Motor/Sensory Deficits, Normal 

Mood/Affect, CNs II-XII Norm as Tested, Abnormal Gait, Motor Weakness (left leg 

amputation )


Skin:  Normal Color, Warm/Dry


Lymphatic:  No Adenopathy





Results/Procedures


Lab


Patient resulted labs reviewed.





FIM


Transfers


Therapy Code Descriptions/Definitions 





Functional [place] Measure:


0=Not Assessed/NA        4=Minimal Assistance


1=Total Assistance        5=Supervision or Setup


2=Maximal Assistance  6=Modified [place]


3=Moderate Assistance 7=Complete IndependenceSCALE: Activities may be completed 

with or without assistive devices.





6-Indepedent-patient completes the activity by him/herself with no assistance 

from a helper.


5-Set-up or Clean-up Assistance-helper sets up or cleans up; patient completes 

activity. [place] assists only prior to or  


    following the activity.


4-Supervision or Touching Assistance-helper provides verbal cues and/or 

touching/steadying and/or contact guard assistance as patient completes 

activity. Assistance may be provided   


    throughout the activity or intermittently.


3-Partial/Moderate Assistance-helper does LESS THAN HALF the effort. [place] 

lifts, holds or supports trunk or limbs, but provides less than half the effort.


2-Substantial/Maximal Assistance-helper does MORE THAN HALF the effort. [place] 

lifts or holds trunk or limbs and provides more than half the effort.


8-Ahjaocchd-cwodsu does ALL the effort. Patient does none of the effort to 

complete the activity. Or, the assistance of 2 or more helpers is required for 

the patient to complete the  


    activity.


If activity was not attempted, code reason:


7-Patient Refused.


9-Not Applicable-not attempted and the patient did not perform the activity 

before the current illness, exacerbation or injury.


10-Not Attempted due to Environmental Limitations-(lack of equipment, weather 

restraints, etc.).


88-Not Attempted due to Medical Conditions or Safety Concerns.


Roll Left to Right (QC):  6


Sit to Lying (QC):  6


Sit to Stand (QC):  5


Chair/Bed-to-Chair Xfer(QC):  3 (Min A with stading pivot transfer, SBA with 

sliding board transfer)


Car Transfer (QC):  5





Gait Training


Does the Patient Walk?:  No and Walking Goal NOT indicated


Distance:  5' x 2


Walk 10 feet (QC):  88


Walk 50 ft with 2 Turns(QC):  88


Walk 150 ft (QC):  88


Walking 10ft/uneven surface-QC:  88


Gait Assistive Device:  FWW





Wheelchair Training


Does the Pt Use a Wheelchair?:  Yes


Wheel 50 ft with 2 turns (QC):  6


Wheel 150 ft (QC):  6


Type of Wheelchair:  Manual





Stair Training


1 Step (curb) (QC):  88


4 Steps (QC):  88


12 Steps (QC):  88





Balance


Picking up an Object (QC):  88





ADL-Treatment


Eating (QC):  6


Oral Hygiene (QC):  6


Shower/Bathe Self (QC):  6


Upper Body Dressing (QC):  5


Lower Body Dressing (QC):  5


On/Off Footwear (QC):  5


Toileting Hygiene (QC):  4


Toilet Transfer (QC):  5





Assessment/Plan


Assessment and Plan


Assess & Plan/Chief Complaint


Assessment:


s/p LBKA 


DM insulin dependence


HTN


HLP


CRI


Iron def anemia from acute blood loss


TERRI untreated





Plan:


Monitor pain


IRF protocol


Monitor hgb


Iron infusion





4/22/21:


Monitor sugar


Monitor hgb





4/23/21:


Add pain meds


Check labs in am





4/24/21:


Monitor sugar and decrease insulin


Hgb stable


Venofer





4/25/21:


Continue iron infusions


Sugar managed well


Pain controlled





4/26/21:


Stump 


Monitor sugar


Monitor hgb





4/27/21:


Monitor sugar


Decrease insulin


Monitor BP





4/28/21:


Monitor pain


Decreased insulin





4/29/21:


Monitor pain


BM regimen to prevent narcotic bowel





4/30/21:


Monitor closely


Monitor sugar





5/1/21:


Monitor closely


Dressing changes





5/2/21:


Monitor pain


Increase insulin





5/3/21:


Monitor closely


Monitor sugar levels and adjust insulin as needed





5/4/21:


Stump  maintained


Monitor closely





5/5/21:


Monitor pain


Stump 





5/6/21:


Monitor closely


Monitor staples





5/7/2021:


Continue weight loss regimen


Stump 


Monitor ganesh per Dr. JIMÉNEZ





5/8/21:


Continue weight loss


Monitor sugar





5/9/21:


Monitor closely


Labs tomorrow





5/10/21:


Control pain


Monitor hemoglobin


Monitor blood sugar





5/11/21:


DC Fentanyl IV


Monitor closely





5/13/21:


DC Wed


Monitor closely





5/14/21:


Increase pain med dose before dressing changes





5/15/21:


Monitor stump since all staples out today


Continue stump 





(1) Status post below-knee amputation of left lower extremity


Status:  Acute


(2) Hyperglycemia


Status:  Chronic


(3) Hypertension


Status:  Chronic


(4) Diabetes mellitus, type 2


Status:  Chronic


(5) Anemia


Status:  Acute


(6) Iron deficiency


Status:  Acute


(7) Peripheral neuropathy


Status:  Chronic











NICOLE VILA DO                May 15, 2021 12:36

## 2021-05-16 VITALS — DIASTOLIC BLOOD PRESSURE: 70 MMHG | SYSTOLIC BLOOD PRESSURE: 154 MMHG

## 2021-05-16 VITALS — SYSTOLIC BLOOD PRESSURE: 120 MMHG | DIASTOLIC BLOOD PRESSURE: 66 MMHG

## 2021-05-16 RX ADMIN — OXYCODONE HYDROCHLORIDE SCH MG: 10 TABLET, FILM COATED, EXTENDED RELEASE ORAL at 21:04

## 2021-05-16 RX ADMIN — GLYBURIDE SCH MG: 2.5 TABLET ORAL at 06:19

## 2021-05-16 RX ADMIN — GABAPENTIN SCH MG: 300 CAPSULE ORAL at 21:04

## 2021-05-16 RX ADMIN — ASPIRIN SCH MG: 81 TABLET ORAL at 08:36

## 2021-05-16 RX ADMIN — INSULIN ASPART SCH UNIT: 100 INJECTION, SOLUTION INTRAVENOUS; SUBCUTANEOUS at 20:35

## 2021-05-16 RX ADMIN — Medication SCH ML: at 21:05

## 2021-05-16 RX ADMIN — INSULIN ASPART SCH UNIT: 100 INJECTION, SOLUTION INTRAVENOUS; SUBCUTANEOUS at 16:16

## 2021-05-16 RX ADMIN — DOCUSATE SODIUM AND SENNOSIDES SCH EA: 8.6; 5 TABLET, FILM COATED ORAL at 19:11

## 2021-05-16 RX ADMIN — DOCUSATE SODIUM SCH MG: 100 CAPSULE ORAL at 19:11

## 2021-05-16 RX ADMIN — POLYETHYLENE GLYCOL (3350) SCH GM: 17 POWDER, FOR SOLUTION ORAL at 08:59

## 2021-05-16 RX ADMIN — Medication SCH ML: at 06:19

## 2021-05-16 RX ADMIN — MICONAZOLE NITRATE SCH APPLIC: 20 POWDER TOPICAL at 21:05

## 2021-05-16 RX ADMIN — LISINOPRIL SCH MG: 20 TABLET ORAL at 08:36

## 2021-05-16 RX ADMIN — METFORMIN HYDROCHLORIDE SCH MG: 500 TABLET, EXTENDED RELEASE ORAL at 08:35

## 2021-05-16 RX ADMIN — Medication SCH ML: at 13:22

## 2021-05-16 RX ADMIN — PANTOPRAZOLE SODIUM SCH MG: 40 TABLET, DELAYED RELEASE ORAL at 08:36

## 2021-05-16 RX ADMIN — METOPROLOL SUCCINATE SCH MG: 100 TABLET, EXTENDED RELEASE ORAL at 08:36

## 2021-05-16 RX ADMIN — DOCUSATE SODIUM AND SENNOSIDES SCH EA: 8.6; 5 TABLET, FILM COATED ORAL at 08:37

## 2021-05-16 RX ADMIN — MICONAZOLE NITRATE SCH APPLIC: 20 POWDER TOPICAL at 10:50

## 2021-05-16 RX ADMIN — GABAPENTIN SCH MG: 300 CAPSULE ORAL at 08:36

## 2021-05-16 RX ADMIN — INSULIN ASPART SCH UNIT: 100 INJECTION, SOLUTION INTRAVENOUS; SUBCUTANEOUS at 11:39

## 2021-05-16 RX ADMIN — GABAPENTIN SCH MG: 300 CAPSULE ORAL at 12:29

## 2021-05-16 RX ADMIN — BACITRACIN ZINC, NEOMYCIN, POLYMYXIN B SCH GM: 400; 3.5; 5 OINTMENT TOPICAL at 10:50

## 2021-05-16 RX ADMIN — OXYCODONE HYDROCHLORIDE SCH MG: 10 TABLET, FILM COATED, EXTENDED RELEASE ORAL at 08:36

## 2021-05-16 RX ADMIN — METFORMIN HYDROCHLORIDE SCH MG: 500 TABLET, EXTENDED RELEASE ORAL at 17:26

## 2021-05-16 RX ADMIN — GLYBURIDE SCH MG: 2.5 TABLET ORAL at 16:15

## 2021-05-16 RX ADMIN — POLYETHYLENE GLYCOL (3350) SCH GM: 17 POWDER, FOR SOLUTION ORAL at 19:11

## 2021-05-16 RX ADMIN — PRASUGREL SCH MG: 10 TABLET, FILM COATED ORAL at 08:36

## 2021-05-16 RX ADMIN — INSULIN ASPART SCH UNIT: 100 INJECTION, SOLUTION INTRAVENOUS; SUBCUTANEOUS at 06:22

## 2021-05-16 RX ADMIN — DOCUSATE SODIUM SCH MG: 100 CAPSULE ORAL at 08:59

## 2021-05-16 NOTE — PM&R PROGRESS NOTE
Subjective


HPI/CC On Admission


Date Seen by Provider:  May 16, 2021


Time Seen by Provider:  12:10


Subjective/Events-last exam


5/16/21:


No major issues


Used slide board well


Very motivated


Prosthesis on Wed


BM yesterday








5/15/21:


Family education today and it went well


All staples are now out


BM yesterday


Doing well


DC Wed








5/14/21:


Patient doing well


Increasing Oxycodone to 10mg before dressing changes


Wound care evaluating right foot due to early signs of wounds there


BM 5/13


Sugar 133








5/13/21:


Doing well


Sugar 144


BM 5/11


Pain good until dressing changes


Very difficulty coping at times 








5/11/21:


Pt doing pretty well 


Blood sugar 99 today 


Bowels moved yesterday 


Will DC Fentanyl IV and take Oxycodone about an hour before dressing changes


Overall doing pretty well otherwise 








5/10/21:


Patient doing well today


No pain is reported


Just took a shower


Dressing is changed daily


Blood sugar 128


Hemoglobin 9.3








5/9/21:


Patient doing well


Labs due tomorrow


Some staples removed today


Dr Jiménez appreciated








5/8/21:


No major issues


Pain is controlled


Reviewed meds


Sugars and BP ok








5/7/2021:


Patient doing very well


Dr. JIMÉNEZ evaluated the leg


Sugars are good


Pain is well managed with pain medication


Blood pressure good 126/58








5/6/21:


Pt doing very well


Stump  is on


Blood sugars are good


Bowels moved yesterday


Staples will be removed per Dr. Jiménez's order








5/5/21:


Pt doing pretty well


Stump  was uncomfortable so that was taken off last night but put on 

today and doing pretty well


Bowels moved yesterday


Discharge is planned for first of next week








5/4/21:


Pt doing pretty well


Had a large BM last night


Dr. Guzmán thinks that the wound looks really good


Stump  will be started today


Sugars are okay








5/3/21:


Pt doing much better


Hgb 9.5


Bowels moved yesterday


Sugar was 100 this morning


Drainage from incision continues so stump- is being held








5/2/21:


Patient doing well


No pain reported except during dressing changes


Long acting Oxycontin working well for the patient


Stump  today or tomorrow








5/1/21:


Patient doing well


Oxycontin 10mg PO BID


Change dressing as instructed


Incision looks good


Fentanyl infusion before dressing changes








4/30/21:


Patient doing well


Lost 11# since admit


BM yesterday


Sugars are good








4/29/21:


No major issues


Incision is bleeding in 1 specific area


Scheduled Oxycontin is helping and minimsl IR used


BM+








4/28/21:


No major issues


BM yesterday will need to closely monitor that due to long acting narcs


Dr Jiménez will see him today regarding his staples


Talked about weight loss with Dr Jiménez








4/27/21:


Pt doing really well


No issues 


Stump  is being tolerated for 23 hours of the day 


No pain 


Sugar is okay and I decreased insulin even further today 








4/26/21:


Pt doing a lot better today


Pain is well controlled


Bowels moved yesterday


Stump  will be placed today on his left amputation site


Slept very well








4/25/21:


Doing well


No pain when he doesn't move


Pain controlled better with Oxycontin


BM+


Mother and son visiting him today


Sugars reviewed








4/24/21:


Pain is better


No issues reported


Hgb 7.9


BM++


Using IS








4/23/21:


Doing well


Stump  will start Monday per Dr Jiménez


BM+


Oxycontin and Oxycodone added for better pain control


DC Lortab








4/22/21:


Pt doing pretty well


Had a large BM today


Hgb 7.7 after three units of blood on med surg


Rough on the transfers but getting better


Dressing change will be per Dr. Morejon orders


Sugar is 107 this morning





Review of Systems


Musculoskeletal:  leg pain





Objective


Exam


Vital Signs





Vital Signs








  Date Time  Temp Pulse Resp B/P (MAP) Pulse Ox O2 Delivery O2 Flow Rate FiO2


 


5/16/21 18:58 36.0 81 17 154/70 (98) 100 Room Air  





Capillary Refill :


General Appearance:  No Apparent Distress, WD/WN, Chronically ill, Obese


HEENT:  PERRL/EOMI, Normal ENT Inspection, Pharynx Normal


Neck:  Full Range of Motion, Normal Inspection, Non Tender, Supple, Carotid 

Bruit


Respiratory:  Chest Non Tender, Lungs Clear, Normal Breath Sounds, No Accessory 

Muscle Use, No Respiratory Distress


Cardiovascular:  Regular Rate, Rhythm, No Edema, No Gallop, No JVD, No Murmur, 

Normal Peripheral Pulses


Gastrointestinal:  Normal Bowel Sounds, No Organomegaly, No Pulsatile Mass, Non 

Tender, Soft


Back:  Normal Inspection, No CVA Tenderness, No Vertebral Tenderness


Extremity:  Normal Capillary Refill, Normal Inspection, Normal Range of Motion, 

Non Tender, No Calf Tenderness, No Pedal Edema


Neurologic/Psychiatric:  Alert, Oriented x3, No Motor/Sensory Deficits, Normal 

Mood/Affect, CNs II-XII Norm as Tested, Abnormal Gait, Motor Weakness (left leg 

amputation )


Skin:  Normal Color, Warm/Dry


Lymphatic:  No Adenopathy





Results/Procedures


Lab


Patient resulted labs reviewed.





FIM


Transfers


Therapy Code Descriptions/Definitions 





Functional [place] Measure:


0=Not Assessed/NA        4=Minimal Assistance


1=Total Assistance        5=Supervision or Setup


2=Maximal Assistance  6=Modified [place]


3=Moderate Assistance 7=Complete IndependenceSCALE: Activities may be completed 

with or without assistive devices.





6-Indepedent-patient completes the activity by him/herself with no assistance 

from a helper.


5-Set-up or Clean-up Assistance-helper sets up or cleans up; patient completes 

activity. [place] assists only prior to or  


    following the activity.


4-Supervision or Touching Assistance-helper provides verbal cues and/or 

touching/steadying and/or contact guard assistance as patient completes activi

ty. Assistance may be provided   


    throughout the activity or intermittently.


3-Partial/Moderate Assistance-helper does LESS THAN HALF the effort. [place] 

lifts, holds or supports trunk or limbs, but provides less than half the effort.


2-Substantial/Maximal Assistance-helper does MORE THAN HALF the effort. [place] 

lifts or holds trunk or limbs and provides more than half the effort.


6-Mpxruteij-gsmeon does ALL the effort. Patient does none of the effort to 

complete the activity. Or, the assistance of 2 or more helpers is required for 

the patient to complete the  


    activity.


If activity was not attempted, code reason:


7-Patient Refused.


9-Not Applicable-not attempted and the patient did not perform the activity 

before the current illness, exacerbation or injury.


10-Not Attempted due to Environmental Limitations-(lack of equipment, weather 

restraints, etc.).


88-Not Attempted due to Medical Conditions or Safety Concerns.


Roll Left to Right (QC):  6


Sit to Lying (QC):  6


Sit to Stand (QC):  5


Chair/Bed-to-Chair Xfer(QC):  3 (Min A with stading pivot transfer, SBA with 

sliding board transfer)


Car Transfer (QC):  5





Gait Training


Does the Patient Walk?:  No and Walking Goal NOT indicated


Distance:  5' x 2


Walk 10 feet (QC):  88


Walk 50 ft with 2 Turns(QC):  88


Walk 150 ft (QC):  88


Walking 10ft/uneven surface-QC:  88


Gait Assistive Device:  FWW





Wheelchair Training


Does the Pt Use a Wheelchair?:  Yes


Wheel 50 ft with 2 turns (QC):  6


Wheel 150 ft (QC):  6


Type of Wheelchair:  Manual





Stair Training


1 Step (curb) (QC):  88


4 Steps (QC):  88


12 Steps (QC):  88





Balance


Picking up an Object (QC):  88





ADL-Treatment


Eating (QC):  6


Oral Hygiene (QC):  6


Shower/Bathe Self (QC):  6


Upper Body Dressing (QC):  5


Lower Body Dressing (QC):  5


On/Off Footwear (QC):  5


Toileting Hygiene (QC):  4


Toilet Transfer (QC):  5





Assessment/Plan


Assessment and Plan


Assess & Plan/Chief Complaint


Assessment:


s/p LBKA 


DM insulin dependence


HTN


HLP


CRI


Iron def anemia from acute blood loss


TERRI untreated





Plan:


Monitor pain


IRF protocol


Monitor hgb


Iron infusion





4/22/21:


Monitor sugar


Monitor hgb





4/23/21:


Add pain meds


Check labs in am





4/24/21:


Monitor sugar and decrease insulin


Hgb stable


Venofer





4/25/21:


Continue iron infusions


Sugar managed well


Pain controlled





4/26/21:


Stump 


Monitor sugar


Monitor hgb





4/27/21:


Monitor sugar


Decrease insulin


Monitor BP





4/28/21:


Monitor pain


Decreased insulin





4/29/21:


Monitor pain


BM regimen to prevent narcotic bowel





4/30/21:


Monitor closely


Monitor sugar





5/1/21:


Monitor closely


Dressing changes





5/2/21:


Monitor pain


Increase insulin





5/3/21:


Monitor closely


Monitor sugar levels and adjust insulin as needed





5/4/21:


Stump  maintained


Monitor closely





5/5/21:


Monitor pain


Stump 





5/6/21:


Monitor closely


Monitor staples





5/7/2021:


Continue weight loss regimen


Stump 


Monitor ganesh per Dr. JIMÉNEZ





5/8/21:


Continue weight loss


Monitor sugar





5/9/21:


Monitor closely


Labs tomorrow





5/10/21:


Control pain


Monitor hemoglobin


Monitor blood sugar





5/11/21:


DC Fentanyl IV


Monitor closely





5/13/21:


DC Wed


Monitor closely





5/14/21:


Increase pain med dose before dressing changes





5/15/21:


Monitor stump since all staples out today


Continue stump 





5/16/21:


Monitor closely


Stump 





(1) Status post below-knee amputation of left lower extremity


Status:  Acute


(2) Hyperglycemia


Status:  Chronic


(3) Hypertension


Status:  Chronic


(4) Diabetes mellitus, type 2


Status:  Chronic


(5) Anemia


Status:  Acute


(6) Iron deficiency


Status:  Acute


(7) Peripheral neuropathy


Status:  Chronic











NICOLE VILA DO                May 16, 2021 12:03

## 2021-05-17 VITALS — SYSTOLIC BLOOD PRESSURE: 118 MMHG | DIASTOLIC BLOOD PRESSURE: 76 MMHG

## 2021-05-17 VITALS — SYSTOLIC BLOOD PRESSURE: 142 MMHG | DIASTOLIC BLOOD PRESSURE: 65 MMHG

## 2021-05-17 LAB
ALBUMIN SERPL-MCNC: 3.4 GM/DL (ref 3.2–4.5)
ALP SERPL-CCNC: 70 U/L (ref 40–136)
ALT SERPL-CCNC: 22 U/L (ref 0–55)
BASOPHILS # BLD AUTO: 0 10^3/UL (ref 0–0.1)
BASOPHILS NFR BLD AUTO: 1 % (ref 0–10)
BILIRUB SERPL-MCNC: 0.2 MG/DL (ref 0.1–1)
BUN/CREAT SERPL: 28
CALCIUM SERPL-MCNC: 9.3 MG/DL (ref 8.5–10.1)
CHLORIDE SERPL-SCNC: 104 MMOL/L (ref 98–107)
CO2 SERPL-SCNC: 28 MMOL/L (ref 21–32)
CREAT SERPL-MCNC: 0.82 MG/DL (ref 0.6–1.3)
EOSINOPHIL # BLD AUTO: 0.3 10^3/UL (ref 0–0.3)
EOSINOPHIL NFR BLD AUTO: 5 % (ref 0–10)
GFR SERPLBLD BASED ON 1.73 SQ M-ARVRAT: > 60 ML/MIN
GLUCOSE SERPL-MCNC: 119 MG/DL (ref 70–105)
HCT VFR BLD CALC: 29 % (ref 40–54)
HGB BLD-MCNC: 9.1 G/DL (ref 13.3–17.7)
LYMPHOCYTES # BLD AUTO: 2.2 10^3/UL (ref 1–4)
LYMPHOCYTES NFR BLD AUTO: 39 % (ref 12–44)
MANUAL DIFFERENTIAL PERFORMED BLD QL: NO
MCH RBC QN AUTO: 28 PG (ref 25–34)
MCHC RBC AUTO-ENTMCNC: 32 G/DL (ref 32–36)
MCV RBC AUTO: 88 FL (ref 80–99)
MONOCYTES # BLD AUTO: 0.3 10^3/UL (ref 0–1)
MONOCYTES NFR BLD AUTO: 6 % (ref 0–12)
NEUTROPHILS # BLD AUTO: 2.7 10^3/UL (ref 1.8–7.8)
NEUTROPHILS NFR BLD AUTO: 49 % (ref 42–75)
PLATELET # BLD: 230 10^3/UL (ref 130–400)
PMV BLD AUTO: 9.9 FL (ref 9–12.2)
POTASSIUM SERPL-SCNC: 4.1 MMOL/L (ref 3.6–5)
PROT SERPL-MCNC: 6.5 GM/DL (ref 6.4–8.2)
SODIUM SERPL-SCNC: 140 MMOL/L (ref 135–145)
WBC # BLD AUTO: 5.5 10^3/UL (ref 4.3–11)

## 2021-05-17 RX ADMIN — GABAPENTIN SCH MG: 300 CAPSULE ORAL at 13:42

## 2021-05-17 RX ADMIN — MICONAZOLE NITRATE SCH APPLIC: 20 POWDER TOPICAL at 07:31

## 2021-05-17 RX ADMIN — ASPIRIN SCH MG: 81 TABLET ORAL at 07:30

## 2021-05-17 RX ADMIN — GABAPENTIN SCH MG: 300 CAPSULE ORAL at 20:15

## 2021-05-17 RX ADMIN — GABAPENTIN SCH MG: 300 CAPSULE ORAL at 07:30

## 2021-05-17 RX ADMIN — INSULIN ASPART SCH UNIT: 100 INJECTION, SOLUTION INTRAVENOUS; SUBCUTANEOUS at 06:04

## 2021-05-17 RX ADMIN — DOCUSATE SODIUM SCH MG: 100 CAPSULE ORAL at 09:32

## 2021-05-17 RX ADMIN — DOCUSATE SODIUM AND SENNOSIDES SCH EA: 8.6; 5 TABLET, FILM COATED ORAL at 20:15

## 2021-05-17 RX ADMIN — POLYETHYLENE GLYCOL (3350) SCH GM: 17 POWDER, FOR SOLUTION ORAL at 09:32

## 2021-05-17 RX ADMIN — INSULIN ASPART SCH UNIT: 100 INJECTION, SOLUTION INTRAVENOUS; SUBCUTANEOUS at 10:55

## 2021-05-17 RX ADMIN — METFORMIN HYDROCHLORIDE SCH MG: 500 TABLET, EXTENDED RELEASE ORAL at 18:36

## 2021-05-17 RX ADMIN — GLYBURIDE SCH MG: 2.5 TABLET ORAL at 18:36

## 2021-05-17 RX ADMIN — METOPROLOL SUCCINATE SCH MG: 100 TABLET, EXTENDED RELEASE ORAL at 07:30

## 2021-05-17 RX ADMIN — DOCUSATE SODIUM AND SENNOSIDES SCH EA: 8.6; 5 TABLET, FILM COATED ORAL at 09:32

## 2021-05-17 RX ADMIN — GLYBURIDE SCH MG: 2.5 TABLET ORAL at 06:13

## 2021-05-17 RX ADMIN — DOCUSATE SODIUM SCH MG: 100 CAPSULE ORAL at 20:16

## 2021-05-17 RX ADMIN — Medication SCH ML: at 21:37

## 2021-05-17 RX ADMIN — INSULIN ASPART SCH UNIT: 100 INJECTION, SOLUTION INTRAVENOUS; SUBCUTANEOUS at 16:04

## 2021-05-17 RX ADMIN — INSULIN ASPART SCH UNIT: 100 INJECTION, SOLUTION INTRAVENOUS; SUBCUTANEOUS at 20:29

## 2021-05-17 RX ADMIN — BACITRACIN ZINC, NEOMYCIN, POLYMYXIN B SCH GM: 400; 3.5; 5 OINTMENT TOPICAL at 07:31

## 2021-05-17 RX ADMIN — POLYETHYLENE GLYCOL (3350) SCH GM: 17 POWDER, FOR SOLUTION ORAL at 20:16

## 2021-05-17 RX ADMIN — MICONAZOLE NITRATE SCH APPLIC: 20 POWDER TOPICAL at 20:17

## 2021-05-17 RX ADMIN — METFORMIN HYDROCHLORIDE SCH MG: 500 TABLET, EXTENDED RELEASE ORAL at 07:30

## 2021-05-17 RX ADMIN — OXYCODONE HYDROCHLORIDE SCH MG: 10 TABLET, FILM COATED, EXTENDED RELEASE ORAL at 20:15

## 2021-05-17 RX ADMIN — LISINOPRIL SCH MG: 20 TABLET ORAL at 07:29

## 2021-05-17 RX ADMIN — Medication SCH ML: at 05:49

## 2021-05-17 RX ADMIN — Medication SCH ML: at 13:45

## 2021-05-17 RX ADMIN — PANTOPRAZOLE SODIUM SCH MG: 40 TABLET, DELAYED RELEASE ORAL at 07:30

## 2021-05-17 RX ADMIN — OXYCODONE HYDROCHLORIDE SCH MG: 10 TABLET, FILM COATED, EXTENDED RELEASE ORAL at 07:29

## 2021-05-17 RX ADMIN — PRASUGREL SCH MG: 10 TABLET, FILM COATED ORAL at 07:30

## 2021-05-17 NOTE — PM&R PROGRESS NOTE
Subjective


HPI/CC On Admission


Date Seen by Provider:  May 17, 2021


Time Seen by Provider:  08:30


Subjective/Events-last exam


5/17/21:


Pt doing really well 


Discharge planned for Wednesday 


Had a BM two days ago so will give him laxatives


Transferring from wheelchair very well 


Hgb 9.1








5/16/21:


No major issues


Used slide board well


Very motivated


Prosthesis on Wed


BM yesterday








5/15/21:


Family education today and it went well


All staples are now out


BM yesterday


Doing well


DC Wed








5/14/21:


Patient doing well


Increasing Oxycodone to 10mg before dressing changes


Wound care evaluating right foot due to early signs of wounds there


BM 5/13


Sugar 133








5/13/21:


Doing well


Sugar 144


BM 5/11


Pain good until dressing changes


Very difficulty coping at times 








5/11/21:


Pt doing pretty well 


Blood sugar 99 today 


Bowels moved yesterday 


Will DC Fentanyl IV and take Oxycodone about an hour before dressing changes


Overall doing pretty well otherwise 








5/10/21:


Patient doing well today


No pain is reported


Just took a shower


Dressing is changed daily


Blood sugar 128


Hemoglobin 9.3








5/9/21:


Patient doing well


Labs due tomorrow


Some staples removed today


Dr Jiménez appreciated








5/8/21:


No major issues


Pain is controlled


Reviewed meds


Sugars and BP ok








5/7/2021:


Patient doing very well


Dr. JIMÉNEZ evaluated the leg


Sugars are good


Pain is well managed with pain medication


Blood pressure good 126/58








5/6/21:


Pt doing very well


Stump  is on


Blood sugars are good


Bowels moved yesterday


Staples will be removed per Dr. Jiménez's order








5/5/21:


Pt doing pretty well


Stump  was uncomfortable so that was taken off last night but put on 

today and doing pretty well


Bowels moved yesterday


Discharge is planned for first of next week








5/4/21:


Pt doing pretty well


Had a large BM last night


Dr. Guzmán thinks that the wound looks really good


Stump  will be started today


Sugars are okay








5/3/21:


Pt doing much better


Hgb 9.5


Bowels moved yesterday


Sugar was 100 this morning


Drainage from incision continues so stump- is being held








5/2/21:


Patient doing well


No pain reported except during dressing changes


Long acting Oxycontin working well for the patient


Stump  today or tomorrow








5/1/21:


Patient doing well


Oxycontin 10mg PO BID


Change dressing as instructed


Incision looks good


Fentanyl infusion before dressing changes








4/30/21:


Patient doing well


Lost 11# since admit


BM yesterday


Sugars are good








4/29/21:


No major issues


Incision is bleeding in 1 specific area


Scheduled Oxycontin is helping and minimsl IR used


BM+








4/28/21:


No major issues


BM yesterday will need to closely monitor that due to long acting narcs


Dr Jiménez will see him today regarding his staples


Talked about weight loss with Dr Jiménez








4/27/21:


Pt doing really well


No issues 


Stump  is being tolerated for 23 hours of the day 


No pain 


Sugar is okay and I decreased insulin even further today 








4/26/21:


Pt doing a lot better today


Pain is well controlled


Bowels moved yesterday


Stump  will be placed today on his left amputation site


Slept very well








4/25/21:


Doing well


No pain when he doesn't move


Pain controlled better with Oxycontin


BM+


Mother and son visiting him today


Sugars reviewed








4/24/21:


Pain is better


No issues reported


Hgb 7.9


BM++


Using IS








4/23/21:


Doing well


Stump  will start Monday per Dr Jiménez


BM+


Oxycontin and Oxycodone added for better pain control


DC Lortab








4/22/21:


Pt doing pretty well


Had a large BM today


Hgb 7.7 after three units of blood on med surg


Rough on the transfers but getting better


Dressing change will be per Dr. Morejon orders


Sugar is 107 this morning





Review of Systems


General:  Fatigue, Malaise


Neurological:  Weakness





Objective


Exam


Vital Signs





Vital Signs








  Date Time  Temp Pulse Resp B/P (MAP) Pulse Ox O2 Delivery O2 Flow Rate FiO2


 


5/17/21 20:18      Room Air  


 


5/17/21 20:00 36.2 69 16 142/65 (90) 97   





Capillary Refill :


General Appearance:  No Apparent Distress, WD/WN, Chronically ill, Obese


HEENT:  PERRL/EOMI, Normal ENT Inspection, Pharynx Normal


Neck:  Full Range of Motion, Normal Inspection, Non Tender, Supple, Carotid 

Bruit


Respiratory:  Chest Non Tender, Lungs Clear, Normal Breath Sounds, No Accessory 

Muscle Use, No Respiratory Distress


Cardiovascular:  Regular Rate, Rhythm, No Edema, No Gallop, No JVD, No Murmur, 

Normal Peripheral Pulses


Gastrointestinal:  Normal Bowel Sounds, No Organomegaly, No Pulsatile Mass, Non 

Tender, Soft


Back:  Normal Inspection, No CVA Tenderness, No Vertebral Tenderness


Extremity:  Normal Capillary Refill, Normal Inspection, Normal Range of Motion, 

Non Tender, No Calf Tenderness, No Pedal Edema


Neurologic/Psychiatric:  Alert, Oriented x3, No Motor/Sensory Deficits, Normal 

Mood/Affect, CNs II-XII Norm as Tested, Abnormal Gait, Motor Weakness (left leg 

amputation )


Skin:  Normal Color, Warm/Dry


Lymphatic:  No Adenopathy





Results/Procedures


Lab


Laboratory Tests


5/17/21 05:30








Patient resulted labs reviewed.





FIM


Transfers


Therapy Code Descriptions/Definitions 





Functional [place] Measure:


0=Not Assessed/NA        4=Minimal Assistance


1=Total Assistance        5=Supervision or Setup


2=Maximal Assistance  6=Modified [place]


3=Moderate Assistance 7=Complete IndependenceSCALE: Activities may be completed 

with or without assistive devices.





6-Indepedent-patient completes the activity by him/herself with no assistance 

from a helper.


5-Set-up or Clean-up Assistance-helper sets up or cleans up; patient completes 

activity. [place] assists only prior to or  


    following the activity.


4-Supervision or Touching Assistance-helper provides verbal cues and/or 

touching/steadying and/or contact guard assistance as patient completes 

activity. Assistance may be provided   


    throughout the activity or intermittently.


3-Partial/Moderate Assistance-helper does LESS THAN HALF the effort. [place] 

lifts, holds or supports trunk or limbs, but provides less than half the effort.


2-Substantial/Maximal Assistance-helper does MORE THAN HALF the effort. [place] 

lifts or holds trunk or limbs and provides more than half the effort.


5-Msacmzqrm-dkkwdt does ALL the effort. Patient does none of the effort to 

complete the activity. Or, the assistance of 2 or more helpers is required for 

the patient to complete the  


    activity.


If activity was not attempted, code reason:


7-Patient Refused.


9-Not Applicable-not attempted and the patient did not perform the activity 

before the current illness, exacerbation or injury.


10-Not Attempted due to Environmental Limitations-(lack of equipment, weather 

restraints, etc.).


88-Not Attempted due to Medical Conditions or Safety Concerns.


Roll Left to Right (QC):  6


Sit to Lying (QC):  6


Sit to Stand (QC):  5


Chair/Bed-to-Chair Xfer(QC):  3 (Min A with stading pivot transfer, SBA with 

sliding board transfer)


Car Transfer (QC):  5





Gait Training


Does the Patient Walk?:  No and Walking Goal NOT indicated


Distance:  5' x 2


Walk 10 feet (QC):  88


Walk 50 ft with 2 Turns(QC):  88


Walk 150 ft (QC):  88


Walking 10ft/uneven surface-QC:  88


Gait Assistive Device:  FWW





Wheelchair Training


Does the Pt Use a Wheelchair?:  Yes


Wheel 50 ft with 2 turns (QC):  6


Wheel 150 ft (QC):  6


Type of Wheelchair:  Manual





Stair Training


1 Step (curb) (QC):  88


4 Steps (QC):  88


12 Steps (QC):  88





Balance


Picking up an Object (QC):  88





ADL-Treatment


Eating (QC):  6


Oral Hygiene (QC):  6


Shower/Bathe Self (QC):  6


Upper Body Dressing (QC):  5


Lower Body Dressing (QC):  5


On/Off Footwear (QC):  5


Toileting Hygiene (QC):  4


Toilet Transfer (QC):  5





Assessment/Plan


Assessment and Plan


Assess & Plan/Chief Complaint


Assessment:


s/p LBKA 


DM insulin dependence


HTN


HLP


CRI


Iron def anemia from acute blood loss


TERRI untreated





Plan:


Monitor pain


IRF protocol


Monitor hgb


Iron infusion





4/22/21:


Monitor sugar


Monitor hgb





4/23/21:


Add pain meds


Check labs in am





4/24/21:


Monitor sugar and decrease insulin


Hgb stable


Venofer





4/25/21:


Continue iron infusions


Sugar managed well


Pain controlled





4/26/21:


Stump 


Monitor sugar


Monitor hgb





4/27/21:


Monitor sugar


Decrease insulin


Monitor BP





4/28/21:


Monitor pain


Decreased insulin





4/29/21:


Monitor pain


BM regimen to prevent narcotic bowel





4/30/21:


Monitor closely


Monitor sugar





5/1/21:


Monitor closely


Dressing changes





5/2/21:


Monitor pain


Increase insulin





5/3/21:


Monitor closely


Monitor sugar levels and adjust insulin as needed





5/4/21:


Stump  maintained


Monitor closely





5/5/21:


Monitor pain


Stump 





5/6/21:


Monitor closely


Monitor staples





5/7/2021:


Continue weight loss regimen


Stump 


Monitor ganesh per Dr. JIMÉNEZ





5/8/21:


Continue weight loss


Monitor sugar





5/9/21:


Monitor closely


Labs tomorrow





5/10/21:


Control pain


Monitor hemoglobin


Monitor blood sugar





5/11/21:


DC Fentanyl IV


Monitor closely





5/13/21:


DC Wed


Monitor closely





5/14/21:


Increase pain med dose before dressing changes





5/15/21:


Monitor stump since all staples out today


Continue stump 





5/16/21:


Monitor closely


Stump 





5/17/21:


Improved status


Pain management





The patient has a physical and mobility limitation that makes him incapable of 

utilizing regular toilet facilities.








Because of his physical and mobility limitations, he is confined to limited 

areas in his home.








An extra wide/heavy duty commode chair is necessary because patient weighs 441#.








A commode chair with detachable arms feature is necessary to accommodate patient

required use of a slide board for transfers and because of his weight of 441#.





(1) Status post below-knee amputation of left lower extremity


Status:  Acute


(2) Hyperglycemia


Status:  Chronic


(3) Hypertension


Status:  Chronic


(4) Diabetes mellitus, type 2


Status:  Chronic


(5) Anemia


Status:  Acute


(6) Iron deficiency


Status:  Acute


(7) Peripheral neuropathy


Status:  Chronic











NICOLE VILA DO                May 17, 2021 07:17

## 2021-05-17 NOTE — PHYSICAL THERAPY DAILY NOTE
PT Daily Note-Current


Subjective


Patient in bed pre tx, agrees to PT, has no complaints of pain, during tx the 

wound care nurse comes in to look at his right foot, his great toe is a little 

swollen.





Appearance


Patient in bed post tx, with nurse call,phone, tray, all needs met.





Mental Status


Patient Orientation:  Normal For Age





Transfers


SCALE: Activities may be completed with or without assistive devices.





6-Indepedent-patient completes the activity by him/herself with no assistance 

from a helper.


5-Set-up or Clean-up Assistance-helper sets up or cleans up; patient completes 

activity. [place] assists only prior to or  


    following the activity.


4-Supervision or Touching Assistance-helper provides verbal cues and/or 

touching/steadying and/or contact guard assistance as patient completes 

activity. Assistance may be provided   


    throughout the activity or intermittently.


3-Partial/Moderate Assistance-helper does LESS THAN HALF the effort. [place] 

lifts, holds or supports trunk or limbs, but provides less than half the effort.


2-Substantial/Maximal Assistance-helper does MORE THAN HALF the effort. [place] 

lifts or holds trunk or limbs and provides more than half the effort.


6-Oihhmzbzf-xstjkj does ALL the effort. Patient does none of the effort to 

complete the activity. Or, the assistance of 2 or more helpers is required for 

the patient to complete the  


    activity.


If activity was not attempted, code reason:


7-Patient Refused.


9-Not Applicable-not attempted and the patient did not perform the activity 

before the current illness, exacerbation or injury.


10-Not Attempted due to Environmental Limitations-(lack of equipment, weather 

restraints, etc.).


88-Not Attempted due to Medical Conditions or Safety Concerns.





Weight Bearing


Right Lower Extremity:  Right


Full Weight Bearing


Left Lower Extremity:  Left


Non Weight Bearing (BKA)





Exercises


Supine Ex:  Ankle pumps (RLE), Quad Set, Glut sets, Heel Slides (RLE, LLE just 

elevate the leg and perform knee flex/ext), Short Arc Quads, Straight leg raise,

Hip abd/add


Supine Reps:  20


sidelying hip abd x20 each side, could only perform a few while laying on his 

left side due to leg discomfort





Treatments


LE exercise





Assessment


Current Status:  Fair Progress


slowly progressing strength





PT Short Term Goals


Short Term Goals


Time Frame:  2021


Roll Left & Right:  6


Sit to lyin


Lying to sitting on side of be:  6


Sit to stand:  3


Chair/bed-to-chair transfer:  3


Wheel 50ft w/2 turns:  6


Wheel 150 feet:  6





PT Long Term Goals


Long Term Goals


PT Long Term Goals Time Frame:  May 19, 2021


Roll Left & Right (QC):  6


Sit to Lying (QC):  6


Lying-Sitting on Side/Bed(QC):  6


Sit to Stand (QC):  6


Chair/Bed-to-Chair Xfer(QC):  6 (SPT or slideboard, whichever is the safest 

method)


Toilet Transfer (QC):  6 (SPT or slide board)


Car Transfer (QC):  4


Does the Patient Walk:  No and Walking Goal NOT indicated


Walk 10 feet (QC):  88


Walk 50ft with 2 Turns (QC):  88


Walk 150 ft (QC):  88


Walking 10ft on Uneven Surface:  88


1 Step (curb) (QC):  88


4 Steps (QC):  88


12 Steps (QC):  88


Picking up an Object (QC):  88


Does the Pt use WC or Scooter?:  Yes


Wheel 50 feet with 2 turns (QC:  6


Type:  Manual


Wheel 150 feet:  6


Type:  Manual





PT Plan


Problem List


Problem List:  Activity Tolerance, Functional Strength, Safety, Balance, Gait, 

Transfer, Bed Mobility, ROM





Treatment/Plan


Treatment Plan:  Continue Plan of Care


Treatment Plan:  Bed Mobility, Education, Functional Activity Aung, Functional 

Strength, Group Therapy, Gait, Safety, Therapeutic Exercise, Transfers


Treatment Duration:  May 19, 2021


Frequency:  At least 5 of 7 days/Wk (IRF)


Estimated Hrs Per Day:  1.5 hours per day


Patient and/or Family Agrees t:  Yes





Safety Risks/Education


Patient Education:  Correct Positioning, Safety Issues


Teaching Recipient:  Patient


Teaching Methods:  Demonstration, Discussion


Response to Teaching:  Reinforcement Needed





Time/GCodes


Time In:  1300


Time Out:  1330


Total Billed Treatment Time:  30


Total Billed Treatment


1 visit


EX 30'











CAMILLE HARRY PT                May 17, 2021 13:26

## 2021-05-17 NOTE — OCCUPATIONAL THER DAILY NOTE
OT Current Status-Daily Note


Subjective


No pain reported.





Appearance


Pt. up in wheelchair.  Agrees to work with OT.





Mental Status/Objective


Patient Orientation:  Person, Place, Time, Situation





ADL-Treatment


Therapy Code Descriptions/Definitions 





Functional [place] Measure:


0=Not Assessed/NA        4=Minimal Assistance


1=Total Assistance        5=Supervision or Setup


2=Maximal Assistance  6=Modified [place]


3=Moderate Assistance 7=Complete IndependenceSCALE: Activities may be completed 

with or without assistive devices.





6-Indepedent-patient completes the activity by him/herself with no assistance 

from a helper.


5-Set-up or Clean-up Assistance-helper sets up or cleans up; patient completes 

activity. [place] assists only prior to or  


    following the activity.


4-Supervision or Touching Assistance-helper provides verbal cues and/or 

touching/steadying and/or contact guard assistance as patient completes 

activity. Assistance may be provided   


    throughout the activity or intermittently.


3-Partial/Moderate Assistance-helper does LESS THAN HALF the effort. [place] 

lifts, holds or supports trunk or limbs, but provides less than half the effort.


2-Substantial/Maximal Assistance-helper does MORE THAN HALF the effort. [place] 

lifts or holds trunk or limbs and provides more than half the effort.


2-Lijngkgrd-zavzxo does ALL the effort. Patient does none of the effort to 

complete the activity. Or, the assistance of 2 or more helpers is required for 

the patient to complete the  


    activity.


If activity was not attempted, code reason:


7-Patient Refused.


9-Not Applicable-not attempted and the patient did not perform the activity 

before the current illness, exacerbation or injury.


10-Not Attempted due to Environmental Limitations-(lack of equipment, weather 

restraints, etc.).


88-Not Attempted due to Medical Conditions or Safety Concerns.


Eating (QC):  6


Oral Hygiene (QC):  6


Toileting Hygiene (QC):  6


Toilet Transfer (QC):  5





Other Treatment


Pt. demonstrates ability to place slide board from wheelchair to commode over 

toilet, and slide over.  OT assists with set up of positioning wheelchair.  Pt. 

able to toilet self with Mod I.  OT provides set up for pt. to transfer back to 

wheelchair.  Pt. demonstrates ability to wash hands and brush teeth.  Self 

propels wheelchair to therapy gym with no difficulty.  Completes arm bike x 15 

minutes at max resistance for increased overall strength.  Self propels back to 

room and is able to position lift chair against wall.  Pt. slides back to chair 

with slide board from wheelchair with SBA.  All needs met.





Education


OT Patient Education:  Correct positioning, Exercise program, Modified ADL 

techniques, Progress toward Goal/Update tx plan, Purpose of tx/functional 

activities, Reviewed precautions, Rehab process, Transfer techniques


Teaching Recipient:  Patient


Teaching Methods:  Demonstration, Discussion


Response to Teaching:  Verbalize Understanding, Return Demonstration





OT Short Term Goals


Short Term Goals


Time Frame:  May 5, 2021


Eatin


Oral hygiene:  6


Toileting hygiene:  4


Shower/bathe self:  4


Upper body dressin


Lower body dressing:  3


Putting on/taking off footwear:  3





OT Long Term Goals


Long Term Goals


Time Frame:  May 19, 2021


Eating (QC):  6


Oral Hygiene (QC):  6


Toileting Hygiene (QC):  6


Shower/Bathe Self (QC):  6


Upper Body Dressing (QC):  6


Lower Body Dressing (QC):  6


On/Off Footwear (QC):  6


Additional Goals:  1-Demonstrate ADL Tasks, 2-Verbalize Understanding, 3-

ImproveStrength/Aung


1=Demonstrate adherence to instructed precautions during ADL tasks.


2=Patient will verbalize/demonstrate understanding of assistive 

devices/modifications for ADL.


3=Patient will improve strength/tolerance for activity to enable patient to 

perform ADL's.





OT Education/Plan


Problem List/Assessment


Assessment:  Decreased Activ Tolerance, Impaired I ADL's





Discharge Recommendations


Plan/Recommendations:  Continue POC





Treatment Plan/Plan of Care


Treatment,Training & Education:  Yes


Patient would benefit from OT for education, treatment and training to promote 

independence in ADL's, mobility, safety and/or upper extremity function for 

ADL's.


Plan of Care:  ADL Retraining, Functional Mobility, Group Exercise/Act as Ind, 

UE Funct Exercise/Act


Treatment Duration:  May 19, 2021


Frequency:  At least 5 of 7 days/Wk (IRF)


Estimated Hrs Per Day:  1.5 hours per day


Agreement:  Yes


Rehab Potential:  Good





Time/GCodes


Start Time:  08:15


Stop Time:  09:00


Total Time Billed (hr/min):  45


Billed Treatment Time


1, ADL x 15minutes, FA x 15minutes, Ex x 15minutes











JAROCHO OBRIEN OT           May 17, 2021 11:51

## 2021-05-17 NOTE — OCCUPATIONAL THER DAILY NOTE
OT Current Status-Daily Note


Subjective


No pain reported.





Appearance


Pt. in bed.  Agrees to bilateral UE exercises.





Mental Status/Objective


Patient Orientation:  Person, Place, Time, Situation





ADL-Treatment


Therapy Code Descriptions/Definitions 





Functional [place] Measure:


0=Not Assessed/NA        4=Minimal Assistance


1=Total Assistance        5=Supervision or Setup


2=Maximal Assistance  6=Modified [place]


3=Moderate Assistance 7=Complete IndependenceSCALE: Activities may be completed 

with or without assistive devices.





6-Indepedent-patient completes the activity by him/herself with no assistance 

from a helper.


5-Set-up or Clean-up Assistance-helper sets up or cleans up; patient completes 

activity. [place] assists only prior to or  


    following the activity.


4-Supervision or Touching Assistance-helper provides verbal cues and/or 

touching/steadying and/or contact guard assistance as patient completes 

activity. Assistance may be provided   


    throughout the activity or intermittently.


3-Partial/Moderate Assistance-helper does LESS THAN HALF the effort. [place] 

lifts, holds or supports trunk or limbs, but provides less than half the effort.


2-Substantial/Maximal Assistance-helper does MORE THAN HALF the effort. [place] 

lifts or holds trunk or limbs and provides more than half the effort.


7-Lajwocuhf-bovgie does ALL the effort. Patient does none of the effort to 

complete the activity. Or, the assistance of 2 or more helpers is required for 

the patient to complete the  


    activity.


If activity was not attempted, code reason:


7-Patient Refused.


9-Not Applicable-not attempted and the patient did not perform the activity 

before the current illness, exacerbation or injury.


10-Not Attempted due to Environmental Limitations-(lack of equipment, weather 

restraints, etc.).


88-Not Attempted due to Medical Conditions or Safety Concerns.





Other Treatment


Pt. agrees to bilateral UE exercises.  Completed multiple exercises x 6 lb. 

dumbbells x 15 reps in all planes for continued UE strengthening for functional 

activities for safe discharge home.  Tolerated well.  Pt. completed at bed level

as he was awaiting LE wound change.  When nursing came in to change dressing, OT

and pt. discussed next steps with prosthetic leg, silicone sleeve, pt's reported

bariatric surgery and questions regarding that with his leg, etc...Pt. would 

like to know what his steps will be regarding leg fitting after he loses weight.

 These will be good questions for his prosthetist when he is fit for his 

prosthetic leg.  All needs met.





Education


OT Patient Education:  Correct positioning, Exercise program, Modified ADL 

techniques, Progress toward Goal/Update tx plan, Purpose of tx/functional act

ivities, Reviewed precautions, Rehab process


Teaching Recipient:  Patient


Teaching Methods:  Demonstration, Discussion


Response to Teaching:  Verbalize Understanding, Return Demonstration





OT Short Term Goals


Short Term Goals


Time Frame:  May 5, 2021


Eatin


Oral hygiene:  6


Toileting hygiene:  4


Shower/bathe self:  4


Upper body dressin


Lower body dressing:  3


Putting on/taking off footwear:  3





OT Long Term Goals


Long Term Goals


Time Frame:  May 19, 2021


Eating (QC):  6


Oral Hygiene (QC):  6


Toileting Hygiene (QC):  6


Shower/Bathe Self (QC):  6


Upper Body Dressing (QC):  6


Lower Body Dressing (QC):  6


On/Off Footwear (QC):  6


Additional Goals:  1-Demonstrate ADL Tasks, 2-Verbalize Understanding, 3-

ImproveStrength/Aung


1=Demonstrate adherence to instructed precautions during ADL tasks.


2=Patient will verbalize/demonstrate understanding of assistive 

devices/modifications for ADL.


3=Patient will improve strength/tolerance for activity to enable patient to 

perform ADL's.





OT Education/Plan


Problem List/Assessment


Assessment:  Impaired I ADL's





Discharge Recommendations


Plan/Recommendations:  Continue POC





Treatment Plan/Plan of Care


Treatment,Training & Education:  Yes


Patient would benefit from OT for education, treatment and training to promote 

independence in ADL's, mobility, safety and/or upper extremity function for 

ADL's.


Plan of Care:  ADL Retraining, Functional Mobility, Group Exercise/Act as Ind, 

UE Funct Exercise/Act


Treatment Duration:  May 19, 2021


Frequency:  At least 5 of 7 days/Wk (IRF)


Estimated Hrs Per Day:  1.5 hours per day


Agreement:  Yes


Rehab Potential:  Good





Time/GCodes


Start Time:  13:45


Stop Time:  14:30


Total Time Billed (hr/min):  45


Billed Treatment Time


1, EX x 3











JAROCHO OBRIEN OT           May 17, 2021 14:57

## 2021-05-17 NOTE — PHYSICAL THERAPY DAILY NOTE
PT Daily Note-Current


Subjective


Patient in recliner pre tx, agrees to PT, has no complaints of pain.





Appearance


Patient sitting EOB post tx with nurse call, phone, tray, all needs met, patient

can lay down on his own.





Mental Status


Patient Orientation:  Normal For Age





Transfers


SCALE: Activities may be completed with or without assistive devices.





6-Indepedent-patient completes the activity by him/herself with no assistance 

from a helper.


5-Set-up or Clean-up Assistance-helper sets up or cleans up; patient completes 

activity. [place] assists only prior to or  


    following the activity.


4-Supervision or Touching Assistance-helper provides verbal cues and/or 

touching/steadying and/or contact guard assistance as patient completes 

activity. Assistance may be provided   


    throughout the activity or intermittently.


3-Partial/Moderate Assistance-helper does LESS THAN HALF the effort. Enc[place] l

ifts, holds or supports trunk or limbs, but provides less than half the effort.


2-Substantial/Maximal Assistance-helper does MORE THAN HALF the effort. [place] 

lifts or holds trunk or limbs and provides more than half the effort.


8-Zfuicmlyh-ektssu does ALL the effort. Patient does none of the effort to 

complete the activity. Or, the assistance of 2 or more helpers is required for t

he patient to complete the  


    activity.


If activity was not attempted, code reason:


7-Patient Refused.


9-Not Applicable-not attempted and the patient did not perform the activity 

before the current illness, exacerbation or injury.


10-Not Attempted due to Environmental Limitations-(lack of equipment, weather 

restraints, etc.).


88-Not Attempted due to Medical Conditions or Safety Concerns.


Roll Left & Right (QC):  6


Sit to Lying (QC):  6


Lying to Sitting/Side of Bed(Q:  6


Sit to Stand (QC):  4


Chair/Bed-to-Chair Xfer(QC):  4


Patient was able to stand from his elevating recliner with SBA, had much 

difficulty but was able to stand and turn to sit in WH, propelled to therapy 

gym, performed a sliding board transfer with SBA, he was able to place the 

transfer board on his own and the same when he transferred from  to his bed.





Weight Bearing


Right Lower Extremity:  Right


Full Weight Bearing


Left Lower Extremity:  Left


Non Weight Bearing (BKA)





Wheelchair Training


Does the Pt Use a Wheelchair?:  Yes


Wheel 50 ft with 2 turns (QC):  6


Wheel 150 ft (QC):  6


Type of Wheelchair:  Manual


400'





Exercises


Supine Ex:  Bridging (RLE, attempted but could not perform), Heel Slides (just 

elevated leg LLE and worked on knee flex/ext), Straight leg raise (LLE), Hip 

abd/add (LLE)


Supine Reps:  20


sidelying LLE hip abd x20, prone hip stretch 5 min, supine knee ext stretch 5 

min, sit to stand from slightly elevated therapy table 2 sets of 5, attempted to

stand from WC but patient cannot without being able to use the back of his knee 

against a surface





Treatments


bed mobility and transfers, WC mob, ROM and strengthening





Assessment


Current Status:  Fair Progress


improving transfers





PT Short Term Goals


Short Term Goals


Time Frame:  2021


Roll Left & Right:  6


Sit to lyin


Lying to sitting on side of be:  6


Sit to stand:  3


Chair/bed-to-chair transfer:  3


Wheel 50ft w/2 turns:  6


Wheel 150 feet:  6





PT Long Term Goals


Long Term Goals


PT Long Term Goals Time Frame:  May 19, 2021


Roll Left & Right (QC):  6


Sit to Lying (QC):  6


Lying-Sitting on Side/Bed(QC):  6


Sit to Stand (QC):  6


Chair/Bed-to-Chair Xfer(QC):  6 (SPT or slideboard, whichever is the safest 

method)


Toilet Transfer (QC):  6 (SPT or slide board)


Car Transfer (QC):  4


Does the Patient Walk:  No and Walking Goal NOT indicated


Walk 10 feet (QC):  88


Walk 50ft with 2 Turns (QC):  88


Walk 150 ft (QC):  88


Walking 10ft on Uneven Surface:  88


1 Step (curb) (QC):  88


4 Steps (QC):  88


12 Steps (QC):  88


Picking up an Object (QC):  88


Does the Pt use WC or Scooter?:  Yes


Wheel 50 feet with 2 turns (QC:  6


Type:  Manual


Wheel 150 feet:  6


Type:  Manual





PT Plan


Problem List


Problem List:  Activity Tolerance, Functional Strength, Safety, Balance, Gait, 

Transfer, Bed Mobility, ROM





Treatment/Plan


Treatment Plan:  Continue Plan of Care


Treatment Plan:  Bed Mobility, Education, Functional Activity Aung, Functional 

Strength, Group Therapy, Gait, Safety, Therapeutic Exercise, Transfers


Treatment Duration:  May 19, 2021


Frequency:  At least 5 of 7 days/Wk (IRF)


Estimated Hrs Per Day:  1.5 hours per day


Patient and/or Family Agrees t:  Yes





Safety Risks/Education


Patient Education:  Transfer Techniques, Correct Positioning, W/C Management, 

Safety Issues


Teaching Recipient:  Patient


Teaching Methods:  Demonstration, Discussion


Response to Teaching:  Reinforcement Needed





Time/GCodes


Time In:  1100


Time Out:  1200


Total Billed Treatment Time:  60


Total Billed Treatment


1 visit


FA 30'


EX 30'











CAMILLE HARRY PT                May 17, 2021 12:57

## 2021-05-18 VITALS — DIASTOLIC BLOOD PRESSURE: 54 MMHG | SYSTOLIC BLOOD PRESSURE: 112 MMHG

## 2021-05-18 VITALS — DIASTOLIC BLOOD PRESSURE: 62 MMHG | SYSTOLIC BLOOD PRESSURE: 139 MMHG

## 2021-05-18 RX ADMIN — ASPIRIN SCH MG: 81 TABLET ORAL at 08:03

## 2021-05-18 RX ADMIN — PRASUGREL SCH MG: 10 TABLET, FILM COATED ORAL at 08:03

## 2021-05-18 RX ADMIN — MICONAZOLE NITRATE SCH APPLIC: 20 POWDER TOPICAL at 08:07

## 2021-05-18 RX ADMIN — DOCUSATE SODIUM AND SENNOSIDES SCH EA: 8.6; 5 TABLET, FILM COATED ORAL at 08:05

## 2021-05-18 RX ADMIN — GLYBURIDE SCH MG: 2.5 TABLET ORAL at 05:42

## 2021-05-18 RX ADMIN — GABAPENTIN SCH MG: 300 CAPSULE ORAL at 22:01

## 2021-05-18 RX ADMIN — POLYETHYLENE GLYCOL (3350) SCH GM: 17 POWDER, FOR SOLUTION ORAL at 20:07

## 2021-05-18 RX ADMIN — GLYBURIDE SCH MG: 2.5 TABLET ORAL at 17:01

## 2021-05-18 RX ADMIN — DOCUSATE SODIUM SCH MG: 100 CAPSULE ORAL at 20:07

## 2021-05-18 RX ADMIN — DOCUSATE SODIUM SCH MG: 100 CAPSULE ORAL at 08:05

## 2021-05-18 RX ADMIN — METFORMIN HYDROCHLORIDE SCH MG: 500 TABLET, EXTENDED RELEASE ORAL at 17:01

## 2021-05-18 RX ADMIN — OXYCODONE HYDROCHLORIDE SCH MG: 10 TABLET, FILM COATED, EXTENDED RELEASE ORAL at 08:04

## 2021-05-18 RX ADMIN — METOPROLOL SUCCINATE SCH MG: 100 TABLET, EXTENDED RELEASE ORAL at 08:03

## 2021-05-18 RX ADMIN — DOCUSATE SODIUM AND SENNOSIDES SCH EA: 8.6; 5 TABLET, FILM COATED ORAL at 20:07

## 2021-05-18 RX ADMIN — Medication SCH ML: at 05:41

## 2021-05-18 RX ADMIN — GABAPENTIN SCH MG: 300 CAPSULE ORAL at 13:26

## 2021-05-18 RX ADMIN — GABAPENTIN SCH MG: 300 CAPSULE ORAL at 08:03

## 2021-05-18 RX ADMIN — METFORMIN HYDROCHLORIDE SCH MG: 500 TABLET, EXTENDED RELEASE ORAL at 08:03

## 2021-05-18 RX ADMIN — INSULIN ASPART SCH UNIT: 100 INJECTION, SOLUTION INTRAVENOUS; SUBCUTANEOUS at 15:29

## 2021-05-18 RX ADMIN — INSULIN ASPART SCH UNIT: 100 INJECTION, SOLUTION INTRAVENOUS; SUBCUTANEOUS at 05:33

## 2021-05-18 RX ADMIN — POLYETHYLENE GLYCOL (3350) SCH GM: 17 POWDER, FOR SOLUTION ORAL at 08:05

## 2021-05-18 RX ADMIN — Medication SCH ML: at 22:00

## 2021-05-18 RX ADMIN — INSULIN ASPART SCH UNIT: 100 INJECTION, SOLUTION INTRAVENOUS; SUBCUTANEOUS at 20:19

## 2021-05-18 RX ADMIN — Medication SCH ML: at 13:27

## 2021-05-18 RX ADMIN — INSULIN ASPART SCH UNIT: 100 INJECTION, SOLUTION INTRAVENOUS; SUBCUTANEOUS at 10:59

## 2021-05-18 RX ADMIN — PANTOPRAZOLE SODIUM SCH MG: 40 TABLET, DELAYED RELEASE ORAL at 08:03

## 2021-05-18 RX ADMIN — BACITRACIN ZINC, NEOMYCIN, POLYMYXIN B SCH GM: 400; 3.5; 5 OINTMENT TOPICAL at 08:07

## 2021-05-18 RX ADMIN — MICONAZOLE NITRATE SCH APPLIC: 20 POWDER TOPICAL at 22:01

## 2021-05-18 RX ADMIN — OXYCODONE HYDROCHLORIDE SCH MG: 10 TABLET, FILM COATED, EXTENDED RELEASE ORAL at 22:00

## 2021-05-18 RX ADMIN — LISINOPRIL SCH MG: 20 TABLET ORAL at 08:03

## 2021-05-18 NOTE — PODIATRY PROGRESS NOTE
Standard Progress Note


Progress Notes/Assess & Plan


Date Seen by a Provider:  May 18, 2021


Time Seen by a Provider:  11:22


Progress/Assessment & Plan


There was some concern about the right great toe reported by the nursing staff. 

He has no reported pain, drainage and no trauma reported.





There remains a stable eschar to the right hallux, no increase in calor.   There

is some slight maceration to the web spaces right foot.  There is also some 

erythematous patched to the medial arch, right. 





A/P:  Tinea Pedis - Rx clotrimazole 1% Cream to be applied daily to the right 

foot.  Follow up in office upon discharge.  I would also recommend the use of 

betadine to the right hallux eschar and to the right 2nd toenail, daily.


Final Diagnosis


Maria Fernanda Pedis











JOHN FITZGERALD DPM              May 18, 2021 11:27

## 2021-05-18 NOTE — PHYSICAL THERAPY DAILY NOTE
PT Daily Note-Current


Subjective


Patient in WC pre tx, agrees to PT, voices no complaints of pain.





Appearance


Patient in WC post tx with nurse call, phone, tray, all needs met.





Mental Status


Patient Orientation:  Normal For Age





Transfers


SCALE: Activities may be completed with or without assistive devices.





6-Indepedent-patient completes the activity by him/herself with no assistance 

from a helper.


5-Set-up or Clean-up Assistance-helper sets up or cleans up; patient completes 

activity. Norris assists only prior to or  


    following the activity.


4-Supervision or Touching Assistance-helper provides verbal cues and/or 

touching/steadying and/or contact guard assistance as patient completes 

activity. Assistance may be provided   


    throughout the activity or intermittently.


3-Partial/Moderate Assistance-helper does LESS THAN HALF the effort. Norris 

lifts, holds or supports trunk or limbs, but provides less than half the effort.


2-Substantial/Maximal Assistance-helper does MORE THAN HALF the effort. Norris 

lifts or holds trunk or limbs and provides more than half the effort.


6-Qvqzydzni-isyaaf does ALL the effort. Patient does none of the effort to 

complete the activity. Or, the assistance of 2 or more helpers is required for 

the patient to complete the  


    activity.


If activity was not attempted, code reason:


7-Patient Refused.


9-Not Applicable-not attempted and the patient did not perform the activity 

before the current illness, exacerbation or injury.


10-Not Attempted due to Environmental Limitations-(lack of equipment, weather 

restraints, etc.).


88-Not Attempted due to Medical Conditions or Safety Concerns.





Weight Bearing


Right Lower Extremity:  Right


Full Weight Bearing


Left Lower Extremity:  Left


Non Weight Bearing (BKA)





Wheelchair Training


Does the Pt Use a Wheelchair?:  Yes


Wheel 50 ft with 2 turns (QC):  6


Wheel 150 ft (QC):  6


Type of Wheelchair:  Manual


500'x2, patient practiced going up and down a long ramp with close rails on both

sides, used the rails to pull himself back up in order to work on arm strength, 

went up and down the ramp x4, several rest breaks after doing the ramp and 

during WC mobility due to fatigue





Treatments


WC mobility, UE strengthening





Assessment


Current Status:  Fair Progress


independent with WC mobility but prefers to turn around backward and push with 

leg to go long distances





PT Short Term Goals


Short Term Goals


Time Frame:  2021


Roll Left & Right:  6


Sit to lyin


Lying to sitting on side of be:  6


Sit to stand:  3


Chair/bed-to-chair transfer:  3


Wheel 50ft w/2 turns:  6


Wheel 150 feet:  6





PT Long Term Goals


Long Term Goals


PT Long Term Goals Time Frame:  May 19, 2021


Roll Left & Right (QC):  6


Sit to Lying (QC):  6


Lying-Sitting on Side/Bed(QC):  6


Sit to Stand (QC):  6


Chair/Bed-to-Chair Xfer(QC):  6 (SPT or slideboard, whichever is the safest 

method)


Toilet Transfer (QC):  6 (SPT or slide board)


Car Transfer (QC):  4


Does the Patient Walk:  No and Walking Goal NOT indicated


Walk 10 feet (QC):  88


Walk 50ft with 2 Turns (QC):  88


Walk 150 ft (QC):  88


Walking 10ft on Uneven Surface:  88


1 Step (curb) (QC):  88


4 Steps (QC):  88


12 Steps (QC):  88


Picking up an Object (QC):  88


Does the Pt use WC or Scooter?:  Yes


Wheel 50 feet with 2 turns (QC:  6


Type:  Manual


Wheel 150 feet:  6


Type:  Manual





PT Plan


Problem List


Problem List:  Activity Tolerance, Functional Strength, Safety, Balance, Gait, 

Transfer, Bed Mobility, ROM





Treatment/Plan


Treatment Plan:  Continue Plan of Care


Treatment Plan:  Bed Mobility, Education, Functional Activity Aung, Functional 

Strength, Group Therapy, Gait, Safety, Therapeutic Exercise, Transfers


Treatment Duration:  May 19, 2021


Frequency:  At least 5 of 7 days/Wk (IRF)


Estimated Hrs Per Day:  1.5 hours per day


Patient and/or Family Agrees t:  Yes





Safety Risks/Education


Patient Education:  Correct Positioning, W/C Management, Safety Issues


Teaching Recipient:  Patient


Teaching Methods:  Demonstration, Discussion


Response to Teaching:  Reinforcement Needed





Time/GCodes


Time In:  1115


Time Out:  1215


Total Billed Treatment Time:  60


Total Billed Treatment


1 visit


EX 15'


FA 45'











CAMILLE HARRY PT                May 18, 2021 12:53

## 2021-05-18 NOTE — OCCUPATIONAL THER DAILY NOTE
OT Current Status-Daily Note


Subjective


No pain reported.





Appearance


Pt. up in wheelchair.  Agrees to work with OT.





ADL-Treatment


Therapy Code Descriptions/Definitions 





Functional [place] Measure:


0=Not Assessed/NA        4=Minimal Assistance


1=Total Assistance        5=Supervision or Setup


2=Maximal Assistance  6=Modified [place]


3=Moderate Assistance 7=Complete IndependenceSCALE: Activities may be completed 

with or without assistive devices.





6-Indepedent-patient completes the activity by him/herself with no assistance 

from a helper.


5-Set-up or Clean-up Assistance-helper sets up or cleans up; patient completes 

activity. [place] assists only prior to or  


    following the activity.


4-Supervision or Touching Assistance-helper provides verbal cues and/or 

touching/steadying and/or contact guard assistance as patient completes 

activity. Assistance may be provided   


    throughout the activity or intermittently.


3-Partial/Moderate Assistance-helper does LESS THAN HALF the effort. [place] 

lifts, holds or supports trunk or limbs, but provides less than half the effort.


2-Substantial/Maximal Assistance-helper does MORE THAN HALF the effort. [place] 

lifts or holds trunk or limbs and provides more than half the effort.


1-Qgdvmbfmu-oqgeip does ALL the effort. Patient does none of the effort to 

complete the activity. Or, the assistance of 2 or more helpers is required for 

the patient to complete the  


    activity.


If activity was not attempted, code reason:


7-Patient Refused.


9-Not Applicable-not attempted and the patient did not perform the activity 

before the current illness, exacerbation or injury.


10-Not Attempted due to Environmental Limitations-(lack of equipment, weather 

restraints, etc.).


88-Not Attempted due to Medical Conditions or Safety Concerns.





Other Treatment


Pt. dons 2 lb. wrist weights and self propels wheelchair throughout therapy 

area, approximately 800 feet, for increased overall UE strength and endurance.  

Pt. completes this task independently.  All needs met back in room.  Pt. and OT 

discuss transfer home tomorrow.  Pt. feels ready to discharge.





OT Short Term Goals


Short Term Goals


Time Frame:  May 5, 2021


Eatin


Oral hygiene:  6


Toileting hygiene:  4


Shower/bathe self:  4


Upper body dressin


Lower body dressing:  3


Putting on/taking off footwear:  3





OT Long Term Goals


Long Term Goals


Time Frame:  May 19, 2021


Eating (QC):  6


Oral Hygiene (QC):  6


Toileting Hygiene (QC):  6


Shower/Bathe Self (QC):  6


Upper Body Dressing (QC):  6


Lower Body Dressing (QC):  6


On/Off Footwear (QC):  6


Additional Goals:  1-Demonstrate ADL Tasks, 2-Verbalize Understanding, 3-

ImproveStrength/Aung


1=Demonstrate adherence to instructed precautions during ADL tasks.


2=Patient will verbalize/demonstrate understanding of assistive 

devices/modifications for ADL.


3=Patient will improve strength/tolerance for activity to enable patient to 

perform ADL's.





OT Education/Plan


Discharge Recommendations


Plan/Recommendations:  Continue POC





Treatment Plan/Plan of Care


Treatment,Training & Education:  Yes


Patient would benefit from OT for education, treatment and training to promote 

independence in ADL's, mobility, safety and/or upper extremity function for 

ADL's.


Plan of Care:  ADL Retraining, Functional Mobility, Group Exercise/Act as Ind, 

UE Funct Exercise/Act


Treatment Duration:  May 19, 2021


Frequency:  At least 5 of 7 days/Wk (IRF)


Estimated Hrs Per Day:  1.5 hours per day


Agreement:  Yes


Rehab Potential:  Good





Time/GCodes


Start Time:  13:30


Stop Time:  14:00


Total Time Billed (hr/min):  30


Billed Treatment Time


1, JAROCHO Lopes OT           May 18, 2021 14:22

## 2021-05-18 NOTE — PM&R PROGRESS NOTE
Subjective


HPI/CC On Admission


Date Seen by Provider:  May 18, 2021


Time Seen by Provider:  08:30


Subjective/Events-last exam


5/18/21:


Pt doing really well


Dr. Jiménez and Dr. Neumann will come and see him


Will DC on Wednesday


Bowels moved yesterday


Pain medication sent in and wheelchair ordered








5/17/21:


Pt doing really well 


Discharge planned for Wednesday 


Had a BM two days ago so will give him laxatives


Transferring from wheelchair very well 


Hgb 9.1








5/16/21:


No major issues


Used slide board well


Very motivated


Prosthesis on Wed


BM yesterday








5/15/21:


Family education today and it went well


All staples are now out


BM yesterday


Doing well


DC Wed








5/14/21:


Patient doing well


Increasing Oxycodone to 10mg before dressing changes


Wound care evaluating right foot due to early signs of wounds there


BM 5/13


Sugar 133








5/13/21:


Doing well


Sugar 144


BM 5/11


Pain good until dressing changes


Very difficulty coping at times 








5/11/21:


Pt doing pretty well 


Blood sugar 99 today 


Bowels moved yesterday 


Will DC Fentanyl IV and take Oxycodone about an hour before dressing changes


Overall doing pretty well otherwise 








5/10/21:


Patient doing well today


No pain is reported


Just took a shower


Dressing is changed daily


Blood sugar 128


Hemoglobin 9.3








5/9/21:


Patient doing well


Labs due tomorrow


Some staples removed today


Dr Jiménez appreciated








5/8/21:


No major issues


Pain is controlled


Reviewed meds


Sugars and BP ok








5/7/2021:


Patient doing very well


Dr. JIMÉNEZ evaluated the leg


Sugars are good


Pain is well managed with pain medication


Blood pressure good 126/58








5/6/21:


Pt doing very well


Stump  is on


Blood sugars are good


Bowels moved yesterday


Staples will be removed per Dr. Jiménez's order








5/5/21:


Pt doing pretty well


Stump  was uncomfortable so that was taken off last night but put on 

today and doing pretty well


Bowels moved yesterday


Discharge is planned for first of next week








5/4/21:


Pt doing pretty well


Had a large BM last night


Dr. Guzmán thinks that the wound looks really good


Stump  will be started today


Sugars are okay








5/3/21:


Pt doing much better


Hgb 9.5


Bowels moved yesterday


Sugar was 100 this morning


Drainage from incision continues so stump- is being held








5/2/21:


Patient doing well


No pain reported except during dressing changes


Long acting Oxycontin working well for the patient


Stump  today or tomorrow








5/1/21:


Patient doing well


Oxycontin 10mg PO BID


Change dressing as instructed


Incision looks good


Fentanyl infusion before dressing changes








4/30/21:


Patient doing well


Lost 11# since admit


BM yesterday


Sugars are good








4/29/21:


No major issues


Incision is bleeding in 1 specific area


Scheduled Oxycontin is helping and minimsl IR used


BM+








4/28/21:


No major issues


BM yesterday will need to closely monitor that due to long acting narcs


Dr Jiménez will see him today regarding his staples


Talked about weight loss with Dr Jiménez








4/27/21:


Pt doing really well


No issues 


Stump  is being tolerated for 23 hours of the day 


No pain 


Sugar is okay and I decreased insulin even further today 








4/26/21:


Pt doing a lot better today


Pain is well controlled


Bowels moved yesterday


Stump  will be placed today on his left amputation site


Slept very well








4/25/21:


Doing well


No pain when he doesn't move


Pain controlled better with Oxycontin


BM+


Mother and son visiting him today


Sugars reviewed








4/24/21:


Pain is better


No issues reported


Hgb 7.9


BM++


Using IS








4/23/21:


Doing well


Stump  will start Monday per Dr Jiménez


BM+


Oxycontin and Oxycodone added for better pain control


DC Lortab








4/22/21:


Pt doing pretty well


Had a large BM today


Hgb 7.7 after three units of blood on med surg


Rough on the transfers but getting better


Dressing change will be per Dr. Morejon orders


Sugar is 107 this morning





Review of Systems


General:  Fatigue, Malaise


Musculoskeletal:  leg pain





Objective


Exam


Vital Signs





Vital Signs








  Date Time  Temp Pulse Resp B/P (MAP) Pulse Ox O2 Delivery O2 Flow Rate FiO2


 


5/18/21 20:00      Room Air  


 


5/18/21 19:42 36.4 74 16 112/54 (73) 96   





Capillary Refill :


General Appearance:  No Apparent Distress, WD/WN, Chronically ill, Obese


HEENT:  PERRL/EOMI, Normal ENT Inspection, Pharynx Normal


Neck:  Full Range of Motion, Normal Inspection, Non Tender, Supple, Carotid 

Bruit


Respiratory:  Chest Non Tender, Lungs Clear, Normal Breath Sounds, No Accessory 

Muscle Use, No Respiratory Distress


Cardiovascular:  Regular Rate, Rhythm, No Edema, No Gallop, No JVD, No Murmur, 

Normal Peripheral Pulses


Gastrointestinal:  Normal Bowel Sounds, No Organomegaly, No Pulsatile Mass, Non 

Tender, Soft


Back:  Normal Inspection, No CVA Tenderness, No Vertebral Tenderness


Extremity:  Normal Capillary Refill, Normal Inspection, Normal Range of Motion, 

Non Tender, No Calf Tenderness, No Pedal Edema


Neurologic/Psychiatric:  Alert, Oriented x3, No Motor/Sensory Deficits, Normal 

Mood/Affect, CNs II-XII Norm as Tested, Abnormal Gait, Motor Weakness (left leg 

amputation )


Skin:  Normal Color, Warm/Dry


Lymphatic:  No Adenopathy





Results/Procedures


Lab


Patient resulted labs reviewed.





FIM


Transfers


Therapy Code Descriptions/Definitions 





Functional [place] Measure:


0=Not Assessed/NA        4=Minimal Assistance


1=Total Assistance        5=Supervision or Setup


2=Maximal Assistance  6=Modified [place]


3=Moderate Assistance 7=Complete IndependenceSCALE: Activities may be completed 

with or without assistive devices.





6-Indepedent-patient completes the activity by him/herself with no assistance 

from a helper.


5-Set-up or Clean-up Assistance-helper sets up or cleans up; patient completes 

activity. [place] assists only prior to or  


    following the activity.


4-Supervision or Touching Assistance-helper provides verbal cues and/or 

touching/steadying and/or contact guard assistance as patient completes 

activity. Assistance may be provided   


    throughout the activity or intermittently.


3-Partial/Moderate Assistance-helper does LESS THAN HALF the effort. [place] 

lifts, holds or supports trunk or limbs, but provides less than half the effort.


2-Substantial/Maximal Assistance-helper does MORE THAN HALF the effort. [place] 

lifts or holds trunk or limbs and provides more than half the effort.


0-Btjkhjqdb-oynknm does ALL the effort. Patient does none of the effort to 

complete the activity. Or, the assistance of 2 or more helpers is required for 

the patient to complete the  


    activity.


If activity was not attempted, code reason:


7-Patient Refused.


9-Not Applicable-not attempted and the patient did not perform the activity 

before the current illness, exacerbation or injury.


10-Not Attempted due to Environmental Limitations-(lack of equipment, weather 

restraints, etc.).


88-Not Attempted due to Medical Conditions or Safety Concerns.


Roll Left to Right (QC):  6


Sit to Lying (QC):  6


Sit to Stand (QC):  4


Chair/Bed-to-Chair Xfer(QC):  4


Car Transfer (QC):  5





Gait Training


Does the Patient Walk?:  No and Walking Goal NOT indicated


Distance:  5' x 2


Walk 10 feet (QC):  88


Walk 50 ft with 2 Turns(QC):  88


Walk 150 ft (QC):  88


Walking 10ft/uneven surface-QC:  88


Gait Assistive Device:  FWW





Wheelchair Training


Does the Pt Use a Wheelchair?:  Yes


Wheel 50 ft with 2 turns (QC):  6


Wheel 150 ft (QC):  6


Type of Wheelchair:  Manual





Stair Training


1 Step (curb) (QC):  88


4 Steps (QC):  88


12 Steps (QC):  88





Balance


Picking up an Object (QC):  88





ADL-Treatment


Eating (QC):  6


Oral Hygiene (QC):  6


Shower/Bathe Self (QC):  6


Upper Body Dressing (QC):  5


Lower Body Dressing (QC):  5


On/Off Footwear (QC):  5


Toileting Hygiene (QC):  6


Toilet Transfer (QC):  5





Assessment/Plan


Assessment and Plan


Assess & Plan/Chief Complaint


Assessment:


s/p LBKA 


DM insulin dependence


HTN


HLP


CRI


Iron def anemia from acute blood loss


TERRI untreated





Plan:


Monitor pain


IRF protocol


Monitor hgb


Iron infusion





4/22/21:


Monitor sugar


Monitor hgb





4/23/21:


Add pain meds


Check labs in am





4/24/21:


Monitor sugar and decrease insulin


Hgb stable


Venofer





4/25/21:


Continue iron infusions


Sugar managed well


Pain controlled





4/26/21:


Stump 


Monitor sugar


Monitor hgb





4/27/21:


Monitor sugar


Decrease insulin


Monitor BP





4/28/21:


Monitor pain


Decreased insulin





4/29/21:


Monitor pain


BM regimen to prevent narcotic bowel





4/30/21:


Monitor closely


Monitor sugar





5/1/21:


Monitor closely


Dressing changes





5/2/21:


Monitor pain


Increase insulin





5/3/21:


Monitor closely


Monitor sugar levels and adjust insulin as needed





5/4/21:


Stump  maintained


Monitor closely





5/5/21:


Monitor pain


Stump 





5/6/21:


Monitor closely


Monitor staples





5/7/2021:


Continue weight loss regimen


Stump 


Monitor ganesh per Dr. JIMÉNEZ





5/8/21:


Continue weight loss


Monitor sugar





5/9/21:


Monitor closely


Labs tomorrow





5/10/21:


Control pain


Monitor hemoglobin


Monitor blood sugar





5/11/21:


DC Fentanyl IV


Monitor closely





5/13/21:


DC Wed


Monitor closely





5/14/21:


Increase pain med dose before dressing changes





5/15/21:


Monitor stump since all staples out today


Continue stump 





5/16/21:


Monitor closely


Stump 





5/17/21:


Improved status


Pain management





The patient has a physical and mobility limitation that makes him incapable of 

utilizing regular toilet facilities.








Because of his physical and mobility limitations, he is confined to limited 

areas in his home.








An extra wide/heavy duty commode chair is necessary because patient weighs 441#.








A commode chair with detachable arms feature is necessary to accommodate patient

required use of a slide board for transfers and because of his weight of 441#.





5/18/21:


DC planned for tomorrow


All Rxes sent in





(1) Status post below-knee amputation of left lower extremity


Status:  Acute


(2) Hyperglycemia


Status:  Chronic


(3) Hypertension


Status:  Chronic


(4) Diabetes mellitus, type 2


Status:  Chronic


(5) Anemia


Status:  Acute


(6) Iron deficiency


Status:  Acute


(7) Peripheral neuropathy


Status:  Chronic











NICOLE VILA DO                May 18, 2021 07:07

## 2021-05-18 NOTE — PHYSICAL THERAPY DAILY NOTE
PT Daily Note-Current


Subjective


Patient in WC pre tx, agrees to PT, has no complaints of pain.





Appearance


Patient in WC post tx, has OT right after PT





Mental Status


Patient Orientation:  Normal For Age





Transfers


SCALE: Activities may be completed with or without assistive devices.





6-Indepedent-patient completes the activity by him/herself with no assistance 

from a helper.


5-Set-up or Clean-up Assistance-helper sets up or cleans up; patient completes 

activity. [place] assists only prior to or  


    following the activity.


4-Supervision or Touching Assistance-helper provides verbal cues and/or 

touching/steadying and/or contact guard assistance as patient completes 

activity. Assistance may be provided   


    throughout the activity or intermittently.


3-Partial/Moderate Assistance-helper does LESS THAN HALF the effort. [place] 

lifts, holds or supports trunk or limbs, but provides less than half the effort.


2-Substantial/Maximal Assistance-helper does MORE THAN HALF the effort. [place] 

lifts or holds trunk or limbs and provides more than half the effort.


6-Asxlgfaio-vdimpl does ALL the effort. Patient does none of the effort to 

complete the activity. Or, the assistance of 2 or more helpers is required for 

the patient to complete the  


    activity.


If activity was not attempted, code reason:


7-Patient Refused.


9-Not Applicable-not attempted and the patient did not perform the activity 

before the current illness, exacerbation or injury.


10-Not Attempted due to Environmental Limitations-(lack of equipment, weather 

restraints, etc.).


88-Not Attempted due to Medical Conditions or Safety Concerns.


Roll Left & Right (QC):  6


Sit to Lying (QC):  6


Lying to Sitting/Side of Bed(Q:  6


Sit to Stand (QC):  4


Chair/Bed-to-Chair Xfer(QC):  4


Toilet Transfer (QC):  3


Car Transfer (QC):  7


Patient can perform a sliding board transfer with SBA and can place it himself 

but needs min assist occasionally for toilet transfers, patient can stand and 

perform a transfer with SBA but cannot stand from his WC, he can stand from the 

bed or therapy table because he can push with the back of his leg from the bed 

or therapy table while he cannot from the WC.  Patient refused car transfer.  He

has a lot of trouble with the car transfer we have on rehab.  He has actually 

performed a car transfer using his own car (OT was with him), patient reports it

was much easier and feels confident in his ability to do this.





Weight Bearing


Right Lower Extremity:  Right


Full Weight Bearing


Left Lower Extremity:  Left


Non Weight Bearing (BKA)





Wheelchair Training


Does the Pt Use a Wheelchair?:  Yes


Wheel 50 ft with 2 turns (QC):  6


Wheel 150 ft (QC):  6


Type of Wheelchair:  Manual





Exercises


Seated Therapy Exercises:  Long arc quads


Seated Reps:  20


5 min supine knee extension stretch and 5 min prone hip stretch





Treatments


bed mobility and transfers, LE exercise, WC mobility





Assessment


Current Status:  Fair Progress


improved sliding board transfers





PT Short Term Goals


Short Term Goals


Time Frame:  2021


Roll Left & Right:  6


Sit to lyin


Lying to sitting on side of be:  6


Sit to stand:  3


Chair/bed-to-chair transfer:  3


Wheel 50ft w/2 turns:  6


Wheel 150 feet:  6





PT Long Term Goals


Long Term Goals


PT Long Term Goals Time Frame:  May 19, 2021


Roll Left & Right (QC):  6


Sit to Lying (QC):  6


Lying-Sitting on Side/Bed(QC):  6


Sit to Stand (QC):  6


Chair/Bed-to-Chair Xfer(QC):  6 (SPT or slideboard, whichever is the safest 

method)


Toilet Transfer (QC):  6 (SPT or slide board)


Car Transfer (QC):  4


Does the Patient Walk:  No and Walking Goal NOT indicated


Walk 10 feet (QC):  88


Walk 50ft with 2 Turns (QC):  88


Walk 150 ft (QC):  88


Walking 10ft on Uneven Surface:  88


1 Step (curb) (QC):  88


4 Steps (QC):  88


12 Steps (QC):  88


Picking up an Object (QC):  88


Does the Pt use WC or Scooter?:  Yes


Wheel 50 feet with 2 turns (QC:  6


Type:  Manual


Wheel 150 feet:  6


Type:  Manual





PT Plan


Problem List


Problem List:  Activity Tolerance, Functional Strength, Safety, Balance, Gait, 

Transfer





Treatment/Plan


Treatment Plan:  Continue Plan of Care


Treatment Plan:  Bed Mobility, Education, Functional Activity Aung, Functional 

Strength, Group Therapy, Gait, Safety, Therapeutic Exercise, Transfers


Treatment Duration:  May 19, 2021


Frequency:  At least 5 of 7 days/Wk (IRF)


Estimated Hrs Per Day:  1.5 hours per day


Patient and/or Family Agrees t:  Yes





Safety Risks/Education


Patient Education:  Transfer Techniques, Correct Positioning, W/C Management, 

Safety Issues


Teaching Recipient:  Patient


Teaching Methods:  Demonstration, Discussion


Response to Teaching:  Reinforcement Needed





Time/GCodes


Time In:  1300


Time Out:  1330


Total Billed Treatment Time:  30


Total Billed Treatment


1 visit


FA 30'











CAMILLE HARRY PT                May 18, 2021 13:30

## 2021-05-18 NOTE — OCCUPATIONAL THER DAILY NOTE
OT Current Status-Daily Note


Subjective


No pain reported.





Appearance


Pt. in bed.  Agreeable to shower.





Mental Status/Objective


Patient Orientation:  Person, Place, Time, Situation





ADL-Treatment


Therapy Code Descriptions/Definitions 





Functional [place] Measure:


0=Not Assessed/NA        4=Minimal Assistance


1=Total Assistance        5=Supervision or Setup


2=Maximal Assistance  6=Modified [place]


3=Moderate Assistance 7=Complete IndependenceSCALE: Activities may be completed 

with or without assistive devices.





6-Indepedent-patient completes the activity by him/herself with no assistance 

from a helper.


5-Set-up or Clean-up Assistance-helper sets up or cleans up; patient completes 

activity. [place] assists only prior to or  


    following the activity.


4-Supervision or Touching Assistance-helper provides verbal cues and/or to

uching/steadying and/or contact guard assistance as patient completes activity. 

Assistance may be provided   


    throughout the activity or intermittently.


3-Partial/Moderate Assistance-helper does LESS THAN HALF the effort. [place] 

lifts, holds or supports trunk or limbs, but provides less than half the effort.


2-Substantial/Maximal Assistance-helper does MORE THAN HALF the effort. [place] 

lifts or holds trunk or limbs and provides more than half the effort.


3-Wfejgbwit-wjjcwh does ALL the effort. Patient does none of the effort to 

complete the activity. Or, the assistance of 2 or more helpers is required for 

the patient to complete the  


    activity.


If activity was not attempted, code reason:


7-Patient Refused.


9-Not Applicable-not attempted and the patient did not perform the activity 

before the current illness, exacerbation or injury.


10-Not Attempted due to Environmental Limitations-(lack of equipment, weather 

restraints, etc.).


88-Not Attempted due to Medical Conditions or Safety Concerns.


Eating (QC):  6


Oral Hygiene (QC):  6 (From wheelchair level.)


Shower/Bathe Self (QC):  6 (From shower chair level.)


Upper Body Dressing (QC):  5


Lower Body Dressing (QC):  5


On/Off Footwear:  5 (From bed level.)





Other Treatment


Pt. agrees to shower.  Pt. stands from bed level with bed elevated from walker. 

Pivots with Mod I to shower chair.  Pt. taken to shower via shower chair.  After

showering independently, he stands at bars in shower room from shower chair with

SBA and OT replaces shower chair with wheelchair.  Pt. is able to finish 

grooming tasks independently from wheelchair.  All needs met up in wheelchair.





Education


OT Patient Education:  Correct positioning, Modified ADL techniques, Progress 

toward Goal/Update tx plan, Purpose of tx/functional activities, Reviewed 

precautions, Rehab process, Transfer techniques


Teaching Recipient:  Patient


Teaching Methods:  Demonstration, Discussion


Response to Teaching:  Verbalize Understanding, Return Demonstration





OT Short Term Goals


Short Term Goals


Time Frame:  May 5, 2021


Eatin


Oral hygiene:  6


Toileting hygiene:  4


Shower/bathe self:  4


Upper body dressin


Lower body dressing:  3


Putting on/taking off footwear:  3





OT Long Term Goals


Long Term Goals


Time Frame:  May 19, 2021


Eating (QC):  6


Oral Hygiene (QC):  6


Toileting Hygiene (QC):  6


Shower/Bathe Self (QC):  6


Upper Body Dressing (QC):  6


Lower Body Dressing (QC):  6


On/Off Footwear (QC):  6


Additional Goals:  1-Demonstrate ADL Tasks, 2-Verbalize Understanding, 3-

ImproveStrength/Aung


1=Demonstrate adherence to instructed precautions during ADL tasks.


2=Patient will verbalize/demonstrate understanding of assistive devices/mo

difications for ADL.


3=Patient will improve strength/tolerance for activity to enable patient to 

perform ADL's.





OT Education/Plan


Problem List/Assessment


Assessment:  Decreased Activ Tolerance





Discharge Recommendations


Plan/Recommendations:  Continue POC


Therapy Discharge Recommendati:  Home & Family, Post Acute OT





Treatment Plan/Plan of Care


Treatment,Training & Education:  Yes


Patient would benefit from OT for education, treatment and training to promote 

independence in ADL's, mobility, safety and/or upper extremity function for 

ADL's.


Plan of Care:  ADL Retraining, Functional Mobility, Group Exercise/Act as Ind, 

UE Funct Exercise/Act


Treatment Duration:  May 19, 2021


Frequency:  At least 5 of 7 days/Wk (IRF)


Estimated Hrs Per Day:  1.5 hours per day


Agreement:  Yes


Rehab Potential:  Good





Time/GCodes


Start Time:  08:10


Stop Time:  09:10


Total Time Billed (hr/min):  60


Billed Treatment Time


1, ADL x 4











JAROCHO OBRIEN OT           May 18, 2021 09:19

## 2021-05-18 NOTE — D/C HH FACE TO FACE ORDER
D/C  Face to Face Orders


Reconcile Patient Problems


Problems Reviewed?:  Yes





Instructions for Patient


[facility]


Patient Instructions/FollowUp:  


[facility] 1 week


Physician to follow Patient:  [facility]


Discharge Diet for Home:  ADA Diet


Patient Problems:  


s/p left BKA





Patient Data-Allergies,Ht & Wt


Patient Allergies:  


Coded Allergies:  


     levofloxacin (Verified  Allergy, Mild, 4/14/15)


Height (Feet):  6


Height (Inches):  0.00


Weight (Pounds):  422


Weight (Ounces):  6.4





Home Health Need/Face to Face


Date of Face to Face:  May 18, 2021


Clinical Findings:  Instability, Muscle weakness, Unsteady gait


I have seen Pt face-to-face:  Yes


Discharged To:  Home


Diagnosis/Conditions:  


s/p left BKA


Patient is Homebound due to:  Arline fall risk due to instabilty, Muscle weakness


Homebound Status


   Due to the above stated illness, injury or surgical procedure (medical 

condition or diagnosis) and associated clinical findings, the patient is 

homebound because of his/her inability to leave home except with aid of a 

supportive device and/or person AND leaving the home requires a considerable and

taxing effort or is medically contraindicated.


Pt req the following assistanc:  Walker, Wheelchair





Home Health Nursing Orders


Home Health Services Order:  Nursing Services, Occupational Ther-Evaluate & 

Treat, Physical Therapy-Evaluate & Treat


Certify Stmt


I certify that this patient is under my care and that I, a nurse practitioner or

a physician; a assistant working with me, had a face to face encounter that -

meets the physician face to face encounter requirements with this patient as 

dated.











NICOLE VILA DO                May 18, 2021 06:28

## 2021-05-19 VITALS — DIASTOLIC BLOOD PRESSURE: 61 MMHG | SYSTOLIC BLOOD PRESSURE: 131 MMHG

## 2021-05-19 RX ADMIN — LISINOPRIL SCH MG: 20 TABLET ORAL at 08:26

## 2021-05-19 RX ADMIN — DOCUSATE SODIUM SCH MG: 100 CAPSULE ORAL at 08:31

## 2021-05-19 RX ADMIN — PANTOPRAZOLE SODIUM SCH MG: 40 TABLET, DELAYED RELEASE ORAL at 08:26

## 2021-05-19 RX ADMIN — POLYETHYLENE GLYCOL (3350) SCH GM: 17 POWDER, FOR SOLUTION ORAL at 08:33

## 2021-05-19 RX ADMIN — ASPIRIN SCH MG: 81 TABLET ORAL at 08:26

## 2021-05-19 RX ADMIN — DOCUSATE SODIUM AND SENNOSIDES SCH EA: 8.6; 5 TABLET, FILM COATED ORAL at 08:31

## 2021-05-19 RX ADMIN — METFORMIN HYDROCHLORIDE SCH MG: 500 TABLET, EXTENDED RELEASE ORAL at 08:25

## 2021-05-19 RX ADMIN — INSULIN ASPART SCH UNIT: 100 INJECTION, SOLUTION INTRAVENOUS; SUBCUTANEOUS at 06:32

## 2021-05-19 RX ADMIN — METOPROLOL SUCCINATE SCH MG: 100 TABLET, EXTENDED RELEASE ORAL at 08:26

## 2021-05-19 RX ADMIN — PRASUGREL SCH MG: 10 TABLET, FILM COATED ORAL at 08:25

## 2021-05-19 RX ADMIN — GABAPENTIN SCH MG: 300 CAPSULE ORAL at 08:25

## 2021-05-19 RX ADMIN — GLYBURIDE SCH MG: 2.5 TABLET ORAL at 06:32

## 2021-05-19 RX ADMIN — MICONAZOLE NITRATE SCH APPLIC: 20 POWDER TOPICAL at 08:32

## 2021-05-19 RX ADMIN — Medication SCH ML: at 06:32

## 2021-05-19 RX ADMIN — OXYCODONE HYDROCHLORIDE SCH MG: 10 TABLET, FILM COATED, EXTENDED RELEASE ORAL at 08:25

## 2021-05-19 RX ADMIN — INSULIN ASPART SCH UNIT: 100 INJECTION, SOLUTION INTRAVENOUS; SUBCUTANEOUS at 10:47

## 2021-05-19 NOTE — THERAPY TEAM DISCHARGE SUMMARY
Therapy Discharge Summary


Discharge Recommendations


Date of Discharge


5-19-21


Therapy D/C Recommendations:  Home w/ Family Support, Occupational Therapy Home 

Care





Occupational Therapy


Pt. has been seen by Occupational therapy to increase overall strength and 

independence with daily tasks to improve functional skills for return home.  Pt.

has made significant gains and will discharge home this date.  Pt. will 

discharge home with family support.  Recommend home health OT/PT.  Pt. will go 

home with EMS return.  Equipment needed includes wheelchair, bariatric walker, 

slide boards, bariatric commode.  Family is having ramp installed, as well as 

ADA accessible bathroom.  Doorways have been widened.  Cushion has been ordered 

for wheelchair.  Pt. has practiced slide board transfers to/from bed, commode, 

chair.  Lift chair has been delivered to home.  Pt. has practiced getting into 

and out of his van.  Pt. will discharge home with spouse and two teenage 

children.


Decreased Activ Tolerance, Impaired I ADL's





PT Long Term Goals


Long Term Goals


PT Long Term Goals Time Frame:  May 19, 2021


Roll Left to Right (QC):  6


Sit to Lying (QC):  6


Lying-Sitting on Side/Bed(QC):  6


Sit to Stand (QC):  6


Chair/Bed-to-Chair Xfer(QC):  6 (SPT or slideboard, whichever is the safest 

method)


Car Transfer (QC):  4


Does the Patient Walk:  No and Walking Goal NOT indicated


Walk 10 feet (QC):  88


Walk 10ft-Uneven Surface(QC):  88


Walk 50ft with 2 Turns (QC):  88


Walk 150 ft (QC):  88


Does the Pt use WC or Scooter?:  Yes


Wheel 50 feet with 2 turns (QC:  6


1 Step (curb) (QC):  88


4 Steps (QC):  88


12 Steps (QC):  88


Picking up an Object (QC):  88





OT Long Term Goals


Long Term Goals


Time Frame:  May 19, 2021


Eating (QC):  6 (met)


Oral Hygiene (QC):  6 (met)


Shower/Bathe Self (QC):  6 (met)


Upper Body Dressing (QC):  6 (not met)


Lower Body Dressing (QC):  6 (not met)


On/Off Footwear (QC):  6 (not met)


Toileting Hygiene (QC):  6 (met)


Toilet/Commode Transfer (QC):  6 (SPT or slide boardnot met)


Additional Goals:  1-Demonstrate ADL Tasks, 2-Verbalize Understanding, 3-

ImproveStrength/Aung


1=Demonstrate adherence to instructed precautions during ADL tasks.


2=Patient will verbalize/demonstrate understanding of assistive geronimo

jane/modifications for ADL.


3=Patient will improve strength/tolerance for activity to enable patient to 

perform ADL's.











JAROCHO OBRIEN OT           May 19, 2021 10:58

## 2021-05-19 NOTE — PROGRESS NOTE
Subjective


Date Seen by a Provider:  May 19, 2021


Time Seen by a Provider:  09:30


Subjective/Events-last exam


doing well. ambulating better. pain controlled. wound clean/dry/intact, getting 

LE casted for prosthesis today.





Objective


Exam





Vital Signs








  Date Time  Temp Pulse Resp B/P (MAP) Pulse Ox O2 Delivery O2 Flow Rate FiO2


 


5/19/21 08:43      Room Air  


 


5/19/21 08:00 36.1 72 18 131/61 (84) 97 Room Air  


 


5/18/21 20:00      Room Air  


 


5/18/21 19:42 36.4 74 16 112/54 (73) 96   





Capillary Refill :


General Appearance:  No Apparent Distress


HEENT:  PERRL/EOMI


Neck:  Full Range of Motion


Respiratory:  Chest Non Tender, Lungs Clear


Cardiovascular:  Regular Rate, Rhythm


Gastrointestinal:  normal bowel sounds, non tender, soft


Extremity:  Normal Capillary Refill


Neurologic/Psychiatric:  Alert, Oriented x3


Skin:  Normal Color


Lymphatic:  No Adenopathy





Results


Lab


Laboratory Tests


5/18/21 10:54: Glucometer 170H


5/18/21 15:26: Glucometer 135H


5/18/21 20:15: Glucometer 165H


5/19/21 05:19: Glucometer 215H





Assessment/Plan


Assessment/Plan


Assess & Plan/Chief Complaint


s/p left BKA.


cont rehab.


long term will want pt to proceed with medically supervised weight loss and once

at a healthier weight(BMI around 50) , patient states very much interested and 

planning on it.


start prosthetic care. 


will have patient f/u in office in around 2 weeks after discharge.











CLAUDY ALEJO MD                May 19, 2021 09:59

## 2021-05-19 NOTE — THERAPY TEAM DISCHARGE SUMMARY
Therapy Discharge Summary


Discharge Recommendations


Date of Discharge


May 19, 2021 at 11:20


Therapy D/C Recommendations:  Home w/ Family Support, Occupational Therapy Home 

Care





Physical Therapy


Patient came to rehab following a left BKA.  Upon evaluation patient performed 

bed mobility with SBA, supine <-> sit with min/mod assist, slide board transfer 

with max assist, propel a manual ' with SBA.  Patient has been performing 

bed mobility and transfer training, balance and endurance training, functional 

strengthening, gait training, and education.  Patient has made some progress but

has only met his long term goals for bed mobility and supine <-> sit.  Now, 

patient performs a sliding board transfer with SBA and can place it himself but 

needs min assist occasionally for toilet transfers, patient can stand and 

perform a transfer with SBA but cannot stand from his WC, he can stand from the 

bed or therapy table because he can push with the back of his leg from the bed 

or therapy table while he cannot from the WC, he performs bed mobility and 

supine <-> sit with independence, he is also independent with WC mobility.  

Patient was discharged from this facility today and will be discharged from PT 

at this time.





Occupational Therapy


Decreased Activ Tolerance, Impaired I ADL's





PT Long Term Goals


Long Term Goals


PT Long Term Goals Time Frame:  May 19, 2021


Roll Left to Right (QC):  6


Sit to Lying (QC):  6


Lying-Sitting on Side/Bed(QC):  6


Sit to Stand (QC):  6


Chair/Bed-to-Chair Xfer(QC):  6 (SPT or slideboard, whichever is the safest 

method)


Car Transfer (QC):  4


Does the Patient Walk:  No and Walking Goal NOT indicated


Walk 10 feet (QC):  88


Walk 10ft-Uneven Surface(QC):  88


Walk 50ft with 2 Turns (QC):  88


Walk 150 ft (QC):  88


Does the Pt use WC or Scooter?:  Yes


Wheel 50 feet with 2 turns (QC:  6


1 Step (curb) (QC):  88


4 Steps (QC):  88


12 Steps (QC):  88


Picking up an Object (QC):  88





OT Long Term Goals


Long Term Goals


Time Frame:  May 19, 2021


Eating (QC):  6 (met)


Oral Hygiene (QC):  6 (met)


Shower/Bathe Self (QC):  6 (met)


Upper Body Dressing (QC):  6 (not met)


Lower Body Dressing (QC):  6 (not met)


On/Off Footwear (QC):  6 (not met)


Toileting Hygiene (QC):  6 (met)


Toilet/Commode Transfer (QC):  6 (SPT or slide boardnot met)


Additional Goals:  1-Demonstrate ADL Tasks, 2-Verbalize Understanding, 3-

ImproveStrength/Aung


1=Demonstrate adherence to instructed precautions during ADL tasks.


2=Patient will verbalize/demonstrate understanding of assistive 

devices/modifications for ADL.


3=Patient will improve strength/tolerance for activity to enable patient to 

perform ADL's.











CAMILLE HARRY PT                May 19, 2021 15:55

## 2021-05-19 NOTE — DISCHARGE SUMMARY
Diagnosis/Chief Complaint


Date of Admission


Apr 21, 2021 at 08:35


Date of Discharge





Discharge Date:  May 19, 2021


Discharge Diagnosis


Assessment:


s/p LBKA 


DM insulin dependence


HTN


HLP


CRI


Iron def anemia from acute blood loss


TERRI untreated





Plan:


Monitor pain


IRF protocol


Monitor hgb


Iron infusion





4/22/21:


Monitor sugar


Monitor hgb





4/23/21:


Add pain meds


Check labs in am





4/24/21:


Monitor sugar and decrease insulin


Hgb stable


Venofer





4/25/21:


Continue iron infusions


Sugar managed well


Pain controlled





4/26/21:


Stump 


Monitor sugar


Monitor hgb





4/27/21:


Monitor sugar


Decrease insulin


Monitor BP





4/28/21:


Monitor pain


Decreased insulin





4/29/21:


Monitor pain


BM regimen to prevent narcotic bowel





4/30/21:


Monitor closely


Monitor sugar





5/1/21:


Monitor closely


Dressing changes





5/2/21:


Monitor pain


Increase insulin





5/3/21:


Monitor closely


Monitor sugar levels and adjust insulin as needed





5/4/21:


Stump  maintained


Monitor closely





5/5/21:


Monitor pain


Stump 





5/6/21:


Monitor closely


Monitor staples





5/7/2021:


Continue weight loss regimen


Stump 


Monitor ganesh per Dr. ALEJO





5/8/21:


Continue weight loss


Monitor sugar





5/9/21:


Monitor closely


Labs tomorrow





5/10/21:


Control pain


Monitor hemoglobin


Monitor blood sugar





5/11/21:


DC Fentanyl IV


Monitor closely





5/13/21:


DC Wed


Monitor closely





5/14/21:


Increase pain med dose before dressing changes





5/15/21:


Monitor stump since all staples out today


Continue stump 





5/16/21:


Monitor closely


Stump 





5/17/21:


Improved status


Pain management





The patient has a physical and mobility limitation that makes him incapable of 

utilizing regular toilet facilities.








Because of his physical and mobility limitations, he is confined to limited 

areas in his home.








An extra wide/heavy duty commode chair is necessary because patient weighs 441#.








A commode chair with detachable arms feature is necessary to accommodate patient

required use of a slide board for transfers and because of his weight of 441#.





5/18/21:


DC planned for tomorrow


All Rxes sent in





(1) Status post below-knee amputation of left lower extremity


Status:  Acute


(2) Hyperglycemia


Status:  Chronic


(3) Hypertension


Status:  Chronic


(4) Diabetes mellitus, type 2


Status:  Chronic


(5) Anemia


Status:  Acute


(6) Iron deficiency


Status:  Acute


(7) Peripheral neuropathy


Status:  Chronic





Discharge Summary


Discharge Physical Examination


Allergies:  


Coded Allergies:  


     levofloxacin (Verified  Allergy, Mild, 4/14/15)


Vitals & I&Os





Vital Signs








  Date Time  Temp Pulse Resp B/P (MAP) Pulse Ox O2 Delivery O2 Flow Rate FiO2


 


5/19/21 11:14 36.1 72 18 131/61 97 Room Air  








General Appearance:  Alert, Oriented X3, Cooperative


Respiratory:  Clear to Auscultation


Cardiovascular:  Regular Rate


Neuro:  Strength at 5/5 X4 Ext


Psych/Mental Status:  Mental Status NL





Hospital Course


Was the Problem List Reviewed?:  Yes


Hospital course: 


Pt had a lengthy hospital course for 29 days after he underwent a left 

below-the-knee amputation due to osteomyelitis and necrotizing fasciitis, due to

non-healing diabetic ulcer. Blood sugars remained good with insulin regimen. 

Labs remained stable. Pt did compete IV iron infusions. Pain medication was 

maintained of OxyContin BID and Oxycodone two pills of the 5 mG tablets before 

dressing changes. All staples were removed by Dr. Alejo, he will have close 

follow-up with home care and will be monitored in the meantime, all home 

medications were reviewed, insulin will likely be adjusted once he returns home 

and is back in his routine and he was discharged in improved condition.


Labs (last 24 hrs)


Laboratory Tests


4/21/21 10:52: Glucometer 146H


4/21/21 16:09: Glucometer 112H


4/21/21 21:15: Glucometer 139H


4/22/21 05:34: 


White Blood Count 6.9, Red Blood Count 2.92L, Hemoglobin 7.7L, Hematocrit 26L, 

Mean Corpuscular Volume 89, Mean Corpuscular Hemoglobin 26, Mean Corpuscular 

Hemoglobin Concent 30L, Red Cell Distribution Width 17.8H, Platelet Count 339, 

Mean Platelet Volume 9.1, Immature Granulocyte % (Auto) 1, Neutrophils (%) 

(Auto) 59, Lymphocytes (%) (Auto) 32, Monocytes (%) (Auto) 5, Eosinophils (%) 

(Auto) 4, Basophils (%) (Auto) 0, Neutrophils # (Auto) 4.0, Lymphocytes # (Auto)

2.2, Monocytes # (Auto) 0.3, Eosinophils # (Auto) 0.3, Basophils # (Auto) 0.0, 

Immature Granulocyte # (Auto) 0.1, Sodium Level 136, Potassium Level 4.7, 

Chloride Level 103, Carbon Dioxide Level 22, Anion Gap 11, Blood Urea Nitrogen 1

8, Creatinine 1.02, Estimat Glomerular Filtration Rate > 60, BUN/Creatinine 

Ratio 18, Glucose Level 107H, Calcium Level 9.3, Corrected Calcium 9.9, Total 

Bilirubin 0.3, Aspartate Amino Transf (AST/SGOT) 21, Alanine Aminotransferase 

(ALT/SGPT) 14, Alkaline Phosphatase 68, Total Protein 6.8, Albumin 3.3


4/22/21 11:39: Glucometer 87


4/22/21 15:43: Glucometer 99


4/22/21 20:53: Glucometer 99


4/23/21 06:13: Glucometer 76


4/23/21 10:46: Glucometer 103


4/23/21 15:30: Glucometer 93


4/23/21 20:42: Glucometer 115H


4/24/21 06:00: 


White Blood Count 6.3, Red Blood Count 2.95L, Hemoglobin 7.9L, Hematocrit 27L, 

Mean Corpuscular Volume 90, Mean Corpuscular Hemoglobin 27, Mean Corpuscular 

Hemoglobin Concent 30L, Red Cell Distribution Width 17.8H, Platelet Count 373, 

Mean Platelet Volume 9.3, Immature Granulocyte % (Auto) 1, Neutrophils (%) 

(Auto) 58, Lymphocytes (%) (Auto) 32, Monocytes (%) (Auto) 6, Eosinophils (%) 

(Auto) 3, Basophils (%) (Auto) 1, Neutrophils # (Auto) 3.7, Lymphocytes # (Auto)

2.0, Monocytes # (Auto) 0.4, Eosinophils # (Auto) 0.2, Basophils # (Auto) 0.0, 

Immature Granulocyte # (Auto) 0.1, Sodium Level 139, Potassium Level 4.7, 

Chloride Level 104, Carbon Dioxide Level 23, Anion Gap 12, Blood Urea Nitrogen 

20H, Creatinine 0.84, Estimat Glomerular Filtration Rate > 60, BUN/Creatinine 

Ratio 24, Glucose Level 102, Calcium Level 9.4, Corrected Calcium 9.9, Total 

Bilirubin 0.3, Aspartate Amino Transf (AST/SGOT) 20, Alanine Aminotransferase 

(ALT/SGPT) 14, Alkaline Phosphatase 74, Total Protein 7.0, Albumin 3.4


4/24/21 06:10: Glucometer 91


4/24/21 11:01: Glucometer 94


4/24/21 15:18: Glucometer 134H


4/24/21 20:24: Glucometer 115H


4/25/21 05:47: Glucometer 98


4/25/21 10:41: Glucometer 118H


4/25/21 16:56: Glucometer 116H


4/25/21 21:07: Glucometer 131H


4/26/21 05:00: 


White Blood Count 6.5, Red Blood Count 2.95L, Hemoglobin 7.9L, Hematocrit 27L, 

Mean Corpuscular Volume 90, Mean Corpuscular Hemoglobin 27, Mean Corpuscular 

Hemoglobin Concent 30L, Red Cell Distribution Width 17.8H, Platelet Count 380, 

Mean Platelet Volume 9.1, Immature Granulocyte % (Auto) 1, Neutrophils (%) 

(Auto) 51, Lymphocytes (%) (Auto) 38, Monocytes (%) (Auto) 6, Eosinophils (%) 

(Auto) 3, Basophils (%) (Auto) 1, Neutrophils # (Auto) 3.3, Lymphocytes # (Auto)

2.5, Monocytes # (Auto) 0.4, Eosinophils # (Auto) 0.2, Basophils # (Auto) 0.0, 

Immature Granulocyte # (Auto) 0.1, Sodium Level 142, Potassium Level 4.8, 

Chloride Level 105, Carbon Dioxide Level 25, Anion Gap 12, Blood Urea Nitrogen 

21H, Creatinine 0.97, Estimat Glomerular Filtration Rate > 60, BUN/Creatinine R

atio 22, Glucose Level 114H, Calcium Level 9.6, Corrected Calcium 10.1, Total 

Bilirubin 0.2, Aspartate Amino Transf (AST/SGOT) 18, Alanine Aminotransferase 

(ALT/SGPT) 14, Alkaline Phosphatase 79, Total Protein 6.9, Albumin 3.4


4/26/21 11:12: Glucometer 120H


4/26/21 16:17: Glucometer 96


4/26/21 21:27: Glucometer 109


4/27/21 05:26: Glucometer 120H


4/27/21 11:42: Glucometer 114H


4/27/21 16:31: Glucometer 111H


4/27/21 20:34: Glucometer 114H


4/28/21 06:22: Glucometer 109


4/28/21 11:21: Glucometer 111H


4/28/21 16:28: Glucometer 131H


4/28/21 20:26: Glucometer 135H


4/29/21 06:23: Glucometer 99


4/29/21 11:08: Glucometer 140H


4/29/21 15:19: Glucometer 142H


4/29/21 20:34: Glucometer 162H


4/30/21 06:13: Glucometer 111H


4/30/21 10:49: Glucometer 183H


4/30/21 15:30: Glucometer 136H


4/30/21 20:51: Glucometer 144H


5/1/21 05:45: Glucometer 143H


5/1/21 11:20: Glucometer 179H


5/1/21 17:04: Glucometer 176H


5/1/21 20:14: Glucometer 169H


5/2/21 06:04: Glucometer 175H


5/2/21 10:44: Glucometer 173H


5/2/21 16:11: Glucometer 177H


5/2/21 20:36: Glucometer 184H


5/3/21 06:00: 


Sodium Level 140, Potassium Level 4.5, Chloride Level 104, Carbon Dioxide Level 

27, Anion Gap 9, Blood Urea Nitrogen 24H, Creatinine 0.89, Estimat Glomerular 

Filtration Rate > 60, BUN/Creatinine Ratio 27, Glucose Level 114H, Calcium Level

9.2, Corrected Calcium 9.6, Total Bilirubin 0.3, Aspartate Amino Transf 

(AST/SGOT) 14, Alanine Aminotransferase (ALT/SGPT) 18, Alkaline Phosphatase 80, 

Total Protein 6.8, Albumin 3.5


5/3/21 06:04: Glucometer 100


5/3/21 06:50: 


White Blood Count 5.9, Red Blood Count 3.42L, Hemoglobin 9.5L, Hematocrit 31L, 

Mean Corpuscular Volume 91, Mean Corpuscular Hemoglobin 28, Mean Corpuscular 

Hemoglobin Concent 31L, Red Cell Distribution Width 17.4H, Platelet Count 382, 

Mean Platelet Volume 9.3, Immature Granulocyte % (Auto) 0, Neutrophils (%) 

(Auto) 58, Lymphocytes (%) (Auto) 33, Monocytes (%) (Auto) 5, Eosinophils (%) 

(Auto) 3, Basophils (%) (Auto) 1, Neutrophils # (Auto) 3.4, Lymphocytes # (Auto)

2.0, Monocytes # (Auto) 0.3, Eosinophils # (Auto) 0.2, Basophils # (Auto) 0.0, 

Immature Granulocyte # (Auto) 0.0


5/3/21 10:31: Glucometer 141H


5/3/21 15:28: Glucometer 158H


5/3/21 20:28: Glucometer 170H


5/4/21 05:41: Glucometer 134H


5/4/21 10:49: Glucometer 177H


5/4/21 15:36: Glucometer 143H


5/4/21 20:40: Glucometer 154H


5/5/21 06:03: Glucometer 136H


5/5/21 10:54: Glucometer 170H


5/5/21 15:21: Glucometer 157H


5/5/21 21:10: Glucometer 147H


5/6/21 05:53: Glucometer 132H


5/6/21 11:41: Glucometer 188H


5/6/21 16:42: Glucometer 165H


5/6/21 20:02: Glucometer 164H


5/7/21 05:55: Glucometer 79


5/7/21 11:13: Glucometer 134H


5/7/21 15:53: Glucometer 137H


5/7/21 21:02: Glucometer 134H


5/8/21 06:07: Glucometer 115H


5/8/21 12:07: Glucometer 143H


5/8/21 15:23: Glucometer 178H


5/8/21 20:06: Glucometer 190H


5/9/21 06:11: Glucometer 87


5/9/21 11:02: Glucometer 121H


5/9/21 15:25: Glucometer 166H


5/9/21 21:13: Glucometer 186H


5/10/21 05:55: 


White Blood Count 6.1, Red Blood Count 3.43L, Hemoglobin 9.3L, Hematocrit 30L, M

joseline Corpuscular Volume 88, Mean Corpuscular Hemoglobin 27, Mean Corpuscular 

Hemoglobin Concent 31L, Red Cell Distribution Width 16.7H, Platelet Count 249, 

Mean Platelet Volume 9.8, Immature Granulocyte % (Auto) 0, Neutrophils (%) 

(Auto) 56, Lymphocytes (%) (Auto) 33, Monocytes (%) (Auto) 6, Eosinophils (%) 

(Auto) 4, Basophils (%) (Auto) 1, Neutrophils # (Auto) 3.4, Lymphocytes # (Auto)

2.0, Monocytes # (Auto) 0.4, Eosinophils # (Auto) 0.2, Basophils # (Auto) 0.0, 

Immature Granulocyte # (Auto) 0.0, Sodium Level 140, Potassium Level 4.6, 

Chloride Level 105, Carbon Dioxide Level 24, Anion Gap 11, Blood Urea Nitrogen 

30H, Creatinine 1.07, Estimat Glomerular Filtration Rate > 60, BUN/Creatinine 

Ratio 28, Glucose Level 128H, Calcium Level 9.1, Corrected Calcium 9.5, Total 

Bilirubin 0.3, Aspartate Amino Transf (AST/SGOT) 17, Alanine Aminotransferase 

(ALT/SGPT) 24, Alkaline Phosphatase 87, Total Protein 6.6, Albumin 3.5


5/10/21 10:53: Glucometer 130H


5/10/21 15:21: Glucometer 151H


5/10/21 20:14: Glucometer 144H


5/11/21 06:07: Glucometer 99


5/11/21 10:49: Glucometer 185H


5/11/21 15:16: Glucometer 133H


5/11/21 20:02: Glucometer 116H


5/12/21 05:40: Glucometer 99


5/12/21 11:17: Glucometer 127H


5/12/21 16:46: Glucometer 91


5/12/21 22:02: Glucometer 125H


5/13/21 06:02: Glucometer 109


5/13/21 10:44: Glucometer 144H


5/13/21 15:48: Glucometer 132H


5/13/21 20:44: Glucometer 159H


5/14/21 06:06: Glucometer 133H


5/14/21 10:44: Glucometer 170H


5/14/21 15:15: Glucometer 126H


5/14/21 20:35: Glucometer 124H


5/15/21 05:30: Glucometer 133H


5/15/21 10:32: Glucometer 200H


5/15/21 16:04: Glucometer 140H


5/15/21 20:09: Glucometer 164H


5/16/21 06:14: Glucometer 139H


5/16/21 11:01: Glucometer 190H


5/16/21 16:06: Glucometer 198H


5/16/21 20:12: Glucometer 119H


5/17/21 05:30: 


White Blood Count 5.5, Red Blood Count 3.29L, Hemoglobin 9.1L, Hematocrit 29L, 

Mean Corpuscular Volume 88, Mean Corpuscular Hemoglobin 28, Mean Corpuscular 

Hemoglobin Concent 32, Red Cell Distribution Width 16.0H, Platelet Count 230, 

Mean Platelet Volume 9.9, Immature Granulocyte % (Auto) 0, Neutrophils (%) 

(Auto) 49, Lymphocytes (%) (Auto) 39, Monocytes (%) (Auto) 6, Eosinophils (%) 

(Auto) 5, Basophils (%) (Auto) 1, Neutrophils # (Auto) 2.7, Lymphocytes # (Auto)

2.2, Monocytes # (Auto) 0.3, Eosinophils # (Auto) 0.3, Basophils # (Auto) 0.0, 

Immature Granulocyte # (Auto) 0.0, Sodium Level 140, Potassium Level 4.1, 

Chloride Level 104, Carbon Dioxide Level 28, Anion Gap 8, Blood Urea Nitrogen 

23H, Creatinine 0.82, Estimat Glomerular Filtration Rate > 60, BUN/Creatinine 

Ratio 28, Glucose Level 119H, Calcium Level 9.3, Corrected Calcium 9.8, Total Bi

lirubin 0.2, Aspartate Amino Transf (AST/SGOT) 16, Alanine Aminotransferase 

(ALT/SGPT) 22, Alkaline Phosphatase 70, Total Protein 6.5, Albumin 3.4


5/17/21 10:40: Glucometer 200H


5/17/21 15:24: Glucometer 136H


5/17/21 20:25: Glucometer 216H


5/18/21 05:25: Glucometer 141H


5/18/21 10:54: Glucometer 170H


5/18/21 15:26: Glucometer 135H


5/18/21 20:15: Glucometer 165H


5/19/21 05:19: Glucometer 215H


5/19/21 10:37: Glucometer 176H





Pending Labs


Laboratory Tests


4/21/21 10:52: Glucometer 146


4/21/21 16:09: Glucometer 112


4/21/21 21:15: Glucometer 139


4/22/21 05:34: 


White Blood Count 6.9, Red Blood Count 2.92, Hemoglobin 7.7, Hematocrit 26, Mean

Corpuscular Volume 89, Mean Corpuscular Hemoglobin 26, Mean Corpuscular 

Hemoglobin Concent 30, Red Cell Distribution Width 17.8, Platelet Count 339, 

Mean Platelet Volume 9.1, Immature Granulocyte % (Auto) 1, Neutrophils (%) 

(Auto) 59, Lymphocytes (%) (Auto) 32, Monocytes (%) (Auto) 5, Eosinophils (%) 

(Auto) 4, Basophils (%) (Auto) 0, Neutrophils # (Auto) 4.0, Lymphocytes # (Auto)

2.2, Monocytes # (Auto) 0.3, Eosinophils # (Auto) 0.3, Basophils # (Auto) 0.0, 

Immature Granulocyte # (Auto) 0.1, Sodium Level 136, Potassium Level 4.7, 

Chloride Level 103, Carbon Dioxide Level 22, Anion Gap 11, Blood Urea Nitrogen 

18, Creatinine 1.02, Estimat Glomerular Filtration Rate > 60, BUN/Creatinine 

Ratio 18, Glucose Level 107, Calcium Level 9.3, Corrected Calcium 9.9, Total 

Bilirubin 0.3, Aspartate Amino Transf (AST/SGOT) 21, Alanine Aminotransferase 

(ALT/SGPT) 14, Alkaline Phosphatase 68, Total Protein 6.8, Albumin 3.3


4/22/21 11:39: Glucometer 87


4/22/21 15:43: Glucometer 99


4/22/21 20:53: Glucometer 99


4/23/21 06:13: Glucometer 76


4/23/21 10:46: Glucometer 103


4/23/21 15:30: Glucometer 93


4/23/21 20:42: Glucometer 115


4/24/21 06:00: 


White Blood Count 6.3, Red Blood Count 2.95, Hemoglobin 7.9, Hematocrit 27, Mean

Corpuscular Volume 90, Mean Corpuscular Hemoglobin 27, Mean Corpuscular 

Hemoglobin Concent 30, Red Cell Distribution Width 17.8, Platelet Count 373, 

Mean Platelet Volume 9.3, Immature Granulocyte % (Auto) 1, Neutrophils (%) 

(Auto) 58, Lymphocytes (%) (Auto) 32, Monocytes (%) (Auto) 6, Eosinophils (%) 

(Auto) 3, Basophils (%) (Auto) 1, Neutrophils # (Auto) 3.7, Lymphocytes # (Auto)

2.0, Monocytes # (Auto) 0.4, Eosinophils # (Auto) 0.2, Basophils # (Auto) 0.0, 

Immature Granulocyte # (Auto) 0.1, Sodium Level 139, Potassium Level 4.7, 

Chloride Level 104, Carbon Dioxide Level 23, Anion Gap 12, Blood Urea Nitrogen 

20, Creatinine 0.84, Estimat Glomerular Filtration Rate > 60, BUN/Creatinine 

Ratio 24, Glucose Level 102, Calcium Level 9.4, Corrected Calcium 9.9, Total 

Bilirubin 0.3, Aspartate Amino Transf (AST/SGOT) 20, Alanine Aminotransferase 

(ALT/SGPT) 14, Alkaline Phosphatase 74, Total Protein 7.0, Albumin 3.4


4/24/21 06:10: Glucometer 91


4/24/21 11:01: Glucometer 94


4/24/21 15:18: Glucometer 134


4/24/21 20:24: Glucometer 115


4/25/21 05:47: Glucometer 98


4/25/21 10:41: Glucometer 118


4/25/21 16:56: Glucometer 116


4/25/21 21:07: Glucometer 131


4/26/21 05:00: 


White Blood Count 6.5, Red Blood Count 2.95, Hemoglobin 7.9, Hematocrit 27, Mean

Corpuscular Volume 90, Mean Corpuscular Hemoglobin 27, Mean Corpuscular 

Hemoglobin Concent 30, Red Cell Distribution Width 17.8, Platelet Count 380, 

Mean Platelet Volume 9.1, Immature Granulocyte % (Auto) 1, Neutrophils (%) 

(Auto) 51, Lymphocytes (%) (Auto) 38, Monocytes (%) (Auto) 6, Eosinophils (%) 

(Auto) 3, Basophils (%) (Auto) 1, Neutrophils # (Auto) 3.3, Lymphocytes # (Auto)

2.5, Monocytes # (Auto) 0.4, Eosinophils # (Auto) 0.2, Basophils # (Auto) 0.0, 

Immature Granulocyte # (Auto) 0.1, Sodium Level 142, Potassium Level 4.8, 

Chloride Level 105, Carbon Dioxide Level 25, Anion Gap 12, Blood Urea Nitrogen 

21, Creatinine 0.97, Estimat Glomerular Filtration Rate > 60, BUN/Creatinine 

Ratio 22, Glucose Level 114, Calcium Level 9.6, Corrected Calcium 10.1, Total 

Bilirubin 0.2, Aspartate Amino Transf (AST/SGOT) 18, Alanine Aminotransferase 

(ALT/SGPT) 14, Alkaline Phosphatase 79, Total Protein 6.9, Albumin 3.4


4/26/21 11:12: Glucometer 120


4/26/21 16:17: Glucometer 96


4/26/21 21:27: Glucometer 109


4/27/21 05:26: Glucometer 120


4/27/21 11:42: Glucometer 114


4/27/21 16:31: Glucometer 111


4/27/21 20:34: Glucometer 114


4/28/21 06:22: Glucometer 109


4/28/21 11:21: Glucometer 111


4/28/21 16:28: Glucometer 131


4/28/21 20:26: Glucometer 135


4/29/21 06:23: Glucometer 99


4/29/21 11:08: Glucometer 140


4/29/21 15:19: Glucometer 142


4/29/21 20:34: Glucometer 162


4/30/21 06:13: Glucometer 111


4/30/21 10:49: Glucometer 183


4/30/21 15:30: Glucometer 136


4/30/21 20:51: Glucometer 144


5/1/21 05:45: Glucometer 143


5/1/21 11:20: Glucometer 179


5/1/21 17:04: Glucometer 176


5/1/21 20:14: Glucometer 169


5/2/21 06:04: Glucometer 175


5/2/21 10:44: Glucometer 173


5/2/21 16:11: Glucometer 177


5/2/21 20:36: Glucometer 184


5/3/21 06:00: 


Sodium Level 140, Potassium Level 4.5, Chloride Level 104, Carbon Dioxide Level 

27, Anion Gap 9, Blood Urea Nitrogen 24, Creatinine 0.89, Estimat Glomerular 

Filtration Rate > 60, BUN/Creatinine Ratio 27, Glucose Level 114, Calcium Level 

9.2, Corrected Calcium 9.6, Total Bilirubin 0.3, Aspartate Amino Transf 

(AST/SGOT) 14, Alanine Aminotransferase (ALT/SGPT) 18, Alkaline Phosphatase 80, 

Total Protein 6.8, Albumin 3.5


5/3/21 06:04: Glucometer 100


5/3/21 06:50: 


White Blood Count 5.9, Red Blood Count 3.42, Hemoglobin 9.5, Hematocrit 31, Mean

Corpuscular Volume 91, Mean Corpuscular Hemoglobin 28, Mean Corpuscular 

Hemoglobin Concent 31, Red Cell Distribution Width 17.4, Platelet Count 382, 

Mean Platelet Volume 9.3, Immature Granulocyte % (Auto) 0, Neutrophils (%) 

(Auto) 58, Lymphocytes (%) (Auto) 33, Monocytes (%) (Auto) 5, Eosinophils (%) 

(Auto) 3, Basophils (%) (Auto) 1, Neutrophils # (Auto) 3.4, Lymphocytes # (Auto)

2.0, Monocytes # (Auto) 0.3, Eosinophils # (Auto) 0.2, Basophils # (Auto) 0.0, 

Immature Granulocyte # (Auto) 0.0


5/3/21 10:31: Glucometer 141


5/3/21 15:28: Glucometer 158


5/3/21 20:28: Glucometer 170


5/4/21 05:41: Glucometer 134


5/4/21 10:49: Glucometer 177


5/4/21 15:36: Glucometer 143


5/4/21 20:40: Glucometer 154


5/5/21 06:03: Glucometer 136


5/5/21 10:54: Glucometer 170


5/5/21 15:21: Glucometer 157


5/5/21 21:10: Glucometer 147


5/6/21 05:53: Glucometer 132


5/6/21 11:41: Glucometer 188


5/6/21 16:42: Glucometer 165


5/6/21 20:02: Glucometer 164


5/7/21 05:55: Glucometer 79


5/7/21 11:13: Glucometer 134


5/7/21 15:53: Glucometer 137


5/7/21 21:02: Glucometer 134


5/8/21 06:07: Glucometer 115


5/8/21 12:07: Glucometer 143


5/8/21 15:23: Glucometer 178


5/8/21 20:06: Glucometer 190


5/9/21 06:11: Glucometer 87


5/9/21 11:02: Glucometer 121


5/9/21 15:25: Glucometer 166


5/9/21 21:13: Glucometer 186


5/10/21 05:55: 


White Blood Count 6.1, Red Blood Count 3.43, Hemoglobin 9.3, Hematocrit 30, Mean

Corpuscular Volume 88, Mean Corpuscular Hemoglobin 27, Mean Corpuscular 

Hemoglobin Concent 31, Red Cell Distribution Width 16.7, Platelet Count 249, 

Mean Platelet Volume 9.8, Immature Granulocyte % (Auto) 0, Neutrophils (%) 

(Auto) 56, Lymphocytes (%) (Auto) 33, Monocytes (%) (Auto) 6, Eosinophils (%) 

(Auto) 4, Basophils (%) (Auto) 1, Neutrophils # (Auto) 3.4, Lymphocytes # (Auto)

2.0, Monocytes # (Auto) 0.4, Eosinophils # (Auto) 0.2, Basophils # (Auto) 0.0, 

Immature Granulocyte # (Auto) 0.0, Sodium Level 140, Potassium Level 4.6, 

Chloride Level 105, Carbon Dioxide Level 24, Anion Gap 11, Blood Urea Nitrogen 

30, Creatinine 1.07, Estimat Glomerular Filtration Rate > 60, BUN/Creatinine 

Ratio 28, Glucose Level 128, Calcium Level 9.1, Corrected Calcium 9.5, Total 

Bilirubin 0.3, Aspartate Amino Transf (AST/SGOT) 17, Alanine Aminotransferase 

(ALT/SGPT) 24, Alkaline Phosphatase 87, Total Protein 6.6, Albumin 3.5


5/10/21 10:53: Glucometer 130


5/10/21 15:21: Glucometer 151


5/10/21 20:14: Glucometer 144


5/11/21 06:07: Glucometer 99


5/11/21 10:49: Glucometer 185


5/11/21 15:16: Glucometer 133


5/11/21 20:02: Glucometer 116


5/12/21 05:40: Glucometer 99


5/12/21 11:17: Glucometer 127


5/12/21 16:46: Glucometer 91


5/12/21 22:02: Glucometer 125


5/13/21 06:02: Glucometer 109


5/13/21 10:44: Glucometer 144


5/13/21 15:48: Glucometer 132


5/13/21 20:44: Glucometer 159


5/14/21 06:06: Glucometer 133


5/14/21 10:44: Glucometer 170


5/14/21 15:15: Glucometer 126


5/14/21 20:35: Glucometer 124


5/15/21 05:30: Glucometer 133


5/15/21 10:32: Glucometer 200


5/15/21 16:04: Glucometer 140


5/15/21 20:09: Glucometer 164


5/16/21 06:14: Glucometer 139


5/16/21 11:01: Glucometer 190


5/16/21 16:06: Glucometer 198


5/16/21 20:12: Glucometer 119


5/17/21 05:30: 


White Blood Count 5.5, Red Blood Count 3.29, Hemoglobin 9.1, Hematocrit 29, Mean

Corpuscular Volume 88, Mean Corpuscular Hemoglobin 28, Mean Corpuscular 

Hemoglobin Concent 32, Red Cell Distribution Width 16.0, Platelet Count 230, 

Mean Platelet Volume 9.9, Immature Granulocyte % (Auto) 0, Neutrophils (%) 

(Auto) 49, Lymphocytes (%) (Auto) 39, Monocytes (%) (Auto) 6, Eosinophils (%) 

(Auto) 5, Basophils (%) (Auto) 1, Neutrophils # (Auto) 2.7, Lymphocytes # (Auto)

2.2, Monocytes # (Auto) 0.3, Eosinophils # (Auto) 0.3, Basophils # (Auto) 0.0, 

Immature Granulocyte # (Auto) 0.0, Sodium Level 140, Potassium Level 4.1, 

Chloride Level 104, Carbon Dioxide Level 28, Anion Gap 8, Blood Urea Nitrogen 

23, Creatinine 0.82, Estimat Glomerular Filtration Rate > 60, BUN/Creatinine 

Ratio 28, Glucose Level 119, Calcium Level 9.3, Corrected Calcium 9.8, Total 

Bilirubin 0.2, Aspartate Amino Transf (AST/SGOT) 16, Alanine Aminotransferase 

(ALT/SGPT) 22, Alkaline Phosphatase 70, Total Protein 6.5, Albumin 3.4


5/17/21 10:40: Glucometer 200


5/17/21 15:24: Glucometer 136


5/17/21 20:25: Glucometer 216


5/18/21 05:25: Glucometer 141


5/18/21 10:54: Glucometer 170


5/18/21 15:26: Glucometer 135


5/18/21 20:15: Glucometer 165


5/19/21 05:19: Glucometer 215


5/19/21 10:37: Glucometer 176








Discharge


Home Medications:





Active Scripts


Active


Oxycodone HCl ER (Oxycodone HCl) 10 Mg Tab.er.12h 10 Mg PO BID


Gabapentin 100 Mg Capsule 300 Mg PO TID


Lotrimin AF (Miconazole Nitrate) 90 Gm Powder 0 Gm TOP BID


Pantoprazole Sodium 40 Mg Tablet.dr 40 Mg PO DAILY


Oxyir Tablet (Oxycodone HCl) 5 Mg Tab 10 Mg PO DAILY


Levemir Flextouch (Insulin Detemir) 100 Unit/1 Ml Insuln.pen 15 Unit SQ BID 7 

Days


Novolog Flexpen (Insulin Aspart) 300 Units/3 Ml Solution 8 Units SQ AC 7 Days


Reported


Metoprolol Succinate 100 Mg Tab.er.24h 100 Mg PO DAILY


Tums Ultra (Calcium Carbonate) 400 Mg Tab.chew 400-800 Mg PO PRN PRN


Atorvastatin Calcium 80 Mg Tablet 80 Mg PO DAILY


     LAST FILL DATE 01- #30


Prasugrel HCl 10 Mg Tablet 10 Mg PO DAILY


Aspirin EC (Aspirin) 81 Mg Tablet.dr 81 Mg PO DAILY


Lisinopril-Hctz 20-25 mg Tab (Lisinopril/Hydrochlorothiazide) 1 Each Tablet 1 

Each PO DAILY


     LAST FILLED 01- #60/60 DAY SUPPLY


Glyburide 2.5 Mg Tablet 2.5 Mg PO BID


Metformin HCl ER (Metformin HCl) 500 Mg Tab.er.24 500 Mg PO BID





Instructions to patient/family


Please see electronic discharge instructions given to patient.





Diagnosis/Problems


Diagnosis/Problems





(1) Status post below-knee amputation of left lower extremity


Status:  Acute


(2) Hyperglycemia


Status:  Chronic


(3) Hypertension


Status:  Chronic


(4) Diabetes mellitus, type 2


Status:  Chronic


(5) Anemia


Status:  Acute


(6) Iron deficiency


Status:  Acute


(7) Peripheral neuropathy


Status:  Chronic











NICOLE VILA DO                May 19, 2021 08:59

## 2022-11-27 NOTE — OCCUPATIONAL THERAPY EVAL
OT Evaluation-General/PLF


Medical Diagnosis


Admission Date


Mar 23, 2021 at 18:41


Medical Diagnosis:  diabetic ulcer L foot


Onset Date:  Mar 23, 2021





Therapy Diagnosis


Therapy Diagnosis:  weakness, decreased ADL status





Height/Weight


Height (Feet):  6


Height (Inches):  0.00


Weight (Pounds):  422


Weight (Ounces):  6.4





Precautions


Precautions/Isolations:  Standard Precautions





Weight Bear Status


Location Restriction:  LT FOOT


Orders indicate WBAT, pt states non weight bearing in order to heal wounds.





Referral


Physician:  Holly


Referral Reason:  Evaluation/Treatment





Medical History


Pertinent Medical History:  DM, HTN, Neuropathy


Additional Medical History


coronary stent, chronic back pain


Current History


ED due to diabetic foot ulcer L foot & L lateral malleolus abscess





Social History


Home:  Single Level


Current Living Status:  Spouse


 Steps Into Home:  2





ADL-Prior Level of Function


SCALE: Activities may be completed with or without assistive devices.





6-Indepedent-patient completes the activity by him/herself with no assistance 

from a helper.


5-Set-up or Clean-up Assistance-helper sets up or cleans up; patient completes 

activity. [place] assists only prior to or  


    following the activity.


4-Supervision or Touching Assistance-helper provides verbal cues and/or 

touching/steadying and/or contact guard assistance as patient completes 

activity. Assistance may be provided   


    throughout the activity or intermittently.


3-Partial/Moderate Assistance-helper does LESS THAN HALF the effort. [place] 

lifts, holds or supports trunk or limbs, but provides less than half the effort.


2-Substantial/Maximal Assistance-helper does MORE THAN HALF the effort. [place] 

lifts or holds trunk or limbs and provides more than half the effort.


1-Mhukntvsq-gsknhd does ALL the effort. Patient does none of the effort to 

complete the activity. Or, the assistance of 2 or more helpers is required for 

the patient to complete the  


    activity.


If activity was not attempted, code reason:


7-Patient Refused.


9-Not Applicable-not attempted and the patient did not perform the activity 

before the current illness, exacerbation or injury.


10-Not Attempted due to Environmental Limitations-(lack of equipment, weather 

restraints, etc.).


88-Not Attempted due to Medical Conditions or Safety Concerns.


ADL PLOF Comments


Pt reports being independent wtih ADLs at OF, wife assists with footwear. Pt 

completes the cooking and cleaning. He uses a cane for functional mobility.


Self Care:  Needed Some Help


Functional Cognition:  Independent


DME/Equipment Comments


cane





OT Current Status


Subjective


Pt seated in shower with nursing staff, agreeable to OT evaluation/tx.





Mental Status/Objective


Patient Orientation:  Person, Place, Time, Situation


Attachments:  Other-See Comments (wound vac)





Current


Upper Extremity ROM


WFL,


Upper Extremity Coordination


WFL


Upper Extremity Sensation


WFL


Upper Extremity Strength


grossly 3+/5 BUES





ADL-Treatment


Eating (QC):  6 (Per pt report)


Shower/Bathe Self (QC):  3 (Assist with BLE lower legs/feet, assist buttocks.)


Upper Body Dressing (QC):  5 (Pt able to doff/don hospital gown)


On/Off Footwear (QC):  1 (Assist donning RLE sock (Wife assists at home))


Toileting Hygiene (QC):  5 (Pt states able to cleanse self after BM)





Other Treatments


Pt seated in shower chair with nurse present, agreeable to OT evaluation and tx.

Pt completed shower seated in SC, assist to wash BLE lower legs/feet. Pt then 

stood at W, educated on nonweight bearing of LLE, but pt had difficulty 

maintaining nonweight bearing status with ambulation. CGA to transfer from SC to

recliner. Then transferred supine. OT educated pt on OT POC while admitted, he 

verbalized understanding. Post tx, pt laying in bed, call light in reach and all

needs met, nursing staff and wound care staff present.





Education


OT Patient Education:  Correct positioning, Modified ADL techniques, Progress 

toward Goal/Update tx plan, Purpose of tx/functional activities


Teaching Recipient:  Patient


Teaching Methods:  Discussion


Response to Teaching:  Verbalize Understanding





OT Long Term Goals


Long Term Goals


Time Frame:  Apr 16, 2021


Eating (QC):  6


Oral Hygiene (QC):  6


Toileting Hygiene (QC):  6


Shower/Bathe Self (QC):  5


Upper Body Dressing (QC):  6


Lower Body Dressing (QC):  6


Additional Goals:  1-Demonstrate ADL Tasks, 2-Verbalize Understanding, 3-

ImproveStrength/Aung


1=Demonstrate adherence to instructed precautions during ADL tasks.


2=Patient will verbalize/demonstrate understanding of assistive 

devices/modifications for ADL.


3=Patient will improve strength/tolerance for activity to enable patient to 

perform ADL's.





OT Education/Plan


Problem List/Assessment


Assessment:  Decreased Activ Tolerance, Decreased UE Strength, Impaired I ADL's,

Impaired Self-Care Skills





Discharge Recommendations


Plan/Recommendations:  Continue POC





Treatment Plan/Plan of Care


Patient would benefit from OT for education, treatment and training to promote 

independence in ADL's, mobility, safety and/or upper extremity function for 

ADL's.


Plan of Care:  ADL Retraining, Functional Mobility, UE Funct Exercise/Act


Treatment Duration:  Apr 16, 2021


Frequency:  5 times per week


Estimated Hrs Per Day:  .25 hour per day


Rehab Potential:  Fair





Time/GCodes


Start Time:  11:45


Stop Time:  12:08


Total Time Billed (hr/min):  23


Billed Treatment Time


1, EVM (10'), ADL (13')











YAMIL FLORES OT          Mar 26, 2021 11:50 PAST SURGICAL HISTORY:  No significant past surgical history

## 2023-10-18 NOTE — PROGRESS NOTE - HOSPITALIST
Subjective


HPI/CC On Admission


Date Seen by Provider:  Mar 26, 2021


Time Seen by Provider:  10:00


CC: Left lateral skin abscess with left heal osteomyelitis





HPI: This is a 40yoWM of [facility] who has a PMH of super-morbid obesity with DM who 

has experienced left heal, chronic diabetic ulcer for the past three months with

Dr. Guzmán at wound care, who presented to the ER with drainage from a lateral 

skin abscess, Dr. Jiménez was evaluation the Pt for a possible amputation but he is

not aware of that plan. The goal would be to perform a debridement, monitor labs

and his Hgb is 8.6, and potassium 3.5, we will have a picc line placed with 

Cefepime and Vancomycin empirically to treat the osteomyelitis presumptively in 

addition to gram-negative coverage. At this current time Pt denies any pain.


Subjective/Events-last exam


Patient doing well


Difficult for him to move around given lack of stability of left foot


IRF referral and submitted to insurance


Wound vac in place


Abx maintained


Dr Guzmán updated me


Sugars are improved


Received 1 unit of blood yesterday and hgb stable today


Iron level ordered





Review of Systems


Musculoskeletal:  foot pain





Focused Exam


Lactate Level


3/23/21 17:32: Lactic Acid Level 1.52








Objective


Exam


Vital Signs





Vital Signs








  Date Time  Temp Pulse Resp B/P (MAP) Pulse Ox O2 Delivery O2 Flow Rate FiO2


 


3/26/21 15:53 35.8 92 18 117/57 (77) 100 Room Air  





Capillary Refill : Less Than 3 SecondsLess Than 3 Seconds


General Appearance:  No Apparent Distress, WD/WN, Chronically ill, Obese


Respiratory:  Lungs Clear


Cardiovascular:  Regular Rate, Rhythm


Neurologic/Psychiatric:  Alert, Oriented x3, No Motor/Sensory Deficits, Normal 

Mood/Affect





Results/Procedures


Lab


Laboratory Tests


3/26/21 06:25








3/26/21 06:50








Patient resulted labs reviewed.





Assessment/Plan


Assessment and Plan


Assess & Plan/Chief Complaint








Assessment:


Left lateral ankle abscess s/p debridement with wound vac placement


Heel DM ulcer s/p debridement


Obesity


DM


Anemia s/p 1 unit of blood iron level pending





Plan:


IV abx


Wound vac


Insulin


Monitor hgb





Diagnosis/Problems


Diagnosis/Problems





(1) Diabetic foot ulcer


(2) Hyperglycemia


Status:  Acute


(3) Hypertension


Status:  Chronic


(4) Diabetes mellitus, type 2


Status:  Chronic


(5) Diabetic ulcer of foot associated with diabetes mellitus due to underlying 

condition, with necrosis of muscle


Status:  Acute


(6) ANCA (acute kidney injury)


Status:  Resolved


Resolution Date/Time:  4/30/20 @ 10:28











NICOLE VILA DO                Mar 26, 2021 05:53 Hide Additional Notes?: No Detail Level: Zone

## 2025-06-04 NOTE — PHYSICAL THERAPY DAILY NOTE
PCP:  Pcp, No    No chief complaint on file.      HPI:  Kleber Sanders is a 84 year old male who presents for Left knee Gelsyn Injection # 2.  The patient tolerated the previous injection well without much discomfort.      Physical Exam    Left knee  No significant redness, warmth, erythema, or swelling.  Sensation intact to light touch        Assessment/Plan:  There are no diagnoses linked to this encounter.    Patient tolerated Injection # 2.  I will see the patient back next week for the next injection in the series.      Pablo Stevenson M.D    Orthopedic Surgery       PT Daily Note-Current


Subjective


Patient sitting chair pre tx. Patient consents to PT and reports no pain.





Appearance


Patient sitting upright in chair post tx. Denies wanting tray table nearby, but 

call button was placed beside chair.





Mental Status


Patient Orientation:  Person, Place, Time, Normal For Age


Attachments:  Other-See Comments (Wound Vac), IV





Transfers


SCALE: Activities may be completed with or without assistive devices.





6-Indepedent-patient completes the activity by him/herself with no assistance 

from a helper.


5-Set-up or Clean-up Assistance-helper sets up or cleans up; patient completes 

activity. [place] assists only prior to or  


    following the activity.


4-Supervision or Touching Assistance-helper provides verbal cues and/or t

ouching/steadying and/or contact guard assistance as patient completes activity.

Assistance may be provided   


    throughout the activity or intermittently.


3-Partial/Moderate Assistance-helper does LESS THAN HALF the effort. [place] 

lifts, holds or supports trunk or limbs, but provides less than half the effort.


2-Substantial/Maximal Assistance-helper does MORE THAN HALF the effort. [place] 

lifts or holds trunk or limbs and provides more than half the effort.


6-Mimzbxpec-qsbeiw does ALL the effort. Patient does none of the effort to 

complete the activity. Or, the assistance of 2 or more helpers is required for 

the patient to complete the  


    activity.


If activity was not attempted, code reason:


7-Patient Refused.


9-Not Applicable-not attempted and the patient did not perform the activity 

before the current illness, exacerbation or injury.


10-Not Attempted due to Environmental Limitations-(lack of equipment, weather 

restraints, etc.).


88-Not Attempted due to Medical Conditions or Safety Concerns.





Weight Bearing


Right Lower Extremity:  Right


Full Weight Bearing


Left Lower Extremity:  Left


Non Weight Bearing





Patient NWB on L foot, no pressure allowed





Exercises


Supine Ex:  Ankle pumps, Quad Set, Glut sets, Heel Slides, Short Arc Quads 

(light blue bolster), Straight leg raise


Supine Reps:  20 (3 sets)


Used 2# weight on R LE for exercises, L LE did not complete heel slides





Treatments


LE exercises





Assessment


Current Status:  Fair Progress


Patient demonstrated improved muscular endurance with addition of 2# weight for 

LE exercise.





PT Short Term Goals


Short Term Goals


Time Frame:  2021


Roll Left & Right:  6


Sit to lyin


Lying to sitting on side of be:  6


Sit to stand:  4 (SBA)


Chair/bed-to-chair transfer:  4 (SBA)


Toilet transfer:  4 (SBA)


Car transfer:  4 (SBA)


Walk 10 feet:  4 (SBA)


Does pt use a wc or scooter:  Yes


Wheel 50ft w/2 turns:  6


Wheel 150 feet:  6


Type:  Manual





PT Long Term Goals


Long Term Goals


PT Long Term Goals Time Frame:  2021


Roll Left & Right (QC):  6


Sit to Lying (QC):  6


Lying-Sitting on Side/Bed(QC):  6


Sit to Stand (QC):  6


Chair/Bed-to-Chair Xfer(QC):  6


Toilet Transfer (QC):  6


Car Transfer (QC):  6


Does the Patient Walk:  No and Walking Goal IS indicated


Walk 10 feet (QC):  6


Walk 50ft with 2 Turns (QC):  6


Walk 150 ft (QC):  88


Walking 10ft on Uneven Surface:  6


1 Step (curb) (QC):  4


4 Steps (QC):  88


12 Steps (QC):  88


Picking up an Object (QC):  88


Does the Pt use WC or Scooter?:  Yes


Wheel 50 feet with 2 turns (QC:  6


Type:  Manual


Wheel 150 feet:  6


Type:  Manual





PT Plan


Problem List


Problem List:  Activity Tolerance, Functional Strength, Safety, Balance, Gait, 

Transfer, Bed Mobility, ROM





Treatment/Plan


Treatment Plan:  Continue Plan of Care


Treatment Plan:  Bed Mobility, Education, Functional Activity Aung, Functional 

Strength, Group Therapy, Gait, Safety, Therapeutic Exercise, Transfers


Treatment Duration:  2021


Frequency:  At least 5 of 7 days/Wk (IRF)


Estimated Hrs Per Day:  1.5 hours per day


Patient and/or Family Agrees t:  Yes





Safety Risks/Education


Patient Education:  Transfer Techniques, Reviewed Precautions, Correct 

Positioning, Safety Issues


Teaching Recipient:  Patient


Teaching Methods:  Demonstration, Discussion


Response to Teaching:  Verbalize Understanding, Return Demonstration





Time/GCodes


Time In:  1305


Time Out:  1335


Total Billed Treatment Time:  30


Total Billed Treatment


1 visit: 


EX x2: 30'











CAMILLE HARRY PT                 2021 13:51

## 2025-06-07 NOTE — WOUND CARE PROGRESS NOTE
Subjective


Subjective


Subjective/Events-last exam


36 year old male with infected L 5th metatarsal head ulcer, with necrotic base 

of uncertain depth.  This is clearly at least a Grade 2, and upon debridement 

may prove to be a Grade 3.  The patient has had poor control of diabetes with a 

HgbA1c of 12.0.  He has had poor foot care and hygiene, coming in to the 

hospital with the plantar surface black.  He presented with presumed sepsis and 

cellulitis of the L leg.  His symptoms have improved with IV Zosyn and 

Vancomycin.





PMH:  Type II diabetes, morbid obesity, peripheral neuropathy, venous 

insufficiency BLE, HTN, multiple lower extremity traumatic fractures.





FH: + for stroke, DM, MI.





Review of Systems


Date Seen by Provider:  Sep 19, 2017


Time Seen by Provider:  17:15


General:  No Chills


HEENT:  No Head Aches, No Visual Changes, No Dysphasia


Pulmonary:  No Dyspnea


Cardiovascular:  No: Chest Pain, Palpitations


Gastrointestinal:  No: Nausea, Vomiting, Abdominal Pain


Genitourinary:  No Dysuria


Musculoskeletal:  leg pain, No: foot pain


Neurological:  Numbness, No: Weakness, Confusion





Objective


Exam


Last Set of Vital Signs





Vital Signs








  Date Time  Temp Pulse Resp B/P (MAP) Pulse Ox O2 Delivery O2 Flow Rate FiO2


 


9/19/17 16:36 94.0 81 12 148/75 97 Room Air  





Capillary Refill : Less Than 3 Seconds


I&O











Intake and Output 


 


 9/20/17





 00:00


 


Intake Total 2610 ml


 


Output Total 950 ml


 


Balance 1660 ml


 


 


 


Intake Oral 740 ml


 


IV Total 1870 ml


 


Output Urine Total 950 ml


 


# Voids 3


 


# Bowel Movements 1


 


Daily Weight Change No








General:  Alert, No Acute Distress


HEENT:  Atraumatic


Neck:  Supple


Lungs:  Clear to Auscultation, Normal Air Movement


Heart:  Regular Rate, No Murmurs


Abdomen:  Normal Bowel Sounds, Soft


Extremities:  No Clubbing, No Cyanosis, Other (2+ pitting edema lower 

extremities, )


Skin:  No Rashes, Other (L 5th MTH  --  2.3 x 4.7 x 0.5 cm, base 100% gray 

necrotic slough, heavy thin drainage, foul odor, surrounding erythema.)


Neuro:  Normal Speech, Other (Diminished sensation in feet.)





Results


Lab


Laboratory Tests


9/18/17 22:35: 


White Blood Count 7.2, Red Blood Count 5.10, Hemoglobin 14.7, Hematocrit 42, 

Mean Corpuscular Volume 83, Mean Corpuscular Hemoglobin 29, Mean Corpuscular 

Hemoglobin Concent 35, Red Cell Distribution Width 14.1, Platelet Count 280, 

Mean Platelet Volume 10.3, Neutrophils (%) (Auto) 56, Lymphocytes (%) (Auto) 36

, Monocytes (%) (Auto) 6, Eosinophils (%) (Auto) 2, Basophils (%) (Auto) 0, 

Neutrophils # (Auto) 4.0, Lymphocytes # (Auto) 2.6, Monocytes # (Auto) 0.4, 

Eosinophils # (Auto) 0.2, Basophils # (Auto) 0.0, Erythrocyte Sedimentation 

Rate 29H, Prothrombin Time 12.5, INR Comment 0.9, Activated Partial 

Thromboplast Time 27, Sodium Level 132L, Potassium Level 4.1, Chloride Level 95L

, Carbon Dioxide Level 23, Anion Gap 14, Blood Urea Nitrogen 12, Creatinine 1.01

, Estimat Glomerular Filtration Rate > 60, BUN/Creatinine Ratio 12, Glucose 

Level 583*H, Lactic Acid Level 3.52*H, Calcium Level 10.4H, Total Bilirubin 0.5

, Aspartate Amino Transf (AST/SGOT) 12, Alanine Aminotransferase (ALT/SGPT) 23, 

Alkaline Phosphatase 103, C-Reactive Protein High Sensitivity 5.76H, Total 

Protein 7.5, Albumin 3.9, TSH Door Testing 1.69


9/19/17 01:05: Lactic Acid Level 3.19*H


9/19/17 05:38: 


White Blood Count 8.1, Red Blood Count 4.85, Hemoglobin 14.0, Hematocrit 41, 

Mean Corpuscular Volume 85, Mean Corpuscular Hemoglobin 29, Mean Corpuscular 

Hemoglobin Concent 34, Red Cell Distribution Width 14.4, Platelet Count 275, 

Mean Platelet Volume 10.5H, Neutrophils (%) (Auto) 50, Lymphocytes (%) (Auto) 41

, Monocytes (%) (Auto) 6, Eosinophils (%) (Auto) 2, Basophils (%) (Auto) 0, 

Neutrophils # (Auto) 4.1, Lymphocytes # (Auto) 3.4, Monocytes # (Auto) 0.5, 

Eosinophils # (Auto) 0.2, Basophils # (Auto) 0.0, Sodium Level 135, Potassium 

Level 4.0, Chloride Level 100, Carbon Dioxide Level 22, Anion Gap 13, Blood 

Urea Nitrogen 11, Creatinine 0.77, Estimat Glomerular Filtration Rate > 60, BUN/

Creatinine Ratio 14, Glucose Level 385H, Calcium Level 9.8, Total Bilirubin 0.6

, Aspartate Amino Transf (AST/SGOT) 13, Alanine Aminotransferase (ALT/SGPT) 21, 

Alkaline Phosphatase 93, Total Protein 6.5, Albumin 3.6, Hemoglobin A1c 12.0H


9/19/17 06:20: Glucometer 359H


9/19/17 09:57: Lactic Acid Level 2.38*H


9/19/17 11:04: Glucometer 302H


9/19/17 12:10: Lactic Acid Level 1.95


9/19/17 16:15: Glucometer 168H





Microbiology


9/18/17 Blood Culture - Preliminary, Resulted


          No growth


9/18/17 Gram Stain - Final, Resulted


          


9/18/17 Wound Culture - Preliminary, Resulted


          Gram Negative Stefan


          Strep Or Related Genus





Assessment/Plan


1. Diabetic foot ulcer, L 5th metatarsal head, Grade 2 with necrotic base.





2. Poorly controlled DM due to non-compliance.





3. Morbid obesity.





4. Hypertension.





Plan:  Sharp debridement at bedside, tomorrow.  Continue IV antibiotics.  

Patient to follow with Ricarda Leal RN.  He is told that if he fails to get 

control of his blood sugars, he WILL lose his leg.  Will need to off-load  --  

CAM boot vs. knee walker.











INES VERNON MD Sep 19, 2017 20:18 Unknown if ever smoked